# Patient Record
Sex: FEMALE | Race: WHITE | NOT HISPANIC OR LATINO | Employment: OTHER | URBAN - NONMETROPOLITAN AREA
[De-identification: names, ages, dates, MRNs, and addresses within clinical notes are randomized per-mention and may not be internally consistent; named-entity substitution may affect disease eponyms.]

---

## 2023-03-13 DIAGNOSIS — M86.461 CHRONIC OSTEOMYELITIS OF RIGHT TIBIA WITH DRAINING SINUS (MULTI): Primary | ICD-10-CM

## 2023-03-13 DIAGNOSIS — M17.31 POST-TRAUMATIC ARTHRITIS OF LOWER LEG, RIGHT: ICD-10-CM

## 2023-03-13 RX ORDER — OXYCODONE HYDROCHLORIDE 5 MG/1
5 TABLET ORAL EVERY 6 HOURS
Qty: 15 TABLET | Refills: 0 | Status: SHIPPED | OUTPATIENT
Start: 2023-03-13 | End: 2023-03-15 | Stop reason: SDUPTHER

## 2023-03-13 RX ORDER — OXYCODONE HYDROCHLORIDE 5 MG/1
TABLET ORAL EVERY 6 HOURS
COMMUNITY
Start: 2021-10-27 | End: 2023-03-13 | Stop reason: SDUPTHER

## 2023-03-13 NOTE — TELEPHONE ENCOUNTER
Sent this to Mohit but he hasn't been online to fill it. Can you please fill, she will be out tonight.

## 2023-03-15 DIAGNOSIS — M17.31 POST-TRAUMATIC ARTHRITIS OF LOWER LEG, RIGHT: ICD-10-CM

## 2023-03-15 DIAGNOSIS — M86.461 CHRONIC OSTEOMYELITIS OF RIGHT TIBIA WITH DRAINING SINUS (MULTI): ICD-10-CM

## 2023-03-16 RX ORDER — OXYCODONE HYDROCHLORIDE 5 MG/1
5 TABLET ORAL EVERY 6 HOURS
Qty: 60 TABLET | Refills: 0 | Status: SHIPPED | OUTPATIENT
Start: 2023-03-16 | End: 2023-04-01 | Stop reason: SDUPTHER

## 2023-03-29 DIAGNOSIS — M86.461 CHRONIC OSTEOMYELITIS OF RIGHT TIBIA WITH DRAINING SINUS (MULTI): Primary | ICD-10-CM

## 2023-03-29 RX ORDER — OXYCODONE AND ACETAMINOPHEN 5; 325 MG/1; MG/1
1 TABLET ORAL EVERY 6 HOURS PRN
COMMUNITY
Start: 2022-05-27 | End: 2023-03-29 | Stop reason: SDUPTHER

## 2023-03-29 RX ORDER — OXYCODONE AND ACETAMINOPHEN 5; 325 MG/1; MG/1
1 TABLET ORAL EVERY 6 HOURS PRN
Qty: 120 TABLET | Refills: 0 | Status: SHIPPED | OUTPATIENT
Start: 2023-03-29 | End: 2023-04-01 | Stop reason: ALTCHOICE

## 2023-04-01 ENCOUNTER — TELEPHONE (OUTPATIENT)
Dept: PRIMARY CARE | Facility: CLINIC | Age: 68
End: 2023-04-01

## 2023-04-01 DIAGNOSIS — M86.461 CHRONIC OSTEOMYELITIS OF RIGHT TIBIA WITH DRAINING SINUS (MULTI): ICD-10-CM

## 2023-04-01 DIAGNOSIS — M17.31 POST-TRAUMATIC ARTHRITIS OF LOWER LEG, RIGHT: ICD-10-CM

## 2023-04-01 RX ORDER — OXYCODONE HYDROCHLORIDE 5 MG/1
5 TABLET ORAL EVERY 6 HOURS
Qty: 60 TABLET | Refills: 0 | Status: SHIPPED | OUTPATIENT
Start: 2023-04-01 | End: 2023-04-01 | Stop reason: SDUPTHER

## 2023-04-01 RX ORDER — OXYCODONE HYDROCHLORIDE 5 MG/1
5 TABLET ORAL EVERY 6 HOURS
Qty: 60 TABLET | Refills: 0 | Status: SHIPPED | OUTPATIENT
Start: 2023-04-01 | End: 2023-04-14 | Stop reason: SDUPTHER

## 2023-04-01 NOTE — TELEPHONE ENCOUNTER
She has called a few times.  Percocet was called in instead of Oxy 5 mg. She does not want the percocet.  Uses [facility].  They gave her a partial rx for there perc.  Did not have enough.[phone]

## 2023-04-14 DIAGNOSIS — M86.461 CHRONIC OSTEOMYELITIS OF RIGHT TIBIA WITH DRAINING SINUS (MULTI): ICD-10-CM

## 2023-04-14 DIAGNOSIS — M17.31 POST-TRAUMATIC ARTHRITIS OF LOWER LEG, RIGHT: ICD-10-CM

## 2023-04-14 RX ORDER — OXYCODONE HYDROCHLORIDE 5 MG/1
5 TABLET ORAL EVERY 6 HOURS
Qty: 60 TABLET | Refills: 0 | Status: SHIPPED | OUTPATIENT
Start: 2023-04-14 | End: 2023-04-27 | Stop reason: SDUPTHER

## 2023-04-14 NOTE — TELEPHONE ENCOUNTER
Pt is requesting a refill of her oxy 5 mg. She takes them 4x daily. She is using [facility] in [place].

## 2023-04-27 DIAGNOSIS — M17.31 POST-TRAUMATIC ARTHRITIS OF LOWER LEG, RIGHT: ICD-10-CM

## 2023-04-27 DIAGNOSIS — M86.461 CHRONIC OSTEOMYELITIS OF RIGHT TIBIA WITH DRAINING SINUS (MULTI): ICD-10-CM

## 2023-04-27 RX ORDER — OXYCODONE HYDROCHLORIDE 5 MG/1
5 TABLET ORAL EVERY 6 HOURS
Qty: 60 TABLET | Refills: 0 | Status: SHIPPED | OUTPATIENT
Start: 2023-04-27 | End: 2023-05-17 | Stop reason: SDUPTHER

## 2023-05-02 DIAGNOSIS — F32.A DEPRESSION, UNSPECIFIED: ICD-10-CM

## 2023-05-02 RX ORDER — PAROXETINE 10 MG/1
TABLET, FILM COATED ORAL
Qty: 90 TABLET | Refills: 3 | Status: SHIPPED | OUTPATIENT
Start: 2023-05-02 | End: 2023-08-09

## 2023-05-05 DIAGNOSIS — S82.401S: ICD-10-CM

## 2023-05-05 DIAGNOSIS — S82.201S UNSPECIFIED FRACTURE OF SHAFT OF RIGHT TIBIA, SEQUELA: ICD-10-CM

## 2023-05-05 RX ORDER — ASPIRIN 81 MG/1
TABLET ORAL
Qty: 90 TABLET | Refills: 3 | Status: SHIPPED | OUTPATIENT
Start: 2023-05-05 | End: 2023-08-09

## 2023-05-17 DIAGNOSIS — M17.31 POST-TRAUMATIC ARTHRITIS OF LOWER LEG, RIGHT: ICD-10-CM

## 2023-05-17 DIAGNOSIS — M86.461 CHRONIC OSTEOMYELITIS OF RIGHT TIBIA WITH DRAINING SINUS (MULTI): ICD-10-CM

## 2023-05-17 RX ORDER — OXYCODONE HYDROCHLORIDE 5 MG/1
5 TABLET ORAL EVERY 6 HOURS
Qty: 60 TABLET | Refills: 0 | Status: SHIPPED | OUTPATIENT
Start: 2023-05-17 | End: 2023-06-01 | Stop reason: SDUPTHER

## 2023-06-01 DIAGNOSIS — M86.461 CHRONIC OSTEOMYELITIS OF RIGHT TIBIA WITH DRAINING SINUS (MULTI): ICD-10-CM

## 2023-06-01 DIAGNOSIS — M17.31 POST-TRAUMATIC ARTHRITIS OF LOWER LEG, RIGHT: ICD-10-CM

## 2023-06-01 RX ORDER — OXYCODONE HYDROCHLORIDE 5 MG/1
5 TABLET ORAL EVERY 6 HOURS
Qty: 60 TABLET | Refills: 0 | Status: SHIPPED | OUTPATIENT
Start: 2023-06-01 | End: 2023-06-18 | Stop reason: SDUPTHER

## 2023-06-16 ENCOUNTER — TELEPHONE (OUTPATIENT)
Dept: PRIMARY CARE | Facility: CLINIC | Age: 68
End: 2023-06-16
Payer: MEDICARE

## 2023-06-16 NOTE — TELEPHONE ENCOUNTER
Pt called and said that she needs a refill of her OxyCODONE sent to the [facility] pharm in [place]. I did tell pt that you were out of the office today.

## 2023-06-18 DIAGNOSIS — M86.461 CHRONIC OSTEOMYELITIS OF RIGHT TIBIA WITH DRAINING SINUS (MULTI): ICD-10-CM

## 2023-06-18 DIAGNOSIS — M17.31 POST-TRAUMATIC ARTHRITIS OF LOWER LEG, RIGHT: ICD-10-CM

## 2023-06-18 RX ORDER — OXYCODONE HYDROCHLORIDE 5 MG/1
5 TABLET ORAL EVERY 6 HOURS
Qty: 30 TABLET | Refills: 0 | Status: SHIPPED | OUTPATIENT
Start: 2023-06-18 | End: 2023-07-03 | Stop reason: SDUPTHER

## 2023-06-20 LAB
ANION GAP SERPL CALCULATED.3IONS-SCNC: 14 MMOL/L (ref 10–20)
BICARBONATE: 32 MMOL/L (ref 21–32)
CALCIUM SERPL-MCNC: 10.4 MG/DL (ref 8.6–10.6)
CHLORIDE BLD-SCNC: 101 MMOL/L (ref 98–107)
CREAT SERPL-MCNC: 0.59 MG/DL (ref 0.5–1.05)
EGFR FEMALE: >90 ML/MIN/1.73M2
ERYTHROCYTE [DISTWIDTH] IN BLOOD BY AUTOMATED COUNT: 12.7 % (ref 11.5–14.5)
ESTIMATED AVERAGE GLUCOSE: 105 MG/DL
GLUCOSE: 79 MG/DL (ref 74–99)
HBA1C MFR BLD: 5.3 %
HCT VFR BLD CALC: 46 % (ref 36–46)
HEMOGLOBIN: 14.7 G/DL (ref 12–16)
MCHC RBC AUTO-ENTMCNC: 32 G/DL (ref 32–36)
MCV RBC AUTO: 92 FL (ref 80–100)
NUCLEATED RBCS: 0 /100 WBC (ref 0–0)
PLATELET # BLD: 287 X10E9/L (ref 150–450)
POTASSIUM SERPL-SCNC: 4.6 MMOL/L (ref 3.5–5.3)
RBC # BLD: 4.99 X10E12/L (ref 4–5.2)
SODIUM BLD-SCNC: 142 MMOL/L (ref 136–145)
UREA NITROGEN: 11 MG/DL (ref 6–23)
WBC: 6.6 X10E9/L (ref 4.4–11.3)

## 2023-06-23 ENCOUNTER — HOSPITAL ENCOUNTER (OUTPATIENT)
Dept: DATA CONVERSION | Facility: HOSPITAL | Age: 68
End: 2023-06-23
Attending: ORTHOPAEDIC SURGERY | Admitting: ORTHOPAEDIC SURGERY
Payer: MEDICARE

## 2023-06-23 DIAGNOSIS — Z95.810 PRESENCE OF AUTOMATIC (IMPLANTABLE) CARDIAC DEFIBRILLATOR: ICD-10-CM

## 2023-06-23 DIAGNOSIS — Z87.891 PERSONAL HISTORY OF NICOTINE DEPENDENCE: ICD-10-CM

## 2023-06-23 DIAGNOSIS — Z79.82 LONG TERM (CURRENT) USE OF ASPIRIN: ICD-10-CM

## 2023-06-23 DIAGNOSIS — I25.2 OLD MYOCARDIAL INFARCTION: ICD-10-CM

## 2023-06-23 DIAGNOSIS — Z88.0 ALLERGY STATUS TO PENICILLIN: ICD-10-CM

## 2023-06-23 DIAGNOSIS — I10 ESSENTIAL (PRIMARY) HYPERTENSION: ICD-10-CM

## 2023-06-23 DIAGNOSIS — M21.371 FOOT DROP, RIGHT FOOT: ICD-10-CM

## 2023-06-23 DIAGNOSIS — I25.10 ATHEROSCLEROTIC HEART DISEASE OF NATIVE CORONARY ARTERY WITHOUT ANGINA PECTORIS: ICD-10-CM

## 2023-06-23 DIAGNOSIS — J45.998 OTHER ASTHMA (HHS-HCC): ICD-10-CM

## 2023-06-23 DIAGNOSIS — D64.9 ANEMIA, UNSPECIFIED: ICD-10-CM

## 2023-06-23 DIAGNOSIS — M17.31 UNILATERAL POST-TRAUMATIC OSTEOARTHRITIS, RIGHT KNEE: ICD-10-CM

## 2023-06-23 DIAGNOSIS — Z91.041 RADIOGRAPHIC DYE ALLERGY STATUS: ICD-10-CM

## 2023-06-25 LAB
GRAM STAIN: NORMAL
GRAM STAIN: NORMAL
TISSUE/WOUND CULTURE/SMEAR: NORMAL
TISSUE/WOUND CULTURE/SMEAR: NORMAL

## 2023-06-28 LAB
ATRIAL RATE: 67 BPM
P AXIS: 60 DEGREES
P OFFSET: 184 MS
P ONSET: 121 MS
PR INTERVAL: 206 MS
Q ONSET: 224 MS
QRS COUNT: 11 BEATS
QRS DURATION: 88 MS
QT INTERVAL: 476 MS
QTC CALCULATION(BAZETT): 502 MS
QTC FREDERICIA: 494 MS
R AXIS: 73 DEGREES
T AXIS: 65 DEGREES
T OFFSET: 462 MS
VENTRICULAR RATE: 67 BPM

## 2023-07-03 DIAGNOSIS — M86.461 CHRONIC OSTEOMYELITIS OF RIGHT TIBIA WITH DRAINING SINUS (MULTI): ICD-10-CM

## 2023-07-03 DIAGNOSIS — M17.31 POST-TRAUMATIC ARTHRITIS OF LOWER LEG, RIGHT: ICD-10-CM

## 2023-07-03 RX ORDER — OXYCODONE HYDROCHLORIDE 5 MG/1
5 TABLET ORAL EVERY 6 HOURS
Qty: 30 TABLET | Refills: 0 | Status: SHIPPED | OUTPATIENT
Start: 2023-07-03 | End: 2023-07-11 | Stop reason: SDUPTHER

## 2023-07-11 ENCOUNTER — TELEPHONE (OUTPATIENT)
Dept: PRIMARY CARE | Facility: CLINIC | Age: 68
End: 2023-07-11
Payer: MEDICARE

## 2023-07-11 DIAGNOSIS — M17.31 POST-TRAUMATIC ARTHRITIS OF LOWER LEG, RIGHT: ICD-10-CM

## 2023-07-11 DIAGNOSIS — M86.461 CHRONIC OSTEOMYELITIS OF RIGHT TIBIA WITH DRAINING SINUS (MULTI): ICD-10-CM

## 2023-07-11 RX ORDER — OXYCODONE HYDROCHLORIDE 5 MG/1
5 TABLET ORAL EVERY 6 HOURS
Qty: 30 TABLET | Refills: 0 | Status: SHIPPED | OUTPATIENT
Start: 2023-07-11 | End: 2023-07-22 | Stop reason: SDUPTHER

## 2023-07-20 ENCOUNTER — OFFICE VISIT (OUTPATIENT)
Dept: PRIMARY CARE | Facility: CLINIC | Age: 68
End: 2023-07-20
Payer: MEDICARE

## 2023-07-20 VITALS
DIASTOLIC BLOOD PRESSURE: 70 MMHG | BODY MASS INDEX: 23.39 KG/M2 | HEART RATE: 72 BPM | WEIGHT: 163 LBS | SYSTOLIC BLOOD PRESSURE: 120 MMHG | OXYGEN SATURATION: 96 %

## 2023-07-20 DIAGNOSIS — M86.461 CHRONIC OSTEOMYELITIS OF RIGHT TIBIA WITH DRAINING SINUS (MULTI): ICD-10-CM

## 2023-07-20 DIAGNOSIS — M17.31 POST-TRAUMATIC ARTHRITIS OF LOWER LEG, RIGHT: Primary | ICD-10-CM

## 2023-07-20 PROCEDURE — 1126F AMNT PAIN NOTED NONE PRSNT: CPT

## 2023-07-20 PROCEDURE — 99213 OFFICE O/P EST LOW 20 MIN: CPT

## 2023-07-20 PROCEDURE — 1159F MED LIST DOCD IN RCRD: CPT

## 2023-07-20 PROCEDURE — 1036F TOBACCO NON-USER: CPT

## 2023-07-20 PROCEDURE — 1157F ADVNC CARE PLAN IN RCRD: CPT

## 2023-07-20 PROCEDURE — 1160F RVW MEDS BY RX/DR IN RCRD: CPT

## 2023-07-20 RX ORDER — OXYCODONE HYDROCHLORIDE 5 MG/1
5 TABLET ORAL EVERY 4 HOURS PRN
Qty: 15 TABLET | Refills: 0 | Status: SHIPPED | OUTPATIENT
Start: 2023-07-20 | End: 2023-07-27

## 2023-07-20 ASSESSMENT — COLUMBIA-SUICIDE SEVERITY RATING SCALE - C-SSRS
6. HAVE YOU EVER DONE ANYTHING, STARTED TO DO ANYTHING, OR PREPARED TO DO ANYTHING TO END YOUR LIFE?: NO
2. HAVE YOU ACTUALLY HAD ANY THOUGHTS OF KILLING YOURSELF?: NO
1. IN THE PAST MONTH, HAVE YOU WISHED YOU WERE DEAD OR WISHED YOU COULD GO TO SLEEP AND NOT WAKE UP?: NO

## 2023-07-20 ASSESSMENT — ENCOUNTER SYMPTOMS
LOSS OF SENSATION IN FEET: 0
DEPRESSION: 0
OCCASIONAL FEELINGS OF UNSTEADINESS: 0

## 2023-07-20 ASSESSMENT — PATIENT HEALTH QUESTIONNAIRE - PHQ9
SUM OF ALL RESPONSES TO PHQ9 QUESTIONS 1 AND 2: 0
2. FEELING DOWN, DEPRESSED OR HOPELESS: NOT AT ALL
1. LITTLE INTEREST OR PLEASURE IN DOING THINGS: NOT AT ALL
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
## 2023-07-22 DIAGNOSIS — M17.31 POST-TRAUMATIC ARTHRITIS OF LOWER LEG, RIGHT: ICD-10-CM

## 2023-07-22 DIAGNOSIS — M86.461 CHRONIC OSTEOMYELITIS OF RIGHT TIBIA WITH DRAINING SINUS (MULTI): Primary | ICD-10-CM

## 2023-07-22 DIAGNOSIS — M86.461 CHRONIC OSTEOMYELITIS OF RIGHT TIBIA WITH DRAINING SINUS (MULTI): ICD-10-CM

## 2023-07-22 RX ORDER — OXYCODONE HYDROCHLORIDE 5 MG/1
5 TABLET ORAL EVERY 6 HOURS
Qty: 60 TABLET | Refills: 0 | Status: SHIPPED | OUTPATIENT
Start: 2023-07-22 | End: 2023-08-07 | Stop reason: SDUPTHER

## 2023-07-22 RX ORDER — NALOXONE HYDROCHLORIDE 4 MG/.1ML
4 SPRAY NASAL AS NEEDED
Qty: 2 EACH | Refills: 0 | Status: SHIPPED | OUTPATIENT
Start: 2023-07-22 | End: 2023-08-09

## 2023-07-26 ENCOUNTER — HOSPITAL ENCOUNTER (OUTPATIENT)
Dept: DATA CONVERSION | Facility: HOSPITAL | Age: 68
End: 2023-07-26
Attending: OPHTHALMOLOGY | Admitting: OPHTHALMOLOGY
Payer: MEDICARE

## 2023-07-26 DIAGNOSIS — Z88.0 ALLERGY STATUS TO PENICILLIN: ICD-10-CM

## 2023-07-26 DIAGNOSIS — H25.812 COMBINED FORMS OF AGE-RELATED CATARACT, LEFT EYE: ICD-10-CM

## 2023-07-26 DIAGNOSIS — Z91.041 RADIOGRAPHIC DYE ALLERGY STATUS: ICD-10-CM

## 2023-07-26 DIAGNOSIS — H26.9 UNSPECIFIED CATARACT: ICD-10-CM

## 2023-08-07 ENCOUNTER — TELEPHONE (OUTPATIENT)
Dept: PRIMARY CARE | Facility: CLINIC | Age: 68
End: 2023-08-07
Payer: MEDICARE

## 2023-08-07 DIAGNOSIS — M17.31 POST-TRAUMATIC ARTHRITIS OF LOWER LEG, RIGHT: ICD-10-CM

## 2023-08-07 DIAGNOSIS — M86.461 CHRONIC OSTEOMYELITIS OF RIGHT TIBIA WITH DRAINING SINUS (MULTI): ICD-10-CM

## 2023-08-07 RX ORDER — OXYCODONE HYDROCHLORIDE 5 MG/1
5 TABLET ORAL EVERY 6 HOURS
Qty: 60 TABLET | Refills: 0 | Status: SHIPPED | OUTPATIENT
Start: 2023-08-07 | End: 2023-08-09 | Stop reason: SDUPTHER

## 2023-08-07 NOTE — TELEPHONE ENCOUNTER
Has appointment 8/9/23 but says she will run out of oxycodone tomorrow, will you send refill to [facility]?

## 2023-08-09 ENCOUNTER — OFFICE VISIT (OUTPATIENT)
Dept: PRIMARY CARE | Facility: CLINIC | Age: 68
End: 2023-08-09
Payer: MEDICARE

## 2023-08-09 VITALS
SYSTOLIC BLOOD PRESSURE: 132 MMHG | DIASTOLIC BLOOD PRESSURE: 78 MMHG | WEIGHT: 175 LBS | OXYGEN SATURATION: 94 % | BODY MASS INDEX: 25.11 KG/M2 | HEART RATE: 66 BPM

## 2023-08-09 DIAGNOSIS — I45.81 LONG QT SYNDROME: ICD-10-CM

## 2023-08-09 DIAGNOSIS — M17.31 POST-TRAUMATIC ARTHRITIS OF LOWER LEG, RIGHT: ICD-10-CM

## 2023-08-09 DIAGNOSIS — M86.461 CHRONIC OSTEOMYELITIS OF RIGHT TIBIA WITH DRAINING SINUS (MULTI): ICD-10-CM

## 2023-08-09 PROBLEM — N20.0 NEPHROLITHIASIS: Status: ACTIVE | Noted: 2023-08-09

## 2023-08-09 PROBLEM — I47.21 TORSADES DE POINTES (MULTI): Status: ACTIVE | Noted: 2023-08-09

## 2023-08-09 PROBLEM — G89.29 CHRONIC HIP PAIN: Status: ACTIVE | Noted: 2023-08-09

## 2023-08-09 PROBLEM — R26.9 ABNORMAL GAIT: Status: ACTIVE | Noted: 2023-08-09

## 2023-08-09 PROBLEM — I42.9 CARDIOMYOPATHY (MULTI): Status: ACTIVE | Noted: 2023-08-09

## 2023-08-09 PROBLEM — S02.30XA: Status: ACTIVE | Noted: 2023-08-09

## 2023-08-09 PROBLEM — R94.31 LONG QT INTERVAL: Status: ACTIVE | Noted: 2023-08-09

## 2023-08-09 PROBLEM — T78.40XA ALLERGIES: Status: ACTIVE | Noted: 2023-08-09

## 2023-08-09 PROBLEM — H81.12 BENIGN PAROXYSMAL POSITIONAL VERTIGO OF LEFT EAR: Status: ACTIVE | Noted: 2023-08-09

## 2023-08-09 PROBLEM — E55.9 VITAMIN D DEFICIENCY: Status: ACTIVE | Noted: 2023-08-09

## 2023-08-09 PROBLEM — H50.111 EXOTROPIA OF RIGHT EYE: Status: ACTIVE | Noted: 2023-08-09

## 2023-08-09 PROBLEM — H25.812 COMBINED FORMS OF AGE-RELATED CATARACT OF LEFT EYE: Status: ACTIVE | Noted: 2023-08-09

## 2023-08-09 PROBLEM — I49.8 BIGEMINY: Status: ACTIVE | Noted: 2023-08-09

## 2023-08-09 PROBLEM — I49.3 PVC (PREMATURE VENTRICULAR CONTRACTION): Status: ACTIVE | Noted: 2023-08-09

## 2023-08-09 PROBLEM — M25.571 RIGHT ANKLE PAIN: Status: ACTIVE | Noted: 2023-08-09

## 2023-08-09 PROBLEM — G43.119 INTRACTABLE MIGRAINE WITH AURA WITHOUT STATUS MIGRAINOSUS: Status: ACTIVE | Noted: 2022-01-09
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
## 2023-08-09 PROBLEM — M25.559 CHRONIC HIP PAIN: Status: ACTIVE | Noted: 2023-08-09

## 2023-08-09 PROBLEM — M81.0 OSTEOPOROSIS: Status: ACTIVE | Noted: 2023-08-09

## 2023-08-09 LAB
AMPHETAMINE (PRESENCE) IN URINE BY SCREEN METHOD: ABNORMAL
BARBITURATES PRESENCE IN URINE BY SCREEN METHOD: ABNORMAL
BENZODIAZEPINE (PRESENCE) IN URINE BY SCREEN METHOD: ABNORMAL
CANNABINOIDS IN URINE BY SCREEN METHOD: ABNORMAL
COCAINE (PRESENCE) IN URINE BY SCREEN METHOD: ABNORMAL
DRUG SCREEN COMMENT URINE: ABNORMAL
FENTANYL URINE: ABNORMAL
METHADONE (PRESENCE) IN URINE BY SCREEN METHOD: ABNORMAL
OPIATES (PRESENCE) IN URINE BY SCREEN METHOD: ABNORMAL
OXYCODONE (PRESENCE) IN URINE BY SCREEN METHOD: ABNORMAL
PHENCYCLIDINE (PRESENCE) IN URINE BY SCREEN METHOD: ABNORMAL

## 2023-08-09 PROCEDURE — 1160F RVW MEDS BY RX/DR IN RCRD: CPT | Performed by: FAMILY MEDICINE

## 2023-08-09 PROCEDURE — 99213 OFFICE O/P EST LOW 20 MIN: CPT | Performed by: FAMILY MEDICINE

## 2023-08-09 PROCEDURE — 1159F MED LIST DOCD IN RCRD: CPT | Performed by: FAMILY MEDICINE

## 2023-08-09 PROCEDURE — 1126F AMNT PAIN NOTED NONE PRSNT: CPT | Performed by: FAMILY MEDICINE

## 2023-08-09 PROCEDURE — 80307 DRUG TEST PRSMV CHEM ANLYZR: CPT

## 2023-08-09 PROCEDURE — G0439 PPPS, SUBSEQ VISIT: HCPCS | Performed by: FAMILY MEDICINE

## 2023-08-09 PROCEDURE — 1157F ADVNC CARE PLAN IN RCRD: CPT | Performed by: FAMILY MEDICINE

## 2023-08-09 PROCEDURE — 1036F TOBACCO NON-USER: CPT | Performed by: FAMILY MEDICINE

## 2023-08-09 PROCEDURE — 1170F FXNL STATUS ASSESSED: CPT | Performed by: FAMILY MEDICINE

## 2023-08-09 RX ORDER — CALCIUM CARBONATE/VITAMIN D3 600MG-5MCG
1 TABLET ORAL DAILY
COMMUNITY
Start: 2023-01-03 | End: 2023-11-21 | Stop reason: ALTCHOICE

## 2023-08-09 RX ORDER — NALOXONE HYDROCHLORIDE 4 MG/.1ML
4 SPRAY NASAL AS NEEDED
Qty: 2 EACH | Refills: 0
Start: 2023-08-09 | End: 2024-08-08

## 2023-08-09 RX ORDER — OXYCODONE HYDROCHLORIDE 5 MG/1
5 TABLET ORAL EVERY 6 HOURS
Qty: 120 TABLET | Refills: 0 | Status: SHIPPED | OUTPATIENT
Start: 2023-08-09 | End: 2023-09-13 | Stop reason: SDUPTHER

## 2023-08-09 RX ORDER — BACLOFEN 5 MG/1
1 TABLET ORAL DAILY PRN
COMMUNITY
Start: 2022-02-10

## 2023-08-09 RX ORDER — LANOLIN ALCOHOL/MO/W.PET/CERES
1 CREAM (GRAM) TOPICAL DAILY
COMMUNITY
Start: 2023-02-08 | End: 2023-11-21 | Stop reason: ALTCHOICE

## 2023-08-09 RX ORDER — POTASSIUM CHLORIDE 1.5 G/1
20 POWDER, FOR SOLUTION ORAL DAILY
COMMUNITY
End: 2023-11-21 | Stop reason: ALTCHOICE

## 2023-08-09 RX ORDER — ALBUTEROL SULFATE 90 UG/1
2 AEROSOL, METERED RESPIRATORY (INHALATION) EVERY 6 HOURS PRN
COMMUNITY
End: 2024-04-23 | Stop reason: SDUPTHER

## 2023-08-09 RX ORDER — PREDNISOLONE ACETATE 10 MG/ML
SUSPENSION/ DROPS OPHTHALMIC
COMMUNITY
Start: 2023-07-26 | End: 2024-02-20 | Stop reason: ALTCHOICE

## 2023-08-09 ASSESSMENT — ACTIVITIES OF DAILY LIVING (ADL)
DRESSING: NEEDS ASSISTANCE
TAKING_MEDICATION: NEEDS ASSISTANCE
BATHING: NEEDS ASSISTANCE
GROCERY_SHOPPING: NEEDS ASSISTANCE
DOING_HOUSEWORK: NEEDS ASSISTANCE
MANAGING_FINANCES: INDEPENDENT

## 2023-08-09 ASSESSMENT — ENCOUNTER SYMPTOMS
APPETITE CHANGE: 0
DIFFICULTY URINATING: 0
BLOOD IN STOOL: 0
UNEXPECTED WEIGHT CHANGE: 0
SHORTNESS OF BREATH: 0
SEIZURES: 0
CONSTIPATION: 0
DIARRHEA: 0
JOINT SWELLING: 1
FEVER: 0
CONFUSION: 0
PALPITATIONS: 0
TROUBLE SWALLOWING: 0
NERVOUS/ANXIOUS: 0
COLOR CHANGE: 0
ARTHRALGIAS: 1
HEMATURIA: 0
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
## 2023-08-10 PROCEDURE — 80361 OPIATES 1 OR MORE: CPT

## 2023-08-10 PROCEDURE — 80365 DRUG SCREENING OXYCODONE: CPT

## 2023-08-14 LAB
6-ACETYLMORPHINE: <25 NG/ML
CODEINE: <50 NG/ML
HYDROCODONE: <25 NG/ML
HYDROMORPHONE: <25 NG/ML
MORPHINE URINE: <50 NG/ML
NORHYDROCODONE: <25 NG/ML
NOROXYCODONE: >1000 NG/ML
OXYCODONE: 489 NG/ML
OXYMORPHONE: 1588 NG/ML

## 2023-08-21 ENCOUNTER — OFFICE VISIT (OUTPATIENT)
Dept: PRIMARY CARE | Facility: CLINIC | Age: 68
End: 2023-08-21
Payer: MEDICARE

## 2023-08-21 VITALS
WEIGHT: 175 LBS | DIASTOLIC BLOOD PRESSURE: 69 MMHG | BODY MASS INDEX: 25.05 KG/M2 | OXYGEN SATURATION: 96 % | HEART RATE: 69 BPM | SYSTOLIC BLOOD PRESSURE: 113 MMHG | HEIGHT: 70 IN

## 2023-08-21 DIAGNOSIS — M86.461 CHRONIC OSTEOMYELITIS OF RIGHT TIBIA WITH DRAINING SINUS (MULTI): ICD-10-CM

## 2023-08-21 DIAGNOSIS — M17.31 POST-TRAUMATIC ARTHRITIS OF LOWER LEG, RIGHT: Primary | ICD-10-CM

## 2023-08-21 DIAGNOSIS — G89.29 CHRONIC PAIN OF RIGHT HIP: ICD-10-CM
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
## 2023-08-21 DIAGNOSIS — R09.81 SINUS CONGESTION: ICD-10-CM

## 2023-08-21 DIAGNOSIS — M25.551 CHRONIC PAIN OF RIGHT HIP: ICD-10-CM

## 2023-08-21 DIAGNOSIS — L03.115 CELLULITIS OF RIGHT LOWER EXTREMITY: ICD-10-CM

## 2023-08-21 PROCEDURE — 99213 OFFICE O/P EST LOW 20 MIN: CPT | Performed by: FAMILY MEDICINE

## 2023-08-21 PROCEDURE — 1126F AMNT PAIN NOTED NONE PRSNT: CPT | Performed by: FAMILY MEDICINE

## 2023-08-21 PROCEDURE — 1160F RVW MEDS BY RX/DR IN RCRD: CPT | Performed by: FAMILY MEDICINE

## 2023-08-21 PROCEDURE — 1036F TOBACCO NON-USER: CPT | Performed by: FAMILY MEDICINE

## 2023-08-21 PROCEDURE — 1157F ADVNC CARE PLAN IN RCRD: CPT | Performed by: FAMILY MEDICINE

## 2023-08-21 PROCEDURE — 1159F MED LIST DOCD IN RCRD: CPT | Performed by: FAMILY MEDICINE

## 2023-08-21 RX ORDER — CEPHALEXIN 500 MG/1
500 CAPSULE ORAL 3 TIMES DAILY
Qty: 21 CAPSULE | Refills: 0 | Status: SHIPPED | OUTPATIENT
Start: 2023-08-21 | End: 2023-08-28

## 2023-08-21 ASSESSMENT — ENCOUNTER SYMPTOMS
CONFUSION: 0
VOICE CHANGE: 1
NERVOUS/ANXIOUS: 0
BLOOD IN STOOL: 0
CONSTIPATION: 0
HEMATURIA: 0
TROUBLE SWALLOWING: 0
DIARRHEA: 0
JOINT SWELLING: 0
FEVER: 0
SINUS PAIN: 1
ARTHRALGIAS: 0
APPETITE CHANGE: 0
SINUS PRESSURE: 1
SORE THROAT: 1
SEIZURES: 0
PALPITATIONS: 0
SHORTNESS OF BREATH: 0
DIFFICULTY URINATING: 0
UNEXPECTED WEIGHT CHANGE: 0
RHINORRHEA: 1
COLOR CHANGE: 0
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
## 2023-08-28 ENCOUNTER — TELEPHONE (OUTPATIENT)
Dept: PRIMARY CARE | Facility: CLINIC | Age: 68
End: 2023-08-28
Payer: MEDICARE

## 2023-08-28 NOTE — TELEPHONE ENCOUNTER
"Pt needs referral to therapist so that therapist conducting evaluation can write rx for elevating seat lift wheelchair. Bret Cruz from [employer].     \"Elevation for power wheelchair with lift seat\"   Please fax to [employer].   The chair will come from a company called[facility] [employer] [place] in [place].  "

## 2023-08-29 NOTE — TELEPHONE ENCOUNTER
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

## 2023-09-12 DIAGNOSIS — K21.9 GASTROESOPHAGEAL REFLUX DISEASE WITHOUT ESOPHAGITIS: Primary | ICD-10-CM

## 2023-09-12 RX ORDER — PANTOPRAZOLE SODIUM 20 MG/1
20 TABLET, DELAYED RELEASE ORAL DAILY
Qty: 90 TABLET | Refills: 1 | Status: SHIPPED | OUTPATIENT
Start: 2023-09-12 | End: 2023-11-21 | Stop reason: ALTCHOICE

## 2023-09-13 ENCOUNTER — OFFICE VISIT (OUTPATIENT)
Dept: PRIMARY CARE | Facility: CLINIC | Age: 68
End: 2023-09-13
Payer: MEDICARE

## 2023-09-13 VITALS
WEIGHT: 175 LBS | SYSTOLIC BLOOD PRESSURE: 111 MMHG | BODY MASS INDEX: 25.11 KG/M2 | OXYGEN SATURATION: 95 % | DIASTOLIC BLOOD PRESSURE: 78 MMHG | HEART RATE: 41 BPM

## 2023-09-13 DIAGNOSIS — I42.9 CARDIOMYOPATHY, UNSPECIFIED TYPE (MULTI): ICD-10-CM

## 2023-09-13 DIAGNOSIS — I46.2 CARDIAC ARREST DUE TO UNDERLYING CARDIAC CONDITION (MULTI): Primary | ICD-10-CM

## 2023-09-13 DIAGNOSIS — M86.461 CHRONIC OSTEOMYELITIS OF RIGHT TIBIA WITH DRAINING SINUS (MULTI): ICD-10-CM

## 2023-09-13 DIAGNOSIS — S02.30XA: ICD-10-CM

## 2023-09-13 DIAGNOSIS — I47.21 TORSADES DE POINTES (MULTI): ICD-10-CM

## 2023-09-13 DIAGNOSIS — M17.31 POST-TRAUMATIC ARTHRITIS OF LOWER LEG, RIGHT: ICD-10-CM

## 2023-09-13 DIAGNOSIS — I49.8 BIGEMINY: ICD-10-CM

## 2023-09-13 PROCEDURE — 1126F AMNT PAIN NOTED NONE PRSNT: CPT | Performed by: FAMILY MEDICINE

## 2023-09-13 PROCEDURE — 1159F MED LIST DOCD IN RCRD: CPT | Performed by: FAMILY MEDICINE

## 2023-09-13 PROCEDURE — 1036F TOBACCO NON-USER: CPT | Performed by: FAMILY MEDICINE

## 2023-09-13 PROCEDURE — 1160F RVW MEDS BY RX/DR IN RCRD: CPT | Performed by: FAMILY MEDICINE

## 2023-09-13 PROCEDURE — 99213 OFFICE O/P EST LOW 20 MIN: CPT | Performed by: FAMILY MEDICINE

## 2023-09-13 PROCEDURE — 1157F ADVNC CARE PLAN IN RCRD: CPT | Performed by: FAMILY MEDICINE

## 2023-09-13 RX ORDER — PREGABALIN 75 MG/1
75 CAPSULE ORAL 2 TIMES DAILY
Qty: 60 CAPSULE | Refills: 1 | Status: SHIPPED | OUTPATIENT
Start: 2023-09-13 | End: 2023-09-25 | Stop reason: ALTCHOICE

## 2023-09-13 RX ORDER — OXYCODONE HYDROCHLORIDE 5 MG/1
5-10 TABLET ORAL EVERY 6 HOURS
Qty: 240 TABLET | Refills: 0 | Status: SHIPPED | OUTPATIENT
Start: 2023-09-13 | End: 2023-09-21 | Stop reason: SDUPTHER

## 2023-09-13 ASSESSMENT — ENCOUNTER SYMPTOMS
SHORTNESS OF BREATH: 0
FATIGUE: 1
PALPITATIONS: 0
COLOR CHANGE: 0
JOINT SWELLING: 0
HEMATURIA: 0
SINUS PAIN: 1
SINUS PRESSURE: 1
TROUBLE SWALLOWING: 0
NERVOUS/ANXIOUS: 0
APPETITE CHANGE: 0
DIFFICULTY URINATING: 0
SEIZURES: 0
RHINORRHEA: 1
CONFUSION: 0
SORE THROAT: 1
VOICE CHANGE: 1
BLOOD IN STOOL: 0
CONSTIPATION: 0
UNEXPECTED WEIGHT CHANGE: 0
FEVER: 0
DIARRHEA: 0
ARTHRALGIAS: 0
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
## 2023-09-21 DIAGNOSIS — M86.461 CHRONIC OSTEOMYELITIS OF RIGHT TIBIA WITH DRAINING SINUS (MULTI): ICD-10-CM

## 2023-09-21 DIAGNOSIS — M17.31 POST-TRAUMATIC ARTHRITIS OF LOWER LEG, RIGHT: ICD-10-CM

## 2023-09-25 ENCOUNTER — TELEPHONE (OUTPATIENT)
Dept: PRIMARY CARE | Facility: CLINIC | Age: 68
End: 2023-09-25
Payer: MEDICARE

## 2023-09-25 DIAGNOSIS — M86.461 CHRONIC OSTEOMYELITIS OF RIGHT TIBIA WITH DRAINING SINUS (MULTI): Primary | ICD-10-CM

## 2023-09-25 RX ORDER — OXYCODONE HYDROCHLORIDE 5 MG/1
5-10 TABLET ORAL EVERY 6 HOURS
Qty: 240 TABLET | Refills: 0 | Status: SHIPPED | OUTPATIENT
Start: 2023-09-25 | End: 2023-10-25

## 2023-09-25 RX ORDER — PREGABALIN 50 MG/1
50 CAPSULE ORAL 2 TIMES DAILY
Qty: 60 CAPSULE | Refills: 0 | Status: SHIPPED | OUTPATIENT
Start: 2023-09-25 | End: 2024-01-17 | Stop reason: WASHOUT

## 2023-09-25 NOTE — TELEPHONE ENCOUNTER
She said her lyrica is making her very sleepy, asked if she can do lower dose or something else? Also asked for refill of her oxycodone

## 2023-09-28 LAB
C REACTIVE PROTEIN (MG/L) IN SER/PLAS: 0.59 MG/DL
C-REACTIVE PROTEIN: 0.59 MG/DL
SEDIMENTATION RATE, ERYTHROCYTE: 36 MM/H (ref 0–30)
SEDIMENTATION RATE, ERYTHROCYTE: 36 MM/H (ref 0–30)

## 2023-09-30 NOTE — H&P
History & Physical Reviewed:   I have reviewed the History and Physical dated:  20-Jun-2023   History and Physical reviewed and relevant findings noted. Patient examined to review pertinent physical  findings.: No significant changes   Home Medications Reviewed: no changes noted   Allergies Reviewed: no changes noted       ERAS (Enhanced Recovery After Surgery):  ·  ERAS Patient: no     Consent:   COVID-19 Consent:  ·  COVID-19 Risk Consent Surgeon has reviewed key risks related to the risk of mark COVID-19 and if they contract COVID-19 what the risks are.     Attestation:   Note Completion:  I am a:  Resident/Fellow   Attending Attestation I saw and evaluated the patient.  I personally obtained the key and critical portions of the history and physical exam or was physically present for key and  critical portions performed by the resident/fellow. I reviewed the resident/fellow?s documentation and discussed the patient with the resident/fellow.  I agree with the resident/fellow?s medical decision making as documented in the note.     I personally evaluated the patient on 23-Jun-2023         Electronic Signatures:  Julieta Saez (Resident))  (Signed 23-Jun-2023 10:09)   Authored: History & Physical Reviewed, ERAS, Consent,  Note Completion  Andrzej Whelan)  (Signed 23-Jun-2023 12:02)   Authored: Note Completion   Co-Signer: History & Physical Reviewed, ERAS, Consent, Note Completion      Last Updated: 23-Jun-2023 12:02 by Andrzej Whelan)

## 2023-09-30 NOTE — H&P
History of Present Illness:   History Present Illness:  Reason for surgery: cataract left eye   HPI:    visually significant cataract OS    Allergies:        Allergies:  ·  contrast (specific type unknown) : Anaphylaxis  ·  penicillin : Anaphylaxis  ·  Augmentin : Other (Moderate)  ·  tramadol : Other (Moderate)  ·  naproxen : Bleeding (Severe)  ·  Vibramycin : Unknown  ·  Zofran : Unknown       Intolerances:  ·  codeine : Nausea/Vomiting    Home Medication Review:   Home Medications Reviewed: yes     Impression/Procedure:   ·  Impression and Planned Procedure: cataract extraction and intraocular lens insertion left eye       ERAS (Enhanced Recovery After Surgery):  ·  ERAS Patient: no       Physical Exam by System:    Constitutional: Well developed, awake/alert/oriented  x3, no distress, alert and cooperative   Respiratory/Thorax: Patent airways, CTAB, normal  breath sounds with good chest expansion, thorax symmetric   Cardiovascular: Regular, rate and rhythm, no murmurs,  2+ equal pulses of the extremities, normal S 1and S 2   Skin: Warm and dry, no lesions, no rashes     Consent:   COVID-19 Consent:  ·  COVID-19 Risk Consent Surgeon has reviewed key risks related to the risk of mark COVID-19 and if they contract COVID-19 what the risks are.     Attestation:   Note Completion:  I am a:  Resident/Fellow   Attending Attestation I saw and evaluated the patient.  I personally obtained the key and critical portions of the history and physical exam or was physically present for key and  critical portions performed by the resident/fellow. I reviewed the resident/fellow?s documentation and discussed the patient with the resident/fellow.  I agree with the resident/fellow?s medical decision making as documented in the note.     I personally evaluated the patient on 26-Jul-2023         Electronic Signatures:  Sveta Minaya (Resident))  (Signed 26-Jul-2023 07:04)   Authored: History of Present Illness,  Allergies, Home  Medication Review, Impression/Procedure, ERAS, Physical Exam, Consent, Note Completion  Nehal Gill)  (Signed 26-Jul-2023 12:54)   Authored: Note Completion   Co-Signer: History of Present Illness, Allergies, Home Medication Review, Impression/Procedure, ERAS, Physical Exam, Consent, Note Completion      Last Updated: 26-Jul-2023 12:54 by Nehal Gill)

## 2023-10-02 NOTE — OP NOTE
Post Operative Note:     PreOp Diagnosis: Right tibia painful hardware   Post-Procedure Diagnosis: same as preop   Procedure: 1. Right tibia removal of hardware  2.   3.   4.   5.   Surgeon: Dr. Whelan   Resident/Fellow/Other Assistant: REENA Saez; AMBAR Lutz   Estimated Blood Loss (mL): 10   Specimen: yes   Findings: consistent with preoperative diagnosis   Additional Details: WBAT  d/c home from PACU     Attestation:   Note Completion:  I am a: Resident/Fellow   Attending Attestation I was present for the entire procedure          Electronic Signatures:  Julieta Saez (Resident))  (Signed 23-Jun-2023 12:26)   Authored: Post Operative Note, Note Completion  Andrzej Whelan)  (Signed 23-Jun-2023 17:00)   Authored: Note Completion   Co-Signer: Post Operative Note, Note Completion      Last Updated: 23-Jun-2023 17:00 by Andrzej Whelan)

## 2023-10-02 NOTE — OP NOTE
Post Operative Note:     PreOp Diagnosis: cataract OS   Post-Procedure Diagnosis: pseudophakia OS   Procedure: cataract extraction and intraocular lens  insertion left eye   Surgeon: Jairo DELATORRE   Resident/Fellow/Other Assistant: Rei MARTIN   Estimated Blood Loss (mL): none   Specimen: no   Findings: Visually significant cataract     Operative Report Dictated:  Dictation: not applicable - note contains Operative  Report    Operative Report:    The patient was placed in the supine position on the operating room table where appropriate blood pressure and cardiac monitoring were initiated. The patient was  prepped and draped in the usual sterile fashion for intraocular surgery. This included instillation of Betadine 5% onto the ocular surface followed by irrigation with balance salt solution a minute or two later. A lid speculum was placed and the operating  microscope was positioned. One paracentesis stab incision was made to the left of the planned cataract incision with a 15-degree supersharp blade. 1 ml of preservative free lidocaine was injected into the AC. Viscoat was used to replace the aqueous humor.  A temporal clear corneal wound was fashioned beginning at the limbus with a 2.2 mm keratome, extending 2 mm into clear cornea before entering the anterior chamber. A continuous tear circular capsulorhexis of approximately 5 mm in diameter was performed.  Hydrodissection was performed using a Sears canula. The endothelium was coated with viscoat again. Using the Ozil handpiece on the [employer] Lens Removal System, the nucleus was emulsified and aspirated using a divide-and-conquer technique. Residual cortex  was removed from the eye with the irrigation/aspiration bimanually. ProVisc was used to inflate the capsular bag. The lens implant was inspected and found to be free of visible defects. The lens used was an Gianluca model AU00T0, power 20.0 diopter intraocular  lens. The lens was inserted into the capsular bag using  the [employer] insertion mechanism. The lens was centered with a Stovall hook. Residual viscoelastic was removed from the eye with the irrigation/aspiration instrument. Preservative free moxifloxacin  was injected  intracameral. The wounds were checked and found to be watertight. The lid speculum was removed and the eye was dressed with Maxitrol ointment, eye pad, tape, and shield. The patient tolerated the procedure well, and there were no complications.        Attestation:   Note Completion:  I am a: Resident/Fellow   Attending Attestation I was present for the entire procedure          Electronic Signatures:  Sveta Minaya (Resident))  (Signed 26-Jul-2023 10:03)   Authored: Post Operative Note, Note Completion  Nehal Gill)  (Signed 26-Jul-2023 13:25)   Authored: Post Operative Note, Note Completion   Co-Signer: Post Operative Note, Note Completion      Last Updated: 26-Jul-2023 13:25 by Nehal Gill)

## 2023-10-19 ENCOUNTER — HOSPITAL ENCOUNTER (OUTPATIENT)
Dept: RADIOLOGY | Facility: HOSPITAL | Age: 68
Discharge: HOME | End: 2023-10-19
Payer: MEDICARE

## 2023-10-19 DIAGNOSIS — S82.141B: ICD-10-CM

## 2023-10-19 PROCEDURE — 73700 CT LOWER EXTREMITY W/O DYE: CPT | Mod: RT

## 2023-10-19 PROCEDURE — 73700 CT LOWER EXTREMITY W/O DYE: CPT | Mod: RIGHT SIDE | Performed by: RADIOLOGY

## 2023-11-15 ENCOUNTER — TELEPHONE (OUTPATIENT)
Dept: PRIMARY CARE | Facility: CLINIC | Age: 68
End: 2023-11-15
Payer: MEDICARE

## 2023-11-15 DIAGNOSIS — M17.31 POST-TRAUMATIC ARTHRITIS OF LOWER LEG, RIGHT: ICD-10-CM

## 2023-11-15 DIAGNOSIS — M86.461 CHRONIC OSTEOMYELITIS OF RIGHT TIBIA WITH DRAINING SINUS (MULTI): ICD-10-CM

## 2023-11-15 DIAGNOSIS — S02.30XA: Primary | ICD-10-CM

## 2023-11-15 RX ORDER — OXYCODONE HYDROCHLORIDE 5 MG/1
5 TABLET ORAL EVERY 6 HOURS PRN
Qty: 120 TABLET | Refills: 0 | Status: SHIPPED | OUTPATIENT
Start: 2023-11-15 | End: 2023-12-14 | Stop reason: SDUPTHER

## 2023-12-05 ENCOUNTER — ANESTHESIA EVENT (OUTPATIENT)
Dept: OPERATING ROOM | Facility: CLINIC | Age: 68
End: 2023-12-05
Payer: MEDICARE

## 2023-12-06 ENCOUNTER — HOSPITAL ENCOUNTER (OUTPATIENT)
Facility: CLINIC | Age: 68
Setting detail: OUTPATIENT SURGERY
Discharge: HOME | End: 2023-12-06
Attending: OPHTHALMOLOGY | Admitting: OPHTHALMOLOGY
Payer: MEDICARE

## 2023-12-06 ENCOUNTER — ANESTHESIA (OUTPATIENT)
Dept: OPERATING ROOM | Facility: CLINIC | Age: 68
End: 2023-12-06
Payer: MEDICARE

## 2023-12-06 VITALS
WEIGHT: 174.89 LBS | SYSTOLIC BLOOD PRESSURE: 138 MMHG | TEMPERATURE: 97.9 F | OXYGEN SATURATION: 98 % | BODY MASS INDEX: 25.04 KG/M2 | RESPIRATION RATE: 16 BRPM | HEIGHT: 70 IN | HEART RATE: 68 BPM | DIASTOLIC BLOOD PRESSURE: 69 MMHG

## 2023-12-06 DIAGNOSIS — H25.811 COMBINED FORMS OF AGE-RELATED CATARACT, RIGHT EYE: Primary | ICD-10-CM

## 2023-12-06 PROCEDURE — 2500000001 HC RX 250 WO HCPCS SELF ADMINISTERED DRUGS (ALT 637 FOR MEDICARE OP): Performed by: OPHTHALMOLOGY

## 2023-12-06 PROCEDURE — 3600000003 HC OR TIME - INITIAL BASE CHARGE - PROCEDURE LEVEL THREE: Performed by: OPHTHALMOLOGY

## 2023-12-06 PROCEDURE — A66984 PR REMV CATARACT EXTRACAP,INSERT LENS: Performed by: NURSE ANESTHETIST, CERTIFIED REGISTERED

## 2023-12-06 PROCEDURE — 3700000001 HC GENERAL ANESTHESIA TIME - INITIAL BASE CHARGE: Performed by: OPHTHALMOLOGY

## 2023-12-06 PROCEDURE — 3600000008 HC OR TIME - EACH INCREMENTAL 1 MINUTE - PROCEDURE LEVEL THREE: Performed by: OPHTHALMOLOGY

## 2023-12-06 PROCEDURE — 3700000002 HC GENERAL ANESTHESIA TIME - EACH INCREMENTAL 1 MINUTE: Performed by: OPHTHALMOLOGY

## 2023-12-06 PROCEDURE — C1780 LENS, INTRAOCULAR (NEW TECH): HCPCS | Performed by: OPHTHALMOLOGY

## 2023-12-06 PROCEDURE — A66984 PR REMV CATARACT EXTRACAP,INSERT LENS: Performed by: ANESTHESIOLOGY

## 2023-12-06 PROCEDURE — 2760000001 HC OR 276 NO HCPCS: Performed by: OPHTHALMOLOGY

## 2023-12-06 PROCEDURE — 7100000009 HC PHASE TWO TIME - INITIAL BASE CHARGE: Performed by: OPHTHALMOLOGY

## 2023-12-06 PROCEDURE — 66984 XCAPSL CTRC RMVL W/O ECP: CPT | Performed by: OPHTHALMOLOGY

## 2023-12-06 PROCEDURE — 2500000004 HC RX 250 GENERAL PHARMACY W/ HCPCS (ALT 636 FOR OP/ED): Performed by: OPHTHALMOLOGY

## 2023-12-06 PROCEDURE — 7100000010 HC PHASE TWO TIME - EACH INCREMENTAL 1 MINUTE: Performed by: OPHTHALMOLOGY

## 2023-12-06 PROCEDURE — 2500000005 HC RX 250 GENERAL PHARMACY W/O HCPCS: Performed by: OPHTHALMOLOGY

## 2023-12-06 PROCEDURE — 2500000004 HC RX 250 GENERAL PHARMACY W/ HCPCS (ALT 636 FOR OP/ED): Performed by: NURSE ANESTHETIST, CERTIFIED REGISTERED

## 2023-12-06 PROCEDURE — 2500000001 HC RX 250 WO HCPCS SELF ADMINISTERED DRUGS (ALT 637 FOR MEDICARE OP): Performed by: STUDENT IN AN ORGANIZED HEALTH CARE EDUCATION/TRAINING PROGRAM

## 2023-12-06 DEVICE — ACRYSOF(R) IQ ASPHERIC IOL SP ACRYLIC FOLDABLELENS WULTRASERT(TM) DELIVERY SYSTEM UV WBLUE LIGHT FILTER. 13.0MM LENGTH 6.0MM ANTERIORASYMMETRIC BICONVEX OPTIC PLANAR HAPTICS.
Type: IMPLANTABLE DEVICE | Site: EYE | Status: FUNCTIONAL
Brand: ACRYSOF ULTRASERT

## 2023-12-06 RX ORDER — EPINEPHRINE 1 MG/ML
INJECTION, SOLUTION, CONCENTRATE INTRAVENOUS AS NEEDED
Status: DISCONTINUED | OUTPATIENT
Start: 2023-12-06 | End: 2023-12-06 | Stop reason: HOSPADM

## 2023-12-06 RX ORDER — LIDOCAINE HYDROCHLORIDE 10 MG/ML
INJECTION INFILTRATION; PERINEURAL AS NEEDED
Status: DISCONTINUED | OUTPATIENT
Start: 2023-12-06 | End: 2023-12-06 | Stop reason: HOSPADM

## 2023-12-06 RX ORDER — LIDOCAINE IN NACL,ISO-OSMOT/PF 30 MG/3 ML
0.1 SYRINGE (ML) INJECTION ONCE
Status: DISCONTINUED | OUTPATIENT
Start: 2023-12-06 | End: 2023-12-06 | Stop reason: HOSPADM

## 2023-12-06 RX ORDER — TRIAMCINOLONE ACETONIDE 40 MG/ML
INJECTION, SUSPENSION INTRA-ARTICULAR; INTRAMUSCULAR AS NEEDED
Status: DISCONTINUED | OUTPATIENT
Start: 2023-12-06 | End: 2023-12-06 | Stop reason: HOSPADM

## 2023-12-06 RX ORDER — FENTANYL CITRATE 50 UG/ML
INJECTION, SOLUTION INTRAMUSCULAR; INTRAVENOUS AS NEEDED
Status: DISCONTINUED | OUTPATIENT
Start: 2023-12-06 | End: 2023-12-06

## 2023-12-06 RX ORDER — SODIUM CHLORIDE, SODIUM LACTATE, POTASSIUM CHLORIDE, CALCIUM CHLORIDE 600; 310; 30; 20 MG/100ML; MG/100ML; MG/100ML; MG/100ML
100 INJECTION, SOLUTION INTRAVENOUS CONTINUOUS
Status: DISCONTINUED | OUTPATIENT
Start: 2023-12-06 | End: 2023-12-06 | Stop reason: HOSPADM

## 2023-12-06 RX ORDER — TROPICAMIDE 10 MG/ML
1 SOLUTION/ DROPS OPHTHALMIC
Status: COMPLETED | OUTPATIENT
Start: 2023-12-06 | End: 2023-12-06

## 2023-12-06 RX ORDER — MOXIFLOXACIN 5 MG/ML
1 SOLUTION/ DROPS OPHTHALMIC ONCE
Status: COMPLETED | OUTPATIENT
Start: 2023-12-06 | End: 2023-12-06

## 2023-12-06 RX ORDER — CYCLOPENTOLATE HYDROCHLORIDE 10 MG/ML
1 SOLUTION/ DROPS OPHTHALMIC
Status: COMPLETED | OUTPATIENT
Start: 2023-12-06 | End: 2023-12-06

## 2023-12-06 RX ORDER — MOXIFLOXACIN 5 MG/ML
SOLUTION/ DROPS OPHTHALMIC AS NEEDED
Status: DISCONTINUED | OUTPATIENT
Start: 2023-12-06 | End: 2023-12-06 | Stop reason: HOSPADM

## 2023-12-06 RX ORDER — TETRACAINE HYDROCHLORIDE 5 MG/ML
1 SOLUTION OPHTHALMIC ONCE
Status: COMPLETED | OUTPATIENT
Start: 2023-12-06 | End: 2023-12-06

## 2023-12-06 RX ORDER — PHENYLEPHRINE HYDROCHLORIDE 25 MG/ML
1 SOLUTION/ DROPS OPHTHALMIC
Status: COMPLETED | OUTPATIENT
Start: 2023-12-06 | End: 2023-12-06

## 2023-12-06 RX ADMIN — CYCLOPENTOLATE HYDROCHLORIDE 1 DROP: 10 SOLUTION/ DROPS OPHTHALMIC at 12:20

## 2023-12-06 RX ADMIN — PHENYLEPHRINE HYDROCHLORIDE 1 DROP: 25 SOLUTION/ DROPS OPHTHALMIC at 12:20

## 2023-12-06 RX ADMIN — PHENYLEPHRINE HYDROCHLORIDE 1 DROP: 25 SOLUTION/ DROPS OPHTHALMIC at 12:25

## 2023-12-06 RX ADMIN — PHENYLEPHRINE HYDROCHLORIDE 1 DROP: 25 SOLUTION/ DROPS OPHTHALMIC at 12:15

## 2023-12-06 RX ADMIN — TROPICAMIDE 1 DROP: 10 SOLUTION/ DROPS OPHTHALMIC at 12:20

## 2023-12-06 RX ADMIN — TROPICAMIDE 1 DROP: 10 SOLUTION/ DROPS OPHTHALMIC at 12:15

## 2023-12-06 RX ADMIN — CYCLOPENTOLATE HYDROCHLORIDE 1 DROP: 10 SOLUTION/ DROPS OPHTHALMIC at 12:15

## 2023-12-06 RX ADMIN — MOXIFLOXACIN OPHTHALMIC SOLUTION 1 DROP: 5 SOLUTION/ DROPS OPHTHALMIC at 12:15

## 2023-12-06 RX ADMIN — TETRACAINE HYDROCHLORIDE 1 DROP: 5 SOLUTION/ DROPS OPHTHALMIC at 12:15

## 2023-12-06 RX ADMIN — TROPICAMIDE 1 DROP: 10 SOLUTION/ DROPS OPHTHALMIC at 12:25

## 2023-12-06 RX ADMIN — CYCLOPENTOLATE HYDROCHLORIDE 1 DROP: 10 SOLUTION/ DROPS OPHTHALMIC at 12:25

## 2023-12-06 RX ADMIN — FENTANYL CITRATE 50 MCG: 50 INJECTION, SOLUTION INTRAMUSCULAR; INTRAVENOUS at 12:55

## 2023-12-06 SDOH — HEALTH STABILITY: MENTAL HEALTH: CURRENT SMOKER: 0

## 2023-12-06 ASSESSMENT — PAIN SCALES - GENERAL
PAINLEVEL_OUTOF10: 7
PAINLEVEL_OUTOF10: 0 - NO PAIN

## 2023-12-06 ASSESSMENT — PAIN - FUNCTIONAL ASSESSMENT
PAIN_FUNCTIONAL_ASSESSMENT: 0-10

## 2023-12-06 NOTE — ANESTHESIA POSTPROCEDURE EVALUATION
Patient: Adriana Wood    Procedure Summary       Date: 12/06/23 Room / Location: [facility] OR 04 / Virtual [facility] OR    Anesthesia Start: 1252 Anesthesia Stop: 1323    Procedure: PHACO OD (Right: Eye) Diagnosis:       Combined forms of age-related cataract, right eye      (Combined forms of age-related cataract, right eye [H25.811])    Surgeons: Nehal Gill MD Responsible Provider: Aminah Mendoza MD    Anesthesia Type: MAC ASA Status: 3            Anesthesia Type: MAC    Vitals Value Taken Time   /69 12/06/23 1335   Temp 36.6 °C (97.9 °F) 12/06/23 1335   Pulse 68 12/06/23 1335   Resp 16 12/06/23 1335   SpO2 98 % 12/06/23 1335       Anesthesia Post Evaluation    Patient participation: complete - patient participated  Level of consciousness: awake and alert  Pain management: adequate  Airway patency: patent  Cardiovascular status: acceptable  Respiratory status: acceptable  Hydration status: acceptable  Postoperative Nausea and Vomiting: none    There were no known notable events for this encounter.

## 2023-12-06 NOTE — BRIEF OP NOTE
Date: 2023  OR Location: [facility] OR    Name: Adriana Wood, : 1955, Age: 68 y.o., MRN: [id number], Sex: female    Diagnosis  Pre-op Diagnosis     * Combined forms of age-related cataract, right eye [H25.811] Post-op Diagnosis     * Combined forms of age-related cataract, right eye [H25.811]     Procedures  PHACO OD  00268 - [place] XCAPSL CTRC RMVL INSJ IO LENS PROSTH W/O ECP      Surgeons      * Nehal Gill - Primary    Resident/Fellow/Other Assistant:  Surgeon(s) and Role:    Procedure Summary  Anesthesia: Monitor Anesthesia Care  ASA: III  Anesthesia Staff: Anesthesiologist: Aminah Mendoza MD  CRNA: ISABEL Carvalho-CRNA  Estimated Blood Loss: 0mL  Intra-op Medications:   Medication Name Total Dose   lidocaine (Xylocaine) 10 mg/mL (1 %) injection 0.5 mL   EPINEPHrine HCl (PF) (Adrenalin) injection 0.3 mg   balanced salts (BSS) intraocular solution 500 mL   chondroitin sulf-sod hyaluron (Duovisc) intraocular kit 0.55 mL   moxifloxacin (Vigamox) 0.5 % ophthalmic solution 1 drop   triamcinolone acetonide (Kenalog-40) injection 40 mg              Anesthesia Record               Intraprocedure I/O Totals       None           Specimen: No specimens collected     Staff:   Circulator: Varsha Stevens RN  Scrub Person: Samantha Mishra          Findings: Cataract OD    Complications:  None; patient tolerated the procedure well.     Disposition: PACU - hemodynamically stable.  Condition: stable  Specimens Collected: No specimens collected  Attending Attestation: I performed the procedure.    Nehal Gill  Phone Number: [phone]
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
## 2023-12-06 NOTE — DISCHARGE INSTRUCTIONS
Start the following eye drops in the operative eye:     Postop instructions:  Prednisolone acetate drops:  4 times/day for 1 week  3 times/day for 1 week  2 times/day for 1 week  Once/day for 1 week     Sleep with shield at night for 7 days  No eye rubbing  May shower and wash face but no water inside surgery eye  No lifting any weight above 10lbs  Patient to resume home medications.   Please call immediately if you develop any redness, pain, decreased vision, flashes, floaters.   Office phone (after hours): [phone]   Hospital : [phone] (call this number if unable to reach someone on the phone above) then ask for ophthalmologist on call.
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
## 2023-12-06 NOTE — OP NOTE
PHACO OD (R) Operative Note     Date: 2023  OR Location: [facility] OR    Name: Adriana Wood, : 1955, Age: 68 y.o., MRN: [id number], Sex: female    Diagnosis  Pre-op Diagnosis     * Combined forms of age-related cataract, right eye [H25.811] Post-op Diagnosis     * Combined forms of age-related cataract, right eye [H25.811]     Procedures  PHACO OD  29735 - [place] XCAPSL CTRC RMVL INSJ IO LENS PROSTH W/O ECP      Surgeons      * Nehal Gill - Primary    Resident/Fellow/Other Assistant:  Surgeon(s) and Role:    Procedure Summary  Anesthesia: Monitor Anesthesia Care  ASA: III  Anesthesia Staff: Anesthesiologist: Aminah Mendoza MD  CRNA: ISABEL Carvalho-CRNA  Estimated Blood Loss: 0mL  Intra-op Medications:   Medication Name Total Dose   lidocaine (Xylocaine) 10 mg/mL (1 %) injection 0.5 mL   EPINEPHrine HCl (PF) (Adrenalin) injection 0.3 mg   balanced salts (BSS) intraocular solution 500 mL   chondroitin sulf-sod hyaluron (Duovisc) intraocular kit 0.55 mL   moxifloxacin (Vigamox) 0.5 % ophthalmic solution 1 drop   triamcinolone acetonide (Kenalog-40) injection 40 mg              Anesthesia Record               Intraprocedure I/O Totals       None           Specimen: No specimens collected     Staff:   Circulator: Varsha Stevens RN  Scrub Person: Samantha Mishra         Drains and/or Catheters: * None in log *    Tourniquet Times:         Implants:  Implants       Type Name Action Serial No.      Lens LENS, INTRAOCULAR, AU00T0 23.0D - [id number] Implanted [id number]              Findings: Cataract OD    Indications: Adriana Wood is an 68 y.o. female who is having surgery for Combined forms of age-related cataract, right eye [H25.811].     The patient was seen in the preoperative area. The risks, benefits, complications, treatment options, non-operative alternatives, expected recovery and outcomes were discussed with the patient. The possibilities of reaction to medication, pulmonary  aspiration, injury to surrounding structures, bleeding, recurrent infection, the need for additional procedures, failure to diagnose a condition, and creating a complication requiring transfusion or operation were discussed with the patient. The patient concurred with the proposed plan, giving informed consent.  The site of surgery was properly noted/marked if necessary per policy. The patient has been actively warmed in preoperative area. Preoperative antibiotics have been ordered and given within 1 hours of incision. Venous thrombosis prophylaxis are not indicated.    Procedure Details: The patient was placed in the supine position on the operating room table where appropriate blood pressure and cardiac monitoring were initiated. The patient was prepped and draped in the usual sterile fashion for intraocular surgery. This included instillation of Betadine 5% onto the ocular surface followed by irrigation with balance salt solution a minute or two later. A lid speculum was placed and the operating microscope was positioned. One paracentesis stab incision was made to the left of the planned cataract incision with a 15-degree supersharp blade. 1 ml of preservative free lidocaine was injected into the AC. Viscoat was used to replace the aqueous humor. A temporal clear corneal wound was fashioned beginning at the limbus with a 2.2 mm keratome, extending 2 mm into clear cornea before entering the anterior chamber. A continuous tear circular capsulorhexis of approximately 5 mm in diameter was performed. Hydrodissection was performed using a [employer] canula. The endothelium was coated with viscoat again. Using the Ozil handpiece on the [employer] Lens Removal System, the nucleus was emulsified and aspirated using a divide-and-conquer technique. Residual cortex was removed from the eye with the irrigation/aspiration bimanually. ProVisc was used to inflate the capsular bag. The lens implant was inspected and found to be free of  visible defects. The lens used was an Gianluca model AU00T0, power 23.0 diopter intraocular lens. The lens was inserted into the capsular bag using the [employer] insertion mechanism. The lens was centered with a Stovall hook. Residual viscoelastic was removed from the eye with the irrigation/aspiration instrument. Preservative free moxifloxacin was injected  intracameral. The wounds were checked and found to be watertight. 0.3ml of Diluted (1:3) triamcinolone acetonide was injected subonj inferiorly. The lid speculum was removed and the eye was dressed with Maxitrol ointment, eye pad, tape, and shield. The patient tolerated the procedure well, and there were no complications.   Complications:  None; patient tolerated the procedure well.    Disposition: PACU - hemodynamically stable.  Condition: stable         Additional Details: None    Attending Attestation: I performed the procedure.    Nehal Gill  Phone Number: [phone]

## 2023-12-07 ENCOUNTER — OFFICE VISIT (OUTPATIENT)
Dept: OPHTHALMOLOGY | Facility: CLINIC | Age: 68
End: 2023-12-07
Payer: MEDICARE

## 2023-12-07 DIAGNOSIS — Z96.1 PSEUDOPHAKIA OF BOTH EYES: Primary | ICD-10-CM

## 2023-12-07 PROCEDURE — 99024 POSTOP FOLLOW-UP VISIT: CPT | Performed by: OPHTHALMOLOGY

## 2023-12-07 ASSESSMENT — VISUAL ACUITY
METHOD: SNELLEN - LINEAR
OS_SC: 20/40+1
OD_SC: 20/100-1
OD_SC: J1+

## 2023-12-07 ASSESSMENT — EXTERNAL EXAM - LEFT EYE: OS_EXAM: NORMAL

## 2023-12-07 ASSESSMENT — TONOMETRY
IOP_METHOD: TONOPEN
OD_IOP_MMHG: 12

## 2023-12-07 ASSESSMENT — EXTERNAL EXAM - RIGHT EYE: OD_EXAM: NORMAL

## 2023-12-07 ASSESSMENT — ENCOUNTER SYMPTOMS: EYES NEGATIVE: 1

## 2023-12-07 ASSESSMENT — PAIN SCALES - GENERAL: PAINLEVEL_OUTOF10: 0 - NO PAIN

## 2023-12-07 ASSESSMENT — SLIT LAMP EXAM - LIDS
COMMENTS: GOOD POSITION
COMMENTS: GOOD POSITION

## 2023-12-08 ENCOUNTER — APPOINTMENT (OUTPATIENT)
Dept: OPHTHALMOLOGY | Facility: CLINIC | Age: 68
End: 2023-12-08
Payer: MEDICARE

## 2023-12-14 DIAGNOSIS — S02.30XA: ICD-10-CM

## 2023-12-14 DIAGNOSIS — M86.461 CHRONIC OSTEOMYELITIS OF RIGHT TIBIA WITH DRAINING SINUS (MULTI): ICD-10-CM

## 2023-12-14 DIAGNOSIS — M17.31 POST-TRAUMATIC ARTHRITIS OF LOWER LEG, RIGHT: ICD-10-CM

## 2023-12-14 RX ORDER — OXYCODONE HYDROCHLORIDE 5 MG/1
5 TABLET ORAL EVERY 6 HOURS PRN
Qty: 120 TABLET | Refills: 0 | Status: SHIPPED | OUTPATIENT
Start: 2023-12-14 | End: 2024-01-09 | Stop reason: SDUPTHER

## 2023-12-19 ENCOUNTER — APPOINTMENT (OUTPATIENT)
Dept: OPHTHALMOLOGY | Facility: CLINIC | Age: 68
End: 2023-12-19
Payer: MEDICARE

## 2023-12-27 ENCOUNTER — TELEPHONE (OUTPATIENT)
Dept: PRIMARY CARE | Facility: CLINIC | Age: 68
End: 2023-12-27
Payer: MEDICARE

## 2023-12-27 NOTE — TELEPHONE ENCOUNTER
Pt called to set up appt for you to fill out paperwork so she can get her new chair built. I squeezed her in on Jan 17th, she asked me to message you to see if you need to see her or if you would rather refer her to PT for them to fill out paperwork. Either way the paperwork has to be filled out with a face to face visit.

## 2023-12-28 DIAGNOSIS — M17.31 POST-TRAUMATIC ARTHRITIS OF LOWER LEG, RIGHT: Primary | ICD-10-CM

## 2024-01-09 DIAGNOSIS — S02.30XA: ICD-10-CM

## 2024-01-09 DIAGNOSIS — M17.31 POST-TRAUMATIC ARTHRITIS OF LOWER LEG, RIGHT: ICD-10-CM

## 2024-01-09 DIAGNOSIS — M86.461 CHRONIC OSTEOMYELITIS OF RIGHT TIBIA WITH DRAINING SINUS (MULTI): ICD-10-CM

## 2024-01-09 RX ORDER — OXYCODONE HYDROCHLORIDE 5 MG/1
5 TABLET ORAL EVERY 6 HOURS PRN
Qty: 120 TABLET | Refills: 0 | Status: SHIPPED | OUTPATIENT
Start: 2024-01-09 | End: 2024-01-17 | Stop reason: SDUPTHER

## 2024-01-12 ENCOUNTER — APPOINTMENT (OUTPATIENT)
Dept: OPHTHALMOLOGY | Facility: CLINIC | Age: 69
End: 2024-01-12
Payer: MEDICARE

## 2024-01-16 ENCOUNTER — OFFICE VISIT (OUTPATIENT)
Dept: OPHTHALMOLOGY | Facility: CLINIC | Age: 69
End: 2024-01-16
Payer: MEDICARE

## 2024-01-16 DIAGNOSIS — Z96.1 PSEUDOPHAKIA OF BOTH EYES: ICD-10-CM

## 2024-01-16 DIAGNOSIS — H59.033 CYSTOID MACULAR EDEMA FOLLOWING CATARACT SURGERY, BILATERAL: Primary | ICD-10-CM

## 2024-01-16 PROCEDURE — 99024 POSTOP FOLLOW-UP VISIT: CPT | Performed by: OPHTHALMOLOGY

## 2024-01-16 ASSESSMENT — REFRACTION_MANIFEST
OD_CYLINDER: -1.25
METHOD_AUTOREFRACTION: 1
OD_SPHERE: -1.75
OS_SPHERE: +1.00
OD_AXIS: 088
METHOD_AUTOREFRACTION: 1
OS_AXIS: 070
OD_AXIS: 088
OD_SPHERE: -1.75
OS_CYLINDER: -2.00
OS_CYLINDER: -2.00
OS_AXIS: 070
OS_ADD: +2.50
OD_CYLINDER: -1.25
OS_SPHERE: +1.00

## 2024-01-16 ASSESSMENT — CONF VISUAL FIELD
OS_NORMAL: 1
OD_SUPERIOR_TEMPORAL_RESTRICTION: 0
OS_INFERIOR_NASAL_RESTRICTION: 0
OD_INFERIOR_TEMPORAL_RESTRICTION: 0
OD_NORMAL: 1
OS_INFERIOR_TEMPORAL_RESTRICTION: 0
OD_INFERIOR_NASAL_RESTRICTION: 0
OD_SUPERIOR_NASAL_RESTRICTION: 0
OS_SUPERIOR_NASAL_RESTRICTION: 0
OS_SUPERIOR_TEMPORAL_RESTRICTION: 0

## 2024-01-16 ASSESSMENT — VISUAL ACUITY
OD_SC: J1
METHOD: SNELLEN - LINEAR
OD_SC: 20/60
OS_SC: 20/30+2

## 2024-01-16 ASSESSMENT — SLIT LAMP EXAM - LIDS
COMMENTS: NORMAL
COMMENTS: NORMAL

## 2024-01-16 ASSESSMENT — EXTERNAL EXAM - RIGHT EYE: OD_EXAM: NORMAL

## 2024-01-16 ASSESSMENT — TONOMETRY
OD_IOP_MMHG: 12
IOP_METHOD: TONOPEN
OS_IOP_MMHG: 13

## 2024-01-16 ASSESSMENT — EXTERNAL EXAM - LEFT EYE: OS_EXAM: NORMAL

## 2024-01-16 ASSESSMENT — ENCOUNTER SYMPTOMS: EYES NEGATIVE: 1

## 2024-01-16 NOTE — PROGRESS NOTES
S/p phaco OS followed by OD  [facility] (OD is near)  MRX released to the pt per their request to help with night time driving    Pt will fu locally in [place]

## 2024-01-17 ENCOUNTER — OFFICE VISIT (OUTPATIENT)
Dept: PRIMARY CARE | Facility: CLINIC | Age: 69
End: 2024-01-17
Payer: MEDICARE

## 2024-01-17 VITALS — HEART RATE: 80 BPM | OXYGEN SATURATION: 91 % | DIASTOLIC BLOOD PRESSURE: 70 MMHG | SYSTOLIC BLOOD PRESSURE: 105 MMHG

## 2024-01-17 DIAGNOSIS — M86.461 CHRONIC OSTEOMYELITIS OF RIGHT TIBIA WITH DRAINING SINUS (MULTI): ICD-10-CM

## 2024-01-17 DIAGNOSIS — Z99.3 DEPENDENCE ON WHEELCHAIR: Primary | ICD-10-CM

## 2024-01-17 DIAGNOSIS — I46.2 CARDIAC ARREST DUE TO UNDERLYING CARDIAC CONDITION (MULTI): ICD-10-CM

## 2024-01-17 DIAGNOSIS — Z12.31 SCREENING MAMMOGRAM, ENCOUNTER FOR: ICD-10-CM

## 2024-01-17 DIAGNOSIS — Z23 NEED FOR VACCINATION: ICD-10-CM

## 2024-01-17 DIAGNOSIS — I42.9 CARDIOMYOPATHY, UNSPECIFIED TYPE (MULTI): ICD-10-CM

## 2024-01-17 DIAGNOSIS — M17.31 POST-TRAUMATIC ARTHRITIS OF LOWER LEG, RIGHT: ICD-10-CM

## 2024-01-17 DIAGNOSIS — Z23 FLU VACCINE NEED: ICD-10-CM

## 2024-01-17 DIAGNOSIS — I47.21 TORSADES DE POINTES (MULTI): ICD-10-CM

## 2024-01-17 DIAGNOSIS — S02.30XA: ICD-10-CM

## 2024-01-17 PROCEDURE — 90677 PCV20 VACCINE IM: CPT | Performed by: FAMILY MEDICINE

## 2024-01-17 PROCEDURE — 1159F MED LIST DOCD IN RCRD: CPT | Performed by: FAMILY MEDICINE

## 2024-01-17 PROCEDURE — 99213 OFFICE O/P EST LOW 20 MIN: CPT | Performed by: FAMILY MEDICINE

## 2024-01-17 PROCEDURE — 1036F TOBACCO NON-USER: CPT | Performed by: FAMILY MEDICINE

## 2024-01-17 PROCEDURE — G0008 ADMIN INFLUENZA VIRUS VAC: HCPCS | Performed by: FAMILY MEDICINE

## 2024-01-17 PROCEDURE — 1160F RVW MEDS BY RX/DR IN RCRD: CPT | Performed by: FAMILY MEDICINE

## 2024-01-17 PROCEDURE — 90662 IIV NO PRSV INCREASED AG IM: CPT | Performed by: FAMILY MEDICINE

## 2024-01-17 PROCEDURE — 1126F AMNT PAIN NOTED NONE PRSNT: CPT | Performed by: FAMILY MEDICINE

## 2024-01-17 PROCEDURE — G0009 ADMIN PNEUMOCOCCAL VACCINE: HCPCS | Performed by: FAMILY MEDICINE

## 2024-01-17 RX ORDER — OXYCODONE HYDROCHLORIDE 5 MG/1
5-10 TABLET ORAL EVERY 6 HOURS PRN
Qty: 150 TABLET | Refills: 0 | COMMUNITY
Start: 2024-01-17 | End: 2024-02-05 | Stop reason: SDUPTHER

## 2024-01-17 ASSESSMENT — ENCOUNTER SYMPTOMS
SHORTNESS OF BREATH: 0
SINUS PAIN: 1
ARTHRALGIAS: 0
JOINT SWELLING: 0
APPETITE CHANGE: 0
BLOOD IN STOOL: 0
TROUBLE SWALLOWING: 0
DIFFICULTY URINATING: 0
FATIGUE: 1
SORE THROAT: 1
CONFUSION: 0
SINUS PRESSURE: 1
PALPITATIONS: 0
SEIZURES: 0
NERVOUS/ANXIOUS: 0
HEMATURIA: 0
COLOR CHANGE: 0
UNEXPECTED WEIGHT CHANGE: 0
CONSTIPATION: 0
RHINORRHEA: 1
FEVER: 0
VOICE CHANGE: 1
DIARRHEA: 0
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
## 2024-01-18 ENCOUNTER — LAB (OUTPATIENT)
Dept: LAB | Facility: LAB | Age: 69
End: 2024-01-18
Payer: MEDICARE

## 2024-01-18 ENCOUNTER — OFFICE VISIT (OUTPATIENT)
Dept: ORTHOPEDIC SURGERY | Facility: CLINIC | Age: 69
End: 2024-01-18
Payer: MEDICARE

## 2024-01-18 VITALS — WEIGHT: 174 LBS | BODY MASS INDEX: 24.91 KG/M2 | HEIGHT: 70 IN

## 2024-01-18 DIAGNOSIS — Z01.818 PRE-OP EVALUATION: ICD-10-CM

## 2024-01-18 DIAGNOSIS — M81.6 LOCALIZED OSTEOPOROSIS WITHOUT CURRENT PATHOLOGICAL FRACTURE: ICD-10-CM

## 2024-01-18 DIAGNOSIS — I42.8 OTHER CARDIOMYOPATHY (MULTI): Primary | ICD-10-CM

## 2024-01-18 DIAGNOSIS — M17.31 POST-TRAUMATIC ARTHRITIS OF LOWER LEG, RIGHT: ICD-10-CM

## 2024-01-18 LAB
CRP SERPL-MCNC: 1.58 MG/DL
ERYTHROCYTE [SEDIMENTATION RATE] IN BLOOD BY WESTERGREN METHOD: 35 MM/H (ref 0–30)

## 2024-01-18 PROCEDURE — 1159F MED LIST DOCD IN RCRD: CPT | Performed by: ORTHOPAEDIC SURGERY

## 2024-01-18 PROCEDURE — 99215 OFFICE O/P EST HI 40 MIN: CPT | Performed by: ORTHOPAEDIC SURGERY

## 2024-01-18 PROCEDURE — 1160F RVW MEDS BY RX/DR IN RCRD: CPT | Performed by: ORTHOPAEDIC SURGERY

## 2024-01-18 PROCEDURE — 1036F TOBACCO NON-USER: CPT | Performed by: ORTHOPAEDIC SURGERY

## 2024-01-18 PROCEDURE — 36415 COLL VENOUS BLD VENIPUNCTURE: CPT

## 2024-01-18 PROCEDURE — 85652 RBC SED RATE AUTOMATED: CPT

## 2024-01-18 PROCEDURE — 1126F AMNT PAIN NOTED NONE PRSNT: CPT | Performed by: ORTHOPAEDIC SURGERY

## 2024-01-18 PROCEDURE — 86140 C-REACTIVE PROTEIN: CPT

## 2024-01-18 ASSESSMENT — PAIN DESCRIPTION - DESCRIPTORS: DESCRIPTORS: ACHING

## 2024-01-18 ASSESSMENT — PATIENT HEALTH QUESTIONNAIRE - PHQ9
SUM OF ALL RESPONSES TO PHQ9 QUESTIONS 1 AND 2: 1
1. LITTLE INTEREST OR PLEASURE IN DOING THINGS: NOT AT ALL
2. FEELING DOWN, DEPRESSED OR HOPELESS: SEVERAL DAYS

## 2024-01-18 ASSESSMENT — PAIN SCALES - GENERAL: PAINLEVEL_OUTOF10: 7

## 2024-01-18 NOTE — PROGRESS NOTES
Chief complaint scheduled for right total knee replacement on March 13    History this is a 68-year-old female who is well-known to me.  She has a history of cardiomyopathy.  She had a severe right tibial plateau fracture and developed infection which required removal of hardware.  Dr. Tai did the original surgery.  I did remove the hardware last June and all cultures were negative at the time.  She is here to see me today to go over her CT scan.  She has had normal ESR and CRP's we do not have them for several months she is not on any antibiotics she has not had any drainage or sign of infection.  She still undergoing physical therapy and barely able to put weight on this leg.  She has significant osteoporosis problems and she has a significant problem with cardiomyopathy and has seen cardiology in the past.    Her examination today of the right knee reveals she lacks about 10 degrees of full extension she is able to flex to about 80.  She has a anterior lateral incision which goes across the tibial plateau.  In order to complete this will have to probably connect to this incision to do a total knee replacement.  There is no sign of infection.  She has about a 1-2+ joint effusion.  Her patella is not moving well not mobile.  She has full motion of the right ankle.  She had original foot drop after her trauma.     CT scan was evaluated today which she had she has severe disuse osteopenia in the proximal tibia and distal femur.  There are various areas of cystic formation which are not worried at all about infection.  She has a about a 8 mm defect in the lateral tibial plateau which is probably causing majority of her pain.  The various metaphyseal diaphyseal fractures appear to be healed but she has very thin cortices.  She has severe posttraumatic arthritis of right knee.    Assessment severe posttraumatic arthritis of the right knee status post tibial plateau fracture bicondylar, postoperative infection,  removal of hardware with negative cultures, with history of cardiomyopathy.    Plan the patient is currently on my schedule for right total knee replacement to be done on March 13.  I do not have any other alternatives for her.  She is barely able to put weight on the leg now.  She will need aggressive physical therapy afterward.  Will take cultures off antibiotics as well.  If her cultures come back positive will need to keep her on antibiotics for extended period of time.  I will also most likely do a frozen section at the time of her surgery if it becomes acute inflammation positive we might have to hold off temporarily and put the implant with antibiotics rather than the knee replacement.  She will need to see her cardiologist.  We have set up an appointment to see , I do not know if she needs another echocardiogram.  As far as the knee replacement is concerned I probably will need to technically use a tibial stem with the Connor universal tibial component.  Will much most likely use a PS femur.  Will most likely replace the patella as well.  Pre-op TKR discussion   I talked with the patient at length about risks, limitations, benefits and alternatives to total knee replacement today. The patient's recent knee imaging can be classified as a Grade 4 on the Kellgren Luis Scale for radiographic classification of osteoarthritis. Grade 4 is severe knee OA with large osteophytes, marked narrowing of joint space, severe sclerosis and definite deformity of bone ends. Under our care or the care of previous providers, the patient has had a reasonable trial of nonoperative treatment for their knee problem including NSAIDS, tylenol or other analgesics, cortisone injections or viscosupplementation, activity modification and activity restriction and use of assistive devices. These previous treatments have not provided the patient with durable relief of their symptoms. The patient is not an appropriate candidate  for physical therapy at this time. Despite the above, patient has had pain and symptomatic functional impairment that either interferes with their sleep or ADLs and quality of life. I reviewed concerns about implant wear, loosening, breakage, infection and infection prophylaxis, DVT, PE, death and other medical and anesthetic complications of surgery. We talked about the potential for persistent pain following surgery since there are many possible causes for knee and leg pain. The patient was advised that knee replacement will only relieve pain that is coming from the knee. We talked about limited range of motion following knee replacement and the importance of physical therapy and their motivation. We talked about improvements in pain management post-operatively and our accelerated rehab program. We talked about wound healing issues and neurovascular complications of surgery. I reviewed functional and activity restrictions in detail. We discussed the possible need for a homologous blood transfusion. We discussed the fact that many of our patients are able to go home in 2-3 days depending on their health, mobility, pre-op preparation, individual home situation and personal preference. The patient should take our pre-operative teaching class. All of the patients questions were answered.

## 2024-01-24 ENCOUNTER — OFFICE VISIT (OUTPATIENT)
Dept: CARDIOLOGY | Facility: CLINIC | Age: 69
End: 2024-01-24
Payer: MEDICARE

## 2024-01-24 DIAGNOSIS — I49.3 PVC (PREMATURE VENTRICULAR CONTRACTION): Primary | ICD-10-CM

## 2024-01-24 PROCEDURE — 1126F AMNT PAIN NOTED NONE PRSNT: CPT | Performed by: INTERNAL MEDICINE

## 2024-01-24 PROCEDURE — 99214 OFFICE O/P EST MOD 30 MIN: CPT | Performed by: INTERNAL MEDICINE

## 2024-01-24 PROCEDURE — 1036F TOBACCO NON-USER: CPT | Performed by: INTERNAL MEDICINE

## 2024-01-24 PROCEDURE — 1157F ADVNC CARE PLAN IN RCRD: CPT | Performed by: INTERNAL MEDICINE

## 2024-01-24 PROCEDURE — 1159F MED LIST DOCD IN RCRD: CPT | Performed by: INTERNAL MEDICINE

## 2024-01-24 PROCEDURE — 1160F RVW MEDS BY RX/DR IN RCRD: CPT | Performed by: INTERNAL MEDICINE

## 2024-01-24 NOTE — ASSESSMENT & PLAN NOTE
She is here for preoperative clearance - knee replacement. She has long standing PVCs from the outflow tract. Completely asymptomatic. She can proceed with the surgery . No medical therapy is needed especially since she does not tolerate medical therapy well.
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
## 2024-01-31 DIAGNOSIS — M17.11 ARTHRITIS OF RIGHT KNEE: ICD-10-CM

## 2024-02-05 DIAGNOSIS — S02.30XA: ICD-10-CM

## 2024-02-05 DIAGNOSIS — M86.461 CHRONIC OSTEOMYELITIS OF RIGHT TIBIA WITH DRAINING SINUS (MULTI): ICD-10-CM

## 2024-02-05 DIAGNOSIS — M17.31 POST-TRAUMATIC ARTHRITIS OF LOWER LEG, RIGHT: ICD-10-CM

## 2024-02-05 PROBLEM — M17.11 ARTHRITIS OF RIGHT KNEE: Status: ACTIVE | Noted: 2024-01-31

## 2024-02-05 RX ORDER — OXYCODONE HYDROCHLORIDE 5 MG/1
5-10 TABLET ORAL EVERY 6 HOURS PRN
Qty: 150 TABLET | Refills: 0 | Status: SHIPPED | OUTPATIENT
Start: 2024-02-05 | End: 2024-03-12 | Stop reason: SDUPTHER

## 2024-02-10 ENCOUNTER — OFFICE VISIT (OUTPATIENT)
Dept: PRIMARY CARE | Facility: CLINIC | Age: 69
End: 2024-02-10
Payer: MEDICARE

## 2024-02-10 VITALS
WEIGHT: 176 LBS | OXYGEN SATURATION: 93 % | DIASTOLIC BLOOD PRESSURE: 84 MMHG | SYSTOLIC BLOOD PRESSURE: 127 MMHG | BODY MASS INDEX: 25.25 KG/M2 | HEART RATE: 64 BPM

## 2024-02-10 DIAGNOSIS — R31.9 HEMATURIA, UNSPECIFIED TYPE: Primary | ICD-10-CM

## 2024-02-10 LAB
POC APPEARANCE, URINE: CLEAR
POC BILIRUBIN, URINE: NEGATIVE
POC BLOOD, URINE: ABNORMAL
POC COLOR, URINE: YELLOW
POC GLUCOSE, URINE: NEGATIVE MG/DL
POC KETONES, URINE: NEGATIVE MG/DL
POC LEUKOCYTES, URINE: ABNORMAL
POC NITRITE,URINE: NEGATIVE
POC PH, URINE: 7 PH
POC PROTEIN, URINE: NEGATIVE MG/DL
POC SPECIFIC GRAVITY, URINE: 1.01
POC UROBILINOGEN, URINE: 0.2 EU/DL

## 2024-02-10 PROCEDURE — 81003 URINALYSIS AUTO W/O SCOPE: CPT | Performed by: FAMILY MEDICINE

## 2024-02-10 PROCEDURE — 1126F AMNT PAIN NOTED NONE PRSNT: CPT | Performed by: FAMILY MEDICINE

## 2024-02-10 PROCEDURE — 99213 OFFICE O/P EST LOW 20 MIN: CPT | Performed by: FAMILY MEDICINE

## 2024-02-10 PROCEDURE — 87186 SC STD MICRODIL/AGAR DIL: CPT

## 2024-02-10 PROCEDURE — 1160F RVW MEDS BY RX/DR IN RCRD: CPT | Performed by: FAMILY MEDICINE

## 2024-02-10 PROCEDURE — 1036F TOBACCO NON-USER: CPT | Performed by: FAMILY MEDICINE

## 2024-02-10 PROCEDURE — 87086 URINE CULTURE/COLONY COUNT: CPT

## 2024-02-10 PROCEDURE — 1159F MED LIST DOCD IN RCRD: CPT | Performed by: FAMILY MEDICINE

## 2024-02-10 PROCEDURE — 1157F ADVNC CARE PLAN IN RCRD: CPT | Performed by: FAMILY MEDICINE

## 2024-02-10 RX ORDER — SULFAMETHOXAZOLE AND TRIMETHOPRIM 800; 160 MG/1; MG/1
1 TABLET ORAL 2 TIMES DAILY
Qty: 14 TABLET | Refills: 0 | Status: SHIPPED | OUTPATIENT
Start: 2024-02-10 | End: 2024-02-17

## 2024-02-10 ASSESSMENT — ENCOUNTER SYMPTOMS: FATIGUE: 1
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
## 2024-02-11 ASSESSMENT — ENCOUNTER SYMPTOMS
TROUBLE SWALLOWING: 0
JOINT SWELLING: 0
NERVOUS/ANXIOUS: 0
FREQUENCY: 0
UNEXPECTED WEIGHT CHANGE: 0
APPETITE CHANGE: 0
FLANK PAIN: 0
DIARRHEA: 0
SHORTNESS OF BREATH: 0
DYSURIA: 1
HEMATURIA: 1
PALPITATIONS: 0
CONSTIPATION: 0
BLOOD IN STOOL: 0
DIFFICULTY URINATING: 0
CONFUSION: 0
FEVER: 0
COLOR CHANGE: 0
SEIZURES: 0

## 2024-02-13 ENCOUNTER — TELEPHONE (OUTPATIENT)
Dept: PRIMARY CARE | Facility: CLINIC | Age: 69
End: 2024-02-13
Payer: MEDICARE

## 2024-02-13 LAB — BACTERIA UR CULT: ABNORMAL

## 2024-02-15 DIAGNOSIS — M17.11 UNILATERAL PRIMARY OSTEOARTHRITIS, RIGHT KNEE: ICD-10-CM

## 2024-02-19 ENCOUNTER — HOSPITAL ENCOUNTER (EMERGENCY)
Facility: HOSPITAL | Age: 69
Discharge: HOME | End: 2024-02-19
Attending: STUDENT IN AN ORGANIZED HEALTH CARE EDUCATION/TRAINING PROGRAM
Payer: MEDICARE

## 2024-02-19 VITALS
TEMPERATURE: 97.9 F | RESPIRATION RATE: 18 BRPM | BODY MASS INDEX: 25.24 KG/M2 | SYSTOLIC BLOOD PRESSURE: 110 MMHG | OXYGEN SATURATION: 96 % | HEART RATE: 65 BPM | DIASTOLIC BLOOD PRESSURE: 59 MMHG | WEIGHT: 175.93 LBS

## 2024-02-19 DIAGNOSIS — R11.0 NAUSEA: ICD-10-CM

## 2024-02-19 DIAGNOSIS — R68.89 ILL FEELING: Primary | ICD-10-CM

## 2024-02-19 LAB
ALBUMIN SERPL BCP-MCNC: 4.4 G/DL (ref 3.4–5)
ALP SERPL-CCNC: 80 U/L (ref 33–136)
ALT SERPL W P-5'-P-CCNC: 30 U/L (ref 7–45)
ANION GAP SERPL CALC-SCNC: 15 MMOL/L (ref 10–20)
APPEARANCE UR: CLEAR
AST SERPL W P-5'-P-CCNC: 22 U/L (ref 9–39)
BILIRUB SERPL-MCNC: 0.4 MG/DL (ref 0–1.2)
BILIRUB UR STRIP.AUTO-MCNC: NEGATIVE MG/DL
BUN SERPL-MCNC: 9 MG/DL (ref 6–23)
CALCIUM SERPL-MCNC: 10 MG/DL (ref 8.6–10.6)
CHLORIDE SERPL-SCNC: 101 MMOL/L (ref 98–107)
CO2 SERPL-SCNC: 24 MMOL/L (ref 21–32)
COLOR UR: COLORLESS
CREAT SERPL-MCNC: 0.54 MG/DL (ref 0.5–1.05)
EGFRCR SERPLBLD CKD-EPI 2021: >90 ML/MIN/1.73M*2
GLUCOSE SERPL-MCNC: 87 MG/DL (ref 74–99)
GLUCOSE UR STRIP.AUTO-MCNC: NORMAL MG/DL
KETONES UR STRIP.AUTO-MCNC: NEGATIVE MG/DL
LEUKOCYTE ESTERASE UR QL STRIP.AUTO: NEGATIVE
NITRITE UR QL STRIP.AUTO: NEGATIVE
PH UR STRIP.AUTO: 5 [PH]
POTASSIUM SERPL-SCNC: 4.4 MMOL/L (ref 3.5–5.3)
PROT SERPL-MCNC: 7.9 G/DL (ref 6.4–8.2)
PROT UR STRIP.AUTO-MCNC: NEGATIVE MG/DL
RBC # UR STRIP.AUTO: NEGATIVE /UL
SODIUM SERPL-SCNC: 136 MMOL/L (ref 136–145)
SP GR UR STRIP.AUTO: 1
UROBILINOGEN UR STRIP.AUTO-MCNC: NORMAL MG/DL

## 2024-02-19 PROCEDURE — 81003 URINALYSIS AUTO W/O SCOPE: CPT

## 2024-02-19 PROCEDURE — 99283 EMERGENCY DEPT VISIT LOW MDM: CPT

## 2024-02-19 PROCEDURE — 99284 EMERGENCY DEPT VISIT MOD MDM: CPT

## 2024-02-19 PROCEDURE — 36415 COLL VENOUS BLD VENIPUNCTURE: CPT

## 2024-02-19 PROCEDURE — 84075 ASSAY ALKALINE PHOSPHATASE: CPT

## 2024-02-19 ASSESSMENT — COLUMBIA-SUICIDE SEVERITY RATING SCALE - C-SSRS
2. HAVE YOU ACTUALLY HAD ANY THOUGHTS OF KILLING YOURSELF?: NO
1. IN THE PAST MONTH, HAVE YOU WISHED YOU WERE DEAD OR WISHED YOU COULD GO TO SLEEP AND NOT WAKE UP?: NO
6. HAVE YOU EVER DONE ANYTHING, STARTED TO DO ANYTHING, OR PREPARED TO DO ANYTHING TO END YOUR LIFE?: NO

## 2024-02-19 NOTE — ED TRIAGE NOTES
Pt recently treated for kidney infection. Pt states after she finished the script she began experiencing nausea. Pt also concerned for possible infection of R foot.
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
## 2024-02-20 LAB — HOLD SPECIMEN: NORMAL

## 2024-02-20 NOTE — DISCHARGE INSTRUCTIONS
Your urinalysis was negative for a UTI.  This is great news and means that your infection cleared up.  The symptoms you are experiencing are likely side effects of the antibiotic you are on.  Please continue to rest, stay well-hydrated, and eat as tolerated.  The symptoms should subside within the coming days.  Also, your foot does not look infected.  You are likely colonized with MRSA being that it has been present various times in the past.  This infection would not spread from your blood and into your urine.  This means that it was present in the urine only to begin with.  With that being said, you can follow-up with your PCP as needed.

## 2024-02-27 ENCOUNTER — HOSPITAL ENCOUNTER (OUTPATIENT)
Dept: RADIOLOGY | Facility: HOSPITAL | Age: 69
Discharge: HOME | End: 2024-02-27
Payer: MEDICARE

## 2024-02-27 ENCOUNTER — PRE-ADMISSION TESTING (OUTPATIENT)
Dept: PREADMISSION TESTING | Facility: HOSPITAL | Age: 69
End: 2024-02-27
Payer: MEDICARE

## 2024-02-27 VITALS
OXYGEN SATURATION: 92 % | WEIGHT: 176.37 LBS | BODY MASS INDEX: 25.25 KG/M2 | TEMPERATURE: 98.6 F | DIASTOLIC BLOOD PRESSURE: 76 MMHG | SYSTOLIC BLOOD PRESSURE: 114 MMHG | HEART RATE: 58 BPM | HEIGHT: 70 IN

## 2024-02-27 DIAGNOSIS — Z01.818 PREOPERATIVE TESTING: Primary | ICD-10-CM

## 2024-02-27 DIAGNOSIS — M17.11 UNILATERAL PRIMARY OSTEOARTHRITIS, RIGHT KNEE: ICD-10-CM

## 2024-02-27 DIAGNOSIS — M17.11 ARTHRITIS OF RIGHT KNEE: ICD-10-CM

## 2024-02-27 LAB
BASOPHILS # BLD AUTO: 0.04 X10*3/UL (ref 0–0.1)
BASOPHILS NFR BLD AUTO: 0.6 %
EOSINOPHIL # BLD AUTO: 0.08 X10*3/UL (ref 0–0.7)
EOSINOPHIL NFR BLD AUTO: 1.2 %
ERYTHROCYTE [DISTWIDTH] IN BLOOD BY AUTOMATED COUNT: 12.4 % (ref 11.5–14.5)
HCT VFR BLD AUTO: 43.1 % (ref 36–46)
HGB BLD-MCNC: 14.4 G/DL (ref 12–16)
IMM GRANULOCYTES # BLD AUTO: 0.01 X10*3/UL (ref 0–0.7)
IMM GRANULOCYTES NFR BLD AUTO: 0.1 % (ref 0–0.9)
LYMPHOCYTES # BLD AUTO: 2.09 X10*3/UL (ref 1.2–4.8)
LYMPHOCYTES NFR BLD AUTO: 30.7 %
MCH RBC QN AUTO: 29.4 PG (ref 26–34)
MCHC RBC AUTO-ENTMCNC: 33.4 G/DL (ref 32–36)
MCV RBC AUTO: 88 FL (ref 80–100)
MONOCYTES # BLD AUTO: 0.53 X10*3/UL (ref 0.1–1)
MONOCYTES NFR BLD AUTO: 7.8 %
NEUTROPHILS # BLD AUTO: 4.06 X10*3/UL (ref 1.2–7.7)
NEUTROPHILS NFR BLD AUTO: 59.6 %
NRBC BLD-RTO: 0 /100 WBCS (ref 0–0)
PLATELET # BLD AUTO: 285 X10*3/UL (ref 150–450)
RBC # BLD AUTO: 4.89 X10*6/UL (ref 4–5.2)
WBC # BLD AUTO: 6.8 X10*3/UL (ref 4.4–11.3)

## 2024-02-27 PROCEDURE — 99215 OFFICE O/P EST HI 40 MIN: CPT | Performed by: NURSE PRACTITIONER

## 2024-02-27 PROCEDURE — 85025 COMPLETE CBC W/AUTO DIFF WBC: CPT | Performed by: NURSE PRACTITIONER

## 2024-02-27 PROCEDURE — 36415 COLL VENOUS BLD VENIPUNCTURE: CPT

## 2024-02-27 PROCEDURE — 73562 X-RAY EXAM OF KNEE 3: CPT | Mod: RT

## 2024-02-27 PROCEDURE — 87081 CULTURE SCREEN ONLY: CPT | Performed by: NURSE PRACTITIONER

## 2024-02-27 PROCEDURE — 77077 JOINT SURVEY SINGLE VIEW: CPT

## 2024-02-27 PROCEDURE — 77077 JOINT SURVEY SINGLE VIEW: CPT | Mod: MUE

## 2024-02-27 RX ORDER — CHLORHEXIDINE GLUCONATE 40 MG/ML
SOLUTION TOPICAL
Qty: 473 ML | Refills: 0 | Status: SHIPPED | OUTPATIENT
Start: 2024-02-27 | End: 2024-03-18 | Stop reason: HOSPADM

## 2024-02-27 RX ORDER — CHLORHEXIDINE GLUCONATE ORAL RINSE 1.2 MG/ML
SOLUTION DENTAL
Qty: 15 ML | Refills: 0 | Status: SHIPPED | OUTPATIENT
Start: 2024-02-27 | End: 2024-03-18 | Stop reason: HOSPADM

## 2024-02-27 ASSESSMENT — ENCOUNTER SYMPTOMS
RESPIRATORY NEGATIVE: 1
NECK NEGATIVE: 1
CONSTITUTIONAL NEGATIVE: 1
EYES NEGATIVE: 1
ENDOCRINE NEGATIVE: 1
CONSTIPATION: 1
CARDIOVASCULAR NEGATIVE: 1
ARTHRALGIAS: 1
NEUROLOGICAL NEGATIVE: 1

## 2024-02-27 ASSESSMENT — DUKE ACTIVITY SCORE INDEX (DASI)
CAN YOU HAVE SEXUAL RELATIONS: NO
CAN YOU PARTICIPATE IN STRENOUS SPORTS LIKE SWIMMING, SINGLES TENNIS, FOOTBALL, BASKETBALL, OR SKIING: NO
CAN YOU TAKE CARE OF YOURSELF (EAT, DRESS, BATHE, OR USE TOILET): NO
CAN YOU PARTICIPATE IN MODERATE RECREATIONAL ACTIVITIES LIKE GOLF, BOWLING, DANCING, DOUBLES TENNIS OR THROWING A BASEBALL OR FOOTBALL: NO
CAN YOU WALK INDOORS, SUCH AS AROUND YOUR HOUSE: NO
DASI METS SCORE: 3.1
CAN YOU WALK A BLOCK OR TWO ON LEVEL GROUND: NO
CAN YOU DO YARD WORK LIKE RAKING LEAVES, WEEDING OR PUSHING A MOWER: NO
CAN YOU RUN A SHORT DISTANCE: NO
CAN YOU DO MODERATE WORK AROUND THE HOUSE LIKE VACUUMING, SWEEPING FLOORS OR CARRYING GROCERIES: NO
CAN YOU DO HEAVY WORK AROUND THE HOUSE LIKE SCRUBBING FLOORS OR LIFTING AND MOVING HEAVY FURNITURE: NO
TOTAL_SCORE: 2.7
CAN YOU CLIMB A FLIGHT OF STAIRS OR WALK UP A HILL: NO
CAN YOU DO LIGHT WORK AROUND THE HOUSE LIKE DUSTING OR WASHING DISHES: YES

## 2024-02-27 ASSESSMENT — CHADS2 SCORE
AGE GREATER THAN OR EQUAL TO 75: NO
CHADS2 SCORE: 1
HYPERTENSION: YES
DIABETES: NO
CHF: NO
PRIOR STROKE OR TIA OR THROMBOEMBOLISM: NO

## 2024-02-27 ASSESSMENT — LIFESTYLE VARIABLES: SMOKING_STATUS: NONSMOKER

## 2024-02-27 NOTE — PREPROCEDURE INSTRUCTIONS
Fasting Guidelines    NPO Instructions:    Do not eat any food after midnight the night before your surgery/procedure.  You may have up to TEN ounces of clear liquids until TWO hours before your instructed arrival time to the hospital. This includes water, black tea/coffee, (no milk or cream), apple juice, and/or electrolyte drinks (Gatorade).  You may chew gum up to TWO hours before your surgery/procedure.    Additional Instructions:     We have sent a prescription for Hibiclens soap and Peridex mouth wash to your preferred pharmacy.  If you have not already, Please  your prescription and start using as directed before surgery.  Follow the instruction sheet provided to you at your CPM/PAT appointment.    Avoid herbal supplements, multivitamins and NSAIDS (non-steroidal anti-inflammatory drugs) such as Advil, Aleve, Ibuprofen, Naproxen, Excedrin, Meloxicam or Celebrex for at least 7 days prior to surgery. May take Tylenol as needed.    Avoid tobacco and alcohol products for 24 hours prior to surgery.    Seven/Six Days before Surgery:  Review your medication instructions, stop indicated medications    Day of Surgery:  Review your medication instructions, take indicated medications  Wear comfortable loose fitting clothing  Do not use moisturizers, creams, lotions or perfume  All jewelry and valuables should be left at home    [facility]  [facility]  [phone]  [phone]  Email-[email or web address]    [facility]  [phone]       Preoperative Brain Exercises    What are brain exercises?  A brain exercise is any activity that engages your thinking (cognitive) skills.    What types of activities are considered brain exercises?  Jigsaw puzzles, crossword puzzles, word jumble, memory games, word search, and many more.  Many can be found free online or on your phone via a mobile jenise.    Why should I do brain exercises  before my surgery?  More recent research has shown brain exercise before surgery can lower the risk of postoperative delirium (confusion) which can be especially important for older adults.  Patients who did brain exercises for 5 to 10 hours the days before surgery, cut their risk of postoperative delirium in half up to 1 week after surgery.         [facility]    Preoperative Deep Breathing Exercises    Why it is important to do deep breathing exercises before my surgery?  Deep breathing exercises strengthen your breathing muscles.  This helps you to recover after your surgery and decreases the chance of breathing complications.      How are the deep breathing exercises done?  Sit straight with your back supported.  Breathe in deeply and slowly through your nose. Your lower rib cage should expand and your abdomen may move forward.  Hold that breath for 3 to 5 seconds.  Breathe out through pursed lips, slowly and completely.  Rest and repeat 10 times every hour while awake.  Rest longer if you become dizzy or lightheaded.         Patient and Family Education             Ways You Can Help Prevent Blood Clots             This handout explains some simple things you can do to help prevent blood clots.      Blood clots are blockages that can form in the body's veins. When a blood clot forms in your deep veins, it may be called a deep vein thrombosis, or DVT for short. Blood clots can happen in any part of the body where blood flows, but they are most common in the arms and legs. If a piece of a blood clot breaks free and travels to the lungs, it is called a pulmonary embolus (PE). A PE can be a very serious problem.         Being in the hospital or having surgery can raise your chances of getting a blood clot because you may not be well enough to move around as much as you normally do.         Ways you can help prevent blood clots in the hospital         Wearing SCDs. SCDs stands for Sequential  Compression Devices.   SCDs are special sleeves that wrap around your legs  They attach to a pump that fills them with air to gently squeeze your legs every few minutes.   This helps return the blood in your legs to your heart.   SCDs should only be taken off when walking or bathing.   SCDs may not be comfortable, but they can help save your life.               Wearing compression stockings - if your doctor orders them. These special snug fitting stockings gently squeeze your legs to help blood flow.       Walking. Walking helps move the blood in your legs.   If your doctor says it is ok, try walking the halls at least   5 times a day. Ask us to help you get up, so you don't fall.      Taking any blood thinning medicines your doctor orders.        Page 1 of 2     [facility]; 3/23   Ways you can help prevent blood clots at home       Wearing compression stockings - if your doctor orders them. ? Walking - to help move the blood in your legs.       Taking any blood thinning medicines your doctor orders.      Signs of a blood clot or PE      Tell your doctor or nurse know right away if you have of the problems listed below.    If you are at home, seek medical care right away. Call 911 for chest pain or problems breathing.               Signs of a blood clot (DVT) - such as pain,  swelling, redness or warmth in your arm or leg      Signs of a pulmonary embolism (PE) - such as chest     pain or feeling short of breath
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
## 2024-02-29 LAB — STAPHYLOCOCCUS SPEC CULT: NORMAL

## 2024-03-06 NOTE — CONSULTS
Service:   Service: Anesthesia - Pain     Consult:  Consult requested by (Attending Name): Dr. Whelan   Reason: Postoperative Pain     History of Present Illness:   HPI:    ALVARO GOSS is a 67 year old Female who presents for removal of hardware from right tibia with Dr. Whelan on 6/23/23. Acute Pain consulted for block for  postoperative pain control.     Anticipated Postop Pain Issues -   Palliative: typically relieved with IV analgesics  Provocative: typically with movement  Quality: typically burning and aching  Radiation: typically none  Severity: typically severe 8-10/10  Timing: typically constant    PMH:  History of long QTc/torsades/cardiac arrest, BPPV, GERD, asthma, nephrolithiasis, depression, anxiety, PTSD    PSH:  colonoscopy, D&C, ORIF    FHx:  CAD, HTN, Cancer    SHx:  Former smoker, occasional drink, no drugs    Allergies:   Penicillin, augmentin, tramadol, naproxen, contrast, vibramycin, zofran    ROS:   Constitutional: No fevers, no rash, no chills  Cardiovascular: No chest pain, no syncope.   Respiratory: No cough, shortness of breath  GI: No abdominal pain, no nausea, no vomiting. No diarrhea.   Vascular: No lower extremity pain.   Neuro: No seizures, weakness, or other neuro symptoms.  : No urinary symptoms. No burning with urination or incontinence.   MSK: No extremity muscle pain or weakness.   Endocrine: No evidence of heat intolerance, or cold intolerance or other endocrine symptoms:  Hematologic: No easy bleeding, bruising, petechiae, or purpura.   Skin: No rash or itching.            Allergies:  ·  contrast (specific type unknown) : Anaphylaxis  ·  penicillin : Anaphylaxis  ·  Augmentin : Other (Moderate)  ·  tramadol : Other (Moderate)  ·  naproxen : Bleeding (Severe)  ·  Vibramycin : Unknown  ·  Zofran : Unknown       Intolerances:  ·  codeine : Nausea/Vomiting    Objective:   Physical Exam Narrative:  ·  Physical Exam:    Physical Exam  Constitutional: Well developed,  awake/alert, no distress, alert and cooperative  Skin: Warm and dry, no lesions, no rashes  Eyes: EOMI, clear sclera  ENMT: mucous membranes moist, no apparent injury, no lesions seen  Head/Neck: Normocephalic, no apparent injury  Respiratory/Thorax: Breathing comfortably on room air  Cardiovascular: Warm and well perfused  Gastrointestinal: Non-distended  Extremities: Normal extremities, no cyanosis, edema, contusions, no clubbing  Psychological: Appropriate mood and behavior       Assessment:    ALVARO GOSS is a 67 year old Female who presents for removal of hardware from right tibia with Dr. Whelan on 6/23/23. Acute Pain consulted for block for  postoperative pain control.     - Right popliteal and saphenous single shot nerve blocks performed preoperatively  - Pain medications per primary team  - Can consider dosage of pain medication prior to work with PT  - Encourage IS and work with PT  - Will see on POD1    Acute Pain Resident  pg [phone] ph [phone]     Attestation:   Note Completion:  I am a:  Resident/Fellow   Attending Attestation I saw and evaluated the patient.  I personally obtained the key and critical portions of the history and physical exam or was physically present for key and  critical portions performed by the resident/fellow. I reviewed the resident/fellow?s documentation and discussed the patient with the resident/fellow.  I agree with the resident/fellow?s medical decision making as documented in the note.     I personally evaluated the patient on 23-Jun-2023         Electronic Signatures:  Kyler Romero)  (Signed 23-Jun-2023 13:24)   Authored: Note Completion   Co-Signer: Service, History of Present Illness, Allergies, Objective, Assessment/Recommendations, Note Completion  Jewel Jiang (Resident))  (Signed 23-Jun-2023 13:03)   Authored: Service, History of Present Illness, Allergies,  Objective, Assessment/Recommendations, Note Completion      Last Updated: 23-Jun-2023  13:24 by Kyler Romero)

## 2024-03-12 ENCOUNTER — ANESTHESIA EVENT (OUTPATIENT)
Dept: OPERATING ROOM | Facility: HOSPITAL | Age: 69
DRG: 470 | End: 2024-03-12
Payer: MEDICARE

## 2024-03-12 DIAGNOSIS — S02.30XA: ICD-10-CM

## 2024-03-12 DIAGNOSIS — M86.461 CHRONIC OSTEOMYELITIS OF RIGHT TIBIA WITH DRAINING SINUS (MULTI): ICD-10-CM

## 2024-03-12 DIAGNOSIS — M17.31 POST-TRAUMATIC ARTHRITIS OF LOWER LEG, RIGHT: ICD-10-CM

## 2024-03-12 RX ORDER — OXYCODONE HYDROCHLORIDE 5 MG/1
5-10 TABLET ORAL EVERY 6 HOURS PRN
Qty: 150 TABLET | Refills: 0 | Status: SHIPPED | OUTPATIENT
Start: 2024-03-12 | End: 2024-03-18 | Stop reason: HOSPADM

## 2024-03-13 ENCOUNTER — ANESTHESIA (OUTPATIENT)
Dept: OPERATING ROOM | Facility: HOSPITAL | Age: 69
DRG: 470 | End: 2024-03-13
Payer: MEDICARE

## 2024-03-13 ENCOUNTER — HOSPITAL ENCOUNTER (INPATIENT)
Facility: HOSPITAL | Age: 69
LOS: 3 days | Discharge: NURSING, REHAB OR LONG-TERM CARE | DRG: 470 | End: 2024-03-18
Attending: ORTHOPAEDIC SURGERY | Admitting: ORTHOPAEDIC SURGERY
Payer: MEDICARE

## 2024-03-13 ENCOUNTER — APPOINTMENT (OUTPATIENT)
Dept: CARDIOLOGY | Facility: HOSPITAL | Age: 69
DRG: 470 | End: 2024-03-13
Payer: MEDICARE

## 2024-03-13 DIAGNOSIS — G89.18 POSTOPERATIVE PAIN: ICD-10-CM

## 2024-03-13 DIAGNOSIS — M17.11 ARTHRITIS OF RIGHT KNEE: Primary | ICD-10-CM

## 2024-03-13 PROBLEM — M12.561 TRAUMATIC ARTHRITIS OF KNEE, RIGHT: Status: ACTIVE | Noted: 2024-03-13

## 2024-03-13 PROCEDURE — 0SRC0J9 REPLACEMENT OF RIGHT KNEE JOINT WITH SYNTHETIC SUBSTITUTE, CEMENTED, OPEN APPROACH: ICD-10-PCS | Performed by: ORTHOPAEDIC SURGERY

## 2024-03-13 PROCEDURE — C1713 ANCHOR/SCREW BN/BN,TIS/BN: HCPCS | Performed by: ORTHOPAEDIC SURGERY

## 2024-03-13 PROCEDURE — A4217 STERILE WATER/SALINE, 500 ML: HCPCS | Performed by: ORTHOPAEDIC SURGERY

## 2024-03-13 PROCEDURE — 97110 THERAPEUTIC EXERCISES: CPT | Mod: GP

## 2024-03-13 PROCEDURE — 2500000004 HC RX 250 GENERAL PHARMACY W/ HCPCS (ALT 636 FOR OP/ED): Performed by: ORTHOPAEDIC SURGERY

## 2024-03-13 PROCEDURE — 2780000003 HC OR 278 NO HCPCS: Performed by: ORTHOPAEDIC SURGERY

## 2024-03-13 PROCEDURE — A27447 PR TOTAL KNEE ARTHROPLASTY: Performed by: ANESTHESIOLOGIST ASSISTANT

## 2024-03-13 PROCEDURE — C1776 JOINT DEVICE (IMPLANTABLE): HCPCS | Performed by: ORTHOPAEDIC SURGERY

## 2024-03-13 PROCEDURE — 2500000005 HC RX 250 GENERAL PHARMACY W/O HCPCS: Performed by: ANESTHESIOLOGY

## 2024-03-13 PROCEDURE — 7100000011 HC EXTENDED STAY RECOVERY HOURLY - NURSING UNIT

## 2024-03-13 PROCEDURE — 3600000018 HC OR TIME - INITIAL BASE CHARGE - PROCEDURE LEVEL SIX: Performed by: ORTHOPAEDIC SURGERY

## 2024-03-13 PROCEDURE — 27447 TOTAL KNEE ARTHROPLASTY: CPT | Performed by: ORTHOPAEDIC SURGERY

## 2024-03-13 PROCEDURE — 3700000001 HC GENERAL ANESTHESIA TIME - INITIAL BASE CHARGE: Performed by: ORTHOPAEDIC SURGERY

## 2024-03-13 PROCEDURE — 2500000005 HC RX 250 GENERAL PHARMACY W/O HCPCS: Performed by: ANESTHESIOLOGIST ASSISTANT

## 2024-03-13 PROCEDURE — 2500000004 HC RX 250 GENERAL PHARMACY W/ HCPCS (ALT 636 FOR OP/ED): Mod: JG

## 2024-03-13 PROCEDURE — 97161 PT EVAL LOW COMPLEX 20 MIN: CPT | Mod: GP

## 2024-03-13 PROCEDURE — 3600000017 HC OR TIME - EACH INCREMENTAL 1 MINUTE - PROCEDURE LEVEL SIX: Performed by: ORTHOPAEDIC SURGERY

## 2024-03-13 PROCEDURE — 2500000001 HC RX 250 WO HCPCS SELF ADMINISTERED DRUGS (ALT 637 FOR MEDICARE OP)

## 2024-03-13 PROCEDURE — 7100000002 HC RECOVERY ROOM TIME - EACH INCREMENTAL 1 MINUTE: Performed by: ORTHOPAEDIC SURGERY

## 2024-03-13 PROCEDURE — P9045 ALBUMIN (HUMAN), 5%, 250 ML: HCPCS | Mod: JZ,JG | Performed by: ANESTHESIOLOGY

## 2024-03-13 PROCEDURE — 99223 1ST HOSP IP/OBS HIGH 75: CPT

## 2024-03-13 PROCEDURE — 93005 ELECTROCARDIOGRAM TRACING: CPT

## 2024-03-13 PROCEDURE — A6213 FOAM DRG >16<=48 SQ IN W/BDR: HCPCS | Performed by: ORTHOPAEDIC SURGERY

## 2024-03-13 PROCEDURE — 2500000004 HC RX 250 GENERAL PHARMACY W/ HCPCS (ALT 636 FOR OP/ED): Performed by: STUDENT IN AN ORGANIZED HEALTH CARE EDUCATION/TRAINING PROGRAM

## 2024-03-13 PROCEDURE — 2500000002 HC RX 250 W HCPCS SELF ADMINISTERED DRUGS (ALT 637 FOR MEDICARE OP, ALT 636 FOR OP/ED): Performed by: ANESTHESIOLOGIST ASSISTANT

## 2024-03-13 PROCEDURE — 2720000007 HC OR 272 NO HCPCS: Performed by: ORTHOPAEDIC SURGERY

## 2024-03-13 PROCEDURE — 27447 TOTAL KNEE ARTHROPLASTY: CPT | Performed by: PHYSICIAN ASSISTANT

## 2024-03-13 PROCEDURE — 3700000002 HC GENERAL ANESTHESIA TIME - EACH INCREMENTAL 1 MINUTE: Performed by: ORTHOPAEDIC SURGERY

## 2024-03-13 PROCEDURE — 2500000004 HC RX 250 GENERAL PHARMACY W/ HCPCS (ALT 636 FOR OP/ED): Performed by: ANESTHESIOLOGIST ASSISTANT

## 2024-03-13 PROCEDURE — 93010 ELECTROCARDIOGRAM REPORT: CPT | Performed by: INTERNAL MEDICINE

## 2024-03-13 PROCEDURE — A27447 PR TOTAL KNEE ARTHROPLASTY: Performed by: ANESTHESIOLOGY

## 2024-03-13 PROCEDURE — 2500000002 HC RX 250 W HCPCS SELF ADMINISTERED DRUGS (ALT 637 FOR MEDICARE OP, ALT 636 FOR OP/ED)

## 2024-03-13 PROCEDURE — 2500000004 HC RX 250 GENERAL PHARMACY W/ HCPCS (ALT 636 FOR OP/ED): Mod: JZ,JG | Performed by: ANESTHESIOLOGY

## 2024-03-13 PROCEDURE — 7100000001 HC RECOVERY ROOM TIME - INITIAL BASE CHARGE: Performed by: ORTHOPAEDIC SURGERY

## 2024-03-13 DEVICE — TOBRA FULL DOSE ANTIBIOTIC BONE CEMENT, 10 PACK CATALOG NUMBER IS 6197-9-010
Type: IMPLANTABLE DEVICE | Site: KNEE | Status: FUNCTIONAL
Brand: SIMPLEX

## 2024-03-13 DEVICE — ASYMMETRIC PATELLA
Type: IMPLANTABLE DEVICE | Site: PATELLA | Status: FUNCTIONAL
Brand: TRIATHLON

## 2024-03-13 DEVICE — UNIVERSAL TIBIAL BASEPLATE
Type: IMPLANTABLE DEVICE | Site: KNEE | Status: FUNCTIONAL
Brand: TRIATHLON

## 2024-03-13 DEVICE — TOTAL STABILIZER+ TIBIAL INSERT
Type: IMPLANTABLE DEVICE | Site: KNEE | Status: FUNCTIONAL
Brand: TRIATHLON

## 2024-03-13 DEVICE — POSTERIOR STABILIZED FEMORAL
Type: IMPLANTABLE DEVICE | Site: KNEE | Status: FUNCTIONAL
Brand: TRIATHLON

## 2024-03-13 DEVICE — CEMENTED STEM
Type: IMPLANTABLE DEVICE | Site: KNEE | Status: FUNCTIONAL
Brand: TRIATHLON

## 2024-03-13 RX ORDER — PANTOPRAZOLE SODIUM 40 MG/1
40 TABLET, DELAYED RELEASE ORAL
Status: DISCONTINUED | OUTPATIENT
Start: 2024-03-14 | End: 2024-03-18 | Stop reason: HOSPADM

## 2024-03-13 RX ORDER — ROPIVACAINE HYDROCHLORIDE 5 MG/ML
INJECTION, SOLUTION EPIDURAL; INFILTRATION; PERINEURAL AS NEEDED
Status: DISCONTINUED | OUTPATIENT
Start: 2024-03-13 | End: 2024-03-13

## 2024-03-13 RX ORDER — ACETAMINOPHEN 325 MG/1
650 TABLET ORAL EVERY 6 HOURS SCHEDULED
Status: DISCONTINUED | OUTPATIENT
Start: 2024-03-13 | End: 2024-03-14

## 2024-03-13 RX ORDER — HYDROMORPHONE HYDROCHLORIDE 1 MG/ML
0.1 INJECTION, SOLUTION INTRAMUSCULAR; INTRAVENOUS; SUBCUTANEOUS EVERY 5 MIN PRN
Status: DISCONTINUED | OUTPATIENT
Start: 2024-03-13 | End: 2024-03-13 | Stop reason: HOSPADM

## 2024-03-13 RX ORDER — SCOLOPAMINE TRANSDERMAL SYSTEM 1 MG/1
PATCH, EXTENDED RELEASE TRANSDERMAL AS NEEDED
Status: DISCONTINUED | OUTPATIENT
Start: 2024-03-13 | End: 2024-03-13

## 2024-03-13 RX ORDER — METOCLOPRAMIDE 10 MG/1
10 TABLET ORAL EVERY 6 HOURS PRN
Status: DISCONTINUED | OUTPATIENT
Start: 2024-03-13 | End: 2024-03-18 | Stop reason: HOSPADM

## 2024-03-13 RX ORDER — LIDOCAINE HYDROCHLORIDE 10 MG/ML
0.1 INJECTION INFILTRATION; PERINEURAL ONCE
Status: DISCONTINUED | OUTPATIENT
Start: 2024-03-13 | End: 2024-03-13 | Stop reason: HOSPADM

## 2024-03-13 RX ORDER — NALOXONE HYDROCHLORIDE 0.4 MG/ML
0.2 INJECTION, SOLUTION INTRAMUSCULAR; INTRAVENOUS; SUBCUTANEOUS EVERY 5 MIN PRN
Status: DISCONTINUED | OUTPATIENT
Start: 2024-03-13 | End: 2024-03-13 | Stop reason: SDUPTHER

## 2024-03-13 RX ORDER — ACETAMINOPHEN 325 MG/1
650 TABLET ORAL EVERY 4 HOURS PRN
Status: DISCONTINUED | OUTPATIENT
Start: 2024-03-13 | End: 2024-03-13 | Stop reason: HOSPADM

## 2024-03-13 RX ORDER — NALOXONE HYDROCHLORIDE 0.4 MG/ML
0.2 INJECTION, SOLUTION INTRAMUSCULAR; INTRAVENOUS; SUBCUTANEOUS EVERY 5 MIN PRN
Status: DISCONTINUED | OUTPATIENT
Start: 2024-03-13 | End: 2024-03-18 | Stop reason: HOSPADM

## 2024-03-13 RX ORDER — ONDANSETRON HYDROCHLORIDE 2 MG/ML
4 INJECTION, SOLUTION INTRAVENOUS ONCE AS NEEDED
Status: DISCONTINUED | OUTPATIENT
Start: 2024-03-13 | End: 2024-03-13 | Stop reason: HOSPADM

## 2024-03-13 RX ORDER — PROPOFOL 10 MG/ML
INJECTION, EMULSION INTRAVENOUS CONTINUOUS PRN
Status: DISCONTINUED | OUTPATIENT
Start: 2024-03-13 | End: 2024-03-13

## 2024-03-13 RX ORDER — BACLOFEN 10 MG/1
5 TABLET ORAL DAILY PRN
Status: DISCONTINUED | OUTPATIENT
Start: 2024-03-13 | End: 2024-03-18 | Stop reason: HOSPADM

## 2024-03-13 RX ORDER — METOCLOPRAMIDE HYDROCHLORIDE 5 MG/ML
10 INJECTION INTRAMUSCULAR; INTRAVENOUS EVERY 6 HOURS PRN
Status: DISCONTINUED | OUTPATIENT
Start: 2024-03-13 | End: 2024-03-18 | Stop reason: HOSPADM

## 2024-03-13 RX ORDER — PHENYLEPHRINE 10 MG/250 ML(40 MCG/ML)IN 0.9 % SOD.CHLORIDE INTRAVENOUS
CONTINUOUS PRN
Status: DISCONTINUED | OUTPATIENT
Start: 2024-03-13 | End: 2024-03-13

## 2024-03-13 RX ORDER — MEPERIDINE HYDROCHLORIDE 25 MG/ML
12.5 INJECTION INTRAMUSCULAR; INTRAVENOUS; SUBCUTANEOUS EVERY 10 MIN PRN
Status: DISCONTINUED | OUTPATIENT
Start: 2024-03-13 | End: 2024-03-13 | Stop reason: HOSPADM

## 2024-03-13 RX ORDER — OXYCODONE HYDROCHLORIDE 5 MG/1
5 TABLET ORAL EVERY 4 HOURS PRN
Status: DISCONTINUED | OUTPATIENT
Start: 2024-03-13 | End: 2024-03-13 | Stop reason: HOSPADM

## 2024-03-13 RX ORDER — SODIUM CHLORIDE, SODIUM LACTATE, POTASSIUM CHLORIDE, CALCIUM CHLORIDE 600; 310; 30; 20 MG/100ML; MG/100ML; MG/100ML; MG/100ML
100 INJECTION, SOLUTION INTRAVENOUS CONTINUOUS
Status: DISCONTINUED | OUTPATIENT
Start: 2024-03-13 | End: 2024-03-18 | Stop reason: HOSPADM

## 2024-03-13 RX ORDER — MAGNESIUM SULFATE HEPTAHYDRATE 500 MG/ML
INJECTION, SOLUTION INTRAMUSCULAR; INTRAVENOUS AS NEEDED
Status: DISCONTINUED | OUTPATIENT
Start: 2024-03-13 | End: 2024-03-13

## 2024-03-13 RX ORDER — HYDROMORPHONE HYDROCHLORIDE 1 MG/ML
0.2 INJECTION, SOLUTION INTRAMUSCULAR; INTRAVENOUS; SUBCUTANEOUS EVERY 5 MIN PRN
Status: DISCONTINUED | OUTPATIENT
Start: 2024-03-13 | End: 2024-03-13 | Stop reason: HOSPADM

## 2024-03-13 RX ORDER — ALBUTEROL SULFATE 90 UG/1
2 AEROSOL, METERED RESPIRATORY (INHALATION) EVERY 6 HOURS PRN
Status: DISCONTINUED | OUTPATIENT
Start: 2024-03-13 | End: 2024-03-18 | Stop reason: HOSPADM

## 2024-03-13 RX ORDER — FENTANYL CITRATE 50 UG/ML
INJECTION, SOLUTION INTRAMUSCULAR; INTRAVENOUS AS NEEDED
Status: DISCONTINUED | OUTPATIENT
Start: 2024-03-13 | End: 2024-03-13

## 2024-03-13 RX ORDER — DOCUSATE SODIUM 100 MG/1
100 CAPSULE, LIQUID FILLED ORAL 2 TIMES DAILY
Status: DISCONTINUED | OUTPATIENT
Start: 2024-03-13 | End: 2024-03-17

## 2024-03-13 RX ORDER — OXYCODONE HYDROCHLORIDE 5 MG/1
10 TABLET ORAL EVERY 4 HOURS PRN
Status: DISCONTINUED | OUTPATIENT
Start: 2024-03-13 | End: 2024-03-14

## 2024-03-13 RX ORDER — TRANEXAMIC ACID 650 MG/1
1950 TABLET ORAL ONCE
Status: COMPLETED | OUTPATIENT
Start: 2024-03-13 | End: 2024-03-13

## 2024-03-13 RX ORDER — CEFAZOLIN SODIUM 2 G/100ML
2 INJECTION, SOLUTION INTRAVENOUS EVERY 8 HOURS
Status: COMPLETED | OUTPATIENT
Start: 2024-03-13 | End: 2024-03-14

## 2024-03-13 RX ORDER — MIDAZOLAM HYDROCHLORIDE 1 MG/ML
INJECTION INTRAMUSCULAR; INTRAVENOUS AS NEEDED
Status: DISCONTINUED | OUTPATIENT
Start: 2024-03-13 | End: 2024-03-13

## 2024-03-13 RX ORDER — CEFAZOLIN 1 G/1
INJECTION, POWDER, FOR SOLUTION INTRAVENOUS AS NEEDED
Status: DISCONTINUED | OUTPATIENT
Start: 2024-03-13 | End: 2024-03-13

## 2024-03-13 RX ORDER — ALBUMIN HUMAN 50 G/1000ML
12.5 SOLUTION INTRAVENOUS ONCE
Status: COMPLETED | OUTPATIENT
Start: 2024-03-13 | End: 2024-03-13

## 2024-03-13 RX ORDER — PHENYLEPHRINE HCL IN 0.9% NACL 1 MG/10 ML
SYRINGE (ML) INTRAVENOUS AS NEEDED
Status: DISCONTINUED | OUTPATIENT
Start: 2024-03-13 | End: 2024-03-13

## 2024-03-13 RX ORDER — TRANEXAMIC ACID 100 MG/ML
INJECTION, SOLUTION INTRAVENOUS AS NEEDED
Status: DISCONTINUED | OUTPATIENT
Start: 2024-03-13 | End: 2024-03-13

## 2024-03-13 RX ORDER — HYDROMORPHONE HYDROCHLORIDE 1 MG/ML
0.5 INJECTION, SOLUTION INTRAMUSCULAR; INTRAVENOUS; SUBCUTANEOUS EVERY 5 MIN PRN
Status: DISCONTINUED | OUTPATIENT
Start: 2024-03-13 | End: 2024-03-13 | Stop reason: HOSPADM

## 2024-03-13 RX ORDER — SODIUM CHLORIDE, SODIUM LACTATE, POTASSIUM CHLORIDE, CALCIUM CHLORIDE 600; 310; 30; 20 MG/100ML; MG/100ML; MG/100ML; MG/100ML
50 INJECTION, SOLUTION INTRAVENOUS CONTINUOUS
Status: ACTIVE | OUTPATIENT
Start: 2024-03-13 | End: 2024-03-14

## 2024-03-13 RX ORDER — GLYCOPYRROLATE 0.2 MG/ML
INJECTION INTRAMUSCULAR; INTRAVENOUS AS NEEDED
Status: DISCONTINUED | OUTPATIENT
Start: 2024-03-13 | End: 2024-03-13

## 2024-03-13 RX ORDER — NORETHINDRONE AND ETHINYL ESTRADIOL 0.5-0.035
KIT ORAL AS NEEDED
Status: DISCONTINUED | OUTPATIENT
Start: 2024-03-13 | End: 2024-03-13

## 2024-03-13 RX ORDER — SODIUM CHLORIDE 0.9 G/100ML
IRRIGANT IRRIGATION AS NEEDED
Status: DISCONTINUED | OUTPATIENT
Start: 2024-03-13 | End: 2024-03-13 | Stop reason: HOSPADM

## 2024-03-13 RX ORDER — PROPOFOL 10 MG/ML
INJECTION, EMULSION INTRAVENOUS AS NEEDED
Status: DISCONTINUED | OUTPATIENT
Start: 2024-03-13 | End: 2024-03-13

## 2024-03-13 RX ORDER — DOCUSATE SODIUM 100 MG/1
100 CAPSULE, LIQUID FILLED ORAL 2 TIMES DAILY PRN
COMMUNITY
End: 2024-03-18 | Stop reason: HOSPADM

## 2024-03-13 RX ORDER — ASPIRIN 81 MG/1
81 TABLET ORAL 2 TIMES DAILY
Status: DISCONTINUED | OUTPATIENT
Start: 2024-03-13 | End: 2024-03-18 | Stop reason: HOSPADM

## 2024-03-13 RX ORDER — DROPERIDOL 2.5 MG/ML
0.62 INJECTION, SOLUTION INTRAMUSCULAR; INTRAVENOUS ONCE AS NEEDED
Status: DISCONTINUED | OUTPATIENT
Start: 2024-03-13 | End: 2024-03-13 | Stop reason: HOSPADM

## 2024-03-13 RX ORDER — SODIUM CHLORIDE, SODIUM LACTATE, POTASSIUM CHLORIDE, CALCIUM CHLORIDE 600; 310; 30; 20 MG/100ML; MG/100ML; MG/100ML; MG/100ML
100 INJECTION, SOLUTION INTRAVENOUS CONTINUOUS
Status: DISCONTINUED | OUTPATIENT
Start: 2024-03-13 | End: 2024-03-13 | Stop reason: HOSPADM

## 2024-03-13 RX ORDER — OXYCODONE HYDROCHLORIDE 5 MG/1
5 TABLET ORAL EVERY 6 HOURS PRN
Status: DISCONTINUED | OUTPATIENT
Start: 2024-03-13 | End: 2024-03-14

## 2024-03-13 RX ORDER — HYDROMORPHONE HYDROCHLORIDE 1 MG/ML
0.2 INJECTION, SOLUTION INTRAMUSCULAR; INTRAVENOUS; SUBCUTANEOUS EVERY 4 HOURS PRN
Status: DISCONTINUED | OUTPATIENT
Start: 2024-03-13 | End: 2024-03-18 | Stop reason: HOSPADM

## 2024-03-13 RX ORDER — APREPITANT 40 MG/1
CAPSULE ORAL AS NEEDED
Status: DISCONTINUED | OUTPATIENT
Start: 2024-03-13 | End: 2024-03-13

## 2024-03-13 RX ADMIN — MAGNESIUM SULFATE HEPTAHYDRATE 1 G: 500 INJECTION, SOLUTION INTRAMUSCULAR; INTRAVENOUS at 09:21

## 2024-03-13 RX ADMIN — HYDROMORPHONE HYDROCHLORIDE 0.5 MG: 1 INJECTION, SOLUTION INTRAMUSCULAR; INTRAVENOUS; SUBCUTANEOUS at 15:04

## 2024-03-13 RX ADMIN — SODIUM CHLORIDE, POTASSIUM CHLORIDE, SODIUM LACTATE AND CALCIUM CHLORIDE 100 ML/HR: 600; 310; 30; 20 INJECTION, SOLUTION INTRAVENOUS at 12:15

## 2024-03-13 RX ADMIN — FENTANYL CITRATE 25 MCG: 50 INJECTION, SOLUTION INTRAMUSCULAR; INTRAVENOUS at 10:42

## 2024-03-13 RX ADMIN — FENTANYL CITRATE 50 MCG: 50 INJECTION, SOLUTION INTRAMUSCULAR; INTRAVENOUS at 10:48

## 2024-03-13 RX ADMIN — Medication 80 MCG: at 11:09

## 2024-03-13 RX ADMIN — EPHEDRINE SULFATE 5 MG: 50 INJECTION, SOLUTION INTRAVENOUS at 08:56

## 2024-03-13 RX ADMIN — CEFAZOLIN SODIUM 2 G: 2 INJECTION, SOLUTION INTRAVENOUS at 16:31

## 2024-03-13 RX ADMIN — GLYCOPYRROLATE 0.2 MG: 0.2 INJECTION INTRAMUSCULAR; INTRAVENOUS at 08:46

## 2024-03-13 RX ADMIN — OXYCODONE HYDROCHLORIDE 10 MG: 5 TABLET ORAL at 16:27

## 2024-03-13 RX ADMIN — ALBUMIN HUMAN 12.5 G: 0.05 INJECTION, SOLUTION INTRAVENOUS at 13:43

## 2024-03-13 RX ADMIN — MIDAZOLAM HYDROCHLORIDE 1 MG: 1 INJECTION, SOLUTION INTRAMUSCULAR; INTRAVENOUS at 08:28

## 2024-03-13 RX ADMIN — MAGNESIUM SULFATE HEPTAHYDRATE 1 G: 500 INJECTION, SOLUTION INTRAMUSCULAR; INTRAVENOUS at 09:24

## 2024-03-13 RX ADMIN — ROPIVACAINE HYDROCHLORIDE 20 ML: 5 INJECTION, SOLUTION EPIDURAL; INFILTRATION; PERINEURAL at 07:42

## 2024-03-13 RX ADMIN — Medication 80 MCG: at 09:29

## 2024-03-13 RX ADMIN — DOCUSATE SODIUM 100 MG: 100 CAPSULE, LIQUID FILLED ORAL at 21:33

## 2024-03-13 RX ADMIN — PROPOFOL 20 MG: 10 INJECTION, EMULSION INTRAVENOUS at 11:01

## 2024-03-13 RX ADMIN — MIDAZOLAM HYDROCHLORIDE 1 MG: 1 INJECTION, SOLUTION INTRAMUSCULAR; INTRAVENOUS at 08:40

## 2024-03-13 RX ADMIN — BACLOFEN 5 MG: 10 TABLET ORAL at 17:41

## 2024-03-13 RX ADMIN — PROPOFOL 30 MG: 10 INJECTION, EMULSION INTRAVENOUS at 10:58

## 2024-03-13 RX ADMIN — Medication 2 L/MIN: at 11:48

## 2024-03-13 RX ADMIN — EPHEDRINE SULFATE 25 MG: 50 INJECTION, SOLUTION INTRAVENOUS at 08:54

## 2024-03-13 RX ADMIN — HYDROMORPHONE HYDROCHLORIDE 0.5 MG: 1 INJECTION, SOLUTION INTRAMUSCULAR; INTRAVENOUS; SUBCUTANEOUS at 15:13

## 2024-03-13 RX ADMIN — CEFAZOLIN 2 G: 1 INJECTION, POWDER, FOR SOLUTION INTRAMUSCULAR; INTRAVENOUS at 08:56

## 2024-03-13 RX ADMIN — TRANEXAMIC ACID 1000 MG: 100 INJECTION, SOLUTION INTRAVENOUS at 08:58

## 2024-03-13 RX ADMIN — ACETAMINOPHEN 650 MG: 325 TABLET ORAL at 17:41

## 2024-03-13 RX ADMIN — ASPIRIN 81 MG: 81 TABLET, COATED ORAL at 21:33

## 2024-03-13 RX ADMIN — TRANEXAMIC ACID 1950 MG: 650 TABLET ORAL at 17:41

## 2024-03-13 RX ADMIN — Medication 0.3 MCG/KG/MIN: at 09:24

## 2024-03-13 RX ADMIN — APREPITANT 40 MG: 40 CAPSULE ORAL at 08:09

## 2024-03-13 RX ADMIN — SCOPALAMINE 1 PATCH: 1 PATCH, EXTENDED RELEASE TRANSDERMAL at 08:09

## 2024-03-13 RX ADMIN — Medication 200 MCG: at 08:58

## 2024-03-13 RX ADMIN — PROPOFOL 10 MG: 10 INJECTION, EMULSION INTRAVENOUS at 11:17

## 2024-03-13 RX ADMIN — PROPOFOL 50 MCG/KG/MIN: 10 INJECTION, EMULSION INTRAVENOUS at 08:57

## 2024-03-13 RX ADMIN — OXYCODONE HYDROCHLORIDE 10 MG: 5 TABLET ORAL at 21:33

## 2024-03-13 RX ADMIN — SODIUM CHLORIDE, SODIUM LACTATE, POTASSIUM CHLORIDE, AND CALCIUM CHLORIDE: 600; 310; 30; 20 INJECTION, SOLUTION INTRAVENOUS at 08:09

## 2024-03-13 SDOH — SOCIAL STABILITY: SOCIAL INSECURITY
WITHIN THE LAST YEAR, HAVE TO BEEN RAPED OR FORCED TO HAVE ANY KIND OF SEXUAL ACTIVITY BY YOUR PARTNER OR EX-PARTNER?: NO

## 2024-03-13 SDOH — SOCIAL STABILITY: SOCIAL NETWORK: HOW OFTEN DO YOU ATTEND CHURCH OR RELIGIOUS SERVICES?: MORE THAN 4 TIMES PER YEAR

## 2024-03-13 SDOH — ECONOMIC STABILITY: FOOD INSECURITY: WITHIN THE PAST 12 MONTHS, YOU WORRIED THAT YOUR FOOD WOULD RUN OUT BEFORE YOU GOT MONEY TO BUY MORE.: NEVER TRUE

## 2024-03-13 SDOH — SOCIAL STABILITY: SOCIAL INSECURITY: ARE THERE ANY APPARENT SIGNS OF INJURIES/BEHAVIORS THAT COULD BE RELATED TO ABUSE/NEGLECT?: NO

## 2024-03-13 SDOH — SOCIAL STABILITY: SOCIAL INSECURITY: WITHIN THE LAST YEAR, HAVE YOU BEEN HUMILIATED OR EMOTIONALLY ABUSED IN OTHER WAYS BY YOUR PARTNER OR EX-PARTNER?: NO

## 2024-03-13 SDOH — SOCIAL STABILITY: SOCIAL INSECURITY
WITHIN THE LAST YEAR, HAVE YOU BEEN KICKED, HIT, SLAPPED, OR OTHERWISE PHYSICALLY HURT BY YOUR PARTNER OR EX-PARTNER?: NO

## 2024-03-13 SDOH — SOCIAL STABILITY: SOCIAL INSECURITY: HAS ANYONE EVER THREATENED TO HURT YOUR FAMILY OR YOUR PETS?: NO

## 2024-03-13 SDOH — SOCIAL STABILITY: SOCIAL INSECURITY: DOES ANYONE TRY TO KEEP YOU FROM HAVING/CONTACTING OTHER FRIENDS OR DOING THINGS OUTSIDE YOUR HOME?: NO

## 2024-03-13 SDOH — HEALTH STABILITY: MENTAL HEALTH
STRESS IS WHEN SOMEONE FEELS TENSE, NERVOUS, ANXIOUS, OR CAN'T SLEEP AT NIGHT BECAUSE THEIR MIND IS TROUBLED. HOW STRESSED ARE YOU?: NOT AT ALL

## 2024-03-13 SDOH — SOCIAL STABILITY: SOCIAL INSECURITY: WITHIN THE LAST YEAR, HAVE YOU BEEN AFRAID OF YOUR PARTNER OR EX-PARTNER?: NO

## 2024-03-13 SDOH — SOCIAL STABILITY: SOCIAL INSECURITY: ABUSE: ADULT

## 2024-03-13 SDOH — ECONOMIC STABILITY: INCOME INSECURITY: IN THE PAST 12 MONTHS, HAS THE ELECTRIC, GAS, OIL, OR WATER COMPANY THREATENED TO SHUT OFF SERVICE IN YOUR HOME?: NO

## 2024-03-13 SDOH — SOCIAL STABILITY: SOCIAL NETWORK
DO YOU BELONG TO ANY CLUBS OR ORGANIZATIONS SUCH AS CHURCH GROUPS UNIONS, FRATERNAL OR ATHLETIC GROUPS, OR SCHOOL GROUPS?: YES

## 2024-03-13 SDOH — ECONOMIC STABILITY: INCOME INSECURITY: IN THE LAST 12 MONTHS, WAS THERE A TIME WHEN YOU WERE NOT ABLE TO PAY THE MORTGAGE OR RENT ON TIME?: NO

## 2024-03-13 SDOH — SOCIAL STABILITY: SOCIAL INSECURITY: ARE YOU OR HAVE YOU BEEN THREATENED OR ABUSED PHYSICALLY, EMOTIONALLY, OR SEXUALLY BY ANYONE?: NO

## 2024-03-13 SDOH — SOCIAL STABILITY: SOCIAL NETWORK: HOW OFTEN DO YOU GET TOGETHER WITH FRIENDS OR RELATIVES?: MORE THAN THREE TIMES A WEEK

## 2024-03-13 SDOH — SOCIAL STABILITY: SOCIAL INSECURITY: DO YOU FEEL UNSAFE GOING BACK TO THE PLACE WHERE YOU ARE LIVING?: NO

## 2024-03-13 SDOH — ECONOMIC STABILITY: HOUSING INSECURITY
IN THE LAST 12 MONTHS, WAS THERE A TIME WHEN YOU DID NOT HAVE A STEADY PLACE TO SLEEP OR SLEPT IN A SHELTER (INCLUDING NOW)?: NO

## 2024-03-13 SDOH — ECONOMIC STABILITY: TRANSPORTATION INSECURITY
IN THE PAST 12 MONTHS, HAS LACK OF TRANSPORTATION KEPT YOU FROM MEETINGS, WORK, OR FROM GETTING THINGS NEEDED FOR DAILY LIVING?: NO

## 2024-03-13 SDOH — ECONOMIC STABILITY: INCOME INSECURITY: HOW HARD IS IT FOR YOU TO PAY FOR THE VERY BASICS LIKE FOOD, HOUSING, MEDICAL CARE, AND HEATING?: NOT HARD AT ALL

## 2024-03-13 SDOH — SOCIAL STABILITY: SOCIAL INSECURITY: WERE YOU ABLE TO COMPLETE ALL THE BEHAVIORAL HEALTH SCREENINGS?: YES

## 2024-03-13 SDOH — SOCIAL STABILITY: SOCIAL NETWORK: HOW OFTEN DO YOU ATTENT MEETINGS OF THE CLUB OR ORGANIZATION YOU BELONG TO?: MORE THAN 4 TIMES PER YEAR

## 2024-03-13 SDOH — SOCIAL STABILITY: SOCIAL NETWORK: ARE YOU MARRIED, WIDOWED, DIVORCED, SEPARATED, NEVER MARRIED, OR LIVING WITH A PARTNER?: MARRIED

## 2024-03-13 SDOH — ECONOMIC STABILITY: FOOD INSECURITY: WITHIN THE PAST 12 MONTHS, THE FOOD YOU BOUGHT JUST DIDN'T LAST AND YOU DIDN'T HAVE MONEY TO GET MORE.: NEVER TRUE

## 2024-03-13 SDOH — HEALTH STABILITY: PHYSICAL HEALTH: ON AVERAGE, HOW MANY DAYS PER WEEK DO YOU ENGAGE IN MODERATE TO STRENUOUS EXERCISE (LIKE A BRISK WALK)?: 0 DAYS

## 2024-03-13 SDOH — SOCIAL STABILITY: SOCIAL NETWORK
IN A TYPICAL WEEK, HOW MANY TIMES DO YOU TALK ON THE PHONE WITH FAMILY, FRIENDS, OR NEIGHBORS?: MORE THAN THREE TIMES A WEEK

## 2024-03-13 SDOH — HEALTH STABILITY: PHYSICAL HEALTH: ON AVERAGE, HOW MANY MINUTES DO YOU ENGAGE IN EXERCISE AT THIS LEVEL?: 0 MIN

## 2024-03-13 SDOH — SOCIAL STABILITY: SOCIAL INSECURITY: HAVE YOU HAD THOUGHTS OF HARMING ANYONE ELSE?: NO

## 2024-03-13 SDOH — SOCIAL STABILITY: SOCIAL INSECURITY: DO YOU FEEL ANYONE HAS EXPLOITED OR TAKEN ADVANTAGE OF YOU FINANCIALLY OR OF YOUR PERSONAL PROPERTY?: NO

## 2024-03-13 SDOH — ECONOMIC STABILITY: TRANSPORTATION INSECURITY
IN THE PAST 12 MONTHS, HAS THE LACK OF TRANSPORTATION KEPT YOU FROM MEDICAL APPOINTMENTS OR FROM GETTING MEDICATIONS?: NO

## 2024-03-13 ASSESSMENT — PAIN SCALES - GENERAL
PAINLEVEL_OUTOF10: 10 - WORST POSSIBLE PAIN
PAINLEVEL_OUTOF10: 7
PAINLEVEL_OUTOF10: 7
PAINLEVEL_OUTOF10: 6
PAINLEVEL_OUTOF10: 6
PAINLEVEL_OUTOF10: 7
PAINLEVEL_OUTOF10: 10 - WORST POSSIBLE PAIN
PAINLEVEL_OUTOF10: 8
PAINLEVEL_OUTOF10: 10 - WORST POSSIBLE PAIN
PAIN_LEVEL: 3
PAINLEVEL_OUTOF10: 7

## 2024-03-13 ASSESSMENT — COGNITIVE AND FUNCTIONAL STATUS - GENERAL
DRESSING REGULAR UPPER BODY CLOTHING: A LITTLE
MOVING FROM LYING ON BACK TO SITTING ON SIDE OF FLAT BED WITH BEDRAILS: A LITTLE
PATIENT BASELINE BEDBOUND: NO
WALKING IN HOSPITAL ROOM: A LOT
STANDING UP FROM CHAIR USING ARMS: A LOT
MOBILITY SCORE: 13
STANDING UP FROM CHAIR USING ARMS: A LOT
CLIMB 3 TO 5 STEPS WITH RAILING: TOTAL
TURNING FROM BACK TO SIDE WHILE IN FLAT BAD: A LOT
WALKING IN HOSPITAL ROOM: A LOT
MOVING TO AND FROM BED TO CHAIR: A LOT
HELP NEEDED FOR BATHING: A LOT
CLIMB 3 TO 5 STEPS WITH RAILING: A LOT
TURNING FROM BACK TO SIDE WHILE IN FLAT BAD: A LOT
MOVING TO AND FROM BED TO CHAIR: A LOT
MOBILITY SCORE: 12
DAILY ACTIVITIY SCORE: 16
MOVING FROM LYING ON BACK TO SITTING ON SIDE OF FLAT BED WITH BEDRAILS: A LITTLE
TOILETING: A LOT
DRESSING REGULAR LOWER BODY CLOTHING: TOTAL

## 2024-03-13 ASSESSMENT — ACTIVITIES OF DAILY LIVING (ADL)
WALKS IN HOME: NEEDS ASSISTANCE
LACK_OF_TRANSPORTATION: NO
TOILETING: NEEDS ASSISTANCE
FEEDING YOURSELF: INDEPENDENT
DRESSING YOURSELF: NEEDS ASSISTANCE
WALKS IN HOME: NEEDS ASSISTANCE
HEARING - RIGHT EAR: FUNCTIONAL
PATIENT'S MEMORY ADEQUATE TO SAFELY COMPLETE DAILY ACTIVITIES?: YES
ADEQUATE_TO_COMPLETE_ADL: YES
GROOMING: NEEDS ASSISTANCE
BATHING: NEEDS ASSISTANCE
PATIENT'S MEMORY ADEQUATE TO SAFELY COMPLETE DAILY ACTIVITIES?: YES
JUDGMENT_ADEQUATE_SAFELY_COMPLETE_DAILY_ACTIVITIES: YES
GROOMING: NEEDS ASSISTANCE
HEARING - LEFT EAR: FUNCTIONAL
JUDGMENT_ADEQUATE_SAFELY_COMPLETE_DAILY_ACTIVITIES: YES
HEARING - LEFT EAR: FUNCTIONAL
TOILETING: NEEDS ASSISTANCE
DRESSING YOURSELF: NEEDS ASSISTANCE
HEARING - RIGHT EAR: FUNCTIONAL
FEEDING YOURSELF: INDEPENDENT
ADEQUATE_TO_COMPLETE_ADL: YES

## 2024-03-13 ASSESSMENT — COLUMBIA-SUICIDE SEVERITY RATING SCALE - C-SSRS
6. HAVE YOU EVER DONE ANYTHING, STARTED TO DO ANYTHING, OR PREPARED TO DO ANYTHING TO END YOUR LIFE?: NO
2. HAVE YOU ACTUALLY HAD ANY THOUGHTS OF KILLING YOURSELF?: NO
1. IN THE PAST MONTH, HAVE YOU WISHED YOU WERE DEAD OR WISHED YOU COULD GO TO SLEEP AND NOT WAKE UP?: NO
2. HAVE YOU ACTUALLY HAD ANY THOUGHTS OF KILLING YOURSELF?: NO
1. IN THE PAST MONTH, HAVE YOU WISHED YOU WERE DEAD OR WISHED YOU COULD GO TO SLEEP AND NOT WAKE UP?: NO
6. HAVE YOU EVER DONE ANYTHING, STARTED TO DO ANYTHING, OR PREPARED TO DO ANYTHING TO END YOUR LIFE?: NO

## 2024-03-13 ASSESSMENT — PAIN - FUNCTIONAL ASSESSMENT
PAIN_FUNCTIONAL_ASSESSMENT: UNABLE TO SELF-REPORT
PAIN_FUNCTIONAL_ASSESSMENT: UNABLE TO SELF-REPORT
PAIN_FUNCTIONAL_ASSESSMENT: 0-10
PAIN_FUNCTIONAL_ASSESSMENT: UNABLE TO SELF-REPORT
PAIN_FUNCTIONAL_ASSESSMENT: 0-10
PAIN_FUNCTIONAL_ASSESSMENT: UNABLE TO SELF-REPORT
PAIN_FUNCTIONAL_ASSESSMENT: 0-10

## 2024-03-13 ASSESSMENT — LIFESTYLE VARIABLES
AUDIT-C TOTAL SCORE: 1
HOW OFTEN DO YOU HAVE 6 OR MORE DRINKS ON ONE OCCASION: NEVER
AUDIT-C TOTAL SCORE: 1
HOW MANY STANDARD DRINKS CONTAINING ALCOHOL DO YOU HAVE ON A TYPICAL DAY: PATIENT DOES NOT DRINK
SKIP TO QUESTIONS 9-10: 1
PRESCIPTION_ABUSE_PAST_12_MONTHS: NO
HOW OFTEN DO YOU HAVE A DRINK CONTAINING ALCOHOL: MONTHLY OR LESS
SUBSTANCE_ABUSE_PAST_12_MONTHS: NO

## 2024-03-13 ASSESSMENT — PAIN DESCRIPTION - LOCATION
LOCATION: KNEE
LOCATION: KNEE

## 2024-03-13 ASSESSMENT — PATIENT HEALTH QUESTIONNAIRE - PHQ9
2. FEELING DOWN, DEPRESSED OR HOPELESS: NOT AT ALL
SUM OF ALL RESPONSES TO PHQ9 QUESTIONS 1 & 2: 0
1. LITTLE INTEREST OR PLEASURE IN DOING THINGS: NOT AT ALL

## 2024-03-13 ASSESSMENT — PAIN DESCRIPTION - ORIENTATION
ORIENTATION: RIGHT
ORIENTATION: RIGHT

## 2024-03-13 NOTE — BRIEF OP NOTE
Date: 3/13/2024  OR Location: [facility] OR    Name: Adriana Wood, : 1955, Age: 68 y.o., MRN: [id number], Sex: female    Diagnosis  Pre-op Diagnosis     * Arthritis of right knee [M17.11] Post-op Diagnosis     * Arthritis of right knee [M17.11]     Procedures  Arthroplasty Total Knee  06109 - MS ARTHRP KNE CONDYLE&PLATU MEDIAL&LAT COMPARTMENTS    MS ARTHRP KNE CONDYLE&PLATU MEDIAL&LAT COMPARTMENTS [89343]  Surgeons      * Andrzej Whelan - Primary    Resident/Fellow/Other Assistant:  Surgeon(s) and Role:     * Keshawn Lebron MD - Resident - Assisting     * OSIRIS Jolley-C - MARC First Assist    Procedure Summary  Anesthesia: Spinal  ASA: ASA status not filed in the log.  Anesthesia Staff: Anesthesiologist: Oral Nelson MD; Mario Hua MD  C-AA: HERMAN Blake  Anesthesia Resident: Lama Dana MD  Estimated Blood Loss: 250 mL  Intra-op Medications:   Administrations occurring from 0815 to 1130 on 24:   Medication Name Total Dose   sodium chloride 0.9 % irrigation solution 3,000 mL   bupivacaine PF (Marcaine) 0.25 % (2.5 mg/mL) 30 mL, ketorolac (Toradol) 30 mg in sodium chloride 0.9% 30 mL syringe 60 mL              Anesthesia Record               Intraprocedure I/O Totals          Intake    Tranexamic Acid 0.00 mL    The total shown is the total volume documented since Anesthesia Start was filed.    Propofol Drip 0.00 mL    The total shown is the total volume documented since Anesthesia Start was filed.    Phenylephrine Drip 0.00 mL    The total shown is the total volume documented since Anesthesia Start was filed.    Total Intake 0 mL          Specimen: No specimens collected     Staff:   Circulator: Tiffanie Montesinos RN  Relief Scrub: Ronny Marroquin RN  Scrub Person: Bobby Ibarra          Findings: posttraumatic arthritis of right knee, disuse osteoporosis right knee, traumatic contracture right knee     Complications:  None; patient tolerated the  procedure well.     Disposition: PACU - hemodynamically stable.  Condition: stable  Specimens Collected: No specimens collected      Keshawn Lebron MD  Orthopaedic Surgery, PGY-2  Available by Epic Chat  03/13/24  11:55 AM

## 2024-03-13 NOTE — OP NOTE
Arthroplasty Total Knee (R) Operative Note     Date: 3/13/2024  OR Location: [facility] OR    Name: Adriana Wood, : 1955, Age: 68 y.o., MRN: [id number], Sex: female    Diagnosis  Pre-op Diagnosis     * Arthritis of right knee [M17.11] Post-op Diagnosis     * Arthritis of right knee [M17.11]     Procedures  Arthroplasty Total Knee  89173 - AK ARTHRP KNE CONDYLE&PLATU MEDIAL&LAT COMPARTMENTS    AK ARTHRP KNE CONDYLE&PLATU MEDIAL&LAT COMPARTMENTS [82188]  Surgeons      * Andrzej Whelan - Primary    Resident/Fellow/Other Assistant:  Surgeon(s) and Role:     * Keshawn Lebron MD - Resident - Assisting     * OSIRIS Jolley-C - MARC First Assist    Procedure Summary  Anesthesia: Spinal  ASA: ASA status not filed in the log.  Anesthesia Staff: Anesthesiologist: Oral Nelson MD; Mario Hua MD  C-AA: HERMAN Blake  Estimated Blood Loss: 250mL  Intra-op Medications:   Administrations occurring from 0815 to 1130 on 24:   Medication Name Total Dose   sodium chloride 0.9 % irrigation solution 3,000 mL   bupivacaine PF (Marcaine) 0.25 % (2.5 mg/mL) 30 mL, ketorolac (Toradol) 30 mg in sodium chloride 0.9% 30 mL syringe 60 mL              Anesthesia Record               Intraprocedure I/O Totals          Intake    Tranexamic Acid 0.00 mL    The total shown is the total volume documented since Anesthesia Start was filed.    Propofol Drip 0.00 mL    The total shown is the total volume documented since Anesthesia Start was filed.    Phenylephrine Drip 0.00 mL    The total shown is the total volume documented since Anesthesia Start was filed.    Total Intake 0 mL          Specimen: No specimens collected     Staff:   Circulator: Tiffanie Montesinos RN  Relief Scrub: Ronny Marroquin RN  Scrub Person: Bobby Ibarra         Drains and/or Catheters: * None in log *    Tourniquet Times:     Total Tourniquet Time Documented:  Thigh (Right) - 96 minutes  Total: Thigh (Right) - 96 minutes       Implants:  Implants       Type Name Action Serial No.      Joint CEMENT, BONE SIMPLEX P W/TOBRA - [id number] Implanted      Joint CEMENT, BONE SIMPLEX P W/TOBRA - [id number] Implanted      Joint INSERT, TIBIAL, X3 POLY TS PLUS, SZ-4 11MM - [id number] Implanted      Joint FEM COMP, TRIATH JIMENEZ PS P 4 RIGHT - [id number] Implanted      Joint PATELLA, TRIATHLON, ASYMMETRIC X3, SZ-A29 9MM - [id number] Implanted      Screw STEM, TRIATHLON CEMENTED, 12MM X 100MM - [id number] Implanted      Joint BASEPLATE, TIBIA 4 TS TRIATH - [id number] Implanted      Joint CEMENT, BONE SIMPLEX P W/TOBRA - [id number] Implanted      Joint CEMENT, BONE SIMPLEX P W/TOBRA - [id number] Implanted               Findings: severe contracture right knee,  severe post-trauma OA right knee, disuse osteoporosis    Indications: Adriana Wood is an 68 y.o. female who is having surgery for Arthritis of right knee [M17.11].     The patient was seen in the preoperative area. The risks, benefits, complications, treatment options, non-operative alternatives, expected recovery and outcomes were discussed with the patient. The possibilities of reaction to medication, pulmonary aspiration, injury to surrounding structures, bleeding, recurrent infection, the need for additional procedures, failure to diagnose a condition, and creating a complication requiring transfusion or operation were discussed with the patient. The patient concurred with the proposed plan, giving informed consent.  The site of surgery was properly noted/marked if necessary per policy. The patient has been actively warmed in preoperative area. Preoperative antibiotics have been ordered and given within 1 hours of incision. Venous thrombosis prophylaxis have been ordered including chemical prophylaxis    Procedure Details..  The operative procedure    Preoperative diagnosis posttraumatic arthritis of right knee, disuse osteoporosis right knee, traumatic contracture right knee    Postop  diagnosis same    Procedure was right total knee replacement with universal tibial stem component to support previous right tibial plateau fracture    Surgeon    keith  First assistant was Chanda corona physician assistant, please note that Chanda was required as my first assistant because there was no available senior resident.  Second assistant was Sam sinha    Anesthesia was spinal estimated blood loss was 250 cc    Preoperative indications this is a 68-year-old female who sustained a tibial plate right tibial plateau fracture bicondylar and developed an infection.  She had debridement and eventually removal of plates.  Once her infection cleared she developed severe posttraumatic arthritis of the right knee with a large defect in the lateral tibial plateau.  In fact her knee range of motion at the time of the surgery had a 20 degree flexion contracture and only flex to about 50,, she really got to the point where she was not putting almost any weight on the right leg.  At this point I talked to her about the risks and benefits of doing right total knee replacement we would certainly have to do releases and but in my opinion is the only way to alleviate the arthritic pain in the knee and try to get the knee range of motion improved.  The risks and benefits were discussed and she did agree to proceed.    Operative procedure after a full huddle was performed the operating room patient was given 2 g of Ancef IV.  Patient was given adequate TXA as a spinal anesthesia was obtained patient was placed supine on the table.  A tourniquet placed the proximal right thigh the entire right lower extremities and prepped and draped in usual sterile fashion a surgical pause was performed.    Even under spinal anesthesia the patient had still had a 20 degree flexion contracture and only flex to about 60 degrees.  We exsanguinated the right lower extremity and elevated the tourniquet appropriately.  The incision was made  midline above the patella over the medial side of the patella and the incision came down at a 90 degree angle to the previous anterior lateral incision then and then extended distally along the old scar.  The incision was taken down through skin and subcutaneous tissue a subcutaneous tissue was elevated from the fascia.  And we did a medial parapatellar approach extending the quadricep split proximally.  When we did this we noticed that the entire quadricep tendon was scarred to the femur and the quad tendon and the patella were scarred to the patella trochlea.  We surgically debrided that with a Locke and knife in order to its fully free up the patella we brought the Locke underneath the quad tendon proximally both on the anterior medial and lateral side at least 15 cm up the thigh.  Even when we did this there was no bending more than about 60 degrees.  I did a medial release of soft tissue around the medial tibia.  The patient had severe arthritic changes of the medial femoral condyle and had a defect in in the lateral tibial plateau had a large defect in it.  In order to flex the knee over 90 I did do a quadriceps snip even after lateral release.  So we did a oblique significant quadricep tendon using sharp dissection in order to lengthen the quadricep tendon.  The patient had significant disuse osteoporosis particularly in the femur.  We did an intramedullary starting hole in the femur and we cut the distal femur at 12 mm because of the flexion contracture.  We measured the femur to be 4 and #4 using the anterior put using the posterior femoral condyles and we placed the distal femoral cutting jig in 3 degrees of external rotation for the number for we then cut the did the anterior cut then the posterior cut and anterior chamfer and posterior chamfer cut respectively.  We then placed the notch cutting guide for the box on the femur for the number for we pinned into position and centralizing it and cut the notch  out.  We removed the remnant of the posterior cruciate ligament.  There was a tremendous amount of scarring of the posterior condyles to the tibia we removed the scarring by sharp dissection we removed the remnant posterior horn of the medial lateral meniscus.  There was significant osteoporosis of the femur so we try to protect using a metal box for retraction of the tibia.  We retracted the tibia anteriorly and we created a starting hole in the proximal tibia please note that the patient had previous fracture so we put a long skinny guidewire into the tibia directing it to the distal shaft then we used a larger guide adrienne with the proximal tibial cutting jig and we used the medial tibial plateau as the low point.  We did a proximal medial tibial cut removing about 2 mm below the most low portion of the tibia with the medial side however it appeared that there was actually sort of a blowout part of the posterior medial plateau which is healed in a malunited position slightly so the posterior medial side was pushed wider and there was a large defect in the lateral tibial plateau but it was it was contained.  We then reamed the proximal tibia with a 14 mm reamer across the malunion slight malunion of the tibial plateau at the metaphyseal diaphyseal junction and we were able to then prepare the proximal tibia with a #4 universal tibia from [employer].  With this trial we then trialed a #4 tibia 9 and 11 mm poly PS initially with a #4 PS femur.  We did this we noted the patient to we get the knee out to full extension and full flexion with the 11 but was slightly unstable in flexion extension.  So we substituted a 11 mm TS poly which was much more stable cannot do full flexion full extension.  We initially the tibia and the femur remove the trial components we did prepare the patella.  We reviewed removed the bone and then we we we trialed it with a 29 mm preparing it with 3 drill holes.    We then opened all the  components and we used 4 bags of Simplex antibiotic cement we first cemented the number for universal Connor tibial component with 100 mm x 12 mm cemented stem into the tibia with appropriate rotation we then cemented a #4 PS Connor femoral component onto the femur we then impacted an 11 mm total stabilized poly component onto the tibia and reduce the knee in full extension and then we cemented a 29 mm patella under the patella.  Once we the cement had set up with the knee was placed to full flexion full extension and was stable in flexion extension.  We then impacted the metal post into the poly of the tibia to department to make it permanent stable.    We then released the tourniquet.  No major bleeding was encountered we copiously irrigated out the area.    We then prepared repaired the quadriceps snip we put the knee in slight flexion so that we can get more flexion and extend extend the quadriceps snip repair and we used a #2 FiberWire to repair the quad snip portion and then repaired the medial side of the of the arthrotomy with #1 Ethibond and #1 Vicryl suture.  When we had the quadricep repaired we then bent the knee and the knee bent to from about 0 to 80 degrees quite comfortably without putting any stress much stress on the quadricep and also.  We then closed was closed the skin with 2-0 Vicryl and 404 0 Monocryl suture and Steri-Strips.  A Prevena dressing was placed on for added healing and a sterile dressing was applied on top of this.    We would put the patient in a hinged knee brace at 0-90 to protect the last bit of flexion.  Patient will be weightbearing as tolerated.  Patient undergo standard DVT prophylaxis.    Patient tolerated this procedure well.      Complications:  None; patient tolerated the procedure well.    Disposition: PACU - hemodynamically stable.  Condition: stable         Additional Details:     Attending Attestation:     Andrzej Whelan  Phone Number: [phone]

## 2024-03-13 NOTE — ANESTHESIA POSTPROCEDURE EVALUATION
Patient: Adriana Wood    Procedure Summary       Date: 03/13/24 Room / Location: [facility] OR 09 / [facility]    Anesthesia Start: 0830 Anesthesia Stop: 1148    Procedure: Arthroplasty Total Knee (Right: Knee) Diagnosis:       Arthritis of right knee      (Arthritis of right knee [M17.11])    Surgeons: Andrzej Whelan MD Responsible Provider: HERMAN Blake    Anesthesia Type: spinal ASA Status: 3            Anesthesia Type: spinal    Vitals Value Taken Time   /61 03/13/24 1152   Temp 36.4 03/13/24 1158   Pulse 80 03/13/24 1155   Resp 13 03/13/24 1155   SpO2 96 % 03/13/24 1155   Vitals shown include unvalidated device data.    Anesthesia Post Evaluation    Patient location during evaluation: PACU  Patient participation: complete - patient participated  Level of consciousness: awake and alert  Pain score: 3  Pain management: adequate  Airway patency: patent  Cardiovascular status: acceptable  Respiratory status: acceptable  Hydration status: acceptable  Postoperative Nausea and Vomiting: none        There were no known notable events for this encounter.

## 2024-03-13 NOTE — ANESTHESIA PREPROCEDURE EVALUATION
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 : N  Date of Last Liquid: 03/12/24  Time of Last Liquid: 0000  Date of Last Solid: 03/12/24  Time of Last Solid: 0000  Last Intake Type: Clear fluids  Time of Last Void: 0654         PHYSICAL EXAM    Anesthesia Plan    History of general anesthesia?: yes  History of complications of general anesthesia?: no    ASA 3     spinal     Anesthetic plan and risks discussed with patient.    Plan discussed with CAA.

## 2024-03-13 NOTE — PROGRESS NOTES
Physical Therapy    Physical Therapy Evaluation & Treatment    Patient Name: Adriana Wood  MRN: [id number]  Today's Date: 3/13/2024   Time Calculation  Start Time: 1625  Stop Time: 1705  Time Calculation (min): 40 min    Assessment/Plan   PT Assessment  PT Assessment Results: Decreased strength, Decreased endurance, Decreased mobility, Decreased range of motion, Impaired balance, Pain  Rehab Prognosis: Excellent  Evaluation/Treatment Tolerance: Patient tolerated treatment well  Strengths: Attitude of self, Support and attitude of living partners  End of Session Communication: Bedside nurse  Assessment Comment: Pt to benefit from ongoing PT services at this time and at discharge to address the above limitations and to prepare pt for timely return to prior level of function.  End of Session Patient Position: Bed, 3 rail up, Alarm off, not on at start of session   IP OR SWING BED PT PLAN  Inpatient or Swing Bed: Inpatient  PT Plan  Treatment/Interventions: Bed mobility, Transfer training, Gait training, Stair training, Balance training, Neuromuscular re-education, Strengthening, Endurance training, Range of motion, Therapeutic exercise, Therapeutic activity, Home exercise program, Orthotic fitting/training, Positioning, Postural re-education  PT Plan: Skilled PT  PT Frequency: BID  PT Discharge Recommendations: Moderate intensity level of continued care  Equipment Recommended upon Discharge:  (Pt owns walker, cane, and wheelchair)  PT Recommended Transfer Status: Assist x2, Assistive device  PT - OK to Discharge: Yes (When medically ready.)      Subjective     General Visit Information:  General  Reason for Referral: Severe posttraumatic arthritis of R knee s/p R TKA w/ quad snip  Past Medical History Relevant to Rehab: severe right tibial plateau fracture and developed infection which required removal of hardware  Missed Visit: No  Missed Visit Reason:  (n/a)  Family/Caregiver Present: Yes  Caregiver Feedback:  Pt's spouse present and supportive.  Co-Treatment:  (n/a)  Prior to Session Communication: Bedside nurse  Patient Position Received: Bed, 3 rail up, Alarm off, not on at start of session  General Comment: Extra rest time between position changes 2/2 pain.  Home Living:  Home Living  Type of Home: House  Lives With: Spouse (Adult daughter frequently visits.)  Home Adaptive Equipment: Walker rolling or standard, Wheelchair-manual, Cane  Home Layout: One level, Laundry main level  Home Access: Ramped entrance  Bathroom Shower/Tub: Tub/shower unit  Bathroom Equipment: Bedside commode, Tub transfer bench, Grab bars in shower  Prior Level of Function:  Prior Function Per Pt/Caregiver Report  Level of [place]:  (IND using manual wheelchair for mobility since 2/2022. 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  Hand Dominance: Right  Prior Function Comments: (-) drives (attempting to re-learn with hand controls)  Precautions:  Precautions  Hearing/Visual Limitations: WFL  Medical Precautions: Fall precautions  Precautions Comment: WBAT RLE in HKB, locked at 90 degrees, ROMAT 0-90 to protect terminal flexion  Vital Signs:       Objective   Pain:  Pain Assessment  Pain Assessment: 0-10  Pain Score: 7  Pain Location: Knee (Ankle, great toe)  Pain Orientation: Right  Pain Interventions:  (RN passed pain medications at start of session.)  Cognition:  Cognition  Overall Cognitive Status: Within Functional Limits  Orientation Level: Oriented X4  Insight: Within function limits  Impulsive: Within functional limits  Processing Speed:  Within funtional limits    General Assessments:     Sensation  Light Touch:  (RLE: Diminished sensation in foot/toes. Deep pressure intact. Intact in thigh. LLE unremarkable.)    Postural Control  Postural Control: Within Functional Limits  Head Control: WFL  Trunk Control: WFL    Static Sitting Balance  Static Sitting-Balance Support: Bilateral upper extremity supported, Feet supported  Static Sitting-Level of Assistance: Close supervision  Dynamic Sitting Balance  Dynamic Sitting-Balance Support: Bilateral upper extremity supported, Feet supported  Dynamic Sitting-Comments: CGA    Static Standing Balance  Static Standing-Balance Support: Bilateral upper extremity supported  Static Standing-Level of Assistance: Moderate assistance (x1)  Dynamic Standing Balance  Dynamic Standing-Balance Support: Bilateral upper extremity supported  Dynamic Standing-Comments: MOD A x1  Functional Assessments:  Bed Mobility  Bed Mobility: Yes  Bed Mobility 1  Bed Mobility 1: Supine to sitting, Sitting to supine  Level of Assistance 1: Minimum assistance, Minimal verbal cues (x1)  Bed Mobility Comments 1: Therapist assists with RLE. HOB elevated.    Transfers  Transfer: Yes  Transfer 1  Technique 1: Sit to stand, Stand to sit  Transfer Device 1: Walker (gait belt)  Transfer Level of Assistance 1: Moderate assistance (x1)  Trials/Comments 1: Verbal/tactile cues for proper hand placement and controlling speed.    Ambulation/Gait Training  Ambulation/Gait Training Performed: Yes  Ambulation/Gait Training 1  Surface 1: Level tile  Device 1: Rolling walker  Gait Support Devices: Gait belt  Assistance 1: Moderate assistance (x1)  Quality of Gait 1: Narrow base of support, Diminished heel strike, Decreased step length, Shuffling gait, Antalgic (Verbal cues for proper posture, walker mgmt, and increased B step length.)  Comments/Distance (ft) 1: 3 steps laterally  Extremity/Trunk Assessments:  RLE   RLE :  (ROM: Knee flexion 0-90 limited by  HKB. Hip and ankle WFL. Strength: >/= 3-/5 throughout based on observation. Deferred overpressure 2/2 HKB.)  LLE   LLE :  (ROM WFL. Strength 5/5 throughout.)  Treatments:  Therapeutic Exercise  Therapeutic Exercise Performed: Yes  Therapeutic Exercise Activity 1: 10x RLE with verbal/tactile cues for proper form, speed, and muscle fiber recruitment: Ankle pumps, heel slides, and quad set.  Outcome Measures:  [facility] Basic Mobility  Turning from your back to your side while in a flat bed without using bedrails: A little  Moving from lying on your back to sitting on the side of a flat bed without using bedrails: A lot  Moving to and from bed to chair (including a wheelchair): A lot  Standing up from a chair using your arms (e.g. wheelchair or bedside chair): A lot  To walk in hospital room: A lot  Climbing 3-5 steps with railing: Total  Basic Mobility - Total Score: 12    Encounter Problems       Encounter Problems (Active)       PT Problem       Patient will stand with UE support of LRD and </= CGA at least 3 min to improve balance required for self-care tasks.        Start:  03/13/24    Expected End:  03/15/24            Patient will perform sit to stand and stand to sit transfers with </= CGA and LRD to increase functional strength.        Start:  03/13/24    Expected End:  03/15/24            Patient will ambulate at least 15 ft. with </= MIN A x1 and LRD to improve tolerance of household distances.        Start:  03/13/24    Expected End:  03/15/24            Patient will perform home exercise program as prescribed with cues as needed.         Start:  03/13/24    Expected End:  03/15/24            Patient will perform bed mobility with </= close sup to reduce risk of developing decubitus ulcers.        Start:  03/13/24    Expected End:  03/15/24                   Education Documentation  Precautions, taught by Heidy Garcia, PT at 3/13/2024  5:29 PM.  Learner: Significant Other, Patient  Readiness:  Acceptance  Method: Explanation  Response: Verbalizes Understanding    Body Mechanics, taught by Heidy Garcia PT at 3/13/2024  5:29 PM.  Learner: Significant Other, Patient  Readiness: Acceptance  Method: Explanation  Response: Verbalizes Understanding    Home Exercise Program, taught by Heidy Garcia PT at 3/13/2024  5:29 PM.  Learner: Significant Other, Patient  Readiness: Acceptance  Method: Explanation  Response: Verbalizes Understanding    Mobility Training, taught by Heidy Garcia PT at 3/13/2024  5:29 PM.  Learner: Significant Other, Patient  Readiness: Acceptance  Method: Explanation  Response: Verbalizes Understanding    Education Comments  No comments found.      03/13/24  at 5:31 PM   Heidy Garcia PT   Rehab Office: [phone]

## 2024-03-13 NOTE — ANESTHESIA PROCEDURE NOTES
Peripheral Block    Patient location during procedure: pre-op  Start time: 3/13/2024 7:35 AM  End time: 3/13/2024 7:45 AM  Reason for block: post-op pain management  Staffing  Performed: resident   Authorized by: Sofia Lewis MD    Performed by: Petty Slaughter MD  Preanesthetic Checklist  Completed: patient identified, IV checked, site marked, risks and benefits discussed, surgical consent, monitors and equipment checked, pre-op evaluation and timeout performed   Timeout performed at: 3/13/2024 7:35 AM  Peripheral Block  Patient position: laying flat  Prep: ChloraPrep  Patient monitoring: heart rate and continuous pulse ox  Block type: adductor canal  Laterality: right  Injection technique: single-shot  Guidance: ultrasound guided  Local infiltration: lidocaine  Needle  Needle type: Tuohy   Needle gauge: 26 G  Needle length: 8 cm  Needle localization: ultrasound guidance     image stored in chart  Assessment  Injection assessment: negative aspiration for heme, no paresthesia on injection, incremental injection and local visualized surrounding nerve on ultrasound  Paresthesia pain: none  Heart rate change: no  Slow fractionated injection: no  Additional Notes  Adductor canal block: Informed consent obtained.  Risks, benefits, and alternatives discussed.  ASA monitors placed, and timeout performed.  Patient positioned, prepped with chlorhexidine, and draped with sterile towels.  Ultrasound guidance was used to visualize the saphenous nerve at the adductor canal and surrounding structures with visualization of the needle throughout duration of the procedure.  Aspiration negative.  20 cc of 0.5% ropivacaine, dexamethasone 4 mg, and 1:200,000 epinephrine injected.  Patient tolerated the procedure well.    Timeout by Rekha, PACU LAURIE            
Spinal Block    Patient location during procedure: OR  Start time: 3/13/2024 8:37 AM  End time: 3/13/2024 8:45 AM  Reason for block: primary anesthetic  Staffing  Performed: MSA   Authorized by: Oral Nelson MD    Performed by: HERMAN Blake    Preanesthetic Checklist  Completed: patient identified, IV checked, risks and benefits discussed, surgical consent, pre-op evaluation, timeout performed and sterile techniques followed  Block Timeout  RN/Licensed healthcare professional reads aloud to the Anesthesia provider and entire team: Patient identity, procedure with side and site, patient position, and as applicable the availability of implants/special equipment/special requirements.  Patient on coagulant treatment: no  Timeout performed at: 3/13/2024 8:32 AM  Spinal Block  Patient position: sitting  Prep: Betadine  Sterility prep: gloves, hand hygiene and mask  Sedation level: moderate sedation  Patient monitoring: blood pressure and continuous pulse oximetry  Approach: midline  Vertebral space: L3-4  Injection technique: single-shot  Needle  Needle type: pencil-point   Needle gauge: 24 G  Needle length: 4 in  Free flowing CSF: yes    Assessment  Sensory level: T10  Block outcome: patient comfortable  Procedure assessment: patient tolerated procedure well with no immediate complications  Additional Notes  2 attempts by TAVO-1, 2 mL of spinal bupivacaine 0.75% with epi wash.          
Moderna dose 1, 2, and 3

## 2024-03-13 NOTE — DISCHARGE INSTRUCTIONS
MD Chanda Alvarado, MPAS, PA-C  [address] [address]  [phone]    PLEASE READ CAREFULLY BEFORE CONTACTING YOUR PROVIDER.    WE WORK COLLABORATIVELY AS A TEAM. CALLING MULTIPLE STAFF MEMBERS REGARDING THE SAME ISSUE WILL DELAY YOUR CARE.  MYCHART IS THE PREFERRED COMMUNICATION FOR ALL TEAM MEMBERS.    Postoperative Instructions: TOTAL HIP & TOTAL KNEE ARTHROPLASTY    JOINT CARE TEAM  Please use the information below to contact your care team following surgery.  If you are leaving a message, please include your full name, date of birth and date of surgery so that we can correctly identify you.  Your call will be returned within 1-2 business days, please do not leave multiple messages regarding a single issue while you are awaiting a return call.     Who to call Contact Information Matters needing handled   Apolonia PLATA, RN  Ortho/Trauma Nurse Navigator   [phone]   Prescription Refills   Medical questions/concerns  Orders for dental antibiotics lifelong  Nursing, medical question related questions or concerns within 6 weeks of surgery   Orders for Outpatient Physical Therapy         [place]           [phone] [address]     Scheduling office Visits  Leave of Absence or other paperwork  Any concerns more than 6 weeks from surgery - an appointment will need to be made     MEDICATION REFILLS - BONI Dubois, RN (MyChart or Main Office)    You will not receive a call indicating that your prescription has been filled.  Please contact your pharmacy with any questions.    Medication refills will be filled Monday-Friday 7am to 1pm ONLY. Please call the office or send a [facility] message for a refill request.  Any requests received outside of this timeframe will be handled on the next business day.  Messages should be left directly through the office or via my chart.  Please do not call multiple times or call other members of the care team for medication  needs, this will cause the refill to take longer.    Per State and Institutional policy, pain medications can only be refilled every 7 days for up to six weeks following surgery.    DISCHARGE MEDICATIONS - Please reference the sample schedule on the reverse side for instructions on how to best schedule medications.  PAIN MEDICATION    __ _X ___Tramadol / Oxycodone  Tramadol and Oxycodone have been prescribed for post-operative pain control.    These medications will only be refilled ONCE every 7 days for a period of up to 6 weeks following surgery.  After 6 weeks, you will transition to acetaminophen and over -the- counter anti-inflammatories such as Ibuprofen, Advil or Aleve in conjunction with ICE/COLD THERAPY.   Side effects may be constipation and nausea, vomiting, sleepiness, dizziness, lightheadedness, headache, blurred vision, dry mouth sweating, itching (if you have itching, over-the -counter Benadryl can be used as needed).  You may NOT operate a motor vehicle while taking these medications or have been cleared by your care team.     ___ X___ Acetaminophen (Tylenol)  Acetaminophen has been prescribed as an adjunct for pain control. Take two 500 mg tablets every 6 hours for 4 weeks. You will not receive a refill on this medication.  Do not exceed 4000mg of acetaminophen within a 24 hour period.  Side effects may include nausea, heartburn, drowsiness, and headache.    _______ Meloxicam (Mobic)-Meloxicam has been prescribed as an adjunct anti-inflammatory to assist in pain control.    Take one 15mg tablet once daily for 4 weeks.  You will not receive refills on this medication.   Side effects may include nausea.  May not be prescribed if you are on a more potent blood thinner than aspirin or have chronic kidney disease.    BLOOD THINNER    ___X___Blood Thinner   A blood thinner has been prescribed to prevent blood clots in your leg or lungs. Take as prescribed on the bottle for 4 weeks. You will not receive a  refill on this medication.    ANTI NAUSEA    ______Pantoprazole (Protonix)  Pantoprazole has been prescribed to help with nausea and protect your stomach while taking pain medication. Take one 40 mg tablet once daily for 4 weeks. You will not receive a refill on this medication.    _______ Zofran   Zofran has been prescribed to help with nausea and protect your stomach while taking pain medication. Take one 4 mg tablet every eight hours as needed for nausea. Refills will be provided as necessary.    STOOL SOFTENERS    ___X___ Colace (Docusate Sodium) & Miralax (polyethylene glycol)  Take both medications to help with constipation while using the Oxycodone and Tramadol for pain control.  You will not receive a refill on this medication.    ______ Senna  Senna has been prescribed to help with constipation while on Oxycodone and Tramadol. Take one tab, once daily. Senna is a laxative used to treat constipation.  Side effects may include nausea, vomiting, persistent diarrhea, abdominal cramps, and discolored urine.      You will not receive refills on the following medications:  Acetaminophen (Tylenol)  Meloxicam  Miralax  Colace  Pantoprazole  Blood Thinner    Pain Medication Refills [phone] or [facility]- Monday through Friday 7am-1pm    Medication refills will be sent upon receipt of your request during the times listed above. Due to the high call volume, you will not receive a call confirming prescription refills; please do not call multiple times.  Prescription refills may take a few hours to process, you may follow up with your pharmacy for pickup availability.    SAMPLE              The times below are an example of how to organize medications to optimize pain control  Your actual medication schedule may vary based on your last dose taken IN THE HOSPITAL        Time 3:00 am 6:00 am 9:00 am 12:00 pm 3:00 pm 6:00 pm 9:00 pm 12:00 am   Medications Tramadol Tylenol  Oxycodone  Miralax   Blood  Thinner  Colace  Pantoprazole  Tramadol  Meloxicam Tylenol  Oxycodone Tramadol Tylenol  Oxycodone  Miralax Blood Thinner  Colace  Tramadol   Tylenol  Oxycodone          You may begin to wean off the pain medication as your pain remains controlled with increased activity.  The schedules provided are meant to serve as an example.  You may wean off based on your pain control.  Please note that pain medications are not filled beyond 6 weeks after surgery.              The times below are an example of how to WEAN OFF medications WHILE CONTINUING TO OPTIMIZE PAIN CONTROL.  Your actual medication schedule may vary based on your last dose taken.    Time 12:00am 4:00am 8:00am 12:00pm 4:00pm 8:00pm   Med Tramadol Oxycodone   Tramadol Oxycodone Tramadol Oxycodone     Time 12:00am 6:00am 12:00pm 6:00pm   Med Tramadol Oxycodone   Tramadol Oxycodone     Time 12:00am 8:00am 4:00pm   Med Tramadol Oxycodone   Tramadol     Time 12:00am 12:00pm   Med Tramadol Tramadol
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
## 2024-03-13 NOTE — PROGRESS NOTES
"Orthopaedic Surgery Progress Note    S:  Transferred to PACU in stable condition, spinal still in effect.     O:  /72   Pulse (!) 34   Temp 36.3 °C (97.3 °F) (Temporal)   Resp 16   Ht 1.778 m (5' 10\")   Wt 80 kg (176 lb 5.9 oz)   SpO2 93%   BMI 25.31 kg/m²     Gen: arousable, NAD, appropriately conversational  Cardiac: RRR to peripheral palpation  Resp: nonlabored on RA  GI: soft, nondistended    MSK:  Right Lower Extremity:   -Provena holding suction  -Unable to obtain M/S exam s/t spinal  -Foot warm, well perfused  -Palpable DP pulse, brisk cap refill      Labs:  No results found for this or any previous visit (from the past 24 hour(s)).    A/P: 68 y.o. female s/p R TKA w/ quad snip on 3/13/24 with Dr. Whelan. Stable in PACU.       Plan:  - Weight bearing: WBAT RLE in HKB, locked at 90 degrees, ROMAT 0-90 to protect terminal flexion  - DVT ppx: SCDs, ASA 81mg BID (pantoprazole for GI protection)  - Diet: Regular  - Pain: Tylenol, oxycodone 5/10, dilaudid breakthrough  - Antibiotics: perioperative ancef 2g q8hr x3 doses  - FEN: HLIV with good PO intake  - Bowel Regimen: Colace, senna, dulcolax  - PT/OT  - Pulm: Encourage IS  - Continue home medications  - No damon    Dispo: Pending PT/OT, anticipate [facility]    Keshawn Lebron MD  Orthopaedic Surgery, PGY-2  Available by Epic Chat  03/13/24  12:04 PM    This patient will be followed by Ortho Trauma team (All chat preferred):     1st call: Martin Russell, PGY-1  2nd call: Keshawn Lebron PGY-2  3rd call: Lucio Harper PGY-3    "

## 2024-03-14 LAB
ERYTHROCYTE [DISTWIDTH] IN BLOOD BY AUTOMATED COUNT: 12.6 % (ref 11.5–14.5)
HCT VFR BLD AUTO: 41 % (ref 36–46)
HGB BLD-MCNC: 12.9 G/DL (ref 12–16)
MCH RBC QN AUTO: 29.9 PG (ref 26–34)
MCHC RBC AUTO-ENTMCNC: 31.5 G/DL (ref 32–36)
MCV RBC AUTO: 95 FL (ref 80–100)
NRBC BLD-RTO: 0 /100 WBCS (ref 0–0)
PLATELET # BLD AUTO: 192 X10*3/UL (ref 150–450)
RBC # BLD AUTO: 4.32 X10*6/UL (ref 4–5.2)
WBC # BLD AUTO: 12.4 X10*3/UL (ref 4.4–11.3)

## 2024-03-14 PROCEDURE — 97530 THERAPEUTIC ACTIVITIES: CPT | Mod: GP,CQ

## 2024-03-14 PROCEDURE — 36415 COLL VENOUS BLD VENIPUNCTURE: CPT

## 2024-03-14 PROCEDURE — 7100000011 HC EXTENDED STAY RECOVERY HOURLY - NURSING UNIT

## 2024-03-14 PROCEDURE — 2500000004 HC RX 250 GENERAL PHARMACY W/ HCPCS (ALT 636 FOR OP/ED): Mod: JG

## 2024-03-14 PROCEDURE — 97165 OT EVAL LOW COMPLEX 30 MIN: CPT | Mod: GO

## 2024-03-14 PROCEDURE — 97110 THERAPEUTIC EXERCISES: CPT | Mod: GP,CQ

## 2024-03-14 PROCEDURE — 99231 SBSQ HOSP IP/OBS SF/LOW 25: CPT | Performed by: STUDENT IN AN ORGANIZED HEALTH CARE EDUCATION/TRAINING PROGRAM

## 2024-03-14 PROCEDURE — 85027 COMPLETE CBC AUTOMATED: CPT

## 2024-03-14 PROCEDURE — 97116 GAIT TRAINING THERAPY: CPT | Mod: GP,CQ

## 2024-03-14 PROCEDURE — 2500000004 HC RX 250 GENERAL PHARMACY W/ HCPCS (ALT 636 FOR OP/ED): Performed by: ANESTHESIOLOGY

## 2024-03-14 PROCEDURE — 2500000001 HC RX 250 WO HCPCS SELF ADMINISTERED DRUGS (ALT 637 FOR MEDICARE OP)

## 2024-03-14 RX ORDER — OXYCODONE HYDROCHLORIDE 5 MG/1
15 TABLET ORAL EVERY 4 HOURS PRN
Status: DISCONTINUED | OUTPATIENT
Start: 2024-03-14 | End: 2024-03-15

## 2024-03-14 RX ORDER — OXYCODONE HYDROCHLORIDE 5 MG/1
10 TABLET ORAL EVERY 6 HOURS PRN
Status: DISCONTINUED | OUTPATIENT
Start: 2024-03-14 | End: 2024-03-15

## 2024-03-14 RX ORDER — ACETAMINOPHEN 325 MG/1
975 TABLET ORAL EVERY 6 HOURS SCHEDULED
Status: DISCONTINUED | OUTPATIENT
Start: 2024-03-14 | End: 2024-03-18 | Stop reason: HOSPADM

## 2024-03-14 RX ORDER — CEFAZOLIN SODIUM 2 G/100ML
2 INJECTION, SOLUTION INTRAVENOUS EVERY 8 HOURS
Status: COMPLETED | OUTPATIENT
Start: 2024-03-14 | End: 2024-03-14

## 2024-03-14 RX ADMIN — ACETAMINOPHEN 650 MG: 325 TABLET ORAL at 12:59

## 2024-03-14 RX ADMIN — ASPIRIN 81 MG: 81 TABLET, COATED ORAL at 20:10

## 2024-03-14 RX ADMIN — ACETAMINOPHEN 650 MG: 325 TABLET ORAL at 00:16

## 2024-03-14 RX ADMIN — CEFAZOLIN SODIUM 2 G: 2 INJECTION, SOLUTION INTRAVENOUS at 00:16

## 2024-03-14 RX ADMIN — OXYCODONE HYDROCHLORIDE 10 MG: 5 TABLET ORAL at 02:07

## 2024-03-14 RX ADMIN — HYDROMORPHONE HYDROCHLORIDE 0.2 MG: 1 INJECTION, SOLUTION INTRAMUSCULAR; INTRAVENOUS; SUBCUTANEOUS at 20:10

## 2024-03-14 RX ADMIN — HYDROMORPHONE HYDROCHLORIDE 0.2 MG: 1 INJECTION, SOLUTION INTRAMUSCULAR; INTRAVENOUS; SUBCUTANEOUS at 11:01

## 2024-03-14 RX ADMIN — OXYCODONE HYDROCHLORIDE 10 MG: 5 TABLET ORAL at 08:07

## 2024-03-14 RX ADMIN — HYDROMORPHONE HYDROCHLORIDE 0.2 MG: 1 INJECTION, SOLUTION INTRAMUSCULAR; INTRAVENOUS; SUBCUTANEOUS at 15:03

## 2024-03-14 RX ADMIN — BACLOFEN 5 MG: 10 TABLET ORAL at 15:01

## 2024-03-14 RX ADMIN — CEFAZOLIN SODIUM 2 G: 2 INJECTION, SOLUTION INTRAVENOUS at 08:07

## 2024-03-14 RX ADMIN — OXYCODONE HYDROCHLORIDE 10 MG: 5 TABLET ORAL at 16:47

## 2024-03-14 RX ADMIN — ACETAMINOPHEN 975 MG: 325 TABLET ORAL at 18:43

## 2024-03-14 RX ADMIN — SODIUM CHLORIDE, POTASSIUM CHLORIDE, SODIUM LACTATE AND CALCIUM CHLORIDE 100 ML/HR: 600; 310; 30; 20 INJECTION, SOLUTION INTRAVENOUS at 18:42

## 2024-03-14 RX ADMIN — ASPIRIN 81 MG: 81 TABLET, COATED ORAL at 08:07

## 2024-03-14 RX ADMIN — DOCUSATE SODIUM 100 MG: 100 CAPSULE, LIQUID FILLED ORAL at 08:07

## 2024-03-14 ASSESSMENT — PAIN SCALES - GENERAL
PAINLEVEL_OUTOF10: 10 - WORST POSSIBLE PAIN
PAINLEVEL_OUTOF10: 4
PAINLEVEL_OUTOF10: 5 - MODERATE PAIN
PAINLEVEL_OUTOF10: 7
PAINLEVEL_OUTOF10: 6
PAINLEVEL_OUTOF10: 4
PAINLEVEL_OUTOF10: 8
PAINLEVEL_OUTOF10: 9
PAINLEVEL_OUTOF10: 7
PAINLEVEL_OUTOF10: 10 - WORST POSSIBLE PAIN
PAINLEVEL_OUTOF10: 9
PAINLEVEL_OUTOF10: 8
PAINLEVEL_OUTOF10: 4
PAINLEVEL_OUTOF10: 7
PAINLEVEL_OUTOF10: 10 - WORST POSSIBLE PAIN

## 2024-03-14 ASSESSMENT — COGNITIVE AND FUNCTIONAL STATUS - GENERAL
STANDING UP FROM CHAIR USING ARMS: A LITTLE
TURNING FROM BACK TO SIDE WHILE IN FLAT BAD: A LITTLE
PERSONAL GROOMING: A LITTLE
DAILY ACTIVITIY SCORE: 15
WALKING IN HOSPITAL ROOM: A LITTLE
HELP NEEDED FOR BATHING: A LOT
MOBILITY SCORE: 16
MOVING FROM LYING ON BACK TO SITTING ON SIDE OF FLAT BED WITH BEDRAILS: A LITTLE
MOVING TO AND FROM BED TO CHAIR: A LITTLE
TOILETING: A LOT
DRESSING REGULAR LOWER BODY CLOTHING: A LOT
CLIMB 3 TO 5 STEPS WITH RAILING: TOTAL
DRESSING REGULAR UPPER BODY CLOTHING: A LITTLE
EATING MEALS: A LITTLE

## 2024-03-14 ASSESSMENT — PAIN DESCRIPTION - ORIENTATION
ORIENTATION: RIGHT
ORIENTATION: RIGHT

## 2024-03-14 ASSESSMENT — PAIN DESCRIPTION - LOCATION
LOCATION: KNEE
LOCATION: KNEE

## 2024-03-14 ASSESSMENT — PAIN DESCRIPTION - DESCRIPTORS
DESCRIPTORS: BURNING
DESCRIPTORS: DISCOMFORT;SHARP
DESCRIPTORS: ACHING;CRAMPING
DESCRIPTORS: ACHING
DESCRIPTORS: ACHING;CRAMPING
DESCRIPTORS: ACHING;CRAMPING;CRUSHING
DESCRIPTORS: BURNING
DESCRIPTORS: ACHING;CRAMPING
DESCRIPTORS: ACHING;CRAMPING

## 2024-03-14 NOTE — PROGRESS NOTES
Physical Therapy    Physical Therapy Treatment    Patient Name: Adriana oWod  MRN: [id number]  Today's Date: 3/14/2024  Time Calculation  Start Time: 1005  Stop Time: 1103  Time Calculation (min): 58 min       Assessment/Plan   PT Assessment  End of Session Communication:  (Pt. remained in bed and requested breakthru pain meds -Towards end of session pt. closing eyes due to discomfort.)  Assessment Comment: Pt. remained in bed and requested breakthru pain meds -Towards end of session pt. closing eyes due to discomfort.  End of Session Patient Position: Bed, 3 rail up     PT Plan  Treatment/Interventions: Bed mobility, Transfer training, Gait training, Stair training, Balance training, Neuromuscular re-education, Strengthening, Endurance training, Range of motion, Therapeutic exercise, Therapeutic activity, Home exercise program, Orthotic fitting/training, Positioning, Postural re-education  PT Plan: Skilled PT  PT Frequency: BID  PT Discharge Recommendations: Moderate intensity level of continued care  Equipment Recommended upon Discharge:  (Pt owns walker, cane, and wheelchair)  PT Recommended Transfer Status: Assist x2, Assistive device  PT - OK to Discharge: Yes (When medically ready.)      General Visit Information:   PT  Visit  PT Received On: 03/14/24  General  Reason for Referral: Severe posttraumatic arthritis of R knee s/p R TKA w/ quad snip  Missed Visit: No  Missed Visit Reason:  (Attempted follow up however pt. in too much pain. RN addressing currently.)  Family/Caregiver Present: No  Prior to Session Communication: Bedside nurse  Patient Position Received: Up in chair  General Comment: Pt just finished working with OT but willing to participate -Pt. already had oxy 10mg this morning. Pt. has HKB on right le 0-90 degrees - Pt. has purwick/PIV/wound vac/HKB.    Subjective   Precautions:  Precautions  LE Weight Bearing Status:  (WBAT-RLE)  Vital Signs:       Objective   Pain:  Pain Assessment  Pain  Assessment: 0-10  Pain Score: 8  Pain Descriptors: Aching, Cramping  Cognition:  Cognition  Overall Cognitive Status: Within Functional Limits  Postural Control:     Extremity/Trunk Assessments:                      Activity Tolerance:  Activity Tolerance  Endurance: Decreased tolerance for upright activites  Treatments:  Therapeutic Exercise  Therapeutic Exercise Performed: Yes  Therapeutic Exercise Activity 1: Supine bilat AP'S- decreased range of motion bilat but R>L QS, SAQ'S, HS X 15 REPS. Pt. needs time, rest breaks and assist.    Therapeutic Activity  Therapeutic Activity Performed:  (Pre gait activites due to pt. reporting buckling with OT. Shifting laterally / forward and back. x 3 trials.)    Bed Mobility  Bed Mobility: Yes  Bed Mobility 1  Bed Mobility 1: Sitting to supine  Level of Assistance 1: Minimum assistance, Minimal verbal cues  Bed Mobility Comments 1: Assist with bilat le's Right >Left.    Ambulation/Gait Training  Ambulation/Gait Training Performed: Yes  Ambulation/Gait Training 1  Surface 1:  (Pt. amb. fwd and back 1 step using a wh. walker and min assist. Pt. complained of increased pain with backward stepping.)  Ambulation/Gait Training 2  Surface 2:  (Pt. amb. from w/c to bed - using a wh. walker and min assist.)  Transfers  Transfer: Yes  Transfer 1  Transfer From 1:  (Several trials of sit to stand - min assist to steady/balance.)    Outcome Measures:  [facility] Basic Mobility  Turning from your back to your side while in a flat bed without using bedrails: A little  Moving from lying on your back to sitting on the side of a flat bed without using bedrails: A little  Moving to and from bed to chair (including a wheelchair): A little  Standing up from a chair using your arms (e.g. wheelchair or bedside chair): A little  To walk in hospital room: A little  Climbing 3-5 steps with railing: Total  Basic Mobility - Total Score: 16    Education Documentation  Body Mechanics, taught by Ciera RO  NENA Jarvis at 3/14/2024 11:32 AM.  Learner: Patient  Readiness: Acceptance  Method: Explanation, Demonstration  Response: Needs Reinforcement    Home Exercise Program, taught by Ciera Jarvis PTA at 3/14/2024 11:32 AM.  Learner: Patient  Readiness: Acceptance  Method: Explanation, Demonstration  Response: Needs Reinforcement    Mobility Training, taught by Ciera Jarvis PTA at 3/14/2024 11:32 AM.  Learner: Patient  Readiness: Acceptance  Method: Explanation, Demonstration  Response: Needs Reinforcement    Education Comments  No comments found.        OP EDUCATION:       Encounter Problems       Encounter Problems (Active)       PT Problem       Patient will stand with UE support of LRD and </= CGA at least 3 min to improve balance required for self-care tasks.        Start:  03/13/24    Expected End:  03/15/24            Patient will perform sit to stand and stand to sit transfers with </= CGA and LRD to increase functional strength.  (Progressing)       Start:  03/13/24    Expected End:  03/15/24            Patient will ambulate at least 15 ft. with </= MIN A x1 and LRD to improve tolerance of household distances.  (Progressing)       Start:  03/13/24    Expected End:  03/15/24            Patient will perform home exercise program as prescribed with cues as needed.   (Progressing)       Start:  03/13/24    Expected End:  03/15/24            Patient will perform bed mobility with </= close sup to reduce risk of developing decubitus ulcers.        Start:  03/13/24    Expected End:  03/15/24
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
## 2024-03-14 NOTE — CARE PLAN
The patient's goals for the shift include to avoid nausea    The clinical goals for the shift include Pain management      Problem: Pain  Goal: My pain/discomfort is manageable  Outcome: Progressing     Problem: Safety  Goal: Patient will be injury free during hospitalization  Outcome: Progressing  Goal: I will remain free of falls  Outcome: Progressing     Problem: Daily Care  Goal: Daily care needs are met  Outcome: Progressing     Problem: Psychosocial Needs  Goal: Demonstrates ability to cope with hospitalization/illness  Outcome: Progressing  Goal: Collaborate with me, my family, and caregiver to identify my specific goals  Outcome: Progressing     Problem: Discharge Barriers  Goal: My discharge needs are met  Outcome: Progressing     Problem: Pain  Goal: Takes deep breaths with improved pain control throughout the shift  Outcome: Progressing  Goal: Turns in bed with improved pain control throughout the shift  Outcome: Progressing  Goal: Walks with improved pain control throughout the shift  Outcome: Progressing  Goal: Performs ADL's with improved pain control throughout shift  Outcome: Progressing  Goal: Participates in PT with improved pain control throughout the shift  Outcome: Progressing  Goal: Free from opioid side effects throughout the shift  Outcome: Progressing  Goal: Free from acute confusion related to pain meds throughout the shift  Outcome: Progressing

## 2024-03-14 NOTE — PROGRESS NOTES
I met with Adriana at the bedside regarding discharge planning and home going needs. Patient lives at home with her spouse [phone] who is her primary caregiver she states that she is not independent with ADL's she states that she has received a HHA through the [facility] in the past. Patient has a ramp to enter her home . She has a wheelchair here at the bedside with her, she has a walker, cane, shower chair and bedside commode in the home. Patient is recommended for moderate intensity therapy at discharge. She would like her referral placed with [facility]. I will continue to follow with a safe discharge plan.

## 2024-03-14 NOTE — PROGRESS NOTES
SW received update from [facility] that patient is agreeable to SNF. FOC is [facility] [place]. SW inquired if SNF can accept under secondary insurance, patient is OBS and primary is Medicare a/b. Referral submitted for review. Will continue to follow.    NADINE Shepherd

## 2024-03-14 NOTE — PROGRESS NOTES
Physical Therapy                 Therapy Communication Note    Patient Name: Adriana Wood  MRN: [id number]  Today's Date: 3/14/2024     Discipline: Physical Therapy    Missed Visit Reason: Missed Visit Reason:  (Attempted follow up however pt. in too much pain. RN addressing presently.)    Missed Time: Attempt    Comment:

## 2024-03-14 NOTE — CARE PLAN
Problem: Pain  Goal: My pain/discomfort is manageable  Outcome: Progressing     Problem: Safety  Goal: Patient will be injury free during hospitalization  Outcome: Progressing  Goal: I will remain free of falls  Outcome: Progressing     Problem: Daily Care  Goal: Daily care needs are met  Outcome: Progressing     Problem: Psychosocial Needs  Goal: Demonstrates ability to cope with hospitalization/illness  Outcome: Progressing  Goal: Collaborate with me, my family, and caregiver to identify my specific goals  Outcome: Progressing     Problem: Discharge Barriers  Goal: My discharge needs are met  Outcome: Progressing     Problem: Pain  Goal: Takes deep breaths with improved pain control throughout the shift  Outcome: Progressing  Goal: Turns in bed with improved pain control throughout the shift  Outcome: Progressing  Goal: Walks with improved pain control throughout the shift  Outcome: Progressing  Goal: Performs ADL's with improved pain control throughout shift  Outcome: Progressing  Goal: Participates in PT with improved pain control throughout the shift  Outcome: Progressing  Goal: Free from opioid side effects throughout the shift  Outcome: Progressing  Goal: Free from acute confusion related to pain meds throughout the shift  Outcome: Progressing   The patient's goals for the shift include to avoid nausea    The clinical goals for the shift include decreased pain this shift

## 2024-03-14 NOTE — PROGRESS NOTES
"Orthopaedic Surgery Progress Note    S:  NAEON. Doing well, pain controlled. Denies CP, SOB, N, V. HKB placed by orthotics yesterday.    O:  /75 (BP Location: Left arm, Patient Position: Lying)   Pulse 80   Temp 36.2 °C (97.2 °F) (Temporal)   Resp 17   Ht 1.778 m (5' 10\")   Wt 80 kg (176 lb 5.9 oz)   SpO2 95%   BMI 25.31 kg/m²     Gen: arousable, NAD, appropriately conversational  Cardiac: RRR to peripheral palpation  Resp: nonlabored on RA  GI: soft, nondistended    MSK:  Right Lower Extremity:   -Dressing c/d/I, HKB in place  - Provena holding suction  -Unable to obtain M/S exam s/t spinal  -Foot warm, well perfused  -Palpable DP pulse, brisk cap refill      Labs:  No results found for this or any previous visit (from the past 24 hour(s)).    A/P: 68 y.o. female s/p R TKA w/ quad snip on 3/13/24 with Dr. Whelan. Stable in PACU.       Plan:  - Weight bearing: WBAT RLE in HKB, locked at 90 degrees, ROMAT 0-90 to protect terminal flexion  - DVT ppx: SCDs, ASA 81mg BID (pantoprazole for GI protection)  - Diet: Regular  - Pain: Tylenol, oxycodone 5/10, dilaudid breakthrough  - Antibiotics: perioperative ancef 2g q8hr x3 doses  - FEN: HLIV with good PO intake  - Bowel Regimen: Colace, senna, dulcolax  - PT/OT  - Pulm: Encourage IS  - Continue home medications  - No damon    Dispo: PT rec SNF, SNF placement and possible DC today    Keshawn Lebron MD  Orthopaedic Surgery, PGY-2  Available by Epic Chat  03/14/24  5:33 AM    This patient will be followed by Ortho Trauma team (All chat preferred):     1st call: Martin Russell, PGY-1  2nd call: Keshawn Lebron, PGY-2  3rd call: Lucio Harper, PGY-3    "

## 2024-03-14 NOTE — PROGRESS NOTES
Acute Pain Service    Postop Pain HPI -   Palliative: relieved with IV analgesics and regional local anesthetics  Provocative: movement  Quality:  burning and aching  Radiation:  none  Severity:  8/10  Timing: constant    24-HOUR OPIOID CONSUMPTION:  Oxycodone 10mg x3    Scheduled medications  acetaminophen, 650 mg, oral, q6h FELIX  aspirin, 81 mg, oral, BID  docusate sodium, 100 mg, oral, BID  pantoprazole, 40 mg, oral, Daily before breakfast      Continuous medications  lactated Ringer's, 100 mL/hr, Last Rate: 100 mL/hr (03/13/24 1743)  lactated Ringer's, 50 mL/hr, Last Rate: 50 mL/hr (03/13/24 1630)  oxygen, 2 L/min, Last Rate: Stopped (03/13/24 1630)      PRN medications  PRN medications: albuterol, baclofen, HYDROmorphone, metoclopramide **OR** metoclopramide, naloxone, naloxone, oxyCODONE, oxyCODONE     Physical Exam:  Constitutional:  no distress, alert and cooperative  Eyes: clear sclera  Head/Neck: No apparent injury, trachea midline  Respiratory/Thorax: Patent airways, thorax symmetric, breathing comfortably  Cardiovascular: no pitting edema  Gastrointestinal: Nondistended  Musculoskeletal: ROM intact  Extremities: no clubbing  Neurological: alert, campuzano x4  Psychological: Appropriate affect    Results for orders placed or performed during the hospital encounter of 03/13/24 (from the past 24 hour(s))   CBC   Result Value Ref Range    WBC 12.4 (H) 4.4 - 11.3 x10*3/uL    nRBC 0.0 0.0 - 0.0 /100 WBCs    RBC 4.32 4.00 - 5.20 x10*6/uL    Hemoglobin 12.9 12.0 - 16.0 g/dL    Hematocrit 41.0 36.0 - 46.0 %    MCV 95 80 - 100 fL    MCH 29.9 26.0 - 34.0 pg    MCHC 31.5 (L) 32.0 - 36.0 g/dL    RDW 12.6 11.5 - 14.5 %    Platelets 192 150 - 450 x10*3/uL      Adriana Wood is a 68 y.o. year old female patient who presents for right arthroplasty total knee right with Dr. Whelan. Acute Pain consulted for block for postoperative pain control.      Plan:     - Right adductor canal single shot block performed preoperatively  on 3/13/24  - Recommend toradol 15mg q6h if ok from surgical standpoint  - Recommend increasing tylenol to 975mg q6  - Consider increasing oxycodone dosage to 10mg/15mg for moderate/severe if continued difficulty controlling pain  - Rest of pain management per primary team  - Will sign off, please call with any questions or concerns      Acute Pain Resident  pg [phone] ph [phone]

## 2024-03-15 PROCEDURE — 97530 THERAPEUTIC ACTIVITIES: CPT | Mod: GP,CQ

## 2024-03-15 PROCEDURE — 2500000004 HC RX 250 GENERAL PHARMACY W/ HCPCS (ALT 636 FOR OP/ED)

## 2024-03-15 PROCEDURE — 2500000001 HC RX 250 WO HCPCS SELF ADMINISTERED DRUGS (ALT 637 FOR MEDICARE OP)

## 2024-03-15 PROCEDURE — 1100000001 HC PRIVATE ROOM DAILY

## 2024-03-15 PROCEDURE — 97116 GAIT TRAINING THERAPY: CPT | Mod: GP,CQ

## 2024-03-15 PROCEDURE — 97110 THERAPEUTIC EXERCISES: CPT | Mod: GP,CQ

## 2024-03-15 RX ORDER — HYDROMORPHONE HYDROCHLORIDE 1 MG/ML
0.4 INJECTION, SOLUTION INTRAMUSCULAR; INTRAVENOUS; SUBCUTANEOUS ONCE
Status: COMPLETED | OUTPATIENT
Start: 2024-03-15 | End: 2024-03-15

## 2024-03-15 RX ORDER — OXYCODONE HYDROCHLORIDE 5 MG/1
10 TABLET ORAL EVERY 6 HOURS PRN
Status: DISCONTINUED | OUTPATIENT
Start: 2024-03-15 | End: 2024-03-18 | Stop reason: HOSPADM

## 2024-03-15 RX ORDER — OXYCODONE HYDROCHLORIDE 5 MG/1
5 TABLET ORAL EVERY 4 HOURS PRN
Status: DISCONTINUED | OUTPATIENT
Start: 2024-03-15 | End: 2024-03-15

## 2024-03-15 RX ORDER — OXYCODONE HYDROCHLORIDE 5 MG/1
5 TABLET ORAL EVERY 6 HOURS PRN
Status: DISCONTINUED | OUTPATIENT
Start: 2024-03-15 | End: 2024-03-18 | Stop reason: HOSPADM

## 2024-03-15 RX ADMIN — ASPIRIN 81 MG: 81 TABLET, COATED ORAL at 21:25

## 2024-03-15 RX ADMIN — OXYCODONE HYDROCHLORIDE 10 MG: 5 TABLET ORAL at 01:30

## 2024-03-15 RX ADMIN — OXYCODONE HYDROCHLORIDE 10 MG: 5 TABLET ORAL at 23:47

## 2024-03-15 RX ADMIN — OXYCODONE HYDROCHLORIDE 10 MG: 5 TABLET ORAL at 15:30

## 2024-03-15 RX ADMIN — DOCUSATE SODIUM 100 MG: 100 CAPSULE, LIQUID FILLED ORAL at 21:30

## 2024-03-15 RX ADMIN — ACETAMINOPHEN 975 MG: 325 TABLET ORAL at 23:47

## 2024-03-15 RX ADMIN — HYDROMORPHONE HYDROCHLORIDE 0.4 MG: 1 INJECTION, SOLUTION INTRAMUSCULAR; INTRAVENOUS; SUBCUTANEOUS at 11:15

## 2024-03-15 RX ADMIN — BACLOFEN 5 MG: 10 TABLET ORAL at 08:26

## 2024-03-15 RX ADMIN — HYDROMORPHONE HYDROCHLORIDE 0.2 MG: 1 INJECTION, SOLUTION INTRAMUSCULAR; INTRAVENOUS; SUBCUTANEOUS at 21:21

## 2024-03-15 RX ADMIN — OXYCODONE HYDROCHLORIDE 10 MG: 5 TABLET ORAL at 08:26

## 2024-03-15 RX ADMIN — DOCUSATE SODIUM 100 MG: 100 CAPSULE, LIQUID FILLED ORAL at 08:26

## 2024-03-15 RX ADMIN — ACETAMINOPHEN 975 MG: 325 TABLET ORAL at 18:11

## 2024-03-15 RX ADMIN — ACETAMINOPHEN 975 MG: 325 TABLET ORAL at 12:32

## 2024-03-15 ASSESSMENT — PAIN SCALES - GENERAL
PAINLEVEL_OUTOF10: 7
PAINLEVEL_OUTOF10: 7
PAINLEVEL_OUTOF10: 6
PAINLEVEL_OUTOF10: 8
PAINLEVEL_OUTOF10: 8
PAINLEVEL_OUTOF10: 9
PAINLEVEL_OUTOF10: 8

## 2024-03-15 ASSESSMENT — COGNITIVE AND FUNCTIONAL STATUS - GENERAL
CLIMB 3 TO 5 STEPS WITH RAILING: TOTAL
WALKING IN HOSPITAL ROOM: A LOT
TURNING FROM BACK TO SIDE WHILE IN FLAT BAD: A LITTLE
DRESSING REGULAR UPPER BODY CLOTHING: A LITTLE
WALKING IN HOSPITAL ROOM: A LITTLE
HELP NEEDED FOR BATHING: A LOT
MOVING TO AND FROM BED TO CHAIR: A LITTLE
CLIMB 3 TO 5 STEPS WITH RAILING: TOTAL
DRESSING REGULAR LOWER BODY CLOTHING: A LOT
TURNING FROM BACK TO SIDE WHILE IN FLAT BAD: A LITTLE
MOVING FROM LYING ON BACK TO SITTING ON SIDE OF FLAT BED WITH BEDRAILS: A LITTLE
MOVING FROM LYING ON BACK TO SITTING ON SIDE OF FLAT BED WITH BEDRAILS: A LITTLE
TOILETING: A LOT
MOBILITY SCORE: 13
TURNING FROM BACK TO SIDE WHILE IN FLAT BAD: A LITTLE
DAILY ACTIVITIY SCORE: 16
HELP NEEDED FOR BATHING: A LOT
CLIMB 3 TO 5 STEPS WITH RAILING: TOTAL
STANDING UP FROM CHAIR USING ARMS: A LOT
WALKING IN HOSPITAL ROOM: A LOT
MOBILITY SCORE: 16
STANDING UP FROM CHAIR USING ARMS: A LOT
MOVING FROM LYING ON BACK TO SITTING ON SIDE OF FLAT BED WITH BEDRAILS: A LITTLE
PERSONAL GROOMING: A LITTLE
PERSONAL GROOMING: A LITTLE
MOVING TO AND FROM BED TO CHAIR: A LOT
STANDING UP FROM CHAIR USING ARMS: A LITTLE
DAILY ACTIVITIY SCORE: 16
DRESSING REGULAR LOWER BODY CLOTHING: A LOT
DRESSING REGULAR UPPER BODY CLOTHING: A LITTLE
TOILETING: A LOT
MOBILITY SCORE: 13
MOVING TO AND FROM BED TO CHAIR: A LOT

## 2024-03-15 ASSESSMENT — PAIN - FUNCTIONAL ASSESSMENT
PAIN_FUNCTIONAL_ASSESSMENT: 0-10

## 2024-03-15 ASSESSMENT — PAIN DESCRIPTION - LOCATION: LOCATION: KNEE

## 2024-03-15 ASSESSMENT — PAIN DESCRIPTION - ORIENTATION: ORIENTATION: RIGHT

## 2024-03-15 NOTE — PROGRESS NOTES
"Orthopaedic Surgery Progress Note    S:  NAEON. Doing well, pain controlled. Denies CP, SOB, N, V. HKB in place. Working with PT.    O:  /67   Pulse 91   Temp 36.1 °C (97 °F)   Resp 18   Ht 1.778 m (5' 10\")   Wt 80 kg (176 lb 5.9 oz)   SpO2 92%   BMI 25.31 kg/m²     Gen: arousable, NAD, appropriately conversational  Cardiac: RRR to peripheral palpation  Resp: nonlabored on RA  GI: soft, nondistended    MSK:  Right Lower Extremity:   -Dressing c/d/I, HKB in place  - Provena holding suction  -Unable to obtain M/S exam s/t spinal  -Foot warm, well perfused  -Palpable DP pulse, brisk cap refill      Labs:  Results for orders placed or performed during the hospital encounter of 03/13/24 (from the past 24 hour(s))   CBC   Result Value Ref Range    WBC 12.4 (H) 4.4 - 11.3 x10*3/uL    nRBC 0.0 0.0 - 0.0 /100 WBCs    RBC 4.32 4.00 - 5.20 x10*6/uL    Hemoglobin 12.9 12.0 - 16.0 g/dL    Hematocrit 41.0 36.0 - 46.0 %    MCV 95 80 - 100 fL    MCH 29.9 26.0 - 34.0 pg    MCHC 31.5 (L) 32.0 - 36.0 g/dL    RDW 12.6 11.5 - 14.5 %    Platelets 192 150 - 450 x10*3/uL       A/P: 68 y.o. female s/p R TKA w/ quad snip on 3/13/24 with Dr. Whelan. Stable in PACU.       Plan:  - Weight bearing: WBAT RLE in HKB, locked at 90 degrees, ROMAT 0-90 to protect terminal flexion  - DVT ppx: SCDs, ASA 81mg BID (pantoprazole for GI protection)  - Diet: Regular  - Pain: Tylenol, oxycodone 5/10, dilaudid breakthrough  - Antibiotics: perioperative ancef 2g q8hr x3 doses  - FEN: HLIV with good PO intake  - Bowel Regimen: Colace, senna, dulcolax  - PT/OT  - Pulm: Encourage IS  - Continue home medications  - No damon    Dispo: Possible update in PT recommendation for HHC, then HHC DC today if so, otherwise SNF    Keshawn Lebron MD  Orthopaedic Surgery, PGY-2  Available by Epic Chat  03/15/24  5:44 AM    This patient will be followed by Ortho Trauma team (All chat preferred):     1st call: Martin Russell, PGY-1  2nd call: Keshawn" Vi, PGY-2  3rd call: Lucio Harper, PGY-3

## 2024-03-15 NOTE — CARE PLAN
Problem: Pain  Goal: My pain/discomfort is manageable  Outcome: Progressing     Problem: Safety  Goal: Patient will be injury free during hospitalization  Outcome: Progressing  Goal: I will remain free of falls  Outcome: Progressing     Problem: Daily Care  Goal: Daily care needs are met  Outcome: Progressing     Problem: Psychosocial Needs  Goal: Demonstrates ability to cope with hospitalization/illness  Outcome: Progressing  Goal: Collaborate with me, my family, and caregiver to identify my specific goals  Outcome: Progressing     Problem: Discharge Barriers  Goal: My discharge needs are met  Outcome: Progressing     Problem: Pain  Goal: Takes deep breaths with improved pain control throughout the shift  Outcome: Progressing  Goal: Turns in bed with improved pain control throughout the shift  Outcome: Progressing  Goal: Walks with improved pain control throughout the shift  Outcome: Progressing  Goal: Performs ADL's with improved pain control throughout shift  Outcome: Progressing  Goal: Participates in PT with improved pain control throughout the shift  Outcome: Progressing  Goal: Free from opioid side effects throughout the shift  Outcome: Progressing  Goal: Free from acute confusion related to pain meds throughout the shift  Outcome: Progressing     Problem: Skin  Goal: Decreased wound size/increased tissue granulation at next dressing change  Outcome: Progressing  Goal: Participates in plan/prevention/treatment measures  Outcome: Progressing  Goal: Prevent/manage excess moisture  Outcome: Progressing  Goal: Prevent/minimize sheer/friction injuries  Outcome: Progressing  Goal: Promote/optimize nutrition  Outcome: Progressing  Goal: Promote skin healing  Outcome: Progressing     Problem: Pain - Adult  Goal: Verbalizes/displays adequate comfort level or baseline comfort level  Outcome: Progressing     Problem: Safety - Adult  Goal: Free from fall injury  Outcome: Progressing     Problem: Discharge  Planning  Goal: Discharge to home or other facility with appropriate resources  Outcome: Progressing     Problem: Chronic Conditions and Co-morbidities  Goal: Patient's chronic conditions and co-morbidity symptoms are monitored and maintained or improved  Outcome: Progressing   The patient's goals for the shift include to avoid nausea    The clinical goals for the shift include pt will have decreased pain this shift    Over the shift, the patient did not make progress toward the following goals. Barriers to progression include   . Recommendations to address these barriers include   .

## 2024-03-15 NOTE — CARE PLAN
Problem: Pain  Goal: My pain/discomfort is manageable  Outcome: Progressing     Problem: Safety  Goal: Patient will be injury free during hospitalization  Outcome: Progressing  Goal: I will remain free of falls  Outcome: Progressing     Problem: Daily Care  Goal: Daily care needs are met  Outcome: Progressing     Problem: Psychosocial Needs  Goal: Demonstrates ability to cope with hospitalization/illness  Outcome: Progressing  Goal: Collaborate with me, my family, and caregiver to identify my specific goals  Outcome: Progressing  Flowsheets (Taken 3/15/2024 0900)  Cultural Requests During Hospitalization: n/a  Spiritual Requests During Hospitalization: n/a     Problem: Discharge Barriers  Goal: My discharge needs are met  Outcome: Progressing     Problem: Pain  Goal: Takes deep breaths with improved pain control throughout the shift  Outcome: Progressing  Goal: Turns in bed with improved pain control throughout the shift  Outcome: Progressing  Goal: Walks with improved pain control throughout the shift  Outcome: Progressing  Goal: Performs ADL's with improved pain control throughout shift  Outcome: Progressing  Goal: Participates in PT with improved pain control throughout the shift  Outcome: Progressing  Goal: Free from opioid side effects throughout the shift  Outcome: Progressing  Goal: Free from acute confusion related to pain meds throughout the shift  Outcome: Progressing     Problem: Skin  Goal: Decreased wound size/increased tissue granulation at next dressing change  Outcome: Progressing  Goal: Participates in plan/prevention/treatment measures  Outcome: Progressing  Goal: Prevent/manage excess moisture  Outcome: Progressing  Goal: Prevent/minimize sheer/friction injuries  Outcome: Progressing  Goal: Promote/optimize nutrition  3/15/2024 0936 by Andrzej Marvin RN  Flowsheets (Taken 3/15/2024 0936)  Promote/optimize nutrition: Consume > 50% meals/supplements  3/15/2024 0935 by Andrzej Marvin RN  Outcome:  Progressing  Goal: Promote skin healing  3/15/2024 0936 by Andrzej Marvin RN  Flowsheets (Taken 3/15/2024 0936)  Promote skin healing:   Assess skin/pad under line(s)/device(s)   Protective dressings over bony prominences  3/15/2024 0935 by Andrzej Marvin RN  Outcome: Progressing     Problem: Pain - Adult  Goal: Verbalizes/displays adequate comfort level or baseline comfort level  Outcome: Progressing     Problem: Safety - Adult  Goal: Free from fall injury  Outcome: Progressing     Problem: Discharge Planning  Goal: Discharge to home or other facility with appropriate resources  Outcome: Progressing     Problem: Chronic Conditions and Co-morbidities  Goal: Patient's chronic conditions and co-morbidity symptoms are monitored and maintained or improved  Outcome: Progressing   The patient's goals for the shift include to avoid nausea    The clinical goals for the shift include decrease in pain    Over the shift, the patient did not make progress toward the following goals. Barriers to progression include   . Recommendations to address these barriers include   .

## 2024-03-15 NOTE — PROGRESS NOTES
Patient was switched from OBS to inpatient today. Will need 3 midnight inpatient stay before dc to [facility] [place]. Will continue to follow.    NADINE Shepherd

## 2024-03-15 NOTE — PROGRESS NOTES
"Physical Therapy    Physical Therapy Treatment    Patient Name: Adriana Wood  MRN: [id number]  Today's Date: 3/15/2024  Time Calculation  Start Time: 1100  Stop Time: 1156  Time Calculation (min): 56 min       Assessment/Plan   PT Assessment  Evaluation/Treatment Tolerance: Patient tolerated treatment well  Assessment Comment: Pt. able to tolerate most of session however pt. complained of throbbing pain during ther ex right le. Pt. given dilaudid to assist in mobility. Pt. willing to sit up in w/c end of session.  End of Session Patient Position: Up in chair  PT Plan  Inpatient/Swing Bed or Outpatient: Inpatient  PT Plan  Treatment/Interventions: Bed mobility, Transfer training, Gait training, Stair training, Balance training, Neuromuscular re-education, Strengthening, Endurance training, Range of motion, Therapeutic exercise, Therapeutic activity, Home exercise program, Orthotic fitting/training, Positioning, Postural re-education  PT Plan: Skilled PT  PT Frequency: BID  PT Discharge Recommendations: Moderate intensity level of continued care  Equipment Recommended upon Discharge:  (Pt owns walker, cane, and wheelchair)  PT Recommended Transfer Status: Assist x2, Assistive device  PT - OK to Discharge: Yes (When medically ready.)      General Visit Information:   PT  Visit  PT Received On: 03/15/24  General  Reason for Referral: Severe posttraumatic arthritis of R knee s/p R TKA w/ quad snip  Past Medical History Relevant to Rehab: severe right tibial plateau fracture and developed infection which required removal of hardware  Missed Visit: Yes  Missed Visit Reason:  (Attempted follow up however pt.  in \"8-9\"/10 with movement and wanted pain meds to work before participating.  Encouraged pt. to eat with the medication as pt. already reporting  stomach discomfort.)  Family/Caregiver Present: No  Prior to Session Communication: Bedside nurse  Patient Position Received: Bed, 3 rail up  General Comment: Pt. " received pain meds and ate breakfast as discussed before therapy was to begin. Pt. has HKB on right , regina, 2L of 02 , prevena PIV.    Subjective   Precautions:  Precautions  Hearing/Visual Limitations: WFL  Medical Precautions: Fall precautions  Precautions Comment: WBAT RLE in HKB, locked at 90 degrees, ROMAT 0-90 to protect terminal flexion  Vital Signs:  Vital Signs  Heart Rate: 85  SpO2: 93 %    Objective   Pain:  Pain Assessment  Pain Assessment: 0-10  Pain Score: 8  Pain Interventions: Medication (See MAR)  Cognition:     Postural Control:  Static Sitting Balance  Static Sitting-Balance Support: Bilateral upper extremity supported, Feet supported  Static Sitting-Level of Assistance: Close supervision  Static Standing Balance  Static Standing-Balance Support: Bilateral upper extremity supported  Static Standing-Level of Assistance: Minimum assistance, Moderate assistance  Extremity/Trunk Assessments:                Activity Tolerance:  Activity Tolerance  Endurance: Decreased tolerance for upright activites  Treatments:  Therapeutic Exercise  Therapeutic Exercise Performed: Yes  Therapeutic Exercise Activity 1: Supine bilat le ex AP'S, QS, SAQ'S, HS, AND ABD X 15 REPS. Increased time required due to increased discomfort.         Bed Mobility  Bed Mobility: Yes  Bed Mobility 1  Bed Mobility 1: Supine to sitting  Level of Assistance 1: Minimum assistance, Minimal verbal cues  Bed Mobility Comments 1: Increased time required and assist for bilat le's.    Ambulation/Gait Training  Ambulation/Gait Training Performed: Yes  Ambulation/Gait Training 1  Surface 1:  (Pt. amb. 5' fwd and back using a wh. walker and min assist. Pt. requires max cues to work on hip,knee, trunk extension.)  Ambulation/Gait Training 2  Surface 2:  (Pt. amb. 4 sidesteps right /left using a wh. walker and min assist- Pt. stated that she felt like something was dripping down her leg therefore sat pt. down on bed - gave pt. wet wipes to clean  herself.)  Ambulation/Gait Training 3  Surface 3:  (Pt. amb. 5' using a wh. walker and mod assist as pt. attempting to sit before reaching back.)  Transfers  Transfer: Yes  Transfer 1  Transfer From 1: Bed to  Transfer to 1: Stand  Transfer Level of Assistance 1: Minimum assistance, Minimal verbal cues  Trials/Comments 1: Incresed time getting to full standing position. Cues for hip/trunk/knee extension.  Transfers 2  Transfer From 2: Stand to  Transfer to 2: Wheelchair  Transfer Level of Assistance 2: Moderate assistance, Minimal verbal cues  Trials/Comments 2: Max cues to reach back after lining up with chair.    Outcome Measures:  [facility] Basic Mobility  Turning from your back to your side while in a flat bed without using bedrails: A little  Moving from lying on your back to sitting on the side of a flat bed without using bedrails: A little  Moving to and from bed to chair (including a wheelchair): A lot  Standing up from a chair using your arms (e.g. wheelchair or bedside chair): A lot  To walk in hospital room: A lot  Climbing 3-5 steps with railing: Total  Basic Mobility - Total Score: 13    Education Documentation  Precautions, taught by Ciera Jarvis PTA at 3/15/2024 12:17 PM.  Learner: Patient  Readiness: Acceptance  Method: Explanation, Demonstration  Response: Needs Reinforcement    Body Mechanics, taught by Ciera Jarvis PTA at 3/15/2024 12:17 PM.  Learner: Patient  Readiness: Acceptance  Method: Explanation, Demonstration  Response: Needs Reinforcement    Home Exercise Program, taught by Ciera Jarvis PTA at 3/15/2024 12:17 PM.  Learner: Patient  Readiness: Acceptance  Method: Explanation, Demonstration  Response: Needs Reinforcement    Mobility Training, taught by Ciera Jarvis PTA at 3/15/2024 12:17 PM.  Learner: Patient  Readiness: Acceptance  Method: Explanation, Demonstration  Response: Needs Reinforcement    Education Comments  No comments found.        OP EDUCATION:       Encounter  Problems       Encounter Problems (Active)       PT Problem       Patient will stand with UE support of LRD and </= CGA at least 3 min to improve balance required for self-care tasks.  (Progressing)       Start:  03/13/24    Expected End:  03/15/24            Patient will perform sit to stand and stand to sit transfers with </= CGA and LRD to increase functional strength.  (Progressing)       Start:  03/13/24    Expected End:  03/15/24            Patient will ambulate at least 15 ft. with </= MIN A x1 and LRD to improve tolerance of household distances.  (Progressing)       Start:  03/13/24    Expected End:  03/15/24            Patient will perform home exercise program as prescribed with cues as needed.   (Progressing)       Start:  03/13/24    Expected End:  03/15/24            Patient will perform bed mobility with </= close sup to reduce risk of developing decubitus ulcers.  (Progressing)       Start:  03/13/24    Expected End:  03/15/24               Pain - Adult

## 2024-03-15 NOTE — PROGRESS NOTES
"Physical Therapy                 Therapy Communication Note    Patient Name: Adriana Wood  MRN: [id number]  Today's Date: 3/15/2024     Discipline: Physical Therapy    Missed Visit Reason: Missed Visit Reason:  (Attempted follow up however pt.  in \"8-9\"/10 with movement and wanted pain meds to work before participating.  Encouraged pt. to eat with the medication as pt. already reporting  stomach discomfort.)    Missed Time: Attempt    Comment:  "

## 2024-03-15 NOTE — CARE PLAN
The patient's goals for the shift include to avoid nausea    The clinical goals for the shift include pt. will have decrease in pain level.    Problem: Pain  Goal: My pain/discomfort is manageable  Outcome: Progressing     Problem: Safety  Goal: Patient will be injury free during hospitalization  Outcome: Progressing  Goal: I will remain free of falls  Outcome: Progressing     Problem: Daily Care  Goal: Daily care needs are met  Outcome: Progressing     Problem: Psychosocial Needs  Goal: Demonstrates ability to cope with hospitalization/illness  Outcome: Progressing  Goal: Collaborate with me, my family, and caregiver to identify my specific goals  Outcome: Progressing     Problem: Discharge Barriers  Goal: My discharge needs are met  Outcome: Progressing     Problem: Pain  Goal: Takes deep breaths with improved pain control throughout the shift  Outcome: Progressing  Goal: Turns in bed with improved pain control throughout the shift  Outcome: Progressing  Goal: Walks with improved pain control throughout the shift  Outcome: Progressing  Goal: Performs ADL's with improved pain control throughout shift  Outcome: Progressing  Goal: Participates in PT with improved pain control throughout the shift  Outcome: Progressing  Goal: Free from opioid side effects throughout the shift  Outcome: Progressing  Goal: Free from acute confusion related to pain meds throughout the shift  Outcome: Progressing     Problem: Skin  Goal: Decreased wound size/increased tissue granulation at next dressing change  Outcome: Progressing  Goal: Participates in plan/prevention/treatment measures  Outcome: Progressing  Goal: Prevent/manage excess moisture  Outcome: Progressing  Goal: Prevent/minimize sheer/friction injuries  Outcome: Progressing  Goal: Promote/optimize nutrition  Outcome: Progressing  Goal: Promote skin healing  Outcome: Progressing

## 2024-03-16 PROCEDURE — 2500000001 HC RX 250 WO HCPCS SELF ADMINISTERED DRUGS (ALT 637 FOR MEDICARE OP)

## 2024-03-16 PROCEDURE — 1100000001 HC PRIVATE ROOM DAILY

## 2024-03-16 PROCEDURE — 97530 THERAPEUTIC ACTIVITIES: CPT | Mod: GP,CQ

## 2024-03-16 PROCEDURE — 97116 GAIT TRAINING THERAPY: CPT | Mod: GP,CQ

## 2024-03-16 RX ORDER — CYCLOBENZAPRINE HCL 10 MG
5 TABLET ORAL 3 TIMES DAILY PRN
Status: DISCONTINUED | OUTPATIENT
Start: 2024-03-16 | End: 2024-03-18 | Stop reason: HOSPADM

## 2024-03-16 RX ADMIN — OXYCODONE HYDROCHLORIDE 10 MG: 5 TABLET ORAL at 06:26

## 2024-03-16 RX ADMIN — OXYCODONE HYDROCHLORIDE 10 MG: 5 TABLET ORAL at 19:07

## 2024-03-16 RX ADMIN — ASPIRIN 81 MG: 81 TABLET, COATED ORAL at 20:10

## 2024-03-16 RX ADMIN — ACETAMINOPHEN 975 MG: 325 TABLET ORAL at 06:25

## 2024-03-16 RX ADMIN — CYCLOBENZAPRINE 5 MG: 10 TABLET, FILM COATED ORAL at 18:07

## 2024-03-16 RX ADMIN — DOCUSATE SODIUM 100 MG: 100 CAPSULE, LIQUID FILLED ORAL at 20:10

## 2024-03-16 RX ADMIN — OXYCODONE HYDROCHLORIDE 10 MG: 5 TABLET ORAL at 13:02

## 2024-03-16 RX ADMIN — DOCUSATE SODIUM 100 MG: 100 CAPSULE, LIQUID FILLED ORAL at 09:17

## 2024-03-16 RX ADMIN — PANTOPRAZOLE SODIUM 40 MG: 40 TABLET, DELAYED RELEASE ORAL at 06:26

## 2024-03-16 RX ADMIN — BACLOFEN 5 MG: 10 TABLET ORAL at 09:16

## 2024-03-16 RX ADMIN — ASPIRIN 81 MG: 81 TABLET, COATED ORAL at 09:17

## 2024-03-16 RX ADMIN — ACETAMINOPHEN 975 MG: 325 TABLET ORAL at 13:02

## 2024-03-16 ASSESSMENT — COGNITIVE AND FUNCTIONAL STATUS - GENERAL
TOILETING: A LOT
DRESSING REGULAR UPPER BODY CLOTHING: A LITTLE
WALKING IN HOSPITAL ROOM: A LITTLE
MOVING FROM LYING ON BACK TO SITTING ON SIDE OF FLAT BED WITH BEDRAILS: A LITTLE
CLIMB 3 TO 5 STEPS WITH RAILING: TOTAL
HELP NEEDED FOR BATHING: A LOT
MOVING TO AND FROM BED TO CHAIR: A LITTLE
DAILY ACTIVITIY SCORE: 16
MOBILITY SCORE: 16
TURNING FROM BACK TO SIDE WHILE IN FLAT BAD: A LITTLE
PERSONAL GROOMING: A LITTLE
STANDING UP FROM CHAIR USING ARMS: A LITTLE
MOBILITY SCORE: 16
MOVING FROM LYING ON BACK TO SITTING ON SIDE OF FLAT BED WITH BEDRAILS: A LITTLE
CLIMB 3 TO 5 STEPS WITH RAILING: TOTAL
MOVING TO AND FROM BED TO CHAIR: A LITTLE
TURNING FROM BACK TO SIDE WHILE IN FLAT BAD: A LITTLE
DRESSING REGULAR LOWER BODY CLOTHING: A LOT
STANDING UP FROM CHAIR USING ARMS: A LITTLE
WALKING IN HOSPITAL ROOM: A LITTLE

## 2024-03-16 ASSESSMENT — PAIN - FUNCTIONAL ASSESSMENT
PAIN_FUNCTIONAL_ASSESSMENT: 0-10

## 2024-03-16 ASSESSMENT — PAIN SCALES - GENERAL
PAINLEVEL_OUTOF10: 8
PAINLEVEL_OUTOF10: 7
PAINLEVEL_OUTOF10: 9
PAINLEVEL_OUTOF10: 6
PAINLEVEL_OUTOF10: 7
PAINLEVEL_OUTOF10: 7
PAINLEVEL_OUTOF10: 8

## 2024-03-16 ASSESSMENT — PAIN DESCRIPTION - LOCATION
LOCATION: KNEE

## 2024-03-16 ASSESSMENT — PAIN DESCRIPTION - DESCRIPTORS: DESCRIPTORS: BURNING

## 2024-03-16 ASSESSMENT — PAIN DESCRIPTION - ORIENTATION
ORIENTATION: RIGHT

## 2024-03-16 NOTE — PROGRESS NOTES
Physical Therapy    Physical Therapy Treatment    Patient Name: Adriana Wood  MRN: [id number]  Today's Date: 3/16/2024  Time Calculation  Start Time: 0848  Stop Time: 0915  Time Calculation (min): 27 min       Assessment/Plan   PT Assessment  PT Assessment Results: Decreased strength, Decreased range of motion, Decreased endurance, Impaired balance, Decreased mobility  End of Session Patient Position: Bed, 2 rail up, Alarm off, not on at start of session  PT Plan  Inpatient/Swing Bed or Outpatient: Inpatient  PT Plan  Treatment/Interventions: Bed mobility, Transfer training, Gait training, Stair training, Balance training, Neuromuscular re-education, Strengthening, Endurance training, Range of motion, Therapeutic exercise, Therapeutic activity, Home exercise program, Orthotic fitting/training, Positioning, Postural re-education  PT Plan: Skilled PT  PT Frequency: Daily  PT Discharge Recommendations: Moderate intensity level of continued care  Equipment Recommended upon Discharge:  (Pt owns walker, cane, and wheelchair)  PT Recommended Transfer Status: Assist x2, Assistive device  PT - OK to Discharge: Yes (When medically ready.)      General Visit Information:   PT  Visit  PT Received On: 03/16/24  General  Family/Caregiver Present: No  Prior to Session Communication: Bedside nurse  Patient Position Received: Bed, 3 rail up, Alarm off, not on at start of session  General Comment: Pt supine in bed upon arrival. Pt pleasant and agreeable to therapy.    Subjective   Precautions:  Precautions  LE Weight Bearing Status:  (WBAT R LE)  Medical Precautions: Fall precautions  Precautions Comment: WBAT RLE in HKB, locked at 90 degrees, ROMAT 0-90 to protect terminal flexion  Vital Signs:       Objective   Pain:  Pain Assessment  Pain Assessment: 0-10  Pain Score: 7  Pain Type: Acute pain  Pain Location: Knee  Pain Orientation: Right  Pain Descriptors: Burning  Pain Frequency: Constant/continuous  Pain Onset:  Ongoing  Cognition:  Cognition  Overall Cognitive Status: Within Functional Limits    Activity Tolerance:  Activity Tolerance  Endurance: Tolerates 10 - 20 min exercise with multiple rests  Treatments:  Therapeutic Exercise  Therapeutic Exercise Performed: Yes  Therapeutic Exercise Activity 1: Supine B LE AROM HS, QS x10    Bed Mobility  Bed Mobility: Yes  Bed Mobility 1  Bed Mobility 1: Supine to sitting  Level of Assistance 1: Close supervision  Bed Mobility 2  Bed Mobility  2: Sitting to supine  Level of Assistance 2: Minimum assistance    Ambulation/Gait Training  Ambulation/Gait Training Performed: Yes  Ambulation/Gait Training 1  Surface 1: Level tile  Device 1: Rolling walker  Assistance 1: Contact guard  Quality of Gait 1: Narrow base of support, Antalgic, Decreased step length (step to gait pattern)  Comments/Distance (ft) 1: 10' increased time to complete  Transfers  Transfer: Yes  Transfer 1  Transfer From 1: Bed to  Transfer to 1: Stand  Technique 1: Sit to stand  Transfer Device 1: Walker  Transfer Level of Assistance 1: Close supervision  Trials/Comments 1: cues for hand placement, increased time to complete.  Transfers 2  Transfer From 2: Stand to  Transfer to 2: Sit  Technique 2: Stand to sit  Transfer Device 2: Walker  Transfer Level of Assistance 2: Close supervision  Trials/Comments 2: cues to reach back to control descent.    Outcome Measures:  [facility] Basic Mobility  Turning from your back to your side while in a flat bed without using bedrails: A little  Moving from lying on your back to sitting on the side of a flat bed without using bedrails: A little  Moving to and from bed to chair (including a wheelchair): A little  Standing up from a chair using your arms (e.g. wheelchair or bedside chair): A little  To walk in hospital room: A little  Climbing 3-5 steps with railing: Total  Basic Mobility - Total Score: 16    Education Documentation  Precautions, taught by Radha Hartley PTA at 3/16/2024  10:38 AM.  Learner: Patient  Readiness: Acceptance  Method: Explanation  Response: Verbalizes Understanding    Body Mechanics, taught by Radha Hartley PTA at 3/16/2024 10:38 AM.  Learner: Patient  Readiness: Acceptance  Method: Explanation  Response: Verbalizes Understanding    Home Exercise Program, taught by Radha Hartley PTA at 3/16/2024 10:38 AM.  Learner: Patient  Readiness: Acceptance  Method: Explanation  Response: Verbalizes Understanding    Mobility Training, taught by Radha Hartley PTA at 3/16/2024 10:38 AM.  Learner: Patient  Readiness: Acceptance  Method: Explanation  Response: Verbalizes Understanding    Education Comments  No comments found.        OP EDUCATION:       Encounter Problems       Encounter Problems (Active)       PT Problem       Patient will stand with UE support of LRD and </= CGA at least 3 min to improve balance required for self-care tasks.  (Progressing)       Start:  03/13/24    Expected End:  03/29/24            Patient will perform sit to stand and stand to sit transfers with </= CGA and LRD to increase functional strength.  (Progressing)       Start:  03/13/24    Expected End:  03/29/24            Patient will ambulate at least 15 ft. with </= MIN A x1 and LRD to improve tolerance of household distances.  (Progressing)       Start:  03/13/24    Expected End:  03/29/24            Patient will perform home exercise program as prescribed with cues as needed.   (Progressing)       Start:  03/13/24    Expected End:  03/29/24            Patient will perform bed mobility with </= close sup to reduce risk of developing decubitus ulcers.  (Progressing)       Start:  03/13/24    Expected End:  03/29/24               Pain - Adult

## 2024-03-16 NOTE — CARE PLAN
Problem: Pain  Goal: My pain/discomfort is manageable  Outcome: Progressing     Problem: Safety  Goal: Patient will be injury free during hospitalization  Outcome: Progressing  Goal: I will remain free of falls  Outcome: Progressing     Problem: Daily Care  Goal: Daily care needs are met  Outcome: Progressing     Problem: Psychosocial Needs  Goal: Demonstrates ability to cope with hospitalization/illness  Outcome: Progressing  Goal: Collaborate with me, my family, and caregiver to identify my specific goals  Outcome: Progressing     Problem: Discharge Barriers  Goal: My discharge needs are met  Outcome: Progressing     Problem: Pain  Goal: Takes deep breaths with improved pain control throughout the shift  Outcome: Progressing  Goal: Turns in bed with improved pain control throughout the shift  Outcome: Progressing  Goal: Walks with improved pain control throughout the shift  Outcome: Progressing  Goal: Performs ADL's with improved pain control throughout shift  Outcome: Progressing  Goal: Participates in PT with improved pain control throughout the shift  Outcome: Progressing  Goal: Free from opioid side effects throughout the shift  Outcome: Progressing  Goal: Free from acute confusion related to pain meds throughout the shift  Outcome: Progressing     Problem: Skin  Goal: Participates in plan/prevention/treatment measures  Outcome: Progressing  Goal: Prevent/manage excess moisture  Outcome: Progressing  Goal: Prevent/minimize sheer/friction injuries  Outcome: Progressing  Goal: Promote/optimize nutrition  Outcome: Progressing  Goal: Promote skin healing  Outcome: Progressing   The patient's goals for the shift include to avoid nausea    The clinical goals for the shift include Pain controlled to tolerable level. Patient compliant with DVT prophylaxis. Patient remains safe and free from falls.

## 2024-03-16 NOTE — PROGRESS NOTES
"Orthopaedic Surgery Progress Note    S:  NAEON. Doing well, pain controlled. Denies CP, SOB, N, V. HKB in place. Working with PT.    O:  /77   Pulse 53   Temp 36.5 °C (97.7 °F) (Temporal)   Resp 16   Ht 1.778 m (5' 10\")   Wt 80 kg (176 lb 5.9 oz)   SpO2 95%   BMI 25.31 kg/m²     Gen: arousable, NAD, appropriately conversational  Cardiac: RRR to peripheral palpation  Resp: nonlabored on RA  GI: soft, nondistended    MSK:  Right Lower Extremity:   -Dressing c/d/I, HKB in place  - Provena holding suction  -Unable to obtain M/S exam s/t spinal  -Foot warm, well perfused  -Palpable DP pulse, brisk cap refill      Labs:  No results found for this or any previous visit (from the past 24 hour(s)).      A/P: 68 y.o. female s/p R TKA w/ quad snip on 3/13/24 with Dr. Whelan. Stable in PACU.       Plan:  - Weight bearing: WBAT RLE in HKB, locked at 90 degrees, ROMAT 0-90 to protect terminal flexion  - DVT ppx: SCDs, ASA 81mg BID (pantoprazole for GI protection)  - Diet: Regular  - Pain: Tylenol, oxycodone 5/10, dilaudid breakthrough  - Antibiotics: perioperative ancef 2g q8hr x3 doses  - FEN: HLIV with good PO intake  - Bowel Regimen: Colace, senna, dulcolax  - PT/OT  - Pulm: Encourage IS  - Continue home medications  - No damon    Dispo: Pending another two midnights for SNF on Monday    Keshawn Lebron MD  Orthopaedic Surgery, PGY-2  Available by Epic Chat  03/16/24  9:09 AM    This patient will be followed by Ortho Trauma team (All chat preferred):     1st call: Martin Russell, PGY-1  2nd call: Keshawn Lebron PGY-2  3rd call: Lucio Harper, PGY-3    "

## 2024-03-16 NOTE — CARE PLAN
Problem: Pain  Goal: My pain/discomfort is manageable  Outcome: Progressing     Problem: Safety  Goal: Patient will be injury free during hospitalization  Outcome: Progressing  Goal: I will remain free of falls  Outcome: Progressing     Problem: Daily Care  Goal: Daily care needs are met  Outcome: Progressing     Problem: Psychosocial Needs  Goal: Demonstrates ability to cope with hospitalization/illness  Outcome: Progressing  Goal: Collaborate with me, my family, and caregiver to identify my specific goals  Outcome: Progressing     Problem: Discharge Barriers  Goal: My discharge needs are met  Outcome: Progressing     Problem: Pain  Goal: Takes deep breaths with improved pain control throughout the shift  Outcome: Progressing  Goal: Turns in bed with improved pain control throughout the shift  Outcome: Progressing  Goal: Walks with improved pain control throughout the shift  Outcome: Progressing  Goal: Performs ADL's with improved pain control throughout shift  Outcome: Progressing  Goal: Participates in PT with improved pain control throughout the shift  Outcome: Progressing  Goal: Free from opioid side effects throughout the shift  Outcome: Progressing  Goal: Free from acute confusion related to pain meds throughout the shift  Outcome: Progressing     Problem: Skin  Goal: Decreased wound size/increased tissue granulation at next dressing change  Outcome: Progressing  Goal: Participates in plan/prevention/treatment measures  Outcome: Progressing  Goal: Prevent/manage excess moisture  Outcome: Progressing  Goal: Prevent/minimize sheer/friction injuries  Outcome: Progressing  Goal: Promote/optimize nutrition  Outcome: Progressing  Goal: Promote skin healing  Outcome: Progressing     Problem: Pain - Adult  Goal: Verbalizes/displays adequate comfort level or baseline comfort level  Outcome: Progressing     Problem: Safety - Adult  Goal: Free from fall injury  Outcome: Progressing     Problem: Discharge  Planning  Goal: Discharge to home or other facility with appropriate resources  Outcome: Progressing     Problem: Chronic Conditions and Co-morbidities  Goal: Patient's chronic conditions and co-morbidity symptoms are monitored and maintained or improved  Outcome: Progressing     Problem: Fall/Injury  Goal: Not fall by end of shift  Outcome: Progressing  Goal: Be free from injury by end of the shift  Outcome: Progressing  Goal: Verbalize understanding of personal risk factors for fall in the hospital  Outcome: Progressing  Goal: Verbalize understanding of risk factor reduction measures to prevent injury from fall in the home  Outcome: Progressing  Goal: Use assistive devices by end of the shift  Outcome: Progressing  Goal: Pace activities to prevent fatigue by end of the shift  Outcome: Progressing   The patient's goals for the shift include to avoid nausea    The clinical goals for the shift include decrease in pain

## 2024-03-16 NOTE — CARE PLAN
Problem: Pain  Goal: My pain/discomfort is manageable  3/15/2024 2309 by Kurtis Eden RN  Outcome: Progressing  3/15/2024 2306 by Kurtis Eden RN  Outcome: Progressing     Problem: Safety  Goal: Patient will be injury free during hospitalization  3/15/2024 2309 by Kurtis Eden RN  Outcome: Progressing  3/15/2024 2306 by Kurtis Eden RN  Outcome: Progressing  Goal: I will remain free of falls  3/15/2024 2309 by Kurtis Eden RN  Outcome: Progressing  3/15/2024 2306 by Kurtis Eden RN  Outcome: Progressing     Problem: Daily Care  Goal: Daily care needs are met  3/15/2024 2309 by Kurtis Eden RN  Outcome: Progressing  3/15/2024 2306 by Kutris Eden RN  Outcome: Progressing     Problem: Psychosocial Needs  Goal: Demonstrates ability to cope with hospitalization/illness  3/15/2024 2309 by Kurtis Eden RN  Outcome: Progressing  3/15/2024 2306 by Kurtis Eden RN  Outcome: Progressing  Goal: Collaborate with me, my family, and caregiver to identify my specific goals  3/15/2024 2309 by Kurtis Eden RN  Outcome: Progressing  3/15/2024 2306 by Kurtis Eden RN  Outcome: Progressing     Problem: Discharge Barriers  Goal: My discharge needs are met  3/15/2024 2309 by Kurtis Eden RN  Outcome: Progressing  3/15/2024 2306 by Kurtis Eden RN  Outcome: Progressing     Problem: Pain  Goal: Takes deep breaths with improved pain control throughout the shift  3/15/2024 2309 by Kurtis Eden RN  Outcome: Progressing  3/15/2024 2306 by Kurtis Eden RN  Outcome: Progressing  Goal: Turns in bed with improved pain control throughout the shift  3/15/2024 2309 by Kurtis Eden RN  Outcome: Progressing  3/15/2024 2306 by Kurtis Eden RN  Outcome: Progressing  Goal: Walks with improved pain control throughout the shift  3/15/2024 2309 by Kurtis Eden RN  Outcome: Progressing  3/15/2024 2306 by Kurtis Eden RN  Outcome: Progressing  Goal: Performs ADL's with improved pain control throughout shift  3/15/2024  2309 by Kurtis Eden RN  Outcome: Progressing  3/15/2024 2306 by Kurtis Eden RN  Outcome: Progressing  Goal: Participates in PT with improved pain control throughout the shift  3/15/2024 2309 by Kurtis Eden RN  Outcome: Progressing  3/15/2024 2306 by Kurtis Eden RN  Outcome: Progressing  Goal: Free from opioid side effects throughout the shift  3/15/2024 2309 by Kurtis Eden RN  Outcome: Progressing  3/15/2024 2306 by Kurtis Eden RN  Outcome: Progressing  Goal: Free from acute confusion related to pain meds throughout the shift  3/15/2024 2309 by Kurtis Eden RN  Outcome: Progressing  3/15/2024 2306 by Kurtis Eden RN  Outcome: Progressing     Problem: Skin  Goal: Decreased wound size/increased tissue granulation at next dressing change  3/15/2024 2309 by Kurtis Eden RN  Outcome: Progressing  3/15/2024 2306 by Kurtis Eden RN  Outcome: Progressing  Goal: Participates in plan/prevention/treatment measures  3/15/2024 2309 by Kurtis Eden RN  Outcome: Progressing  3/15/2024 2306 by Kurtis Eden RN  Outcome: Progressing  Goal: Prevent/manage excess moisture  3/15/2024 2309 by Kurtis Eden RN  Outcome: Progressing  3/15/2024 2306 by Kurtis Eden RN  Outcome: Progressing  Goal: Prevent/minimize sheer/friction injuries  3/15/2024 2309 by Kurtis Eden RN  Outcome: Progressing  3/15/2024 2306 by Kurtis Eden RN  Outcome: Progressing  Goal: Promote/optimize nutrition  3/15/2024 2309 by Kurtis Eden RN  Outcome: Progressing  3/15/2024 2306 by Kurtis Eden RN  Outcome: Progressing  Goal: Promote skin healing  3/15/2024 2309 by Kurtis Eden RN  Outcome: Progressing  3/15/2024 2306 by Kurtis Eden RN  Outcome: Progressing     Problem: Pain - Adult  Goal: Verbalizes/displays adequate comfort level or baseline comfort level  3/15/2024 2309 by Kurtis Eden RN  Outcome: Progressing  3/15/2024 2306 by Kurtis Eden RN  Outcome: Progressing     Problem: Safety - Adult  Goal: Free from  fall injury  3/15/2024 2309 by Kurtis Eden RN  Outcome: Progressing  3/15/2024 2306 by Kurtis Eden RN  Outcome: Progressing     Problem: Discharge Planning  Goal: Discharge to home or other facility with appropriate resources  3/15/2024 2309 by Kurtis Eden RN  Outcome: Progressing  3/15/2024 2306 by Kurtis Eden RN  Outcome: Progressing     Problem: Chronic Conditions and Co-morbidities  Goal: Patient's chronic conditions and co-morbidity symptoms are monitored and maintained or improved  3/15/2024 2309 by Kurtis Eden RN  Outcome: Progressing  3/15/2024 2306 by Kurtis Eden RN  Outcome: Progressing     Problem: Fall/Injury  Goal: Not fall by end of shift  3/15/2024 2309 by Kurtis Eden RN  Outcome: Progressing  3/15/2024 2306 by Kurtis Eden RN  Outcome: Progressing  Goal: Be free from injury by end of the shift  3/15/2024 2309 by Kurtis Eden RN  Outcome: Progressing  3/15/2024 2306 by Kurtis Eden RN  Outcome: Progressing  Goal: Verbalize understanding of personal risk factors for fall in the hospital  3/15/2024 2309 by Kurtis Eden RN  Outcome: Progressing  3/15/2024 2306 by Kurtis Eden RN  Outcome: Progressing  Goal: Verbalize understanding of risk factor reduction measures to prevent injury from fall in the home  3/15/2024 2309 by Kurtis Eden RN  Outcome: Progressing  3/15/2024 2306 by Kurtis Eden RN  Outcome: Progressing  Goal: Use assistive devices by end of the shift  3/15/2024 2309 by Kurtis Eden RN  Outcome: Progressing  3/15/2024 2306 by Kurtis Eden RN  Outcome: Progressing  Goal: Pace activities to prevent fatigue by end of the shift  3/15/2024 2309 by Kurtis Eden RN  Outcome: Progressing  3/15/2024 2306 by Kurtis Eden RN  Outcome: Progressing   The patient's goals for the shift include     The clinical goals for the shift include decrease in pain

## 2024-03-17 LAB
ANION GAP SERPL CALC-SCNC: 9 MMOL/L (ref 10–20)
BUN SERPL-MCNC: 8 MG/DL (ref 6–23)
CALCIUM SERPL-MCNC: 8.6 MG/DL (ref 8.6–10.6)
CHLORIDE SERPL-SCNC: 102 MMOL/L (ref 98–107)
CO2 SERPL-SCNC: 30 MMOL/L (ref 21–32)
CREAT SERPL-MCNC: 0.43 MG/DL (ref 0.5–1.05)
EGFRCR SERPLBLD CKD-EPI 2021: >90 ML/MIN/1.73M*2
ERYTHROCYTE [DISTWIDTH] IN BLOOD BY AUTOMATED COUNT: 12.8 % (ref 11.5–14.5)
GLUCOSE SERPL-MCNC: 86 MG/DL (ref 74–99)
HCT VFR BLD AUTO: 33.1 % (ref 36–46)
HGB BLD-MCNC: 10.8 G/DL (ref 12–16)
MCH RBC QN AUTO: 29.3 PG (ref 26–34)
MCHC RBC AUTO-ENTMCNC: 32.6 G/DL (ref 32–36)
MCV RBC AUTO: 90 FL (ref 80–100)
NRBC BLD-RTO: 0 /100 WBCS (ref 0–0)
PLATELET # BLD AUTO: 222 X10*3/UL (ref 150–450)
POTASSIUM SERPL-SCNC: 4.1 MMOL/L (ref 3.5–5.3)
RBC # BLD AUTO: 3.68 X10*6/UL (ref 4–5.2)
SODIUM SERPL-SCNC: 137 MMOL/L (ref 136–145)
WBC # BLD AUTO: 9.8 X10*3/UL (ref 4.4–11.3)

## 2024-03-17 PROCEDURE — 97116 GAIT TRAINING THERAPY: CPT | Mod: GP,CQ

## 2024-03-17 PROCEDURE — 80048 BASIC METABOLIC PNL TOTAL CA: CPT

## 2024-03-17 PROCEDURE — 36415 COLL VENOUS BLD VENIPUNCTURE: CPT

## 2024-03-17 PROCEDURE — 2500000001 HC RX 250 WO HCPCS SELF ADMINISTERED DRUGS (ALT 637 FOR MEDICARE OP)

## 2024-03-17 PROCEDURE — 2500000004 HC RX 250 GENERAL PHARMACY W/ HCPCS (ALT 636 FOR OP/ED)

## 2024-03-17 PROCEDURE — 1100000001 HC PRIVATE ROOM DAILY

## 2024-03-17 PROCEDURE — 97530 THERAPEUTIC ACTIVITIES: CPT | Mod: GP,CQ

## 2024-03-17 PROCEDURE — 85027 COMPLETE CBC AUTOMATED: CPT

## 2024-03-17 RX ORDER — POLYETHYLENE GLYCOL 3350 17 G/17G
17 POWDER, FOR SOLUTION ORAL DAILY
Qty: 30 PACKET | Refills: 0 | Status: SHIPPED | OUTPATIENT
Start: 2024-03-17 | End: 2024-04-23

## 2024-03-17 RX ORDER — DOCUSATE SODIUM 100 MG/1
100 CAPSULE, LIQUID FILLED ORAL DAILY
Status: DISCONTINUED | OUTPATIENT
Start: 2024-03-17 | End: 2024-03-18 | Stop reason: HOSPADM

## 2024-03-17 RX ORDER — ACETAMINOPHEN 325 MG/1
650 TABLET ORAL EVERY 6 HOURS PRN
Qty: 120 TABLET | Refills: 0 | Status: SHIPPED | OUTPATIENT
Start: 2024-03-17 | End: 2024-05-15 | Stop reason: HOSPADM

## 2024-03-17 RX ORDER — OXYCODONE HYDROCHLORIDE 5 MG/1
5 TABLET ORAL EVERY 6 HOURS PRN
Qty: 28 TABLET | Refills: 0 | Status: SHIPPED | OUTPATIENT
Start: 2024-03-17 | End: 2024-03-24

## 2024-03-17 RX ORDER — POLYETHYLENE GLYCOL 3350 17 G/17G
17 POWDER, FOR SOLUTION ORAL DAILY
Status: DISCONTINUED | OUTPATIENT
Start: 2024-03-17 | End: 2024-03-18 | Stop reason: HOSPADM

## 2024-03-17 RX ORDER — DOCUSATE SODIUM 100 MG/1
100 CAPSULE, LIQUID FILLED ORAL 2 TIMES DAILY
Qty: 60 CAPSULE | Refills: 0 | Status: SHIPPED | OUTPATIENT
Start: 2024-03-17 | End: 2024-04-23

## 2024-03-17 RX ORDER — PANTOPRAZOLE SODIUM 40 MG/1
40 TABLET, DELAYED RELEASE ORAL
Qty: 30 TABLET | Refills: 0 | Status: SHIPPED | OUTPATIENT
Start: 2024-03-17 | End: 2024-04-23 | Stop reason: WASHOUT

## 2024-03-17 RX ORDER — FERROUS SULFATE, DRIED 160(50) MG
1 TABLET, EXTENDED RELEASE ORAL DAILY
Qty: 30 TABLET | Refills: 0 | Status: SHIPPED | OUTPATIENT
Start: 2024-03-17 | End: 2024-04-16

## 2024-03-17 RX ORDER — ASPIRIN 81 MG/1
81 TABLET ORAL 2 TIMES DAILY
Qty: 60 TABLET | Refills: 0 | Status: SHIPPED | OUTPATIENT
Start: 2024-03-17 | End: 2024-04-23

## 2024-03-17 RX ADMIN — HYDROMORPHONE HYDROCHLORIDE 0.2 MG: 1 INJECTION, SOLUTION INTRAMUSCULAR; INTRAVENOUS; SUBCUTANEOUS at 23:26

## 2024-03-17 RX ADMIN — OXYCODONE HYDROCHLORIDE 10 MG: 5 TABLET ORAL at 03:39

## 2024-03-17 RX ADMIN — POLYETHYLENE GLYCOL 3350 17 G: 17 POWDER, FOR SOLUTION ORAL at 08:05

## 2024-03-17 RX ADMIN — OXYCODONE HYDROCHLORIDE 10 MG: 5 TABLET ORAL at 10:02

## 2024-03-17 RX ADMIN — ASPIRIN 81 MG: 81 TABLET, COATED ORAL at 08:05

## 2024-03-17 RX ADMIN — CYCLOBENZAPRINE 5 MG: 10 TABLET, FILM COATED ORAL at 19:38

## 2024-03-17 RX ADMIN — OXYCODONE HYDROCHLORIDE 10 MG: 5 TABLET ORAL at 21:57

## 2024-03-17 RX ADMIN — HYDROMORPHONE HYDROCHLORIDE 0.2 MG: 1 INJECTION, SOLUTION INTRAMUSCULAR; INTRAVENOUS; SUBCUTANEOUS at 02:38

## 2024-03-17 RX ADMIN — PANTOPRAZOLE SODIUM 40 MG: 40 TABLET, DELAYED RELEASE ORAL at 06:17

## 2024-03-17 RX ADMIN — OXYCODONE HYDROCHLORIDE 10 MG: 5 TABLET ORAL at 15:50

## 2024-03-17 RX ADMIN — HYDROMORPHONE HYDROCHLORIDE 0.2 MG: 1 INJECTION, SOLUTION INTRAMUSCULAR; INTRAVENOUS; SUBCUTANEOUS at 11:08

## 2024-03-17 RX ADMIN — ASPIRIN 81 MG: 81 TABLET, COATED ORAL at 21:41

## 2024-03-17 RX ADMIN — BACLOFEN 5 MG: 10 TABLET ORAL at 15:59

## 2024-03-17 RX ADMIN — DOCUSATE SODIUM 100 MG: 100 CAPSULE, LIQUID FILLED ORAL at 08:06

## 2024-03-17 RX ADMIN — HYDROMORPHONE HYDROCHLORIDE 0.2 MG: 1 INJECTION, SOLUTION INTRAMUSCULAR; INTRAVENOUS; SUBCUTANEOUS at 06:16

## 2024-03-17 ASSESSMENT — PAIN SCALES - GENERAL
PAINLEVEL_OUTOF10: 8
PAINLEVEL_OUTOF10: 9
PAINLEVEL_OUTOF10: 7
PAINLEVEL_OUTOF10: 9
PAINLEVEL_OUTOF10: 6
PAINLEVEL_OUTOF10: 9
PAINLEVEL_OUTOF10: 7
PAINLEVEL_OUTOF10: 6
PAINLEVEL_OUTOF10: 7
PAINLEVEL_OUTOF10: 9
PAINLEVEL_OUTOF10: 8
PAINLEVEL_OUTOF10: 8
PAINLEVEL_OUTOF10: 9
PAINLEVEL_OUTOF10: 7
PAINLEVEL_OUTOF10: 6
PAINLEVEL_OUTOF10: 9
PAINLEVEL_OUTOF10: 7

## 2024-03-17 ASSESSMENT — PAIN DESCRIPTION - LOCATION
LOCATION: KNEE

## 2024-03-17 ASSESSMENT — COGNITIVE AND FUNCTIONAL STATUS - GENERAL
MOVING FROM LYING ON BACK TO SITTING ON SIDE OF FLAT BED WITH BEDRAILS: A LITTLE
STANDING UP FROM CHAIR USING ARMS: A LITTLE
MOVING TO AND FROM BED TO CHAIR: A LITTLE
WALKING IN HOSPITAL ROOM: A LITTLE
TURNING FROM BACK TO SIDE WHILE IN FLAT BAD: A LITTLE
MOBILITY SCORE: 16
CLIMB 3 TO 5 STEPS WITH RAILING: TOTAL

## 2024-03-17 ASSESSMENT — PAIN DESCRIPTION - ORIENTATION
ORIENTATION: RIGHT

## 2024-03-17 NOTE — CARE PLAN
Problem: Pain  Goal: My pain/discomfort is manageable  Outcome: Progressing     Problem: Safety  Goal: Patient will be injury free during hospitalization  Outcome: Progressing  Goal: I will remain free of falls  Outcome: Progressing     Problem: Daily Care  Goal: Daily care needs are met  Outcome: Progressing     Problem: Psychosocial Needs  Goal: Demonstrates ability to cope with hospitalization/illness  Outcome: Progressing  Goal: Collaborate with me, my family, and caregiver to identify my specific goals  Outcome: Progressing     Problem: Discharge Barriers  Goal: My discharge needs are met  Outcome: Progressing     Problem: Pain  Goal: Takes deep breaths with improved pain control throughout the shift  Outcome: Progressing  Goal: Turns in bed with improved pain control throughout the shift  Outcome: Progressing  Goal: Walks with improved pain control throughout the shift  Outcome: Progressing  Goal: Performs ADL's with improved pain control throughout shift  Outcome: Progressing  Goal: Participates in PT with improved pain control throughout the shift  Outcome: Progressing  Goal: Free from opioid side effects throughout the shift  Outcome: Progressing  Goal: Free from acute confusion related to pain meds throughout the shift  Outcome: Progressing     Problem: Skin  Goal: Participates in plan/prevention/treatment measures  Outcome: Progressing  Goal: Prevent/manage excess moisture  Outcome: Progressing  Goal: Prevent/minimize sheer/friction injuries  Outcome: Progressing  Goal: Promote/optimize nutrition  Outcome: Progressing  Goal: Promote skin healing  Outcome: Progressing     Problem: Pain - Adult  Goal: Verbalizes/displays adequate comfort level or baseline comfort level  Outcome: Progressing     Problem: Safety - Adult  Goal: Free from fall injury  Outcome: Progressing     Problem: Discharge Planning  Goal: Discharge to home or other facility with appropriate resources  Outcome: Progressing     Problem:  Chronic Conditions and Co-morbidities  Goal: Patient's chronic conditions and co-morbidity symptoms are monitored and maintained or improved  Outcome: Progressing     Problem: Fall/Injury  Goal: Not fall by end of shift  Outcome: Progressing  Goal: Be free from injury by end of the shift  Outcome: Progressing  Goal: Verbalize understanding of personal risk factors for fall in the hospital  Outcome: Progressing  Goal: Verbalize understanding of risk factor reduction measures to prevent injury from fall in the home  Outcome: Progressing  Goal: Use assistive devices by end of the shift  Outcome: Progressing  Goal: Pace activities to prevent fatigue by end of the shift  Outcome: Progressing   The patient's goals for the shift include to avoid nausea    The clinical goals for the shift include Patient's pain remains at tolerable level. Patient compliant with DVT prophylaxis. Patient remains safe and free from falls.

## 2024-03-17 NOTE — CARE PLAN
The patient's goals for the shift include to avoid nausea    The clinical goals for the shift include Pain controlled to tolerable level. Patient compliant with DVT prophylaxis. Patient remains safe and free from falls.      Problem: Pain  Goal: My pain/discomfort is manageable  Outcome: Progressing     Problem: Safety  Goal: Patient will be injury free during hospitalization  Outcome: Progressing     Problem: Safety  Goal: I will remain free of falls  Outcome: Progressing     Problem: Psychosocial Needs  Goal: Demonstrates ability to cope with hospitalization/illness  Outcome: Progressing     Problem: Pain  Goal: Takes deep breaths with improved pain control throughout the shift  Outcome: Progressing

## 2024-03-17 NOTE — PROGRESS NOTES
Physical Therapy    Physical Therapy Treatment    Patient Name: Adriana Wodo  MRN: [id number]  Today's Date: 3/17/2024  Time Calculation  Start Time: 1100  Stop Time: 1130  Time Calculation (min): 30 min       Assessment/Plan   PT Assessment  End of Session Communication: Bedside nurse  Assessment Comment: Pt continues to remain appropriate for Moderate intensity PT upon D/C from hospital.  End of Session Patient Position: Up in chair  PT Plan  Inpatient/Swing Bed or Outpatient: Inpatient  PT Plan  Treatment/Interventions: Bed mobility, Transfer training, Gait training, Stair training, Balance training, Neuromuscular re-education, Strengthening, Endurance training, Range of motion, Therapeutic exercise, Therapeutic activity, Home exercise program, Orthotic fitting/training, Positioning, Postural re-education  PT Plan: Skilled PT  PT Frequency: Daily  PT Discharge Recommendations: Moderate intensity level of continued care  Equipment Recommended upon Discharge:  (Pt owns walker, cane, and wheelchair)  PT Recommended Transfer Status: Assist x2, Assistive device  PT - OK to Discharge: Yes (When medically ready.)      General Visit Information:   PT  Visit  PT Received On: 03/17/24  General  Missed Visit: No  Missed Visit Reason:  (n/a)  Family/Caregiver Present: No  Prior to Session Communication: Bedside nurse  Patient Position Received: Bed, 3 rail up, Alarm on  General Comment: Pt supine in bed on arrival, agreeable to work with PT.    Subjective   Precautions:  Precautions  LE Weight Bearing Status: Weight Bearing as Tolerated (RLE, HKB locked in 90, ROMAT 0-90 to protect terminal flexion)  Medical Precautions: Fall precautions  Vital Signs:       Objective   Pain:  Pain Assessment  Pain Assessment: 0-10  Pain Score: 9  Pain Location: Knee  Pain Orientation: Right  Cognition:  Cognition  Overall Cognitive Status: Within Functional Limits    Activity Tolerance:  Activity Tolerance  Endurance: Tolerates 10 - 20  min exercise with multiple rests  Treatments:  Bed Mobility  Bed Mobility: Yes  Bed Mobility 1  Bed Mobility 1: Supine to sitting  Level of Assistance 1: Minimum assistance, Minimal verbal cues  Bed Mobility Comments 1: assist with RLE, pt required increased time and effort to advance to EOB.    Ambulation/Gait Training  Ambulation/Gait Training Performed: Yes  Ambulation/Gait Training 1  Surface 1: Level tile  Device 1: Standard walker  Assistance 1: Minimum assistance, Minimal verbal cues  Quality of Gait 1: Wide base of support, Diminished heel strike, Inconsistent stride length, Decreased step length, Shuffling gait, Forward flexed posture (Decreased yandel,)  Comments/Distance (ft) 1: x10ft, x3ft, cues for safe sequencing and FWW placement. Pt with increased fatigue towards end of ambulation, called in CTA for increased safety at end of session.    Transfers  Transfer: Yes  Transfer 1  Transfer From 1: Bed to  Transfer to 1: Stand  Technique 1: Sit to stand  Transfer Device 1: Walker  Transfer Level of Assistance 1: Minimum assistance, Minimal verbal cues  Trials/Comments 1: Cues for safe hand placement and safety, bed height elevated  Transfers 2  Transfer From 2: Stand to  Transfer to 2: Toilet  Technique 2: Stand to sit  Transfer Device 2: Walker (grabbar)  Transfer Level of Assistance 2: Minimum assistance, Minimal verbal cues  Trials/Comments 2: Cues for safe sequencing, cues to kick out RLE before descending to seated position.  Transfers 3  Transfer From 3: Toilet to  Transfer to 3: Stand  Technique 3: Sit to stand  Transfer Device 3: Walker (grab bars)  Transfer Level of Assistance 3: Minimum assistance, Minimal verbal cues  Trials/Comments 3: Cues for hand placement  Transfers 4  Transfer From 4: Stand to  Transfer to 4: Wheelchair  Technique 4: Stand to sit  Transfer Device 4: Walker  Transfer Level of Assistance 4: Minimum assistance, Minimal verbal cues  Trials/Comments 4: Cues to kick out RLE  prior to transitioning to seated position.    Stairs  Stairs: No    Outcome Measures:  [facility] Basic Mobility  Turning from your back to your side while in a flat bed without using bedrails: A little  Moving from lying on your back to sitting on the side of a flat bed without using bedrails: A little  Moving to and from bed to chair (including a wheelchair): A little  Standing up from a chair using your arms (e.g. wheelchair or bedside chair): A little  To walk in hospital room: A little  Climbing 3-5 steps with railing: Total  Basic Mobility - Total Score: 16    Education Documentation  Precautions, taught by Erin Tello PTA at 3/17/2024 12:27 PM.  Learner: Patient  Readiness: Acceptance  Method: Explanation  Response: Verbalizes Understanding    Body Mechanics, taught by Erin Tello PTA at 3/17/2024 12:27 PM.  Learner: Patient  Readiness: Acceptance  Method: Explanation  Response: Verbalizes Understanding    Mobility Training, taught by Erin Tello PTA at 3/17/2024 12:27 PM.  Learner: Patient  Readiness: Acceptance  Method: Explanation  Response: Verbalizes Understanding    Education Comments  No comments found.        OP EDUCATION:       Encounter Problems       Encounter Problems (Active)       PT Problem       Patient will stand with UE support of LRD and </= CGA at least 3 min to improve balance required for self-care tasks.  (Progressing)       Start:  03/13/24    Expected End:  03/29/24            Patient will perform sit to stand and stand to sit transfers with </= CGA and LRD to increase functional strength.  (Progressing)       Start:  03/13/24    Expected End:  03/29/24            Patient will ambulate at least 15 ft. with </= MIN A x1 and LRD to improve tolerance of household distances.  (Progressing)       Start:  03/13/24    Expected End:  03/29/24            Patient will perform home exercise program as prescribed with cues as needed.   (Progressing)       Start:  03/13/24     Expected End:  03/29/24            Patient will perform bed mobility with </= close sup to reduce risk of developing decubitus ulcers.  (Progressing)       Start:  03/13/24    Expected End:  03/29/24               Pain - Adult            Erin Tello PTA  Rehab Office [phone]

## 2024-03-17 NOTE — PROGRESS NOTES
"Orthopaedic Surgery Progress Note    S:  NAEON. Doing well, pain controlled. Denies CP, SOB, N, V. HKB in place. Working with PT.    O:  /64 (BP Location: Right arm, Patient Position: Lying)   Pulse 63   Temp 36 °C (96.8 °F) (Temporal)   Resp 18   Ht 1.778 m (5' 10\")   Wt 80 kg (176 lb 5.9 oz)   SpO2 94%   BMI 25.31 kg/m²     Gen: arousable, NAD, appropriately conversational  Cardiac: RRR to peripheral palpation  Resp: nonlabored on RA  GI: soft, nondistended    MSK:  Right Lower Extremity:   -Dressing c/d/I, HKB in place  - Provena holding suction  -Unable to obtain M/S exam s/t spinal  -Foot warm, well perfused  -Palpable DP pulse, brisk cap refill      Labs:  Results for orders placed or performed during the hospital encounter of 03/13/24 (from the past 24 hour(s))   CBC   Result Value Ref Range    WBC 9.8 4.4 - 11.3 x10*3/uL    nRBC 0.0 0.0 - 0.0 /100 WBCs    RBC 3.68 (L) 4.00 - 5.20 x10*6/uL    Hemoglobin 10.8 (L) 12.0 - 16.0 g/dL    Hematocrit 33.1 (L) 36.0 - 46.0 %    MCV 90 80 - 100 fL    MCH 29.3 26.0 - 34.0 pg    MCHC 32.6 32.0 - 36.0 g/dL    RDW 12.8 11.5 - 14.5 %    Platelets 222 150 - 450 x10*3/uL   Basic metabolic panel   Result Value Ref Range    Glucose 86 74 - 99 mg/dL    Sodium 137 136 - 145 mmol/L    Potassium 4.1 3.5 - 5.3 mmol/L    Chloride 102 98 - 107 mmol/L    Bicarbonate 30 21 - 32 mmol/L    Anion Gap 9 (L) 10 - 20 mmol/L    Urea Nitrogen 8 6 - 23 mg/dL    Creatinine 0.43 (L) 0.50 - 1.05 mg/dL    eGFR >90 >60 mL/min/1.73m*2    Calcium 8.6 8.6 - 10.6 mg/dL         A/P: 68 y.o. female s/p R TKA w/ quad snip on 3/13/24 with Dr. Whelan. Stable in PACU.       Plan:  - Weight bearing: WBAT RLE in HKB, locked at 90 degrees, ROMAT 0-90 to protect terminal flexion  - DVT ppx: SCDs, ASA 81mg BID (pantoprazole for GI protection)  - Diet: Regular  - Pain: Tylenol, oxycodone 5/10, dilaudid breakthrough  - Antibiotics: perioperative ancef 2g q8hr x3 doses  - FEN: HLIV with good PO intake  - " Bowel Regimen: Colace, senna, dulcolax  - PT/OT  - Pulm: Encourage IS  - Continue home medications  - No damon    Dispo: Pending another midnight for SNF tomorrow, accepted at a facility    Keshawn Lebron MD  Orthopaedic Surgery, PGY-2  Available by Epic Chat  03/17/24  9:14 AM    This patient will be followed by Ortho Trauma team (All chat preferred):     1st call: Martin Russell, PGY-1  2nd call: Keshawn Lebron PGY-2  3rd call: Lucio Harper, PGY-3

## 2024-03-18 ENCOUNTER — APPOINTMENT (OUTPATIENT)
Dept: CARDIOLOGY | Facility: HOSPITAL | Age: 69
DRG: 470 | End: 2024-03-18
Payer: MEDICARE

## 2024-03-18 VITALS
BODY MASS INDEX: 25.25 KG/M2 | HEART RATE: 86 BPM | RESPIRATION RATE: 18 BRPM | DIASTOLIC BLOOD PRESSURE: 76 MMHG | HEIGHT: 70 IN | WEIGHT: 176.37 LBS | SYSTOLIC BLOOD PRESSURE: 145 MMHG | TEMPERATURE: 98.6 F | OXYGEN SATURATION: 100 %

## 2024-03-18 PROCEDURE — 2500000001 HC RX 250 WO HCPCS SELF ADMINISTERED DRUGS (ALT 637 FOR MEDICARE OP)

## 2024-03-18 PROCEDURE — 97530 THERAPEUTIC ACTIVITIES: CPT | Mod: GO

## 2024-03-18 PROCEDURE — 97110 THERAPEUTIC EXERCISES: CPT | Mod: GP,CQ

## 2024-03-18 PROCEDURE — 93005 ELECTROCARDIOGRAM TRACING: CPT

## 2024-03-18 PROCEDURE — 97535 SELF CARE MNGMENT TRAINING: CPT | Mod: GO

## 2024-03-18 RX ADMIN — OXYCODONE HYDROCHLORIDE 10 MG: 5 TABLET ORAL at 10:48

## 2024-03-18 RX ADMIN — ASPIRIN 81 MG: 81 TABLET, COATED ORAL at 08:56

## 2024-03-18 RX ADMIN — DOCUSATE SODIUM 100 MG: 100 CAPSULE, LIQUID FILLED ORAL at 08:56

## 2024-03-18 RX ADMIN — OXYCODONE HYDROCHLORIDE 10 MG: 5 TABLET ORAL at 04:44

## 2024-03-18 RX ADMIN — CYCLOBENZAPRINE 5 MG: 10 TABLET, FILM COATED ORAL at 04:44

## 2024-03-18 ASSESSMENT — COGNITIVE AND FUNCTIONAL STATUS - GENERAL
PERSONAL GROOMING: A LITTLE
DRESSING REGULAR UPPER BODY CLOTHING: A LITTLE
HELP NEEDED FOR BATHING: A LOT
MOVING TO AND FROM BED TO CHAIR: A LITTLE
CLIMB 3 TO 5 STEPS WITH RAILING: TOTAL
MOBILITY SCORE: 16
WALKING IN HOSPITAL ROOM: A LITTLE
TURNING FROM BACK TO SIDE WHILE IN FLAT BAD: A LITTLE
MOVING FROM LYING ON BACK TO SITTING ON SIDE OF FLAT BED WITH BEDRAILS: A LITTLE
TOILETING: A LITTLE
DAILY ACTIVITIY SCORE: 17
STANDING UP FROM CHAIR USING ARMS: A LITTLE
DRESSING REGULAR LOWER BODY CLOTHING: A LOT

## 2024-03-18 ASSESSMENT — PAIN SCALES - GENERAL
PAINLEVEL_OUTOF10: 7
PAINLEVEL_OUTOF10: 8
PAINLEVEL_OUTOF10: 8
PAINLEVEL_OUTOF10: 6
PAINLEVEL_OUTOF10: 8

## 2024-03-18 ASSESSMENT — PAIN DESCRIPTION - ORIENTATION: ORIENTATION: RIGHT

## 2024-03-18 ASSESSMENT — PAIN - FUNCTIONAL ASSESSMENT
PAIN_FUNCTIONAL_ASSESSMENT: 0-10

## 2024-03-18 ASSESSMENT — PAIN DESCRIPTION - LOCATION: LOCATION: KNEE

## 2024-03-18 ASSESSMENT — ACTIVITIES OF DAILY LIVING (ADL): HOME_MANAGEMENT_TIME_ENTRY: 16

## 2024-03-18 NOTE — PROGRESS NOTES
Physical Therapy    Physical Therapy Treatment    Patient Name: Adriana Wood  MRN: [id number]  Today's Date: 3/18/2024  Time Calculation  Start Time: 1017  Stop Time: 1027  Time Calculation (min): 10 min       Assessment/Plan   PT Assessment  Evaluation/Treatment Tolerance: Patient limited by pain  Assessment Comment: Pt continues to remain appropriate for Moderate intensity PT upon D/C from hospital.  End of Session Patient Position: Bed, 3 rail up, Alarm off, not on at start of session  PT Plan  Inpatient/Swing Bed or Outpatient: Inpatient  PT Plan  Treatment/Interventions: Bed mobility, Transfer training, Gait training, Stair training, Balance training, Neuromuscular re-education, Strengthening, Endurance training, Range of motion, Therapeutic exercise, Therapeutic activity, Home exercise program, Orthotic fitting/training, Positioning, Postural re-education  PT Plan: Skilled PT  PT Frequency: Daily  PT Discharge Recommendations: Moderate intensity level of continued care  Equipment Recommended upon Discharge:  (Pt owns walker, cane, and wheelchair)  PT Recommended Transfer Status: Assist x2, Assistive device  PT - OK to Discharge: Yes (When medically ready.)      General Visit Information:   PT  Visit  PT Received On: 03/18/24  General  Reason for Referral: Severe posttraumatic arthritis of R knee s/p R TKA w/ quad snip  Past Medical History Relevant to Rehab: severe right tibial plateau fracture and developed infection which required removal of hardware  Family/Caregiver Present: Yes  Caregiver Feedback: Pt's spouse present and supportive.  Prior to Session Communication: Bedside nurse  Patient Position Received: Bed, 3 rail up, Alarm off, not on at start of session  General Comment: First attempt, pt. unwilling due to needing to rest- asked pt. when to return- Pt. asked if this therapist could go see another pt. and return - Returned after seeing pt. as Pt. requested.  in room.    Subjective    Precautions:  Precautions  LE Weight Bearing Status: Weight Bearing as Tolerated  Precautions Comment: wound vac  Vital Signs:       Objective   Pain:  Pain Assessment  Pain Assessment: 0-10  Pain Score: 8  Pain Location: Knee  Pain Orientation: Right  Pain Interventions: Repositioned (Not yet due for pain meds - notified RN.)  Cognition:     Postural Control:     Extremity/Trunk Assessments:                      Activity Tolerance:     Treatments:  Therapeutic Exercise  Therapeutic Exercise Performed:  (Began ther ex after setting up room and introing self to  - Pt. performed AP'S, AND QS X 15 REPS. Pt. appearing to be in pain therefore frequently asked pt. if she needed to stop however pt. did not respond either way. Began  SAQ's however)  Therapeutic Exercise Activity 1: Pt. continuing to appear in intolerable pain therefore this therapist made the decision that pt. is unable at this time. Alerted RN.    Outcome Measures:  [facility] Basic Mobility  Turning from your back to your side while in a flat bed without using bedrails: A little  Moving from lying on your back to sitting on the side of a flat bed without using bedrails: A little  Moving to and from bed to chair (including a wheelchair): A little  Standing up from a chair using your arms (e.g. wheelchair or bedside chair): A little  To walk in hospital room: A little  Climbing 3-5 steps with railing: Total  Basic Mobility - Total Score: 16    Education Documentation  Home Exercise Program, taught by Ciera Jarvis PTA at 3/18/2024 10:45 AM.  Learner: Patient  Readiness: Acceptance  Method: Demonstration, Explanation  Response: Needs Reinforcement    Education Comments  No comments found.        OP EDUCATION:       Encounter Problems       Encounter Problems (Active)       PT Problem       Patient will stand with UE support of LRD and </= CGA at least 3 min to improve balance required for self-care tasks.  (Progressing)       Start:  03/13/24     Expected End:  03/29/24            Patient will perform sit to stand and stand to sit transfers with </= CGA and LRD to increase functional strength.  (Progressing)       Start:  03/13/24    Expected End:  03/29/24            Patient will ambulate at least 15 ft. with </= MIN A x1 and LRD to improve tolerance of household distances.  (Progressing)       Start:  03/13/24    Expected End:  03/29/24            Patient will perform home exercise program as prescribed with cues as needed.   (Progressing)       Start:  03/13/24    Expected End:  03/29/24            Patient will perform bed mobility with </= close sup to reduce risk of developing decubitus ulcers.  (Progressing)       Start:  03/13/24    Expected End:  03/29/24               Pain - Adult

## 2024-03-18 NOTE — CARE PLAN
Problem: Pain  Goal: My pain/discomfort is manageable  Outcome: Adequate for Discharge     Problem: Safety  Goal: Patient will be injury free during hospitalization  Outcome: Adequate for Discharge  Goal: I will remain free of falls  Outcome: Adequate for Discharge     Problem: Daily Care  Goal: Daily care needs are met  Outcome: Adequate for Discharge     Problem: Psychosocial Needs  Goal: Demonstrates ability to cope with hospitalization/illness  Outcome: Adequate for Discharge  Goal: Collaborate with me, my family, and caregiver to identify my specific goals  Outcome: Adequate for Discharge     Problem: Discharge Barriers  Goal: My discharge needs are met  Outcome: Adequate for Discharge     Problem: Pain  Goal: Takes deep breaths with improved pain control throughout the shift  Outcome: Adequate for Discharge  Goal: Turns in bed with improved pain control throughout the shift  Outcome: Adequate for Discharge  Goal: Walks with improved pain control throughout the shift  Outcome: Adequate for Discharge  Goal: Performs ADL's with improved pain control throughout shift  Outcome: Adequate for Discharge  Goal: Participates in PT with improved pain control throughout the shift  Outcome: Adequate for Discharge  Goal: Free from opioid side effects throughout the shift  Outcome: Adequate for Discharge  Goal: Free from acute confusion related to pain meds throughout the shift  Outcome: Adequate for Discharge     Problem: Skin  Goal: Decreased wound size/increased tissue granulation at next dressing change  Outcome: Adequate for Discharge  Goal: Participates in plan/prevention/treatment measures  Outcome: Adequate for Discharge  Goal: Prevent/manage excess moisture  Outcome: Adequate for Discharge  Goal: Prevent/minimize sheer/friction injuries  Outcome: Adequate for Discharge  Goal: Promote/optimize nutrition  Outcome: Adequate for Discharge  Goal: Promote skin healing  Outcome: Adequate for Discharge     Problem: Pain -  Adult  Goal: Verbalizes/displays adequate comfort level or baseline comfort level  Outcome: Adequate for Discharge     Problem: Safety - Adult  Goal: Free from fall injury  Outcome: Adequate for Discharge     Problem: Discharge Planning  Goal: Discharge to home or other facility with appropriate resources  Outcome: Adequate for Discharge     Problem: Chronic Conditions and Co-morbidities  Goal: Patient's chronic conditions and co-morbidity symptoms are monitored and maintained or improved  Outcome: Adequate for Discharge     Problem: Fall/Injury  Goal: Not fall by end of shift  Outcome: Adequate for Discharge  Goal: Be free from injury by end of the shift  Outcome: Adequate for Discharge  Goal: Verbalize understanding of personal risk factors for fall in the hospital  Outcome: Adequate for Discharge  Goal: Verbalize understanding of risk factor reduction measures to prevent injury from fall in the home  Outcome: Adequate for Discharge  Goal: Use assistive devices by end of the shift  Outcome: Adequate for Discharge  Goal: Pace activities to prevent fatigue by end of the shift  Outcome: Adequate for Discharge

## 2024-03-18 NOTE — PROGRESS NOTES
Occupational Therapy    OT Treatment    Patient Name: Adriana Wood  MRN: [id number]  Today's Date: 3/18/2024  Time Calculation  Start Time: 0803  Stop Time: 0834  Time Calculation (min): 31 min         Assessment:  Prognosis: Good  Barriers to Discharge: None  Evaluation/Treatment Tolerance: Patient limited by pain  Medical Staff Made Aware: Yes  End of Session Communication: Bedside nurse  End of Session Patient Position: Bed, 3 rail up, Alarm off, not on at start of session  OT Assessment Results: Decreased ADL status, Decreased functional mobility  Prognosis: Good  Barriers to Discharge: None  Evaluation/Treatment Tolerance: Patient limited by pain  Medical Staff Made Aware: Yes  Strengths: Attitude of self, Support of Caregivers  Plan:  Treatment Interventions: ADL retraining, Functional transfer training  OT Frequency: 3 times per week  OT Discharge Recommendations: Moderate intensity level of continued care  Equipment Recommended upon Discharge:  (TBD)  OT Recommended Transfer Status: Assist of 1  OT - OK to Discharge: Yes  Treatment Interventions: ADL retraining, Functional transfer training    Subjective   Previous Visit Info:  OT Last Visit  OT Received On: 03/18/24  General:  General  Reason for Referral: Severe posttraumatic arthritis of R knee s/p R TKA w/ quad snip  Past Medical History Relevant to Rehab: severe right tibial plateau fracture and developed infection which required removal of hardware  Family/Caregiver Present: No  Prior to Session Communication: Bedside nurse  Patient Position Received: Bed, 3 rail up, Alarm off, not on at start of session  Preferred Learning Style: verbal, visual  General Comment: Pt pleasant and agreeable to therapy  Precautions:  LE Weight Bearing Status: Weight Bearing as Tolerated (LE, HKB locked in 90, ROMAT 0-90 to protect terminal flexion)  Medical Precautions: Fall precautions  Precautions Comment: wound vac    Pain:  Pain Assessment  Pain Assessment:  0-10  Pain Score: 8  Pain Type: Acute pain, Surgical pain  Pain Location: Knee  Pain Orientation: Right  Pain Interventions: Repositioned    Objective    Cognition:  Cognition  Overall Cognitive Status: Within Functional Limits  Orientation Level: Oriented X4  Insight: Mild    Activities of Daily Living:      Grooming  Grooming Level of Assistance: Setup  Grooming Where Assessed: Chair  Grooming Comments: Seated facial care in chair following set-up  UE Bathing  UE Bathing Level of Assistance: Minimum assistance  UE Bathing Where Assessed:  (chair level)  UE Bathing Comments: Set-up to complete task, minimal assist due to functional reach restrictions  UE Dressing  UE Dressing Level of Assistance: Setup  UE Dressing Where Assessed: Chair  Toileting  Toileting Level of Assistance: Minimum assistance  Where Assessed: Bedside commode  Toileting Comments: Verbal cueing for mechanics for safety, esepcially for portions of task with standing elements, to maintain 1 UE on walker for balance  Functional Standing Tolerance:     Bed Mobility/Transfers: Bed Mobility  Bed Mobility: Yes  Bed Mobility 1  Bed Mobility 1: Supine to sitting, Sitting to supine  Level of Assistance 1: Minimum assistance  Bed Mobility Comments 1: Verbal cueing for mechanics, assistance required primarily for RLE    Transfers  Transfer: Yes  Transfer 1  Transfer From 1: Bed to  Transfer to 1: Stand  Technique 1: Sit to stand  Transfer Device 1: Walker  Transfer Level of Assistance 1: Moderate assistance  Trials/Comments 1: Verbal cueing for mechanics, increased physical assist to safely weightshift to stand  Transfers 2  Transfer From 2: Stand to  Transfer to 2: Commode-standard  Technique 2: Sit to stand, Stand to sit  Transfer Device 2: Walker  Transfer Level of Assistance 2: Minimum assistance  Trials/Comments 2: Verbal cueing for hand placement as well as RLE for controlled transitions  Transfers 3  Transfer From 3: Stand to  Transfer to 3:  Bed  Technique 3: Stand to sit  Transfer Device 3: Walker  Transfer Level of Assistance 3: Minimum assistance    Outcome Measures:[facility] Daily Activity  Putting on and taking off regular lower body clothing: A lot  Bathing (including washing, rinsing, drying): A lot  Putting on and taking off regular upper body clothing: A little  Toileting, which includes using toilet, bedpan or urinal: A little  Taking care of personal grooming such as brushing teeth: A little  Eating Meals: None  Daily Activity - Total Score: 17    Education Documentation  Precautions, taught by Rhea Carolina OT at 3/18/2024  9:09 AM.  Learner: Patient  Readiness: Acceptance  Method: Explanation, Demonstration  Response: Verbalizes Understanding, Demonstrated Understanding, Needs Reinforcement    ADL Training, taught by Rhea Carolina OT at 3/18/2024  9:09 AM.  Learner: Patient  Readiness: Acceptance  Method: Explanation, Demonstration  Response: Verbalizes Understanding, Demonstrated Understanding, Needs Reinforcement    EDUCATION:  Education  Individual(s) Educated: Patient  Education Provided: Diagnosis & Precautions, Fall precautons, Risk and benefits of OT discussed with patient or other, POC discussed and agreed upon  Patient Response to Education: Patient/Caregiver Verbalized Understanding of Information    Goals:  Encounter Problems       Encounter Problems (Active)       ADLs       Patient will perform UB and LB bathing  with modified independent level of assistance and grab bars. (Progressing)       Start:  03/14/24    Expected End:  04/04/24            Patient with complete lower body dressing with modified independent level of assistance donning and doffing all LE clothes  with PRN adaptive equipment while edge of bed  (Progressing)       Start:  03/14/24    Expected End:  04/04/24            Patient will complete toileting including hygiene clothing management/hygiene with modified independent level of assistance and bedside  minor. (Progressing)       Start:  03/14/24    Expected End:  04/04/24               BALANCE       Pt will maintain dynamic standing balance during ADL task with modified independent level of assistance in order to demonstrate decreased risk of falling and improved postural control. (Progressing)       Start:  03/14/24    Expected End:  04/04/24               MOBILITY       Patient will perform Functional mobility min Household distances/Community Distances with modified independent level of assistance and least restrictive device in order to improve safety and functional mobility. (Progressing)       Start:  03/14/24    Expected End:  04/04/24               TRANSFERS       Patient will perform bed mobility modified independent level of assistance and bed rails in order to improve safety and independence with mobility (Progressing)       Start:  03/14/24    Expected End:  04/04/24            Patient will complete sit to stand transfer with modified independent level of assistance and least restrictive device in order to improve safety and prepare for out of bed mobility. (Progressing)       Start:  03/14/24    Expected End:  04/04/24

## 2024-03-18 NOTE — PROGRESS NOTES
Patient is medically cleared for discharge she will be discharged to [facility] via private transportation. I have spoken with Adriana at the bedside to let her know that she is accepted and that she will be discharged today. I have also alerted the bedside RN Jac and the . Patient is ready for discharge.

## 2024-03-18 NOTE — DISCHARGE SUMMARY
Orthopaedic Surgery Discharge Summary  Date of discharge: 3/18/24  Discharge Diagnosis  Arthritis of right knee    Issues Requiring Follow-Up  Routine Postoperative Followup    Test Results Pending At Discharge  Pending Labs       No current pending labs.            Hospital Course  68 year-old female who presented with R knee post-traumatic arthritis. Patient is now s/p R TKA on 3/13 with Dr. Whelan. On the day of surgery, patient was identified in the pre-operative holding area and agreeable to proceed with surgery. Written consent was obtained.  Please see operative note for further details of this procedure. Patient received 24 hours of zaheer-operative antibiotics. Patient recovered in the PACU before transfer to a regular nursing floor. Patient was started on oxycodone and tylenol for pain control and ASA 81 mg bid for DVT prophylaxis. She was kept in a hinged knee brace with flexion limited to 90 degrees due to the need of a quad snip in the OR. Physical therapy recommended continued recovery at at [facility] with continued physical therapy and wound care. On the day of discharge, patient was afebrile with stable vital signs. Patient was neurovascularly intact at time of discharge. Patient was discharged with prescription of ASA for DVT prophylaxis for 4 weeks. Patient will follow-up with Dr. Whelan in 2 weeks for post-operative visit.      Home Medications     Medication List      START taking these medications     acetaminophen 325 mg tablet; Commonly known as: Tylenol; Take 2 tablets   (650 mg) by mouth every 6 hours if needed for mild pain (1 - 3).   aspirin 81 mg EC tablet; Take 1 tablet (81 mg) by mouth 2 times a day.   calcium carbonate-vitamin D3 500 mg-5 mcg (200 unit) tablet; Take 1   tablet by mouth once daily.   pantoprazole 40 mg EC tablet; Commonly known as: ProtoNix; Take 1 tablet   (40 mg) by mouth once daily in the morning. Take before meals. Do not   crush, chew, or split.   polyethylene glycol 17  gram packet; Commonly known as: Glycolax,   Miralax; Take 17 g by mouth once daily. Mix 1 cap (17g) into 8 ounces of   fluid.     CHANGE how you take these medications     docusate sodium 100 mg capsule; Commonly known as: Colace; Take 1   capsule (100 mg) by mouth 2 times a day.; What changed: when to take this,   reasons to take this   oxyCODONE 5 mg immediate release tablet; Commonly known as: Roxicodone;   Take 1 tablet (5 mg) by mouth every 6 hours if needed for severe pain (7 -   10) for up to 7 days.; What changed: how much to take, reasons to take   this, additional instructions     CONTINUE taking these medications     albuterol 90 mcg/actuation inhaler   baclofen 5 mg tablet; Commonly known as: Lioresal   naloxone 4 mg/0.1 mL nasal spray; Commonly known as: Narcan; Administer   1 spray (4 mg) into affected nostril(s) if needed for opioid reversal. May   repeat every 2-3 minutes if needed, alternating nostrils, until medical   assistance becomes available.     STOP taking these medications     chlorhexidine 0.12 % solution; Commonly known as: Peridex   chlorhexidine 4 % external liquid; Commonly known as: Hibiclens       Outpatient Follow-Up  Future Appointments   Date Time Provider Department Center   3/28/2024 10:20 AM Andrzej Whelan MD [id number] Maximiliano Lebron MD  Orthopaedic Surgery, PGY-2  Available by Epic Chat  03/18/24  3:11 PM

## 2024-03-18 NOTE — NURSING NOTE
AVS given to patient and her  for review. IV removed. Nursing report called to Gabriel at [facility].

## 2024-03-18 NOTE — CARE PLAN
The patient's goals for the shift include to avoid nausea    The clinical goals for the shift include Patient's pain remains at tolerable level. Patient compliant with DVT prophylaxis. Patient remains safe and free from falls.    Problem: Pain  Goal: My pain/discomfort is manageable  Outcome: Progressing     Problem: Safety  Goal: Patient will be injury free during hospitalization  Outcome: Progressing  Goal: I will remain free of falls  Outcome: Progressing     Problem: Daily Care  Goal: Daily care needs are met  Outcome: Progressing     Problem: Psychosocial Needs  Goal: Demonstrates ability to cope with hospitalization/illness  Outcome: Progressing  Goal: Collaborate with me, my family, and caregiver to identify my specific goals  Outcome: Progressing     Problem: Discharge Barriers  Goal: My discharge needs are met  Outcome: Progressing     Problem: Pain  Goal: Takes deep breaths with improved pain control throughout the shift  Outcome: Progressing  Goal: Turns in bed with improved pain control throughout the shift  Outcome: Progressing  Goal: Walks with improved pain control throughout the shift  Outcome: Progressing  Goal: Performs ADL's with improved pain control throughout shift  Outcome: Progressing  Goal: Participates in PT with improved pain control throughout the shift  Outcome: Progressing  Goal: Free from opioid side effects throughout the shift  Outcome: Progressing  Goal: Free from acute confusion related to pain meds throughout the shift  Outcome: Progressing     Problem: Skin  Goal: Decreased wound size/increased tissue granulation at next dressing change  Outcome: Progressing  Goal: Participates in plan/prevention/treatment measures  Outcome: Progressing  Goal: Prevent/manage excess moisture  Outcome: Progressing  Goal: Prevent/minimize sheer/friction injuries  Outcome: Progressing  Goal: Promote/optimize nutrition  Outcome: Progressing  Goal: Promote skin healing  Outcome: Progressing      Problem: Pain - Adult  Goal: Verbalizes/displays adequate comfort level or baseline comfort level  Outcome: Progressing     Problem: Safety - Adult  Goal: Free from fall injury  Outcome: Progressing     Problem: Discharge Planning  Goal: Discharge to home or other facility with appropriate resources  Outcome: Progressing     Problem: Chronic Conditions and Co-morbidities  Goal: Patient's chronic conditions and co-morbidity symptoms are monitored and maintained or improved  Outcome: Progressing     Problem: Fall/Injury  Goal: Not fall by end of shift  Outcome: Progressing  Goal: Be free from injury by end of the shift  Outcome: Progressing  Goal: Verbalize understanding of personal risk factors for fall in the hospital  Outcome: Progressing  Goal: Verbalize understanding of risk factor reduction measures to prevent injury from fall in the home  Outcome: Progressing  Goal: Use assistive devices by end of the shift  Outcome: Progressing  Goal: Pace activities to prevent fatigue by end of the shift  Outcome: Progressing

## 2024-03-18 NOTE — PROGRESS NOTES
"Orthopaedic Surgery Progress Note    S:  DIMPLE. Doing well, pain controlled, working with PT. Hillary CP, SOB, N, V. HKB in place.     O:  /64   Pulse 71   Temp 36.2 °C (97.2 °F)   Resp 16   Ht 1.778 m (5' 10\")   Wt 80 kg (176 lb 5.9 oz)   SpO2 90%   BMI 25.31 kg/m²     Gen: arousable, NAD, appropriately conversational  Cardiac: RRR to peripheral palpation  Resp: nonlabored on RA  GI: soft, nondistended    MSK:  Right Lower Extremity:   -Dressing c/d/I, HKB in place  - Provena holding suction  -Unable to obtain M/S exam s/t spinal  -Foot warm, well perfused  -Palpable DP pulse, brisk cap refill      Labs:  Results for orders placed or performed during the hospital encounter of 03/13/24 (from the past 24 hour(s))   CBC   Result Value Ref Range    WBC 9.8 4.4 - 11.3 x10*3/uL    nRBC 0.0 0.0 - 0.0 /100 WBCs    RBC 3.68 (L) 4.00 - 5.20 x10*6/uL    Hemoglobin 10.8 (L) 12.0 - 16.0 g/dL    Hematocrit 33.1 (L) 36.0 - 46.0 %    MCV 90 80 - 100 fL    MCH 29.3 26.0 - 34.0 pg    MCHC 32.6 32.0 - 36.0 g/dL    RDW 12.8 11.5 - 14.5 %    Platelets 222 150 - 450 x10*3/uL   Basic metabolic panel   Result Value Ref Range    Glucose 86 74 - 99 mg/dL    Sodium 137 136 - 145 mmol/L    Potassium 4.1 3.5 - 5.3 mmol/L    Chloride 102 98 - 107 mmol/L    Bicarbonate 30 21 - 32 mmol/L    Anion Gap 9 (L) 10 - 20 mmol/L    Urea Nitrogen 8 6 - 23 mg/dL    Creatinine 0.43 (L) 0.50 - 1.05 mg/dL    eGFR >90 >60 mL/min/1.73m*2    Calcium 8.6 8.6 - 10.6 mg/dL         A/P: 68 y.o. female s/p R TKA w/ quad snip on 3/13/24 with Dr. Whelan. Doing well on the floor.     Plan:  - Weight bearing: WBAT RLE in HKB, locked at 90 degrees, ROMAT 0-90 to protect terminal flexion  - DVT ppx: SCDs, ASA 81mg BID (pantoprazole for GI protection)  - Diet: Regular  - Pain: Tylenol, oxycodone 5/10, dilaudid breakthrough  - Antibiotics: perioperative ancef 2g q8hr x3 doses  - FEN: HLIV with good PO intake  - Bowel Regimen: Colace, senna, dulcolax  - PT/OT  - " Pulm: Encourage IS  - Continue home medications  - No damon    Dispo: SNF DC today    Keshawn Lebron MD  Orthopaedic Surgery, PGY-2  Available by Epic Chat  03/18/24  5:49 AM    This patient will be followed by Ortho Trauma team (All chat preferred):     1st call: Martin Russell, PGY-1  2nd call: Keshawn Lebron, PGY-2  3rd call: Lucio Harper, PGY-3

## 2024-03-20 ENCOUNTER — NURSING HOME VISIT (OUTPATIENT)
Dept: POST ACUTE CARE | Facility: EXTERNAL LOCATION | Age: 69
End: 2024-03-20
Payer: MEDICARE

## 2024-03-20 DIAGNOSIS — J98.01 BRONCHOSPASM: ICD-10-CM

## 2024-03-20 DIAGNOSIS — E56.9 VITAMIN DEFICIENCY: ICD-10-CM

## 2024-03-20 DIAGNOSIS — Z29.89 NEED FOR PROPHYLAXIS AGAINST URINARY TRACT INFECTION: ICD-10-CM

## 2024-03-20 DIAGNOSIS — K59.00 CONSTIPATION, UNSPECIFIED CONSTIPATION TYPE: ICD-10-CM

## 2024-03-20 DIAGNOSIS — M17.31 POST-TRAUMATIC ARTHRITIS OF LOWER LEG, RIGHT: Primary | ICD-10-CM

## 2024-03-20 DIAGNOSIS — K21.9 GASTROESOPHAGEAL REFLUX DISEASE, UNSPECIFIED WHETHER ESOPHAGITIS PRESENT: ICD-10-CM

## 2024-03-20 PROCEDURE — 99310 SBSQ NF CARE HIGH MDM 45: CPT | Performed by: NURSE PRACTITIONER

## 2024-03-21 ENCOUNTER — NURSING HOME VISIT (OUTPATIENT)
Dept: POST ACUTE CARE | Facility: EXTERNAL LOCATION | Age: 69
End: 2024-03-21
Payer: MEDICARE

## 2024-03-21 DIAGNOSIS — I49.3 PVC (PREMATURE VENTRICULAR CONTRACTION): ICD-10-CM

## 2024-03-21 DIAGNOSIS — M17.31 POST-TRAUMATIC ARTHRITIS OF LOWER LEG, RIGHT: Primary | ICD-10-CM

## 2024-03-21 PROCEDURE — 99305 1ST NF CARE MODERATE MDM 35: CPT | Performed by: FAMILY MEDICINE
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
## 2024-03-22 LAB
ATRIAL RATE: 68 BPM
P AXIS: 60 DEGREES
P OFFSET: 181 MS
P ONSET: 119 MS
PR INTERVAL: 206 MS
Q ONSET: 222 MS
QRS COUNT: 11 BEATS
QRS DURATION: 84 MS
QT INTERVAL: 424 MS
QTC CALCULATION(BAZETT): 450 MS
QTC FREDERICIA: 442 MS
R AXIS: 26 DEGREES
T AXIS: 86 DEGREES
T OFFSET: 434 MS
VENTRICULAR RATE: 68 BPM

## 2024-03-27 ENCOUNTER — HOSPITAL ENCOUNTER (EMERGENCY)
Facility: HOSPITAL | Age: 69
Discharge: HOME | End: 2024-03-28
Attending: EMERGENCY MEDICINE
Payer: MEDICARE

## 2024-03-27 ENCOUNTER — APPOINTMENT (OUTPATIENT)
Dept: RADIOLOGY | Facility: HOSPITAL | Age: 69
End: 2024-03-27
Payer: MEDICARE

## 2024-03-27 ENCOUNTER — NURSING HOME VISIT (OUTPATIENT)
Dept: POST ACUTE CARE | Facility: EXTERNAL LOCATION | Age: 69
End: 2024-03-27

## 2024-03-27 DIAGNOSIS — Z29.89 NEED FOR PROPHYLAXIS AGAINST URINARY TRACT INFECTION: ICD-10-CM

## 2024-03-27 DIAGNOSIS — L03.115 CELLULITIS OF RIGHT LEG: Primary | ICD-10-CM

## 2024-03-27 DIAGNOSIS — J98.01 BRONCHOSPASM: ICD-10-CM

## 2024-03-27 DIAGNOSIS — K21.9 GASTROESOPHAGEAL REFLUX DISEASE, UNSPECIFIED WHETHER ESOPHAGITIS PRESENT: ICD-10-CM

## 2024-03-27 DIAGNOSIS — E56.9 VITAMIN DEFICIENCY: ICD-10-CM

## 2024-03-27 DIAGNOSIS — M17.31 POST-TRAUMATIC ARTHRITIS OF LOWER LEG, RIGHT: Primary | ICD-10-CM

## 2024-03-27 DIAGNOSIS — K59.00 CONSTIPATION, UNSPECIFIED CONSTIPATION TYPE: ICD-10-CM

## 2024-03-27 PROCEDURE — 99284 EMERGENCY DEPT VISIT MOD MDM: CPT | Performed by: EMERGENCY MEDICINE

## 2024-03-27 PROCEDURE — 73562 X-RAY EXAM OF KNEE 3: CPT | Mod: RT

## 2024-03-27 PROCEDURE — 99284 EMERGENCY DEPT VISIT MOD MDM: CPT

## 2024-03-27 PROCEDURE — 99309 SBSQ NF CARE MODERATE MDM 30: CPT | Performed by: NURSE PRACTITIONER

## 2024-03-27 PROCEDURE — 2500000001 HC RX 250 WO HCPCS SELF ADMINISTERED DRUGS (ALT 637 FOR MEDICARE OP)

## 2024-03-27 PROCEDURE — 73562 X-RAY EXAM OF KNEE 3: CPT | Mod: RIGHT SIDE | Performed by: STUDENT IN AN ORGANIZED HEALTH CARE EDUCATION/TRAINING PROGRAM

## 2024-03-27 PROCEDURE — 2500000002 HC RX 250 W HCPCS SELF ADMINISTERED DRUGS (ALT 637 FOR MEDICARE OP, ALT 636 FOR OP/ED)

## 2024-03-27 RX ORDER — OXYCODONE AND ACETAMINOPHEN 5; 325 MG/1; MG/1
1 TABLET ORAL ONCE
Status: COMPLETED | OUTPATIENT
Start: 2024-03-27 | End: 2024-03-27

## 2024-03-27 RX ORDER — SULFAMETHOXAZOLE AND TRIMETHOPRIM 800; 160 MG/1; MG/1
1 TABLET ORAL ONCE
Status: COMPLETED | OUTPATIENT
Start: 2024-03-27 | End: 2024-03-27

## 2024-03-27 RX ORDER — SULFAMETHOXAZOLE AND TRIMETHOPRIM 800; 160 MG/1; MG/1
1 TABLET ORAL 2 TIMES DAILY
Qty: 14 TABLET | Refills: 0 | Status: SHIPPED | OUTPATIENT
Start: 2024-03-27 | End: 2024-04-03

## 2024-03-27 RX ADMIN — OXYCODONE HYDROCHLORIDE AND ACETAMINOPHEN 1 TABLET: 5; 325 TABLET ORAL at 22:50

## 2024-03-27 RX ADMIN — SULFAMETHOXAZOLE AND TRIMETHOPRIM 1 TABLET: 800; 160 TABLET ORAL at 23:58

## 2024-03-27 ASSESSMENT — PAIN - FUNCTIONAL ASSESSMENT: PAIN_FUNCTIONAL_ASSESSMENT: 0-10

## 2024-03-28 ENCOUNTER — APPOINTMENT (OUTPATIENT)
Dept: RADIOLOGY | Facility: CLINIC | Age: 69
End: 2024-03-28
Payer: MEDICARE

## 2024-03-28 ENCOUNTER — OFFICE VISIT (OUTPATIENT)
Dept: ORTHOPEDIC SURGERY | Facility: CLINIC | Age: 69
End: 2024-03-28
Payer: MEDICARE

## 2024-03-28 ENCOUNTER — APPOINTMENT (OUTPATIENT)
Dept: ORTHOPEDIC SURGERY | Facility: CLINIC | Age: 69
End: 2024-03-28
Payer: MEDICARE

## 2024-03-28 VITALS
DIASTOLIC BLOOD PRESSURE: 69 MMHG | BODY MASS INDEX: 25.31 KG/M2 | SYSTOLIC BLOOD PRESSURE: 116 MMHG | HEART RATE: 80 BPM | TEMPERATURE: 97.7 F | RESPIRATION RATE: 16 BRPM | OXYGEN SATURATION: 96 % | WEIGHT: 176.37 LBS

## 2024-03-28 DIAGNOSIS — M17.31 POST-TRAUMATIC ARTHRITIS OF LOWER LEG, RIGHT: Primary | ICD-10-CM

## 2024-03-28 DIAGNOSIS — Z96.651 STATUS POST TOTAL RIGHT KNEE REPLACEMENT: ICD-10-CM

## 2024-03-28 PROCEDURE — 1111F DSCHRG MED/CURRENT MED MERGE: CPT | Performed by: ORTHOPAEDIC SURGERY

## 2024-03-28 PROCEDURE — 1160F RVW MEDS BY RX/DR IN RCRD: CPT | Performed by: ORTHOPAEDIC SURGERY

## 2024-03-28 PROCEDURE — 1157F ADVNC CARE PLAN IN RCRD: CPT | Performed by: ORTHOPAEDIC SURGERY

## 2024-03-28 PROCEDURE — 1159F MED LIST DOCD IN RCRD: CPT | Performed by: ORTHOPAEDIC SURGERY

## 2024-03-28 PROCEDURE — 99024 POSTOP FOLLOW-UP VISIT: CPT | Performed by: ORTHOPAEDIC SURGERY

## 2024-03-28 PROCEDURE — 1125F AMNT PAIN NOTED PAIN PRSNT: CPT | Performed by: ORTHOPAEDIC SURGERY

## 2024-03-28 ASSESSMENT — PAIN SCALES - GENERAL
PAINLEVEL_OUTOF10: 0 - NO PAIN
PAINLEVEL_OUTOF10: 8

## 2024-03-28 ASSESSMENT — PAIN - FUNCTIONAL ASSESSMENT
PAIN_FUNCTIONAL_ASSESSMENT: 0-10
PAIN_FUNCTIONAL_ASSESSMENT: 0-10

## 2024-03-28 ASSESSMENT — LIFESTYLE VARIABLES
EVER HAD A DRINK FIRST THING IN THE MORNING TO STEADY YOUR NERVES TO GET RID OF A HANGOVER: NO
TOTAL SCORE: 0
EVER FELT BAD OR GUILTY ABOUT YOUR DRINKING: NO
HAVE PEOPLE ANNOYED YOU BY CRITICIZING YOUR DRINKING: NO
HAVE YOU EVER FELT YOU SHOULD CUT DOWN ON YOUR DRINKING: NO

## 2024-03-28 ASSESSMENT — PAIN DESCRIPTION - DESCRIPTORS: DESCRIPTORS: SHARP
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
## 2024-03-28 NOTE — LETTER
June 13, 2024     Patient: Adriana Wood   YOB: 1955   Date of Visit: 3/28/2024       To Whom it May Concern:    Adriana Wood was seen in my clinic on 3/28/2024. She would like to withdraw from her courses  due to medical issues. Ms. Wood has had a total knee replacement and is currently   enrolled in multiple physical therapy sessions.    If you have any questions or concerns, please don't hesitate to call.  [phone]         Sincerely,          Andrzej Whelan MD        CC: No Recipients

## 2024-03-28 NOTE — DISCHARGE INSTRUCTIONS
I have provided a prescription for your antibiotic. Your facility should be able to coordinate your medication filling for you with the prescription. Please attend your follow-up appointment with Dr. Whelan tomorrow at [facility]. Return to the emergency department for any new or worsening symptoms or for any other concerns.

## 2024-03-28 NOTE — ED TRIAGE NOTES
"Pt to ED, from acute rehab facility, for post-op problem. Pt had Right TKA on 3/13. Pt is having pain and noted \"black sticky discharge\" at wound.  "

## 2024-03-28 NOTE — LETTER
June 13, 2024     Patient: Adriana Wood   YOB: 1955   Date of Visit: 3/28/2024       To Whom It May Concern:    It is my medical opinion that Adriana Wood should have her dressings changed   Daily with zero form and sterile dressing.    If you have any questions or concerns, please don't hesitate to call.  [phone]       Sincerely,        Andrzej Whelan MD    CC: No Recipients

## 2024-03-28 NOTE — PROGRESS NOTES
Patient is status post conversion of previous tibial plateau fracture to total knee replacement stabilized on 3/13/2024, now still having some drainage from the distal end of the incision and skin changes.    History patient comes in today after being in the emergency room last night.  She was seen by the orthopedic resident.  What she has developed is some skin necrosis over the distal incision as it very close or adjacent to the previous fracture incision scar.  She has a blister measures about a centimeter by centimeter and some erythema around it she has some necrosis as well.  There is no gross signs of infection and the knee joint itself does not have a lot of fluid in it.  She is afebrile she feels well and her labs last night showed that her CRP was actually lower than it was 2 months ago before her surgery.  She is always had some of an elevated CRP when she was infected 2 years ago her CRP was 15 a currently is 1.48.  Her ESR is elevated as well but she is always had an elevated ESR.  Her white blood cell count is not elevated.    This appears to me on physical examination today as above to be some mild skin necrosis.  I do not see any sign of deep infection but she still has some drainage from the very apex of the incision distally.  Her range of motion is actually very good at full extension and about 60 degrees of flexion.  She started off at about 10 degrees to 30 degrees before the surgery.  Her knee is stable medial laterally anteriorly and posteriorly.    X-rays done in the emergency room last night show total knee replacement in good position across previously tibial plateau fracture.    Assessment 2 weeks status post conversion of previous severe tibial plateau fracture to total knee replacement patient has a 2-year history of infection which was resolved.  I do not see any evidence of deep infection at this time but certainly some local cellulitis.    Plan I am good to go ahead and discontinue  her brace even though she has a quadricep tendon repair.  I think the brace is pushing on the skin and worsening the problem.  She can weight-bear as tolerated with a walker and assistance but we have to be careful about doing any heavy resistance for the quad muscle.  She can do resistance against gravity only.  I am to see her back in 2 weeks and reassess her incision.  I put on Bactrim double strength 1 p.o. twice daily.  I think she is going to stay in the skilled care facility until next week which would be reasonable then discharged with home physical therapy and pain medications as needed.  She may need some wound assessment from home as well from nursing.  This
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
## 2024-03-30 ASSESSMENT — ENCOUNTER SYMPTOMS
FATIGUE: 0
BACK PAIN: 0
WOUND: 0
NECK STIFFNESS: 0
BRUISES/BLEEDS EASILY: 0
POLYDIPSIA: 0
WEAKNESS: 1
EYE REDNESS: 0
RHINORRHEA: 0
HEADACHES: 0
SORE THROAT: 0
ADENOPATHY: 0
COUGH: 0
JOINT SWELLING: 1
CHILLS: 0
HALLUCINATIONS: 0
FEVER: 0
ARTHRALGIAS: 1
HEMATURIA: 0
DYSURIA: 0
SHORTNESS OF BREATH: 0
BLOOD IN STOOL: 0
ABDOMINAL PAIN: 0
EYE PAIN: 0
PALPITATIONS: 0
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
## 2024-04-01 ENCOUNTER — NURSING HOME VISIT (OUTPATIENT)
Dept: POST ACUTE CARE | Facility: EXTERNAL LOCATION | Age: 69
End: 2024-04-01
Payer: MEDICARE

## 2024-04-01 ENCOUNTER — HOSPITAL ENCOUNTER (EMERGENCY)
Facility: HOSPITAL | Age: 69
Discharge: HOME | End: 2024-04-01
Payer: MEDICARE

## 2024-04-01 VITALS
TEMPERATURE: 97.7 F | HEART RATE: 84 BPM | RESPIRATION RATE: 16 BRPM | DIASTOLIC BLOOD PRESSURE: 80 MMHG | WEIGHT: 176 LBS | SYSTOLIC BLOOD PRESSURE: 112 MMHG | HEIGHT: 66 IN | BODY MASS INDEX: 28.28 KG/M2 | OXYGEN SATURATION: 100 %

## 2024-04-01 DIAGNOSIS — R11.0 NAUSEA: ICD-10-CM

## 2024-04-01 DIAGNOSIS — M17.9 OSTEOARTHRITIS OF KNEE, UNSPECIFIED LATERALITY, UNSPECIFIED OSTEOARTHRITIS TYPE: ICD-10-CM

## 2024-04-01 DIAGNOSIS — R11.10 VOMITING, UNSPECIFIED VOMITING TYPE, UNSPECIFIED WHETHER NAUSEA PRESENT: Primary | ICD-10-CM

## 2024-04-01 LAB
ALBUMIN SERPL BCP-MCNC: 4.1 G/DL (ref 3.4–5)
ALP SERPL-CCNC: 89 U/L (ref 33–136)
ALT SERPL W P-5'-P-CCNC: 20 U/L (ref 7–45)
ANION GAP SERPL CALC-SCNC: 18 MMOL/L (ref 10–20)
AST SERPL W P-5'-P-CCNC: 25 U/L (ref 9–39)
BASOPHILS # BLD AUTO: 0.02 X10*3/UL (ref 0–0.1)
BASOPHILS NFR BLD AUTO: 0.5 %
BILIRUB SERPL-MCNC: 0.5 MG/DL (ref 0–1.2)
BUN SERPL-MCNC: 7 MG/DL (ref 6–23)
CALCIUM SERPL-MCNC: 9.3 MG/DL (ref 8.6–10.3)
CHLORIDE SERPL-SCNC: 97 MMOL/L (ref 98–107)
CO2 SERPL-SCNC: 24 MMOL/L (ref 21–32)
CREAT SERPL-MCNC: 0.77 MG/DL (ref 0.5–1.05)
EGFRCR SERPLBLD CKD-EPI 2021: 85 ML/MIN/1.73M*2
EOSINOPHIL # BLD AUTO: 0 X10*3/UL (ref 0–0.7)
EOSINOPHIL NFR BLD AUTO: 0 %
ERYTHROCYTE [DISTWIDTH] IN BLOOD BY AUTOMATED COUNT: 14.1 % (ref 11.5–14.5)
GLUCOSE SERPL-MCNC: 104 MG/DL (ref 74–99)
HCT VFR BLD AUTO: 40.6 % (ref 36–46)
HGB BLD-MCNC: 13.5 G/DL (ref 12–16)
IMM GRANULOCYTES # BLD AUTO: 0.02 X10*3/UL (ref 0–0.7)
IMM GRANULOCYTES NFR BLD AUTO: 0.5 % (ref 0–0.9)
LIPASE SERPL-CCNC: 41 U/L (ref 9–82)
LYMPHOCYTES # BLD AUTO: 1.09 X10*3/UL (ref 1.2–4.8)
LYMPHOCYTES NFR BLD AUTO: 26.7 %
MAGNESIUM SERPL-MCNC: 2.02 MG/DL (ref 1.6–2.4)
MCH RBC QN AUTO: 29.2 PG (ref 26–34)
MCHC RBC AUTO-ENTMCNC: 33.3 G/DL (ref 32–36)
MCV RBC AUTO: 88 FL (ref 80–100)
MONOCYTES # BLD AUTO: 0.26 X10*3/UL (ref 0.1–1)
MONOCYTES NFR BLD AUTO: 6.4 %
NEUTROPHILS # BLD AUTO: 2.69 X10*3/UL (ref 1.2–7.7)
NEUTROPHILS NFR BLD AUTO: 65.9 %
NRBC BLD-RTO: 0 /100 WBCS (ref 0–0)
PLATELET # BLD AUTO: 383 X10*3/UL (ref 150–450)
POTASSIUM SERPL-SCNC: 4.2 MMOL/L (ref 3.5–5.3)
PROT SERPL-MCNC: 7.8 G/DL (ref 6.4–8.2)
RBC # BLD AUTO: 4.63 X10*6/UL (ref 4–5.2)
SODIUM SERPL-SCNC: 135 MMOL/L (ref 136–145)
WBC # BLD AUTO: 4.1 X10*3/UL (ref 4.4–11.3)

## 2024-04-01 PROCEDURE — 85025 COMPLETE CBC W/AUTO DIFF WBC: CPT | Performed by: PHYSICIAN ASSISTANT

## 2024-04-01 PROCEDURE — 99309 SBSQ NF CARE MODERATE MDM 30: CPT | Performed by: FAMILY MEDICINE

## 2024-04-01 PROCEDURE — 2500000004 HC RX 250 GENERAL PHARMACY W/ HCPCS (ALT 636 FOR OP/ED): Performed by: PHYSICIAN ASSISTANT

## 2024-04-01 PROCEDURE — 83735 ASSAY OF MAGNESIUM: CPT | Performed by: PHYSICIAN ASSISTANT

## 2024-04-01 PROCEDURE — 96361 HYDRATE IV INFUSION ADD-ON: CPT

## 2024-04-01 PROCEDURE — 83690 ASSAY OF LIPASE: CPT | Performed by: PHYSICIAN ASSISTANT

## 2024-04-01 PROCEDURE — 99284 EMERGENCY DEPT VISIT MOD MDM: CPT | Mod: 25

## 2024-04-01 PROCEDURE — 84075 ASSAY ALKALINE PHOSPHATASE: CPT | Performed by: PHYSICIAN ASSISTANT

## 2024-04-01 PROCEDURE — 96375 TX/PRO/DX INJ NEW DRUG ADDON: CPT

## 2024-04-01 PROCEDURE — 96374 THER/PROPH/DIAG INJ IV PUSH: CPT

## 2024-04-01 PROCEDURE — 36415 COLL VENOUS BLD VENIPUNCTURE: CPT | Performed by: PHYSICIAN ASSISTANT

## 2024-04-01 RX ORDER — FAMOTIDINE 10 MG/ML
20 INJECTION INTRAVENOUS ONCE
Status: COMPLETED | OUTPATIENT
Start: 2024-04-01 | End: 2024-04-01

## 2024-04-01 RX ORDER — METOCLOPRAMIDE HYDROCHLORIDE 5 MG/ML
10 INJECTION INTRAMUSCULAR; INTRAVENOUS ONCE
Status: COMPLETED | OUTPATIENT
Start: 2024-04-01 | End: 2024-04-01

## 2024-04-01 RX ORDER — FAMOTIDINE 20 MG/1
20 TABLET, FILM COATED ORAL DAILY PRN
Qty: 30 TABLET | Refills: 0 | Status: SHIPPED | OUTPATIENT
Start: 2024-04-01 | End: 2024-04-23 | Stop reason: WASHOUT

## 2024-04-01 RX ORDER — METOCLOPRAMIDE 10 MG/1
10 TABLET ORAL EVERY 6 HOURS PRN
Qty: 12 TABLET | Refills: 0 | Status: SHIPPED | OUTPATIENT
Start: 2024-04-01 | End: 2024-04-23 | Stop reason: WASHOUT

## 2024-04-01 RX ADMIN — FAMOTIDINE 20 MG: 10 INJECTION, SOLUTION INTRAVENOUS at 16:34

## 2024-04-01 RX ADMIN — METOCLOPRAMIDE 10 MG: 5 INJECTION, SOLUTION INTRAMUSCULAR; INTRAVENOUS at 18:27

## 2024-04-01 RX ADMIN — SODIUM CHLORIDE 1000 ML: 9 INJECTION, SOLUTION INTRAVENOUS at 16:34

## 2024-04-01 ASSESSMENT — LIFESTYLE VARIABLES
TOTAL SCORE: 0
EVER HAD A DRINK FIRST THING IN THE MORNING TO STEADY YOUR NERVES TO GET RID OF A HANGOVER: NO
EVER FELT BAD OR GUILTY ABOUT YOUR DRINKING: NO
HAVE PEOPLE ANNOYED YOU BY CRITICIZING YOUR DRINKING: NO
HAVE YOU EVER FELT YOU SHOULD CUT DOWN ON YOUR DRINKING: NO

## 2024-04-01 ASSESSMENT — COLUMBIA-SUICIDE SEVERITY RATING SCALE - C-SSRS
6. HAVE YOU EVER DONE ANYTHING, STARTED TO DO ANYTHING, OR PREPARED TO DO ANYTHING TO END YOUR LIFE?: NO
1. IN THE PAST MONTH, HAVE YOU WISHED YOU WERE DEAD OR WISHED YOU COULD GO TO SLEEP AND NOT WAKE UP?: NO
2. HAVE YOU ACTUALLY HAD ANY THOUGHTS OF KILLING YOURSELF?: NO

## 2024-04-01 ASSESSMENT — PAIN SCALES - GENERAL: PAINLEVEL_OUTOF10: 0 - NO PAIN

## 2024-04-01 ASSESSMENT — PAIN - FUNCTIONAL ASSESSMENT: PAIN_FUNCTIONAL_ASSESSMENT: 0-10
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
## 2024-04-01 NOTE — ED TRIAGE NOTES
Pt arrived with EMS with nausea and vomiting x's 2 days. Pt states it started after they increased her Bactrim dose.
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
## 2024-04-03 ENCOUNTER — DOCUMENTATION (OUTPATIENT)
Dept: PRIMARY CARE | Facility: CLINIC | Age: 69
End: 2024-04-03
Payer: MEDICARE

## 2024-04-03 ENCOUNTER — PATIENT OUTREACH (OUTPATIENT)
Dept: PRIMARY CARE | Facility: CLINIC | Age: 69
End: 2024-04-03
Payer: MEDICARE

## 2024-04-03 DIAGNOSIS — Z96.651 S/P TKR (TOTAL KNEE REPLACEMENT), RIGHT: Primary | ICD-10-CM

## 2024-04-03 DIAGNOSIS — Z96.651 S/P TKR (TOTAL KNEE REPLACEMENT), RIGHT: ICD-10-CM

## 2024-04-03 DIAGNOSIS — L03.115 CELLULITIS OF RIGHT LEG: ICD-10-CM
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
## 2024-04-04 ENCOUNTER — PATIENT OUTREACH (OUTPATIENT)
Dept: PRIMARY CARE | Facility: CLINIC | Age: 69
End: 2024-04-04
Payer: MEDICARE

## 2024-04-04 ENCOUNTER — APPOINTMENT (OUTPATIENT)
Dept: ORTHOPEDIC SURGERY | Facility: CLINIC | Age: 69
End: 2024-04-04
Payer: MEDICARE

## 2024-04-04 PROBLEM — M17.9 OSTEOARTHRITIS OF KNEE: Status: ACTIVE | Noted: 2024-04-04

## 2024-04-04 PROBLEM — R11.0 NAUSEA: Status: ACTIVE | Noted: 2024-04-04
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
## 2024-04-04 NOTE — PROGRESS NOTES
Outreach made to notify patient Dr. Rueda ordered  Home Health Care and the emergency room sent a prescription to her pharmacy for her nausea. LVM and advised patient to return call with any questions or concerns.
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
## 2024-04-05 ENCOUNTER — HOME HEALTH ADMISSION (OUTPATIENT)
Dept: HOME HEALTH SERVICES | Facility: HOME HEALTH | Age: 69
End: 2024-04-05
Payer: MEDICARE

## 2024-04-08 ENCOUNTER — HOME CARE VISIT (OUTPATIENT)
Dept: HOME HEALTH SERVICES | Facility: HOME HEALTH | Age: 69
End: 2024-04-08
Payer: MEDICARE

## 2024-04-08 PROCEDURE — 1090000001 HH PPS REVENUE CREDIT

## 2024-04-08 PROCEDURE — G0151 HHCP-SERV OF PT,EA 15 MIN: HCPCS | Mod: HHH

## 2024-04-08 PROCEDURE — 1090000002 HH PPS REVENUE DEBIT

## 2024-04-08 PROCEDURE — 0023 HH SOC

## 2024-04-08 PROCEDURE — 169592 NO-PAY CLAIM PROCEDURE

## 2024-04-09 VITALS
TEMPERATURE: 97.4 F | DIASTOLIC BLOOD PRESSURE: 78 MMHG | SYSTOLIC BLOOD PRESSURE: 120 MMHG | OXYGEN SATURATION: 97 % | HEART RATE: 74 BPM

## 2024-04-09 PROCEDURE — 1090000002 HH PPS REVENUE DEBIT

## 2024-04-09 PROCEDURE — 1090000001 HH PPS REVENUE CREDIT

## 2024-04-09 ASSESSMENT — ENCOUNTER SYMPTOMS
DEPRESSION: 0
PERSON REPORTING PAIN: PATIENT
LIMITED RANGE OF MOTION: 1
HIGHEST PAIN SEVERITY IN PAST 24 HOURS: 10/10
LOWEST PAIN SEVERITY IN PAST 24 HOURS: 7/10
PAIN LOCATION - RELIEVING FACTORS: PAIN MEDS
SUBJECTIVE PAIN PROGRESSION: UNCHANGED
PAIN LOCATION - PAIN SEVERITY: 7/10
OCCASIONAL FEELINGS OF UNSTEADINESS: 1
PAIN LOCATION - PAIN QUALITY: BURNING
MUSCLE WEAKNESS: 1
LOSS OF SENSATION IN FEET: 0
PAIN LOCATION: RIGHT LEG
PAIN: 1

## 2024-04-09 ASSESSMENT — ACTIVITIES OF DAILY LIVING (ADL)
OASIS_M1830: 03
ENTERING_EXITING_HOME: NEEDS ASSISTANCE
AMBULATION ASSISTANCE ON FLAT SURFACES: 1
AMBULATION_DISTANCE/DURATION_TOLERATED: 40 FEET
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
## 2024-04-10 ENCOUNTER — HOME CARE VISIT (OUTPATIENT)
Dept: HOME HEALTH SERVICES | Facility: HOME HEALTH | Age: 69
End: 2024-04-10
Payer: MEDICARE

## 2024-04-10 PROCEDURE — 1090000002 HH PPS REVENUE DEBIT

## 2024-04-10 PROCEDURE — 1090000001 HH PPS REVENUE CREDIT

## 2024-04-10 PROCEDURE — G0152 HHCP-SERV OF OT,EA 15 MIN: HCPCS | Mod: HHH

## 2024-04-10 ASSESSMENT — ENCOUNTER SYMPTOMS
PAIN LOCATION - RELIEVING FACTORS: MEDS
LOWEST PAIN SEVERITY IN PAST 24 HOURS: 2/10
HIGHEST PAIN SEVERITY IN PAST 24 HOURS: 10/10
PAIN LOCATION - PAIN QUALITY: ACHING
PAIN LOCATION - PAIN FREQUENCY: CONSTANT
PAIN: 1
PAIN LOCATION - RELIEVING FACTORS: MEDS
SUBJECTIVE PAIN PROGRESSION: GRADUALLY IMPROVING
PAIN LOCATION: RIGHT KNEE
PAIN LOCATION - PAIN SEVERITY: 7/10
PAIN LOCATION - EXACERBATING FACTORS: WB, ROM
PAIN LOCATION - PAIN QUALITY: ACHING
PAIN LOCATION: RIGHT ANKLE
PERSON REPORTING PAIN: PATIENT
PAIN LOCATION - PAIN SEVERITY: 7/10
PAIN LOCATION - EXACERBATING FACTORS: WB
PAIN SEVERITY GOAL: 0/10
PAIN LOCATION - PAIN FREQUENCY: CONSTANT

## 2024-04-10 ASSESSMENT — ACTIVITIES OF DAILY LIVING (ADL)
TOILETING: CONTACT GUARD ASSIST
AMBULATION ASSISTANCE: MINIMUM ASSIST
AMBULATION ASSISTANCE: 1
BATHING_CURRENT_FUNCTION: MINIMUM ASSIST
DRESSING_LB_CURRENT_FUNCTION: MINIMUM ASSIST
PREPARING MEALS: NEEDS ASSISTANCE
BATHING ASSESSED: 1
CURRENT_FUNCTION: CONTACT GUARD ASSIST
PHYSICAL TRANSFERS ASSESSED: 1
TOILETING: 1
DRESSING_UB_CURRENT_FUNCTION: STAND BY ASSIST

## 2024-04-11 ENCOUNTER — HOSPITAL ENCOUNTER (OUTPATIENT)
Dept: RADIOLOGY | Facility: CLINIC | Age: 69
Discharge: HOME | End: 2024-04-11
Payer: MEDICARE

## 2024-04-11 ENCOUNTER — OFFICE VISIT (OUTPATIENT)
Dept: ORTHOPEDIC SURGERY | Facility: CLINIC | Age: 69
End: 2024-04-11
Payer: MEDICARE

## 2024-04-11 VITALS — BODY MASS INDEX: 28.28 KG/M2 | HEIGHT: 66 IN | WEIGHT: 176 LBS

## 2024-04-11 DIAGNOSIS — Z96.651 STATUS POST TOTAL RIGHT KNEE REPLACEMENT: Primary | ICD-10-CM

## 2024-04-11 DIAGNOSIS — Z96.651 S/P TOTAL KNEE ARTHROPLASTY, RIGHT: ICD-10-CM

## 2024-04-11 PROCEDURE — 1090000001 HH PPS REVENUE CREDIT

## 2024-04-11 PROCEDURE — 1160F RVW MEDS BY RX/DR IN RCRD: CPT | Performed by: ORTHOPAEDIC SURGERY

## 2024-04-11 PROCEDURE — 1159F MED LIST DOCD IN RCRD: CPT | Performed by: ORTHOPAEDIC SURGERY

## 2024-04-11 PROCEDURE — 1125F AMNT PAIN NOTED PAIN PRSNT: CPT | Performed by: ORTHOPAEDIC SURGERY

## 2024-04-11 PROCEDURE — 1157F ADVNC CARE PLAN IN RCRD: CPT | Performed by: ORTHOPAEDIC SURGERY

## 2024-04-11 PROCEDURE — 73560 X-RAY EXAM OF KNEE 1 OR 2: CPT | Mod: RIGHT SIDE | Performed by: RADIOLOGY

## 2024-04-11 PROCEDURE — 1090000002 HH PPS REVENUE DEBIT

## 2024-04-11 PROCEDURE — 99024 POSTOP FOLLOW-UP VISIT: CPT | Performed by: ORTHOPAEDIC SURGERY

## 2024-04-11 PROCEDURE — 73560 X-RAY EXAM OF KNEE 1 OR 2: CPT | Mod: RT

## 2024-04-11 PROCEDURE — 1111F DSCHRG MED/CURRENT MED MERGE: CPT | Performed by: ORTHOPAEDIC SURGERY

## 2024-04-11 ASSESSMENT — PAIN DESCRIPTION - DESCRIPTORS: DESCRIPTORS: ACHING

## 2024-04-11 ASSESSMENT — PAIN SCALES - GENERAL: PAINLEVEL_OUTOF10: 6

## 2024-04-11 ASSESSMENT — PAIN - FUNCTIONAL ASSESSMENT: PAIN_FUNCTIONAL_ASSESSMENT: 0-10

## 2024-04-11 NOTE — PROGRESS NOTES
Chief complaint patient returns today for wound check.    History patient is status post conversion of previous total knee surgery to a stabilized total knee.  She had developed some skin necrosis between the previous lateral tibial plateau incision in the anterior medial total knee incision.  She stopped taking any of the antibiotics over a week ago as they bothered her stomach.  She is also stopped taking aspirin at the same time.  She is saying she is doing pretty well but the foot and ankle and leg are swollen today.    Physical exam appears to show that the skin necrosis is improved greatly.  The small eschars are decreasing in size and there is no sign of deep infection.  She has a negative Homans' sign but she does have swelling in the ankle and the foot.  She had a previous right ankle injury which was taken care of by Dr. Tai.  Patient also has a significant weakness in the right knee.  But she is able to actively extend her knee against gravity to about 10 degrees unable to get the knee fully extended passively and flex her to about 85 degrees.  The quadricep tendon appears to be manually intact by palpation.    X-rays of her right knee show revision knee replacement in good position no evidence of loosening or malalignment.    Assessment patient is 4 weeks status post revision conversion right total knee replacement after tibial plateau fracture.  Patient does have element of RSD.  Old right ankle syndesmotic injury swelling in the right ankle.    Plan I told her that the note the swelling is probably secondary to the fact the knee has some swelling and she had a previous right ankle injury but you note to be on the safe side I told her I think she should go to the emergency room to rule out deep venous thrombosis   unfortunately, she said her  developed acute leukemia and  [facility] at  for treatment.  She wants to go over there right away.  Still I think she should go to the emergency  room but I will leave it up to her.  She did stop taking aspirin last week and wanted to go back on a baby aspirin twice daily.  I Patricia see her back again in 6 weeks.  I like to repeat x-ray of the right knee at that time.  She is undergoing physical therapy now at home unit for I forgot to give I forgot to get OxyContin from oxycodone okay is is all he wanted but he said he wanted oxycodone

## 2024-04-12 ENCOUNTER — HOME CARE VISIT (OUTPATIENT)
Dept: HOME HEALTH SERVICES | Facility: HOME HEALTH | Age: 69
End: 2024-04-12
Payer: MEDICARE

## 2024-04-12 PROCEDURE — 1090000002 HH PPS REVENUE DEBIT

## 2024-04-12 PROCEDURE — 1090000001 HH PPS REVENUE CREDIT

## 2024-04-12 PROCEDURE — G0157 HHC PT ASSISTANT EA 15: HCPCS | Mod: HHH

## 2024-04-12 ASSESSMENT — ENCOUNTER SYMPTOMS
HIGHEST PAIN SEVERITY IN PAST 24 HOURS: 8/10
SUBJECTIVE PAIN PROGRESSION: UNCHANGED
PAIN LOCATION - PAIN QUALITY: SORE/ACHEY
PAIN LOCATION: RIGHT ANKLE
PAIN LOCATION - PAIN FREQUENCY: CONSTANT
PAIN LOCATION: RIGHT KNEE
PERSON REPORTING PAIN: PATIENT
PAIN LOCATION - EXACERBATING FACTORS: ACTIVITY/MOBILITY
PAIN LOCATION - PAIN SEVERITY: 7/10
PAIN LOCATION - PAIN QUALITY: SORE/ACHEY
PAIN LOCATION - PAIN SEVERITY: 7/10
PAIN LOCATION - PAIN FREQUENCY: CONSTANT
PAIN LOCATION - RELIEVING FACTORS: PAIN MEDICATION, ICE APPLICATION, REST AND REPOSITIONING
LOWEST PAIN SEVERITY IN PAST 24 HOURS: 6/10
PAIN: 1
PAIN SEVERITY GOAL: 0/10
PAIN LOCATION - EXACERBATING FACTORS: ACTIVITY/MOBILITY

## 2024-04-13 PROCEDURE — 1090000001 HH PPS REVENUE CREDIT

## 2024-04-13 PROCEDURE — 1090000002 HH PPS REVENUE DEBIT

## 2024-04-14 PROCEDURE — 1090000001 HH PPS REVENUE CREDIT

## 2024-04-14 PROCEDURE — 1090000002 HH PPS REVENUE DEBIT

## 2024-04-15 PROCEDURE — 1090000002 HH PPS REVENUE DEBIT

## 2024-04-15 PROCEDURE — 1090000001 HH PPS REVENUE CREDIT

## 2024-04-16 ENCOUNTER — HOME CARE VISIT (OUTPATIENT)
Dept: HOME HEALTH SERVICES | Facility: HOME HEALTH | Age: 69
End: 2024-04-16
Payer: MEDICARE

## 2024-04-16 PROCEDURE — 1090000002 HH PPS REVENUE DEBIT

## 2024-04-16 PROCEDURE — G0157 HHC PT ASSISTANT EA 15: HCPCS | Mod: HHH

## 2024-04-16 PROCEDURE — 1090000001 HH PPS REVENUE CREDIT

## 2024-04-16 ASSESSMENT — ENCOUNTER SYMPTOMS
PAIN LOCATION - PAIN FREQUENCY: INTERMITTENT
PERSON REPORTING PAIN: PATIENT
PAIN SEVERITY GOAL: 0/10
PAIN: 1
PAIN LOCATION - EXACERBATING FACTORS: ACTIVITY/MOBILITY
PAIN LOCATION - PAIN QUALITY: SORE/ACHEY
LOWEST PAIN SEVERITY IN PAST 24 HOURS: 4/10
PAIN LOCATION - RELIEVING FACTORS: PAIN MEDICATION, ICE APPLICATION, REST AND REPOSITIONING
PAIN LOCATION - PAIN SEVERITY: 6/10
PAIN LOCATION: RIGHT KNEE
HIGHEST PAIN SEVERITY IN PAST 24 HOURS: 7/10

## 2024-04-17 ENCOUNTER — PATIENT OUTREACH (OUTPATIENT)
Dept: PRIMARY CARE | Facility: CLINIC | Age: 69
End: 2024-04-17

## 2024-04-17 ENCOUNTER — HOME CARE VISIT (OUTPATIENT)
Dept: HOME HEALTH SERVICES | Facility: HOME HEALTH | Age: 69
End: 2024-04-17
Payer: MEDICARE

## 2024-04-17 PROCEDURE — 1090000001 HH PPS REVENUE CREDIT

## 2024-04-17 PROCEDURE — G0152 HHCP-SERV OF OT,EA 15 MIN: HCPCS | Mod: HHH

## 2024-04-17 PROCEDURE — 1090000002 HH PPS REVENUE DEBIT

## 2024-04-17 PROCEDURE — G0180 MD CERTIFICATION HHA PATIENT: HCPCS | Performed by: FAMILY MEDICINE

## 2024-04-17 ASSESSMENT — PAIN DESCRIPTION - PAIN TYPE: TYPE: ACUTE PAIN

## 2024-04-18 ENCOUNTER — HOME CARE VISIT (OUTPATIENT)
Dept: HOME HEALTH SERVICES | Facility: HOME HEALTH | Age: 69
End: 2024-04-18
Payer: MEDICARE

## 2024-04-18 PROCEDURE — G0157 HHC PT ASSISTANT EA 15: HCPCS | Mod: HHH

## 2024-04-18 PROCEDURE — 1090000002 HH PPS REVENUE DEBIT

## 2024-04-18 PROCEDURE — 1090000001 HH PPS REVENUE CREDIT

## 2024-04-18 PROCEDURE — G0152 HHCP-SERV OF OT,EA 15 MIN: HCPCS | Mod: HHH

## 2024-04-18 ASSESSMENT — ENCOUNTER SYMPTOMS
PAIN: 1
PAIN LOCATION - EXACERBATING FACTORS: ACTIVITY/MOBILITY
PAIN LOCATION - PAIN FREQUENCY: FREQUENT
PAIN LOCATION: RIGHT KNEE
SUBJECTIVE PAIN PROGRESSION: UNCHANGED
HIGHEST PAIN SEVERITY IN PAST 24 HOURS: 8/10
LOWEST PAIN SEVERITY IN PAST 24 HOURS: 3/10
PAIN LOCATION - PAIN SEVERITY: 8/10
PAIN SEVERITY GOAL: 0/10
PERSON REPORTING PAIN: PATIENT

## 2024-04-18 ASSESSMENT — PAIN DESCRIPTION - PAIN TYPE: TYPE: ACUTE PAIN

## 2024-04-19 PROCEDURE — 1090000001 HH PPS REVENUE CREDIT

## 2024-04-19 PROCEDURE — 1090000002 HH PPS REVENUE DEBIT

## 2024-04-20 PROCEDURE — 1090000002 HH PPS REVENUE DEBIT

## 2024-04-20 PROCEDURE — 1090000001 HH PPS REVENUE CREDIT

## 2024-04-21 PROCEDURE — 1090000001 HH PPS REVENUE CREDIT

## 2024-04-21 PROCEDURE — 1090000002 HH PPS REVENUE DEBIT

## 2024-04-22 PROCEDURE — 1090000002 HH PPS REVENUE DEBIT

## 2024-04-22 PROCEDURE — 1090000001 HH PPS REVENUE CREDIT

## 2024-04-23 ENCOUNTER — HOME CARE VISIT (OUTPATIENT)
Dept: HOME HEALTH SERVICES | Facility: HOME HEALTH | Age: 69
End: 2024-04-23
Payer: MEDICARE

## 2024-04-23 ENCOUNTER — OFFICE VISIT (OUTPATIENT)
Dept: PRIMARY CARE | Facility: CLINIC | Age: 69
End: 2024-04-23
Payer: MEDICARE

## 2024-04-23 VITALS
HEIGHT: 66 IN | DIASTOLIC BLOOD PRESSURE: 72 MMHG | SYSTOLIC BLOOD PRESSURE: 128 MMHG | OXYGEN SATURATION: 97 % | HEART RATE: 84 BPM | BODY MASS INDEX: 28.12 KG/M2 | WEIGHT: 175 LBS

## 2024-04-23 DIAGNOSIS — M86.461 CHRONIC OSTEOMYELITIS OF RIGHT TIBIA WITH DRAINING SINUS (MULTI): ICD-10-CM

## 2024-04-23 DIAGNOSIS — Z96.651 S/P TKR (TOTAL KNEE REPLACEMENT), RIGHT: ICD-10-CM

## 2024-04-23 DIAGNOSIS — M17.31 POST-TRAUMATIC ARTHRITIS OF LOWER LEG, RIGHT: Primary | ICD-10-CM

## 2024-04-23 DIAGNOSIS — R05.1 ACUTE COUGH: ICD-10-CM

## 2024-04-23 PROCEDURE — G2211 COMPLEX E/M VISIT ADD ON: HCPCS | Performed by: FAMILY MEDICINE

## 2024-04-23 PROCEDURE — G0152 HHCP-SERV OF OT,EA 15 MIN: HCPCS | Mod: HHH

## 2024-04-23 PROCEDURE — 1157F ADVNC CARE PLAN IN RCRD: CPT | Performed by: FAMILY MEDICINE

## 2024-04-23 PROCEDURE — 99213 OFFICE O/P EST LOW 20 MIN: CPT | Performed by: FAMILY MEDICINE

## 2024-04-23 PROCEDURE — 1159F MED LIST DOCD IN RCRD: CPT | Performed by: FAMILY MEDICINE

## 2024-04-23 PROCEDURE — G0157 HHC PT ASSISTANT EA 15: HCPCS | Mod: HHH

## 2024-04-23 PROCEDURE — 1160F RVW MEDS BY RX/DR IN RCRD: CPT | Performed by: FAMILY MEDICINE

## 2024-04-23 PROCEDURE — 1090000002 HH PPS REVENUE DEBIT

## 2024-04-23 PROCEDURE — 1090000001 HH PPS REVENUE CREDIT

## 2024-04-23 RX ORDER — OXYCODONE HYDROCHLORIDE 5 MG/1
10 TABLET ORAL EVERY 6 HOURS PRN
Qty: 210 TABLET | Refills: 0 | Status: SHIPPED | OUTPATIENT
Start: 2024-04-23 | End: 2024-05-15 | Stop reason: HOSPADM

## 2024-04-23 RX ORDER — ALBUTEROL SULFATE 90 UG/1
2 AEROSOL, METERED RESPIRATORY (INHALATION) EVERY 6 HOURS PRN
Qty: 8 G | Refills: 0 | Status: SHIPPED | OUTPATIENT
Start: 2024-04-23

## 2024-04-23 ASSESSMENT — ENCOUNTER SYMPTOMS
PAIN LOCATION - PAIN SEVERITY: 7/10
SEIZURES: 0
HIGHEST PAIN SEVERITY IN PAST 24 HOURS: 8/10
PAIN: 1
SHORTNESS OF BREATH: 0
PAIN LOCATION - PAIN FREQUENCY: INTERMITTENT
PAIN LOCATION - EXACERBATING FACTORS: ACTIVITY/MOBILITY
DIFFICULTY URINATING: 0
FLANK PAIN: 0
BLOOD IN STOOL: 0
CONFUSION: 0
FEVER: 0
PAIN LOCATION - PAIN QUALITY: SORE/ACHEY
TROUBLE SWALLOWING: 0
DIARRHEA: 0
PAIN LOCATION: RIGHT LEG
LOWEST PAIN SEVERITY IN PAST 24 HOURS: 3/10
APPETITE CHANGE: 0
DYSURIA: 1
JOINT SWELLING: 0
NERVOUS/ANXIOUS: 0
CONSTIPATION: 0
COLOR CHANGE: 0
FATIGUE: 1
FREQUENCY: 0
HEMATURIA: 1
PERSON REPORTING PAIN: PATIENT
PALPITATIONS: 0
PAIN SEVERITY GOAL: 0/10
UNEXPECTED WEIGHT CHANGE: 0

## 2024-04-23 ASSESSMENT — PAIN DESCRIPTION - PAIN TYPE: TYPE: ACUTE PAIN
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
## 2024-04-24 PROCEDURE — 1090000001 HH PPS REVENUE CREDIT

## 2024-04-24 PROCEDURE — 1090000002 HH PPS REVENUE DEBIT

## 2024-04-25 ENCOUNTER — HOME CARE VISIT (OUTPATIENT)
Dept: HOME HEALTH SERVICES | Facility: HOME HEALTH | Age: 69
End: 2024-04-25
Payer: MEDICARE

## 2024-04-25 PROCEDURE — G0152 HHCP-SERV OF OT,EA 15 MIN: HCPCS | Mod: HHH

## 2024-04-25 PROCEDURE — 1090000001 HH PPS REVENUE CREDIT

## 2024-04-25 PROCEDURE — 1090000002 HH PPS REVENUE DEBIT

## 2024-04-25 ASSESSMENT — ENCOUNTER SYMPTOMS
PAIN LOCATION - PAIN QUALITY: SHARP
PAIN LOCATION - PAIN SEVERITY: 8/10
PAIN: 1
PAIN LOCATION - RELIEVING FACTORS: MEDS
PAIN LOCATION: GENERALIZED
PAIN LOCATION - EXACERBATING FACTORS: ROM, WB
PERSON REPORTING PAIN: PATIENT
LOWEST PAIN SEVERITY IN PAST 24 HOURS: 6/10
HIGHEST PAIN SEVERITY IN PAST 24 HOURS: 9/10

## 2024-04-26 ENCOUNTER — HOME CARE VISIT (OUTPATIENT)
Dept: HOME HEALTH SERVICES | Facility: HOME HEALTH | Age: 69
End: 2024-04-26
Payer: MEDICARE

## 2024-04-26 PROCEDURE — 1090000001 HH PPS REVENUE CREDIT

## 2024-04-26 PROCEDURE — G0157 HHC PT ASSISTANT EA 15: HCPCS | Mod: HHH

## 2024-04-26 PROCEDURE — 1090000002 HH PPS REVENUE DEBIT

## 2024-04-26 ASSESSMENT — ENCOUNTER SYMPTOMS
PAIN SEVERITY GOAL: 0/10
PAIN LOCATION - PAIN SEVERITY: 6/10
LOWEST PAIN SEVERITY IN PAST 24 HOURS: 3/10
PAIN LOCATION - PAIN FREQUENCY: INTERMITTENT
PAIN LOCATION - EXACERBATING FACTORS: ACTIVITY/MOBILITY
PAIN LOCATION - RELIEVING FACTORS: PAIN MEDICATION, ICE APPLICATION, REST AND REPOSITIONING
PERSON REPORTING PAIN: PATIENT
HIGHEST PAIN SEVERITY IN PAST 24 HOURS: 8/10
PAIN: 1
SUBJECTIVE PAIN PROGRESSION: UNCHANGED

## 2024-04-27 PROCEDURE — 1090000001 HH PPS REVENUE CREDIT

## 2024-04-27 PROCEDURE — 1090000002 HH PPS REVENUE DEBIT

## 2024-04-28 PROCEDURE — 1090000001 HH PPS REVENUE CREDIT

## 2024-04-28 PROCEDURE — 1090000002 HH PPS REVENUE DEBIT

## 2024-04-29 PROCEDURE — 1090000002 HH PPS REVENUE DEBIT

## 2024-04-29 PROCEDURE — 1090000001 HH PPS REVENUE CREDIT

## 2024-04-30 ENCOUNTER — HOME CARE VISIT (OUTPATIENT)
Dept: HOME HEALTH SERVICES | Facility: HOME HEALTH | Age: 69
End: 2024-04-30
Payer: MEDICARE

## 2024-04-30 PROCEDURE — G0157 HHC PT ASSISTANT EA 15: HCPCS | Mod: HHH

## 2024-04-30 PROCEDURE — 1090000002 HH PPS REVENUE DEBIT

## 2024-04-30 PROCEDURE — 1090000001 HH PPS REVENUE CREDIT

## 2024-04-30 ASSESSMENT — ENCOUNTER SYMPTOMS
PAIN LOCATION - RELIEVING FACTORS: PAIN MEDICATION, ICE APPLICATION, REST AND REPOSITIONING
PAIN LOCATION - EXACERBATING FACTORS: ACTIVITY/MOBILITY
LOWEST PAIN SEVERITY IN PAST 24 HOURS: 3/10
PAIN LOCATION - PAIN SEVERITY: 8/10
PAIN LOCATION - PAIN QUALITY: SORE/ACHEY
PERSON REPORTING PAIN: PATIENT
PAIN: 1
PAIN LOCATION: RIGHT KNEE
PAIN SEVERITY GOAL: 0/10
SUBJECTIVE PAIN PROGRESSION: UNCHANGED
HIGHEST PAIN SEVERITY IN PAST 24 HOURS: 8/10

## 2024-05-01 ENCOUNTER — HOME CARE VISIT (OUTPATIENT)
Dept: HOME HEALTH SERVICES | Facility: HOME HEALTH | Age: 69
End: 2024-05-01
Payer: MEDICARE

## 2024-05-01 PROCEDURE — 1090000002 HH PPS REVENUE DEBIT

## 2024-05-01 PROCEDURE — G0152 HHCP-SERV OF OT,EA 15 MIN: HCPCS | Mod: HHH

## 2024-05-01 PROCEDURE — 1090000001 HH PPS REVENUE CREDIT

## 2024-05-02 ENCOUNTER — HOME CARE VISIT (OUTPATIENT)
Dept: HOME HEALTH SERVICES | Facility: HOME HEALTH | Age: 69
End: 2024-05-02
Payer: MEDICARE

## 2024-05-02 PROCEDURE — G0157 HHC PT ASSISTANT EA 15: HCPCS | Mod: HHH

## 2024-05-02 PROCEDURE — 1090000002 HH PPS REVENUE DEBIT

## 2024-05-02 PROCEDURE — 1090000001 HH PPS REVENUE CREDIT

## 2024-05-02 ASSESSMENT — ENCOUNTER SYMPTOMS
PAIN LOCATION - PAIN SEVERITY: 7/10
PERSON REPORTING PAIN: PATIENT
PAIN SEVERITY GOAL: 0/10
PAIN LOCATION - RELIEVING FACTORS: PAIN MEDICATION, ICE APPLICATION, REST AND REPOSITIONING
HIGHEST PAIN SEVERITY IN PAST 24 HOURS: 8/10
PAIN LOCATION - PAIN FREQUENCY: INTERMITTENT
PAIN LOCATION: RIGHT KNEE
PAIN: 1
PAIN LOCATION - EXACERBATING FACTORS: ACTIVITY/MOBILITY
PAIN LOCATION - PAIN QUALITY: SORE/ACHEY
SUBJECTIVE PAIN PROGRESSION: UNCHANGED
LOWEST PAIN SEVERITY IN PAST 24 HOURS: 3/10

## 2024-05-03 ENCOUNTER — HOME CARE VISIT (OUTPATIENT)
Dept: HOME HEALTH SERVICES | Facility: HOME HEALTH | Age: 69
End: 2024-05-03
Payer: MEDICARE

## 2024-05-03 PROCEDURE — 1090000002 HH PPS REVENUE DEBIT

## 2024-05-03 PROCEDURE — 1090000001 HH PPS REVENUE CREDIT

## 2024-05-04 PROCEDURE — 1090000002 HH PPS REVENUE DEBIT

## 2024-05-04 PROCEDURE — 1090000001 HH PPS REVENUE CREDIT

## 2024-05-05 PROCEDURE — 1090000002 HH PPS REVENUE DEBIT

## 2024-05-05 PROCEDURE — 1090000001 HH PPS REVENUE CREDIT

## 2024-05-06 ENCOUNTER — PATIENT OUTREACH (OUTPATIENT)
Dept: PRIMARY CARE | Facility: CLINIC | Age: 69
End: 2024-05-06
Payer: MEDICARE

## 2024-05-06 PROCEDURE — 1090000002 HH PPS REVENUE DEBIT

## 2024-05-06 PROCEDURE — 1090000001 HH PPS REVENUE CREDIT

## 2024-05-06 NOTE — PROGRESS NOTES
Outreach made for monthly post discharge follow up. At the time of outreach call, the patient stated she is progressing. She continues to work with home care PT/OT for her right leg injury and knee replacement. No questions regarding follow up appt with Dr. Rueda.

## 2024-05-06 NOTE — OP NOTE
PREOPERATIVE DIAGNOSIS:  1. Posttraumatic arthritis of right knee.  2. History of postoperative infection with tibial plateau fracture,  remote.   3. History of footdrop with peroneal nerve injury.  4. History of chronic regional pain syndrome.    POSTOPERATIVE DIAGNOSIS:  1. Posttraumatic arthritis of right knee.  2. History of postoperative infection with tibial plateau fracture,  remote.   3. History of footdrop with peroneal nerve injury.  4. History of chronic regional pain syndrome.    OPERATION/PROCEDURE:  Removal of hardware, deep, plates x2, right proximal tibia with  culturing.     SURGEON:  Andrzej Whelan MD.    ASSISTANT(S):  1. Chanda Lutz, physician assistant.  Please note that Chanda was  required for the entire case.   2. Julieta Saez, resident, who had academic obligations partially  during this case.     ANESTHESIA:  General LMA.    PREOPERATIVE INDICATIONS:  This is a 67-year-old female, who presents after a tibial plateau  fracture fixed by Dr. Tai.  The patient sustained this fracture  over a year ago, developed contracture of the knee and had a  secondary infection, which required debridement and antibiotics.  She  has subsequently developed significant posttraumatic arthritis of the  knee and secondary symptoms of hyperesthesia.  She also has a  footdrop, which she has had since the original injury.  At this time,  I evaluated her.  Her CRP was normal and did not show signs of  infection.  She had no gross signs of infection at this time and has  severe posttraumatic arthritis.  The patient in order to function  reasonably without pain, she is eventually going to require a knee  replacement, but at this time, we would remove the plate through  separate incision.  We would culture the area and we will see how she  does.  The risks and benefits were discussed and she wished to  proceed today.     DESCRIPTION OF OPERATION:  In the preoperative area, the patient was known to have  bigeminy  arrhythmia, which Cardiology knew about and cleared her before  surgery.  Anesthesia did a preoperative block, and then, she was  brought back to the room.  She had a full Huddle in the room and then  underwent general anesthesia LMA without difficulty.  She was given 2  g of Ancef IV.  She does have multiple drug allergies, one of them is  not Ancef.  She was also given TXA.  A tourniquet was placed in  proximal right thigh.  The entire right lower extremity was then  prepped and draped in usual sterile fashion.     A surgical pause was performed.  We evaluated the knee.  The knee  still lacked about 5 to 7 degrees of full extension and only flexed  to about 60 degrees even under general anesthesia.  We did not  attempt to manipulate.  We exsanguinated the right lower extremity  and elevated the tourniquet.  We made an incision in line with the  previous scar, which extended over the anterolateral joint surface  and turned proximally as it moved posteriorly.  We then extended the  incision distally over the anterior compartment, which was very close  to the midline.  We went down through the fascia and exposed a large  plate anterolaterally.  We removed all the screws from the plate.  We  extended the incision a little bit further distally, so we did not  have to percutaneously remove the last 2 as it would have been  difficult and probably not worth it, so we actually removed it  entirely through the incision without difficulty.  We did culture on  the plate and below the plate.  There were no gross signs of  infection.  The fracture appeared to be healed clinically.  Through  the same incision, we went on top of the fascia over the tibial  tubercle and found the mini frag plate over the tibial tubercle.  We  removed all 4 screws from this plate and removed this plate as well.  We brought in fluoroscopic image, which showed posttraumatic  arthritis of the knee.  We cultured the small plate as well  through  separate culture and we then released the tourniquet.  No major  bleeding was encountered.  We copiously irrigated out this area  including with IrriSept and flow irrigation.  We closed the fascia  with interrupted 0 Vicryl suture.  We closed the skin with 2-0 Vicryl  and 3-0 nylon suture.  Sterile dressings were applied.  The patient  tolerated procedure well. The patient will be weightbearing as  tolerated.  The patient would receive aspirin 81 mg b.i.d. for DVT  prophylaxis for 4 weeks.       Andrzej Whelan MD    DD:  06/23/2023 11:55:23 EST  DT:  06/23/2023 12:07:56 EST  DICTATION NUMBER:  [id number]  INTERNAL JOB NUMBER:  [id number]    CC:  MD Michael Ferguson MD, Fax: [phone]        Electronic Signatures:  Andrzej Whelan) (Signed on 23-Jun-2023 17:00)   Authored  Unsigned, Draft (SYS GENERATED) (Entered on 23-Jun-2023 12:07)   Entered    Last Updated: 23-Jun-2023 17:00 by Andrzej Whelan)

## 2024-05-07 PROCEDURE — 1090000002 HH PPS REVENUE DEBIT

## 2024-05-07 PROCEDURE — 1090000001 HH PPS REVENUE CREDIT

## 2024-05-08 ENCOUNTER — TELEPHONE (OUTPATIENT)
Dept: ORTHOPEDIC SURGERY | Facility: HOSPITAL | Age: 69
End: 2024-05-08
Payer: MEDICARE

## 2024-05-08 ENCOUNTER — HOSPITAL ENCOUNTER (INPATIENT)
Facility: HOSPITAL | Age: 69
LOS: 6 days | Discharge: NURSING, REHAB OR LONG-TERM CARE | DRG: 486 | End: 2024-05-15
Attending: EMERGENCY MEDICINE | Admitting: ORTHOPAEDIC SURGERY
Payer: MEDICARE

## 2024-05-08 ENCOUNTER — HOME CARE VISIT (OUTPATIENT)
Dept: HOME HEALTH SERVICES | Facility: HOME HEALTH | Age: 69
End: 2024-05-08
Payer: MEDICARE

## 2024-05-08 ENCOUNTER — HOSPITAL ENCOUNTER (EMERGENCY)
Facility: HOSPITAL | Age: 69
Discharge: OTHER INSTITUTION | End: 2024-05-08
Attending: STUDENT IN AN ORGANIZED HEALTH CARE EDUCATION/TRAINING PROGRAM
Payer: MEDICARE

## 2024-05-08 ENCOUNTER — APPOINTMENT (OUTPATIENT)
Dept: RADIOLOGY | Facility: HOSPITAL | Age: 69
End: 2024-05-08
Payer: MEDICARE

## 2024-05-08 VITALS
SYSTOLIC BLOOD PRESSURE: 117 MMHG | DIASTOLIC BLOOD PRESSURE: 74 MMHG | HEART RATE: 88 BPM | BODY MASS INDEX: 25.05 KG/M2 | OXYGEN SATURATION: 98 % | HEIGHT: 70 IN | TEMPERATURE: 98.8 F | RESPIRATION RATE: 18 BRPM | WEIGHT: 175 LBS

## 2024-05-08 VITALS — TEMPERATURE: 98.3 F

## 2024-05-08 DIAGNOSIS — G89.18 ACUTE POST-OPERATIVE PAIN: ICD-10-CM

## 2024-05-08 DIAGNOSIS — T81.89XA DRAINING POSTOPERATIVE WOUND, INITIAL ENCOUNTER: Primary | ICD-10-CM

## 2024-05-08 DIAGNOSIS — Z96.651 STATUS POST TOTAL RIGHT KNEE REPLACEMENT: ICD-10-CM

## 2024-05-08 DIAGNOSIS — T79.8XXA OTHER EARLY COMPLICATIONS OF TRAUMA, INITIAL ENCOUNTER (CMS-HCC): ICD-10-CM

## 2024-05-08 DIAGNOSIS — T81.30XA WOUND DEHISCENCE: Primary | ICD-10-CM

## 2024-05-08 LAB
ABO GROUP (TYPE) IN BLOOD: NORMAL
ALBUMIN SERPL BCP-MCNC: 4.4 G/DL (ref 3.4–5)
ALP SERPL-CCNC: 91 U/L (ref 33–136)
ALT SERPL W P-5'-P-CCNC: 11 U/L (ref 7–45)
ANION GAP BLDV CALCULATED.4IONS-SCNC: 9 MMOL/L (ref 10–25)
ANION GAP SERPL CALC-SCNC: 12 MMOL/L (ref 10–20)
ANTIBODY SCREEN: NORMAL
AST SERPL W P-5'-P-CCNC: 14 U/L (ref 9–39)
BASE EXCESS BLDV CALC-SCNC: 5.4 MMOL/L (ref -2–3)
BASOPHILS # BLD AUTO: 0.05 X10*3/UL (ref 0–0.1)
BASOPHILS NFR BLD AUTO: 0.5 %
BILIRUB SERPL-MCNC: 0.8 MG/DL (ref 0–1.2)
BODY TEMPERATURE: ABNORMAL
BUN SERPL-MCNC: 8 MG/DL (ref 6–23)
CA-I BLDV-SCNC: 1.24 MMOL/L (ref 1.1–1.33)
CALCIUM SERPL-MCNC: 9.7 MG/DL (ref 8.6–10.3)
CHLORIDE BLDV-SCNC: 98 MMOL/L (ref 98–107)
CHLORIDE SERPL-SCNC: 99 MMOL/L (ref 98–107)
CO2 SERPL-SCNC: 30 MMOL/L (ref 21–32)
CREAT SERPL-MCNC: 0.58 MG/DL (ref 0.5–1.05)
EGFRCR SERPLBLD CKD-EPI 2021: >90 ML/MIN/1.73M*2
EOSINOPHIL # BLD AUTO: 0.05 X10*3/UL (ref 0–0.7)
EOSINOPHIL NFR BLD AUTO: 0.5 %
ERYTHROCYTE [DISTWIDTH] IN BLOOD BY AUTOMATED COUNT: 12.7 % (ref 11.5–14.5)
GLUCOSE BLDV-MCNC: 99 MG/DL (ref 74–99)
GLUCOSE SERPL-MCNC: 91 MG/DL (ref 74–99)
HCO3 BLDV-SCNC: 32.4 MMOL/L (ref 22–26)
HCT VFR BLD AUTO: 43.3 % (ref 36–46)
HCT VFR BLD EST: 44 % (ref 36–46)
HGB BLD-MCNC: 13.9 G/DL (ref 12–16)
HGB BLDV-MCNC: 14.7 G/DL (ref 12–16)
IMM GRANULOCYTES # BLD AUTO: 0.04 X10*3/UL (ref 0–0.7)
IMM GRANULOCYTES NFR BLD AUTO: 0.4 % (ref 0–0.9)
INHALED O2 CONCENTRATION: 100 %
LACTATE BLDV-SCNC: 0.8 MMOL/L (ref 0.4–2)
LYMPHOCYTES # BLD AUTO: 1.94 X10*3/UL (ref 1.2–4.8)
LYMPHOCYTES NFR BLD AUTO: 20.8 %
MAGNESIUM SERPL-MCNC: 2.04 MG/DL (ref 1.6–2.4)
MCH RBC QN AUTO: 28.3 PG (ref 26–34)
MCHC RBC AUTO-ENTMCNC: 32.1 G/DL (ref 32–36)
MCV RBC AUTO: 88 FL (ref 80–100)
MONOCYTES # BLD AUTO: 0.78 X10*3/UL (ref 0.1–1)
MONOCYTES NFR BLD AUTO: 8.4 %
NEUTROPHILS # BLD AUTO: 6.48 X10*3/UL (ref 1.2–7.7)
NEUTROPHILS NFR BLD AUTO: 69.4 %
NRBC BLD-RTO: 0 /100 WBCS (ref 0–0)
OXYHGB MFR BLDV: 37.8 % (ref 45–75)
PCO2 BLDV: 56 MM HG (ref 41–51)
PH BLDV: 7.37 PH (ref 7.33–7.43)
PLATELET # BLD AUTO: 305 X10*3/UL (ref 150–450)
PO2 BLDV: 27 MM HG (ref 35–45)
POTASSIUM BLDV-SCNC: 4.1 MMOL/L (ref 3.5–5.3)
POTASSIUM SERPL-SCNC: 4.1 MMOL/L (ref 3.5–5.3)
PROT SERPL-MCNC: 8 G/DL (ref 6.4–8.2)
RBC # BLD AUTO: 4.91 X10*6/UL (ref 4–5.2)
RH FACTOR (ANTIGEN D): NORMAL
SAO2 % BLDV: 39 % (ref 45–75)
SODIUM BLDV-SCNC: 135 MMOL/L (ref 136–145)
SODIUM SERPL-SCNC: 137 MMOL/L (ref 136–145)
WBC # BLD AUTO: 9.3 X10*3/UL (ref 4.4–11.3)

## 2024-05-08 PROCEDURE — 96375 TX/PRO/DX INJ NEW DRUG ADDON: CPT

## 2024-05-08 PROCEDURE — 2500000001 HC RX 250 WO HCPCS SELF ADMINISTERED DRUGS (ALT 637 FOR MEDICARE OP): Performed by: STUDENT IN AN ORGANIZED HEALTH CARE EDUCATION/TRAINING PROGRAM

## 2024-05-08 PROCEDURE — 1090000001 HH PPS REVENUE CREDIT

## 2024-05-08 PROCEDURE — 99285 EMERGENCY DEPT VISIT HI MDM: CPT

## 2024-05-08 PROCEDURE — 36415 COLL VENOUS BLD VENIPUNCTURE: CPT | Performed by: PHYSICIAN ASSISTANT

## 2024-05-08 PROCEDURE — 0023 HH SOC

## 2024-05-08 PROCEDURE — 96374 THER/PROPH/DIAG INJ IV PUSH: CPT

## 2024-05-08 PROCEDURE — 99285 EMERGENCY DEPT VISIT HI MDM: CPT | Mod: 25

## 2024-05-08 PROCEDURE — 86850 RBC ANTIBODY SCREEN: CPT | Performed by: PHYSICIAN ASSISTANT

## 2024-05-08 PROCEDURE — G0152 HHCP-SERV OF OT,EA 15 MIN: HCPCS | Mod: HHH

## 2024-05-08 PROCEDURE — 2500000004 HC RX 250 GENERAL PHARMACY W/ HCPCS (ALT 636 FOR OP/ED): Mod: JZ | Performed by: PHYSICIAN ASSISTANT

## 2024-05-08 PROCEDURE — 84132 ASSAY OF SERUM POTASSIUM: CPT | Mod: 91 | Performed by: PHYSICIAN ASSISTANT

## 2024-05-08 PROCEDURE — 1090000002 HH PPS REVENUE DEBIT

## 2024-05-08 PROCEDURE — 1090000003 HH PPS REVENUE ADJ

## 2024-05-08 PROCEDURE — A4217 STERILE WATER/SALINE, 500 ML: HCPCS | Performed by: PHYSICIAN ASSISTANT

## 2024-05-08 PROCEDURE — 99285 EMERGENCY DEPT VISIT HI MDM: CPT | Performed by: EMERGENCY MEDICINE

## 2024-05-08 PROCEDURE — 2500000001 HC RX 250 WO HCPCS SELF ADMINISTERED DRUGS (ALT 637 FOR MEDICARE OP): Performed by: PHYSICIAN ASSISTANT

## 2024-05-08 PROCEDURE — 93010 ELECTROCARDIOGRAM REPORT: CPT | Performed by: EMERGENCY MEDICINE

## 2024-05-08 PROCEDURE — 73562 X-RAY EXAM OF KNEE 3: CPT | Mod: RT

## 2024-05-08 PROCEDURE — 85025 COMPLETE CBC W/AUTO DIFF WBC: CPT | Performed by: PHYSICIAN ASSISTANT

## 2024-05-08 PROCEDURE — 83735 ASSAY OF MAGNESIUM: CPT | Performed by: PHYSICIAN ASSISTANT

## 2024-05-08 PROCEDURE — 73562 X-RAY EXAM OF KNEE 3: CPT | Mod: RIGHT SIDE | Performed by: RADIOLOGY

## 2024-05-08 PROCEDURE — 84132 ASSAY OF SERUM POTASSIUM: CPT | Mod: 59,91 | Performed by: PHYSICIAN ASSISTANT

## 2024-05-08 RX ORDER — METRONIDAZOLE 500 MG/100ML
500 INJECTION, SOLUTION INTRAVENOUS ONCE
Status: COMPLETED | OUTPATIENT
Start: 2024-05-08 | End: 2024-05-08

## 2024-05-08 RX ORDER — ACETAMINOPHEN 325 MG/1
975 TABLET ORAL ONCE
Status: COMPLETED | OUTPATIENT
Start: 2024-05-08 | End: 2024-05-08

## 2024-05-08 RX ORDER — OXYCODONE HYDROCHLORIDE 5 MG/1
10 TABLET ORAL ONCE
Status: COMPLETED | OUTPATIENT
Start: 2024-05-08 | End: 2024-05-08

## 2024-05-08 RX ORDER — OXYCODONE HYDROCHLORIDE 5 MG/1
5 TABLET ORAL ONCE
Status: COMPLETED | OUTPATIENT
Start: 2024-05-08 | End: 2024-05-08

## 2024-05-08 RX ORDER — CEFEPIME 1 G/50ML
2 INJECTION, SOLUTION INTRAVENOUS ONCE
Status: DISCONTINUED | OUTPATIENT
Start: 2024-05-08 | End: 2024-05-08 | Stop reason: HOSPADM

## 2024-05-08 RX ADMIN — ACETAMINOPHEN 975 MG: 325 TABLET ORAL at 15:59

## 2024-05-08 RX ADMIN — OXYCODONE HYDROCHLORIDE 5 MG: 5 TABLET ORAL at 22:21

## 2024-05-08 RX ADMIN — OXYCODONE HYDROCHLORIDE 5 MG: 5 TABLET ORAL at 16:56

## 2024-05-08 RX ADMIN — METRONIDAZOLE 500 MG: 500 INJECTION, SOLUTION INTRAVENOUS at 22:11

## 2024-05-08 RX ADMIN — VANCOMYCIN HYDROCHLORIDE 2 G: 10 INJECTION, POWDER, LYOPHILIZED, FOR SOLUTION INTRAVENOUS at 16:56

## 2024-05-08 RX ADMIN — OXYCODONE HYDROCHLORIDE 5 MG: 5 TABLET ORAL at 15:59

## 2024-05-08 ASSESSMENT — COLUMBIA-SUICIDE SEVERITY RATING SCALE - C-SSRS
6. HAVE YOU EVER DONE ANYTHING, STARTED TO DO ANYTHING, OR PREPARED TO DO ANYTHING TO END YOUR LIFE?: NO
6. HAVE YOU EVER DONE ANYTHING, STARTED TO DO ANYTHING, OR PREPARED TO DO ANYTHING TO END YOUR LIFE?: NO
2. HAVE YOU ACTUALLY HAD ANY THOUGHTS OF KILLING YOURSELF?: NO
1. IN THE PAST MONTH, HAVE YOU WISHED YOU WERE DEAD OR WISHED YOU COULD GO TO SLEEP AND NOT WAKE UP?: NO
1. IN THE PAST MONTH, HAVE YOU WISHED YOU WERE DEAD OR WISHED YOU COULD GO TO SLEEP AND NOT WAKE UP?: NO
2. HAVE YOU ACTUALLY HAD ANY THOUGHTS OF KILLING YOURSELF?: NO

## 2024-05-08 ASSESSMENT — PAIN DESCRIPTION - PROGRESSION
CLINICAL_PROGRESSION: GRADUALLY IMPROVING
CLINICAL_PROGRESSION: NOT CHANGED

## 2024-05-08 ASSESSMENT — ENCOUNTER SYMPTOMS
HIGHEST PAIN SEVERITY IN PAST 24 HOURS: 9/10
PAIN LOCATION: GENERALIZED
PAIN LOCATION - PAIN FREQUENCY: CONSTANT
PERSON REPORTING PAIN: PATIENT
PAIN SEVERITY GOAL: 0/10
PAIN LOCATION - PAIN SEVERITY: 7/10
PAIN: 1
LOWEST PAIN SEVERITY IN PAST 24 HOURS: 5/10
SUBJECTIVE PAIN PROGRESSION: WAXING AND WANING
PAIN LOCATION - EXACERBATING FACTORS: ROM, WB
PAIN LOCATION - RELIEVING FACTORS: MEDS, REST, ICE
PAIN LOCATION - PAIN QUALITY: ACHING

## 2024-05-08 ASSESSMENT — LIFESTYLE VARIABLES
HAVE PEOPLE ANNOYED YOU BY CRITICIZING YOUR DRINKING: NO
EVER HAD A DRINK FIRST THING IN THE MORNING TO STEADY YOUR NERVES TO GET RID OF A HANGOVER: NO
TOTAL SCORE: 0
EVER FELT BAD OR GUILTY ABOUT YOUR DRINKING: NO
HAVE YOU EVER FELT YOU SHOULD CUT DOWN ON YOUR DRINKING: NO

## 2024-05-08 ASSESSMENT — PAIN DESCRIPTION - ONSET: ONSET: ONGOING

## 2024-05-08 ASSESSMENT — PAIN - FUNCTIONAL ASSESSMENT
PAIN_FUNCTIONAL_ASSESSMENT: 0-10

## 2024-05-08 ASSESSMENT — PAIN SCALES - GENERAL
PAINLEVEL_OUTOF10: 8
PAINLEVEL_OUTOF10: 4
PAINLEVEL_OUTOF10: 8
PAINLEVEL_OUTOF10: 8

## 2024-05-08 ASSESSMENT — PAIN DESCRIPTION - ORIENTATION: ORIENTATION: RIGHT

## 2024-05-08 ASSESSMENT — PAIN DESCRIPTION - FREQUENCY: FREQUENCY: CONSTANT/CONTINUOUS

## 2024-05-08 ASSESSMENT — PAIN DESCRIPTION - LOCATION: LOCATION: KNEE
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
## 2024-05-08 NOTE — TELEPHONE ENCOUNTER
Patient called c/o odor and drainage from knee incision. Patient advised to go to [facility] ED but states she does not have a ride. Advised patient to go to nearest ED and providers will reach out our surgeons and discuss patient care. Patient verbalized understanding.

## 2024-05-09 ENCOUNTER — HOSPITAL ENCOUNTER (OUTPATIENT)
Facility: HOSPITAL | Age: 69
Setting detail: SURGERY ADMIT
End: 2024-05-09
Attending: ORTHOPAEDIC SURGERY | Admitting: ORTHOPAEDIC SURGERY
Payer: MEDICARE

## 2024-05-09 ENCOUNTER — CLINICAL SUPPORT (OUTPATIENT)
Dept: EMERGENCY MEDICINE | Facility: HOSPITAL | Age: 69
DRG: 486 | End: 2024-05-09
Payer: MEDICARE

## 2024-05-09 ENCOUNTER — APPOINTMENT (OUTPATIENT)
Dept: RADIOLOGY | Facility: HOSPITAL | Age: 69
DRG: 486 | End: 2024-05-09
Payer: MEDICARE

## 2024-05-09 ENCOUNTER — HOME CARE VISIT (OUTPATIENT)
Dept: HOME HEALTH SERVICES | Facility: HOME HEALTH | Age: 69
End: 2024-05-09
Payer: MEDICARE

## 2024-05-09 ENCOUNTER — ANESTHESIA EVENT (OUTPATIENT)
Dept: OPERATING ROOM | Facility: HOSPITAL | Age: 69
DRG: 486 | End: 2024-05-09
Payer: MEDICARE

## 2024-05-09 PROBLEM — T81.30XA WOUND DEHISCENCE: Status: ACTIVE | Noted: 2024-05-09

## 2024-05-09 PROBLEM — T79.8XXA OTHER EARLY COMPLICATIONS OF TRAUMA, INITIAL ENCOUNTER (CMS-HCC): Status: ACTIVE | Noted: 2024-05-08

## 2024-05-09 LAB
ABO GROUP (TYPE) IN BLOOD: NORMAL
ANION GAP SERPL CALC-SCNC: 12 MMOL/L (ref 10–20)
ANTIBODY SCREEN: NORMAL
APTT PPP: 31 SECONDS (ref 27–38)
BASOPHILS # BLD AUTO: 0.04 X10*3/UL (ref 0–0.1)
BASOPHILS NFR BLD AUTO: 0.5 %
BASOPHILS NFR FLD MANUAL: 0 %
BLASTS NFR FLD MANUAL: 0 %
BUN SERPL-MCNC: 8 MG/DL (ref 6–23)
CALCIUM SERPL-MCNC: 9.2 MG/DL (ref 8.6–10.6)
CHLORIDE SERPL-SCNC: 100 MMOL/L (ref 98–107)
CLARITY FLD: ABNORMAL
CO2 SERPL-SCNC: 28 MMOL/L (ref 21–32)
COLOR FLD: ABNORMAL
CREAT SERPL-MCNC: 0.53 MG/DL (ref 0.5–1.05)
CRP SERPL-MCNC: 4.19 MG/DL
CRYSTALS FLD MICRO: NORMAL
EGFRCR SERPLBLD CKD-EPI 2021: >90 ML/MIN/1.73M*2
EOSINOPHIL # BLD AUTO: 0.09 X10*3/UL (ref 0–0.7)
EOSINOPHIL NFR BLD AUTO: 1.1 %
EOSINOPHIL NFR FLD MANUAL: 0 %
ERYTHROCYTE [DISTWIDTH] IN BLOOD BY AUTOMATED COUNT: 12.7 % (ref 11.5–14.5)
ERYTHROCYTE [SEDIMENTATION RATE] IN BLOOD BY WESTERGREN METHOD: 32 MM/H (ref 0–30)
GLUCOSE SERPL-MCNC: 101 MG/DL (ref 74–99)
HCT VFR BLD AUTO: 39 % (ref 36–46)
HGB BLD-MCNC: 12.8 G/DL (ref 12–16)
IMM GRANULOCYTES # BLD AUTO: 0.02 X10*3/UL (ref 0–0.7)
IMM GRANULOCYTES NFR BLD AUTO: 0.2 % (ref 0–0.9)
IMMATURE GRANULOCYTES IN FLUID: 0 %
INR PPP: 1.1 (ref 0.9–1.1)
LYMPHOCYTES # BLD AUTO: 2.02 X10*3/UL (ref 1.2–4.8)
LYMPHOCYTES NFR BLD AUTO: 24.7 %
LYMPHOCYTES NFR FLD MANUAL: 7 %
MAGNESIUM SERPL-MCNC: 2.04 MG/DL (ref 1.6–2.4)
MCH RBC QN AUTO: 27.9 PG (ref 26–34)
MCHC RBC AUTO-ENTMCNC: 32.8 G/DL (ref 32–36)
MCV RBC AUTO: 85 FL (ref 80–100)
MONOCYTES # BLD AUTO: 0.83 X10*3/UL (ref 0.1–1)
MONOCYTES NFR BLD AUTO: 10.2 %
MONOS+MACROS NFR FLD MANUAL: 0 %
NEUTROPHILS # BLD AUTO: 5.17 X10*3/UL (ref 1.2–7.7)
NEUTROPHILS NFR BLD AUTO: 63.3 %
NEUTROPHILS NFR FLD MANUAL: 93 %
NRBC BLD-RTO: 0 /100 WBCS (ref 0–0)
OTHER CELLS NFR FLD MANUAL: 0 %
PLASMA CELLS NFR FLD MANUAL: 0 %
PLATELET # BLD AUTO: 271 X10*3/UL (ref 150–450)
POTASSIUM SERPL-SCNC: 4.1 MMOL/L (ref 3.5–5.3)
PROTHROMBIN TIME: 12.3 SECONDS (ref 9.8–12.8)
RBC # BLD AUTO: 4.58 X10*6/UL (ref 4–5.2)
RBC # FLD AUTO: ABNORMAL /UL
RH FACTOR (ANTIGEN D): NORMAL
SODIUM SERPL-SCNC: 136 MMOL/L (ref 136–145)
TOTAL CELLS COUNTED FLD: 100
WBC # BLD AUTO: 8.2 X10*3/UL (ref 4.4–11.3)
WBC # FLD AUTO: 1748 /UL

## 2024-05-09 PROCEDURE — 93005 ELECTROCARDIOGRAM TRACING: CPT

## 2024-05-09 PROCEDURE — 85730 THROMBOPLASTIN TIME PARTIAL: CPT | Mod: 91 | Performed by: STUDENT IN AN ORGANIZED HEALTH CARE EDUCATION/TRAINING PROGRAM

## 2024-05-09 PROCEDURE — 99222 1ST HOSP IP/OBS MODERATE 55: CPT | Performed by: STUDENT IN AN ORGANIZED HEALTH CARE EDUCATION/TRAINING PROGRAM

## 2024-05-09 PROCEDURE — 71045 X-RAY EXAM CHEST 1 VIEW: CPT

## 2024-05-09 PROCEDURE — 2500000004 HC RX 250 GENERAL PHARMACY W/ HCPCS (ALT 636 FOR OP/ED)

## 2024-05-09 PROCEDURE — 1090000001 HH PPS REVENUE CREDIT

## 2024-05-09 PROCEDURE — 82374 ASSAY BLOOD CARBON DIOXIDE: CPT

## 2024-05-09 PROCEDURE — 83735 ASSAY OF MAGNESIUM: CPT | Performed by: STUDENT IN AN ORGANIZED HEALTH CARE EDUCATION/TRAINING PROGRAM

## 2024-05-09 PROCEDURE — 85025 COMPLETE CBC W/AUTO DIFF WBC: CPT

## 2024-05-09 PROCEDURE — 2500000001 HC RX 250 WO HCPCS SELF ADMINISTERED DRUGS (ALT 637 FOR MEDICARE OP)

## 2024-05-09 PROCEDURE — 85652 RBC SED RATE AUTOMATED: CPT | Performed by: STUDENT IN AN ORGANIZED HEALTH CARE EDUCATION/TRAINING PROGRAM

## 2024-05-09 PROCEDURE — 1090000002 HH PPS REVENUE DEBIT

## 2024-05-09 PROCEDURE — 86901 BLOOD TYPING SEROLOGIC RH(D): CPT | Performed by: STUDENT IN AN ORGANIZED HEALTH CARE EDUCATION/TRAINING PROGRAM

## 2024-05-09 PROCEDURE — 89051 BODY FLUID CELL COUNT: CPT

## 2024-05-09 PROCEDURE — 87070 CULTURE OTHR SPECIMN AEROBIC: CPT

## 2024-05-09 PROCEDURE — 36415 COLL VENOUS BLD VENIPUNCTURE: CPT

## 2024-05-09 PROCEDURE — 36415 COLL VENOUS BLD VENIPUNCTURE: CPT | Performed by: STUDENT IN AN ORGANIZED HEALTH CARE EDUCATION/TRAINING PROGRAM

## 2024-05-09 PROCEDURE — 99222 1ST HOSP IP/OBS MODERATE 55: CPT

## 2024-05-09 PROCEDURE — 86140 C-REACTIVE PROTEIN: CPT | Performed by: STUDENT IN AN ORGANIZED HEALTH CARE EDUCATION/TRAINING PROGRAM

## 2024-05-09 PROCEDURE — 1100000001 HC PRIVATE ROOM DAILY

## 2024-05-09 PROCEDURE — 89060 EXAM SYNOVIAL FLUID CRYSTALS: CPT

## 2024-05-09 RX ORDER — OXYCODONE HYDROCHLORIDE 5 MG/1
10 TABLET ORAL EVERY 4 HOURS PRN
Status: DISCONTINUED | OUTPATIENT
Start: 2024-05-09 | End: 2024-05-10

## 2024-05-09 RX ORDER — DIAZEPAM 5 MG/ML
2 INJECTION, SOLUTION INTRAMUSCULAR; INTRAVENOUS ONCE
Status: COMPLETED | OUTPATIENT
Start: 2024-05-09 | End: 2024-05-09

## 2024-05-09 RX ORDER — SODIUM CHLORIDE, SODIUM LACTATE, POTASSIUM CHLORIDE, CALCIUM CHLORIDE 600; 310; 30; 20 MG/100ML; MG/100ML; MG/100ML; MG/100ML
100 INJECTION, SOLUTION INTRAVENOUS CONTINUOUS
Status: DISCONTINUED | OUTPATIENT
Start: 2024-05-09 | End: 2024-05-10

## 2024-05-09 RX ORDER — DOCUSATE SODIUM 100 MG/1
100 CAPSULE, LIQUID FILLED ORAL EVERY OTHER DAY
COMMUNITY
End: 2024-05-15 | Stop reason: HOSPADM

## 2024-05-09 RX ORDER — ALBUTEROL SULFATE 90 UG/1
2 AEROSOL, METERED RESPIRATORY (INHALATION) EVERY 6 HOURS PRN
Status: DISCONTINUED | OUTPATIENT
Start: 2024-05-09 | End: 2024-05-15 | Stop reason: HOSPADM

## 2024-05-09 RX ORDER — OXYCODONE HYDROCHLORIDE 5 MG/1
5 TABLET ORAL EVERY 4 HOURS PRN
Status: DISCONTINUED | OUTPATIENT
Start: 2024-05-09 | End: 2024-05-10

## 2024-05-09 RX ORDER — SCOLOPAMINE TRANSDERMAL SYSTEM 1 MG/1
1 PATCH, EXTENDED RELEASE TRANSDERMAL
Status: DISCONTINUED | OUTPATIENT
Start: 2024-05-09 | End: 2024-05-15 | Stop reason: HOSPADM

## 2024-05-09 RX ORDER — LIDOCAINE HYDROCHLORIDE 5 MG/ML
20 INJECTION, SOLUTION INFILTRATION; PERINEURAL ONCE
Qty: 20 ML | Refills: 0 | Status: DISCONTINUED | OUTPATIENT
Start: 2024-05-09 | End: 2024-05-15 | Stop reason: HOSPADM

## 2024-05-09 RX ORDER — BACLOFEN 10 MG/1
5 TABLET ORAL DAILY PRN
Status: DISCONTINUED | OUTPATIENT
Start: 2024-05-09 | End: 2024-05-15 | Stop reason: HOSPADM

## 2024-05-09 RX ORDER — ACETAMINOPHEN 325 MG/1
650 TABLET ORAL EVERY 6 HOURS
Status: DISCONTINUED | OUTPATIENT
Start: 2024-05-09 | End: 2024-05-10

## 2024-05-09 RX ADMIN — SCOPOLAMINE 1 PATCH: 1.5 PATCH, EXTENDED RELEASE TRANSDERMAL at 18:24

## 2024-05-09 RX ADMIN — DIAZEPAM 2 MG: 5 INJECTION, SOLUTION INTRAMUSCULAR; INTRAVENOUS at 20:24

## 2024-05-09 RX ADMIN — OXYCODONE HYDROCHLORIDE 10 MG: 5 TABLET ORAL at 01:37

## 2024-05-09 RX ADMIN — OXYCODONE HYDROCHLORIDE 10 MG: 5 TABLET ORAL at 07:21

## 2024-05-09 RX ADMIN — SODIUM CHLORIDE, POTASSIUM CHLORIDE, SODIUM LACTATE AND CALCIUM CHLORIDE 100 ML/HR: 600; 310; 30; 20 INJECTION, SOLUTION INTRAVENOUS at 19:35

## 2024-05-09 SDOH — SOCIAL STABILITY: SOCIAL INSECURITY: DO YOU FEEL UNSAFE GOING BACK TO THE PLACE WHERE YOU ARE LIVING?: NO

## 2024-05-09 SDOH — SOCIAL STABILITY: SOCIAL INSECURITY: HAS ANYONE EVER THREATENED TO HURT YOUR FAMILY OR YOUR PETS?: NO

## 2024-05-09 SDOH — SOCIAL STABILITY: SOCIAL INSECURITY: ABUSE: ADULT

## 2024-05-09 SDOH — SOCIAL STABILITY: SOCIAL INSECURITY: HAVE YOU HAD ANY THOUGHTS OF HARMING ANYONE ELSE?: NO

## 2024-05-09 SDOH — SOCIAL STABILITY: SOCIAL INSECURITY: HAVE YOU HAD THOUGHTS OF HARMING ANYONE ELSE?: NO

## 2024-05-09 SDOH — SOCIAL STABILITY: SOCIAL INSECURITY: DO YOU FEEL ANYONE HAS EXPLOITED OR TAKEN ADVANTAGE OF YOU FINANCIALLY OR OF YOUR PERSONAL PROPERTY?: NO

## 2024-05-09 SDOH — SOCIAL STABILITY: SOCIAL INSECURITY: ARE YOU OR HAVE YOU BEEN THREATENED OR ABUSED PHYSICALLY, EMOTIONALLY, OR SEXUALLY BY ANYONE?: NO

## 2024-05-09 SDOH — SOCIAL STABILITY: SOCIAL INSECURITY: DOES ANYONE TRY TO KEEP YOU FROM HAVING/CONTACTING OTHER FRIENDS OR DOING THINGS OUTSIDE YOUR HOME?: NO

## 2024-05-09 SDOH — SOCIAL STABILITY: SOCIAL INSECURITY: WERE YOU ABLE TO COMPLETE ALL THE BEHAVIORAL HEALTH SCREENINGS?: NO

## 2024-05-09 SDOH — SOCIAL STABILITY: SOCIAL INSECURITY: ARE THERE ANY APPARENT SIGNS OF INJURIES/BEHAVIORS THAT COULD BE RELATED TO ABUSE/NEGLECT?: NO

## 2024-05-09 ASSESSMENT — LIFESTYLE VARIABLES
TOTAL SCORE: 0
AUDIT-C TOTAL SCORE: 1
AUDIT-C TOTAL SCORE: 1
EVER HAD A DRINK FIRST THING IN THE MORNING TO STEADY YOUR NERVES TO GET RID OF A HANGOVER: NO
HAVE YOU EVER FELT YOU SHOULD CUT DOWN ON YOUR DRINKING: NO
EVER FELT BAD OR GUILTY ABOUT YOUR DRINKING: NO
HOW OFTEN DO YOU HAVE 6 OR MORE DRINKS ON ONE OCCASION: NEVER
HAVE PEOPLE ANNOYED YOU BY CRITICIZING YOUR DRINKING: NO
HOW OFTEN DO YOU HAVE A DRINK CONTAINING ALCOHOL: MONTHLY OR LESS
HOW MANY STANDARD DRINKS CONTAINING ALCOHOL DO YOU HAVE ON A TYPICAL DAY: 1 OR 2
SKIP TO QUESTIONS 9-10: 1

## 2024-05-09 ASSESSMENT — COGNITIVE AND FUNCTIONAL STATUS - GENERAL
DRESSING REGULAR LOWER BODY CLOTHING: A LITTLE
WALKING IN HOSPITAL ROOM: A LOT
HELP NEEDED FOR BATHING: A LOT
MOVING FROM LYING ON BACK TO SITTING ON SIDE OF FLAT BED WITH BEDRAILS: A LITTLE
WALKING IN HOSPITAL ROOM: A LOT
CLIMB 3 TO 5 STEPS WITH RAILING: TOTAL
HELP NEEDED FOR BATHING: A LITTLE
DRESSING REGULAR LOWER BODY CLOTHING: A LOT
CLIMB 3 TO 5 STEPS WITH RAILING: TOTAL
DAILY ACTIVITIY SCORE: 21
MOBILITY SCORE: 18
DAILY ACTIVITIY SCORE: 21
MOVING FROM LYING ON BACK TO SITTING ON SIDE OF FLAT BED WITH BEDRAILS: A LITTLE
MOBILITY SCORE: 18
PATIENT BASELINE BEDBOUND: NO

## 2024-05-09 ASSESSMENT — PAIN SCALES - GENERAL
PAINLEVEL_OUTOF10: 7
PAINLEVEL_OUTOF10: 9
PAINLEVEL_OUTOF10: 4

## 2024-05-09 ASSESSMENT — ACTIVITIES OF DAILY LIVING (ADL)
FEEDING YOURSELF: INDEPENDENT
JUDGMENT_ADEQUATE_SAFELY_COMPLETE_DAILY_ACTIVITIES: YES
DRESSING YOURSELF: NEEDS ASSISTANCE
HEARING - LEFT EAR: FUNCTIONAL
ADEQUATE_TO_COMPLETE_ADL: YES
WALKS IN HOME: NEEDS ASSISTANCE
WALKS IN HOME: NEEDS ASSISTANCE
GROOMING: NEEDS ASSISTANCE
HEARING - RIGHT EAR: FUNCTIONAL
TOILETING: NEEDS ASSISTANCE
EFFECT OF PAIN ON DAILY ACTIVITIES: MOVEMENT
LACK_OF_TRANSPORTATION: NO
BATHING: NEEDS ASSISTANCE
PATIENT'S MEMORY ADEQUATE TO SAFELY COMPLETE DAILY ACTIVITIES?: YES

## 2024-05-09 ASSESSMENT — PAIN - FUNCTIONAL ASSESSMENT
PAIN_FUNCTIONAL_ASSESSMENT: 0-10

## 2024-05-09 ASSESSMENT — PAIN DESCRIPTION - DESCRIPTORS: DESCRIPTORS: DISCOMFORT;SHARP;SORE

## 2024-05-09 ASSESSMENT — PATIENT HEALTH QUESTIONNAIRE - PHQ9
2. FEELING DOWN, DEPRESSED OR HOPELESS: MORE THAN HALF THE DAYS
SUM OF ALL RESPONSES TO PHQ9 QUESTIONS 1 & 2: 2
1. LITTLE INTEREST OR PLEASURE IN DOING THINGS: NOT AT ALL

## 2024-05-09 NOTE — PROGRESS NOTES
Pharmacy Admission Order Reconciliation Review    Adriana Wood is a 68 y.o. female admitted for Wound dehiscence. Pharmacy reviewed the patient's unreconciled admission medications.    Prior to admission medications that were reviewed and acted on by the pharmacist include:  docusate  These medications have been reconciled.     Any other unreconcilied medications have been addressed and will be ordered or held by the patient's medical team. Medications addressed by the pharmacist may be added or changed by the patient's medical team at any time.    Eileen Simons, PharmD  Transitions of Care Pharmacist  [facility]  Please reach out via Secure Chat for questions

## 2024-05-09 NOTE — PROGRESS NOTES
Pharmacy Medication History Review    Adriana Wood is a 68 y.o. female admitted for Wound dehiscence. Pharmacy reviewed the patient's [id number] medications and allergies for accuracy.    The list below reflects the updated PTA list. Comments regarding how patient may be taking medications differently can be found in the Admit Orders Activity  Prior to Admission Medications   Prescriptions Last Dose Informant Patient Reported? Taking?   acetaminophen (Tylenol) 325 mg tablet Not Taking Self No No   Sig: Take 2 tablets (650 mg) by mouth every 6 hours if needed for mild pain (1 - 3).   Patient not taking: Reported on 2024   albuterol 90 mcg/actuation inhaler Over a Month at PRN Self No PRN   Sig: Inhale 2 puffs every 6 hours if needed for shortness of breath.   baclofen (Lioresal) 5 mg tablet Past Month Self Yes Yes   Sig: Take 1 tablet (5 mg) by mouth once daily as needed for muscle spasms.   docusate sodium (Colace) 100 mg capsule Past Week Self Yes Yes   Sig: Take 1 capsule (100 mg) by mouth every other day. 1-2 pills   naloxone (Narcan) 4 mg/0.1 mL nasal spray  at PRN Self No No   Sig: Administer 1 spray (4 mg) into affected nostril(s) if needed for opioid reversal. May repeat every 2-3 minutes if needed, alternating nostrils, until medical assistance becomes available.   oxyCODONE (Roxicodone) 5 mg immediate release tablet 2024 Self No Yes   Sig: Take 2 tablets (10 mg) by mouth every 6 hours if needed for moderate pain (4 - 6).   Patient takin-2 tablets as needed for pain and anticipated pain with activity      Facility-Administered Medications: None        The list below reflects the updated allergy list. Please review each documented allergy for additional clarification and justification.  Allergies  Reviewed by Tenzin Saini MD on 2024        Severity Reactions Comments    Iodinated Contrast Media High Anaphylaxis     Naproxen High Other, GI bleeding Causes internal bleeding  "   Penicillins High Anaphylaxis, Rash, Other Seizures    Tramadol High Unknown, Other stimulant behavior Keeps her up all night    Zofran [ondansetron Hcl] High Cardiac arrhythmia/arrest Long QT, went into cardiac arrest.    Vibramycin [doxycycline Calcium] Medium Nausea/vomiting     Augmentin [amoxicillin-pot Clavulanate] Low Rash     Doxycycline Hyclate Low Rash     Duloxetine Low Diarrhea             Patient declines M2B at discharge. Pharmacy has been updated to [id number].    Sources used to complete the med history include   [employer], [employer] fill history, care everywhere  Patient interview, patient was an excellent historian, and independently listed all medications and information   Patient reports she will take 1-2 tablets of oxycodone, based on her pain level when she takes the medication and what she will be doing (ex. Will take 2 tablets before therapy, may only need 1 tablet otherwise)  Patient takes docusate roughly every other day, however reports it has been about a week since she last took it, and has not had a bowel movement in \"a few days\"    Below are additional concerns with the patient's PTA list.  None     Medications ADDED:  None   Medications CHANGED:  None   Medications REMOVED:   None     Dayan Garcia, PharmD, [facility] PGY1 Pharmacy Resident   [facility] Ambulatory and Retail Services  Please reach out via e[employer]I Secure Chat for questions, or if no response call [employer] or [employer] \"[facility]\"    "
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
## 2024-05-09 NOTE — CARE PLAN
Problem: Safety - Adult  Goal: Free from fall injury  Outcome: Progressing     Problem: Discharge Planning  Goal: Discharge to home or other facility with appropriate resources  Outcome: Progressing     Problem: Chronic Conditions and Co-morbidities  Goal: Patient's chronic conditions and co-morbidity symptoms are monitored and maintained or improved  Outcome: Progressing     Problem: Pain  Goal: Takes deep breaths with improved pain control throughout the shift  Outcome: Progressing  Goal: Turns in bed with improved pain control throughout the shift  Outcome: Progressing  Goal: Walks with improved pain control throughout the shift  Outcome: Progressing  Goal: Performs ADL's with improved pain control throughout shift  Outcome: Progressing  Goal: Participates in PT with improved pain control throughout the shift  Outcome: Progressing  Goal: Free from opioid side effects throughout the shift  Outcome: Progressing  Goal: Free from acute confusion related to pain meds throughout the shift  Outcome: Progressing     Problem: Fall/Injury  Goal: Not fall by end of shift  Outcome: Progressing  Goal: Be free from injury by end of the shift  Outcome: Progressing  Goal: Verbalize understanding of personal risk factors for fall in the hospital  Outcome: Progressing  Goal: Verbalize understanding of risk factor reduction measures to prevent injury from fall in the home  Outcome: Progressing  Goal: Use assistive devices by end of the shift  Outcome: Progressing  Goal: Pace activities to prevent fatigue by end of the shift  Outcome: Progressing     Problem: Infection related to problem list condition  Goal: Infection will resolve through treatment  Outcome: Progressing   The patient's goals for the shift include      The clinical goals for the shift include pain control
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
## 2024-05-09 NOTE — CARE PLAN
Problem: Safety - Adult  Goal: Free from fall injury  Outcome: Progressing     Problem: Discharge Planning  Goal: Discharge to home or other facility with appropriate resources  Outcome: Progressing     Problem: Chronic Conditions and Co-morbidities  Goal: Patient's chronic conditions and co-morbidity symptoms are monitored and maintained or improved  Outcome: Progressing     Problem: Pain  Goal: Walks with improved pain control throughout the shift  Outcome: Progressing     Problem: Pain  Goal: Performs ADL's with improved pain control throughout shift  Outcome: Progressing     Problem: Pain  Goal: Free from opioid side effects throughout the shift  Outcome: Progressing     Problem: Fall/Injury  Goal: Not fall by end of shift  Outcome: Progressing     Problem: Fall/Injury  Goal: Verbalize understanding of personal risk factors for fall in the hospital  Outcome: Progressing     Problem: Fall/Injury  Goal: Verbalize understanding of risk factor reduction measures to prevent injury from fall in the home  Outcome: Progressing     Problem: Infection related to problem list condition  Goal: Infection will resolve through treatment  Outcome: Progressing   The patient's goals for the shift include : Keeping my pain low     The clinical goals for the shift include : Patient to remain HDS and sepsis-free, considering possible infection to right knee

## 2024-05-09 NOTE — PROGRESS NOTES
I met with Adriana at the bedside regarding discharge planning and home going needs. Patient lives home with her spouse [phone] where she is dependent with ADL's she does have a wheelchair, walker, bedside commode and shower chair. Patient states that she has been receiving in home therapy PT/OT with [facility] services, she states that if she is recommended for moderate intensity therapy at discharge she would like her referral placed with [facility] [place]. Patient is not medically cleared for discharge pending cultures and ID recommendations and possible PICC Line placement. I will continue to follow with a safe discharge plan.

## 2024-05-10 ENCOUNTER — ANESTHESIA (OUTPATIENT)
Dept: OPERATING ROOM | Facility: HOSPITAL | Age: 69
DRG: 486 | End: 2024-05-10
Payer: MEDICARE

## 2024-05-10 LAB
ATRIAL RATE: 75 BPM
P AXIS: 68 DEGREES
P OFFSET: 181 MS
P ONSET: 126 MS
PR INTERVAL: 196 MS
Q ONSET: 224 MS
QRS COUNT: 13 BEATS
QRS DURATION: 80 MS
QT INTERVAL: 450 MS
QTC CALCULATION(BAZETT): 502 MS
QTC FREDERICIA: 484 MS
R AXIS: 73 DEGREES
T AXIS: 36 DEGREES
T OFFSET: 449 MS
VENTRICULAR RATE: 75 BPM

## 2024-05-10 PROCEDURE — 2500000004 HC RX 250 GENERAL PHARMACY W/ HCPCS (ALT 636 FOR OP/ED)

## 2024-05-10 PROCEDURE — A27487 PR REVISE KNEE JOINT REPLACE,ALL PARTS: Performed by: ANESTHESIOLOGY

## 2024-05-10 PROCEDURE — 36415 COLL VENOUS BLD VENIPUNCTURE: CPT | Performed by: STUDENT IN AN ORGANIZED HEALTH CARE EDUCATION/TRAINING PROGRAM

## 2024-05-10 PROCEDURE — 3600000008 HC OR TIME - EACH INCREMENTAL 1 MINUTE - PROCEDURE LEVEL THREE: Performed by: ORTHOPAEDIC SURGERY

## 2024-05-10 PROCEDURE — 2500000004 HC RX 250 GENERAL PHARMACY W/ HCPCS (ALT 636 FOR OP/ED): Performed by: ANESTHESIOLOGIST ASSISTANT

## 2024-05-10 PROCEDURE — 99232 SBSQ HOSP IP/OBS MODERATE 35: CPT | Performed by: STUDENT IN AN ORGANIZED HEALTH CARE EDUCATION/TRAINING PROGRAM

## 2024-05-10 PROCEDURE — 87205 SMEAR GRAM STAIN: CPT | Mod: 91,MUE | Performed by: ORTHOPAEDIC SURGERY

## 2024-05-10 PROCEDURE — 27487 REVISE/REPLACE KNEE JOINT: CPT | Performed by: ORTHOPAEDIC SURGERY

## 2024-05-10 PROCEDURE — 1090000002 HH PPS REVENUE DEBIT

## 2024-05-10 PROCEDURE — 2500000001 HC RX 250 WO HCPCS SELF ADMINISTERED DRUGS (ALT 637 FOR MEDICARE OP)

## 2024-05-10 PROCEDURE — 87185 SC STD ENZYME DETCJ PER NZM: CPT | Mod: 91 | Performed by: ORTHOPAEDIC SURGERY

## 2024-05-10 PROCEDURE — 2500000004 HC RX 250 GENERAL PHARMACY W/ HCPCS (ALT 636 FOR OP/ED): Performed by: ANESTHESIOLOGY

## 2024-05-10 PROCEDURE — 0SUV09Z SUPPLEMENT RIGHT KNEE JOINT, TIBIAL SURFACE WITH LINER, OPEN APPROACH: ICD-10-PCS | Performed by: ORTHOPAEDIC SURGERY

## 2024-05-10 PROCEDURE — 0SPC09Z REMOVAL OF LINER FROM RIGHT KNEE JOINT, OPEN APPROACH: ICD-10-PCS | Performed by: ORTHOPAEDIC SURGERY

## 2024-05-10 PROCEDURE — 13160 SEC CLSR SURG WND/DEHSN XTN: CPT | Performed by: ORTHOPAEDIC SURGERY

## 2024-05-10 PROCEDURE — 1100000001 HC PRIVATE ROOM DAILY

## 2024-05-10 PROCEDURE — 1090000001 HH PPS REVENUE CREDIT

## 2024-05-10 PROCEDURE — 2500000005 HC RX 250 GENERAL PHARMACY W/O HCPCS

## 2024-05-10 PROCEDURE — 2500000004 HC RX 250 GENERAL PHARMACY W/ HCPCS (ALT 636 FOR OP/ED): Mod: MUE | Performed by: ORTHOPAEDIC SURGERY

## 2024-05-10 PROCEDURE — 7100000001 HC RECOVERY ROOM TIME - INITIAL BASE CHARGE: Performed by: ORTHOPAEDIC SURGERY

## 2024-05-10 PROCEDURE — A4217 STERILE WATER/SALINE, 500 ML: HCPCS | Mod: MUE | Performed by: ORTHOPAEDIC SURGERY

## 2024-05-10 PROCEDURE — 3700000001 HC GENERAL ANESTHESIA TIME - INITIAL BASE CHARGE: Performed by: ORTHOPAEDIC SURGERY

## 2024-05-10 PROCEDURE — 2500000005 HC RX 250 GENERAL PHARMACY W/O HCPCS: Performed by: ANESTHESIOLOGIST ASSISTANT

## 2024-05-10 PROCEDURE — A27487 PR REVISE KNEE JOINT REPLACE,ALL PARTS: Performed by: ANESTHESIOLOGIST ASSISTANT

## 2024-05-10 PROCEDURE — 0SBC0ZZ EXCISION OF RIGHT KNEE JOINT, OPEN APPROACH: ICD-10-PCS | Performed by: ORTHOPAEDIC SURGERY

## 2024-05-10 PROCEDURE — 3700000002 HC GENERAL ANESTHESIA TIME - EACH INCREMENTAL 1 MINUTE: Performed by: ORTHOPAEDIC SURGERY

## 2024-05-10 PROCEDURE — 87040 BLOOD CULTURE FOR BACTERIA: CPT | Performed by: STUDENT IN AN ORGANIZED HEALTH CARE EDUCATION/TRAINING PROGRAM

## 2024-05-10 PROCEDURE — 87102 FUNGUS ISOLATION CULTURE: CPT | Mod: 91 | Performed by: ORTHOPAEDIC SURGERY

## 2024-05-10 PROCEDURE — 2720000007 HC OR 272 NO HCPCS: Performed by: ORTHOPAEDIC SURGERY

## 2024-05-10 PROCEDURE — 2780000003 HC OR 278 NO HCPCS: Performed by: ORTHOPAEDIC SURGERY

## 2024-05-10 PROCEDURE — 7100000002 HC RECOVERY ROOM TIME - EACH INCREMENTAL 1 MINUTE: Performed by: ORTHOPAEDIC SURGERY

## 2024-05-10 PROCEDURE — 3600000003 HC OR TIME - INITIAL BASE CHARGE - PROCEDURE LEVEL THREE: Performed by: ORTHOPAEDIC SURGERY

## 2024-05-10 PROCEDURE — 2500000001 HC RX 250 WO HCPCS SELF ADMINISTERED DRUGS (ALT 637 FOR MEDICARE OP): Performed by: STUDENT IN AN ORGANIZED HEALTH CARE EDUCATION/TRAINING PROGRAM

## 2024-05-10 PROCEDURE — C1776 JOINT DEVICE (IMPLANTABLE): HCPCS | Performed by: ORTHOPAEDIC SURGERY

## 2024-05-10 DEVICE — TOTAL STABILIZER+ TIBIAL INSERT
Type: IMPLANTABLE DEVICE | Site: KNEE | Status: FUNCTIONAL
Brand: TRIATHLON

## 2024-05-10 RX ORDER — NALOXONE HYDROCHLORIDE 0.4 MG/ML
0.2 INJECTION, SOLUTION INTRAMUSCULAR; INTRAVENOUS; SUBCUTANEOUS EVERY 5 MIN PRN
Status: DISCONTINUED | OUTPATIENT
Start: 2024-05-10 | End: 2024-05-15 | Stop reason: HOSPADM

## 2024-05-10 RX ORDER — ACETAMINOPHEN 325 MG/1
650 TABLET ORAL EVERY 6 HOURS SCHEDULED
Status: DISCONTINUED | OUTPATIENT
Start: 2024-05-10 | End: 2024-05-15 | Stop reason: HOSPADM

## 2024-05-10 RX ORDER — SODIUM CHLORIDE, SODIUM LACTATE, POTASSIUM CHLORIDE, CALCIUM CHLORIDE 600; 310; 30; 20 MG/100ML; MG/100ML; MG/100ML; MG/100ML
125 INJECTION, SOLUTION INTRAVENOUS CONTINUOUS
Status: DISCONTINUED | OUTPATIENT
Start: 2024-05-10 | End: 2024-05-15 | Stop reason: HOSPADM

## 2024-05-10 RX ORDER — VANCOMYCIN HYDROCHLORIDE 1 G/20ML
INJECTION, POWDER, LYOPHILIZED, FOR SOLUTION INTRAVENOUS AS NEEDED
Status: DISCONTINUED | OUTPATIENT
Start: 2024-05-10 | End: 2024-05-10 | Stop reason: HOSPADM

## 2024-05-10 RX ORDER — CEFEPIME HYDROCHLORIDE 2 G/1
INJECTION, POWDER, FOR SOLUTION INTRAVENOUS AS NEEDED
Status: DISCONTINUED | OUTPATIENT
Start: 2024-05-10 | End: 2024-05-10

## 2024-05-10 RX ORDER — OXYCODONE HYDROCHLORIDE 5 MG/1
5 TABLET ORAL EVERY 6 HOURS PRN
Status: DISCONTINUED | OUTPATIENT
Start: 2024-05-10 | End: 2024-05-12

## 2024-05-10 RX ORDER — PHENYLEPHRINE HCL IN 0.9% NACL 1 MG/10 ML
SYRINGE (ML) INTRAVENOUS AS NEEDED
Status: DISCONTINUED | OUTPATIENT
Start: 2024-05-10 | End: 2024-05-10

## 2024-05-10 RX ORDER — SODIUM CHLORIDE, SODIUM LACTATE, POTASSIUM CHLORIDE, CALCIUM CHLORIDE 600; 310; 30; 20 MG/100ML; MG/100ML; MG/100ML; MG/100ML
100 INJECTION, SOLUTION INTRAVENOUS CONTINUOUS
Status: DISCONTINUED | OUTPATIENT
Start: 2024-05-10 | End: 2024-05-10 | Stop reason: HOSPADM

## 2024-05-10 RX ORDER — ASPIRIN 81 MG/1
81 TABLET ORAL 2 TIMES DAILY
Status: DISCONTINUED | OUTPATIENT
Start: 2024-05-10 | End: 2024-05-15 | Stop reason: HOSPADM

## 2024-05-10 RX ORDER — METHOCARBAMOL 100 MG/ML
INJECTION, SOLUTION INTRAMUSCULAR; INTRAVENOUS AS NEEDED
Status: DISCONTINUED | OUTPATIENT
Start: 2024-05-10 | End: 2024-05-10

## 2024-05-10 RX ORDER — SUCCINYLCHOLINE CHLORIDE 100 MG/5ML
SYRINGE (ML) INTRAVENOUS AS NEEDED
Status: DISCONTINUED | OUTPATIENT
Start: 2024-05-10 | End: 2024-05-10

## 2024-05-10 RX ORDER — HYDROMORPHONE HYDROCHLORIDE 1 MG/ML
0.5 INJECTION, SOLUTION INTRAMUSCULAR; INTRAVENOUS; SUBCUTANEOUS EVERY 5 MIN PRN
Status: DISCONTINUED | OUTPATIENT
Start: 2024-05-10 | End: 2024-05-10 | Stop reason: HOSPADM

## 2024-05-10 RX ORDER — OXYCODONE HYDROCHLORIDE 5 MG/1
10 TABLET ORAL EVERY 4 HOURS PRN
Status: DISCONTINUED | OUTPATIENT
Start: 2024-05-10 | End: 2024-05-15 | Stop reason: HOSPADM

## 2024-05-10 RX ORDER — DOCUSATE SODIUM 100 MG/1
100 CAPSULE, LIQUID FILLED ORAL 2 TIMES DAILY
Status: DISCONTINUED | OUTPATIENT
Start: 2024-05-10 | End: 2024-05-15 | Stop reason: HOSPADM

## 2024-05-10 RX ORDER — FENTANYL CITRATE 50 UG/ML
INJECTION, SOLUTION INTRAMUSCULAR; INTRAVENOUS AS NEEDED
Status: DISCONTINUED | OUTPATIENT
Start: 2024-05-10 | End: 2024-05-10

## 2024-05-10 RX ORDER — PANTOPRAZOLE SODIUM 40 MG/1
40 TABLET, DELAYED RELEASE ORAL
Status: DISCONTINUED | OUTPATIENT
Start: 2024-05-11 | End: 2024-05-15 | Stop reason: HOSPADM

## 2024-05-10 RX ORDER — HYDROMORPHONE HYDROCHLORIDE 1 MG/ML
0.2 INJECTION, SOLUTION INTRAMUSCULAR; INTRAVENOUS; SUBCUTANEOUS EVERY 5 MIN PRN
Status: DISCONTINUED | OUTPATIENT
Start: 2024-05-10 | End: 2024-05-10 | Stop reason: HOSPADM

## 2024-05-10 RX ORDER — MIDAZOLAM HYDROCHLORIDE 1 MG/ML
INJECTION, SOLUTION INTRAMUSCULAR; INTRAVENOUS AS NEEDED
Status: DISCONTINUED | OUTPATIENT
Start: 2024-05-10 | End: 2024-05-10

## 2024-05-10 RX ORDER — OXYCODONE HYDROCHLORIDE 5 MG/1
5 TABLET ORAL EVERY 4 HOURS PRN
Status: DISCONTINUED | OUTPATIENT
Start: 2024-05-10 | End: 2024-05-10 | Stop reason: HOSPADM

## 2024-05-10 RX ORDER — SODIUM CHLORIDE 0.9 G/100ML
IRRIGANT IRRIGATION AS NEEDED
Status: DISCONTINUED | OUTPATIENT
Start: 2024-05-10 | End: 2024-05-10 | Stop reason: HOSPADM

## 2024-05-10 RX ORDER — CEFEPIME 1 G/50ML
2 INJECTION, SOLUTION INTRAVENOUS EVERY 8 HOURS
Status: DISCONTINUED | OUTPATIENT
Start: 2024-05-10 | End: 2024-05-14

## 2024-05-10 RX ORDER — VANCOMYCIN HYDROCHLORIDE 1 G/20ML
INJECTION, POWDER, LYOPHILIZED, FOR SOLUTION INTRAVENOUS AS NEEDED
Status: DISCONTINUED | OUTPATIENT
Start: 2024-05-10 | End: 2024-05-10

## 2024-05-10 RX ORDER — ROCURONIUM BROMIDE 10 MG/ML
INJECTION, SOLUTION INTRAVENOUS AS NEEDED
Status: DISCONTINUED | OUTPATIENT
Start: 2024-05-10 | End: 2024-05-10

## 2024-05-10 RX ORDER — LIDOCAINE HYDROCHLORIDE 20 MG/ML
INJECTION, SOLUTION INFILTRATION; PERINEURAL AS NEEDED
Status: DISCONTINUED | OUTPATIENT
Start: 2024-05-10 | End: 2024-05-10

## 2024-05-10 RX ORDER — VANCOMYCIN HYDROCHLORIDE 1 G/20ML
INJECTION, POWDER, LYOPHILIZED, FOR SOLUTION INTRAVENOUS DAILY PRN
Status: DISCONTINUED | OUTPATIENT
Start: 2024-05-10 | End: 2024-05-14

## 2024-05-10 RX ORDER — OXYCODONE HYDROCHLORIDE 5 MG/1
10 TABLET ORAL EVERY 4 HOURS PRN
Status: DISCONTINUED | OUTPATIENT
Start: 2024-05-10 | End: 2024-05-10 | Stop reason: HOSPADM

## 2024-05-10 RX ORDER — SCOLOPAMINE TRANSDERMAL SYSTEM 1 MG/1
PATCH, EXTENDED RELEASE TRANSDERMAL AS NEEDED
Status: DISCONTINUED | OUTPATIENT
Start: 2024-05-10 | End: 2024-05-10

## 2024-05-10 RX ORDER — LABETALOL HYDROCHLORIDE 5 MG/ML
5 INJECTION, SOLUTION INTRAVENOUS ONCE AS NEEDED
Status: DISCONTINUED | OUTPATIENT
Start: 2024-05-10 | End: 2024-05-10 | Stop reason: HOSPADM

## 2024-05-10 RX ORDER — TOBRAMYCIN 1.2 G/30ML
INJECTION, POWDER, LYOPHILIZED, FOR SOLUTION INTRAVENOUS AS NEEDED
Status: DISCONTINUED | OUTPATIENT
Start: 2024-05-10 | End: 2024-05-10 | Stop reason: HOSPADM

## 2024-05-10 RX ORDER — PROPOFOL 10 MG/ML
INJECTION, EMULSION INTRAVENOUS AS NEEDED
Status: DISCONTINUED | OUTPATIENT
Start: 2024-05-10 | End: 2024-05-10

## 2024-05-10 RX ORDER — HYDROMORPHONE HYDROCHLORIDE 1 MG/ML
0.4 INJECTION, SOLUTION INTRAMUSCULAR; INTRAVENOUS; SUBCUTANEOUS EVERY 4 HOURS PRN
Status: DISCONTINUED | OUTPATIENT
Start: 2024-05-10 | End: 2024-05-15 | Stop reason: HOSPADM

## 2024-05-10 RX ORDER — HYDROMORPHONE HYDROCHLORIDE 1 MG/ML
INJECTION, SOLUTION INTRAMUSCULAR; INTRAVENOUS; SUBCUTANEOUS AS NEEDED
Status: DISCONTINUED | OUTPATIENT
Start: 2024-05-10 | End: 2024-05-10

## 2024-05-10 RX ADMIN — HYDROMORPHONE HYDROCHLORIDE 0.5 MG: 1 INJECTION, SOLUTION INTRAMUSCULAR; INTRAVENOUS; SUBCUTANEOUS at 17:20

## 2024-05-10 RX ADMIN — HYDROMORPHONE HYDROCHLORIDE 0.4 MG: 1 INJECTION, SOLUTION INTRAMUSCULAR; INTRAVENOUS; SUBCUTANEOUS at 23:33

## 2024-05-10 RX ADMIN — PROPOFOL 30 MG: 10 INJECTION, EMULSION INTRAVENOUS at 15:01

## 2024-05-10 RX ADMIN — CEFEPIME HYDROCHLORIDE 2 G: 2 INJECTION, POWDER, FOR SOLUTION INTRAVENOUS at 14:56

## 2024-05-10 RX ADMIN — Medication 120 MCG: at 14:36

## 2024-05-10 RX ADMIN — LIDOCAINE HYDROCHLORIDE 100 MG: 20 INJECTION, SOLUTION INFILTRATION; PERINEURAL at 14:11

## 2024-05-10 RX ADMIN — OXYCODONE HYDROCHLORIDE 10 MG: 5 TABLET ORAL at 17:40

## 2024-05-10 RX ADMIN — SODIUM CHLORIDE 1 MG: 9 INJECTION, SOLUTION INTRAVENOUS at 14:15

## 2024-05-10 RX ADMIN — ROCURONIUM 20 MG: 50 INJECTION, SOLUTION INTRAVENOUS at 14:49

## 2024-05-10 RX ADMIN — HYDROMORPHONE HYDROCHLORIDE 1 MG: 1 INJECTION, SOLUTION INTRAMUSCULAR; INTRAVENOUS; SUBCUTANEOUS at 16:30

## 2024-05-10 RX ADMIN — CEFEPIME 2 G: 1 INJECTION, SOLUTION INTRAVENOUS at 22:51

## 2024-05-10 RX ADMIN — Medication 80 MCG: at 14:25

## 2024-05-10 RX ADMIN — OXYCODONE HYDROCHLORIDE 5 MG: 5 TABLET ORAL at 07:41

## 2024-05-10 RX ADMIN — SODIUM CHLORIDE, POTASSIUM CHLORIDE, SODIUM LACTATE AND CALCIUM CHLORIDE: 600; 310; 30; 20 INJECTION, SOLUTION INTRAVENOUS at 14:01

## 2024-05-10 RX ADMIN — PROPOFOL 50 MG: 10 INJECTION, EMULSION INTRAVENOUS at 16:30

## 2024-05-10 RX ADMIN — SODIUM CHLORIDE, POTASSIUM CHLORIDE, SODIUM LACTATE AND CALCIUM CHLORIDE 100 ML/HR: 600; 310; 30; 20 INJECTION, SOLUTION INTRAVENOUS at 16:30

## 2024-05-10 RX ADMIN — ROCURONIUM 50 MG: 50 INJECTION, SOLUTION INTRAVENOUS at 14:15

## 2024-05-10 RX ADMIN — Medication 2 L/MIN: at 19:15

## 2024-05-10 RX ADMIN — Medication 80 MCG: at 15:44

## 2024-05-10 RX ADMIN — SUGAMMADEX 200 MG: 100 INJECTION, SOLUTION INTRAVENOUS at 16:05

## 2024-05-10 RX ADMIN — ASPIRIN 81 MG: 81 TABLET, COATED ORAL at 20:37

## 2024-05-10 RX ADMIN — VANCOMYCIN HYDROCHLORIDE 1 G: 1 INJECTION, POWDER, LYOPHILIZED, FOR SOLUTION INTRAVENOUS at 14:56

## 2024-05-10 RX ADMIN — DOCUSATE SODIUM 100 MG: 100 CAPSULE, LIQUID FILLED ORAL at 20:37

## 2024-05-10 RX ADMIN — MIDAZOLAM 2 MG: 1 INJECTION INTRAMUSCULAR; INTRAVENOUS at 14:11

## 2024-05-10 RX ADMIN — PROPOFOL 50 MG: 10 INJECTION, EMULSION INTRAVENOUS at 15:55

## 2024-05-10 RX ADMIN — FENTANYL CITRATE 100 MCG: 50 INJECTION, SOLUTION INTRAMUSCULAR; INTRAVENOUS at 14:11

## 2024-05-10 RX ADMIN — HYDROMORPHONE HYDROCHLORIDE 0.5 MG: 1 INJECTION, SOLUTION INTRAMUSCULAR; INTRAVENOUS; SUBCUTANEOUS at 17:07

## 2024-05-10 RX ADMIN — SODIUM CHLORIDE, POTASSIUM CHLORIDE, SODIUM LACTATE AND CALCIUM CHLORIDE 100 ML/HR: 600; 310; 30; 20 INJECTION, SOLUTION INTRAVENOUS at 06:09

## 2024-05-10 RX ADMIN — Medication 200 MCG: at 15:19

## 2024-05-10 RX ADMIN — PROPOFOL 150 MG: 10 INJECTION, EMULSION INTRAVENOUS at 14:11

## 2024-05-10 RX ADMIN — Medication 100 MG: at 14:11

## 2024-05-10 RX ADMIN — PROPOFOL 50 MG: 10 INJECTION, EMULSION INTRAVENOUS at 16:02

## 2024-05-10 RX ADMIN — METHOCARBAMOL 1000 MG: 1000 INJECTION, SOLUTION INTRAMUSCULAR; INTRAVENOUS at 16:30

## 2024-05-10 RX ADMIN — HYDROMORPHONE HYDROCHLORIDE 0.5 MG: 1 INJECTION, SOLUTION INTRAMUSCULAR; INTRAVENOUS; SUBCUTANEOUS at 17:00

## 2024-05-10 RX ADMIN — PROPOFOL 20 MG: 10 INJECTION, EMULSION INTRAVENOUS at 14:49

## 2024-05-10 RX ADMIN — DEXAMETHASONE SODIUM PHOSPHATE 4 MG: 4 INJECTION INTRA-ARTICULAR; INTRALESIONAL; INTRAMUSCULAR; INTRAVENOUS; SOFT TISSUE at 14:25

## 2024-05-10 RX ADMIN — OXYCODONE HYDROCHLORIDE 10 MG: 5 TABLET ORAL at 21:33

## 2024-05-10 RX ADMIN — Medication 200 MCG: at 14:44

## 2024-05-10 RX ADMIN — SODIUM CHLORIDE, POTASSIUM CHLORIDE, SODIUM LACTATE AND CALCIUM CHLORIDE 125 ML/HR: 600; 310; 30; 20 INJECTION, SOLUTION INTRAVENOUS at 19:03

## 2024-05-10 ASSESSMENT — PAIN DESCRIPTION - ORIENTATION
ORIENTATION: RIGHT

## 2024-05-10 ASSESSMENT — PAIN DESCRIPTION - LOCATION
LOCATION: LEG

## 2024-05-10 ASSESSMENT — PAIN - FUNCTIONAL ASSESSMENT
PAIN_FUNCTIONAL_ASSESSMENT: 0-10

## 2024-05-10 ASSESSMENT — COGNITIVE AND FUNCTIONAL STATUS - GENERAL
HELP NEEDED FOR BATHING: A LITTLE
DAILY ACTIVITIY SCORE: 20
STANDING UP FROM CHAIR USING ARMS: A LOT
MOVING FROM LYING ON BACK TO SITTING ON SIDE OF FLAT BED WITH BEDRAILS: A LITTLE
TURNING FROM BACK TO SIDE WHILE IN FLAT BAD: A LITTLE
CLIMB 3 TO 5 STEPS WITH RAILING: TOTAL
DRESSING REGULAR UPPER BODY CLOTHING: A LITTLE
TOILETING: A LITTLE
MOBILITY SCORE: 13
MOVING TO AND FROM BED TO CHAIR: A LOT
DRESSING REGULAR LOWER BODY CLOTHING: A LITTLE
WALKING IN HOSPITAL ROOM: A LOT

## 2024-05-10 ASSESSMENT — PAIN SCALES - GENERAL
PAINLEVEL_OUTOF10: 9
PAINLEVEL_OUTOF10: 10 - WORST POSSIBLE PAIN
PAINLEVEL_OUTOF10: 7
PAINLEVEL_OUTOF10: 10 - WORST POSSIBLE PAIN
PAINLEVEL_OUTOF10: 9
PAINLEVEL_OUTOF10: 9
PAINLEVEL_OUTOF10: 4
PAINLEVEL_OUTOF10: 10 - WORST POSSIBLE PAIN
PAINLEVEL_OUTOF10: 10 - WORST POSSIBLE PAIN
PAINLEVEL_OUTOF10: 8

## 2024-05-10 ASSESSMENT — COLUMBIA-SUICIDE SEVERITY RATING SCALE - C-SSRS
1. IN THE PAST MONTH, HAVE YOU WISHED YOU WERE DEAD OR WISHED YOU COULD GO TO SLEEP AND NOT WAKE UP?: NO
6. HAVE YOU EVER DONE ANYTHING, STARTED TO DO ANYTHING, OR PREPARED TO DO ANYTHING TO END YOUR LIFE?: NO
2. HAVE YOU ACTUALLY HAD ANY THOUGHTS OF KILLING YOURSELF?: NO

## 2024-05-10 ASSESSMENT — PAIN DESCRIPTION - DESCRIPTORS
DESCRIPTORS: ACHING
DESCRIPTORS: ACHING
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
## 2024-05-10 NOTE — OP NOTE
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.651]     * Other early complications of trauma, initial encounter (CMS-HCC) [T79.8XXA]     Procedures  Debridement Lower Extremity and poly exchange  13372 - RI DEBRIDEMENT BONE 1ST 20 SQ CM/<      Surgeons      * Andrzej Whelan - Primary    Resident/Fellow/Other Assistant:  Surgeons and Role:     * Julieta Saez MD - Resident - Assisting     * Keshawn Lebron MD - Resident - Assisting    Procedure Summary  Anesthesia: General  ASA: II  Anesthesia Staff: Anesthesiologist: Arron Solorzano MD; Audelia Stark MD  C-AA: HERMAN Trejo; HERMAN Paulino  Estimated Blood Loss: 200 mL  Intra-op Medications:   Administrations occurring from 1350 to 1520 on 05/10/24:   Medication Name Total Dose   sodium chloride 0.9 % irrigation solution 4,000 mL   vancomycin (Vancocin) vial for injection 1 g   lactated Ringer's infusion 131.67 mL              Anesthesia Record               Intraprocedure I/O Totals          Intake    Tranexamic Acid 0.00 mL    The total shown is the total volume documented since Anesthesia Start was filed.    Total Intake 0 mL          Specimen:   ID Type Source Tests Collected by Time   A : Right Knee Joint Fluid for Cell Count Fluid SYNOVIAL FLUID STERILE FLUID CULTURE/SMEAR Andrzej Whelan MD 5/10/2024 1448   B : Superficial Right Knee Swab KNEE HARDWARE RIGHT FUNGAL CULTURE/SMEAR, TISSUE/WOUND CULTURE/SMEAR Andrzej Whelan MD 5/10/2024 1452   C : Deep Right Knee Swab KNEE HARDWARE RIGHT FUNGAL CULTURE/SMEAR, TISSUE/WOUND CULTURE/SMEAR Andrzej  KISHA Whelan MD 5/10/2024 1455   D : Right Knee Deep Joint Fluid Swab KNEE HARDWARE RIGHT FUNGAL CULTURE/SMEAR, TISSUE/WOUND CULTURE/SMEAR Andrzej Whelan MD 5/10/2024 1456   E : Right Knee Deep 3 Swab KNEE HARDWARE RIGHT FUNGAL CULTURE/SMEAR, TISSUE/WOUND CULTURE/SMEAR Andrzej Whelan MD 5/10/2024 1504        Staff:   Circulator: Carly Allen RN; Marisela Faith RN  Scrub Person: Roslyn Matos         Drains and/or Catheters:   Closed/Suction Drain Right;Lateral Knee Accordion 10 Fr. (Active)       Tourniquet Times:     Total Tourniquet Time Documented:  Thigh (Right) - 29 minutes  Total: Thigh (Right) - 29 minutes      Implants:  Implants       Type Name Action Serial No.      Joint INSERT, TIBIAL, X3 POLY TS PLUS, SZ-4 11MM - [id number] Implanted               Findings: necrosis anterior knee skin nd soft tissue with secondary infection TKR    Indications: Adriana Wood is an 68 y.o. female who is having surgery for Status post total right knee replacement [Z96.651]  Other early complications of trauma, initial encounter (CMS-[facility]) [T79.8XXA].     The patient was seen in the preoperative area. The risks, benefits, complications, treatment options, non-operative alternatives, expected recovery and outcomes were discussed with the patient. The possibilities of reaction to medication, pulmonary aspiration, injury to surrounding structures, bleeding, recurrent infection, the need for additional procedures, failure to diagnose a condition, and creating a complication requiring transfusion or operation were discussed with the patient. The patient concurred with the proposed plan, giving informed consent.  The site of surgery was properly noted/marked if necessary per policy. The patient has been actively warmed in preoperative area. Preoperative antibiotics have been ordered and given within 2 hours of incision. Venous thrombosis prophylaxis have been ordered including chemical prophylaxis    Procedure Details  operative procedure    Preoperative diagnosis necrotic posttraumatic wound over anterior knee with partial dehiscence of total knee incision with secondary infected total knee replacement    Postop diagnosis same    Procedure was open arthrotomy of right knee with debridement of right knee synovium, exchange of tibial poly component(partial revision) placement of drain and closure of dehisced wound 4 cm right knee.    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  I followed her for a year removed her hardware did debridements and 8 weeks ago she underwent a right total knee replacement.  At the time of the surgery she only had about 20 to 30 degrees of range of motion and as she was doing quite well she had almost 0 to 90 degrees of motion was ambulating.  We were worried about an area between the anterior knee incision and the previous swash Yehuda lateral incision.  Which she did over medially but she still developed some necrosis of the skin edge last week she developed an infection through an area of about a centimeter and a half by a centimeter half of necrotic tissue.  The infection worsened she went to a local emergency room and was transferred here.  Her ESR and CRP were elevated and she had drainage from the wound with suggested this contaminated into the knee joint.  We tried to aspirate the knee joint more proximally but we only got bloody fluid but we did get up to 92% neutrophils.  At this time we are to take her to the operating for exploration most likely this wound extends down into the knee joint we will do a debridement and a poly exchange.  The risks and benefits were discussed with  her and she did agree to proceed.  Patient does have a history of cardiomyopathy and was cleared for surgery today.    Operative procedure patient was brought into the operating room and a full awake huddle was performed with the patient.  Patient then had adequate general anesthesia.  We did not initially give antibiotics for deep cultures.  We placed the patient supine on the operating table and put a tourniquet along the proximal right thigh the entire right lower extremity was then prepped and draped in usual sterile fashion.  Surgical pause was then performed.    We excised the # scar over the distal incision measuring about 20 mm x 15 mm this eschar went directly down to the patella tendon and snuck into the knee joint along the medial border of the patella tendon down to the tibial component.  For this reason we did a full exposure of the knee joint.  We will wait went proximally over the distal femur and did a medial parapatellar approach.  We surgically excised the edges of the skin around this eschared area with sharp dissection down through skin and subcutaneous tissue.  We debrided the top sheath of the patella tendon and then went into the knee joint what was found more infection anteriorly along the patella and along the tibial poly component.  We took multiple screws both superficial and deep which are recorded.  We also took fluid from the knee joint for cell count.  We did not move the patella to the side and we did a full synovectomy starting approximately the infection did not appear to go up in the suprapatella pouch or even superiorly in the gutters but states mostly inferiorly in the area of the wound.  We did flex the knee protected the quad tendon the quad tendon was well-fixed and the repair had healed.  We removed the number for TS poly component we had to cut the post plastic and then remove the metal and then remove the poly component.  We debrided the back of the popliteal fossa area and  we also debrided the medial and lateral gutter.  We then copiously irrigated out the area we did use Irrisept solution to try to eliminate any biofilm we scrubbed the metal components both proximally and distally as well as the patella we then placed 1 g of vancomycin powder and 1.2 g of tobramycin powder posteriorly and around the tibial component and the femoral component we then flexed the knee up and we impacted an 11 mm thick #4 TS poly component onto the tibia locking into position we then placed the knee in full flexion full extension and the knee was stable we then put the metal post into the poly component for stability.  We did release the tourniquet that was with small amount of bleeding so we actually put a medium Hemovac drain out anterior laterally into the joint.  We then closed the dehisced wound we were able to get the 2 edges of the skin together.  We did get a plastic surgery consultation in the operating with with Dr Reyes, who suggested attempted primary closure and a Prevena dressing.  So what we did this we used #1 PDS for the fascial layer closing the retinaculum which closed tightly.  We then closed the skin proximally with 2-0 Monocryl and 2-0 nylon suture the area over the dehiscence work which we closed the dehiscence we used 0 Prolene suture to bring the skin together and then we used a 2-0 nylon intermittently..  We were able to get watertight closure but we had some blanching over the skin laterally.  But we thought it best to close this considering the open nature of the total knee replacement.  We then used a Prevena dressing and a sterile dressing on top that and then we put the patient in a knee immobilizer.    We did administer vancomycin and Ceftin antibiotic through the IV which was recommended by infectious disease.  We will wait on cultures for final recommendations but certainly the patient is given a prolonged IV antibiotics at least 6 weeks.  We will let the patient  weight-bear but will put her in a knee immobilizer to keep the knee straight for now for soft tissue healing.  Patient will receive standard DVT prophylaxis.  Patient tolerated this procedure well.      Complications:  None; patient tolerated the procedure well.    Disposition: PACU - hemodynamically stable.  Condition: stable         Additional Details:     Attending Attestation: I was present and scrubbed for the entire procedure.    Andrzej Whelan  Phone Number: [phone]

## 2024-05-10 NOTE — PERIOPERATIVE NURSING NOTE
1617 Pt arrived to PACU. Pt alert and able to answer simple questions and follow simple commands. Pt shows no signs of distress. Will continue to monitor.     1740 Pt stable. Report called to nurse on [facility] tower 6. Will continue to  monitor.     1756 Transport requested for pt.       Deb Joe RN

## 2024-05-10 NOTE — ANESTHESIA PREPROCEDURE EVALUATION
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 Left ventricular systolic function is low normal with a 50-55% estimated ejection fraction.  2. Mildly elevated RVSP.  3. Frequent PVCs which may be impacting accuracy of left ventricular systolic function measurements.    NPO Detail:  No data recorded     Physical Exam    Airway  Mallampati: II     Cardiovascular   Rhythm: regular  Rate: normal     Dental    Pulmonary   Breath sounds clear to auscultation     Abdominal            Anesthesia Plan    History of general anesthesia?: yes  History of complications of general anesthesia?: no    ASA 2     general   (Plan GA rapid sequence.  Possible hx of cardiac arrest due to prolonged QT syndrome.   )    Plan discussed with CAA.

## 2024-05-10 NOTE — PROGRESS NOTES
Adriana Wood is a 68 y.o. female on day 1 of admission presenting with Wound dehiscence.    Subjective   Interval History:  -Patient seen and examined this morning. She reports that she was having nausea and vomiting yesterday and still is nauseas today.  -Plan for OR today       Objective   Physical Exam  General: Alert and oriented, NAD  HEENT: no lesions, moist mucous membranes  Neuro: A&Ox3, grossly normal  CV: RRR  Resp: Good bilateral air entry. No crackles,  wheezing, or rhonchi  Abdomen: Non distended, no peritoneal signs  Extremities: approx 3cm draining sinus tract noted on the right knee surgical incision with purulence and malodor, surrounding erythema and warmth, tenderness to palpation of the medial knee and tenderness with passive ROM  Skin: No jaundice, no lesions   Psych: Appropriate mood and behavior    Range of Vitals (last 24 hours)  Heart Rate:  [77-86]   Temp:  [36.1 °C (97 °F)-37.1 °C (98.8 °F)]   Resp:  [14-16]   BP: (131-147)/(65-84)   SpO2:  [86 %-97 %]   Daily Weight  05/09/24 : 77.3 kg (170 lb 6.7 oz)    Body mass index is 24.45 kg/m².    Relevant Results  Labs:  Results from last 72 hours   Lab Units 05/09/24  0024   WBC AUTO x10*3/uL 8.2   HEMOGLOBIN g/dL 12.8   HEMATOCRIT % 39.0   PLATELETS AUTO x10*3/uL 271   NEUTROS PCT AUTO % 63.3   LYMPHS PCT AUTO % 24.7   MONOS PCT AUTO % 10.2   EOS PCT AUTO % 1.1     Results from last 72 hours   Lab Units 05/09/24  0024   SODIUM mmol/L 136   POTASSIUM mmol/L 4.1   CHLORIDE mmol/L 100   CO2 mmol/L 28   BUN mg/dL 8   CREATININE mg/dL 0.53   GLUCOSE mg/dL 101*   CALCIUM mg/dL 9.2   ANION GAP mmol/L 12   EGFR mL/min/1.73m*2 >90     Results from last 72 hours   Lab Units 05/08/24  1607   ALK PHOS U/L 91   BILIRUBIN TOTAL mg/dL 0.8   PROTEIN TOTAL g/dL 8.0   ALT U/L 11   AST U/L 14   ALBUMIN g/dL 4.4     Estimated Creatinine Clearance: 112.9 mL/min (by C-G formula based on SCr of 0.53 mg/dL).  C-Reactive Protein   Date/Time Value Ref Range Status    05/09/2024 12:18 AM 4.19 (H) <1.00 mg/dL Final     Imaging:  XR knee right 3 views  Result Date: 5/8/2024  STUDY: Knee Radiographs; 05/08/2024 at 6:30 PM INDICATION: Post-op right knee. COMPARISON: XR right knee 04/11/24, 03/27/24, 02/27/24. ACCESSION NUMBER(S): [id number] ORDERING CLINICIAN: JAZZ TERRY TECHNIQUE:  Three view(s) of the right knee (three images). FINDINGS: The total knee arthroplasty is in anatomic alignment.  Alignment has not changed when compared to the prior study..  There is no evidence of orthopedic hardware complications.  Also unchanged is an old fracture of the proximal diaphysis of the fibula.  No soft tissue abnormality is seen.    There is a total knee prosthesis in place on the right.  No acute bony abnormalities are identified.. Signed by Joselito Figueroa MD      Micro  Lateral R Tibia 1 9/28/21: Enterobacter cloacae (R amp, cefazolin)  Lateral R Tibia 2 9/28/21: Enterobacter cloacae  Medial R Tibia 1 9/28/21: Enterobacter cloacae  Medial R Tibia 2 9/28/21: Enterobacter cloacae  Blood x 2 10/7/21: NG  Tibia 1 10/7/21: NG  Tibia 2 10/7/21: NG  Tibia 3 10/7/21: NG  R Tibia 1 10/12/21: NG  R Tibia 2 10/12/21: NG  R Lateral Tibia 10/14/21: NG  R Medial Tibia 10/14/21: NG  R Tibia 10/19/21: NG  MRSA Screen 12/8/21: Negative  R Tibia 1 12/28/21: NG  R Tibia 2 12/28/21: NG  MRSA Screen 5/25/22: Positive  MRSA Screen 6/20/23: Positive  Urine culture 2/10/24: >100,000 MRSA  MRSA Screen 2/27/24: Negative  Urine culture 3/27/24: Negative     Synovial fluid 5/9/24: NGTD, final result pending        Antibiotic history   6 weeks IV cefepime in 9/2021  Was on a course of bactrim in 2/2024 and then for 3 weeks after R knee TKA       Assessment/Plan   INFECTIOUS SYNOPSIS AND ASSESSMENT  68F with history of traumatic tibia fracture in 8/2021 c/b osteomyelitis and Enterobacter infection in 9/2021 s/p 6 weeks cefepime. More recently patient underwent R TKA on 3/13/24 for posttraumatic arthritis, now  presenting with 3cm draining sinus tract on surgical incision with concern for infection. Patient going to the OR today 5/10 for I&D.     RECOMMENDATION  -Start vancomycin and cefepime after surgery, follow-up on surgical cultures        ID will follow.   The case was discussed with my attending, Dr. Clyde Tolliver, who agreed with my assessment and plan.     ID consult TEAM B   Tung Melendrez DO

## 2024-05-10 NOTE — BRIEF OP NOTE
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]     * Other early complications of trauma, initial encounter (CMS-HCC) [T79.8XXA] Post-op Diagnosis     * Status post total right knee replacement [Z96.651]     * Other early complications of trauma, initial encounter (CMS-HCC) [T79.8XXA]     Procedures  Debridement Lower Extremity and poly exchange  63487 - KS DEBRIDEMENT BONE 1ST 20 SQ CM/<    I&D R knee  Extensive synovectomy  Revision R total knee tibial component w/ poly exchange      Surgeons      * Andrzej Whelan - Primary    Resident/Fellow/Other Assistant:  Surgeons and Role:     * Julieta Saez MD - Resident - Assisting     * Keshawn Leborn MD - Resident - Assisting    Procedure Summary  Anesthesia: General  ASA: II  Anesthesia Staff: Anesthesiologist: Arron Solorzano MD; Audelia Stark MD  C-AA: HERMAN Trejo; HERMAN Paulino  Estimated Blood Loss: 200 mL  Intra-op Medications:   Administrations occurring from 1350 to 1520 on 05/10/24:   Medication Name Total Dose   sodium chloride 0.9 % irrigation solution 4,000 mL   vancomycin (Vancocin) vial for injection 1 g   lactated Ringer's infusion 131.67 mL              Anesthesia Record               Intraprocedure I/O Totals          Intake    Tranexamic Acid 0.00 mL    The total shown is the total volume documented since Anesthesia Start was filed.    lactated Ringer's infusion 900.00 mL    Total Intake 900 mL          Specimen:   ID Type Source Tests Collected by Time   A : Right Knee Joint Fluid for Cell Count Fluid SYNOVIAL FLUID STERILE FLUID CULTURE/SMEAR Andrzej Whelan MD 5/10/2024 3307   B : Superficial Right Knee Swab KNEE HARDWARE RIGHT FUNGAL CULTURE/SMEAR, TISSUE/WOUND CULTURE/SMEAR Andrzej Whelan MD 5/10/2024 5612   C : Deep Right Knee Swab KNEE HARDWARE  RIGHT FUNGAL CULTURE/SMEAR, TISSUE/WOUND CULTURE/SMEAR Andrzej Whelan MD 5/10/2024 1455   D : Right Knee Deep Joint Fluid Swab KNEE HARDWARE RIGHT FUNGAL CULTURE/SMEAR, TISSUE/WOUND CULTURE/SMEAR Andrzej Whelan MD 5/10/2024 1456   E : Right Knee Deep 3 Swab KNEE HARDWARE RIGHT FUNGAL CULTURE/SMEAR, TISSUE/WOUND CULTURE/SMEAR Andrzej Whelan MD 5/10/2024 1504        Staff:   Circulator: Carly Allen RN; Marisela Faith RN  Scrub Person: Roslyn Matos          Findings: See operative note    Complications:  None; patient tolerated the procedure well.     Disposition: PACU - hemodynamically stable.  Condition: stable  Specimens Collected:   ID Type Source Tests Collected by Time   A : Right Knee Joint Fluid for Cell Count Fluid SYNOVIAL FLUID STERILE FLUID CULTURE/SMEAR Andrzej Whelan MD 5/10/2024 1448   B : Superficial Right Knee Swab KNEE HARDWARE RIGHT FUNGAL CULTURE/SMEAR, TISSUE/WOUND CULTURE/SMEAR Andrzej Whelan MD 5/10/2024 1452   C : Deep Right Knee Swab KNEE HARDWARE RIGHT FUNGAL CULTURE/SMEAR, TISSUE/WOUND CULTURE/SMEAR Andrzej Whelan MD 5/10/2024 1455   D : Right Knee Deep Joint Fluid Swab KNEE HARDWARE RIGHT FUNGAL CULTURE/SMEAR, TISSUE/WOUND CULTURE/SMEAR Andrzej Whelan MD 5/10/2024 1456   E : Right Knee Deep 3 Swab KNEE HARDWARE RIGHT FUNGAL CULTURE/SMEAR, TISSUE/WOUND CULTURE/SMEAR Andrzej Whelan MD 5/10/2024 1504       Keshawn Lebron MD  Orthopaedic Surgery, PGY-2  Available by Epic Chat  05/10/24  4:22 PM

## 2024-05-10 NOTE — CONSULTS
Inpatient consult to Infectious Diseases  Consult performed by: Tenzin Saini MD  Consult ordered by: Andrzej Whelan MD        Please see full consult note from 5/9 and progress note from 5/10  Tenzin Saini MD

## 2024-05-10 NOTE — H&P
H&P reviewed. The patient was examined and there are no changes to the H&P. Patient electing to proceed with surgery. Patient marked and consented.      Jose Alberto Sotomayor MD  Orthopaedic Surgery, PGY-2  EpicChat preferred

## 2024-05-10 NOTE — CONSULTS
"Vancomycin Dosing by Pharmacy- INITIAL    Adriana Wood is a 68 y.o. year old female who Pharmacy has been consulted for vancomycin dosing for other surgical wound infection . Based on the patient's indication and renal status this patient will be dosed based on a goal AUC of 400-600.     Renal function is currently stable.    Visit Vitals  /70   Pulse 80   Temp 36.4 °C (97.5 °F) (Temporal)   Resp 14        Lab Results   Component Value Date    CREATININE 0.53 05/09/2024    CREATININE 0.58 05/08/2024    CREATININE 0.77 04/01/2024    CREATININE 0.56 03/27/2024        Patient weight is No results found for: \"PTWEIGHT\"    No results found for: \"CULTURE\"     I/O last 3 completed shifts:  In: 2993.3 (38.7 mL/kg) [I.V.:2993.3 (38.7 mL/kg)]  Out: 145 (1.9 mL/kg) [Emesis/NG output:100; Drains:20; Blood:25]  Weight: 77.3 kg   [unfilled]    Lab Results   Component Value Date    PATIENTTEMP  05/08/2024      Comment:      NOTE: Patient Results are Not Corrected for Temperature          Assessment/Plan     Patient has already been given a loading dose of 1000 mg in the OR.  Will initiate vancomycin maintenance,  1250 mg every 12 hours.    This dosing regimen is predicted by [employer] to result in the following pharmacokinetic parameters:    Regimen: 1250 mg IV every 12 hours.  Exposure target: AUC24 (range)400-600 mg/L.hr   AUC24,ss: 524 mg/L.hr  Probability of AUC24 > 400: 77 %  Ctrough,ss: 15.5 mg/L  Probability of Ctrough,ss > 20: 31 %  Probability of nephrotoxicity (Lodise JOSE LUIS 2009): 11 %      Follow-up level will be ordered on 5/11 at 1st am labs unless clinically indicated sooner.  Will continue to monitor renal function daily while on vancomycin and order serum creatinine at least every 48 hours if not already ordered.  Follow for continued vancomycin needs, clinical response, and signs/symptoms of toxicity.       Karri Rabago, PharmD       "

## 2024-05-10 NOTE — CARE PLAN
The patient's goals for the shift include      The clinical goals for the shift include Pt's N/V will be adequately managed during this shift    Problem: Safety - Adult  Goal: Free from fall injury  Outcome: Progressing     Problem: Pain  Goal: Takes deep breaths with improved pain control throughout the shift  Outcome: Progressing  Goal: Turns in bed with improved pain control throughout the shift  Outcome: Progressing  Goal: Performs ADL's with improved pain control throughout shift  Outcome: Progressing  Goal: Free from opioid side effects throughout the shift  Outcome: Progressing  Goal: Free from acute confusion related to pain meds throughout the shift  Outcome: Progressing     Problem: Fall/Injury  Goal: Not fall by end of shift  Outcome: Progressing  Goal: Verbalize understanding of personal risk factors for fall in the hospital  Outcome: Progressing  Goal: Use assistive devices by end of the shift  Outcome: Progressing  Goal: Pace activities to prevent fatigue by end of the shift  Outcome: Progressing

## 2024-05-10 NOTE — ANESTHESIA PROCEDURE NOTES
Airway  Date/Time: 5/10/2024 2:13 PM  Urgency: elective    Airway not difficult    Staffing  Performed: HERMAN   Authorized by: Arron Solorzano MD    Performed by: HERMAN Trejo  Patient location during procedure: OR    Indications and Patient Condition  Indications for airway management: anesthesia and airway protection  Spontaneous ventilation: present  Sedation level: deep  Preoxygenated: yes  Patient position: sniffing  MILS not maintained throughout  Mask difficulty assessment: 0 - not attempted    Final Airway Details  Final airway type: endotracheal airway      Successful airway: ETT  Cuffed: yes   Successful intubation technique: direct laryngoscopy  Blade: Orion  Blade size: #3  ETT size (mm): 7.0  Cormack-Lehane Classification: grade I - full view of glottis  Placement verified by: chest auscultation and capnometry   Measured from: lips  ETT to lips (cm): 22  Number of attempts at approach: 1    Additional Comments  Secured with silk tape, RSI

## 2024-05-10 NOTE — PROGRESS NOTES
"Orthopaedic Surgery Progress Note    S:  Transferred to PACU in stable condition. Waking up from anesthesia.     O:  /77   Pulse 97   Temp 36.1 °C (97 °F) (Temporal)   Resp 18   Ht 1.778 m (5' 10\")   Wt 77.3 kg (170 lb 6.7 oz)   SpO2 97%   BMI 24.45 kg/m²     Gen: arousable, NAD  Cardiac: RRR to peripheral palpation  Resp: nonlabored on RA  GI: soft, nondistended    MSK:  Right Lower Extremity:   -Dressing c/d/I, in KI  -Provena holding suction  -Fires DF/PF/EHL/FHL  -SILT in saph/sural/SPN/DPN distributions  -Foot warm, well perfused  -Palpable DP pulse, brisk cap refill  -Compartments soft and compressible      Labs:  No results found for this or any previous visit (from the past 24 hour(s)).    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 Whelan. Stable in PACU. Plastics (Dr. Ramos) evaluated intra-op for potential future flap coverage, wound was able to be closed primarily intra-op.       Plan:    - ID consult for R PJI, appreciate recommendations   - Possible future plastics intervention, pending incisional healing and wound viability  - Weight bearing: NWB while drain in place, maintain KI  - DVT ppx: SCDs, ASA 81mg BID  - Diet: Regular  - Pain: Tylenol, oxycodone 5/10  - Drain: HV drain x1, please record output q8 hr  - F/u intra-op cxs x3  - Antibiotics: Broad spectrum pending tailoring based off intra-op cxs  - FEN: HLIV with good PO intake  - Bowel Regimen: Colace, senna  - PT/OT  - Pulm: Encourage IS  - Continue home medications as ordered  - No damon    Dispo: Pending PT/OT, final cxs/ID recs, drain    Keshawn Lebron MD  Orthopaedic Surgery, PGY-2  Available by Epic Chat  05/10/24  4:29 PM    This patient will be followed by Ortho Trauma team (All chat preferred):     1st call: Tyrel Razo, PGY-1  2nd call: Ciaran Hobbs, PGY-2  3rd call: Mathew Silvestre, PGY-3     "

## 2024-05-11 LAB
ANION GAP SERPL CALC-SCNC: 14 MMOL/L (ref 10–20)
BUN SERPL-MCNC: 7 MG/DL (ref 6–23)
CALCIUM SERPL-MCNC: 8.7 MG/DL (ref 8.6–10.6)
CHLORIDE SERPL-SCNC: 100 MMOL/L (ref 98–107)
CO2 SERPL-SCNC: 24 MMOL/L (ref 21–32)
CREAT SERPL-MCNC: 0.36 MG/DL (ref 0.5–1.05)
EGFRCR SERPLBLD CKD-EPI 2021: >90 ML/MIN/1.73M*2
ERYTHROCYTE [DISTWIDTH] IN BLOOD BY AUTOMATED COUNT: 12.6 % (ref 11.5–14.5)
GLUCOSE SERPL-MCNC: 93 MG/DL (ref 74–99)
HCT VFR BLD AUTO: 36.1 % (ref 36–46)
HGB BLD-MCNC: 11.4 G/DL (ref 12–16)
MCH RBC QN AUTO: 27.8 PG (ref 26–34)
MCHC RBC AUTO-ENTMCNC: 31.6 G/DL (ref 32–36)
MCV RBC AUTO: 88 FL (ref 80–100)
NRBC BLD-RTO: 0 /100 WBCS (ref 0–0)
PLATELET # BLD AUTO: 243 X10*3/UL (ref 150–450)
POTASSIUM SERPL-SCNC: 4.4 MMOL/L (ref 3.5–5.3)
RBC # BLD AUTO: 4.1 X10*6/UL (ref 4–5.2)
SODIUM SERPL-SCNC: 134 MMOL/L (ref 136–145)
VANCOMYCIN SERPL-MCNC: 18.4 UG/ML (ref 5–20)
WBC # BLD AUTO: 10.3 X10*3/UL (ref 4.4–11.3)

## 2024-05-11 PROCEDURE — 1090000001 HH PPS REVENUE CREDIT

## 2024-05-11 PROCEDURE — 36415 COLL VENOUS BLD VENIPUNCTURE: CPT

## 2024-05-11 PROCEDURE — A4217 STERILE WATER/SALINE, 500 ML: HCPCS

## 2024-05-11 PROCEDURE — 1090000002 HH PPS REVENUE DEBIT

## 2024-05-11 PROCEDURE — 80202 ASSAY OF VANCOMYCIN: CPT

## 2024-05-11 PROCEDURE — 2500000001 HC RX 250 WO HCPCS SELF ADMINISTERED DRUGS (ALT 637 FOR MEDICARE OP)

## 2024-05-11 PROCEDURE — 1100000001 HC PRIVATE ROOM DAILY

## 2024-05-11 PROCEDURE — 85027 COMPLETE CBC AUTOMATED: CPT

## 2024-05-11 PROCEDURE — 80048 BASIC METABOLIC PNL TOTAL CA: CPT

## 2024-05-11 PROCEDURE — 2500000004 HC RX 250 GENERAL PHARMACY W/ HCPCS (ALT 636 FOR OP/ED)

## 2024-05-11 RX ADMIN — VANCOMYCIN HYDROCHLORIDE 1250 MG: 1.25 INJECTION, POWDER, LYOPHILIZED, FOR SOLUTION INTRAVENOUS at 02:38

## 2024-05-11 RX ADMIN — CEFEPIME 2 G: 1 INJECTION, SOLUTION INTRAVENOUS at 23:59

## 2024-05-11 RX ADMIN — ACETAMINOPHEN 650 MG: 325 TABLET ORAL at 23:59

## 2024-05-11 RX ADMIN — OXYCODONE HYDROCHLORIDE 10 MG: 5 TABLET ORAL at 04:29

## 2024-05-11 RX ADMIN — DOCUSATE SODIUM 100 MG: 100 CAPSULE, LIQUID FILLED ORAL at 20:02

## 2024-05-11 RX ADMIN — OXYCODONE HYDROCHLORIDE 5 MG: 5 TABLET ORAL at 09:30

## 2024-05-11 RX ADMIN — DOCUSATE SODIUM 100 MG: 100 CAPSULE, LIQUID FILLED ORAL at 09:30

## 2024-05-11 RX ADMIN — ASPIRIN 81 MG: 81 TABLET, COATED ORAL at 09:29

## 2024-05-11 RX ADMIN — BACLOFEN 5 MG: 10 TABLET ORAL at 11:49

## 2024-05-11 RX ADMIN — CEFEPIME 2 G: 1 INJECTION, SOLUTION INTRAVENOUS at 14:28

## 2024-05-11 RX ADMIN — CEFEPIME 2 G: 1 INJECTION, SOLUTION INTRAVENOUS at 06:01

## 2024-05-11 RX ADMIN — OXYCODONE HYDROCHLORIDE 10 MG: 5 TABLET ORAL at 15:39

## 2024-05-11 RX ADMIN — ASPIRIN 81 MG: 81 TABLET, COATED ORAL at 20:02

## 2024-05-11 RX ADMIN — VANCOMYCIN HYDROCHLORIDE 1250 MG: 1.25 INJECTION, POWDER, LYOPHILIZED, FOR SOLUTION INTRAVENOUS at 15:06

## 2024-05-11 RX ADMIN — SODIUM CHLORIDE, POTASSIUM CHLORIDE, SODIUM LACTATE AND CALCIUM CHLORIDE 125 ML/HR: 600; 310; 30; 20 INJECTION, SOLUTION INTRAVENOUS at 04:31

## 2024-05-11 RX ADMIN — OXYCODONE HYDROCHLORIDE 10 MG: 5 TABLET ORAL at 20:02

## 2024-05-11 ASSESSMENT — COGNITIVE AND FUNCTIONAL STATUS - GENERAL
DRESSING REGULAR LOWER BODY CLOTHING: A LITTLE
MOVING TO AND FROM BED TO CHAIR: A LOT
MOVING FROM LYING ON BACK TO SITTING ON SIDE OF FLAT BED WITH BEDRAILS: A LITTLE
STANDING UP FROM CHAIR USING ARMS: A LOT
TURNING FROM BACK TO SIDE WHILE IN FLAT BAD: A LITTLE
MOBILITY SCORE: 13
WALKING IN HOSPITAL ROOM: A LOT
CLIMB 3 TO 5 STEPS WITH RAILING: TOTAL
HELP NEEDED FOR BATHING: A LITTLE
DAILY ACTIVITIY SCORE: 20
TOILETING: A LITTLE
DRESSING REGULAR UPPER BODY CLOTHING: A LITTLE

## 2024-05-11 ASSESSMENT — PAIN - FUNCTIONAL ASSESSMENT
PAIN_FUNCTIONAL_ASSESSMENT: 0-10

## 2024-05-11 ASSESSMENT — PAIN SCALES - GENERAL
PAINLEVEL_OUTOF10: 0 - NO PAIN
PAINLEVEL_OUTOF10: 7
PAINLEVEL_OUTOF10: 8
PAINLEVEL_OUTOF10: 8
PAINLEVEL_OUTOF10: 9

## 2024-05-11 ASSESSMENT — PAIN DESCRIPTION - ORIENTATION
ORIENTATION: RIGHT
ORIENTATION: RIGHT

## 2024-05-11 ASSESSMENT — PAIN DESCRIPTION - LOCATION
LOCATION: KNEE
LOCATION: KNEE

## 2024-05-11 ASSESSMENT — PAIN DESCRIPTION - DESCRIPTORS: DESCRIPTORS: ACHING;DISCOMFORT;SORE

## 2024-05-11 NOTE — PROGRESS NOTES
Orthopaedic Surgery Progress Note:    S: NAEON. AFVSS. Pain well controlled. Denies CP, SOB, f/c. Some motion sickness.    O:    Constitutional: NAD, resting comfortably in bed  Skin: Warm and dry, no rashes   Eyes: EOMI, clear sclera   ENMT: MMM   HEENT: Neck supple without apparent injury, EOMI, MMM  Respiratory: NWOB on RA   CV: RRR per peripheral pulses, limbs wwp  GI: soft, non-distended   Lymph: No apparent LAD  Neuro: SPAIN spontaneously, CNs II - XII grossly intact   Psych: Appropriate mood and behavior   MSK:   RLE:   -Prevena holding suction  - HV in place holding suction  -Motor intact in DF/PF/EHL/FHL  -SILT in saph/sural/SPN/DPN distributions  -Foot wwp, 2+ DP/PT pulse, brisk cap refill  -Compartments soft and compressible, no pain with passive dorsiflexion    A full secondary survey was conducted. Patient did not have any acute pain with ROM or palpation of other extremities other than that which is mentioned below.    A/P: 67yo F p/w R TKA dehiscence. Now s/p I&D, poly swap on 5/10 with Dr Whelan.  Recovering appropriately    - Regular diet. Bowel Regimen: Colace, senna, dulcolax.  - Multimodal pain therapy: scheduled tylenol, prn oxycodone, dilaudid prn for breakthrough  - mIVF to continue until patient is tolerating good PO intake, HLIV w/ good PO; Will monitor BMP on POD 1 and prn thereafter  - Weightbearing: NWB RLE until drain removed. PT/OT consult, to see by POD1 at the latest.   - Encourage IS  - Abx; vanc/cefepime pending cultures  - ID: intra-op cx; ID consult  - No indication for transfusion; monitor CBC POD1, repeat prn thereafter.  - DVT PPx: SCD, ASA 81 mg BID for chemoPPx   - Maintain PIV, no damon  - Drain: HVx1, please record output at the end of each 8 hour nursing shift  - Continue home meds: as indicated  - Glycemic: No issues    Dispo: pending ID recs and PT    This plan was discussed with the attending, Dr. Whelan.    This patient will be followed by ortho trauma team (All Epic  chat preferred):  1st call: Tyrel Razo PGY1  2nd call: Ciaran Hobbs PGY2  3rd call: Mathew Silvestre PGY3     6pm-6am M-F, holidays, weekends please contact on-call resident @ [phone] w/ urgent questions/concerns.    Ciaran Hobbs MD  Orthopedic Surgery, PGY-2

## 2024-05-11 NOTE — CARE PLAN
The patient's goals for the shift include      The clinical goals for the shift include pt will remain safe and free from harm and have adequate pain control by end of shift    Problem: Safety - Adult  Goal: Free from fall injury  Outcome: Progressing     Problem: Discharge Planning  Goal: Discharge to home or other facility with appropriate resources  Outcome: Progressing     Problem: Chronic Conditions and Co-morbidities  Goal: Patient's chronic conditions and co-morbidity symptoms are monitored and maintained or improved  Outcome: Progressing     Problem: Pain  Goal: Takes deep breaths with improved pain control throughout the shift  Outcome: Progressing  Goal: Turns in bed with improved pain control throughout the shift  Outcome: Progressing  Goal: Walks with improved pain control throughout the shift  Outcome: Progressing  Goal: Performs ADL's with improved pain control throughout shift  Outcome: Progressing  Goal: Participates in PT with improved pain control throughout the shift  Outcome: Progressing  Goal: Free from opioid side effects throughout the shift  Outcome: Progressing  Goal: Free from acute confusion related to pain meds throughout the shift  Outcome: Progressing     Problem: Fall/Injury  Goal: Not fall by end of shift  Outcome: Progressing  Goal: Be free from injury by end of the shift  Outcome: Progressing  Goal: Verbalize understanding of personal risk factors for fall in the hospital  Outcome: Progressing  Goal: Verbalize understanding of risk factor reduction measures to prevent injury from fall in the home  Outcome: Progressing  Goal: Use assistive devices by end of the shift  Outcome: Progressing  Goal: Pace activities to prevent fatigue by end of the shift  Outcome: Progressing     Problem: Infection related to problem list condition  Goal: Infection will resolve through treatment  Outcome: Progressing

## 2024-05-11 NOTE — PROGRESS NOTES
"Vancomycin Dosing by Pharmacy- FOLLOW UP    Adriana Wood is a 68 y.o. year old female who Pharmacy has been consulted for vancomycin dosing for other surgical wound infection . Based on the patient's indication and renal status this patient is being dosed based on a goal AUC of 400-600.     Renal function is currently stable.    Current vancomycin dose: 1250 mg given every 12 hours    Estimated vancomycin AUC on current dose: 532 mg/L.hr     Visit Vitals  /70 (BP Location: Right arm, Patient Position: Lying)   Pulse 57   Temp 35.6 °C (96.1 °F) (Temporal)   Resp 17        Lab Results   Component Value Date    CREATININE 0.36 (L) 05/11/2024    CREATININE 0.53 05/09/2024    CREATININE 0.58 05/08/2024    CREATININE 0.77 04/01/2024        Patient weight is No results found for: \"PTWEIGHT\"    No results found for: \"CULTURE\"     I/O last 3 completed shifts:  In: 4247.6 (54.9 mL/kg) [I.V.:4247.6 (54.9 mL/kg)]  Out: 1065 (13.8 mL/kg) [Urine:900 (0.3 mL/kg/hr); Emesis/NG output:100; Drains:40; Blood:25]  Weight: 77.3 kg   [unfilled]    Lab Results   Component Value Date    PATIENTTEMP  05/08/2024      Comment:      NOTE: Patient Results are Not Corrected for Temperature        Assessment/Plan    Within goal AUC range. Continue current vancomycin regimen.    This dosing regimen is predicted by [employer] to result in the following pharmacokinetic parameters:  Loading dose: N/A  Regimen: 1250 mg IV every 12 hours.  Start time: 14:38 on 05/11/2024  Exposure target: AUC24 (range)400-600 mg/L.hr   AUC24,ss: 532 mg/L.hr  Probability of AUC24 > 400: 94 %  Ctrough,ss: 15.1 mg/L  Probability of Ctrough,ss > 20: 18 %  Probability of nephrotoxicity (Lodise JOSE LUIS 2009): 10 %    The next level will be obtained on 5/15/24 at 1st AM labs. May be obtained sooner if clinically indicated.   Will continue to monitor renal function daily while on vancomycin and order serum creatinine at least every 48 hours if not already " ordered.  Follow for continued vancomycin needs, clinical response, and signs/symptoms of toxicity.       Donal Palm, PharmD

## 2024-05-11 NOTE — CARE PLAN
The patient's goals for the shift include      The clinical goals for the shift include pt will remain safe and free from harm and have adequate pain control by end of shift  Pt remained safe and pain was gradually improved throughout my shift  Problem: Safety - Adult  Goal: Free from fall injury  Outcome: Progressing     Problem: Discharge Planning  Goal: Discharge to home or other facility with appropriate resources  Outcome: Progressing     Problem: Chronic Conditions and Co-morbidities  Goal: Patient's chronic conditions and co-morbidity symptoms are monitored and maintained or improved  Outcome: Progressing     Problem: Pain  Goal: Takes deep breaths with improved pain control throughout the shift  Outcome: Progressing  Goal: Turns in bed with improved pain control throughout the shift  Outcome: Progressing  Goal: Walks with improved pain control throughout the shift  Outcome: Progressing  Goal: Performs ADL's with improved pain control throughout shift  Outcome: Progressing  Goal: Participates in PT with improved pain control throughout the shift  Outcome: Progressing  Goal: Free from opioid side effects throughout the shift  Outcome: Progressing  Goal: Free from acute confusion related to pain meds throughout the shift  Outcome: Progressing     Problem: Fall/Injury  Goal: Not fall by end of shift  Outcome: Progressing  Goal: Be free from injury by end of the shift  Outcome: Progressing  Goal: Verbalize understanding of personal risk factors for fall in the hospital  Outcome: Progressing  Goal: Verbalize understanding of risk factor reduction measures to prevent injury from fall in the home  Outcome: Progressing  Goal: Use assistive devices by end of the shift  Outcome: Progressing  Goal: Pace activities to prevent fatigue by end of the shift  Outcome: Progressing     Problem: Infection related to problem list condition  Goal: Infection will resolve through treatment  Outcome: Progressing

## 2024-05-12 LAB
BACTERIA FLD CULT: NORMAL
BACTERIA SPEC CULT: ABNORMAL
GRAM STN SPEC: ABNORMAL
GRAM STN SPEC: ABNORMAL
GRAM STN SPEC: NORMAL
GRAM STN SPEC: NORMAL

## 2024-05-12 PROCEDURE — 2500000001 HC RX 250 WO HCPCS SELF ADMINISTERED DRUGS (ALT 637 FOR MEDICARE OP)

## 2024-05-12 PROCEDURE — 2500000004 HC RX 250 GENERAL PHARMACY W/ HCPCS (ALT 636 FOR OP/ED)

## 2024-05-12 PROCEDURE — 1090000002 HH PPS REVENUE DEBIT

## 2024-05-12 PROCEDURE — 1100000001 HC PRIVATE ROOM DAILY

## 2024-05-12 PROCEDURE — A4217 STERILE WATER/SALINE, 500 ML: HCPCS

## 2024-05-12 PROCEDURE — 1090000001 HH PPS REVENUE CREDIT

## 2024-05-12 RX ORDER — OXYCODONE HYDROCHLORIDE 5 MG/1
5 TABLET ORAL EVERY 4 HOURS PRN
Status: DISCONTINUED | OUTPATIENT
Start: 2024-05-12 | End: 2024-05-15 | Stop reason: HOSPADM

## 2024-05-12 RX ADMIN — PANTOPRAZOLE SODIUM 40 MG: 40 TABLET, DELAYED RELEASE ORAL at 07:48

## 2024-05-12 RX ADMIN — SCOPOLAMINE 1 PATCH: 1.5 PATCH, EXTENDED RELEASE TRANSDERMAL at 17:30

## 2024-05-12 RX ADMIN — ASPIRIN 81 MG: 81 TABLET, COATED ORAL at 08:55

## 2024-05-12 RX ADMIN — OXYCODONE HYDROCHLORIDE 5 MG: 5 TABLET ORAL at 07:48

## 2024-05-12 RX ADMIN — BACLOFEN 5 MG: 10 TABLET ORAL at 10:55

## 2024-05-12 RX ADMIN — CEFEPIME 2 G: 1 INJECTION, SOLUTION INTRAVENOUS at 14:35

## 2024-05-12 RX ADMIN — CEFEPIME 2 G: 1 INJECTION, SOLUTION INTRAVENOUS at 23:46

## 2024-05-12 RX ADMIN — ASPIRIN 81 MG: 81 TABLET, COATED ORAL at 19:59

## 2024-05-12 RX ADMIN — HYDROMORPHONE HYDROCHLORIDE 0.4 MG: 1 INJECTION, SOLUTION INTRAMUSCULAR; INTRAVENOUS; SUBCUTANEOUS at 00:00

## 2024-05-12 RX ADMIN — VANCOMYCIN HYDROCHLORIDE 1250 MG: 1.25 INJECTION, POWDER, LYOPHILIZED, FOR SOLUTION INTRAVENOUS at 15:34

## 2024-05-12 RX ADMIN — OXYCODONE HYDROCHLORIDE 10 MG: 5 TABLET ORAL at 11:49

## 2024-05-12 RX ADMIN — OXYCODONE HYDROCHLORIDE 10 MG: 5 TABLET ORAL at 19:59

## 2024-05-12 RX ADMIN — VANCOMYCIN HYDROCHLORIDE 1250 MG: 1.25 INJECTION, POWDER, LYOPHILIZED, FOR SOLUTION INTRAVENOUS at 03:35

## 2024-05-12 RX ADMIN — CEFEPIME 2 G: 1 INJECTION, SOLUTION INTRAVENOUS at 07:47

## 2024-05-12 ASSESSMENT — COGNITIVE AND FUNCTIONAL STATUS - GENERAL
DAILY ACTIVITIY SCORE: 20
CLIMB 3 TO 5 STEPS WITH RAILING: TOTAL
WALKING IN HOSPITAL ROOM: A LOT
TOILETING: A LITTLE
DRESSING REGULAR UPPER BODY CLOTHING: A LITTLE
MOVING TO AND FROM BED TO CHAIR: A LOT
STANDING UP FROM CHAIR USING ARMS: A LOT
HELP NEEDED FOR BATHING: A LITTLE
HELP NEEDED FOR BATHING: A LITTLE
MOVING FROM LYING ON BACK TO SITTING ON SIDE OF FLAT BED WITH BEDRAILS: A LITTLE
CLIMB 3 TO 5 STEPS WITH RAILING: TOTAL
WALKING IN HOSPITAL ROOM: A LOT
DRESSING REGULAR LOWER BODY CLOTHING: A LITTLE
TURNING FROM BACK TO SIDE WHILE IN FLAT BAD: A LITTLE
DRESSING REGULAR UPPER BODY CLOTHING: A LITTLE
DRESSING REGULAR LOWER BODY CLOTHING: A LITTLE
STANDING UP FROM CHAIR USING ARMS: A LOT
MOBILITY SCORE: 13
TURNING FROM BACK TO SIDE WHILE IN FLAT BAD: A LITTLE
MOVING TO AND FROM BED TO CHAIR: A LOT
MOBILITY SCORE: 13
DAILY ACTIVITIY SCORE: 20
MOVING FROM LYING ON BACK TO SITTING ON SIDE OF FLAT BED WITH BEDRAILS: A LITTLE
TOILETING: A LITTLE

## 2024-05-12 ASSESSMENT — PAIN SCALES - GENERAL
PAINLEVEL_OUTOF10: 9
PAINLEVEL_OUTOF10: 4
PAINLEVEL_OUTOF10: 8
PAINLEVEL_OUTOF10: 3
PAINLEVEL_OUTOF10: 2
PAINLEVEL_OUTOF10: 6
PAINLEVEL_OUTOF10: 8

## 2024-05-12 ASSESSMENT — PAIN - FUNCTIONAL ASSESSMENT
PAIN_FUNCTIONAL_ASSESSMENT: 0-10

## 2024-05-12 ASSESSMENT — PAIN DESCRIPTION - ORIENTATION: ORIENTATION: RIGHT

## 2024-05-12 ASSESSMENT — PAIN DESCRIPTION - LOCATION: LOCATION: LEG

## 2024-05-12 NOTE — CARE PLAN
Problem: Safety - Adult  Goal: Free from fall injury  Outcome: Progressing     Problem: Discharge Planning  Goal: Discharge to home or other facility with appropriate resources  Outcome: Progressing     Problem: Chronic Conditions and Co-morbidities  Goal: Patient's chronic conditions and co-morbidity symptoms are monitored and maintained or improved  Outcome: Progressing     Problem: Pain  Goal: Takes deep breaths with improved pain control throughout the shift  Outcome: Progressing  Goal: Turns in bed with improved pain control throughout the shift  Outcome: Progressing  Goal: Walks with improved pain control throughout the shift  Outcome: Progressing  Goal: Performs ADL's with improved pain control throughout shift  Outcome: Progressing  Goal: Participates in PT with improved pain control throughout the shift  Outcome: Progressing  Goal: Free from opioid side effects throughout the shift  Outcome: Progressing  Goal: Free from acute confusion related to pain meds throughout the shift  Outcome: Progressing     Problem: Fall/Injury  Goal: Not fall by end of shift  Outcome: Progressing  Goal: Be free from injury by end of the shift  Outcome: Progressing  Goal: Verbalize understanding of personal risk factors for fall in the hospital  Outcome: Progressing  Goal: Verbalize understanding of risk factor reduction measures to prevent injury from fall in the home  Outcome: Progressing  Goal: Use assistive devices by end of the shift  Outcome: Progressing  Goal: Pace activities to prevent fatigue by end of the shift  Outcome: Progressing     Problem: Infection related to problem list condition  Goal: Infection will resolve through treatment  Outcome: Progressing   The patient's goals for the shift include      The clinical goals for the shift include pt will have decreased pain this shift

## 2024-05-12 NOTE — PROGRESS NOTES
Orthopaedic Surgery Progress Note:    S: NAEON. AFVSS. Pain well controlled. Denies CP, SOB, f/c. Some motion sickness.    O:    Constitutional: NAD, resting comfortably in bed  Skin: Warm and dry, no rashes   Eyes: EOMI, clear sclera   ENMT: MMM   HEENT: Neck supple without apparent injury, EOMI, MMM  Respiratory: NWOB on RA   CV: RRR per peripheral pulses, limbs wwp  GI: soft, non-distended   Lymph: No apparent LAD  Neuro: SPAIN spontaneously, CNs II - XII grossly intact   Psych: Appropriate mood and behavior   MSK:   RLE:   -Prevena holding suction  -Motor intact in DF/PF/EHL/FHL  -SILT in saph/sural/SPN/DPN distributions  -Foot wwp, 2+ DP/PT pulse, brisk cap refill  -Compartments soft and compressible, no pain with passive dorsiflexion    A full secondary survey was conducted. Patient did not have any acute pain with ROM or palpation of other extremities other than that which is mentioned below.    A/P: 67yo F p/w R TKA dehiscence. Now s/p I&D, poly swap on 5/10 with Dr Whelan.  Recovering appropriately    - Regular diet. Bowel Regimen: Colace, senna, dulcolax.  - Multimodal pain therapy: scheduled tylenol, prn oxycodone, dilaudid prn for breakthrough  - mIVF to continue until patient is tolerating good PO intake, HLIV w/ good PO; Will monitor BMP on POD 1 and prn thereafter  - Weightbearing: WBAT RLE in KI. PT/OT consult, to see by POD1 at the latest.   - Encourage IS  - Abx; vanc/cefepime pending cultures  - ID: intra-op cx w E coli; ID consult  - No indication for transfusion; monitor CBC POD1, repeat prn thereafter.  - DVT PPx: SCD, ASA 81 mg BID for chemoPPx   - Maintain PIV, no damon  - Drain: removed 5/12. Prevena exchanged 5/12  - Continue home meds: as indicated  - Glycemic: No issues    Dispo: pending ID recs and PT    This plan was discussed with the attending, Dr. Whelan.    This patient will be followed by ortho trauma team (All Epic chat preferred):  1st call: Tyrel Razo PGY1  2nd call: Ciaran  Anjel PGY2  3rd call: Mathew Silvestre PGY3     6pm-6am M-F, holidays, weekends please contact on-call resident @ [phone] w/ urgent questions/concerns.    Ciaran Hobbs MD  Orthopedic Surgery, PGY-2

## 2024-05-12 NOTE — CARE PLAN
The patient's goals for the shift include      The clinical goals for the shift include Patient will have adequate pain control throughout the shift.    Problem: Safety - Adult  Goal: Free from fall injury  Outcome: Progressing     Problem: Discharge Planning  Goal: Discharge to home or other facility with appropriate resources  Outcome: Progressing     Problem: Chronic Conditions and Co-morbidities  Goal: Patient's chronic conditions and co-morbidity symptoms are monitored and maintained or improved  Outcome: Progressing     Problem: Pain  Goal: Takes deep breaths with improved pain control throughout the shift  Outcome: Progressing  Goal: Turns in bed with improved pain control throughout the shift  Outcome: Progressing  Goal: Walks with improved pain control throughout the shift  Outcome: Progressing  Goal: Performs ADL's with improved pain control throughout shift  Outcome: Progressing  Goal: Participates in PT with improved pain control throughout the shift  Outcome: Progressing  Goal: Free from opioid side effects throughout the shift  Outcome: Progressing  Goal: Free from acute confusion related to pain meds throughout the shift  Outcome: Progressing     Problem: Fall/Injury  Goal: Not fall by end of shift  Outcome: Progressing  Goal: Be free from injury by end of the shift  Outcome: Progressing  Goal: Verbalize understanding of personal risk factors for fall in the hospital  Outcome: Progressing  Goal: Verbalize understanding of risk factor reduction measures to prevent injury from fall in the home  Outcome: Progressing  Goal: Use assistive devices by end of the shift  Outcome: Progressing  Goal: Pace activities to prevent fatigue by end of the shift  Outcome: Progressing     Problem: Infection related to problem list condition  Goal: Infection will resolve through treatment  Outcome: Progressing

## 2024-05-12 NOTE — NURSING NOTE
1103 - Received critical value from microbiology. E-coli in synovial fluid. Paged MD to make aware. MD is now aware.

## 2024-05-13 ENCOUNTER — APPOINTMENT (OUTPATIENT)
Dept: RADIOLOGY | Facility: HOSPITAL | Age: 69
DRG: 486 | End: 2024-05-13
Payer: MEDICARE

## 2024-05-13 LAB
B-LACTAMASE ORGANISM ISLT: POSITIVE
BACTERIA SPEC CULT: ABNORMAL
GRAM STN SPEC: ABNORMAL

## 2024-05-13 PROCEDURE — 99232 SBSQ HOSP IP/OBS MODERATE 35: CPT | Performed by: INTERNAL MEDICINE

## 2024-05-13 PROCEDURE — 2500000001 HC RX 250 WO HCPCS SELF ADMINISTERED DRUGS (ALT 637 FOR MEDICARE OP)

## 2024-05-13 PROCEDURE — 97530 THERAPEUTIC ACTIVITIES: CPT | Mod: GP

## 2024-05-13 PROCEDURE — C1751 CATH, INF, PER/CENT/MIDLINE: HCPCS

## 2024-05-13 PROCEDURE — 36573 INSJ PICC RS&I 5 YR+: CPT

## 2024-05-13 PROCEDURE — 2500000004 HC RX 250 GENERAL PHARMACY W/ HCPCS (ALT 636 FOR OP/ED)

## 2024-05-13 PROCEDURE — 97165 OT EVAL LOW COMPLEX 30 MIN: CPT | Mod: GO

## 2024-05-13 PROCEDURE — 97161 PT EVAL LOW COMPLEX 20 MIN: CPT | Mod: GP

## 2024-05-13 PROCEDURE — 36569 INSJ PICC 5 YR+ W/O IMAGING: CPT

## 2024-05-13 PROCEDURE — A4217 STERILE WATER/SALINE, 500 ML: HCPCS

## 2024-05-13 PROCEDURE — 1100000001 HC PRIVATE ROOM DAILY

## 2024-05-13 PROCEDURE — 1090000002 HH PPS REVENUE DEBIT

## 2024-05-13 PROCEDURE — 1090000001 HH PPS REVENUE CREDIT

## 2024-05-13 PROCEDURE — 2780000003 HC OR 278 NO HCPCS

## 2024-05-13 PROCEDURE — 87081 CULTURE SCREEN ONLY: CPT

## 2024-05-13 PROCEDURE — 97535 SELF CARE MNGMENT TRAINING: CPT | Mod: GO

## 2024-05-13 PROCEDURE — 02HV33Z INSERTION OF INFUSION DEVICE INTO SUPERIOR VENA CAVA, PERCUTANEOUS APPROACH: ICD-10-PCS | Performed by: ORTHOPAEDIC SURGERY

## 2024-05-13 RX ORDER — ACETAMINOPHEN 325 MG/1
650 TABLET ORAL EVERY 6 HOURS PRN
Qty: 90 TABLET | Refills: 0 | Status: SHIPPED | OUTPATIENT
Start: 2024-05-13 | End: 2024-05-15

## 2024-05-13 RX ORDER — OXYCODONE HYDROCHLORIDE 5 MG/1
5 TABLET ORAL EVERY 6 HOURS PRN
Qty: 20 TABLET | Refills: 0 | Status: SHIPPED | OUTPATIENT
Start: 2024-05-13 | End: 2024-05-15

## 2024-05-13 RX ORDER — DOCUSATE SODIUM 100 MG/1
100 CAPSULE, LIQUID FILLED ORAL 2 TIMES DAILY PRN
Qty: 14 CAPSULE | Refills: 0 | Status: SHIPPED | OUTPATIENT
Start: 2024-05-13 | End: 2024-05-15

## 2024-05-13 RX ORDER — CEFTRIAXONE SODIUM 2 G/1
2 INJECTION, POWDER, FOR SOLUTION INTRAVENOUS DAILY
Qty: 42 EACH | Refills: 0 | Status: SHIPPED
Start: 2024-05-13 | End: 2024-05-15

## 2024-05-13 RX ORDER — LIDOCAINE HYDROCHLORIDE 10 MG/ML
5 INJECTION INFILTRATION; PERINEURAL ONCE
Status: DISCONTINUED | OUTPATIENT
Start: 2024-05-13 | End: 2024-05-15 | Stop reason: HOSPADM

## 2024-05-13 RX ORDER — MULTIVITAMIN
1 TABLET ORAL 2 TIMES DAILY
Qty: 60 TABLET | Refills: 0 | Status: SHIPPED | OUTPATIENT
Start: 2024-05-13 | End: 2024-05-15

## 2024-05-13 RX ORDER — ASPIRIN 81 MG/1
81 TABLET ORAL 2 TIMES DAILY
Qty: 56 TABLET | Refills: 0 | Status: SHIPPED | OUTPATIENT
Start: 2024-05-13 | End: 2024-05-15

## 2024-05-13 RX ADMIN — OXYCODONE HYDROCHLORIDE 10 MG: 5 TABLET ORAL at 15:25

## 2024-05-13 RX ADMIN — CEFEPIME 2 G: 1 INJECTION, SOLUTION INTRAVENOUS at 14:34

## 2024-05-13 RX ADMIN — DOCUSATE SODIUM 100 MG: 100 CAPSULE, LIQUID FILLED ORAL at 20:27

## 2024-05-13 RX ADMIN — CEFEPIME 2 G: 1 INJECTION, SOLUTION INTRAVENOUS at 22:29

## 2024-05-13 RX ADMIN — VANCOMYCIN HYDROCHLORIDE 1250 MG: 1.25 INJECTION, POWDER, LYOPHILIZED, FOR SOLUTION INTRAVENOUS at 15:09

## 2024-05-13 RX ADMIN — VANCOMYCIN HYDROCHLORIDE 1250 MG: 1.25 INJECTION, POWDER, LYOPHILIZED, FOR SOLUTION INTRAVENOUS at 03:33

## 2024-05-13 RX ADMIN — ASPIRIN 81 MG: 81 TABLET, COATED ORAL at 09:11

## 2024-05-13 RX ADMIN — PANTOPRAZOLE SODIUM 40 MG: 40 TABLET, DELAYED RELEASE ORAL at 06:30

## 2024-05-13 RX ADMIN — OXYCODONE HYDROCHLORIDE 10 MG: 5 TABLET ORAL at 03:28

## 2024-05-13 RX ADMIN — ASPIRIN 81 MG: 81 TABLET, COATED ORAL at 20:27

## 2024-05-13 RX ADMIN — OXYCODONE HYDROCHLORIDE 5 MG: 5 TABLET ORAL at 20:27

## 2024-05-13 RX ADMIN — OXYCODONE HYDROCHLORIDE 10 MG: 5 TABLET ORAL at 09:11

## 2024-05-13 RX ADMIN — CEFEPIME 2 G: 1 INJECTION, SOLUTION INTRAVENOUS at 06:32

## 2024-05-13 ASSESSMENT — ENCOUNTER SYMPTOMS
MUSCULOSKELETAL NEGATIVE: 1
HEMATOLOGIC/LYMPHATIC NEGATIVE: 1
CONSTITUTIONAL NEGATIVE: 1
ENDOCRINE NEGATIVE: 1
ALLERGIC/IMMUNOLOGIC NEGATIVE: 1
CARDIOVASCULAR NEGATIVE: 1
PSYCHIATRIC NEGATIVE: 1
RESPIRATORY NEGATIVE: 1
NEUROLOGICAL NEGATIVE: 1
EYES NEGATIVE: 1
GASTROINTESTINAL NEGATIVE: 1

## 2024-05-13 ASSESSMENT — COGNITIVE AND FUNCTIONAL STATUS - GENERAL
MOVING FROM LYING ON BACK TO SITTING ON SIDE OF FLAT BED WITH BEDRAILS: A LITTLE
TOILETING: A LITTLE
HELP NEEDED FOR BATHING: A LOT
STANDING UP FROM CHAIR USING ARMS: A LOT
MOVING FROM LYING ON BACK TO SITTING ON SIDE OF FLAT BED WITH BEDRAILS: A LITTLE
DAILY ACTIVITIY SCORE: 17
WALKING IN HOSPITAL ROOM: A LOT
HELP NEEDED FOR BATHING: A LOT
DRESSING REGULAR LOWER BODY CLOTHING: A LOT
DRESSING REGULAR LOWER BODY CLOTHING: A LOT
WALKING IN HOSPITAL ROOM: A LOT
MOBILITY SCORE: 14
MOVING TO AND FROM BED TO CHAIR: A LITTLE
DRESSING REGULAR UPPER BODY CLOTHING: A LITTLE
TOILETING: A LITTLE
CLIMB 3 TO 5 STEPS WITH RAILING: TOTAL
PERSONAL GROOMING: A LITTLE
TURNING FROM BACK TO SIDE WHILE IN FLAT BAD: A LITTLE
TURNING FROM BACK TO SIDE WHILE IN FLAT BAD: A LITTLE
PERSONAL GROOMING: A LITTLE
DAILY ACTIVITIY SCORE: 17
DRESSING REGULAR UPPER BODY CLOTHING: A LITTLE
STANDING UP FROM CHAIR USING ARMS: A LOT
MOVING TO AND FROM BED TO CHAIR: A LITTLE
MOBILITY SCORE: 14
CLIMB 3 TO 5 STEPS WITH RAILING: TOTAL

## 2024-05-13 ASSESSMENT — ACTIVITIES OF DAILY LIVING (ADL)
HOME_MANAGEMENT_TIME_ENTRY: 8
BATHING_ASSISTANCE: MODERATE
ADL_ASSISTANCE: NEEDS ASSISTANCE

## 2024-05-13 ASSESSMENT — PAIN - FUNCTIONAL ASSESSMENT
PAIN_FUNCTIONAL_ASSESSMENT: 0-10

## 2024-05-13 ASSESSMENT — PAIN SCALES - GENERAL
PAINLEVEL_OUTOF10: 8
PAINLEVEL_OUTOF10: 3
PAINLEVEL_OUTOF10: 9
PAINLEVEL_OUTOF10: 8
PAINLEVEL_OUTOF10: 6
PAINLEVEL_OUTOF10: 7
PAINLEVEL_OUTOF10: 7
PAINLEVEL_OUTOF10: 6

## 2024-05-13 NOTE — PROGRESS NOTES
Orthopaedic Surgery Progress Note:    S: NAEON. AFVSS. Pain well controlled. Denies CP, SOB, f/c.    O:    Constitutional: NAD, resting comfortably in bed  Skin: Warm and dry, no rashes   Eyes: EOMI, clear sclera   ENMT: MMM   HEENT: Neck supple without apparent injury, EOMI, MMM  Respiratory: NWOB on RA   CV: RRR per peripheral pulses, limbs wwp  GI: soft, non-distended   Lymph: No apparent LAD  Neuro: SPAIN spontaneously, CNs II - XII grossly intact   Psych: Appropriate mood and behavior   MSK:   RLE:   -Prevena holding suction  -Motor intact in DF/PF/EHL/FHL  -SILT in saph/sural/SPN/DPN distributions  -Foot wwp, 2+ DP/PT pulse, brisk cap refill  -Compartments soft and compressible, no pain with passive dorsiflexion    A full secondary survey was conducted. Patient did not have any acute pain with ROM or palpation of other extremities other than that which is mentioned below.    A/P: 69yo F p/w R TKA dehiscence. Now s/p I&D, poly swap on 5/10 with Dr Whelan.  Recovering appropriately    - Regular diet. Bowel Regimen: Colace, senna, dulcolax.  - Multimodal pain therapy: scheduled tylenol, prn oxycodone, dilaudid prn for breakthrough  - mIVF to continue until patient is tolerating good PO intake, HLIV w/ good PO; Will monitor BMP on POD 1 and prn thereafter  - Weightbearing: WBAT RLE in KI, no ROM R knee. PT/OT consult, to see by POD1 at the latest.   - Encourage IS  - Abx; vanc/cefepime  - ID: intra-op cx w E coli; ID consult  - No indication for transfusion; monitor CBC POD1, repeat prn thereafter.  - DVT PPx: SCD, ASA 81 mg BID for chemoPPx   - Maintain PIV, no damon  - Drain: removed 5/12. Prevena exchanged 5/12  - Continue home meds: as indicated  - Glycemic: No issues    Dispo: pending ID recs and PT    This plan was discussed with the attending, Dr. Whelan.    This patient will be followed by ortho trauma team (All Epic chat preferred):  1st call: Tyrel Razo PGY1  2nd call: Ciaran Hobbs PGY2  3rd call:  Mathew Silvestre PGY3     6pm-6am M-F, holidays, weekends please contact on-call resident @ [phone] w/ urgent questions/concerns.    Ciaran Hobbs MD  Orthopedic Surgery, PGY-2

## 2024-05-13 NOTE — CARE PLAN
The clinical goals for the shift include pt will remain safe and use call light    Problem: Safety - Adult  Goal: Free from fall injury  Outcome: Progressing     Problem: Discharge Planning  Goal: Discharge to home or other facility with appropriate resources  Outcome: Progressing     Problem: Chronic Conditions and Co-morbidities  Goal: Patient's chronic conditions and co-morbidity symptoms are monitored and maintained or improved  Outcome: Progressing     Problem: Pain  Goal: Takes deep breaths with improved pain control throughout the shift  Outcome: Progressing  Goal: Turns in bed with improved pain control throughout the shift  Outcome: Progressing  Goal: Walks with improved pain control throughout the shift  Outcome: Progressing  Goal: Performs ADL's with improved pain control throughout shift  Outcome: Progressing  Goal: Participates in PT with improved pain control throughout the shift  Outcome: Progressing  Goal: Free from opioid side effects throughout the shift  Outcome: Progressing  Goal: Free from acute confusion related to pain meds throughout the shift  Outcome: Progressing     Problem: Fall/Injury  Goal: Not fall by end of shift  Outcome: Progressing  Goal: Be free from injury by end of the shift  Outcome: Progressing  Goal: Verbalize understanding of personal risk factors for fall in the hospital  Outcome: Progressing  Goal: Verbalize understanding of risk factor reduction measures to prevent injury from fall in the home  Outcome: Progressing  Goal: Use assistive devices by end of the shift  Outcome: Progressing  Goal: Pace activities to prevent fatigue by end of the shift  Outcome: Progressing     Problem: Infection related to problem list condition  Goal: Infection will resolve through treatment  Outcome: Progressing

## 2024-05-13 NOTE — PROGRESS NOTES
Physical Therapy    Physical Therapy Evaluation    Patient Name: Adriana Wood  MRN: [id number]  Today's Date: 5/13/2024   Time Calculation  Start Time: 0820  Stop Time: 0845  Time Calculation (min): 25 min    Assessment/Plan   PT Assessment  PT Assessment Results: Decreased strength, Decreased range of motion, Decreased endurance, Decreased mobility, Impaired balance, Impaired sensation (pt reports n/t in all R fingers)  Rehab Prognosis: Good  Barriers to Discharge: none  Evaluation/Treatment Tolerance: Patient limited by fatigue, Patient limited by pain, Other (Comment) (+ nausea)  Medical Staff Made Aware: Yes  Strengths: Ability to acquire knowledge, Access to adaptive/assistive products, Coping skills, Financial security, Insight into problems, Living arrangement secure, Rehab experience, Support of Caregivers, Support and attitude of living partners  End of Session Communication: Bedside nurse  Assessment Comment: Pt demonstrates deficits in strength, mobility, balance, and function. Would benefit from continued PT in hospital as well as following discharge. Treatment to focus on bed mobility, strength, transfers, gait, and functional capacity.  End of Session Patient Position: Bed, 3 rail up  IP OR SWING BED PT PLAN  Inpatient or Swing Bed: Inpatient  PT Plan  Treatment/Interventions: Bed mobility, Transfer training, Gait training, Balance training, Strengthening  PT Plan: Skilled PT  PT Frequency: Daily  PT Discharge Recommendations: Low intensity level of continued care  PT Recommended Transfer Status: Contact guard  PT - OK to Discharge: Yes    Subjective   General Visit Information:  General  Reason for Referral: hx of R open tibia fx in 8/21 c/b posttraumatic arthritis now s/p R TKA w Dr. Whelan on 3/13/24. Currently hospitalized due to wound dihisence and subsequent infection. Surgical debridement performed last week.  Currently WBAT in knee immobilizer.  Co-Treatment: OT  Co-Treatment Reason: Pt  has multiple sites of complications due to trauma in 2021, including R knee and R forearm. Diffuculties with functional mobility as well as self care and ADLs.  Patient Position Received: Bed, 3 rail up  General Comment: Pt supine in bed upon arrival and drowsy. Reports willingness to participate in therapy this morning. C/o nausea and 7/10 pain in R knee and forearm.  Home Living:  Home Living  Type of Home: House  Lives With: Spouse, Adult children  Home Adaptive Equipment: Walker rolling or standard, Wheelchair-manual  Home Access: Ramped entrance  Home Living Comments: Pt resides with , who was recently dx with leukemia. Since suergery in March, pt's daughter has also been residing with her and offering 24/hr assistance. Reports additional help from home health.  Prior Level of Function:  Prior Function Per Pt/Caregiver Report  Level of [place]: Needs assistance with ADLs, Needs assistance with homemaking, Needs assistance with functional transfers  Receives Help From: Home health, Family  Precautions:  Precautions  LE Weight Bearing Status: Weight Bearing as Tolerated  Medical Precautions: Fall precautions  Braces Applied: R knee immobilizer    Objective   Pain:  Pain Assessment  Pain Assessment: 0-10  Pain Score: 7  Pain Type: Surgical pain  Pain Location: Knee  Pain Orientation: Right  Cognition:  Cognition  Overall Cognitive Status: Within Functional Limits  Orientation Level: Oriented X4    General Assessments:    Activity Tolerance  Endurance: Tolerates 10 - 20 min exercise with multiple rests    Strength  Strength Comments: impaired (3-/10)  Strength  Strength Comments: impaired (3-/10)    Postural Control  Postural Control: Within Functional Limits  Posture Comment: kyphotic sitting posture and B hand support needed to maintain sitting    Functional Assessments:    Bed Mobility  Bed Mobility: Yes  Bed Mobility 1  Bed Mobility 1: Supine to sitting, Sitting to supine, Rolling left  Level of  Assistance 1: Close supervision  Bed Mobility Comments 1: To bring legs off of bed to sit up, pt lifted and dragged R leg. Unclear if due to pain, fear of movement, or knee immobilizer.    Transfers  Transfer: Yes  Transfer 1  Transfer From 1: Bed to, Wheelchair to  Transfer to 1: Wheelchair, Bed  Technique 1: Stand pivot, To right  Transfer Level of Assistance 1: Minimum assistance    Ambulation/Gait Training  Ambulation/Gait Training Performed: No (not able to tolerate attempts to walk, secondary to low endurance, pain and nausea)    Wheelchair Activities  Wheelchair Type: Standard  Wheelchair Cushion: None    Outcome Measures:  [facility] Basic Mobility  Turning from your back to your side while in a flat bed without using bedrails: A little  Moving from lying on your back to sitting on the side of a flat bed without using bedrails: A little  Moving to and from bed to chair (including a wheelchair): A little  Standing up from a chair using your arms (e.g. wheelchair or bedside chair): A lot  To walk in hospital room: A lot  Climbing 3-5 steps with railing: Total  Basic Mobility - Total Score: 14    Encounter Problems       Encounter Problems (Active)       Balance       STG - Maintains static standing balance with upper extremity support wheeled walker, CGA       Start:  05/13/24    Expected End:  05/27/24       INTERVENTIONS:  1. Practice standing with minimal support.  2. Educate patient about standing tolerance.  3. Educate patient about independence with gait, transfers, and ADL's.  4. Educate patient about use of assistive device.  5. Educate patient about self-directed care.            Mobility       STG - Patient will ambulate >10ft, wheeled, CGA       Start:  05/13/24    Expected End:  05/27/24               PT Problem       Patient will perform chair to and from bed transfer with supervision       Start:  05/13/24    Expected End:  05/27/24               PT Transfers       STG - Patient will transfer sit to  and from stand CGA       Start:  05/13/24    Expected End:  05/27/24                   Education Documentation  Precautions, taught by Olu Cedeno PT at 5/13/2024  9:19 AM.  Learner: Patient  Readiness: Acceptance  Method: Explanation  Response: Verbalizes Understanding    ADL Training, taught by Olu Cedeno PT at 5/13/2024  9:19 AM.  Learner: Patient  Readiness: Acceptance  Method: Explanation  Response: Verbalizes Understanding    Precautions, taught by Olu Cedeno PT at 5/13/2024  9:19 AM.  Learner: Patient  Readiness: Acceptance  Method: Explanation  Response: Verbalizes Understanding    Body Mechanics, taught by Olu Cedeno PT at 5/13/2024  9:19 AM.  Learner: Patient  Readiness: Acceptance  Method: Explanation  Response: Verbalizes Understanding    Mobility Training, taught by Olu Cedeno PT at 5/13/2024  9:19 AM.  Learner: Patient  Readiness: Acceptance  Method: Explanation  Response: Verbalizes Understanding    Education Comments  No comments found.

## 2024-05-13 NOTE — PROCEDURES
Pre-Procedure Checklist:  Emergent Line Insertion: No  Type of Line to be Placed: PICC  Consent Obtained: Yes  Emergency Medication Necessary: No  Patient Identified with 2 Independent Identifiers: Yes  Review of Allergies, Anticoagulation, Relevant Labs, ECG/Telemetry: Yes  Risks/Benefits/Alternatives Discussed with Patient/POA/Legal Representative: Yes  Stop Sign on Door: Yes  Time Out Performed: Yes  Catheter Exchange: No    Positioning Checklist:  All People, Including Patient, in the Room with Cap and Mask: Yes  Fluoroscopy Used to Identify Vessel and Guide Insertion: No   Sterile Cover Used: Yes  Full Barrier Precautions Followed (Mask, Cap, Gown, Gloves): Yes  Hands Washed: Yes  Monitors Attached with Sound Alarms On: No  Full Body Sterile Drape (Head-to-Toe) Used to Cover Patient: Yes  Trendelenburg Position (For IJ and Subclavian): No  CHG Skin Prep Used and Allowed to Air Dry to Skin Procedure: Yes    Procedure Checklist:  Blood Aspirated From All Lumens, All Ports Subsequently Flushed: Yes  Catheter Caps Placed on All Lumens; Lumens Clamped: Yes  Maintain Guidewire Control Throughout, Ensuring Guidewire Removal: Yes  Maintain Sterile Field Throughout Insertion: Yes  Catheter Secured: Yes  Confirmatory Test of Venous Placement: Non-Pulsatile Blood    Post Procedure Checklist:  Date and Time Written on Dressing: Yes  Sharp and Wire Count and Safe Disposal of all Sharps/Wires: Yes  Sterile Dressing Applied Per Protocol: Yes  X-ray Ordered or ECG Image: Yes    PICC Insertion Details:  Size (Fr): 4  Lumen Type: SL  Catheter to Vein Ratio Less Than 50%: Yes  Total Length (cm): 42  External Length (cm): 0  Orientation:  Right brachial vein     Site Prep: Chlorohexidine; Usual sterile procedure followed  Local Anesthetic: Injectable/Subcutaneous  Indication: medication administration   Insertion Team Members in the Room:Adrianne Collins LPN  Initial Extremity Circumference (cm): 27  Insertion Attempts:  1  Patient Tolerance: Tolerated Well, Age Appropriate  Comfort Measures: Subcutaneous anesthetic; Verbal  Procedure Location: Bedside  Safety Measures: Patient specific safety measures addressed with RN  Estimated Blood Loss (mL): 1  Vessel Fully Compressible Proximally and Distally to Insertion Site: Yes  Brisk Blood Return Obtained and Line Draws Easily: Yes  Tip Location: PICC tip confirmed using 3cg at SVC  Line Confirmation: ECG  Lot #: [id number]  : Bard  PICC Line Exp Date: 04/30/2025  Securement: Stat Lock  Post Procedure Checklist: Handoff with RN; Obtain all new IV tubing prior to use; Bed at lowest level and wheels locked; Line discharge information at bedside.  Additional Details: Line was inserted using Modified Seldinger's Technique.   Placed by: Loulou Moralez RN

## 2024-05-13 NOTE — ANESTHESIA POSTPROCEDURE EVALUATION
Patient: Adriana Wood    Procedure Summary       Date: 05/10/24 Room / Location: [facility] OR 07 / Virtual [facility] OR    Anesthesia Start: 1355 Anesthesia Stop: 1639    Procedure: Debridement Lower Extremity and poly exchange (Right: Knee) Diagnosis:       Status post total right knee replacement      Other early complications of trauma, initial encounter (CMS-HCC)      (Status post total right knee replacement [Z96.651])      (Other early complications of trauma, initial encounter (CMS-HCC) [T79.8XXA])    Surgeons: Andrzej Whelan MD Responsible Provider: Audelia Stark MD    Anesthesia Type: general ASA Status: 2            Anesthesia Type: general    Vitals Value Taken Time   /77 05/10/24 1756   Temp 35.8 °C (96.4 °F) 05/10/24 1756   Pulse 59 05/10/24 1755   Resp 15 05/10/24 1756   SpO2 96 % 05/10/24 1756   Vitals shown include unfiled device data.    Anesthesia Post Evaluation    Patient location during evaluation: PACU  Patient participation: complete - patient participated  Level of consciousness: awake and alert  Pain management: adequate  Airway patency: patent  Cardiovascular status: acceptable  Respiratory status: acceptable  Hydration status: acceptable  Postoperative Nausea and Vomiting: none        No notable events documented.

## 2024-05-13 NOTE — CARE PLAN
The patient's goals for the shift include pt will rate pain 5/10 or less     The clinical goals for the shift include pt will remain safe and use call light

## 2024-05-13 NOTE — PROGRESS NOTES
Occupational Therapy    Evaluation and Treatment    Patient Name: Adriana Wood  MRN: [id number]  Today's Date: 5/13/2024  Room: [address] [place], [place] [address]  Time Calculation  Start Time: 0825  Stop Time: 0848  Time Calculation (min): 23 min    Assessment  IP OT Assessment  OT Assessment: Pt presents with deficits in activity tolerance, functional strength, and dynamic balance. She will benefit from skilled OT while admitted to increase IND in ADLs/IADLs prior to d/c.  Prognosis: Good  Barriers to Discharge: None  Evaluation/Treatment Tolerance: Patient limited by fatigue  Medical Staff Made Aware: Yes  End of Session Communication: Bedside nurse  End of Session Patient Position: Bed, 3 rail up, Alarm off, not on at start of session  Plan:  Treatment Interventions: ADL retraining, Functional transfer training, UE strengthening/ROM, Endurance training, Patient/family training, Equipment evaluation/education, Compensatory technique education  OT Frequency: 2 times per week  OT Discharge Recommendations: Low intensity level of continued care  OT Recommended Transfer Status: Minimal assist, Assist of 1  OT - OK to Discharge: Yes    Subjective   Current Problem:  1. Wound dehiscence        2. Status post total right knee replacement  Case Request Operating Room: Debridement Lower Extremity    Case Request Operating Room: Debridement Lower Extremity    Fungal Culture/Smear    Fungal Culture/Smear    Fungal Culture/Smear    Fungal Culture/Smear    Fungal Culture/Smear    Fungal Culture/Smear    Sterile Fluid Culture/Smear    Sterile Fluid Culture/Smear    Tissue/Wound Culture/Smear    Tissue/Wound Culture/Smear    Tissue/Wound Culture/Smear    Tissue/Wound Culture/Smear    Tissue/Wound Culture/Smear    Tissue/Wound Culture/Smear    Fungal Culture/Smear    Fungal Culture/Smear    Tissue/Wound Culture/Smear    Tissue/Wound Culture/Smear      3. Other early complications of trauma, initial encounter (CMS-[facility])  Case Request Operating Room:  "Debridement Lower Extremity    Case Request Operating Room: Debridement Lower Extremity    Fungal Culture/Smear    Fungal Culture/Smear    Fungal Culture/Smear    Fungal Culture/Smear    Fungal Culture/Smear    Fungal Culture/Smear    Sterile Fluid Culture/Smear    Sterile Fluid Culture/Smear    Tissue/Wound Culture/Smear    Tissue/Wound Culture/Smear    Tissue/Wound Culture/Smear    Tissue/Wound Culture/Smear    Tissue/Wound Culture/Smear    Tissue/Wound Culture/Smear    Fungal Culture/Smear    Fungal Culture/Smear    Tissue/Wound Culture/Smear    Tissue/Wound Culture/Smear        General:  Reason for Referral: Wound dihisence and subsequent infection. Surgical debridement performed 5/10  Past Medical History Relevant to Rehab: R TKA w Dr. Whelan on 3/13/24, R open tibia fx in 8/21 c/b posttraumatic arthritis  Co-Treatment: PT  Co-Treatment Reason: Pt has multiple sites of complications due to trauma in 2021, including R knee and R forearm. Diffuculties with functional mobility as well as self care and ADLs.  Prior to Session Communication: Bedside nurse  Patient Position Received: Alarm off, not on at start of session (Sitting EOB with PT)  Family/Caregiver Present: No  General Comment: Pt pleasant and agreeable to OT eval, reports \"I want to go home, but I know I might need therapy.\" Reports  is sick and daughter has been providing assistance at home.   Precautions:  LE Weight Bearing Status: Weight Bearing as Tolerated (R LE)  Medical Precautions: Fall precautions  Braces Applied: R knee immobilizer  Vital Signs:     Pain:  Pain Assessment  Pain Assessment: 0-10  Pain Score: 7  Pain Type: Surgical pain  Pain Location: Knee  Pain Orientation: Right  Pain Interventions: Repositioned  Response to Interventions: Best at rest  Lines/Tubes/Drains:  Closed/Suction Drain Right;Lateral Knee Accordion 10 Fr. (Active)   Number of days: 2       External Urinary Catheter Female (Active)   Number of days: 1 "         Objective   Cognition:  Overall Cognitive Status: Within Functional Limits  Orientation Level: Oriented X4           Home Living:  Type of Home: House  Lives With: Spouse, Adult children ( is sick and cannot assist, dtr available and can assist as needed)  Home Adaptive Equipment: Walker rolling or standard, Wheelchair-manual  Home Layout: One level  Home Access: Ramped entrance  Bathroom Shower/Tub: Tub/shower unit  Bathroom Toilet: Standard  Bathroom Equipment: Tub transfer bench, Bedside commode  Home Living Comments: Reports she has been using BSC, hasn't been able to get into bathroom since surgery in March   Prior Function:  Level of [place]: Needs assistance with ADLs, Needs assistance with homemaking, Needs assistance with functional transfers  Receives Help From: Family  ADL Assistance: Needs assistance  Homemaking Assistance: Needs assistance  Homemaking Assistance Comments: Daughter has been assisting  Ambulatory Assistance: Needs assistance  Transfers: Stand by  Vocational: Unemployed  Leisure: Drawing, playing cards, TV  Prior Function Comments: Has been using w/c since 2022, stand pivot transfers with SUP  IADL History:  Homemaking Responsibilities: No  Current License: No  Mode of Transportation: Family  Occupation: Unemployed  Type of Occupation: Wants to run for [employer], plans to work from home  Leisure and Hobbies: Drawing, playing cards, TV  ADL:  Eating Assistance: Independent (Anticipated)  Grooming Assistance: Stand by  Grooming Deficit: Teeth care (From bed level due to nausea)  Bathing Assistance: Moderate (Anticipated)  UE Dressing Assistance: Minimal (Anticipated)  LE Dressing Assistance: Moderate (Anticipated)  Toileting Assistance with Device: Moderate (Anticipated)  Activity Tolerance:  Endurance: Tolerates 10 - 20 min exercise with multiple rests  Activity Tolerance Comments: Short rest breaks throughout  Balance:  Dynamic Standing Balance  Dynamic  Standing-Balance Support: Bilateral upper extremity supported  Dynamic Standing-Comments: Min A  Static Sitting Balance  Static Sitting-Balance Support: No upper extremity supported, Feet supported  Static Sitting-Level of Assistance: Independent  Bed Mobility/Transfers: Bed Mobility/Transfers: Bed Mobility  Bed Mobility: Yes  Bed Mobility 1  Bed Mobility 1: Sitting to supine  Level of Assistance 1: Close supervision  Bed Mobility Comments 1: SBA for safety, cueing for improved positioning   and Transfers  Transfer: Yes  Transfer 1  Transfer From 1: Bed to, Wheelchair to  Transfer to 1: Wheelchair, Bed  Technique 1: Stand pivot  Transfer Level of Assistance 1: Minimum assistance  Trials/Comments 1: Min A for management of R LE, cueing for improved positioning  IADL's:   Homemaking Responsibilities: No  Current License: No  Mode of Transportation: Family  Occupation: Unemployed  Type of Occupation: Wants to run for local [employer] position, plans to work from home  Leisure and Hobbies: Drawing, playing cards, TV  Vision: Vision - Basic Assessment  Current Vision: No visual deficits   and    Sensation:  Light Touch: Partial deficits in the RUE, Partial deficits in the LUE  Sensation Comment: Numbness in tips of fingers in B hands  Strength:  Strength Comments: Grossly impaired  Perception:  Inattention/Neglect: Appears intact  Coordination:  Movements are Fluid and Coordinated: Yes   Hand Function:  Hand Function  Gross Grasp: Functional  Coordination: Functional  Extremities:   RUE   RUE : Exceptions to WFL (Complains of pain with R wrist, 3+/5 gross strength), LUE   LUE: Exceptions to WFL (4-/5 grossly),  , and        Outcome Measures: [facility] Daily Activity  Putting on and taking off regular lower body clothing: A lot  Bathing (including washing, rinsing, drying): A lot  Putting on and taking off regular upper body clothing: A little  Toileting, which includes using toilet, bedpan or urinal: A little  Taking care of  personal grooming such as brushing teeth: A little  Eating Meals: None  Daily Activity - Total Score: 17         ,     OT Adult Other Outcome Measures  4AT: 4 AT -    Education Documentation  Body Mechanics, taught by Jose Amadro OT at 5/13/2024  9:43 AM.  Learner: Patient  Readiness: Acceptance  Method: Explanation, Demonstration  Response: Verbalizes Understanding, Demonstrated Understanding    Precautions, taught by Jose Amador OT at 5/13/2024  9:43 AM.  Learner: Patient  Readiness: Acceptance  Method: Explanation, Demonstration  Response: Verbalizes Understanding, Demonstrated Understanding    ADL Training, taught by Jose Amador OT at 5/13/2024  9:43 AM.  Learner: Patient  Readiness: Acceptance  Method: Explanation, Demonstration  Response: Verbalizes Understanding, Demonstrated Understanding    Education Comments  No comments found.    Goals:   Encounter Problems       Encounter Problems (Active)       ADLs       Patient with complete lower body dressing with supervision level of assistance donning and doffing all LE clothes  with PRN adaptive equipment while edge of bed  and standing (Progressing)       Start:  05/13/24    Expected End:  06/03/24            Patient will complete daily grooming tasks brushing teeth and washing face/hair with supervision level of assistance and PRN adaptive equipment while standing. (Progressing)       Start:  05/13/24    Expected End:  06/03/24            Patient will complete toileting including hygiene clothing management/hygiene with supervision level of assistance and grab bars. (Progressing)       Start:  05/13/24    Expected End:  06/03/24               BALANCE       Patientt will maintain static standing balance during ADL task with supervision level of assistance drop down in order to demonstrate decreased risk of falling and improved postural control. (Progressing)       Start:  05/13/24    Expected End:  06/03/24               TRANSFERS       Patient will  complete functional transfers with least restrictive device with independent level of assistance. (Progressing)       Start:  05/13/24    Expected End:  06/03/24                   Treatment Completed on Evaluation  Activities of Daily Living:    Grooming  Grooming Level of Assistance: Setup  Grooming Where Assessed: Bed level  Grooming Comments: Setup assist at bed level, education on improved positioning, cueing for completion     Therapy/Activity:     Therapeutic Activity  Therapeutic Activity Performed: Yes  Therapeutic Activity 1: Functional transfer bed to w/c and w/c to bed, cueing for improved positioning and safety     05/13/24 at 9:44 AM   Jose Amador OT   Rehab Office: [phone]

## 2024-05-13 NOTE — PROGRESS NOTES
Adriana Wood is a 68 y.o. female on day 4 of admission presenting with Wound dehiscence.    Subjective   Interval History:   Afebrile  OR cxs with E. Coli (pan-sens)        Review of Systems   Constitutional: Negative.    HENT: Negative.     Eyes: Negative.    Respiratory: Negative.     Cardiovascular: Negative.    Gastrointestinal: Negative.    Endocrine: Negative.    Genitourinary: Negative.    Musculoskeletal: Negative.    Skin: Negative.    Allergic/Immunologic: Negative.    Neurological: Negative.    Hematological: Negative.    Psychiatric/Behavioral: Negative.         Objective   Range of Vitals (last 24 hours)  Heart Rate:  [57-65]   Temp:  [36.3 °C (97.3 °F)-36.6 °C (97.9 °F)]   Resp:  [16]   BP: ()/(61-66)   SpO2:  [95 %-96 %]   Daily Weight  05/10/24 : 77.3 kg (170 lb 6.7 oz)    Body mass index is 24.45 kg/m².    Physical Exam  Vitals reviewed.   Constitutional:       Appearance: Normal appearance.   HENT:      Head: Normocephalic and atraumatic.      Mouth/Throat:      Mouth: Mucous membranes are moist.      Pharynx: Oropharynx is clear.   Cardiovascular:      Rate and Rhythm: Normal rate and regular rhythm.   Pulmonary:      Effort: Pulmonary effort is normal.   Abdominal:      General: Abdomen is flat. Bowel sounds are normal.      Palpations: Abdomen is soft.   Musculoskeletal:         General: Normal range of motion.      Cervical back: Normal range of motion.   Skin:     General: Skin is warm.   Neurological:      General: No focal deficit present.      Mental Status: She is alert and oriented to person, place, and time.   Psychiatric:         Mood and Affect: Mood normal.         Behavior: Behavior normal.         Thought Content: Thought content normal.         Judgment: Judgment normal.         Antibiotics  acetaminophen (Tylenol) tablet 650 mg  albuterol 90 mcg/actuation inhaler 2 puff  baclofen (Lioresal) tablet 5 mg  oxyCODONE (Roxicodone) immediate release tablet 5 mg  oxyCODONE  (Roxicodone) immediate release tablet 10 mg  docusate sodium (Colace) capsule  lidocaine (Xylocaine) 5 mg/mL (0.5 %) injection 100 mg  scopolamine (Transderm-Scop) patch 1 patch  dexAMETHasone (Decadron) 4 mg/mL oral liquid 4 mg  diazePAM (Valium) injection 2 mg  lactated Ringer's infusion  lactated Ringer's infusion  HYDROmorphone (Dilaudid) injection 0.2 mg  HYDROmorphone (Dilaudid) injection 0.5 mg  labetaloL (Normodyne,Trandate) injection 5 mg  promethazine (Phenergan) 6.25 mg in sodium chloride 0.9% 50 mL IV  sodium chloride 0.9 % irrigation solution  vancomycin (Vancocin) vial for injection  tobramycin (Nebcin) injection  oxygen (O2) therapy  lactated Ringer's infusion  acetaminophen (Tylenol) tablet 650 mg  aspirin EC tablet 81 mg  pantoprazole (ProtoNix) EC tablet 40 mg  vancomycin (Vancocin) pharmacy to dose - pharmacy monitoring  docusate sodium (Colace) capsule 100 mg  naloxone (Narcan) injection 0.2 mg  oxyCODONE (Roxicodone) immediate release tablet 5 mg  naloxone (Narcan) injection 0.2 mg  oxyCODONE (Roxicodone) immediate release tablet 10 mg  HYDROmorphone (Dilaudid) injection 0.4 mg  naloxone (Narcan) injection 0.2 mg  cefepime (Maxipime) IV 2 g  HYDROmorphone (Dilaudid) injection  - Omnicell Override Pull  HYDROmorphone (Dilaudid) injection  - Omnicell Override Pull  oxyCODONE (Roxicodone) immediate release tablet 5 mg  oxyCODONE (Roxicodone) immediate release tablet 10 mg  oxyCODONE (Roxicodone) immediate release tablet  - Omnicell Override Pull  vancomycin (Vancocin) in dextrose 5 % water (D5W) 250 mL IV 1,250 mg  oxyCODONE (Roxicodone) immediate release tablet 5 mg  lidocaine (Xylocaine) 10 mg/mL (1 %) injection 50 mg  alteplase (Cathflo Activase) injection 2 mg      Relevant Results  Labs  Results from last 72 hours   Lab Units 05/11/24  0726   WBC AUTO x10*3/uL 10.3   HEMOGLOBIN g/dL 11.4*   HEMATOCRIT % 36.1   PLATELETS AUTO x10*3/uL 243     Results from last 72 hours   Lab Units  05/11/24  0726   SODIUM mmol/L 134*   POTASSIUM mmol/L 4.4   CHLORIDE mmol/L 100   CO2 mmol/L 24   BUN mg/dL 7   CREATININE mg/dL 0.36*   GLUCOSE mg/dL 93   CALCIUM mg/dL 8.7   ANION GAP mmol/L 14   EGFR mL/min/1.73m*2 >90         Estimated Creatinine Clearance: 125 mL/min (A) (by C-G formula based on SCr of 0.36 mg/dL (L)).  C-Reactive Protein   Date Value Ref Range Status   05/09/2024 4.19 (H) <1.00 mg/dL Final   03/27/2024 1.48 (H) <1.00 mg/dL Final   01/18/2024 1.58 (H) <1.00 mg/dL Final     Microbiology  Susceptibility data from last 14 days.  Collected Specimen Info Organism Ampicillin Cefazolin Ciprofloxacin Gentamicin Levofloxacin Piperacillin/Tazobactam Trimethoprim/Sulfamethoxazole   05/10/24 Swab from KNEE HARDWARE RIGHT Escherichia coli          05/10/24 Swab from KNEE HARDWARE RIGHT Escherichia coli  S  S  S  S  S  S  S   05/10/24 Swab from KNEE HARDWARE RIGHT Escherichia coli          05/10/24 Swab from KNEE HARDWARE RIGHT Escherichia coli            Mixed Gram-Positive Bacteria           05/10/24 Fluid from SYNOVIAL FLUID Escherichia coli  S  S  S  S  S  S  S           Assessment/Plan   INFECTIOUS SYNOPSIS AND ASSESSMENT  68F with history of traumatic tibia fracture in 8/2021 c/b osteomyelitis and Enterobacter infection in 9/2021 s/p 6 weeks cefepime. More recently patient underwent R TKA on 3/13/24 for posttraumatic arthritis, now presenting with 3cm draining sinus tract on surgical incision with concern for infection. Patient going to the OR today 5/10 for I&D.    UPDATE 5/13/2024  Doing better  Cxs with pan-sens E. coli     RECOMMENDATION  -STOP vanc and cefepime  -START ceftriaxone 2gm IV daily x 6 weeks, EOT 6/19/2024  -OK to place PICC  -On discharge obtain weekly CBC with diff, CMP, CRP and ESR and fax reports to [phone] attn Dr Tolliver  -Will arrange outpatient ID follow up    SIGNING OFF, please call/text with any questions       I spent 30 minutes in the professional and overall care of  this patient.      Clyde Tolliver MD MPH  ID attending  ID Team B

## 2024-05-14 LAB
BACTERIA FLD CULT: ABNORMAL
GRAM STN SPEC: ABNORMAL
GRAM STN SPEC: ABNORMAL
STAPHYLOCOCCUS SPEC CULT: NORMAL

## 2024-05-14 PROCEDURE — 2500000004 HC RX 250 GENERAL PHARMACY W/ HCPCS (ALT 636 FOR OP/ED)

## 2024-05-14 PROCEDURE — 1090000002 HH PPS REVENUE DEBIT

## 2024-05-14 PROCEDURE — 2500000001 HC RX 250 WO HCPCS SELF ADMINISTERED DRUGS (ALT 637 FOR MEDICARE OP)

## 2024-05-14 PROCEDURE — 97530 THERAPEUTIC ACTIVITIES: CPT | Mod: GP,CQ

## 2024-05-14 PROCEDURE — 1100000001 HC PRIVATE ROOM DAILY

## 2024-05-14 PROCEDURE — 1090000001 HH PPS REVENUE CREDIT

## 2024-05-14 PROCEDURE — A4217 STERILE WATER/SALINE, 500 ML: HCPCS

## 2024-05-14 RX ORDER — METOCLOPRAMIDE HYDROCHLORIDE 5 MG/ML
10 INJECTION INTRAMUSCULAR; INTRAVENOUS EVERY 6 HOURS PRN
Status: DISCONTINUED | OUTPATIENT
Start: 2024-05-14 | End: 2024-05-15 | Stop reason: HOSPADM

## 2024-05-14 RX ORDER — METOCLOPRAMIDE 10 MG/1
10 TABLET ORAL EVERY 6 HOURS PRN
Status: DISCONTINUED | OUTPATIENT
Start: 2024-05-14 | End: 2024-05-15 | Stop reason: HOSPADM

## 2024-05-14 RX ORDER — CEFTRIAXONE 2 G/50ML
2 INJECTION, SOLUTION INTRAVENOUS EVERY 24 HOURS
Status: DISCONTINUED | OUTPATIENT
Start: 2024-05-14 | End: 2024-05-15 | Stop reason: HOSPADM

## 2024-05-14 RX ORDER — PROCHLORPERAZINE EDISYLATE 5 MG/ML
5 INJECTION INTRAMUSCULAR; INTRAVENOUS EVERY 6 HOURS PRN
Status: DISCONTINUED | OUTPATIENT
Start: 2024-05-14 | End: 2024-05-14

## 2024-05-14 RX ADMIN — VANCOMYCIN HYDROCHLORIDE 1250 MG: 1.25 INJECTION, POWDER, LYOPHILIZED, FOR SOLUTION INTRAVENOUS at 04:31

## 2024-05-14 RX ADMIN — ASPIRIN 81 MG: 81 TABLET, COATED ORAL at 20:03

## 2024-05-14 RX ADMIN — OXYCODONE HYDROCHLORIDE 10 MG: 5 TABLET ORAL at 12:54

## 2024-05-14 RX ADMIN — METOCLOPRAMIDE 10 MG: 5 INJECTION, SOLUTION INTRAMUSCULAR; INTRAVENOUS at 11:07

## 2024-05-14 RX ADMIN — ASPIRIN 81 MG: 81 TABLET, COATED ORAL at 08:19

## 2024-05-14 RX ADMIN — PANTOPRAZOLE SODIUM 40 MG: 40 TABLET, DELAYED RELEASE ORAL at 06:19

## 2024-05-14 RX ADMIN — CEFTRIAXONE SODIUM 2 G: 2 INJECTION, SOLUTION INTRAVENOUS at 08:19

## 2024-05-14 RX ADMIN — DOCUSATE SODIUM 100 MG: 100 CAPSULE, LIQUID FILLED ORAL at 08:19

## 2024-05-14 RX ADMIN — OXYCODONE HYDROCHLORIDE 10 MG: 5 TABLET ORAL at 22:13

## 2024-05-14 RX ADMIN — OXYCODONE HYDROCHLORIDE 5 MG: 5 TABLET ORAL at 18:11

## 2024-05-14 ASSESSMENT — PAIN - FUNCTIONAL ASSESSMENT
PAIN_FUNCTIONAL_ASSESSMENT: 0-10

## 2024-05-14 ASSESSMENT — PAIN SCALES - GENERAL
PAINLEVEL_OUTOF10: 8
PAINLEVEL_OUTOF10: 3
PAINLEVEL_OUTOF10: 5 - MODERATE PAIN
PAINLEVEL_OUTOF10: 8
PAINLEVEL_OUTOF10: 6
PAINLEVEL_OUTOF10: 6

## 2024-05-14 ASSESSMENT — COGNITIVE AND FUNCTIONAL STATUS - GENERAL
HELP NEEDED FOR BATHING: A LOT
TURNING FROM BACK TO SIDE WHILE IN FLAT BAD: A LITTLE
MOBILITY SCORE: 15
MOVING FROM LYING ON BACK TO SITTING ON SIDE OF FLAT BED WITH BEDRAILS: A LITTLE
TURNING FROM BACK TO SIDE WHILE IN FLAT BAD: A LITTLE
MOVING TO AND FROM BED TO CHAIR: A LITTLE
DAILY ACTIVITIY SCORE: 17
PERSONAL GROOMING: A LITTLE
CLIMB 3 TO 5 STEPS WITH RAILING: TOTAL
WALKING IN HOSPITAL ROOM: A LOT
TOILETING: A LITTLE
WALKING IN HOSPITAL ROOM: A LOT
CLIMB 3 TO 5 STEPS WITH RAILING: TOTAL
MOVING FROM LYING ON BACK TO SITTING ON SIDE OF FLAT BED WITH BEDRAILS: A LITTLE
MOVING TO AND FROM BED TO CHAIR: A LITTLE
STANDING UP FROM CHAIR USING ARMS: A LITTLE
MOBILITY SCORE: 15
DRESSING REGULAR UPPER BODY CLOTHING: A LITTLE
DRESSING REGULAR LOWER BODY CLOTHING: A LOT
STANDING UP FROM CHAIR USING ARMS: A LITTLE

## 2024-05-14 NOTE — CARE PLAN
The patient's goals for the shift include pt will rate pain 5/10 or less -progressing     The clinical goals for the shift include pt will remain safe and use call light -met

## 2024-05-14 NOTE — PROGRESS NOTES
Vancomycin Dosing by Pharmacy- Cessation of Therapy    Consult to pharmacy for vancomycin dosing has been discontinued by the prescriber, pharmacy will sign off at this time.    Please call pharmacy if there are further questions or re-enter a consult if vancomycin is resumed.     TANGELA FREEMAN, PharmD

## 2024-05-14 NOTE — PROGRESS NOTES
Physical Therapy    Physical Therapy Treatment    Patient Name: Adriana Wood  MRN: [id number]  Today's Date: 5/14/2024  Time Calculation  Start Time: 1207  Stop Time: 1234  Time Calculation (min): 27 min    Assessment/Plan   PT Assessment  PT Assessment Results: Decreased strength, Decreased endurance, Impaired balance, Decreased mobility  End of Session Communication: Bedside nurse  Assessment Comment: Pt was limited due to nausea this date. Pt able to complete STS transfer using FWW with Min A. Pt stood staticaly using FWW with CGA but unable to take a step due to increased nausea.  End of Session Patient Position: Bed, 3 rail up, Alarm off, not on at start of session     PT Plan  Treatment/Interventions: Bed mobility, Transfer training, Gait training, Balance training, Strengthening  PT Plan: Skilled PT  PT Frequency: Daily  PT Discharge Recommendations: Low intensity level of continued care  PT Recommended Transfer Status: Contact guard  PT - OK to Discharge: Yes      General Visit Information:   PT  Visit  PT Received On: 05/14/24  General  Family/Caregiver Present: Yes  Caregiver Feedback: Pt's  entered room during treatment.  Prior to Session Communication: Bedside nurse  General Comment: RN stated pt has been nauseaus today but cleared pt for therapy. Pt supine in bed upon arrival. Pt stated she is still nauseaus but it has improved and she is agreeable to therapy.    Subjective   Precautions:  Precautions  LE Weight Bearing Status: Weight Bearing as Tolerated (R LE in knee immobilizer)  Braces Applied: R knee immobilizer  Vital Signs:       Objective   Pain:     Cognition:  Cognition  Overall Cognitive Status: Within Functional Limits  Coordination:       Activity Tolerance:  Activity Tolerance  Endurance: Tolerates 10 - 20 min exercise with multiple rests  Treatments:  Therapeutic Activity  Therapeutic Activity Performed: Yes  Therapeutic Activity 1: Pt completed static standing using FWW with  CGA. Pt's nausea increased with prolonged standing and pt requested to sit.  Therapeutic Activity 2: Pt completed dynamic balance activity sitting EOB completing hygiene care maintaining upright posture with no LOB.    Bed Mobility  Bed Mobility: Yes  Bed Mobility 1  Bed Mobility 1: Supine to sitting  Level of Assistance 1: Close supervision  Bed Mobility 2  Bed Mobility  2: Sitting to supine  Level of Assistance 2: Close supervision    Ambulation/Gait Training  Ambulation/Gait Training Performed: No (Pt unable to due to nausea.)  Transfers  Transfer: Yes  Transfer 1  Transfer From 1: Bed to  Transfer to 1: Stand  Technique 1: Sit to stand  Transfer Level of Assistance 1: Minimum assistance  Trials/Comments 1: Cues for proper hand placement.  Transfers 2  Transfer From 2: Stand to  Transfer to 2: Sit  Technique 2: Stand to sit  Transfer Device 2: Walker  Transfer Level of Assistance 2: Contact guard    Outcome Measures:  [facility] Basic Mobility  Turning from your back to your side while in a flat bed without using bedrails: A little  Moving from lying on your back to sitting on the side of a flat bed without using bedrails: A little  Moving to and from bed to chair (including a wheelchair): A little  Standing up from a chair using your arms (e.g. wheelchair or bedside chair): A little  To walk in hospital room: A lot  Climbing 3-5 steps with railing: Total  Basic Mobility - Total Score: 15    Education Documentation  Precautions, taught by Radha Hartley PTA at 5/14/2024 12:47 PM.  Learner: Patient  Readiness: Acceptance  Method: Explanation  Response: Verbalizes Understanding    Body Mechanics, taught by Radha Hartley PTA at 5/14/2024 12:47 PM.  Learner: Patient  Readiness: Acceptance  Method: Explanation  Response: Verbalizes Understanding    Home Exercise Program, taught by Radha Hartley PTA at 5/14/2024 12:47 PM.  Learner: Patient  Readiness: Acceptance  Method: Explanation  Response: Verbalizes  Understanding    Mobility Training, taught by Radha Hartley PTA at 5/14/2024 12:47 PM.  Learner: Patient  Readiness: Acceptance  Method: Explanation  Response: Verbalizes Understanding    Education Comments  No comments found.        OP EDUCATION:       Encounter Problems       Encounter Problems (Active)       Balance       STG - Maintains static standing balance with upper extremity support wheeled walker, CGA (Progressing)       Start:  05/13/24    Expected End:  05/27/24       INTERVENTIONS:  1. Practice standing with minimal support.  2. Educate patient about standing tolerance.  3. Educate patient about independence with gait, transfers, and ADL's.  4. Educate patient about use of assistive device.  5. Educate patient about self-directed care.            Mobility       STG - Patient will ambulate >10ft, wheeled, CGA (Progressing)       Start:  05/13/24    Expected End:  05/27/24               PT Problem       Patient will perform chair to and from bed transfer with supervision (Progressing)       Start:  05/13/24    Expected End:  05/27/24               PT Transfers       STG - Patient will transfer sit to and from stand CGA (Progressing)       Start:  05/13/24    Expected End:  05/27/24

## 2024-05-14 NOTE — PROGRESS NOTES
Orthopaedic Surgery Progress Note:    S: No overnight issues. Doing well. No longer nauseated. Awaiting SNF     O:    Constitutional: NAD, resting comfortably in bed  Skin: Warm and dry, no rashes   Eyes: EOMI, clear sclera   ENMT: MMM   HEENT: Neck supple without apparent injury, EOMI, MMM  Respiratory: NWOB on RA   CV: RRR per peripheral pulses, limbs wwp  GI: soft, non-distended   Lymph: No apparent LAD  Neuro: SPAIN spontaneously, CNs II - XII grossly intact   Psych: Appropriate mood and behavior     MSK:   RLE:   -Prevena holding suction  -Motor intact in DF/PF/EHL/FHL  -SILT in saph/sural/SPN/DPN distributions  -Foot wwp, 2+ DP/PT pulse, brisk cap refill  -Compartments soft and compressible, no pain with passive dorsiflexion    A full secondary survey was conducted. Patient did not have any acute pain with ROM or palpation of other extremities other than that which is mentioned below.    A/P: 69yo F p/w R TKA dehiscence. Now s/p I&D, poly swap on 5/10 with Dr Whelan.  Recovering appropriately    - Regular diet. Bowel Regimen: Colace, senna, dulcolax.  - Multimodal pain therapy: scheduled tylenol, prn oxycodone, dilaudid prn for breakthrough  - HLIV  - Weightbearing: WBAT RLE in KI, no ROM R knee. PT/OT consult, to see by POD1 at the latest.   - Encourage IS  - Abx; CTX q24 hours  - ID: intra-op cx w E coli; ID consult left final recs Ceftriaxone 2g for 6 weeks   - DVT PPx: SCD, ASA 81 mg BID for chemoPPx   - Maintain PIV, no damon  - Drain: removed 5/12. Prevena exchanged 5/12  - Continue home meds: as indicated  - Glycemic: No issues    Dispo: pending SNF placement     Mathew Silvestre MD  PGY-3 Orthopedic Surgery  [facility]  Available by Epic Message     While admitted, this patient will be followed by the Ortho Trauma Team. Please contact below residents with any questions (available via Epic Chat).     First call: Tyrel Razo PGY-1  Second call: Mathew Silvestre PGY-3

## 2024-05-14 NOTE — CARE PLAN
The clinical goals for the shift include pt will remain safe and us call light    Problem: Safety - Adult  Goal: Free from fall injury  Outcome: Progressing     Problem: Discharge Planning  Goal: Discharge to home or other facility with appropriate resources  Outcome: Progressing     Problem: Chronic Conditions and Co-morbidities  Goal: Patient's chronic conditions and co-morbidity symptoms are monitored and maintained or improved  Outcome: Progressing     Problem: Pain  Goal: Takes deep breaths with improved pain control throughout the shift  Outcome: Progressing  Goal: Turns in bed with improved pain control throughout the shift  Outcome: Progressing  Goal: Walks with improved pain control throughout the shift  Outcome: Progressing  Goal: Performs ADL's with improved pain control throughout shift  Outcome: Progressing  Goal: Participates in PT with improved pain control throughout the shift  Outcome: Progressing  Goal: Free from opioid side effects throughout the shift  Outcome: Progressing  Goal: Free from acute confusion related to pain meds throughout the shift  Outcome: Progressing     Problem: Fall/Injury  Goal: Not fall by end of shift  Outcome: Progressing  Goal: Be free from injury by end of the shift  Outcome: Progressing  Goal: Verbalize understanding of personal risk factors for fall in the hospital  Outcome: Progressing  Goal: Verbalize understanding of risk factor reduction measures to prevent injury from fall in the home  Outcome: Progressing  Goal: Use assistive devices by end of the shift  Outcome: Progressing  Goal: Pace activities to prevent fatigue by end of the shift  Outcome: Progressing     Problem: Infection related to problem list condition  Goal: Infection will resolve through treatment  Outcome: Progressing

## 2024-05-14 NOTE — PROGRESS NOTES
SW received update from [facility]. PT recommends low intensity and will need IV antibiotics Ceftriaxone 2 grams Daily STOP DATE 6/19/24. Patient prefers SNF, FOC is [facility]. Referral submitted for review. SW will continue to follow.    1245-[facility] clinically accepted, pending update on bed availability. Per [facility], patient is not ready today. Will not need precert for discharge. SW will continue to follow.    1551-[facility] accepted. SHANEKA updated patient. SW will continue to follow.    NADINE Shepherd

## 2024-05-15 VITALS
HEIGHT: 70 IN | RESPIRATION RATE: 16 BRPM | BODY MASS INDEX: 24.4 KG/M2 | TEMPERATURE: 97.7 F | OXYGEN SATURATION: 98 % | WEIGHT: 170.42 LBS | HEART RATE: 52 BPM | DIASTOLIC BLOOD PRESSURE: 67 MMHG | SYSTOLIC BLOOD PRESSURE: 111 MMHG

## 2024-05-15 LAB — BACTERIA BLD CULT: NORMAL

## 2024-05-15 PROCEDURE — 2500000004 HC RX 250 GENERAL PHARMACY W/ HCPCS (ALT 636 FOR OP/ED)

## 2024-05-15 PROCEDURE — 1090000002 HH PPS REVENUE DEBIT

## 2024-05-15 PROCEDURE — 2500000001 HC RX 250 WO HCPCS SELF ADMINISTERED DRUGS (ALT 637 FOR MEDICARE OP)

## 2024-05-15 PROCEDURE — 1090000001 HH PPS REVENUE CREDIT

## 2024-05-15 RX ORDER — MULTIVITAMIN
1 TABLET ORAL 2 TIMES DAILY
Qty: 60 TABLET | Refills: 0 | Status: SHIPPED | OUTPATIENT
Start: 2024-05-15 | End: 2024-06-14

## 2024-05-15 RX ORDER — ACETAMINOPHEN 325 MG/1
650 TABLET ORAL EVERY 6 HOURS PRN
Qty: 90 TABLET | Refills: 0 | Status: SHIPPED | OUTPATIENT
Start: 2024-05-15 | End: 2024-05-29

## 2024-05-15 RX ORDER — DOCUSATE SODIUM 100 MG/1
100 CAPSULE, LIQUID FILLED ORAL 2 TIMES DAILY PRN
Qty: 14 CAPSULE | Refills: 0 | Status: SHIPPED | OUTPATIENT
Start: 2024-05-15 | End: 2024-05-22

## 2024-05-15 RX ORDER — OXYCODONE HYDROCHLORIDE 5 MG/1
5 TABLET ORAL EVERY 6 HOURS PRN
Qty: 20 TABLET | Refills: 0 | Status: SHIPPED | OUTPATIENT
Start: 2024-05-15 | End: 2024-05-20

## 2024-05-15 RX ORDER — MULTIVITAMIN
1 TABLET ORAL 2 TIMES DAILY
Qty: 60 TABLET | Refills: 0 | Status: SHIPPED | OUTPATIENT
Start: 2024-05-15 | End: 2024-05-15

## 2024-05-15 RX ORDER — ASPIRIN 81 MG/1
81 TABLET ORAL 2 TIMES DAILY
Qty: 60 TABLET | Refills: 0 | Status: SHIPPED | OUTPATIENT
Start: 2024-05-15 | End: 2024-06-14

## 2024-05-15 RX ORDER — CEFTRIAXONE SODIUM 2 G/1
2 INJECTION, POWDER, FOR SOLUTION INTRAVENOUS DAILY
Qty: 35 EACH | Refills: 0 | Status: SHIPPED | OUTPATIENT
Start: 2024-05-15 | End: 2024-06-19

## 2024-05-15 RX ADMIN — DOCUSATE SODIUM 100 MG: 100 CAPSULE, LIQUID FILLED ORAL at 08:12

## 2024-05-15 RX ADMIN — ASPIRIN 81 MG: 81 TABLET, COATED ORAL at 08:12

## 2024-05-15 RX ADMIN — OXYCODONE HYDROCHLORIDE 10 MG: 5 TABLET ORAL at 03:24

## 2024-05-15 RX ADMIN — OXYCODONE HYDROCHLORIDE 10 MG: 5 TABLET ORAL at 14:29

## 2024-05-15 RX ADMIN — OXYCODONE HYDROCHLORIDE 5 MG: 5 TABLET ORAL at 08:09

## 2024-05-15 RX ADMIN — CEFTRIAXONE SODIUM 2 G: 2 INJECTION, SOLUTION INTRAVENOUS at 06:45

## 2024-05-15 ASSESSMENT — PAIN - FUNCTIONAL ASSESSMENT
PAIN_FUNCTIONAL_ASSESSMENT: 0-10

## 2024-05-15 ASSESSMENT — PAIN SCALES - GENERAL
PAINLEVEL_OUTOF10: 6
PAINLEVEL_OUTOF10: 8
PAINLEVEL_OUTOF10: 8
PAINLEVEL_OUTOF10: 4
PAINLEVEL_OUTOF10: 3

## 2024-05-15 NOTE — PROGRESS NOTES
Patient is not medically cleared for discharge today, she has significant nausea will continue with IV anti nausea medications. Patient is recommended low intensity therapy at discharge but states that her spouse [phone] is ill and receiving treatment for his illness and will not be able to help her in the home and that she will not be able to rely on her daughter. Patient will need IV Ceftriaxone 2 grams daily STOP DATE 6/19/24 in the home. Patient has asked that a referral be placed with [facility], referral placed awaiting response. I will continue to follow with a safe discharge plan.

## 2024-05-15 NOTE — NURSING NOTE
Report called to Viri at [phone]. All belongings taken with patient discharged in stable condition via stretcher

## 2024-05-15 NOTE — DISCHARGE SUMMARY
Discharge Diagnosis  Wound dehiscence    Issues Requiring Follow-Up  Routine Postoperative Followup    Test Results Pending At Discharge  Pending Labs       Order Current Status    Fungal Culture/Smear Preliminary result    Fungal Culture/Smear Preliminary result    Fungal Culture/Smear Preliminary result    Fungal Culture/Smear Preliminary result            Hospital Course  68 year-old female who presented with Right TKA wound dehiscence. Patient is now s/p I&D right knee with polyswap on 5/10/2024 by Dr. Whelan. On the day of surgery, patient was identified in the pre-operative holding area and agreeable to proceed with surgery. Written consent was obtained.  Please see operative note for further details of this procedure. Patient received empiric antibiotics while ID was consulted. Patient recovered in the PACU before transfer to a regular nursing floor. Patient was started on oxycodone, tylenol for pain control and ASA 81 mg bid for DVT prophylaxis. Infectious disease was consulted who recommended prologned IV antibiotics via PICC (Ceftriaxone) after cultures grew E. coli. Physical therapy recommended continued recovery at skilled nursing facility with continued physical therapy and wound care. On the day of discharge, patient was afebrile with stable vital signs. Patient was neurovascularly intact at time of discharge. Patient was discharged with prescription of ASA 81 mg bid for DVT prophylaxis for 4 weeks. Patient will follow-up with Dr. Whelan in 2 weeks for post-operative visit.      Home Medications     Medication List      START taking these medications     aspirin 81 mg EC tablet; Take 1 tablet (81 mg) by mouth 2 times a day.   calcium carbonate-vitamin D3 600 mg-10 mcg (400 unit) tablet; Take 1   tablet by mouth 2 times a day.   cefTRIAXone 2 gram recon soln; Infuse 2 g into a venous catheter once   daily.     CHANGE how you take these medications     acetaminophen 325 mg tablet; Commonly known as:  TylenoL; Take 2 tablets   (650 mg) by mouth every 6 hours if needed for mild pain (1 - 3) or fever   (temp greater than 38.0 C) for up to 14 days. For up to 14 days; What   changed: reasons to take this, additional instructions   docusate sodium 100 mg capsule; Commonly known as: Colace; Take 1   capsule (100 mg) by mouth 2 times a day as needed for constipation for up   to 7 days.; What changed: when to take this, reasons to take this,   additional instructions   oxyCODONE 5 mg immediate release tablet; Commonly known as: Roxicodone;   Take 1 tablet (5 mg) by mouth every 6 hours if needed for severe pain (7 -   10) for up to 5 days.; What changed: how much to take, reasons to take   this     CONTINUE taking these medications     albuterol 90 mcg/actuation inhaler; Inhale 2 puffs every 6 hours if   needed for shortness of breath.   baclofen 5 mg tablet; Commonly known as: Lioresal   naloxone 4 mg/0.1 mL nasal spray; Commonly known as: Narcan; Administer   1 spray (4 mg) into affected nostril(s) if needed for opioid reversal. May   repeat every 2-3 minutes if needed, alternating nostrils, until medical   assistance becomes available.     ASK your doctor about these medications     alteplase 2 mg injection; Commonly known as: Cathflo Activase; 2 mL (2   mg) by intra-catheter route 1 time for 1 dose.; Ask about: Should I take   this medication?       Outpatient Follow-Up  Future Appointments   Date Time Provider Department Startex   5/23/2024 10:00 AM Andrzej Whelan MD [id number] [facility]   10/23/2024  1:00 PM Sabas ALLEN DO [id number] [facility]       Tyrel Razo MD  Orthopedic Surgery PGY-1  [facility]  Pager: [phone]  Available by Epic Chat

## 2024-05-15 NOTE — PROGRESS NOTES
Patient medically ready for dc. SW requested 1300 stretcher transport. Pending Roundtrip confirmation. [facility] notified for 7000. SW will continue to follow.    [facility] confirmed 830 transport. SW updated [facility], patient, SNF and the floor.     1228-SW attempted to get an earlier  time. [facility] confirmed 730 . SHANEKA updated RN, patient and SNF.    NADINE Shepherd

## 2024-05-16 ENCOUNTER — NURSING HOME VISIT (OUTPATIENT)
Dept: POST ACUTE CARE | Facility: EXTERNAL LOCATION | Age: 69
End: 2024-05-16
Payer: MEDICARE

## 2024-05-16 DIAGNOSIS — F43.10 PTSD (POST-TRAUMATIC STRESS DISORDER): ICD-10-CM

## 2024-05-16 DIAGNOSIS — T84.53XD INFECTION ASSOCIATED WITH INTERNAL RIGHT KNEE PROSTHESIS, SUBSEQUENT ENCOUNTER: ICD-10-CM

## 2024-05-16 PROCEDURE — 1090000002 HH PPS REVENUE DEBIT

## 2024-05-16 PROCEDURE — 99305 1ST NF CARE MODERATE MDM 35: CPT | Performed by: FAMILY MEDICINE

## 2024-05-16 PROCEDURE — 1090000001 HH PPS REVENUE CREDIT
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
## 2024-05-17 ENCOUNTER — NURSING HOME VISIT (OUTPATIENT)
Dept: POST ACUTE CARE | Facility: EXTERNAL LOCATION | Age: 69
End: 2024-05-17
Payer: MEDICARE

## 2024-05-17 DIAGNOSIS — R11.0 NAUSEA: ICD-10-CM

## 2024-05-17 DIAGNOSIS — K59.00 CONSTIPATION, UNSPECIFIED CONSTIPATION TYPE: ICD-10-CM

## 2024-05-17 DIAGNOSIS — J98.01 BRONCHOSPASM: ICD-10-CM

## 2024-05-17 DIAGNOSIS — E56.9 VITAMIN DEFICIENCY: ICD-10-CM

## 2024-05-17 DIAGNOSIS — M17.31 POST-TRAUMATIC ARTHRITIS OF LOWER LEG, RIGHT: Primary | ICD-10-CM

## 2024-05-17 DIAGNOSIS — M00.861: ICD-10-CM

## 2024-05-17 PROCEDURE — 99310 SBSQ NF CARE HIGH MDM 45: CPT | Performed by: NURSE PRACTITIONER

## 2024-05-17 PROCEDURE — 1090000001 HH PPS REVENUE CREDIT

## 2024-05-17 PROCEDURE — 1090000002 HH PPS REVENUE DEBIT

## 2024-05-18 PROCEDURE — 1090000002 HH PPS REVENUE DEBIT

## 2024-05-18 PROCEDURE — 1090000001 HH PPS REVENUE CREDIT

## 2024-05-19 PROCEDURE — 1090000002 HH PPS REVENUE DEBIT

## 2024-05-19 PROCEDURE — 1090000001 HH PPS REVENUE CREDIT

## 2024-05-20 ENCOUNTER — NURSING HOME VISIT (OUTPATIENT)
Dept: POST ACUTE CARE | Facility: EXTERNAL LOCATION | Age: 69
End: 2024-05-20
Payer: MEDICARE

## 2024-05-20 DIAGNOSIS — T84.53XD INFECTION ASSOCIATED WITH INTERNAL RIGHT KNEE PROSTHESIS, SUBSEQUENT ENCOUNTER: ICD-10-CM

## 2024-05-20 DIAGNOSIS — G47.00 INSOMNIA, UNSPECIFIED TYPE: ICD-10-CM

## 2024-05-20 PROCEDURE — 1090000002 HH PPS REVENUE DEBIT

## 2024-05-20 PROCEDURE — 1090000001 HH PPS REVENUE CREDIT

## 2024-05-20 PROCEDURE — 99309 SBSQ NF CARE MODERATE MDM 30: CPT | Performed by: FAMILY MEDICINE
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
## 2024-05-21 PROBLEM — T84.53XA INFECTION OF PROSTHETIC RIGHT KNEE JOINT (CMS-HCC): Status: ACTIVE | Noted: 2024-05-21

## 2024-05-21 PROBLEM — F43.10 PTSD (POST-TRAUMATIC STRESS DISORDER): Status: ACTIVE | Noted: 2024-05-21

## 2024-05-21 PROCEDURE — 1090000001 HH PPS REVENUE CREDIT

## 2024-05-21 PROCEDURE — 1090000002 HH PPS REVENUE DEBIT

## 2024-05-21 ASSESSMENT — ENCOUNTER SYMPTOMS
HEMATURIA: 0
PALPITATIONS: 0
FEVER: 0
WOUND: 1
CHILLS: 0
RHINORRHEA: 0
HALLUCINATIONS: 0
BACK PAIN: 0
POLYDIPSIA: 0
COUGH: 0
BLOOD IN STOOL: 0
FATIGUE: 0
ADENOPATHY: 0
EYE REDNESS: 0
WEAKNESS: 1
DYSURIA: 0
SHORTNESS OF BREATH: 0
SORE THROAT: 0
NECK STIFFNESS: 0
HEADACHES: 0
BRUISES/BLEEDS EASILY: 0
EYE PAIN: 0
ABDOMINAL PAIN: 0
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
## 2024-05-22 ENCOUNTER — NURSING HOME VISIT (OUTPATIENT)
Dept: POST ACUTE CARE | Facility: EXTERNAL LOCATION | Age: 69
End: 2024-05-22
Payer: MEDICARE

## 2024-05-22 DIAGNOSIS — E56.9 VITAMIN DEFICIENCY: ICD-10-CM

## 2024-05-22 DIAGNOSIS — T81.30XA WOUND DEHISCENCE: ICD-10-CM

## 2024-05-22 DIAGNOSIS — R11.0 NAUSEA: ICD-10-CM

## 2024-05-22 DIAGNOSIS — T84.53XD INFECTION ASSOCIATED WITH INTERNAL RIGHT KNEE PROSTHESIS, SUBSEQUENT ENCOUNTER: ICD-10-CM

## 2024-05-22 DIAGNOSIS — M17.31 POST-TRAUMATIC ARTHRITIS OF LOWER LEG, RIGHT: Primary | ICD-10-CM

## 2024-05-22 DIAGNOSIS — J98.01 BRONCHOSPASM: ICD-10-CM

## 2024-05-22 DIAGNOSIS — K59.00 CONSTIPATION, UNSPECIFIED CONSTIPATION TYPE: ICD-10-CM

## 2024-05-22 PROCEDURE — 1090000001 HH PPS REVENUE CREDIT

## 2024-05-22 PROCEDURE — 99309 SBSQ NF CARE MODERATE MDM 30: CPT | Performed by: NURSE PRACTITIONER

## 2024-05-22 PROCEDURE — 1090000002 HH PPS REVENUE DEBIT
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
## 2024-05-23 ENCOUNTER — OFFICE VISIT (OUTPATIENT)
Dept: ORTHOPEDIC SURGERY | Facility: CLINIC | Age: 69
End: 2024-05-23
Payer: MEDICARE

## 2024-05-23 ENCOUNTER — APPOINTMENT (OUTPATIENT)
Dept: ORTHOPEDIC SURGERY | Facility: CLINIC | Age: 69
End: 2024-05-23
Payer: MEDICARE

## 2024-05-23 ENCOUNTER — HOSPITAL ENCOUNTER (OUTPATIENT)
Dept: RADIOLOGY | Facility: CLINIC | Age: 69
Discharge: HOME | End: 2024-05-23
Payer: MEDICARE

## 2024-05-23 VITALS — WEIGHT: 172 LBS | HEIGHT: 70 IN | BODY MASS INDEX: 24.62 KG/M2

## 2024-05-23 DIAGNOSIS — Z96.651 STATUS POST TOTAL RIGHT KNEE REPLACEMENT: ICD-10-CM

## 2024-05-23 DIAGNOSIS — M17.31 POST-TRAUMATIC ARTHRITIS OF LOWER LEG, RIGHT: Primary | ICD-10-CM

## 2024-05-23 PROCEDURE — 73560 X-RAY EXAM OF KNEE 1 OR 2: CPT | Mod: RIGHT SIDE | Performed by: STUDENT IN AN ORGANIZED HEALTH CARE EDUCATION/TRAINING PROGRAM

## 2024-05-23 PROCEDURE — 1160F RVW MEDS BY RX/DR IN RCRD: CPT | Performed by: ORTHOPAEDIC SURGERY

## 2024-05-23 PROCEDURE — 1159F MED LIST DOCD IN RCRD: CPT | Performed by: ORTHOPAEDIC SURGERY

## 2024-05-23 PROCEDURE — 1090000001 HH PPS REVENUE CREDIT

## 2024-05-23 PROCEDURE — 1111F DSCHRG MED/CURRENT MED MERGE: CPT | Performed by: ORTHOPAEDIC SURGERY

## 2024-05-23 PROCEDURE — 99024 POSTOP FOLLOW-UP VISIT: CPT | Performed by: ORTHOPAEDIC SURGERY

## 2024-05-23 PROCEDURE — 1125F AMNT PAIN NOTED PAIN PRSNT: CPT | Performed by: ORTHOPAEDIC SURGERY

## 2024-05-23 PROCEDURE — 1157F ADVNC CARE PLAN IN RCRD: CPT | Performed by: ORTHOPAEDIC SURGERY

## 2024-05-23 PROCEDURE — 1090000002 HH PPS REVENUE DEBIT

## 2024-05-23 PROCEDURE — 73560 X-RAY EXAM OF KNEE 1 OR 2: CPT | Mod: RT

## 2024-05-23 PROCEDURE — L1833 KO ADJ JNT POS R SUP PRE OTS: HCPCS | Performed by: ORTHOPAEDIC SURGERY

## 2024-05-23 PROCEDURE — 1036F TOBACCO NON-USER: CPT | Performed by: ORTHOPAEDIC SURGERY

## 2024-05-23 ASSESSMENT — PAIN - FUNCTIONAL ASSESSMENT: PAIN_FUNCTIONAL_ASSESSMENT: 0-10

## 2024-05-23 ASSESSMENT — PAIN SCALES - GENERAL: PAINLEVEL_OUTOF10: 7
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
## 2024-05-24 PROCEDURE — 1090000001 HH PPS REVENUE CREDIT

## 2024-05-24 PROCEDURE — 1090000002 HH PPS REVENUE DEBIT

## 2024-05-25 PROCEDURE — 1090000002 HH PPS REVENUE DEBIT

## 2024-05-25 PROCEDURE — 1090000001 HH PPS REVENUE CREDIT

## 2024-05-26 PROCEDURE — 1090000002 HH PPS REVENUE DEBIT

## 2024-05-26 PROCEDURE — 1090000001 HH PPS REVENUE CREDIT

## 2024-05-27 PROCEDURE — 1090000002 HH PPS REVENUE DEBIT

## 2024-05-27 PROCEDURE — 1090000001 HH PPS REVENUE CREDIT

## 2024-05-28 ENCOUNTER — NURSING HOME VISIT (OUTPATIENT)
Dept: POST ACUTE CARE | Facility: EXTERNAL LOCATION | Age: 69
End: 2024-05-28
Payer: MEDICARE

## 2024-05-28 DIAGNOSIS — T84.53XD INFECTION ASSOCIATED WITH INTERNAL RIGHT KNEE PROSTHESIS, SUBSEQUENT ENCOUNTER: ICD-10-CM

## 2024-05-28 DIAGNOSIS — E56.9 VITAMIN DEFICIENCY: ICD-10-CM

## 2024-05-28 DIAGNOSIS — K59.00 CONSTIPATION, UNSPECIFIED CONSTIPATION TYPE: ICD-10-CM

## 2024-05-28 DIAGNOSIS — R11.0 NAUSEA: ICD-10-CM

## 2024-05-28 DIAGNOSIS — J98.01 BRONCHOSPASM: ICD-10-CM

## 2024-05-28 DIAGNOSIS — T81.30XA WOUND DEHISCENCE: ICD-10-CM

## 2024-05-28 DIAGNOSIS — M17.31 POST-TRAUMATIC ARTHRITIS OF LOWER LEG, RIGHT: Primary | ICD-10-CM

## 2024-05-28 LAB
FUNGUS SPEC CULT: NORMAL
FUNGUS SPEC FUNGUS STN: NORMAL

## 2024-05-28 PROCEDURE — 1090000001 HH PPS REVENUE CREDIT

## 2024-05-28 PROCEDURE — 99309 SBSQ NF CARE MODERATE MDM 30: CPT | Performed by: NURSE PRACTITIONER

## 2024-05-28 PROCEDURE — 1090000002 HH PPS REVENUE DEBIT

## 2024-05-29 PROCEDURE — 1090000002 HH PPS REVENUE DEBIT

## 2024-05-29 PROCEDURE — 1090000001 HH PPS REVENUE CREDIT
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
## 2024-05-30 ENCOUNTER — NURSING HOME VISIT (OUTPATIENT)
Dept: POST ACUTE CARE | Facility: EXTERNAL LOCATION | Age: 69
End: 2024-05-30
Payer: MEDICARE

## 2024-05-30 DIAGNOSIS — R11.0 NAUSEA: ICD-10-CM

## 2024-05-30 DIAGNOSIS — T84.53XD INFECTION ASSOCIATED WITH INTERNAL RIGHT KNEE PROSTHESIS, SUBSEQUENT ENCOUNTER: ICD-10-CM

## 2024-05-30 PROCEDURE — 99309 SBSQ NF CARE MODERATE MDM 30: CPT | Performed by: FAMILY MEDICINE

## 2024-05-30 PROCEDURE — 1090000002 HH PPS REVENUE DEBIT

## 2024-05-30 PROCEDURE — 1090000001 HH PPS REVENUE CREDIT
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
## 2024-05-31 PROCEDURE — 1090000001 HH PPS REVENUE CREDIT

## 2024-05-31 PROCEDURE — 1090000002 HH PPS REVENUE DEBIT

## 2024-06-01 PROCEDURE — 1090000002 HH PPS REVENUE DEBIT

## 2024-06-01 PROCEDURE — 1090000001 HH PPS REVENUE CREDIT

## 2024-06-02 PROCEDURE — 1090000001 HH PPS REVENUE CREDIT

## 2024-06-02 PROCEDURE — 1090000002 HH PPS REVENUE DEBIT

## 2024-06-03 ENCOUNTER — NURSING HOME VISIT (OUTPATIENT)
Dept: POST ACUTE CARE | Facility: EXTERNAL LOCATION | Age: 69
End: 2024-06-03
Payer: MEDICARE

## 2024-06-03 DIAGNOSIS — F43.10 PTSD (POST-TRAUMATIC STRESS DISORDER): ICD-10-CM

## 2024-06-03 DIAGNOSIS — T84.53XD INFECTION ASSOCIATED WITH INTERNAL RIGHT KNEE PROSTHESIS, SUBSEQUENT ENCOUNTER: ICD-10-CM

## 2024-06-03 PROCEDURE — 1090000002 HH PPS REVENUE DEBIT

## 2024-06-03 PROCEDURE — 1090000001 HH PPS REVENUE CREDIT

## 2024-06-03 PROCEDURE — 99309 SBSQ NF CARE MODERATE MDM 30: CPT | Performed by: FAMILY MEDICINE
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
## 2024-06-04 PROCEDURE — 1090000001 HH PPS REVENUE CREDIT

## 2024-06-04 PROCEDURE — 1090000002 HH PPS REVENUE DEBIT

## 2024-06-05 ENCOUNTER — NURSING HOME VISIT (OUTPATIENT)
Dept: POST ACUTE CARE | Facility: EXTERNAL LOCATION | Age: 69
End: 2024-06-05
Payer: MEDICARE

## 2024-06-05 DIAGNOSIS — E56.9 VITAMIN DEFICIENCY: ICD-10-CM

## 2024-06-05 DIAGNOSIS — K59.00 CONSTIPATION, UNSPECIFIED CONSTIPATION TYPE: ICD-10-CM

## 2024-06-05 DIAGNOSIS — T84.53XD INFECTION ASSOCIATED WITH INTERNAL RIGHT KNEE PROSTHESIS, SUBSEQUENT ENCOUNTER: ICD-10-CM

## 2024-06-05 DIAGNOSIS — R11.0 NAUSEA: ICD-10-CM

## 2024-06-05 DIAGNOSIS — J98.01 BRONCHOSPASM: ICD-10-CM

## 2024-06-05 DIAGNOSIS — T81.30XA WOUND DEHISCENCE: ICD-10-CM

## 2024-06-05 DIAGNOSIS — M17.31 POST-TRAUMATIC ARTHRITIS OF LOWER LEG, RIGHT: Primary | ICD-10-CM

## 2024-06-05 PROCEDURE — 1090000002 HH PPS REVENUE DEBIT

## 2024-06-05 PROCEDURE — 99309 SBSQ NF CARE MODERATE MDM 30: CPT | Performed by: NURSE PRACTITIONER

## 2024-06-05 PROCEDURE — 1090000001 HH PPS REVENUE CREDIT
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
## 2024-06-06 ENCOUNTER — OFFICE VISIT (OUTPATIENT)
Dept: ORTHOPEDIC SURGERY | Facility: CLINIC | Age: 69
End: 2024-06-06
Payer: MEDICARE

## 2024-06-06 DIAGNOSIS — M17.31 POST-TRAUMATIC ARTHRITIS OF LOWER LEG, RIGHT: ICD-10-CM

## 2024-06-06 DIAGNOSIS — T81.30XA WOUND DEHISCENCE: Primary | ICD-10-CM

## 2024-06-06 PROCEDURE — 1125F AMNT PAIN NOTED PAIN PRSNT: CPT | Performed by: ORTHOPAEDIC SURGERY

## 2024-06-06 PROCEDURE — 99024 POSTOP FOLLOW-UP VISIT: CPT | Performed by: ORTHOPAEDIC SURGERY

## 2024-06-06 PROCEDURE — 1111F DSCHRG MED/CURRENT MED MERGE: CPT | Performed by: ORTHOPAEDIC SURGERY

## 2024-06-06 PROCEDURE — 1159F MED LIST DOCD IN RCRD: CPT | Performed by: ORTHOPAEDIC SURGERY

## 2024-06-06 PROCEDURE — 1157F ADVNC CARE PLAN IN RCRD: CPT | Performed by: ORTHOPAEDIC SURGERY

## 2024-06-06 ASSESSMENT — PAIN SCALES - GENERAL: PAINLEVEL_OUTOF10: 7

## 2024-06-06 ASSESSMENT — PAIN - FUNCTIONAL ASSESSMENT: PAIN_FUNCTIONAL_ASSESSMENT: 0-10

## 2024-06-06 NOTE — PROGRESS NOTES
Subjective   Patient ID: Adriana Wood is a 68 y.o. female referral from Dr. Whelan.     HPI Adriana is a 68-year-old female with a history of HTN, bradycardia, cardiomyopathy, CAD, cardiac arrest due to electrolyte abnormalities and zofran use post-operatively, and traumatic brain injury. Patient had a total knee right knee done by Dr. Whelan in March 2024. Which was complicated by E. Coli infection. Patient had right TKA wound dehiscence s/p I&D right knee with polyswap and attempted re-closure of complex wound on 5/10/2024 by Dr. Whelan. She is currently on ceftriaxone until June 19th. Patients current wound care regimen for right knee wound is xeroform and ABD followed by ACE wrap. Patient is able to pivot and walks with walker with assistance. Patient is currently residing in nursing home, she states she does not ambulate at this time, rather pivots from wheelchair to bed. She is currently taking 5mg oxycodone every 4 hours.     Review of Systems  ROS: All 10 systems were reviewed and are unremarkable except for those mentioned in HPI.     Objective   Physical Exam  Constitutional:       Appearance: Normal appearance.   HENT:      Head: Normocephalic and atraumatic.      Nose: Nose normal.   Eyes:      Extraocular Movements: Extraocular movements intact.      Pupils: Pupils are equal, round, and reactive to light.   Cardiovascular:      Rate and Rhythm: Normal rate and regular rhythm.   Pulmonary:      Effort: Pulmonary effort is normal. No respiratory distress.   Abdominal:      General: Abdomen is flat.      Palpations: Abdomen is soft.   Musculoskeletal:      Cervical back: Normal range of motion and neck supple.      Comments:   Right knee with healed surgical incision. Small 1.5cm x .5cm, tracks 1cm around wound, wound overlying right kneecap. Wound appears to have fibrinous slough overlying wound bed. No surrounding erythema or warmth. No current or expressible drainage appreciated. Decreased ROM  in RLE, unable to fully extend right leg, lacks about 15 degrees of full extension   Skin:     General: Skin is warm and dry.   Neurological:      Mental Status: She is alert and oriented to person, place, and time.   Psychiatric:         Mood and Affect: Mood normal.         Behavior: Behavior normal.             Assessment/Plan   Patient presents with wound breakdown at the patella area, see picture above. Drainage is minimal.     Discussed treatment options including non surgical with wound care, and reconstruction with free flap vs. Pedicle flap. Pros and cons of each surgical approach were discussed in length. Although the wound surface area is small, the wound is likely to require soft tissue coverage. Patient stated that she prefers reconstruction, she wants the wound to heal as soon as possible. Flap surgery will increase wound healing chances.     Discussed possible risks of flap surgery including but not limited to reaction to medication, deep vein thrombosis, pulmonary embolism, cardiac complications, infection, bleeding and hematoma, seroma, wound healing issues,  partial or total flap necrosis, need for additional surgery chronic regional pain syndrome. Patient verbalized good understanding and wants to proceed.          ·  Referral placed for pre-admission testing for pre-operative clearance       ·  CBC, CRP, CMP, Type and screen ordered       ·  CT lower extremity WO IV contrast (contrast allergy) ordered for possible free flap surgical planning.       ·  Continue antibiotics per ID/Ortho  - Updated wound care:       ·  Cleanse right knee wound with normal saline, and clean the inside of wound with betadine soaked cotton swab. Pack with xeroform, place ABD overtop and loosely wrap ACE bandage.   - Follow up in 2 weeks with Dr. Reyes
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
## 2024-06-10 ENCOUNTER — NURSING HOME VISIT (OUTPATIENT)
Dept: POST ACUTE CARE | Facility: EXTERNAL LOCATION | Age: 69
End: 2024-06-10

## 2024-06-10 ENCOUNTER — LAB (OUTPATIENT)
Dept: LAB | Facility: LAB | Age: 69
End: 2024-06-10
Payer: MEDICARE

## 2024-06-10 ENCOUNTER — OFFICE VISIT (OUTPATIENT)
Dept: PLASTIC SURGERY | Facility: CLINIC | Age: 69
End: 2024-06-10
Payer: MEDICARE

## 2024-06-10 VITALS — SYSTOLIC BLOOD PRESSURE: 102 MMHG | HEART RATE: 72 BPM | DIASTOLIC BLOOD PRESSURE: 75 MMHG

## 2024-06-10 DIAGNOSIS — Z01.818 PRE-OP EXAM: ICD-10-CM

## 2024-06-10 DIAGNOSIS — T81.30XA WOUND DEHISCENCE: ICD-10-CM

## 2024-06-10 DIAGNOSIS — T81.30XA WOUND DEHISCENCE: Primary | ICD-10-CM

## 2024-06-10 DIAGNOSIS — F43.10 PTSD (POST-TRAUMATIC STRESS DISORDER): ICD-10-CM

## 2024-06-10 DIAGNOSIS — R11.0 NAUSEA: ICD-10-CM

## 2024-06-10 LAB
ABO GROUP (TYPE) IN BLOOD: NORMAL
ALBUMIN SERPL BCP-MCNC: 4.2 G/DL (ref 3.4–5)
ALP SERPL-CCNC: 88 U/L (ref 33–136)
ALT SERPL W P-5'-P-CCNC: 17 U/L (ref 7–45)
ANION GAP SERPL CALC-SCNC: 17 MMOL/L (ref 10–20)
ANTIBODY SCREEN: NORMAL
AST SERPL W P-5'-P-CCNC: 23 U/L (ref 9–39)
BASOPHILS # BLD AUTO: 0.01 X10*3/UL (ref 0–0.1)
BASOPHILS NFR BLD AUTO: 0.2 %
BILIRUB SERPL-MCNC: 0.3 MG/DL (ref 0–1.2)
BUN SERPL-MCNC: 9 MG/DL (ref 6–23)
CALCIUM SERPL-MCNC: 9.4 MG/DL (ref 8.6–10.6)
CHLORIDE SERPL-SCNC: 98 MMOL/L (ref 98–107)
CO2 SERPL-SCNC: 27 MMOL/L (ref 21–32)
CREAT SERPL-MCNC: 0.54 MG/DL (ref 0.5–1.05)
CRP SERPL-MCNC: 0.52 MG/DL
EGFRCR SERPLBLD CKD-EPI 2021: >90 ML/MIN/1.73M*2
EOSINOPHIL # BLD AUTO: 0.13 X10*3/UL (ref 0–0.7)
EOSINOPHIL NFR BLD AUTO: 2.8 %
ERYTHROCYTE [DISTWIDTH] IN BLOOD BY AUTOMATED COUNT: 13.1 % (ref 11.5–14.5)
ERYTHROCYTE [SEDIMENTATION RATE] IN BLOOD BY WESTERGREN METHOD: 35 MM/H (ref 0–30)
GLUCOSE SERPL-MCNC: 79 MG/DL (ref 74–99)
HCT VFR BLD AUTO: 39.6 % (ref 36–46)
HGB BLD-MCNC: 12.6 G/DL (ref 12–16)
IMM GRANULOCYTES # BLD AUTO: 0.06 X10*3/UL (ref 0–0.7)
IMM GRANULOCYTES NFR BLD AUTO: 1.3 % (ref 0–0.9)
LYMPHOCYTES # BLD AUTO: 1.2 X10*3/UL (ref 1.2–4.8)
LYMPHOCYTES NFR BLD AUTO: 25.5 %
MCH RBC QN AUTO: 27 PG (ref 26–34)
MCHC RBC AUTO-ENTMCNC: 31.8 G/DL (ref 32–36)
MCV RBC AUTO: 85 FL (ref 80–100)
MONOCYTES # BLD AUTO: 0.47 X10*3/UL (ref 0.1–1)
MONOCYTES NFR BLD AUTO: 10 %
NEUTROPHILS # BLD AUTO: 2.83 X10*3/UL (ref 1.2–7.7)
NEUTROPHILS NFR BLD AUTO: 60.2 %
NRBC BLD-RTO: 0 /100 WBCS (ref 0–0)
PLATELET # BLD AUTO: 286 X10*3/UL (ref 150–450)
POTASSIUM SERPL-SCNC: 4.2 MMOL/L (ref 3.5–5.3)
PROT SERPL-MCNC: 7.8 G/DL (ref 6.4–8.2)
RBC # BLD AUTO: 4.66 X10*6/UL (ref 4–5.2)
RH FACTOR (ANTIGEN D): NORMAL
SODIUM SERPL-SCNC: 138 MMOL/L (ref 136–145)
WBC # BLD AUTO: 4.7 X10*3/UL (ref 4.4–11.3)

## 2024-06-10 PROCEDURE — 80053 COMPREHEN METABOLIC PANEL: CPT

## 2024-06-10 PROCEDURE — 85025 COMPLETE CBC W/AUTO DIFF WBC: CPT

## 2024-06-10 PROCEDURE — 1111F DSCHRG MED/CURRENT MED MERGE: CPT

## 2024-06-10 PROCEDURE — 1036F TOBACCO NON-USER: CPT

## 2024-06-10 PROCEDURE — 1157F ADVNC CARE PLAN IN RCRD: CPT

## 2024-06-10 PROCEDURE — 86900 BLOOD TYPING SEROLOGIC ABO: CPT

## 2024-06-10 PROCEDURE — 1125F AMNT PAIN NOTED PAIN PRSNT: CPT

## 2024-06-10 PROCEDURE — 99203 OFFICE O/P NEW LOW 30 MIN: CPT

## 2024-06-10 PROCEDURE — 1159F MED LIST DOCD IN RCRD: CPT

## 2024-06-10 PROCEDURE — 86901 BLOOD TYPING SEROLOGIC RH(D): CPT

## 2024-06-10 PROCEDURE — 86850 RBC ANTIBODY SCREEN: CPT

## 2024-06-10 PROCEDURE — 86140 C-REACTIVE PROTEIN: CPT

## 2024-06-10 PROCEDURE — 99308 SBSQ NF CARE LOW MDM 20: CPT | Performed by: FAMILY MEDICINE

## 2024-06-10 PROCEDURE — 85652 RBC SED RATE AUTOMATED: CPT

## 2024-06-10 RX ORDER — OXYCODONE HYDROCHLORIDE 5 MG/1
5 TABLET ORAL EVERY 4 HOURS PRN
COMMUNITY
End: 2024-06-10

## 2024-06-10 ASSESSMENT — PAIN SCALES - GENERAL: PAINLEVEL: 6
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
## 2024-06-11 NOTE — PROGRESS NOTES
Subjective   Patient ID: Adriana Wood is a 68 y.o. female referral from Dr. Whelan.     HPI Adriana is a 68-year-old female with a history of HTN, bradycardia, cardiomyopathy, CAD, cardiac arrest due to electrolyte abnormalities and zofran use post-operatively, and traumatic brain injury. Patient had a total knee right knee done by Dr. Whelan in March 2024. Which was complicated by E. Coli infection. Patient had right TKA wound dehiscence s/p I&D right knee with polyswap and attempted re-closure of complex wound on 5/10/2024 by Dr. Whelan. She is currently on ceftriaxone until June 19th. Patients current wound care regimen for right knee wound is xeroform and ABD followed by ACE wrap. Patient is able to pivot and walks with walker with assistance. Patient is currently residing in nursing home, she states she does not ambulate at this time, rather pivots from wheelchair to bed. She is currently taking 5mg oxycodone every 4 hours.     Review of Systems  ROS: All 10 systems were reviewed and are unremarkable except for those mentioned in HPI.     Objective   Physical Exam  Constitutional:       Appearance: Normal appearance.   HENT:      Head: Normocephalic and atraumatic.      Nose: Nose normal.   Eyes:      Extraocular Movements: Extraocular movements intact.      Pupils: Pupils are equal, round, and reactive to light.   Cardiovascular:      Rate and Rhythm: Normal rate and regular rhythm.   Pulmonary:      Effort: Pulmonary effort is normal. No respiratory distress.   Abdominal:      General: Abdomen is flat.      Palpations: Abdomen is soft.   Musculoskeletal:      Cervical back: Normal range of motion and neck supple.      Comments:   Right knee with healed surgical incision. Small 1.5cm x .5cm, tracks 1cm around wound, wound overlying right kneecap. Wound appears to have fibrinous slough overlying wound bed. No surrounding erythema or warmth. No current or expressible drainage appreciated. Decreased ROM  in RLE, unable to fully extend right leg, lacks about 15 degrees of full extension   Skin:     General: Skin is warm and dry.   Neurological:      Mental Status: She is alert and oriented to person, place, and time.   Psychiatric:         Mood and Affect: Mood normal.         Behavior: Behavior normal.           Assessment/Plan   Patient presents for follow up to discuss surgery to wound breakdown at the patella area, see picture above. Drainage is minimal.     Discussed treatment options including non surgical with wound care, and reconstruction with free flap vs. Pedicle flap. Pros and cons of each surgical approach were discussed in length. Although the wound surface area is small, the wound is likely to require soft tissue coverage. Patient stated that she prefers reconstruction, she wants the wound to heal as soon as possible. Flap surgery will increase wound healing chances.     Discussed possible risks of flap surgery including but not limited to reaction to medication, deep vein thrombosis, pulmonary embolism, cardiac complications, infection, bleeding and hematoma, seroma, wound healing issues,  partial or total flap necrosis, need for additional surgery chronic regional pain syndrome. Patient verbalized good understanding and wants to proceed.                ·  Continue antibiotics per ID/Ortho  - Follow up with Wound care clinic   - Follow up PRN

## 2024-06-12 ENCOUNTER — NURSING HOME VISIT (OUTPATIENT)
Dept: POST ACUTE CARE | Facility: EXTERNAL LOCATION | Age: 69
End: 2024-06-12
Payer: MEDICARE

## 2024-06-12 DIAGNOSIS — K59.00 CONSTIPATION, UNSPECIFIED CONSTIPATION TYPE: ICD-10-CM

## 2024-06-12 DIAGNOSIS — M17.31 POST-TRAUMATIC ARTHRITIS OF LOWER LEG, RIGHT: Primary | ICD-10-CM

## 2024-06-12 DIAGNOSIS — R11.0 NAUSEA: ICD-10-CM

## 2024-06-12 DIAGNOSIS — T84.53XD INFECTION ASSOCIATED WITH INTERNAL RIGHT KNEE PROSTHESIS, SUBSEQUENT ENCOUNTER: ICD-10-CM

## 2024-06-12 DIAGNOSIS — T81.30XA WOUND DEHISCENCE: ICD-10-CM

## 2024-06-12 DIAGNOSIS — E56.9 VITAMIN DEFICIENCY: ICD-10-CM

## 2024-06-12 DIAGNOSIS — J98.01 BRONCHOSPASM: ICD-10-CM

## 2024-06-12 PROCEDURE — 99309 SBSQ NF CARE MODERATE MDM 30: CPT | Performed by: NURSE PRACTITIONER

## 2024-06-13 ENCOUNTER — HOSPITAL ENCOUNTER (OUTPATIENT)
Facility: HOSPITAL | Age: 69
Setting detail: SURGERY ADMIT
End: 2024-06-13
Payer: MEDICARE

## 2024-06-13 ENCOUNTER — TELEPHONE (OUTPATIENT)
Dept: PLASTIC SURGERY | Facility: CLINIC | Age: 69
End: 2024-06-13
Payer: MEDICARE

## 2024-06-13 NOTE — TELEPHONE ENCOUNTER
Spoke with LAURIE Sheikh who is providing care at nursing facility for patient Adriana Wood. She wanted to clarify the wound care orders from 6/10/24 visit with Dr. Reyes. Confirmed that the orders were correct and also faxed the office note to the facility at [phone]

## 2024-06-17 ENCOUNTER — NURSING HOME VISIT (OUTPATIENT)
Dept: POST ACUTE CARE | Facility: EXTERNAL LOCATION | Age: 69
End: 2024-06-17
Payer: MEDICARE

## 2024-06-17 DIAGNOSIS — T81.30XA WOUND DEHISCENCE: ICD-10-CM

## 2024-06-17 DIAGNOSIS — T84.53XD INFECTION ASSOCIATED WITH INTERNAL RIGHT KNEE PROSTHESIS, SUBSEQUENT ENCOUNTER: ICD-10-CM

## 2024-06-17 PROCEDURE — 99309 SBSQ NF CARE MODERATE MDM 30: CPT | Performed by: FAMILY MEDICINE
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
## 2024-06-19 ENCOUNTER — NURSING HOME VISIT (OUTPATIENT)
Dept: POST ACUTE CARE | Facility: EXTERNAL LOCATION | Age: 69
End: 2024-06-19
Payer: MEDICARE

## 2024-06-19 DIAGNOSIS — J98.01 BRONCHOSPASM: ICD-10-CM

## 2024-06-19 DIAGNOSIS — E56.9 VITAMIN DEFICIENCY: ICD-10-CM

## 2024-06-19 DIAGNOSIS — M17.31 POST-TRAUMATIC ARTHRITIS OF LOWER LEG, RIGHT: Primary | ICD-10-CM

## 2024-06-19 DIAGNOSIS — T81.30XA WOUND DEHISCENCE: ICD-10-CM

## 2024-06-19 DIAGNOSIS — R11.0 NAUSEA: ICD-10-CM

## 2024-06-19 DIAGNOSIS — T84.53XD INFECTION ASSOCIATED WITH INTERNAL RIGHT KNEE PROSTHESIS, SUBSEQUENT ENCOUNTER: ICD-10-CM

## 2024-06-19 DIAGNOSIS — K59.00 CONSTIPATION, UNSPECIFIED CONSTIPATION TYPE: ICD-10-CM

## 2024-06-19 PROCEDURE — 99316 NF DSCHRG MGMT 30 MIN+: CPT | Performed by: NURSE PRACTITIONER
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
## 2024-06-20 ENCOUNTER — HOSPITAL ENCOUNTER (OUTPATIENT)
Dept: RADIOLOGY | Facility: HOSPITAL | Age: 69
Discharge: HOME | End: 2024-06-20
Payer: MEDICARE

## 2024-06-20 ENCOUNTER — PRE-ADMISSION TESTING (OUTPATIENT)
Dept: PREADMISSION TESTING | Facility: HOSPITAL | Age: 69
End: 2024-06-20
Payer: MEDICARE

## 2024-06-20 ENCOUNTER — TELEPHONE (OUTPATIENT)
Dept: PRIMARY CARE | Facility: CLINIC | Age: 69
End: 2024-06-20

## 2024-06-20 VITALS
OXYGEN SATURATION: 95 % | DIASTOLIC BLOOD PRESSURE: 63 MMHG | HEIGHT: 70 IN | TEMPERATURE: 96.5 F | BODY MASS INDEX: 22.62 KG/M2 | HEART RATE: 78 BPM | SYSTOLIC BLOOD PRESSURE: 99 MMHG | WEIGHT: 158 LBS

## 2024-06-20 DIAGNOSIS — R05.1 ACUTE COUGH: ICD-10-CM

## 2024-06-20 DIAGNOSIS — R11.0 NAUSEA: Primary | ICD-10-CM

## 2024-06-20 DIAGNOSIS — Z01.818 PRE-OP EXAM: ICD-10-CM

## 2024-06-20 DIAGNOSIS — T81.30XA WOUND DEHISCENCE: ICD-10-CM

## 2024-06-20 DIAGNOSIS — Z96.651 STATUS POST TOTAL RIGHT KNEE REPLACEMENT: ICD-10-CM

## 2024-06-20 DIAGNOSIS — M81.6 LOCALIZED OSTEOPOROSIS WITHOUT CURRENT PATHOLOGICAL FRACTURE: ICD-10-CM

## 2024-06-20 PROCEDURE — 73700 CT LOWER EXTREMITY W/O DYE: CPT | Mod: LT

## 2024-06-20 PROCEDURE — 87081 CULTURE SCREEN ONLY: CPT | Performed by: NURSE PRACTITIONER

## 2024-06-20 PROCEDURE — 99215 OFFICE O/P EST HI 40 MIN: CPT | Performed by: NURSE PRACTITIONER

## 2024-06-20 RX ORDER — ALBUTEROL SULFATE 90 UG/1
2 AEROSOL, METERED RESPIRATORY (INHALATION) EVERY 6 HOURS PRN
Qty: 8 G | Refills: 0 | Status: SHIPPED | OUTPATIENT
Start: 2024-06-20

## 2024-06-20 RX ORDER — MULTIVITAMIN
1 TABLET ORAL 2 TIMES DAILY
Qty: 60 TABLET | Refills: 0 | Status: SHIPPED | OUTPATIENT
Start: 2024-06-20 | End: 2024-07-20

## 2024-06-20 RX ORDER — ASPIRIN 81 MG/1
81 TABLET ORAL 2 TIMES DAILY
Qty: 60 TABLET | Refills: 0 | Status: SHIPPED | OUTPATIENT
Start: 2024-06-20 | End: 2024-07-20

## 2024-06-20 RX ORDER — CHLORHEXIDINE GLUCONATE ORAL RINSE 1.2 MG/ML
SOLUTION DENTAL
Qty: 15 ML | Refills: 0 | Status: SHIPPED | OUTPATIENT
Start: 2024-06-20

## 2024-06-20 RX ORDER — PROMETHAZINE HYDROCHLORIDE 12.5 MG/1
12.5 TABLET ORAL EVERY 6 HOURS PRN
Qty: 30 TABLET | Refills: 0 | Status: SHIPPED | OUTPATIENT
Start: 2024-06-20 | End: 2024-07-20

## 2024-06-20 RX ORDER — CHLORHEXIDINE GLUCONATE 40 MG/ML
SOLUTION TOPICAL
Qty: 473 ML | Refills: 0 | Status: SHIPPED | OUTPATIENT
Start: 2024-06-20

## 2024-06-20 RX ORDER — BACLOFEN 5 MG/1
5 TABLET ORAL DAILY PRN
Qty: 30 TABLET | Refills: 0 | Status: SHIPPED | OUTPATIENT
Start: 2024-06-20 | End: 2024-07-20

## 2024-06-20 RX ORDER — PROMETHAZINE HYDROCHLORIDE 12.5 MG/1
12.5 TABLET ORAL EVERY 6 HOURS PRN
COMMUNITY
End: 2024-06-20 | Stop reason: SDUPTHER

## 2024-06-20 RX ORDER — OXYCODONE HYDROCHLORIDE 5 MG/1
5 TABLET ORAL EVERY 4 HOURS PRN
COMMUNITY
End: 2024-06-27 | Stop reason: SDUPTHER

## 2024-06-20 ASSESSMENT — ENCOUNTER SYMPTOMS
ENDOCRINE NEGATIVE: 1
ARTHRALGIAS: 1
CARDIOVASCULAR NEGATIVE: 1
NEUROLOGICAL NEGATIVE: 1
MYALGIAS: 1
WOUND: 1
NECK NEGATIVE: 1
NAUSEA: 1
CONSTITUTIONAL NEGATIVE: 1
CONSTIPATION: 1
EYES NEGATIVE: 1
RESPIRATORY NEGATIVE: 1

## 2024-06-20 ASSESSMENT — DUKE ACTIVITY SCORE INDEX (DASI)
DASI METS SCORE: 3.1
CAN YOU RUN A SHORT DISTANCE: NO
CAN YOU PARTICIPATE IN STRENOUS SPORTS LIKE SWIMMING, SINGLES TENNIS, FOOTBALL, BASKETBALL, OR SKIING: NO
CAN YOU PARTICIPATE IN MODERATE RECREATIONAL ACTIVITIES LIKE GOLF, BOWLING, DANCING, DOUBLES TENNIS OR THROWING A BASEBALL OR FOOTBALL: NO
CAN YOU DO LIGHT WORK AROUND THE HOUSE LIKE DUSTING OR WASHING DISHES: YES
CAN YOU DO YARD WORK LIKE RAKING LEAVES, WEEDING OR PUSHING A MOWER: NO
CAN YOU WALK A BLOCK OR TWO ON LEVEL GROUND: NO
CAN YOU TAKE CARE OF YOURSELF (EAT, DRESS, BATHE, OR USE TOILET): NO
CAN YOU CLIMB A FLIGHT OF STAIRS OR WALK UP A HILL: NO
CAN YOU WALK INDOORS, SUCH AS AROUND YOUR HOUSE: NO
TOTAL_SCORE: 2.7
CAN YOU DO HEAVY WORK AROUND THE HOUSE LIKE SCRUBBING FLOORS OR LIFTING AND MOVING HEAVY FURNITURE: NO
CAN YOU DO MODERATE WORK AROUND THE HOUSE LIKE VACUUMING, SWEEPING FLOORS OR CARRYING GROCERIES: NO
CAN YOU HAVE SEXUAL RELATIONS: NO

## 2024-06-20 ASSESSMENT — LIFESTYLE VARIABLES: SMOKING_STATUS: NONSMOKER
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
## 2024-06-20 NOTE — PREPROCEDURE INSTRUCTIONS
Fasting Guidelines    NPO Instructions:    Do not eat any food after midnight the night before your surgery/procedure.  You may have up to 13.5 ounces of clear liquids until TWO hours before your instructed arrival time to the hospital. This includes water, black tea/coffee, (no milk or cream), apple juice, and/or electrolyte drinks (Gatorade).  You may chew gum up to TWO hours before your surgery/procedure.    Additional Instructions:     We have sent a prescription for Hibiclens soap and Peridex mouth wash to your preferred pharmacy.  If you have not already, Please  your prescription and start using as directed before surgery.  Follow the instruction sheet provided to you at your CPM/PAT appointment.    Avoid herbal supplements, multivitamins and NSAIDS (non-steroidal anti-inflammatory drugs) such as Advil, Aleve, Ibuprofen, Naproxen, Excedrin, Meloxicam or Celebrex for at least 7 days prior to surgery. May take Tylenol as needed.    Avoid tobacco and alcohol products for 24 hours prior to surgery.    CONTACT SURGEON'S OFFICE IF YOU DEVELOP:  * Fever = 100.4 F   * New respiratory symptoms (e.g. cough, shortness of breath, respiratory distress, sore throat)  * Recent loss of taste or smell  *Flu like symptoms such as headache, fatigue or gastrointestinal symptoms  * You develop any open sores, shingles, burning or painful urination   AND/OR:  * You no longer wish to have the surgery.  * Any other personal circumstances change that may lead to the need to cancel or defer this surgery.  *You were admitted to any hospital within one week of your planned procedure.    Seven/Six Days before Surgery:  Review your medication instructions, stop indicated medications    Day of Surgery:  Review your medication instructions, take indicated medications  Wear comfortable loose fitting clothing  Do not use moisturizers, creams, lotions or perfume  All jewelry and valuables should be left at home    Julio Cesar Hill  [facility]  [facility]  [phone]-[phone]  [phone]  Email-[email or web address]    [place] [facility]  [phone]           Patient Information: Pre-Operative Infection Prevention Measures     Why did I have my nose, under my arms, and groin swabbed?  The purpose of the swab is to identify Staphylococcus aureus inside your nose or on your skin.  The swab was sent to the laboratory for culture.  A positive swab/culture for Staphylococcus aureus is called colonization or carriage.      What is Staphylococcus aureus?  Staphylococcus aureus, also known as “staph”, is a germ found on the skin or in the nose of healthy people.  Sometimes Staphylococcus aureus can get into the body and cause an infection.  This can be minor (such as pimples, boils, or other skin problems).  It might also be serious (such as a blood infection, pneumonia, or a surgical site infection).    What is Staphylococcus aureus colonization or carriage?  Colonization or carriage means that a person has the germ but is not sick from it.  These bacteria can be spread on the hands or when breathing or sneezing.    How is Staphylococcus aureus spread?  It is most often spread by close contact with a person or item that carries it.    What happens if my culture is positive for Staphylococcus aureus?  Your doctor/medical team will use this information to guide any antibiotic treatment which may be necessary.  Regardless of the culture results, we will clean the inside of your nose with a betadine swab just before you have your surgery.      Will I get an infection if I have Staphylococcus aureus in my nose or on my skin?  Anyone can get an infection with Staphylococcus aureus.  However, the best way to reduce your risk of infection is to follow the instructions provided to you for the use of your CHG soap and dental rinse.        Patient Information: Oral/Dental Rinse    What is oral/dental  rinse?   It is a mouthwash. It is a way of cleaning the mouth with a germ-killing solution before your surgery.  The solution contains chlorhexidine, commonly known as CHG.   It is used inside the mouth to kill a bacteria known as Staphylococcus aureus.  Let your doctor know if you are allergic to Chlorhexidine.    Why do I need to use CHG oral/dental rinse?  The CHG oral/dental rinse helps to kill a bacteria in your mouth known as Staphylococcus aureus.     This reduces the risk of infection at the surgical site.      Using your CHG oral/dental rinse  STEPS:  Use your CHG oral/dental rinse after you brush your teeth the night before (at bedtime) and the morning of your surgery.  Follow all directions on your prescription label.    Use the cap on the container to measure 15ml   Swish (gargle if you can) the mouthwash in your mouth for at least 30 seconds, (do not swallow) and spit out  After you use your CHG rinse, do not rinse your mouth with water, drink or eat.  Please refer to the prescription label for the appropriate time to resume oral intake      What side effects might I have using the CHG oral/dental rinse?  CHG rinse will stick to plaque on the teeth.  Brush and floss just before use.  Teeth brushing will help avoid staining of plaque during use.      Patient Information: Home Preoperative Antibacterial Shower      What is a home preoperative antibacterial shower?  This shower is a way of cleaning the skin with a germ-killing solution before surgery.  The solution contains chlorhexidine, commonly known as CHG.  CHG is a skin cleanser with germ-killing ability.  Let your doctor know if you are allergic to chlorhexidine.    Why do I need to take a preoperative antibacterial shower?  Skin is not sterile.  It is best to try to make your skin as free of germs as possible before surgery.  Proper cleansing with a germ-killing soap before surgery can lower the number of germs on your skin.  This helps to reduce  the risk of infection at the surgical site.  Following the instructions listed below will help you prepare your skin for surgery.      How do I use the solution?  Steps:  Begin using your CHG soap 5 days before your scheduled surgery on ________________________.    First, wash and rinse your hair using the CHG soap. Keep CHG soap away from ear canals and eyes.  Rinse completely, do not condition.  Hair extensions should be removed.  Wash your face with your normal soap and rinse.    Apply the CHG solution to a clean wet washcloth.  Turn the water off or move away from the water spray to avoid premature rinsing of the CHG soap as you are applying.   Firmly lather your entire body from the neck down.  Do not use on your face.  Pay special attention to the area(s) where your incision(s) will be located unless they are on your face.  Avoid scrubbing your skin too hard.  The important point is to have the CHG soap sit on your skin for 3 minutes.    When the 3 minutes are up, turn on the water and rinse the CHG solution off your body completely.   DO NOT wash with regular soap after you have used the CHG soap solution  Pat yourself dry with a clean, freshly-laundered towel.  DO NOT apply powders, deodorants, or lotions.  Dress in clean, freshly laundered nightclothes.    Be sure to sleep with clean, freshly laundered sheets.  Be aware that CHG will cause stains on fabrics; if you wash them with bleach after use.  Rinse your washcloth and other linens that have contact with CHG completely.  Use only non-chlorine detergents to launder the items used.   The morning of surgery is the fifth day.  Repeat the above steps and dress in clean comfortable clothing     Whom should I contact if I have any questions regarding the use of CHG soap?  Call the [facility], [facility] at [phone] if you have any questions.               Preoperative Brain Exercises    What are  brain exercises?  A brain exercise is any activity that engages your thinking (cognitive) skills.    What types of activities are considered brain exercises?  Jigsaw puzzles, crossword puzzles, word jumble, memory games, word search, and many more.  Many can be found free online or on your phone via a mobile jenise.    Why should I do brain exercises before my surgery?  More recent research has shown brain exercise before surgery can lower the risk of postoperative delirium (confusion) which can be especially important for older adults.  Patients who did brain exercises for 5 to 10 hours the days before surgery, cut their risk of postoperative delirium in half up to 1 week after surgery.         [facility]    Preoperative Deep Breathing Exercises    Why it is important to do deep breathing exercises before my surgery?  Deep breathing exercises strengthen your breathing muscles.  This helps you to recover after your surgery and decreases the chance of breathing complications.      How are the deep breathing exercises done?  Sit straight with your back supported.  Breathe in deeply and slowly through your nose. Your lower rib cage should expand and your abdomen may move forward.  Hold that breath for 3 to 5 seconds.  Breathe out through pursed lips, slowly and completely.  Rest and repeat 10 times every hour while awake.  Rest longer if you become dizzy or lightheaded.         Patient and Family Education             Ways You Can Help Prevent Blood Clots             This handout explains some simple things you can do to help prevent blood clots.      Blood clots are blockages that can form in the body's veins. When a blood clot forms in your deep veins, it may be called a deep vein thrombosis, or DVT for short. Blood clots can happen in any part of the body where blood flows, but they are most common in the arms and legs. If a piece of a blood clot breaks free and travels to the lungs, it is  called a pulmonary embolus (PE). A PE can be a very serious problem.         Being in the hospital or having surgery can raise your chances of getting a blood clot because you may not be well enough to move around as much as you normally do.         Ways you can help prevent blood clots in the hospital         Wearing SCDs. SCDs stands for Sequential Compression Devices.   SCDs are special sleeves that wrap around your legs  They attach to a pump that fills them with air to gently squeeze your legs every few minutes.   This helps return the blood in your legs to your heart.   SCDs should only be taken off when walking or bathing.   SCDs may not be comfortable, but they can help save your life.               Wearing compression stockings - if your doctor orders them. These special snug fitting stockings gently squeeze your legs to help blood flow.       Walking. Walking helps move the blood in your legs.   If your doctor says it is ok, try walking the halls at least   5 times a day. Ask us to help you get up, so you don't fall.      Taking any blood thinning medicines your doctor orders.        Page 1 of 2     [facility]; 3/23   Ways you can help prevent blood clots at home       Wearing compression stockings - if your doctor orders them. ? Walking - to help move the blood in your legs.       Taking any blood thinning medicines your doctor orders.      Signs of a blood clot or PE      Tell your doctor or nurse know right away if you have of the problems listed below.    If you are at home, seek medical care right away. Call 911 for chest pain or problems breathing.               Signs of a blood clot (DVT) - such as pain,  swelling, redness or warmth in your arm or leg      Signs of a pulmonary embolism (PE) - such as chest     pain or feeling short of breath

## 2024-06-20 NOTE — TELEPHONE ENCOUNTER
Pt called, states she still has the PIC line in, is scheduled for surgery next Tuesday. Did not realize there has to be an order to have it removed, doesn't even know who put it in. The nursing home wasn't sure if they were even allowed to remove it.   She would like to speak to you about this. [phone]

## 2024-06-21 DIAGNOSIS — M86.461 CHRONIC OSTEOMYELITIS OF RIGHT TIBIA WITH DRAINING SINUS (MULTI): Primary | ICD-10-CM

## 2024-06-21 RX ORDER — HEPARIN 100 UNIT/ML
500 SYRINGE INTRAVENOUS AS NEEDED
Status: CANCELLED | OUTPATIENT
Start: 2024-06-21

## 2024-06-21 RX ORDER — HEPARIN SODIUM,PORCINE/PF 10 UNIT/ML
50 SYRINGE (ML) INTRAVENOUS AS NEEDED
Status: CANCELLED | OUTPATIENT
Start: 2024-06-21

## 2024-06-22 LAB — STAPHYLOCOCCUS SPEC CULT: ABNORMAL
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
## 2024-06-24 ENCOUNTER — ANESTHESIA EVENT (OUTPATIENT)
Dept: OPERATING ROOM | Facility: HOSPITAL | Age: 69
End: 2024-06-24

## 2024-06-24 ENCOUNTER — APPOINTMENT (OUTPATIENT)
Dept: PLASTIC SURGERY | Facility: CLINIC | Age: 69
End: 2024-06-24
Payer: MEDICARE

## 2024-06-24 ENCOUNTER — INFUSION (OUTPATIENT)
Dept: HEMATOLOGY/ONCOLOGY | Facility: CLINIC | Age: 69
End: 2024-06-24
Payer: MEDICARE

## 2024-06-24 VITALS
DIASTOLIC BLOOD PRESSURE: 79 MMHG | BODY MASS INDEX: 22.62 KG/M2 | TEMPERATURE: 97.5 F | OXYGEN SATURATION: 97 % | RESPIRATION RATE: 18 BRPM | HEIGHT: 70 IN | SYSTOLIC BLOOD PRESSURE: 118 MMHG | WEIGHT: 158 LBS | HEART RATE: 52 BPM

## 2024-06-24 VITALS
HEART RATE: 40 BPM | BODY MASS INDEX: 23.16 KG/M2 | HEIGHT: 70 IN | SYSTOLIC BLOOD PRESSURE: 125 MMHG | RESPIRATION RATE: 16 BRPM | WEIGHT: 161.8 LBS | DIASTOLIC BLOOD PRESSURE: 70 MMHG

## 2024-06-24 DIAGNOSIS — T81.30XA WOUND DEHISCENCE: ICD-10-CM

## 2024-06-24 PROCEDURE — 1157F ADVNC CARE PLAN IN RCRD: CPT

## 2024-06-24 PROCEDURE — 1159F MED LIST DOCD IN RCRD: CPT

## 2024-06-24 PROCEDURE — 1036F TOBACCO NON-USER: CPT

## 2024-06-24 PROCEDURE — 1125F AMNT PAIN NOTED PAIN PRSNT: CPT

## 2024-06-24 PROCEDURE — 99215 OFFICE O/P EST HI 40 MIN: CPT

## 2024-06-24 ASSESSMENT — ENCOUNTER SYMPTOMS
FEVER: 0
ARTHRALGIAS: 0
WOUND: 1
NUMBNESS: 0
DIFFICULTY URINATING: 0
SHORTNESS OF BREATH: 0
VOMITING: 0
CHILLS: 0
FLANK PAIN: 0
DYSURIA: 0
WEAKNESS: 0
NAUSEA: 0
HEMATURIA: 0
COLOR CHANGE: 0
JOINT SWELLING: 0

## 2024-06-24 ASSESSMENT — PAIN SCALES - GENERAL
PAINLEVEL: 6
PAINLEVEL: 0-NO PAIN

## 2024-06-24 NOTE — PROGRESS NOTES
Pt came in for PICC line removal, order in system to remove picc line, placed pt in supine position, removed picc line per policy on 6/24/24 at 1015. Pt tolerated removal of picc line without complications, Pt remained in supine position for 30 minutes, catheter is 42cm and intact, discharge instructions provided to pt verbally and in writing on AVS with teach back.

## 2024-06-24 NOTE — PATIENT INSTRUCTIONS
We removed your PICC line today! Keep our dressing on for 24 hours. Tomorrow (6/25) around 12:00 wash hands and carefully remove the outer Tegaderm (clear adhesive covering) and gauze. Observe for any signs of infection (redness, red streak going up or down the arm, swelling, pus, fever, pain, etc). Keep site covered and dry for 72 hours, or until scabbed over, using provided dressing supplies (Tegaderm and sterile gauze).     If any signs or symptoms of infection are noted, call our office at [phone]. If you notice any bleeding through the dressing (either ours, or the one you put on tomorrow) hold pressure to the site and call our office. If you experience any sudden chest pain, shortness of breath, or confusion, call [phone] immediately.

## 2024-06-24 NOTE — PROGRESS NOTES
Subjective   Patient ID: Adriana Wood is a 68 y.o. female referral from Dr. Whelan.     ARTUR Wright is a 68-year-old female with a history of HTN, bradycardia, cardiomyopathy, CAD, cardiac arrest due to electrolyte abnormalities and zofran use post-operatively, and traumatic brain injury. Patient had a total knee right knee done by Dr. Whelan in March 2024. Which was complicated by E. Coli infection. Patient had right TKA wound dehiscence s/p I&D right knee with polyswap and attempted re-closure of complex wound on 5/10/2024 by Dr. Whelan. She is currently on ceftriaxone until June 19th. Patients current wound care regimen for right knee wound is xeroform and ABD followed by ACE wrap. Patient is able to pivot and walks with walker with assistance. Patient is currently residing in nursing home, she states she does not ambulate at this time, rather pivots from wheelchair to bed. She is currently taking 5mg oxycodone every 4 hours. Patient self reports decreased lower extremity circulation in which she sustains cold extremities. Additionally, she reports limited extension of right knee. Denies any fever, chills, CP, SOB, palpitations, nausea, vomiting, diarrhea, constipation, dysuria, hematuria, flank pain, arthralgias, joint swelling, numbness, or tingling.      Review of Systems   Constitutional:  Negative for chills and fever.   Respiratory:  Negative for shortness of breath.    Cardiovascular:  Negative for chest pain.   Gastrointestinal:  Negative for nausea and vomiting.   Genitourinary:  Negative for difficulty urinating, dysuria, flank pain and hematuria.   Musculoskeletal:  Positive for gait problem. Negative for arthralgias and joint swelling.   Skin:  Positive for wound. Negative for color change and rash.   Neurological:  Negative for weakness and numbness.         Objective   Physical Exam  Constitutional:       Appearance: Normal appearance.   HENT:      Head: Normocephalic and atraumatic.   Eyes:       Extraocular Movements: Extraocular movements intact.      Pupils: Pupils are equal, round, and reactive to light.   Cardiovascular:      Rate and Rhythm: Normal rate and regular rhythm.      Pulses: Normal pulses.      Heart sounds: Normal heart sounds.   Pulmonary:      Effort: Pulmonary effort is normal. No respiratory distress.   Abdominal:      General: Abdomen is flat.      Palpations: Abdomen is soft.   Musculoskeletal:         General: No swelling or tenderness.      Right lower leg: No edema.      Comments:      Skin:     General: Skin is warm and dry.      Capillary Refill: Capillary refill takes less than 2 seconds.      Findings: No rash.      Comments: Right knee with healed surgical incision extending from right mid lower thigh to 2/3 lower right extremity. Small 1.5cm x .5cm, tracks 1cm around wound, wound overlying right kneecap. Wound appears to have fibrinous slough overlying wound bed. No surrounding erythema or warmth. Expressible minimal bloody drainage appreciated with purulent fluid. Decreased ROM in RLE, unable to fully extend right leg, lacks about 15 degrees of full extension   Neurological:      Mental Status: She is alert and oriented to person, place, and time.   Psychiatric:         Mood and Affect: Mood normal.         Behavior: Behavior normal.             Assessment/Plan   Patient presents for follow up to discuss surgery to wound breakdown at the patella area, see picture above. Drainage is minimal. She has her surgery booked for tomorrow 06/25. However, she is hesitant about whether or not to proceed.     Today again, I reviewed treatment options including non surgical with wound care, and reconstruction with free flap. Pros and cons of each surgical approach were discussed in length. Although the wound surface area is small, the wound is likely to require soft tissue coverage due to potential risk of soft tissue or joint infection. Emphasized that flap surgery will increase  wound healing chances.     We then discussed possible risks of flap surgery including but not limited to reaction to medication, deep vein thrombosis, pulmonary embolism, cardiac complications, infection, bleeding and hematoma, seroma, wound healing issues,  partial or total flap necrosis, and need for additional surgery chronic regional pain syndrome. Patient verbalized good understanding and after lengthy discussion, she was in favor of postponing the surgery. She will let us know when she would like to proceed with surgery or not in the near future. Discussed monitoring for signs of infection (fever, malodorous, purulent drainage, warmth, erythema, etc.).           - Continue antibiotics per ID/Ortho  - Educated on importance of washing hands prior to touching or cleaning wound site due to her MRSA carrier status   - Clean wound with Betadine or another antiseptic. Avoid soap and water.   - Follow up with Wound care clinic   - Follow up PRN

## 2024-06-25 ENCOUNTER — ANESTHESIA (OUTPATIENT)
Dept: OPERATING ROOM | Facility: HOSPITAL | Age: 69
End: 2024-06-25
Payer: MEDICARE

## 2024-06-26 ENCOUNTER — APPOINTMENT (OUTPATIENT)
Dept: ORTHOPEDIC SURGERY | Facility: CLINIC | Age: 69
End: 2024-06-26
Payer: MEDICARE

## 2024-06-27 ENCOUNTER — APPOINTMENT (OUTPATIENT)
Dept: ORTHOPEDIC SURGERY | Facility: CLINIC | Age: 69
End: 2024-06-27
Payer: MEDICARE

## 2024-06-27 RX ORDER — OXYCODONE HYDROCHLORIDE 5 MG/1
5 TABLET ORAL EVERY 4 HOURS PRN
Qty: 150 TABLET | Refills: 0 | Status: SHIPPED | OUTPATIENT
Start: 2024-06-27 | End: 2024-07-27

## 2024-07-08 ENCOUNTER — OFFICE VISIT (OUTPATIENT)
Dept: WOUND CARE | Facility: HOSPITAL | Age: 69
End: 2024-07-08
Payer: MEDICARE

## 2024-07-08 DIAGNOSIS — T81.30XA WOUND DEHISCENCE: ICD-10-CM

## 2024-07-08 PROCEDURE — 11042 DBRDMT SUBQ TIS 1ST 20SQCM/<: CPT

## 2024-07-08 PROCEDURE — 99213 OFFICE O/P EST LOW 20 MIN: CPT | Mod: 25

## 2024-07-18 ENCOUNTER — APPOINTMENT (OUTPATIENT)
Dept: INFECTIOUS DISEASES | Facility: CLINIC | Age: 69
End: 2024-07-18
Payer: MEDICARE

## 2024-07-18 ENCOUNTER — OFFICE VISIT (OUTPATIENT)
Dept: WOUND CARE | Facility: HOSPITAL | Age: 69
End: 2024-07-18
Payer: MEDICARE

## 2024-07-18 DIAGNOSIS — S81.001D OPEN WOUND OF RIGHT KNEE WITH COMPLICATION, SUBSEQUENT ENCOUNTER: Primary | ICD-10-CM

## 2024-07-18 DIAGNOSIS — M86.461 CHRONIC OSTEOMYELITIS OF RIGHT TIBIA WITH DRAINING SINUS (MULTI): Primary | ICD-10-CM

## 2024-07-18 PROCEDURE — 99214 OFFICE O/P EST MOD 30 MIN: CPT | Performed by: INTERNAL MEDICINE

## 2024-07-18 PROCEDURE — 1157F ADVNC CARE PLAN IN RCRD: CPT | Performed by: INTERNAL MEDICINE

## 2024-07-18 PROCEDURE — 11043 DBRDMT MUSC&/FSCA 1ST 20/<: CPT

## 2024-07-18 RX ORDER — HONEY 100 %
PASTE (ML) TOPICAL DAILY
Qty: 15 ML | Refills: 2 | Status: SHIPPED | OUTPATIENT
Start: 2024-07-18

## 2024-07-18 RX ORDER — SULFAMETHOXAZOLE AND TRIMETHOPRIM 800; 160 MG/1; MG/1
1 TABLET ORAL 2 TIMES DAILY
Qty: 90 TABLET | Refills: 1 | Status: SHIPPED | OUTPATIENT
Start: 2024-07-18

## 2024-07-18 ASSESSMENT — ENCOUNTER SYMPTOMS
ALLERGIC/IMMUNOLOGIC NEGATIVE: 1
PSYCHIATRIC NEGATIVE: 1
GASTROINTESTINAL NEGATIVE: 1
MUSCULOSKELETAL NEGATIVE: 1
EYES NEGATIVE: 1
CARDIOVASCULAR NEGATIVE: 1
CONSTITUTIONAL NEGATIVE: 1
HEMATOLOGIC/LYMPHATIC NEGATIVE: 1
RESPIRATORY NEGATIVE: 1
NEUROLOGICAL NEGATIVE: 1
ENDOCRINE NEGATIVE: 1
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
## 2024-07-24 ENCOUNTER — APPOINTMENT (OUTPATIENT)
Dept: PRIMARY CARE | Facility: CLINIC | Age: 69
End: 2024-07-24
Payer: MEDICARE

## 2024-07-24 VITALS — HEART RATE: 67 BPM | BODY MASS INDEX: 22.27 KG/M2 | WEIGHT: 155.2 LBS | OXYGEN SATURATION: 96 %

## 2024-07-24 DIAGNOSIS — R19.7 DIARRHEA OF PRESUMED INFECTIOUS ORIGIN: Primary | ICD-10-CM
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
## 2024-07-25 ENCOUNTER — APPOINTMENT (OUTPATIENT)
Dept: ORTHOPEDIC SURGERY | Facility: CLINIC | Age: 69
End: 2024-07-25
Payer: MEDICARE

## 2024-07-25 ENCOUNTER — OFFICE VISIT (OUTPATIENT)
Dept: WOUND CARE | Facility: HOSPITAL | Age: 69
End: 2024-07-25
Payer: MEDICARE

## 2024-07-25 ENCOUNTER — APPOINTMENT (OUTPATIENT)
Dept: PRIMARY CARE | Facility: CLINIC | Age: 69
End: 2024-07-25
Payer: MEDICARE

## 2024-07-25 DIAGNOSIS — R11.0 NAUSEA: ICD-10-CM

## 2024-07-25 PROCEDURE — 11043 DBRDMT MUSC&/FSCA 1ST 20/<: CPT

## 2024-07-26 ENCOUNTER — TELEPHONE (OUTPATIENT)
Dept: PRIMARY CARE | Facility: CLINIC | Age: 69
End: 2024-07-26
Payer: MEDICARE

## 2024-07-30 DIAGNOSIS — Z96.651 S/P TKR (TOTAL KNEE REPLACEMENT), RIGHT: Primary | ICD-10-CM

## 2024-07-30 RX ORDER — CYCLOBENZAPRINE HCL 5 MG
5 TABLET ORAL NIGHTLY PRN
Qty: 30 TABLET | Refills: 0 | Status: ON HOLD | OUTPATIENT
Start: 2024-07-30 | End: 2024-09-28

## 2024-07-31 ENCOUNTER — APPOINTMENT (OUTPATIENT)
Dept: RADIOLOGY | Facility: HOSPITAL | Age: 69
End: 2024-07-31
Payer: MEDICARE

## 2024-07-31 ENCOUNTER — HOSPITAL ENCOUNTER (EMERGENCY)
Facility: HOSPITAL | Age: 69
Discharge: LEFT BEFORE DISCHARGE | End: 2024-07-31
Attending: STUDENT IN AN ORGANIZED HEALTH CARE EDUCATION/TRAINING PROGRAM
Payer: MEDICARE

## 2024-07-31 VITALS
SYSTOLIC BLOOD PRESSURE: 139 MMHG | DIASTOLIC BLOOD PRESSURE: 73 MMHG | RESPIRATION RATE: 16 BRPM | HEART RATE: 87 BPM | WEIGHT: 155 LBS | BODY MASS INDEX: 24.91 KG/M2 | OXYGEN SATURATION: 100 % | HEIGHT: 66 IN | TEMPERATURE: 97.2 F

## 2024-07-31 DIAGNOSIS — L08.9 INFECTED WOUND: Primary | ICD-10-CM

## 2024-07-31 DIAGNOSIS — T14.8XXA INFECTED WOUND: Primary | ICD-10-CM

## 2024-07-31 LAB
ALBUMIN SERPL BCP-MCNC: 3.9 G/DL (ref 3.4–5)
ALP SERPL-CCNC: 85 U/L (ref 33–136)
ALT SERPL W P-5'-P-CCNC: 10 U/L (ref 7–45)
ANION GAP SERPL CALC-SCNC: 14 MMOL/L (ref 10–20)
AST SERPL W P-5'-P-CCNC: 21 U/L (ref 9–39)
BASOPHILS # BLD AUTO: 0.06 X10*3/UL (ref 0–0.1)
BASOPHILS NFR BLD AUTO: 0.6 %
BILIRUB SERPL-MCNC: 0.2 MG/DL (ref 0–1.2)
BUN SERPL-MCNC: 8 MG/DL (ref 6–23)
CALCIUM SERPL-MCNC: 9.6 MG/DL (ref 8.6–10.3)
CHLORIDE SERPL-SCNC: 94 MMOL/L (ref 98–107)
CO2 SERPL-SCNC: 32 MMOL/L (ref 21–32)
CREAT SERPL-MCNC: 0.6 MG/DL (ref 0.5–1.05)
CRP SERPL-MCNC: 7.92 MG/DL
EGFRCR SERPLBLD CKD-EPI 2021: >90 ML/MIN/1.73M*2
EOSINOPHIL # BLD AUTO: 0.04 X10*3/UL (ref 0–0.7)
EOSINOPHIL NFR BLD AUTO: 0.4 %
ERYTHROCYTE [DISTWIDTH] IN BLOOD BY AUTOMATED COUNT: 13.6 % (ref 11.5–14.5)
ERYTHROCYTE [SEDIMENTATION RATE] IN BLOOD BY WESTERGREN METHOD: 61 MM/H (ref 0–30)
GLUCOSE SERPL-MCNC: 92 MG/DL (ref 74–99)
HCT VFR BLD AUTO: 39.7 % (ref 36–46)
HGB BLD-MCNC: 12.4 G/DL (ref 12–16)
IMM GRANULOCYTES # BLD AUTO: 0.03 X10*3/UL (ref 0–0.7)
IMM GRANULOCYTES NFR BLD AUTO: 0.3 % (ref 0–0.9)
INR PPP: 1.1 (ref 0.9–1.1)
LACTATE SERPL-SCNC: 1.3 MMOL/L (ref 0.4–2)
LYMPHOCYTES # BLD AUTO: 1.88 X10*3/UL (ref 1.2–4.8)
LYMPHOCYTES NFR BLD AUTO: 18.9 %
MAGNESIUM SERPL-MCNC: 2.02 MG/DL (ref 1.6–2.4)
MCH RBC QN AUTO: 26.7 PG (ref 26–34)
MCHC RBC AUTO-ENTMCNC: 31.2 G/DL (ref 32–36)
MCV RBC AUTO: 85 FL (ref 80–100)
MONOCYTES # BLD AUTO: 0.77 X10*3/UL (ref 0.1–1)
MONOCYTES NFR BLD AUTO: 7.7 %
NEUTROPHILS # BLD AUTO: 7.16 X10*3/UL (ref 1.2–7.7)
NEUTROPHILS NFR BLD AUTO: 72.1 %
NRBC BLD-RTO: 0 /100 WBCS (ref 0–0)
PLATELET # BLD AUTO: 366 X10*3/UL (ref 150–450)
POTASSIUM SERPL-SCNC: 5 MMOL/L (ref 3.5–5.3)
PROT SERPL-MCNC: 8.2 G/DL (ref 6.4–8.2)
PROTHROMBIN TIME: 12.7 SECONDS (ref 9.8–12.8)
RBC # BLD AUTO: 4.65 X10*6/UL (ref 4–5.2)
SODIUM SERPL-SCNC: 135 MMOL/L (ref 136–145)
WBC # BLD AUTO: 9.9 X10*3/UL (ref 4.4–11.3)

## 2024-07-31 PROCEDURE — 85610 PROTHROMBIN TIME: CPT | Performed by: HEALTH CARE PROVIDER

## 2024-07-31 PROCEDURE — 36415 COLL VENOUS BLD VENIPUNCTURE: CPT | Performed by: HEALTH CARE PROVIDER

## 2024-07-31 PROCEDURE — 93971 EXTREMITY STUDY: CPT | Mod: FOREIGN READ | Performed by: RADIOLOGY

## 2024-07-31 PROCEDURE — 85025 COMPLETE CBC W/AUTO DIFF WBC: CPT | Performed by: HEALTH CARE PROVIDER

## 2024-07-31 PROCEDURE — 85652 RBC SED RATE AUTOMATED: CPT | Performed by: HEALTH CARE PROVIDER

## 2024-07-31 PROCEDURE — 73562 X-RAY EXAM OF KNEE 3: CPT | Mod: RT

## 2024-07-31 PROCEDURE — 83735 ASSAY OF MAGNESIUM: CPT | Performed by: HEALTH CARE PROVIDER

## 2024-07-31 PROCEDURE — 83605 ASSAY OF LACTIC ACID: CPT | Performed by: HEALTH CARE PROVIDER

## 2024-07-31 PROCEDURE — 86140 C-REACTIVE PROTEIN: CPT | Performed by: HEALTH CARE PROVIDER

## 2024-07-31 PROCEDURE — 2500000004 HC RX 250 GENERAL PHARMACY W/ HCPCS (ALT 636 FOR OP/ED): Performed by: HEALTH CARE PROVIDER

## 2024-07-31 PROCEDURE — 93971 EXTREMITY STUDY: CPT

## 2024-07-31 PROCEDURE — 73562 X-RAY EXAM OF KNEE 3: CPT | Mod: RIGHT SIDE | Performed by: SPECIALIST

## 2024-07-31 PROCEDURE — 99284 EMERGENCY DEPT VISIT MOD MDM: CPT | Mod: 25

## 2024-07-31 PROCEDURE — 73590 X-RAY EXAM OF LOWER LEG: CPT | Mod: RIGHT SIDE | Performed by: RADIOLOGY

## 2024-07-31 PROCEDURE — 73590 X-RAY EXAM OF LOWER LEG: CPT | Mod: RT

## 2024-07-31 PROCEDURE — 80053 COMPREHEN METABOLIC PANEL: CPT | Performed by: HEALTH CARE PROVIDER

## 2024-07-31 RX ORDER — VANCOMYCIN HYDROCHLORIDE 1 G/20ML
INJECTION, POWDER, LYOPHILIZED, FOR SOLUTION INTRAVENOUS DAILY PRN
Status: DISCONTINUED | OUTPATIENT
Start: 2024-07-31 | End: 2024-07-31

## 2024-07-31 RX ORDER — METOCLOPRAMIDE HYDROCHLORIDE 5 MG/ML
5 INJECTION INTRAMUSCULAR; INTRAVENOUS ONCE
Status: COMPLETED | OUTPATIENT
Start: 2024-07-31 | End: 2024-07-31

## 2024-07-31 RX ORDER — CEFEPIME 1 G/50ML
2 INJECTION, SOLUTION INTRAVENOUS EVERY 8 HOURS
Status: DISCONTINUED | OUTPATIENT
Start: 2024-07-31 | End: 2024-08-01 | Stop reason: HOSPADM

## 2024-07-31 ASSESSMENT — LIFESTYLE VARIABLES
EVER FELT BAD OR GUILTY ABOUT YOUR DRINKING: NO
HAVE PEOPLE ANNOYED YOU BY CRITICIZING YOUR DRINKING: NO
EVER HAD A DRINK FIRST THING IN THE MORNING TO STEADY YOUR NERVES TO GET RID OF A HANGOVER: NO
HAVE YOU EVER FELT YOU SHOULD CUT DOWN ON YOUR DRINKING: NO
TOTAL SCORE: 0

## 2024-07-31 ASSESSMENT — PAIN DESCRIPTION - PAIN TYPE: TYPE: ACUTE PAIN;CHRONIC PAIN

## 2024-07-31 ASSESSMENT — COLUMBIA-SUICIDE SEVERITY RATING SCALE - C-SSRS
2. HAVE YOU ACTUALLY HAD ANY THOUGHTS OF KILLING YOURSELF?: NO
6. HAVE YOU EVER DONE ANYTHING, STARTED TO DO ANYTHING, OR PREPARED TO DO ANYTHING TO END YOUR LIFE?: NO
1. IN THE PAST MONTH, HAVE YOU WISHED YOU WERE DEAD OR WISHED YOU COULD GO TO SLEEP AND NOT WAKE UP?: NO

## 2024-07-31 ASSESSMENT — PAIN SCALES - GENERAL
PAINLEVEL_OUTOF10: 8
PAINLEVEL_OUTOF10: 8
PAINLEVEL_OUTOF10: 0 - NO PAIN
PAINLEVEL_OUTOF10: 8

## 2024-07-31 ASSESSMENT — PAIN DESCRIPTION - LOCATION: LOCATION: KNEE

## 2024-07-31 ASSESSMENT — PAIN - FUNCTIONAL ASSESSMENT: PAIN_FUNCTIONAL_ASSESSMENT: 0-10

## 2024-07-31 ASSESSMENT — PAIN DESCRIPTION - ORIENTATION: ORIENTATION: RIGHT

## 2024-08-01 ENCOUNTER — OFFICE VISIT (OUTPATIENT)
Dept: WOUND CARE | Facility: HOSPITAL | Age: 69
End: 2024-08-01
Payer: MEDICARE

## 2024-08-01 DIAGNOSIS — S81.001D UNSPECIFIED OPEN WOUND, RIGHT KNEE, SUBSEQUENT ENCOUNTER: Primary | ICD-10-CM

## 2024-08-01 DIAGNOSIS — S81.001D UNSPECIFIED OPEN WOUND, RIGHT KNEE, SUBSEQUENT ENCOUNTER: ICD-10-CM

## 2024-08-01 PROCEDURE — 87077 CULTURE AEROBIC IDENTIFY: CPT | Mod: GEALAB | Performed by: PLASTIC SURGERY

## 2024-08-01 PROCEDURE — 11043 DBRDMT MUSC&/FSCA 1ST 20/<: CPT
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
## 2024-08-02 ASSESSMENT — ENCOUNTER SYMPTOMS
VOMITING: 0
ARTHRALGIAS: 0
WEAKNESS: 0
WOUND: 1
NUMBNESS: 0
SHORTNESS OF BREATH: 0
HEMATURIA: 0
DIFFICULTY URINATING: 0
COLOR CHANGE: 0
CHILLS: 0
FEVER: 0
JOINT SWELLING: 0
DYSURIA: 0
FLANK PAIN: 0
NAUSEA: 0

## 2024-08-02 NOTE — PROGRESS NOTES
Subjective   Patient ID: Adriana Wood is a 68 y.o. female presenting for follow up after worsening of right knee wound    HPI Adriana is a 68-year-old female with a history of HTN, bradycardia, cardiomyopathy, CAD, cardiac arrest due to electrolyte abnormalities and zofran use post-operatively, and traumatic brain injury. Patient had a total knee right knee done by Dr. Whelan in March 2024. Which was complicated by E. Coli infection. Patient had right TKA wound dehiscence s/p I&D right knee with polyswap and attempted re-closure of complex wound on 5/10/2024 by Dr. Whelan. She is currently on ceftriaxone until June 19th. Patients current wound care regimen for right knee wound is xeroform and ABD followed by ACE wrap. Patient is able to pivot and walks with walker with assistance. Patient is currently residing in nursing home, she states she does not ambulate at this time, rather pivots from wheelchair to bed. She is currently taking 5mg oxycodone every 4 hours. Patient self reports decreased lower extremity circulation in which she sustains cold extremities. Additionally, she reports limited extension of right knee. Denies any fever, chills, CP, SOB, palpitations, nausea, vomiting, diarrhea, constipation, dysuria, hematuria, flank pain, arthralgias, joint swelling, numbness, or tingling. Today, she presents as referral from Dr. Combs due to worsening of her wound.       Review of Systems   Constitutional:  Negative for chills and fever.   Respiratory:  Negative for shortness of breath.    Cardiovascular:  Negative for chest pain.   Gastrointestinal:  Negative for nausea and vomiting.   Genitourinary:  Negative for difficulty urinating, dysuria, flank pain and hematuria.   Musculoskeletal:  Positive for gait problem.   Skin:  Positive for color change and wound. Negative for rash.   Neurological:  Negative for weakness and numbness.     Physical Exam  Vitals and nursing note reviewed. Exam conducted with a  chaperone present.   Constitutional:       General: She is not in acute distress.     Appearance: She is not ill-appearing.   Eyes:      Extraocular Movements: Extraocular movements intact.      Conjunctiva/sclera: Conjunctivae normal.      Pupils: Pupils are equal, round, and reactive to light.   Cardiovascular:      Rate and Rhythm: Normal rate and regular rhythm.      Pulses: Normal pulses.   Pulmonary:      Effort: Pulmonary effort is normal.      Breath sounds: Normal breath sounds.   Abdominal:      Palpations: Abdomen is soft. There is no mass.      Tenderness: There is no abdominal tenderness.      Hernia: No hernia is present.   Musculoskeletal:         General: No swelling or tenderness.      Cervical back: Normal range of motion and neck supple.   Skin:     Capillary Refill: Capillary refill takes less than 2 seconds.      Coloration: Skin is not jaundiced.      Findings: Right knee wounds (total 2), measuring:   The upper wound is about 1cm x 2cm and tracks 3 cm deep to knee joint , the lower wound is 0.5x0.5 cm. Both wounds are overlying right kneecap. Wounds appear to have fibrinous slough overlying wound bed. Notable surrounding erythema and warmth. Expressible drainage appreciated with purulent fluid. Decreased ROM in RLE, unable to fully extend right leg, varus deformity  Neurological:      General: No focal deficit present.      Mental Status: She is oriented to person, place, and time.   Psychiatric:         Mood and Affect: Mood normal.         Behavior: Behavior normal.         Thought Content: Thought content normal.         Judgment: Judgment normal.     Objective   Wound on 6/24/24:      Wound today 8/7/24 8/1/24 Tissue Wound Culture:     (4+) Abundant Methicillin Resistant Staphylococcus aureus (MRSA)        Wound Care: cleansed with betadine and packed with Aquacel Ag and covered with adhesive dressing    Assessment/Plan     Patient presented to clinic today after worsening of her  right knee wound. The wound now is three times deeper, have higher output, and the surrounding tissue is erythematous with localized heat. Additionally, the patient is in pain and has limited range of motion at the knee with varus deformity. Patient was initially scheduled for free flap surgery, and opted not to proceed with flap surgery and to do wound care instead. She was referred back to me by Dr. Combs due to worsening of her wound.     We discussed treatment options including irrigation and debridement, broad spectrum antibiotics, removal of hard ware, wound temporization with VAC, and delayed reconstruction. I expressed my concerns about joint infection, need for multidisciplinary approach involving orthopedics, and infectious disease doctors.   I recommended the patient to go to ED at [facility] for admission to treat infection.     Plan of care:  ED  CT knee with/without contrast  CBC, ESR, CRP, metabolic panel.

## 2024-08-04 LAB
BACTERIA SPEC CULT: ABNORMAL
BACTERIA SPEC CULT: ABNORMAL
GRAM STN SPEC: ABNORMAL
GRAM STN SPEC: ABNORMAL

## 2024-08-07 ENCOUNTER — CLINICAL SUPPORT (OUTPATIENT)
Dept: EMERGENCY MEDICINE | Facility: HOSPITAL | Age: 69
End: 2024-08-07
Payer: MEDICARE

## 2024-08-07 ENCOUNTER — HOSPITAL ENCOUNTER (INPATIENT)
Facility: HOSPITAL | Age: 69
End: 2024-08-07
Attending: STUDENT IN AN ORGANIZED HEALTH CARE EDUCATION/TRAINING PROGRAM | Admitting: INTERNAL MEDICINE
Payer: MEDICARE

## 2024-08-07 ENCOUNTER — APPOINTMENT (OUTPATIENT)
Dept: PLASTIC SURGERY | Facility: CLINIC | Age: 69
End: 2024-08-07
Payer: MEDICARE

## 2024-08-07 ENCOUNTER — APPOINTMENT (OUTPATIENT)
Dept: RADIOLOGY | Facility: HOSPITAL | Age: 69
End: 2024-08-07
Payer: MEDICARE

## 2024-08-07 VITALS — HEART RATE: 63 BPM | SYSTOLIC BLOOD PRESSURE: 111 MMHG | DIASTOLIC BLOOD PRESSURE: 76 MMHG

## 2024-08-07 DIAGNOSIS — I49.3 PVC (PREMATURE VENTRICULAR CONTRACTION): ICD-10-CM

## 2024-08-07 DIAGNOSIS — T84.7XXS HARDWARE COMPLICATING WOUND INFECTION, SEQUELA: Primary | ICD-10-CM

## 2024-08-07 DIAGNOSIS — T81.30XA WOUND DEHISCENCE: ICD-10-CM

## 2024-08-07 DIAGNOSIS — T79.8XXA OTHER EARLY COMPLICATIONS OF TRAUMA, INITIAL ENCOUNTER (CMS-HCC): ICD-10-CM

## 2024-08-07 DIAGNOSIS — T14.8XXA WOUND INFECTION: ICD-10-CM

## 2024-08-07 DIAGNOSIS — L08.9 WOUND INFECTION: ICD-10-CM

## 2024-08-07 DIAGNOSIS — M86.461 CHRONIC OSTEOMYELITIS OF RIGHT TIBIA WITH DRAINING SINUS (MULTI): ICD-10-CM

## 2024-08-07 DIAGNOSIS — F43.10 PTSD (POST-TRAUMATIC STRESS DISORDER): ICD-10-CM

## 2024-08-07 DIAGNOSIS — T84.50XD PROSTHETIC JOINT INFECTION, SUBSEQUENT ENCOUNTER: Primary | ICD-10-CM

## 2024-08-07 DIAGNOSIS — S81.001A OPEN WOUND OF RIGHT KNEE, INITIAL ENCOUNTER: ICD-10-CM

## 2024-08-07 DIAGNOSIS — Z01.818 PRE-OP EXAM: ICD-10-CM

## 2024-08-07 PROBLEM — T84.7XXA HARDWARE COMPLICATING WOUND INFECTION (CMS-HCC): Status: ACTIVE | Noted: 2024-05-21

## 2024-08-07 LAB
ALBUMIN SERPL BCP-MCNC: 3.9 G/DL (ref 3.4–5)
ALP SERPL-CCNC: 89 U/L (ref 33–136)
ALT SERPL W P-5'-P-CCNC: 7 U/L (ref 7–45)
ANION GAP SERPL CALC-SCNC: 14 MMOL/L (ref 10–20)
AST SERPL W P-5'-P-CCNC: 14 U/L (ref 9–39)
BASOPHILS # BLD AUTO: 0.04 X10*3/UL (ref 0–0.1)
BASOPHILS NFR BLD AUTO: 0.5 %
BILIRUB SERPL-MCNC: 0.4 MG/DL (ref 0–1.2)
BUN SERPL-MCNC: 8 MG/DL (ref 6–23)
CALCIUM SERPL-MCNC: 9.9 MG/DL (ref 8.6–10.6)
CHLORIDE SERPL-SCNC: 98 MMOL/L (ref 98–107)
CO2 SERPL-SCNC: 29 MMOL/L (ref 21–32)
CREAT SERPL-MCNC: 0.47 MG/DL (ref 0.5–1.05)
CRP SERPL-MCNC: 5.82 MG/DL
EGFRCR SERPLBLD CKD-EPI 2021: >90 ML/MIN/1.73M*2
EOSINOPHIL # BLD AUTO: 0.03 X10*3/UL (ref 0–0.7)
EOSINOPHIL NFR BLD AUTO: 0.4 %
ERYTHROCYTE [DISTWIDTH] IN BLOOD BY AUTOMATED COUNT: 13.5 % (ref 11.5–14.5)
ERYTHROCYTE [SEDIMENTATION RATE] IN BLOOD BY WESTERGREN METHOD: 119 MM/H (ref 0–30)
GLUCOSE SERPL-MCNC: 108 MG/DL (ref 74–99)
HCT VFR BLD AUTO: 38.6 % (ref 36–46)
HGB BLD-MCNC: 12.6 G/DL (ref 12–16)
IMM GRANULOCYTES # BLD AUTO: 0.07 X10*3/UL (ref 0–0.7)
IMM GRANULOCYTES NFR BLD AUTO: 0.9 % (ref 0–0.9)
LACTATE SERPL-SCNC: 0.6 MMOL/L (ref 0.4–2)
LYMPHOCYTES # BLD AUTO: 1.37 X10*3/UL (ref 1.2–4.8)
LYMPHOCYTES NFR BLD AUTO: 18.4 %
MCH RBC QN AUTO: 26.2 PG (ref 26–34)
MCHC RBC AUTO-ENTMCNC: 32.6 G/DL (ref 32–36)
MCV RBC AUTO: 80 FL (ref 80–100)
MONOCYTES # BLD AUTO: 0.52 X10*3/UL (ref 0.1–1)
MONOCYTES NFR BLD AUTO: 7 %
NEUTROPHILS # BLD AUTO: 5.43 X10*3/UL (ref 1.2–7.7)
NEUTROPHILS NFR BLD AUTO: 72.8 %
NRBC BLD-RTO: 0 /100 WBCS (ref 0–0)
PLATELET # BLD AUTO: 384 X10*3/UL (ref 150–450)
POTASSIUM SERPL-SCNC: 4 MMOL/L (ref 3.5–5.3)
PROT SERPL-MCNC: 9.2 G/DL (ref 6.4–8.2)
RBC # BLD AUTO: 4.81 X10*6/UL (ref 4–5.2)
SODIUM SERPL-SCNC: 137 MMOL/L (ref 136–145)
WBC # BLD AUTO: 7.5 X10*3/UL (ref 4.4–11.3)

## 2024-08-07 PROCEDURE — 99214 OFFICE O/P EST MOD 30 MIN: CPT

## 2024-08-07 PROCEDURE — 93010 ELECTROCARDIOGRAM REPORT: CPT | Performed by: PHYSICIAN ASSISTANT

## 2024-08-07 PROCEDURE — 80053 COMPREHEN METABOLIC PANEL: CPT | Performed by: STUDENT IN AN ORGANIZED HEALTH CARE EDUCATION/TRAINING PROGRAM

## 2024-08-07 PROCEDURE — 1159F MED LIST DOCD IN RCRD: CPT

## 2024-08-07 PROCEDURE — 99285 EMERGENCY DEPT VISIT HI MDM: CPT | Performed by: PHYSICIAN ASSISTANT

## 2024-08-07 PROCEDURE — 1125F AMNT PAIN NOTED PAIN PRSNT: CPT

## 2024-08-07 PROCEDURE — 2500000001 HC RX 250 WO HCPCS SELF ADMINISTERED DRUGS (ALT 637 FOR MEDICARE OP)

## 2024-08-07 PROCEDURE — 71045 X-RAY EXAM CHEST 1 VIEW: CPT

## 2024-08-07 PROCEDURE — 2500000004 HC RX 250 GENERAL PHARMACY W/ HCPCS (ALT 636 FOR OP/ED): Performed by: STUDENT IN AN ORGANIZED HEALTH CARE EDUCATION/TRAINING PROGRAM

## 2024-08-07 PROCEDURE — 85025 COMPLETE CBC W/AUTO DIFF WBC: CPT | Performed by: EMERGENCY MEDICINE

## 2024-08-07 PROCEDURE — 73562 X-RAY EXAM OF KNEE 3: CPT | Mod: RIGHT SIDE | Performed by: RADIOLOGY

## 2024-08-07 PROCEDURE — 86140 C-REACTIVE PROTEIN: CPT | Performed by: PHYSICIAN ASSISTANT

## 2024-08-07 PROCEDURE — 73562 X-RAY EXAM OF KNEE 3: CPT | Mod: RT

## 2024-08-07 PROCEDURE — 76937 US GUIDE VASCULAR ACCESS: CPT | Performed by: STUDENT IN AN ORGANIZED HEALTH CARE EDUCATION/TRAINING PROGRAM

## 2024-08-07 PROCEDURE — 85025 COMPLETE CBC W/AUTO DIFF WBC: CPT | Performed by: STUDENT IN AN ORGANIZED HEALTH CARE EDUCATION/TRAINING PROGRAM

## 2024-08-07 PROCEDURE — 2500000001 HC RX 250 WO HCPCS SELF ADMINISTERED DRUGS (ALT 637 FOR MEDICARE OP): Performed by: EMERGENCY MEDICINE

## 2024-08-07 PROCEDURE — 84075 ASSAY ALKALINE PHOSPHATASE: CPT | Performed by: EMERGENCY MEDICINE

## 2024-08-07 PROCEDURE — 36410 VNPNXR 3YR/> PHY/QHP DX/THER: CPT

## 2024-08-07 PROCEDURE — 36000 PLACE NEEDLE IN VEIN: CPT | Performed by: STUDENT IN AN ORGANIZED HEALTH CARE EDUCATION/TRAINING PROGRAM

## 2024-08-07 PROCEDURE — 85652 RBC SED RATE AUTOMATED: CPT | Performed by: PHYSICIAN ASSISTANT

## 2024-08-07 PROCEDURE — 36415 COLL VENOUS BLD VENIPUNCTURE: CPT | Performed by: PHYSICIAN ASSISTANT

## 2024-08-07 PROCEDURE — 36415 COLL VENOUS BLD VENIPUNCTURE: CPT | Performed by: EMERGENCY MEDICINE

## 2024-08-07 PROCEDURE — 1200000002 HC GENERAL ROOM WITH TELEMETRY DAILY

## 2024-08-07 PROCEDURE — 83605 ASSAY OF LACTIC ACID: CPT | Performed by: PHYSICIAN ASSISTANT

## 2024-08-07 PROCEDURE — 99285 EMERGENCY DEPT VISIT HI MDM: CPT

## 2024-08-07 PROCEDURE — 93005 ELECTROCARDIOGRAM TRACING: CPT

## 2024-08-07 PROCEDURE — 1157F ADVNC CARE PLAN IN RCRD: CPT

## 2024-08-07 RX ORDER — DIPHENHYDRAMINE HYDROCHLORIDE 50 MG/ML
50 INJECTION INTRAMUSCULAR; INTRAVENOUS ONCE
Status: DISCONTINUED | OUTPATIENT
Start: 2024-08-08 | End: 2024-08-08

## 2024-08-07 RX ORDER — OXYCODONE HYDROCHLORIDE 5 MG/1
5 TABLET ORAL ONCE
Status: COMPLETED | OUTPATIENT
Start: 2024-08-07 | End: 2024-08-07

## 2024-08-07 RX ORDER — OXYCODONE HYDROCHLORIDE 5 MG/1
5 TABLET ORAL EVERY 6 HOURS PRN
Status: ON HOLD | COMMUNITY

## 2024-08-07 RX ORDER — ALBUTEROL SULFATE 90 UG/1
2 INHALANT RESPIRATORY (INHALATION) EVERY 6 HOURS PRN
Status: DISPENSED | OUTPATIENT
Start: 2024-08-07

## 2024-08-07 RX ORDER — ENOXAPARIN SODIUM 100 MG/ML
40 INJECTION SUBCUTANEOUS EVERY 24 HOURS
Status: DISPENSED | OUTPATIENT
Start: 2024-08-08

## 2024-08-07 RX ORDER — OXYCODONE HYDROCHLORIDE 5 MG/1
TABLET ORAL
Status: COMPLETED
Start: 2024-08-07 | End: 2024-08-07

## 2024-08-07 RX ORDER — CYCLOBENZAPRINE HCL 10 MG
5 TABLET ORAL NIGHTLY PRN
Status: DISCONTINUED | OUTPATIENT
Start: 2024-08-07 | End: 2024-08-09

## 2024-08-07 RX ORDER — ACETAMINOPHEN 325 MG/1
975 TABLET ORAL 3 TIMES DAILY
Status: DISPENSED | OUTPATIENT
Start: 2024-08-07

## 2024-08-07 RX ORDER — OXYCODONE HYDROCHLORIDE 5 MG/1
5 TABLET ORAL EVERY 4 HOURS PRN
Status: DISCONTINUED | OUTPATIENT
Start: 2024-08-07 | End: 2024-08-08

## 2024-08-07 ASSESSMENT — LIFESTYLE VARIABLES
TOTAL SCORE: 0
HAVE PEOPLE ANNOYED YOU BY CRITICIZING YOUR DRINKING: NO
EVER HAD A DRINK FIRST THING IN THE MORNING TO STEADY YOUR NERVES TO GET RID OF A HANGOVER: NO
EVER FELT BAD OR GUILTY ABOUT YOUR DRINKING: NO
HAVE YOU EVER FELT YOU SHOULD CUT DOWN ON YOUR DRINKING: NO

## 2024-08-07 ASSESSMENT — PAIN DESCRIPTION - LOCATION
LOCATION: LEG

## 2024-08-07 ASSESSMENT — PAIN DESCRIPTION - ORIENTATION
ORIENTATION: RIGHT

## 2024-08-07 ASSESSMENT — PAIN SCALES - GENERAL
PAINLEVEL: 8
PAINLEVEL_OUTOF10: 8
PAINLEVEL_OUTOF10: 8
PAINLEVEL_OUTOF10: 9
PAINLEVEL_OUTOF10: 10 - WORST POSSIBLE PAIN

## 2024-08-07 ASSESSMENT — ENCOUNTER SYMPTOMS: COLOR CHANGE: 1

## 2024-08-07 ASSESSMENT — PAIN - FUNCTIONAL ASSESSMENT
PAIN_FUNCTIONAL_ASSESSMENT: 0-10
PAIN_FUNCTIONAL_ASSESSMENT: 0-10

## 2024-08-07 ASSESSMENT — PAIN DESCRIPTION - PAIN TYPE: TYPE: ACUTE PAIN

## 2024-08-07 ASSESSMENT — PAIN DESCRIPTION - PROGRESSION: CLINICAL_PROGRESSION: GRADUALLY IMPROVING

## 2024-08-07 NOTE — ED TRIAGE NOTES
Pt brought to ED from doctor appt with concern for infection to RLE. Pt has open wound to right knee

## 2024-08-07 NOTE — PROGRESS NOTES
Patient having persistent pain in the waiting room, approached by nursing to order her home dose oxycodone, the patient has this listed as an intolerance, we did confirm in fact that the patient is on oxycodone as a home medication tolerates it well no history of allergy to it.  5 mg was ordered  [facility]

## 2024-08-08 ENCOUNTER — APPOINTMENT (OUTPATIENT)
Dept: RADIOLOGY | Facility: HOSPITAL | Age: 69
End: 2024-08-08
Payer: MEDICARE

## 2024-08-08 ENCOUNTER — ANESTHESIA (OUTPATIENT)
Dept: OPERATING ROOM | Facility: HOSPITAL | Age: 69
End: 2024-08-08
Payer: MEDICARE

## 2024-08-08 ENCOUNTER — APPOINTMENT (OUTPATIENT)
Dept: ORTHOPEDIC SURGERY | Facility: CLINIC | Age: 69
End: 2024-08-08
Payer: MEDICARE

## 2024-08-08 ENCOUNTER — ANESTHESIA EVENT (OUTPATIENT)
Dept: OPERATING ROOM | Facility: HOSPITAL | Age: 69
End: 2024-08-08
Payer: MEDICARE

## 2024-08-08 ENCOUNTER — APPOINTMENT (OUTPATIENT)
Dept: WOUND CARE | Facility: HOSPITAL | Age: 69
End: 2024-08-08
Payer: MEDICARE

## 2024-08-08 LAB
ABO GROUP (TYPE) IN BLOOD: NORMAL
ALBUMIN SERPL BCP-MCNC: 3.1 G/DL (ref 3.4–5)
ANION GAP BLDA CALCULATED.4IONS-SCNC: 8 MMO/L (ref 10–25)
ANION GAP SERPL CALC-SCNC: 11 MMOL/L (ref 10–20)
ANTIBODY SCREEN: NORMAL
BASE EXCESS BLDA CALC-SCNC: 1.9 MMOL/L (ref -2–3)
BASOPHILS # BLD AUTO: 0.03 X10*3/UL (ref 0–0.1)
BASOPHILS NFR BLD AUTO: 0.4 %
BODY TEMPERATURE: 37 DEGREES CELSIUS
BUN SERPL-MCNC: 7 MG/DL (ref 6–23)
CA-I BLDA-SCNC: 1.14 MMOL/L (ref 1.1–1.33)
CALCIUM SERPL-MCNC: 8.7 MG/DL (ref 8.6–10.6)
CHLORIDE BLDA-SCNC: 104 MMOL/L (ref 98–107)
CHLORIDE SERPL-SCNC: 101 MMOL/L (ref 98–107)
CO2 SERPL-SCNC: 29 MMOL/L (ref 21–32)
CREAT SERPL-MCNC: 0.47 MG/DL (ref 0.5–1.05)
EGFRCR SERPLBLD CKD-EPI 2021: >90 ML/MIN/1.73M*2
EOSINOPHIL # BLD AUTO: 0.03 X10*3/UL (ref 0–0.7)
EOSINOPHIL NFR BLD AUTO: 0.4 %
ERYTHROCYTE [DISTWIDTH] IN BLOOD BY AUTOMATED COUNT: 13.5 % (ref 11.5–14.5)
ERYTHROCYTE [DISTWIDTH] IN BLOOD BY AUTOMATED COUNT: 13.6 % (ref 11.5–14.5)
GLUCOSE BLD MANUAL STRIP-MCNC: 131 MG/DL (ref 74–99)
GLUCOSE BLDA-MCNC: 100 MG/DL (ref 74–99)
GLUCOSE SERPL-MCNC: 88 MG/DL (ref 74–99)
HCO3 BLDA-SCNC: 26.6 MMOL/L (ref 22–26)
HCT VFR BLD AUTO: 26 % (ref 36–46)
HCT VFR BLD AUTO: 31 % (ref 36–46)
HCT VFR BLD EST: 28 % (ref 36–46)
HGB BLD-MCNC: 8.6 G/DL (ref 12–16)
HGB BLD-MCNC: 9.8 G/DL (ref 12–16)
HGB BLDA-MCNC: 9.3 G/DL (ref 12–16)
HOLD SPECIMEN: NORMAL
IMM GRANULOCYTES # BLD AUTO: 0.02 X10*3/UL (ref 0–0.7)
IMM GRANULOCYTES NFR BLD AUTO: 0.3 % (ref 0–0.9)
INHALED O2 CONCENTRATION: 53 %
INR PPP: 1.2 (ref 0.9–1.1)
LACTATE BLDA-SCNC: 1 MMOL/L (ref 0.4–2)
LYMPHOCYTES # BLD AUTO: 1.76 X10*3/UL (ref 1.2–4.8)
LYMPHOCYTES NFR BLD AUTO: 26.3 %
MAGNESIUM SERPL-MCNC: 1.84 MG/DL (ref 1.6–2.4)
MCH RBC QN AUTO: 26.6 PG (ref 26–34)
MCH RBC QN AUTO: 26.9 PG (ref 26–34)
MCHC RBC AUTO-ENTMCNC: 31.6 G/DL (ref 32–36)
MCHC RBC AUTO-ENTMCNC: 33.1 G/DL (ref 32–36)
MCV RBC AUTO: 81 FL (ref 80–100)
MCV RBC AUTO: 84 FL (ref 80–100)
MONOCYTES # BLD AUTO: 0.56 X10*3/UL (ref 0.1–1)
MONOCYTES NFR BLD AUTO: 8.4 %
NEUTROPHILS # BLD AUTO: 4.28 X10*3/UL (ref 1.2–7.7)
NEUTROPHILS NFR BLD AUTO: 64.2 %
NRBC BLD-RTO: 0 /100 WBCS (ref 0–0)
NRBC BLD-RTO: 0 /100 WBCS (ref 0–0)
OXYHGB MFR BLDA: 97.4 % (ref 94–98)
PCO2 BLDA: 41 MM HG (ref 38–42)
PH BLDA: 7.42 PH (ref 7.38–7.42)
PHOSPHATE SERPL-MCNC: 3.7 MG/DL (ref 2.5–4.9)
PLATELET # BLD AUTO: 301 X10*3/UL (ref 150–450)
PLATELET # BLD AUTO: 351 X10*3/UL (ref 150–450)
PO2 BLDA: 196 MM HG (ref 85–95)
POTASSIUM BLDA-SCNC: 4.1 MMOL/L (ref 3.5–5.3)
POTASSIUM SERPL-SCNC: 3.9 MMOL/L (ref 3.5–5.3)
PROTHROMBIN TIME: 13.2 SECONDS (ref 9.8–12.8)
RBC # BLD AUTO: 3.2 X10*6/UL (ref 4–5.2)
RBC # BLD AUTO: 3.69 X10*6/UL (ref 4–5.2)
RH FACTOR (ANTIGEN D): NORMAL
SAO2 % BLDA: 100 % (ref 94–100)
SODIUM BLDA-SCNC: 134 MMOL/L (ref 136–145)
SODIUM SERPL-SCNC: 137 MMOL/L (ref 136–145)
WBC # BLD AUTO: 6.7 X10*3/UL (ref 4.4–11.3)
WBC # BLD AUTO: 7.5 X10*3/UL (ref 4.4–11.3)

## 2024-08-08 PROCEDURE — 84100 ASSAY OF PHOSPHORUS: CPT

## 2024-08-08 PROCEDURE — A6213 FOAM DRG >16<=48 SQ IN W/BDR: HCPCS | Performed by: ORTHOPAEDIC SURGERY

## 2024-08-08 PROCEDURE — A27487 PR REVISE KNEE JOINT REPLACE,ALL PARTS: Performed by: NURSE ANESTHETIST, CERTIFIED REGISTERED

## 2024-08-08 PROCEDURE — 87102 FUNGUS ISOLATION CULTURE: CPT | Performed by: ORTHOPAEDIC SURGERY

## 2024-08-08 PROCEDURE — 3600000010 HC OR TIME - EACH INCREMENTAL 1 MINUTE - PROCEDURE LEVEL FIVE: Performed by: ORTHOPAEDIC SURGERY

## 2024-08-08 PROCEDURE — 99236 HOSP IP/OBS SAME DATE HI 85: CPT | Performed by: INTERNAL MEDICINE

## 2024-08-08 PROCEDURE — 83735 ASSAY OF MAGNESIUM: CPT

## 2024-08-08 PROCEDURE — 82947 ASSAY GLUCOSE BLOOD QUANT: CPT

## 2024-08-08 PROCEDURE — 88300 SURGICAL PATH GROSS: CPT | Mod: TC,SUR | Performed by: ORTHOPAEDIC SURGERY

## 2024-08-08 PROCEDURE — 3700000002 HC GENERAL ANESTHESIA TIME - EACH INCREMENTAL 1 MINUTE: Performed by: ORTHOPAEDIC SURGERY

## 2024-08-08 PROCEDURE — 2500000001 HC RX 250 WO HCPCS SELF ADMINISTERED DRUGS (ALT 637 FOR MEDICARE OP)

## 2024-08-08 PROCEDURE — 0SBC0ZZ EXCISION OF RIGHT KNEE JOINT, OPEN APPROACH: ICD-10-PCS | Performed by: ORTHOPAEDIC SURGERY

## 2024-08-08 PROCEDURE — 2500000005 HC RX 250 GENERAL PHARMACY W/O HCPCS

## 2024-08-08 PROCEDURE — 86900 BLOOD TYPING SEROLOGIC ABO: CPT

## 2024-08-08 PROCEDURE — 7100000001 HC RECOVERY ROOM TIME - INITIAL BASE CHARGE: Performed by: ORTHOPAEDIC SURGERY

## 2024-08-08 PROCEDURE — 36415 COLL VENOUS BLD VENIPUNCTURE: CPT

## 2024-08-08 PROCEDURE — 2500000005 HC RX 250 GENERAL PHARMACY W/O HCPCS: Performed by: ORTHOPAEDIC SURGERY

## 2024-08-08 PROCEDURE — 87040 BLOOD CULTURE FOR BACTERIA: CPT

## 2024-08-08 PROCEDURE — 2720000007 HC OR 272 NO HCPCS: Performed by: ORTHOPAEDIC SURGERY

## 2024-08-08 PROCEDURE — 3700000001 HC GENERAL ANESTHESIA TIME - INITIAL BASE CHARGE: Performed by: ORTHOPAEDIC SURGERY

## 2024-08-08 PROCEDURE — 84132 ASSAY OF SERUM POTASSIUM: CPT | Performed by: NURSE ANESTHETIST, CERTIFIED REGISTERED

## 2024-08-08 PROCEDURE — 27580 FUSION OF KNEE: CPT | Performed by: ORTHOPAEDIC SURGERY

## 2024-08-08 PROCEDURE — 85025 COMPLETE CBC W/AUTO DIFF WBC: CPT

## 2024-08-08 PROCEDURE — 73590 X-RAY EXAM OF LOWER LEG: CPT | Mod: RT

## 2024-08-08 PROCEDURE — 7100000002 HC RECOVERY ROOM TIME - EACH INCREMENTAL 1 MINUTE: Performed by: ORTHOPAEDIC SURGERY

## 2024-08-08 PROCEDURE — C1769 GUIDE WIRE: HCPCS | Performed by: ORTHOPAEDIC SURGERY

## 2024-08-08 PROCEDURE — 27488 REMOVAL OF KNEE PROSTHESIS: CPT | Performed by: ORTHOPAEDIC SURGERY

## 2024-08-08 PROCEDURE — 0SPC0JZ REMOVAL OF SYNTHETIC SUBSTITUTE FROM RIGHT KNEE JOINT, OPEN APPROACH: ICD-10-PCS | Performed by: ORTHOPAEDIC SURGERY

## 2024-08-08 PROCEDURE — 73560 X-RAY EXAM OF KNEE 1 OR 2: CPT | Mod: RIGHT SIDE | Performed by: RADIOLOGY

## 2024-08-08 PROCEDURE — 2500000004 HC RX 250 GENERAL PHARMACY W/ HCPCS (ALT 636 FOR OP/ED): Performed by: NURSE ANESTHETIST, CERTIFIED REGISTERED

## 2024-08-08 PROCEDURE — 1200000002 HC GENERAL ROOM WITH TELEMETRY DAILY

## 2024-08-08 PROCEDURE — 2500000004 HC RX 250 GENERAL PHARMACY W/ HCPCS (ALT 636 FOR OP/ED)

## 2024-08-08 PROCEDURE — 51702 INSERT TEMP BLADDER CATH: CPT

## 2024-08-08 PROCEDURE — 13160 SEC CLSR SURG WND/DEHSN XTN: CPT | Performed by: ORTHOPAEDIC SURGERY

## 2024-08-08 PROCEDURE — 85610 PROTHROMBIN TIME: CPT

## 2024-08-08 PROCEDURE — 73590 X-RAY EXAM OF LOWER LEG: CPT | Mod: RIGHT SIDE | Performed by: RADIOLOGY

## 2024-08-08 PROCEDURE — 0SHC08Z INSERTION OF SPACER INTO RIGHT KNEE JOINT, OPEN APPROACH: ICD-10-PCS | Performed by: ORTHOPAEDIC SURGERY

## 2024-08-08 PROCEDURE — A27487 PR REVISE KNEE JOINT REPLACE,ALL PARTS: Performed by: ANESTHESIOLOGY

## 2024-08-08 PROCEDURE — C1713 ANCHOR/SCREW BN/BN,TIS/BN: HCPCS | Performed by: ORTHOPAEDIC SURGERY

## 2024-08-08 PROCEDURE — 3600000005 HC OR TIME - INITIAL BASE CHARGE - PROCEDURE LEVEL FIVE: Performed by: ORTHOPAEDIC SURGERY

## 2024-08-08 PROCEDURE — P9045 ALBUMIN (HUMAN), 5%, 250 ML: HCPCS | Mod: JZ | Performed by: NURSE ANESTHETIST, CERTIFIED REGISTERED

## 2024-08-08 PROCEDURE — 85027 COMPLETE CBC AUTOMATED: CPT | Performed by: ANESTHESIOLOGY

## 2024-08-08 PROCEDURE — 73560 X-RAY EXAM OF KNEE 1 OR 2: CPT | Mod: RT

## 2024-08-08 PROCEDURE — 2500000004 HC RX 250 GENERAL PHARMACY W/ HCPCS (ALT 636 FOR OP/ED): Performed by: ANESTHESIOLOGY

## 2024-08-08 PROCEDURE — 87077 CULTURE AEROBIC IDENTIFY: CPT | Performed by: ORTHOPAEDIC SURGERY

## 2024-08-08 PROCEDURE — 99223 1ST HOSP IP/OBS HIGH 75: CPT | Performed by: STUDENT IN AN ORGANIZED HEALTH CARE EDUCATION/TRAINING PROGRAM

## 2024-08-08 PROCEDURE — 2500000005 HC RX 250 GENERAL PHARMACY W/O HCPCS: Performed by: NURSE ANESTHETIST, CERTIFIED REGISTERED

## 2024-08-08 PROCEDURE — 0SGC04Z FUSION OF RIGHT KNEE JOINT WITH INTERNAL FIXATION DEVICE, OPEN APPROACH: ICD-10-PCS | Performed by: ORTHOPAEDIC SURGERY

## 2024-08-08 PROCEDURE — 2780000003 HC OR 278 NO HCPCS: Performed by: ORTHOPAEDIC SURGERY

## 2024-08-08 DEVICE — SCREW, LOCKING, 5 X 45MM: Type: IMPLANTABLE DEVICE | Site: KNEE | Status: FUNCTIONAL

## 2024-08-08 DEVICE — SCREW, LOCKING, 5 X 42MM: Type: IMPLANTABLE DEVICE | Site: KNEE | Status: FUNCTIONAL

## 2024-08-08 DEVICE — TOBRA FULL DOSE ANTIBIOTIC BONE CEMENT, 10 PACK CATALOG NUMBER IS 6197-9-010
Type: IMPLANTABLE DEVICE | Site: KNEE | Status: FUNCTIONAL
Brand: SIMPLEX

## 2024-08-08 DEVICE — IMPLANTABLE DEVICE: Type: IMPLANTABLE DEVICE | Site: TIBIA | Status: FUNCTIONAL

## 2024-08-08 DEVICE — GUIDE WIRE, GAM, 3.0MM X 1000MM: Type: IMPLANTABLE DEVICE | Site: TIBIA | Status: NON-FUNCTIONAL

## 2024-08-08 RX ORDER — ACETAMINOPHEN 325 MG/1
650 TABLET ORAL EVERY 4 HOURS PRN
Status: DISCONTINUED | OUTPATIENT
Start: 2024-08-08 | End: 2024-08-08 | Stop reason: HOSPADM

## 2024-08-08 RX ORDER — NEOSTIGMINE METHYLSULFATE 1 MG/ML
INJECTION INTRAVENOUS AS NEEDED
Status: DISCONTINUED | OUTPATIENT
Start: 2024-08-08 | End: 2024-08-08

## 2024-08-08 RX ORDER — SODIUM CHLORIDE, SODIUM LACTATE, POTASSIUM CHLORIDE, CALCIUM CHLORIDE 600; 310; 30; 20 MG/100ML; MG/100ML; MG/100ML; MG/100ML
100 INJECTION, SOLUTION INTRAVENOUS CONTINUOUS
Status: DISCONTINUED | OUTPATIENT
Start: 2024-08-08 | End: 2024-08-08 | Stop reason: HOSPADM

## 2024-08-08 RX ORDER — ROCURONIUM BROMIDE 10 MG/ML
INJECTION, SOLUTION INTRAVENOUS AS NEEDED
Status: DISCONTINUED | OUTPATIENT
Start: 2024-08-08 | End: 2024-08-08

## 2024-08-08 RX ORDER — LIDOCAINE HYDROCHLORIDE 20 MG/ML
INJECTION, SOLUTION INFILTRATION; PERINEURAL AS NEEDED
Status: DISCONTINUED | OUTPATIENT
Start: 2024-08-08 | End: 2024-08-08

## 2024-08-08 RX ORDER — SODIUM CHLORIDE 0.9 G/100ML
IRRIGANT IRRIGATION AS NEEDED
Status: DISCONTINUED | OUTPATIENT
Start: 2024-08-08 | End: 2024-08-08 | Stop reason: HOSPADM

## 2024-08-08 RX ORDER — MIDAZOLAM HYDROCHLORIDE 1 MG/ML
INJECTION INTRAMUSCULAR; INTRAVENOUS AS NEEDED
Status: DISCONTINUED | OUTPATIENT
Start: 2024-08-08 | End: 2024-08-08

## 2024-08-08 RX ORDER — HYDROMORPHONE HYDROCHLORIDE 1 MG/ML
0.2 INJECTION, SOLUTION INTRAMUSCULAR; INTRAVENOUS; SUBCUTANEOUS EVERY 5 MIN PRN
Status: DISCONTINUED | OUTPATIENT
Start: 2024-08-08 | End: 2024-08-08 | Stop reason: HOSPADM

## 2024-08-08 RX ORDER — ALBUMIN HUMAN 50 G/1000ML
SOLUTION INTRAVENOUS AS NEEDED
Status: DISCONTINUED | OUTPATIENT
Start: 2024-08-08 | End: 2024-08-08

## 2024-08-08 RX ORDER — LIDOCAINE HYDROCHLORIDE 10 MG/ML
0.1 INJECTION INFILTRATION; PERINEURAL ONCE
Status: DISCONTINUED | OUTPATIENT
Start: 2024-08-08 | End: 2024-08-08 | Stop reason: HOSPADM

## 2024-08-08 RX ORDER — HYDROMORPHONE HYDROCHLORIDE 1 MG/ML
0.2 INJECTION, SOLUTION INTRAMUSCULAR; INTRAVENOUS; SUBCUTANEOUS
Status: DISCONTINUED | OUTPATIENT
Start: 2024-08-08 | End: 2024-08-08

## 2024-08-08 RX ORDER — HYDROMORPHONE HYDROCHLORIDE 1 MG/ML
0.5 INJECTION, SOLUTION INTRAMUSCULAR; INTRAVENOUS; SUBCUTANEOUS EVERY 5 MIN PRN
Status: DISCONTINUED | OUTPATIENT
Start: 2024-08-08 | End: 2024-08-08 | Stop reason: HOSPADM

## 2024-08-08 RX ORDER — HYDROMORPHONE HYDROCHLORIDE 1 MG/ML
0.2 INJECTION, SOLUTION INTRAMUSCULAR; INTRAVENOUS; SUBCUTANEOUS
Status: DISCONTINUED | OUTPATIENT
Start: 2024-08-08 | End: 2024-08-09

## 2024-08-08 RX ORDER — NORETHINDRONE AND ETHINYL ESTRADIOL 0.5-0.035
KIT ORAL AS NEEDED
Status: DISCONTINUED | OUTPATIENT
Start: 2024-08-08 | End: 2024-08-08

## 2024-08-08 RX ORDER — OXYCODONE HYDROCHLORIDE 5 MG/1
5 TABLET ORAL EVERY 4 HOURS PRN
Status: DISCONTINUED | OUTPATIENT
Start: 2024-08-08 | End: 2024-08-09

## 2024-08-08 RX ORDER — NALOXONE HYDROCHLORIDE 0.4 MG/ML
0.2 INJECTION, SOLUTION INTRAMUSCULAR; INTRAVENOUS; SUBCUTANEOUS EVERY 5 MIN PRN
Status: ACTIVE | OUTPATIENT
Start: 2024-08-08

## 2024-08-08 RX ORDER — GLYCOPYRROLATE 0.2 MG/ML
INJECTION INTRAMUSCULAR; INTRAVENOUS AS NEEDED
Status: DISCONTINUED | OUTPATIENT
Start: 2024-08-08 | End: 2024-08-08

## 2024-08-08 RX ORDER — VANCOMYCIN HYDROCHLORIDE 1 G/20ML
INJECTION, POWDER, LYOPHILIZED, FOR SOLUTION INTRAVENOUS DAILY PRN
Status: ACTIVE | OUTPATIENT
Start: 2024-08-08

## 2024-08-08 RX ORDER — ESMOLOL HYDROCHLORIDE 10 MG/ML
INJECTION, SOLUTION INTRAVENOUS CONTINUOUS PRN
Status: DISCONTINUED | OUTPATIENT
Start: 2024-08-08 | End: 2024-08-08

## 2024-08-08 RX ORDER — PROPOFOL 10 MG/ML
INJECTION, EMULSION INTRAVENOUS AS NEEDED
Status: DISCONTINUED | OUTPATIENT
Start: 2024-08-08 | End: 2024-08-08

## 2024-08-08 RX ORDER — FENTANYL CITRATE 50 UG/ML
INJECTION, SOLUTION INTRAMUSCULAR; INTRAVENOUS AS NEEDED
Status: DISCONTINUED | OUTPATIENT
Start: 2024-08-08 | End: 2024-08-08

## 2024-08-08 RX ORDER — PHENYLEPHRINE HCL IN 0.9% NACL 0.4MG/10ML
SYRINGE (ML) INTRAVENOUS AS NEEDED
Status: DISCONTINUED | OUTPATIENT
Start: 2024-08-08 | End: 2024-08-08

## 2024-08-08 RX ORDER — HYDROMORPHONE HYDROCHLORIDE 1 MG/ML
0.1 INJECTION, SOLUTION INTRAMUSCULAR; INTRAVENOUS; SUBCUTANEOUS EVERY 5 MIN PRN
Status: DISCONTINUED | OUTPATIENT
Start: 2024-08-08 | End: 2024-08-08 | Stop reason: HOSPADM

## 2024-08-08 RX ORDER — HYDROMORPHONE HYDROCHLORIDE 1 MG/ML
INJECTION, SOLUTION INTRAMUSCULAR; INTRAVENOUS; SUBCUTANEOUS AS NEEDED
Status: DISCONTINUED | OUTPATIENT
Start: 2024-08-08 | End: 2024-08-08

## 2024-08-08 RX ORDER — ESMOLOL HYDROCHLORIDE 10 MG/ML
INJECTION INTRAVENOUS AS NEEDED
Status: DISCONTINUED | OUTPATIENT
Start: 2024-08-08 | End: 2024-08-08

## 2024-08-08 RX ORDER — OXYCODONE HYDROCHLORIDE 5 MG/1
5 TABLET ORAL EVERY 4 HOURS PRN
Status: DISCONTINUED | OUTPATIENT
Start: 2024-08-08 | End: 2024-08-08 | Stop reason: HOSPADM

## 2024-08-08 SDOH — SOCIAL STABILITY: SOCIAL INSECURITY: WITHIN THE LAST YEAR, HAVE YOU BEEN HUMILIATED OR EMOTIONALLY ABUSED IN OTHER WAYS BY YOUR PARTNER OR EX-PARTNER?: NO

## 2024-08-08 SDOH — ECONOMIC STABILITY: HOUSING INSECURITY: AT ANY TIME IN THE PAST 12 MONTHS, WERE YOU HOMELESS OR LIVING IN A SHELTER (INCLUDING NOW)?: NO

## 2024-08-08 SDOH — ECONOMIC STABILITY: INCOME INSECURITY: IN THE LAST 12 MONTHS, WAS THERE A TIME WHEN YOU WERE NOT ABLE TO PAY THE MORTGAGE OR RENT ON TIME?: NO

## 2024-08-08 SDOH — SOCIAL STABILITY: SOCIAL INSECURITY: DO YOU FEEL ANYONE HAS EXPLOITED OR TAKEN ADVANTAGE OF YOU FINANCIALLY OR OF YOUR PERSONAL PROPERTY?: NO

## 2024-08-08 SDOH — SOCIAL STABILITY: SOCIAL INSECURITY: ABUSE: ADULT

## 2024-08-08 SDOH — SOCIAL STABILITY: SOCIAL INSECURITY: DOES ANYONE TRY TO KEEP YOU FROM HAVING/CONTACTING OTHER FRIENDS OR DOING THINGS OUTSIDE YOUR HOME?: NO

## 2024-08-08 SDOH — SOCIAL STABILITY: SOCIAL INSECURITY: WITHIN THE LAST YEAR, HAVE YOU BEEN AFRAID OF YOUR PARTNER OR EX-PARTNER?: NO

## 2024-08-08 SDOH — SOCIAL STABILITY: SOCIAL INSECURITY: HAVE YOU HAD THOUGHTS OF HARMING ANYONE ELSE?: NO

## 2024-08-08 SDOH — HEALTH STABILITY: PHYSICAL HEALTH: ON AVERAGE, HOW MANY MINUTES DO YOU ENGAGE IN EXERCISE AT THIS LEVEL?: 0 MIN

## 2024-08-08 SDOH — ECONOMIC STABILITY: FOOD INSECURITY: WITHIN THE PAST 12 MONTHS, YOU WORRIED THAT YOUR FOOD WOULD RUN OUT BEFORE YOU GOT MONEY TO BUY MORE.: NEVER TRUE

## 2024-08-08 SDOH — SOCIAL STABILITY: SOCIAL INSECURITY: DO YOU FEEL UNSAFE GOING BACK TO THE PLACE WHERE YOU ARE LIVING?: NO

## 2024-08-08 SDOH — SOCIAL STABILITY: SOCIAL NETWORK: HOW OFTEN DO YOU ATTENT MEETINGS OF THE CLUB OR ORGANIZATION YOU BELONG TO?: MORE THAN 4 TIMES PER YEAR

## 2024-08-08 SDOH — SOCIAL STABILITY: SOCIAL INSECURITY: ARE YOU OR HAVE YOU BEEN THREATENED OR ABUSED PHYSICALLY, EMOTIONALLY, OR SEXUALLY BY ANYONE?: NO

## 2024-08-08 SDOH — HEALTH STABILITY: MENTAL HEALTH
HOW OFTEN DO YOU NEED TO HAVE SOMEONE HELP YOU WHEN YOU READ INSTRUCTIONS, PAMPHLETS, OR OTHER WRITTEN MATERIAL FROM YOUR DOCTOR OR PHARMACY?: NEVER

## 2024-08-08 SDOH — ECONOMIC STABILITY: INCOME INSECURITY: IN THE PAST 12 MONTHS, HAS THE ELECTRIC, GAS, OIL, OR WATER COMPANY THREATENED TO SHUT OFF SERVICE IN YOUR HOME?: NO

## 2024-08-08 SDOH — HEALTH STABILITY: PHYSICAL HEALTH: ON AVERAGE, HOW MANY DAYS PER WEEK DO YOU ENGAGE IN MODERATE TO STRENUOUS EXERCISE (LIKE A BRISK WALK)?: 0 DAYS

## 2024-08-08 SDOH — SOCIAL STABILITY: SOCIAL INSECURITY: HAVE YOU HAD ANY THOUGHTS OF HARMING ANYONE ELSE?: NO

## 2024-08-08 SDOH — HEALTH STABILITY: MENTAL HEALTH
STRESS IS WHEN SOMEONE FEELS TENSE, NERVOUS, ANXIOUS, OR CAN'T SLEEP AT NIGHT BECAUSE THEIR MIND IS TROUBLED. HOW STRESSED ARE YOU?: TO SOME EXTENT

## 2024-08-08 SDOH — ECONOMIC STABILITY: FOOD INSECURITY: WITHIN THE PAST 12 MONTHS, THE FOOD YOU BOUGHT JUST DIDN'T LAST AND YOU DIDN'T HAVE MONEY TO GET MORE.: NEVER TRUE

## 2024-08-08 SDOH — SOCIAL STABILITY: SOCIAL NETWORK: ARE YOU MARRIED, WIDOWED, DIVORCED, SEPARATED, NEVER MARRIED, OR LIVING WITH A PARTNER?: MARRIED

## 2024-08-08 SDOH — SOCIAL STABILITY: SOCIAL INSECURITY: HAS ANYONE EVER THREATENED TO HURT YOUR FAMILY OR YOUR PETS?: NO

## 2024-08-08 SDOH — ECONOMIC STABILITY: HOUSING INSECURITY: IN THE PAST 12 MONTHS, HOW MANY TIMES HAVE YOU MOVED WHERE YOU WERE LIVING?: 0

## 2024-08-08 SDOH — ECONOMIC STABILITY: INCOME INSECURITY: HOW HARD IS IT FOR YOU TO PAY FOR THE VERY BASICS LIKE FOOD, HOUSING, MEDICAL CARE, AND HEATING?: NOT HARD AT ALL

## 2024-08-08 SDOH — SOCIAL STABILITY: SOCIAL NETWORK: HOW OFTEN DO YOU GET TOGETHER WITH FRIENDS OR RELATIVES?: MORE THAN THREE TIMES A WEEK

## 2024-08-08 SDOH — HEALTH STABILITY: MENTAL HEALTH: CURRENT SMOKER: 0

## 2024-08-08 SDOH — SOCIAL STABILITY: SOCIAL NETWORK: HOW OFTEN DO YOU ATTEND CHURCH OR RELIGIOUS SERVICES?: MORE THAN 4 TIMES PER YEAR

## 2024-08-08 SDOH — SOCIAL STABILITY: SOCIAL INSECURITY: ARE THERE ANY APPARENT SIGNS OF INJURIES/BEHAVIORS THAT COULD BE RELATED TO ABUSE/NEGLECT?: NO

## 2024-08-08 SDOH — SOCIAL STABILITY: SOCIAL INSECURITY: WERE YOU ABLE TO COMPLETE ALL THE BEHAVIORAL HEALTH SCREENINGS?: YES

## 2024-08-08 ASSESSMENT — PAIN SCALES - GENERAL
PAIN_LEVEL: 3
PAINLEVEL_OUTOF10: 8
PAINLEVEL_OUTOF10: 8
PAINLEVEL_OUTOF10: 10 - WORST POSSIBLE PAIN
PAINLEVEL_OUTOF10: 9
PAINLEVEL_OUTOF10: 7
PAINLEVEL_OUTOF10: 10 - WORST POSSIBLE PAIN
PAINLEVEL_OUTOF10: 10 - WORST POSSIBLE PAIN
PAINLEVEL_OUTOF10: 5 - MODERATE PAIN
PAINLEVEL_OUTOF10: 0 - NO PAIN
PAINLEVEL_OUTOF10: 9
PAINLEVEL_OUTOF10: 5 - MODERATE PAIN
PAINLEVEL_OUTOF10: 8
PAINLEVEL_OUTOF10: 9
PAINLEVEL_OUTOF10: 10 - WORST POSSIBLE PAIN
PAINLEVEL_OUTOF10: 10 - WORST POSSIBLE PAIN
PAINLEVEL_OUTOF10: 7
PAINLEVEL_OUTOF10: 9

## 2024-08-08 ASSESSMENT — LIFESTYLE VARIABLES
AUDIT-C TOTAL SCORE: 0
HOW OFTEN DO YOU HAVE A DRINK CONTAINING ALCOHOL: NEVER
SKIP TO QUESTIONS 9-10: 1
PRESCIPTION_ABUSE_PAST_12_MONTHS: NO
AUDIT-C TOTAL SCORE: 0
SUBSTANCE_ABUSE_PAST_12_MONTHS: NO
HOW MANY STANDARD DRINKS CONTAINING ALCOHOL DO YOU HAVE ON A TYPICAL DAY: PATIENT DOES NOT DRINK
HOW OFTEN DO YOU HAVE 6 OR MORE DRINKS ON ONE OCCASION: NEVER

## 2024-08-08 ASSESSMENT — PAIN SCALES - WONG BAKER
WONGBAKER_NUMERICALRESPONSE: HURTS LITTLE MORE
WONGBAKER_NUMERICALRESPONSE: NO HURT
WONGBAKER_NUMERICALRESPONSE: HURTS WORST
WONGBAKER_NUMERICALRESPONSE: HURTS EVEN MORE
WONGBAKER_NUMERICALRESPONSE: HURTS WHOLE LOT

## 2024-08-08 ASSESSMENT — PAIN DESCRIPTION - ORIENTATION: ORIENTATION: RIGHT

## 2024-08-08 ASSESSMENT — COGNITIVE AND FUNCTIONAL STATUS - GENERAL
MOBILITY SCORE: 12
WALKING IN HOSPITAL ROOM: TOTAL
STANDING UP FROM CHAIR USING ARMS: TOTAL
PATIENT BASELINE BEDBOUND: YES
CLIMB 3 TO 5 STEPS WITH RAILING: TOTAL
DAILY ACTIVITIY SCORE: 19
MOVING TO AND FROM BED TO CHAIR: A LITTLE
STANDING UP FROM CHAIR USING ARMS: TOTAL
MOVING FROM LYING ON BACK TO SITTING ON SIDE OF FLAT BED WITH BEDRAILS: A LITTLE
WALKING IN HOSPITAL ROOM: TOTAL
TOILETING: A LITTLE
MOBILITY SCORE: 14
DRESSING REGULAR UPPER BODY CLOTHING: A LITTLE
MOVING TO AND FROM BED TO CHAIR: A LITTLE
HELP NEEDED FOR BATHING: A LITTLE
PERSONAL GROOMING: A LITTLE
TURNING FROM BACK TO SIDE WHILE IN FLAT BAD: A LITTLE
CLIMB 3 TO 5 STEPS WITH RAILING: TOTAL
DRESSING REGULAR LOWER BODY CLOTHING: A LITTLE

## 2024-08-08 ASSESSMENT — PAIN - FUNCTIONAL ASSESSMENT
PAIN_FUNCTIONAL_ASSESSMENT: 0-10
PAIN_FUNCTIONAL_ASSESSMENT: UNABLE TO SELF-REPORT
PAIN_FUNCTIONAL_ASSESSMENT: 0-10
PAIN_FUNCTIONAL_ASSESSMENT: UNABLE TO SELF-REPORT
PAIN_FUNCTIONAL_ASSESSMENT: 0-10
PAIN_FUNCTIONAL_ASSESSMENT: UNABLE TO SELF-REPORT

## 2024-08-08 ASSESSMENT — ACTIVITIES OF DAILY LIVING (ADL)
HEARING - LEFT EAR: FUNCTIONAL
ASSISTIVE_DEVICE: WHEELCHAIR
BATHING: NEEDS ASSISTANCE
PATIENT'S MEMORY ADEQUATE TO SAFELY COMPLETE DAILY ACTIVITIES?: YES
DRESSING YOURSELF: NEEDS ASSISTANCE
FEEDING YOURSELF: INDEPENDENT
GROOMING: NEEDS ASSISTANCE
HEARING - RIGHT EAR: FUNCTIONAL
JUDGMENT_ADEQUATE_SAFELY_COMPLETE_DAILY_ACTIVITIES: YES
WALKS IN HOME: DEPENDENT
TOILETING: NEEDS ASSISTANCE
ADEQUATE_TO_COMPLETE_ADL: YES

## 2024-08-08 ASSESSMENT — PAIN DESCRIPTION - DESCRIPTORS
DESCRIPTORS: SHOOTING;SHARP
DESCRIPTORS: SHOOTING;SHARP
DESCRIPTORS: ACHING;DISCOMFORT
DESCRIPTORS: SHARP

## 2024-08-08 ASSESSMENT — PATIENT HEALTH QUESTIONNAIRE - PHQ9
SUM OF ALL RESPONSES TO PHQ9 QUESTIONS 1 & 2: 4
2. FEELING DOWN, DEPRESSED OR HOPELESS: MORE THAN HALF THE DAYS
1. LITTLE INTEREST OR PLEASURE IN DOING THINGS: MORE THAN HALF THE DAYS

## 2024-08-08 ASSESSMENT — PAIN DESCRIPTION - LOCATION: LOCATION: KNEE
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
## 2024-08-08 NOTE — NURSING NOTE
22G piv to right forearm site is clear, flushes easilly with excellent blood return. 18G piv to right hand infiltrated, discontinued piv intact. Nurse notified.  Nurse also asked if I could attempt to to place a second piv for access. Scanned bilateral arms with ultrasound and found no viable veins for access. Patient has also had multiple piv's in the last several days. This patient is going to need consult for alternative access. Nurse notified.

## 2024-08-08 NOTE — H&P
[facility] Department of Orthopaedic Surgery   Surgical History & Physical <30 Days    Reason for Surgery: R knee PJI  Planned Procedure: R knee explant and antibiotic spacer placement w possible [place] application    History & Physical Reviewed:  I have reviewed the History and Physical within 30 days dated 8/8. Relevant findings and updates are noted below:  No significant changes.    Home medications were reviewed with significant updates noted below:  No significant changes.    ERAS patient?: No    COVID-19 Risk Consent:   Surgeon has reviewed the key risks related to mark COVID-19 and subsequent sequelae.     08/08/24 at 1:23 AM - Rosa Sosa MD

## 2024-08-08 NOTE — CONSULTS
Vancomycin Dosing by Pharmacy- INITIAL    Adriana Wood is a 68 y.o. year old female who Pharmacy has been consulted for vancomycin dosing for Prosthetic Device Infection. Based on the patient's indication and renal status this patient will be dosed based on a goal AUC of 400-600.     Renal function is currently stable.    Visit Vitals  /60   Pulse 70   Temp 36.3 °C (97.3 °F) (Temporal)   Resp 14        Lab Results   Component Value Date    CREATININE 0.47 (L) 2024    CREATININE 0.47 (L) 2024    CREATININE 0.60 2024    CREATININE 0.54 06/10/2024        Patient weight is as follows:   Vitals:    24 1318   Weight: 70.3 kg (155 lb)       Cultures:  No results found for the encounter in last 14 days.        No intake/output data recorded.  I/O during current shift:  I/O this shift:  In: 3450 [I.V.:400; IV Piggyback:3050]  Out: 1425 [Urine:825; Blood:600]    Temp (24hrs), Av.4 °C (97.6 °F), Min:36.1 °C (97 °F), Max:36.9 °C (98.4 °F)         Assessment/Plan     Patient will not be given a loading dose. Patient had 1g at 1035 today while in OR   Will initiate vancomycin maintenance,  1,250 mg every 12 hours.    This dosing regimen is predicted by [employer] to result in the following pharmacokinetic parameters:    Loading dose: N/A  Regimen: 1250 mg IV every 12 hours.  Start time: 22:35 on 2024  Exposure target: AUC24 (range)400-600 mg/L.hr   AUC24,ss: 518 mg/L.hr  Probability of AUC24 > 400: 75 %  Ctrough,ss: 14.9 mg/L  Probability of Ctrough,ss > 20: 28 %  Probability of nephrotoxicity (Lodise JOSE LUIS ): 10 %    Follow-up level will be ordered on  at 0500 unless clinically indicated sooner.  Will continue to monitor renal function daily while on vancomycin and order serum creatinine at least every 48 hours if not already ordered.  Follow for continued vancomycin needs, clinical response, and signs/symptoms of toxicity.       Estela Long, PharmD

## 2024-08-08 NOTE — ANESTHESIA POSTPROCEDURE EVALUATION
Patient: Adriana Wood    Procedure Summary       Date: 08/08/24 Room / Location: [facility] OR 07 / Virtual [facility] [place] OR    Anesthesia Start: 0829 Anesthesia Stop: 1255    Procedures:       Revision Arthroplasty Total Knee EXPLAN TNA HARDWARE AND ANTIBIOTIC SPACER (Right: Knee)      Lower Extremity I  and  D (Right: Knee) Diagnosis:       Wound dehiscence      Prosthetic joint infection, subsequent encounter      (Wound dehiscence [T81.30XA])      (Prosthetic joint infection, subsequent encounter [T84.50XD])    Surgeons: Andrzej Whelan MD Responsible Provider: Deangelo Rogers MD    Anesthesia Type: general, regional ASA Status: 3            Anesthesia Type: general, regional    Vitals Value Taken Time   /64   08/08/24 1320   Temp  08/08/24 1320   Pulse 102 08/08/24 1320   Resp 17 08/08/24 1320   SpO2 100 % 08/08/24 1245       Anesthesia Post Evaluation    Patient location during evaluation: PACU  Patient participation: complete - patient cannot participate  Level of consciousness: awake  Pain score: 3  Pain management: adequate  Airway patency: patent  Cardiovascular status: acceptable, hemodynamically stable and stable  Respiratory status: acceptable and room air  Hydration status: acceptable  Postoperative Nausea and Vomiting: none        There were no known notable events for this encounter.
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
## 2024-08-08 NOTE — CARE PLAN
The patient's goals for the shift include Decreased pain    The clinical goals for the shift include pt will be free from fall /injury      Problem: Skin  Goal: Promote skin healing  Outcome: Progressing  Flowsheets (Taken 8/8/2024 8574)  Promote skin healing: Rotate device position/do not position patient on device     Problem: Fall/Injury  Goal: Not fall by end of shift  Outcome: Progressing  Goal: Be free from injury by end of the shift  Outcome: Progressing     Problem: Fall/Injury  Goal: Not fall by end of shift  Outcome: Progressing

## 2024-08-08 NOTE — BRIEF OP NOTE
Date: 2024  OR Location: [facility] OR    Name: Adriana Wood, : 1955, Age: 68 y.o., MRN: [id number], Sex: female    Diagnosis  Pre-op Diagnosis      * Wound dehiscence [T81.30XA]     * Prosthetic joint infection, subsequent encounter [T84.50XD] Post-op Diagnosis     * Wound dehiscence [T81.30XA]     * Prosthetic joint infection, subsequent encounter [T84.50XD]     Procedures  Revision Arthroplasty Total Knee EXPLAN TNA HARDWARE AND ANTIBIOTIC SPACER  94105 - ID REVJ TOT KNEE ARTHRP FEM&ENTIRE TIBIAL COMPONE    Lower Extremity I  and  D  79421 - ID INCISION LEG/ANKLE      Surgeons      * Andrzej Whelan - Primary    Resident/Fellow/Other Assistant:  Surgeons and Role:     * Andrzej Alberto MD - Resident - Assisting    Procedure Summary  Anesthesia: Regional, General  ASA: III  Anesthesia Staff: Anesthesiologist: Deangelo Rogers MD  CRNA: ISABEL Wu-CRNA  C-AA: HERMAN Gamboa Capp; EHRMAN Chiu  TAVO: Jacoby Piña  Estimated Blood Loss: 600mL  Intra-op Medications:   Administrations occurring from 0800 to 1230 on 24:   Medication Name Total Dose   sodium chloride 0.9 % irrigation solution 4,000 mL   enoxaparin (Lovenox) syringe 40 mg Cannot be calculated              Anesthesia Record               Intraprocedure I/O Totals          Intake    LR bolus 2000.00 mL    vancomycin (Vancocin) 1 mg in dextrose 5% 250 mL .00 mL    Total Intake 2100 mL       Output    Urine 325 mL    Est. Blood Loss 600 mL    Total Output 925 mL       Net    Net Volume 1175 mL          Specimen:   ID Type Source Tests Collected by Time   1 : Right knee hardware Tissue KNEE HARDWARE RIGHT SURGICAL PATHOLOGY EXAM Andrzej Whelan MD 2024 1134   A : RIGHT KNEE #1 Swab ABSCESS FUNGAL CULTURE/SMEAR, TISSUE/WOUND CULTURE/SMEAR Andrzej Whelan MD 2024 1104   B : RIGHT KNEE #2 Swab ABSCESS FUNGAL CULTURE/SMEAR, TISSUE/WOUND CULTURE/SMEAR Andrzej Whelan MD 2024 1104        Staff:   Circulator:  Dannielle  Scrub Person: Pradip  Scrub Person: Adi  Circulator: Deangelo Kennedy Scrub: Bobby Kennedy Circulator: Adi          Findings: Knee arthroplasty hardware, abscess    Complications:  None; patient tolerated the procedure well.     Disposition: PACU - hemodynamically stable.  Condition: stable  Specimens Collected:   ID Type Source Tests Collected by Time   1 : Right knee hardware Tissue KNEE HARDWARE RIGHT SURGICAL PATHOLOGY EXAM Andrzej Whelan MD 8/8/2024 1134   A : RIGHT KNEE #1 Swab ABSCESS FUNGAL CULTURE/SMEAR, TISSUE/WOUND CULTURE/SMEAR Andrzej Whelan MD 8/8/2024 1104   B : RIGHT KNEE #2 Swab ABSCESS FUNGAL CULTURE/SMEAR, TISSUE/WOUND CULTURE/SMEAR Andrzej Whelan MD 8/8/2024 1104     Attending Attestation:     Andrzej Whelan  Phone Number: [phone]

## 2024-08-08 NOTE — CONSULTS
[facility] Department of Orthopaedic Surgery   Initial Consult Note    HPI:      68F (R knee PJI +E. coli s/p poly swap and I&D on 5/10, CAD, long QT, cardiac arrest postoperatively) p/w worsening cloudy drainage and enlargement of wound w subj fevers x1-2 weeks.     She initially underwent R TKA in 03/2024 that was complicated by PJI that grew E. Coli. She underwent poly exchange and I&D on 5/10 and was placed on IV ceftriaxone. She was initially doing well but then reported anterior wound inferior to the patella that was having difficulty healing. In the clinic, she was prescribed PO abx but she reported she stopped taking them after a couple days due to upset stomach. She was also seen by plastics for possible soft tissue coverage should this wound not heal. She presents today with worsening of wound with increased drainage and generalized feelings of malaise. She reports increased pain and decreased ROM.    Orthopaedic Problems/Injuries: recurrent R knee PJI  Other Injuries: none    PMH: per above/EMR  PSH: per above/EMR  SocHx:      -  Denies tobacco use      -  Denies EtOH use      -  Denies other drug use  FamHx:  Non-contributory to this patient's acute orthopaedic problem.   Allergies: Reviewed in EMR  Meds: Reviewed in EMR    ROS  12-point review of systems is negative other than what is mentioned above.    Physical Exam:  Gen: AOx3, NAD  HEENT: normocephalic, atraumatic  Psych: appropriate mood and affect  Resp: nonlabored breathing  Cardiac: Extremities WWP, regular rate on peripheral palpation  Neuro: moves all extremities spontaneously   Skin: no rashes  MSK:     RLE:   - 1cm wound inferior to patella that probes to joint w persistent brown drainage  - ROM 30-90 degrees secondary to pain  - Motor intact in DF/PF/EHL/FHL  - SILT in saph/sural/SPN/DPN distributions  - Foot wwp, 2+ DP/PT pulse, brisk cap refill  - Compartments soft and compressible, no pain with passive dorsiflexion      A full  secondary exam was performed and all relevant findings discussed and noted above.    Imaging:    XR w stable implants    Assessment:  Orthopaedic Problems/Injuries: recurrent R knee PJI     68F (R knee PJI +E. coli s/p poly swap and I&D on 5/10, CAD, long QT, cardiac arrest postoperatively) p/w worsening cloudy drainage and enlargement of wound w subj fevers x1-2 weeks. 1cm wound inferior to patella that probes to joint. NVI distally. WBC 7.5, , CRP 5.82. Wound cx 8/1 growing MRSA.    Plan:  - Admit to medicine  - Consented and posted to OR schedule for R knee explant and antibiotic spacer placement w possible [place] application w/ Dr. Whelan on 8/8  - NPO for upcoming surgery   - Clearance pending for OR; appreciate documentation of clearance by primary team  - Please obtain pre-operative labs/studies: T&S, PT/INR, CBC, BMP, CXR, EKG  - WB: WBAT  - Abx: please hold for OR if possible  - DVT: SCDs, please hold chemoppx for OR   - Rosenbaum: N    This patient was seen and evaluated within 30 minutes of initial consult.     Staffed and discussed with attending. Please don't hesitate to reach out with any questions.    Rosa Sosa MD  Orthopaedic Surgery PGY-2   Epic Chat preferred    Please page [phone] (on-call pager) on weekends and between 6p-7a on weekdays.  _________________________________________________________    While admitted, this patient will be followed by the Ortho Trauma Team. Please contact the residents listed below with any questions (available via Epic Chat).     First call: Parker Meek, PGY-1; Po Andersen, PGY-1  Second call: Michael Forman, PGY-2   Third call: Sam Lebron, PGY-3    Please call the ortho pager [phone]) on weekends and between 6p-7a on weekdays.

## 2024-08-08 NOTE — ED PROCEDURE NOTE
Procedure  Procedures    There was difficulty obtaining IV access on this patient, and multiple attempts by nursing staff and/or medics have failed. Patient required IV access by ED provider under the assistance of ultrasound.      Site was prepped and sterilized before IV insertion.     Ultrasound images were not saved in the patient's chart demonstrating cannulization.     IV Size:22  IV Side: Left  IV Site: Antecubital Fossa    DO Sarah Patton DO  Resident  08/08/24 0048

## 2024-08-08 NOTE — OP NOTE
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]     Procedures  Revision Arthroplasty Total Knee EXPLAN TNA HARDWARE AND ANTIBIOTIC SPACER  89876 - PA REVJ TOT KNEE ARTHRP FEM&ENTIRE TIBIAL COMPONE    Lower Extremity I  and  D  47339 - PA INCISION LEG/ANKLE      Surgeons      * Andrzej Whelan - Primary    Resident/Fellow/Other Assistant:  Surgeons and Role:     * Andrzej Alberto MD - Resident - Assisting    Procedure Summary  Anesthesia: Regional, General  ASA: III  Anesthesia Staff: Anesthesiologist: Deangelo Rogers MD  CRNA: ISABEL Wu-CRNA  C-AA: HERMAN Gamboa Capp; HERMAN Chiu  TAVO: Jacoby Piña  Estimated Blood [id number]  Intra-op Medications:   Administrations occurring from 0800 to 1230 on 24:   Medication Name Total Dose   sodium chloride 0.9 % irrigation solution 4,000 mL              Anesthesia Record               Intraprocedure I/O Totals          Intake    LR bolus 2500.00 mL    albumin human 5 % 250.00 mL    piperacillin-tazobactam (Zosyn) IV 3.375 g in 50 mL (premix) 100.00 mL    vancomycin (Vancocin) 1 mg in dextrose 5% 250 mL .00 mL    Total Intake 3050 mL       Output    Urine 550 mL    Est. Blood Loss 600 mL    Total Output 1150 mL       Net    Net Volume 1900 mL          Specimen:   ID Type Source Tests Collected by Time   1 : Right knee hardware Tissue KNEE HARDWARE RIGHT SURGICAL PATHOLOGY EXAM Andrzej Whelan MD 2024 1134   A : RIGHT KNEE #1 Swab ABSCESS FUNGAL CULTURE/SMEAR, TISSUE/WOUND CULTURE/SMEAR  Andrzej Whelan MD 8/8/2024 1104   B : RIGHT KNEE #2 Swab ABSCESS FUNGAL CULTURE/SMEAR, TISSUE/WOUND CULTURE/SMEAR Andrzej Whelan MD 8/8/2024 1104        Staff:   Sarahulator: Dannielle Hopsonub Person: Pradip Hopsonub Person: Adi  Circulator: Deangelo Kennedy Scrub: Bobby Kennedy Circulator: Adi         Drains and/or Catheters:   Urethral Catheter (Active)   Collection Container Standard drainage bag 08/08/24 1300   Securement Method Securing device (Describe) 08/08/24 1300   Reason for Continuing Urinary Catheterization surgical procedures: urological/gynecological, pelvic oncology, anal, prolonged surgical procedure 08/08/24 1300   Output (mL) 100 mL 08/08/24 1500       External Urinary Catheter Female (Active)   External Catheter Status Changed 08/08/24 0130       Tourniquet Times:     Total Tourniquet Time Documented:  Thigh (Right) - 72 minutes  Total: Thigh (Right) - 72 minutes      Implants:  Implants       Type Name Action Serial No.      Joint CEMENT, BONE SIMPLEX P W/TOBRA - [id number] Implanted      Joint CEMENT, BONE SIMPLEX P W/TOBRA - [id number] Implanted      Joint CEMENT, BONE SIMPLEX P W/TOBRA - [id number] Implanted      Joint GUIDE WIRE, SMITH, 3.0MM X 1000MM - [id number] Used, Not Implanted      Joint GUIDE WIRE, SMITH, 3.0MM X 1000MM - [id number] Used, Not Implanted      Joint NAIL, TIBIAL, T2 ALPHA, 9 X 405MM - [id number] Implanted      Screw SCREW, LOCKING, 5 X 30MM - [id number] Implanted               Findings: entire right total knee infection, osteomyelitis patella    Indications: Adriana Wood is an 68 y.o. 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     The patient was seen in the preoperative area. The risks, benefits, complications, treatment options, non-operative alternatives, expected recovery and outcomes were discussed with the patient. The possibilities of reaction to medication, pulmonary aspiration,  injury to surrounding structures, bleeding, recurrent infection, the need for additional procedures, failure to diagnose a condition, and creating a complication requiring transfusion or operation were discussed with the patient. The patient concurred with the proposed plan, giving informed consent.  The site of surgery was properly noted/marked if necessary per policy. The patient has been actively warmed in preoperative area. Preoperative antibiotics have been ordered and given within 2 hours of incision. 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    Procedure explantation of right total knee replacement complete, temporizing fusion using 400 x 9 mm trans knee intramedullary nail locked with antibiotic spacer, closure of dehisced wound anterior knee.    Surgeon Tip first Assistant Dr. Ravi second assistant Dr. Frank Andersen    Anesthesia General Estimated blood loss 600cc ..  Multiple cultures taken and pathology of bone and prosthesis sent    Preoperative indications this is a 68-year-old female who has a long history of right knee problems dating back to 2021 when she sustained an open bicondylar tibial plateau fracture treated at an outside hospital.  2 and half months later she had debridement of her fracture here at [facility] by Dr. Tai and multiple surgeries.  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  At that point she was not weightbearing in the leg and the leg was fixed in flexion.  She had  severe osteoporosis.  In March of this year we did a conversion to a total knee replacement understanding the soft tissue was somewhat compromised from her previous surgeries.  She was taken back 2 months later for a debridement mostly because she developed a wound over the anterior incision scar which was part of her original trauma she underwent a poly exchange and we were following her in the office.  She did have some delayed healing but the wound was really partial-thickness but developed full-thickness it was quite small measuring less than about 5 mm.  Despite this I sent her to plastic surgery Dr.A Whatley in 2 months ago she was put on the schedule for free flap to cover this area to prevent infection.  At that point she declined the surgery and elected to go to a wound care service.  Apparently in the past several weeks the wound has increased in size she is developed malaise and chills and the swelling in the knee has progressed.  She also has pain which is considerable.  She was seen by Dr. Avila in his office and sent to the emergency room where she is found to have a grossly infected wound directly to her knee replacement over the patella tendon area.  She was kept n.p.o. she was medically cleared for surgery.  I talked her about the risks and benefits of what I believe need to be done.  Although she had a cemented stem down her tibia at least one third of the way I believe that the whole prosthesis most likely need to come out and if you are going to salvage the extremity we would need to remove this and place a spacer into the knee and even give her a temporary fusion which may last over a year.  She had very weak extensors of the knee consistent with her wound.  Risks and benefits of surgery were discussed with her and she did agree to proceed.  Please note that she did have some superficial cultures positive for methicillin-resistant staph from her wound service.    Operative procedure the patient  was wheeled into the operating room and a full huddle was performed with her awake and alert.  She then underwent general anesthesia.  I did place a Rosenbaum catheter understanding this would be an extensive operation.  We did not give her antibiotics but prepared vancomycin and Zosyn to be given after the cultures.  She was given TXA.    She was placed in a supine on a radiolucent table.  I prepped and draped her entire right lower extremity from the hip down.  We would use a sterile tourniquet in case we needed apply an external fixator proximally.  The right lower extremity was asked elevated and the tourniquet was elevated appropriately.  A full surgical pause was performed.    An incision was made in line with the previous scar.  Dehisced wound was cut out and about a 2 x 2 cm area which appeared to be have viable margins was preserved directly over the patella tendon which was completely involved in infection.  The patella tendon was essentially missing 80% of it.  I debrided both ends of it.  We did a medial parapatellar approach a small portion of the lateral tendon was still attached to the extensor mechanism but the patella appeared to be soft and nonviable.  With a small osteotome the patella completely came off of the soft tissue with the patella component.  The patella appeared to be involved with infection.  I then did a medial lateral release along the tibia.  We did a complete synovectomy of the anterior knee joint including the suprapatellar pouch.  We bent the knee.  Once again I had very much question whether there was any true extensor mechanism still attached attached to the tubercle.  We used a small saw to cut the post on the TS poly and remove the post.  We then with an impactor removed the poly component on the tibia.  We used osteotomes to gently pry the femoral component from the femur her bone was extremely soft around the femur so it was quite easy to do we did lose some bone but not not  too much of the cancellous bone.  We then moved onto the tibia where the infection was directly onto the tibia.  We used an osteotome to loosen up the margins superficially with the extractor for the universal tibial Connor component on the tibia and were able to back out the tibia.  This left the whole cement mantle within the tibia itself which extended to the proximal one third of the tibia.  We carefully meticulously using long osteotomes went down the tibia on the margins medial lateral anterior and posteriorly and removed each portion of the cement column from the tibia the last portion of the cement column was a plug a solid plug across the distal end we used a long osteotomes to release a but had no leverage to pull it out.  We then used a Schanz pin 5 mm into the distal plug and used a T-handle deborah to The cement out of the distal end of the cement mantle removing all the cement from the tibia.    We did take cultures x 3.  And we sent the bone attached and the patella to pathology.  We then administered 1 g of vancomycin and 3.35 of Zosyn antibiotic.  We then used a ball-tipped guidewire to ream the femur to the end of the femoral component of the total knee using up to a 14 mm reamer on the femur and a 13 mm on the tibia.  The tibia was also reamed to the distal end.  The patient had very poor bone quality.  We did release the tourniquet at this point there was bleeding but it certainly was not major bleeding.  We did not have any major vessels and we had very slow return to bleeding once the tourniquet was released.    We copiously irrigated out the area with pulsatile lavage and our plan at this point was to support the tibia and the femur and the knee using a 400 x 9 mm tibial nail.  We would use this nail in such a way as to cross the knee joint to form a temporizing fusion nail.  We made this decision because we do not know if the extensor mechanism is ever going to be repairable and either she  would require a knee fusion in the future permanent or leave this knee fusion as is for several years.  Other options would include above-knee amputation.    We placed the nail into the tibia and then up into the femur.  We stretch up into the femur enough to get support and we locked the tibial nail distally with 1 screw and into the femur with 2 screws.  We slightly shortened the defect in order that we might be able to close the wound anteriorly rather than because her free flap for limb salvage.  We then mixed 3 bags of tobramycin cement and placed 1 g of vancomycin in each of the bags.  We used a Gelfoam in the popliteal fossa to prevent any seeding of the neurovascular bundle and placed the cement around the nail at the site of the fusion between the femur and the tibia.  Once the cement set up we began closing.    The closure was somewhat complex we moved the flap medial slightly distally and lateral slightly proximally.  We really closed the deep fascia approximately the quad muscle but when we came to the with the patella used to be there was a shell of patella there was also remnant of the extensor patella mechanism which we were able to get down to the tibial tubercle which also had some soft tissue attachments.  We were able to close the deep compartment both medial and laterally and bring the extensor mechanism albeit excised partially down to the anterior tibia using #2 FiberWire.  The remainder the fascia was closed with #1 PDS suture.  The skin flaps were then manipulated in such a way as to bring the medial side more distally in the lateral side more proximally to fit into the 2 cm defect that we had presented with.  We used 0 Prolene sutures in the admission initially to bring the tissue together with some tension particularly over the area of the anterior tibia and the antibiotic spacer.  We used a 2-0 nylon suture in between and were able to get the skin closed.  Plastic surgery was consulted and  will follow the patient.  We then used a Prevena dressing over the entire incision.  And placed patient in a knee immobilizer.    Postoperatively I really think patient should be 50% weightbearing max with the knee immobilizer.  And that salvage in the future will most likely be shortening and fusion permanently.  We plan on leaving this temporary fusion mass in 4 extended period of time and soft tissue healing is imperative to limb salvage.  Patient will get standard DVT prophylaxis.  Infectious disease will be reconsulted for most likely at least 6 weeks of IV antibiotics and possible extended p.o. antibiotics.  We did not give the patient any blood products during the surgery but we will check her in the recovery room.  Patient otherwise tolerated procedure well.  Complications:  None; patient tolerated the procedure well.    Disposition: PACU - hemodynamically stable.  Condition: stable         Additional Details: see above    Attending Attestation: I was present and scrubbed for the entire procedure.    Andrzej Whelan  Phone Number: [phone]
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
## 2024-08-08 NOTE — ANESTHESIA PROCEDURE NOTES
Airway  Date/Time: 8/8/2024 8:44 AM  Urgency: elective    Airway not difficult    Staffing  Performed: HERMAN   Authorized by: Deangelo Rogers MD    Performed by: HERMAN Chiu  Patient location during procedure: OR    Indications and Patient Condition  Indications for airway management: anesthesia  Spontaneous Ventilation: absent  Sedation level: deep  Preoxygenated: yes  Patient position: sniffing  MILS maintained throughout  Mask difficulty assessment: 1 - vent by mask  Planned trial extubation    Final Airway Details  Final airway type: endotracheal airway      Successful airway: ETT  Cuffed: yes   Successful intubation technique: direct laryngoscopy  Facilitating devices/methods: intubating stylet  Endotracheal tube insertion site: oral  Blade: Orion  Blade size: #3  ETT size (mm): 7.0  Cormack-Lehane Classification: grade I - full view of glottis  Placement verified by: chest auscultation   Measured from: lips  ETT to lips (cm): 22  Number of attempts at approach: 1  Ventilation between attempts: supraglottic airway

## 2024-08-08 NOTE — PROGRESS NOTES
"Orthopaedic Surgery Progress Note    Subjective: Evaluated in immediate postoperative period. Pain well controlled considering recent surgery. Denies chest pain, shortness of breath, or fevers.    Objective:  /62 (BP Location: Left arm, Patient Position: Lying)   Pulse 60   Temp 36.4 °C (97.5 °F) (Temporal)   Resp 14   Ht 1.778 m (5' 10\")   Wt 70.3 kg (155 lb)   SpO2 92%   BMI 22.24 kg/m²     Gen: arousable, NAD, appropriately conversational  Cardiac: RRR to peripheral palpation  Resp: nonlabored on RA  GI: soft, nondistended    MSK:  Right Lower Extremity:   -Prevena with knee immobilizer  -Tender at site of injury with painful ROM.  -Fires DF/PF/EHL/FHL  -SILT in saph/sural/SPN/DPN distributions  -Foot warm, well perfused  -Palpable DP pulse, brisk cap refill  -Compartments soft and compressible      Assessment/Plan: 68 y.o. female PMHx CAD, HTN, right TKA with infection s/p right knee I&D and revision rTKA with antibiotic spacer on 2024 with Dr. Whelan.      Plan:  - Weight bearin% as tolerated RLE  - DVT ppx: SCDs, per primary  - Diet: Regular  - Pain: Tylenol, oxycodone 5/10  - Antibiotics: vanc/meropenam per ID  - Dressing: Prevena to stay in place  - FEN: HLIV with good PO intake  - Bowel Regimen: Colace, senna, dulcolax  - PT/OT  - Pulm: Encourage IS  - Continue home medications  - damon in place in PACU    Dispo: Pending    Frank Andersen MD  Orthopaedic Surgery PGY-1  [facility]  Available by Epic Chat    While inpatient, this patient will be followed by the Orthopedic trauma team. See below for contact information.     This patient will be followed by Ortho Trauma team (All chat preferred):     1st call: Sam Meek, PGY-1  1st call: Po Andersen, PGY-1  2nd call: Michael Forman, PGY-2  3rd call: Keshawn Lebron, PGY-3      "
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
## 2024-08-09 ENCOUNTER — APPOINTMENT (OUTPATIENT)
Dept: RADIOLOGY | Facility: HOSPITAL | Age: 69
End: 2024-08-09
Payer: MEDICARE

## 2024-08-09 LAB
ALBUMIN SERPL BCP-MCNC: 3.3 G/DL (ref 3.4–5)
ANION GAP SERPL CALC-SCNC: 13 MMOL/L (ref 10–20)
ATRIAL RATE: 111 BPM
BASOPHILS # BLD AUTO: 0.01 X10*3/UL (ref 0–0.1)
BASOPHILS NFR BLD AUTO: 0.1 %
BUN SERPL-MCNC: 6 MG/DL (ref 6–23)
CALCIUM SERPL-MCNC: 8.9 MG/DL (ref 8.6–10.6)
CHLORIDE SERPL-SCNC: 100 MMOL/L (ref 98–107)
CO2 SERPL-SCNC: 27 MMOL/L (ref 21–32)
CREAT SERPL-MCNC: 0.46 MG/DL (ref 0.5–1.05)
CRP SERPL-MCNC: 13.78 MG/DL
EGFRCR SERPLBLD CKD-EPI 2021: >90 ML/MIN/1.73M*2
EOSINOPHIL # BLD AUTO: 0 X10*3/UL (ref 0–0.7)
EOSINOPHIL NFR BLD AUTO: 0 %
ERYTHROCYTE [DISTWIDTH] IN BLOOD BY AUTOMATED COUNT: 13.5 % (ref 11.5–14.5)
FUNGUS SPEC CULT: NORMAL
FUNGUS SPEC CULT: NORMAL
FUNGUS SPEC FUNGUS STN: NORMAL
FUNGUS SPEC FUNGUS STN: NORMAL
GLUCOSE SERPL-MCNC: 115 MG/DL (ref 74–99)
HCT VFR BLD AUTO: 28.1 % (ref 36–46)
HGB BLD-MCNC: 8.7 G/DL (ref 12–16)
IMM GRANULOCYTES # BLD AUTO: 0.05 X10*3/UL (ref 0–0.7)
IMM GRANULOCYTES NFR BLD AUTO: 0.5 % (ref 0–0.9)
LYMPHOCYTES # BLD AUTO: 0.76 X10*3/UL (ref 1.2–4.8)
LYMPHOCYTES NFR BLD AUTO: 7.3 %
MAGNESIUM SERPL-MCNC: 1.9 MG/DL (ref 1.6–2.4)
MCH RBC QN AUTO: 26.4 PG (ref 26–34)
MCHC RBC AUTO-ENTMCNC: 31 G/DL (ref 32–36)
MCV RBC AUTO: 85 FL (ref 80–100)
MONOCYTES # BLD AUTO: 0.95 X10*3/UL (ref 0.1–1)
MONOCYTES NFR BLD AUTO: 9.2 %
NEUTROPHILS # BLD AUTO: 8.6 X10*3/UL (ref 1.2–7.7)
NEUTROPHILS NFR BLD AUTO: 82.9 %
NRBC BLD-RTO: 0 /100 WBCS (ref 0–0)
P AXIS: 76 DEGREES
P OFFSET: 181 MS
P ONSET: 128 MS
PHOSPHATE SERPL-MCNC: 3.9 MG/DL (ref 2.5–4.9)
PLATELET # BLD AUTO: 299 X10*3/UL (ref 150–450)
POTASSIUM SERPL-SCNC: 4.4 MMOL/L (ref 3.5–5.3)
PR INTERVAL: 206 MS
Q ONSET: 231 MS
QRS COUNT: 18 BEATS
QRS DURATION: 64 MS
QT INTERVAL: 346 MS
QTC CALCULATION(BAZETT): 470 MS
QTC FREDERICIA: 425 MS
R AXIS: 91 DEGREES
RBC # BLD AUTO: 3.29 X10*6/UL (ref 4–5.2)
SODIUM SERPL-SCNC: 136 MMOL/L (ref 136–145)
T AXIS: 102 DEGREES
T OFFSET: 404 MS
VANCOMYCIN SERPL-MCNC: 10.2 UG/ML (ref 5–20)
VENTRICULAR RATE: 111 BPM
WBC # BLD AUTO: 10.4 X10*3/UL (ref 4.4–11.3)

## 2024-08-09 PROCEDURE — 97161 PT EVAL LOW COMPLEX 20 MIN: CPT | Mod: GP

## 2024-08-09 PROCEDURE — 36569 INSJ PICC 5 YR+ W/O IMAGING: CPT

## 2024-08-09 PROCEDURE — 97530 THERAPEUTIC ACTIVITIES: CPT | Mod: GO

## 2024-08-09 PROCEDURE — 85025 COMPLETE CBC W/AUTO DIFF WBC: CPT

## 2024-08-09 PROCEDURE — 2500000005 HC RX 250 GENERAL PHARMACY W/O HCPCS

## 2024-08-09 PROCEDURE — 2780000003 HC OR 278 NO HCPCS

## 2024-08-09 PROCEDURE — 86140 C-REACTIVE PROTEIN: CPT

## 2024-08-09 PROCEDURE — 36415 COLL VENOUS BLD VENIPUNCTURE: CPT

## 2024-08-09 PROCEDURE — 99231 SBSQ HOSP IP/OBS SF/LOW 25: CPT

## 2024-08-09 PROCEDURE — 2500000001 HC RX 250 WO HCPCS SELF ADMINISTERED DRUGS (ALT 637 FOR MEDICARE OP)

## 2024-08-09 PROCEDURE — 97165 OT EVAL LOW COMPLEX 30 MIN: CPT | Mod: GO

## 2024-08-09 PROCEDURE — 83735 ASSAY OF MAGNESIUM: CPT

## 2024-08-09 PROCEDURE — 80069 RENAL FUNCTION PANEL: CPT

## 2024-08-09 PROCEDURE — 2500000004 HC RX 250 GENERAL PHARMACY W/ HCPCS (ALT 636 FOR OP/ED)

## 2024-08-09 PROCEDURE — 99233 SBSQ HOSP IP/OBS HIGH 50: CPT | Performed by: INTERNAL MEDICINE

## 2024-08-09 PROCEDURE — 97535 SELF CARE MNGMENT TRAINING: CPT | Mod: GO

## 2024-08-09 PROCEDURE — C1751 CATH, INF, PER/CENT/MIDLINE: HCPCS

## 2024-08-09 PROCEDURE — 80202 ASSAY OF VANCOMYCIN: CPT

## 2024-08-09 PROCEDURE — 1200000002 HC GENERAL ROOM WITH TELEMETRY DAILY

## 2024-08-09 RX ORDER — OXYCODONE HYDROCHLORIDE 5 MG/1
10 TABLET ORAL EVERY 4 HOURS
Status: DISCONTINUED | OUTPATIENT
Start: 2024-08-09 | End: 2024-08-11

## 2024-08-09 RX ORDER — OXYCODONE HYDROCHLORIDE 5 MG/1
10 TABLET ORAL EVERY 6 HOURS
Status: DISCONTINUED | OUTPATIENT
Start: 2024-08-09 | End: 2024-08-09

## 2024-08-09 RX ORDER — HYDROMORPHONE HYDROCHLORIDE 1 MG/ML
0.4 INJECTION, SOLUTION INTRAMUSCULAR; INTRAVENOUS; SUBCUTANEOUS
Status: DISCONTINUED | OUTPATIENT
Start: 2024-08-09 | End: 2024-08-11

## 2024-08-09 RX ORDER — OXYCODONE HYDROCHLORIDE 5 MG/1
5 TABLET ORAL EVERY 6 HOURS
Status: DISCONTINUED | OUTPATIENT
Start: 2024-08-09 | End: 2024-08-09

## 2024-08-09 RX ORDER — KETOROLAC TROMETHAMINE 30 MG/ML
15 INJECTION, SOLUTION INTRAMUSCULAR; INTRAVENOUS EVERY 6 HOURS
Status: DISCONTINUED | OUTPATIENT
Start: 2024-08-09 | End: 2024-08-10

## 2024-08-09 RX ORDER — CYCLOBENZAPRINE HCL 10 MG
5 TABLET ORAL ONCE
Status: COMPLETED | OUTPATIENT
Start: 2024-08-09 | End: 2024-08-09

## 2024-08-09 RX ORDER — OXYCODONE HYDROCHLORIDE 5 MG/1
10 TABLET ORAL ONCE
Status: COMPLETED | OUTPATIENT
Start: 2024-08-09 | End: 2024-08-09

## 2024-08-09 RX ORDER — KETOROLAC TROMETHAMINE 30 MG/ML
15 INJECTION, SOLUTION INTRAMUSCULAR; INTRAVENOUS EVERY 6 HOURS PRN
Status: DISCONTINUED | OUTPATIENT
Start: 2024-08-09 | End: 2024-08-09

## 2024-08-09 RX ORDER — MORPHINE SULFATE IN 0.9 % NACL 30 MG/30ML
PATIENT CONTROLLED ANALGESIA SYRINGE INTRAVENOUS CONTINUOUS
Status: DISCONTINUED | OUTPATIENT
Start: 2024-08-09 | End: 2024-08-09

## 2024-08-09 RX ORDER — NALOXONE HYDROCHLORIDE 0.4 MG/ML
0.2 INJECTION, SOLUTION INTRAMUSCULAR; INTRAVENOUS; SUBCUTANEOUS AS NEEDED
Status: ACTIVE | OUTPATIENT
Start: 2024-08-09

## 2024-08-09 RX ORDER — HYDROMORPHONE HYDROCHLORIDE 1 MG/ML
0.4 INJECTION, SOLUTION INTRAMUSCULAR; INTRAVENOUS; SUBCUTANEOUS
Status: DISCONTINUED | OUTPATIENT
Start: 2024-08-09 | End: 2024-08-09

## 2024-08-09 RX ORDER — GABAPENTIN 100 MG/1
200 CAPSULE ORAL NIGHTLY
Status: DISPENSED | OUTPATIENT
Start: 2024-08-09

## 2024-08-09 RX ORDER — MAGNESIUM SULFATE 1 G/100ML
1 INJECTION INTRAVENOUS 3 TIMES DAILY
Status: DISPENSED | OUTPATIENT
Start: 2024-08-09 | End: 2024-08-10

## 2024-08-09 RX ORDER — CYCLOBENZAPRINE HCL 5 MG
7.5 TABLET ORAL NIGHTLY PRN
Status: DISPENSED | OUTPATIENT
Start: 2024-08-09

## 2024-08-09 RX ORDER — LIDOCAINE HYDROCHLORIDE 10 MG/ML
5 INJECTION, SOLUTION EPIDURAL; INFILTRATION; INTRACAUDAL; PERINEURAL ONCE
Status: DISPENSED | OUTPATIENT
Start: 2024-08-09

## 2024-08-09 ASSESSMENT — ACTIVITIES OF DAILY LIVING (ADL)
ADL_ASSISTANCE: INDEPENDENT
LACK_OF_TRANSPORTATION: NO
BATHING_ASSISTANCE: MODERATE
HOME_MANAGEMENT_TIME_ENTRY: 30

## 2024-08-09 ASSESSMENT — COGNITIVE AND FUNCTIONAL STATUS - GENERAL
TURNING FROM BACK TO SIDE WHILE IN FLAT BAD: A LITTLE
HELP NEEDED FOR BATHING: A LOT
CLIMB 3 TO 5 STEPS WITH RAILING: A LOT
DAILY ACTIVITIY SCORE: 16
STANDING UP FROM CHAIR USING ARMS: A LITTLE
MOBILITY SCORE: 11
MOVING FROM LYING ON BACK TO SITTING ON SIDE OF FLAT BED WITH BEDRAILS: A LITTLE
MOVING FROM LYING ON BACK TO SITTING ON SIDE OF FLAT BED WITH BEDRAILS: A LITTLE
DAILY ACTIVITIY SCORE: 19
STANDING UP FROM CHAIR USING ARMS: TOTAL
MOVING FROM LYING ON BACK TO SITTING ON SIDE OF FLAT BED WITH BEDRAILS: A LITTLE
TURNING FROM BACK TO SIDE WHILE IN FLAT BAD: A LITTLE
MOBILITY SCORE: 17
DRESSING REGULAR UPPER BODY CLOTHING: A LITTLE
MOBILITY SCORE: 10
TOILETING: A LOT
MOVING TO AND FROM BED TO CHAIR: A LOT
MOVING TO AND FROM BED TO CHAIR: A LITTLE
CLIMB 3 TO 5 STEPS WITH RAILING: TOTAL
WALKING IN HOSPITAL ROOM: TOTAL
TOILETING: A LITTLE
CLIMB 3 TO 5 STEPS WITH RAILING: TOTAL
MOVING TO AND FROM BED TO CHAIR: TOTAL
WALKING IN HOSPITAL ROOM: TOTAL
HELP NEEDED FOR BATHING: A LOT
TURNING FROM BACK TO SIDE WHILE IN FLAT BAD: A LITTLE
PERSONAL GROOMING: A LITTLE
STANDING UP FROM CHAIR USING ARMS: TOTAL
DRESSING REGULAR LOWER BODY CLOTHING: A LOT
DRESSING REGULAR LOWER BODY CLOTHING: A LOT
WALKING IN HOSPITAL ROOM: A LITTLE

## 2024-08-09 ASSESSMENT — PAIN SCALES - GENERAL
PAINLEVEL_OUTOF10: 8
PAINLEVEL_OUTOF10: 10 - WORST POSSIBLE PAIN
PAINLEVEL_OUTOF10: 8
PAINLEVEL_OUTOF10: 10 - WORST POSSIBLE PAIN
PAINLEVEL_OUTOF10: 8
PAINLEVEL_OUTOF10: 0 - NO PAIN
PAINLEVEL_OUTOF10: 10 - WORST POSSIBLE PAIN
PAINLEVEL_OUTOF10: 8

## 2024-08-09 ASSESSMENT — PAIN DESCRIPTION - LOCATION
LOCATION: ANKLE
LOCATION: ANKLE

## 2024-08-09 ASSESSMENT — PAIN - FUNCTIONAL ASSESSMENT
PAIN_FUNCTIONAL_ASSESSMENT: 0-10

## 2024-08-09 ASSESSMENT — PAIN SCALES - WONG BAKER
WONGBAKER_NUMERICALRESPONSE: NO HURT
WONGBAKER_NUMERICALRESPONSE: NO HURT
WONGBAKER_NUMERICALRESPONSE: HURTS EVEN MORE
WONGBAKER_NUMERICALRESPONSE: NO HURT

## 2024-08-09 ASSESSMENT — PAIN SCALES - PAIN ASSESSMENT IN ADVANCED DEMENTIA (PAINAD): TOTALSCORE: RELAXATION TECHNIQUE

## 2024-08-09 ASSESSMENT — PAIN DESCRIPTION - DESCRIPTORS
DESCRIPTORS: ACHING;DISCOMFORT;SHOOTING
DESCRIPTORS: ACHING;SHARP;SHOOTING
DESCRIPTORS: SHARP;SHOOTING;STABBING

## 2024-08-09 ASSESSMENT — PAIN DESCRIPTION - ORIENTATION: ORIENTATION: RIGHT

## 2024-08-09 NOTE — PROGRESS NOTES
Occupational Therapy    Evaluation/Treatment    Patient Name: Adriana Wood  MRN: [id number]  : 1955  Today's Date: 24  Time Calculation  Start Time: 819  Stop Time: 921  Time Calculation (min): 62 min       Assessment:  Evaluation/Treatment Tolerance: Patient limited by pain  End of Session Communication: Bedside nurse  End of Session Patient Position: Bed, 2 rail up, Alarm off, not on at start of session  OT Assessment Results: Decreased ADL status, Decreased endurance, Decreased functional mobility, Decreased IADLs, Decreased sensation  Evaluation/Treatment Tolerance: Patient limited by pain    Plan:  Treatment Interventions: ADL retraining, Functional transfer training, UE strengthening/ROM, Endurance training, Patient/family training, Equipment evaluation/education, Compensatory technique education  OT Frequency: 3 times per week  OT Discharge Recommendations: Moderate intensity level of continued care    Subjective   General:   OT Received On: 24  General  Reason for Referral: s/p right knee I&D and revision rTKA with antibiotic spacer on 2024 with Dr. Whelan  Past Medical History Relevant to Rehab: CAD, HTN, right TKA with infection  Prior to Session Communication: Bedside nurse  Patient Position Received: Bed, 2 rail up, Alarm off, not on at start of session  General Comment: Pt supine in bed upon arrival, willing to participate in therapy; limited by significant /continuous pain in RLE this date, however, still engaged in OT session.    Precautions:  LE Weight Bearing Status:  (RLE 50% weight-bearing)  Medical Precautions: Fall precautions    Vital Signs:  Heart Rate:  ()    Pain:  Pain Assessment  Pain Assessment: 0-10  0-10 (Numeric) Pain Score: 10 - Worst possible pain  Pain Type: Surgical pain  Pain Location: Leg  Pain Orientation: Right  Pain Frequency: Constant/continuous  Pain Interventions: Repositioned, Emotional support, Therapeutic presence    Objective    Cognition:  Overall Cognitive Status: Within Functional Limits  Arousal/Alertness: Appropriate responses to stimuli  Orientation Level: Oriented X4  Following Commands: Follows multistep commands consistently  Attention:  (Mild decreased sustained attention -- requires intermittent VCs for sustained attention to conversation /task)  Processing Speed: Delayed (mild)    Home Living:  Type of Home: House  Lives With: Spouse (; +2 dogs; dtr and son-in-law to stay temporarily)  Home Adaptive Equipment: Walker rolling or standard, Wheelchair-manual, Scooter  Home Layout: Able to live on main level with bedroom/bathroom, Laundry main level, Full bath main level (pt reports increased difficulty accessing bathroom d/t narrow doorway +WC -- has been utilizing BSC and sponge bathing)  Home Access: Ramped entrance  Bathroom Shower/Tub: Tub/shower unit  Bathroom Toilet: Standard  Bathroom Equipment: Shower chair with back, Bedside commode    Prior Function:  Hand Dominance: Right  Prior Function Comments: INDEP for UB dressing and toileting; intermittent assist for bathing and LB dressing; WC in house and scooter in community for func mob    IADL History:  Current License:  ((-) drive)  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  Leisure and Hobbies: Enjoys spending time with family and drawing    ADL:  Eating Assistance: Independent (anticipate)  Grooming Assistance: Minimal (UB grooming, oral hygiene, and hair care while seated EOB (75% with RLE supported on bed and LLE dangling); assist for thoroughness and grooming back)  Bathing Assistance: Moderate (anticipate)  UE Dressing Assistance: Stand by (don /doff hosp gown)  LE Dressing Assistance: Maximal (don /doff B socks)  Toileting Assistance with Device: Moderate (anticipate)    Bed Mobility/Transfers:   Bed Mobility 1  Bed Mobility 1: Supine to sitting, Sitting to supine  Level of Assistance 1: Minimum assistance  Bed Mobility Comments 1: 75% EOB sitting (LLE  dangling and RLE supported on bed d/t increased pain); HOB slightly elevated and assist at BLEs /trunk    Bed Mobility 2  Bed Mobility  2: Scooting  Level of Assistance 2: Contact guard  Bed Mobility Comments 2: BUE support on bed    Bed Mobility 3  Bed Mobility 3:  (Boost towards HOB)  Level of Assistance 3: Minimum assistance  Bed Mobility Comments 3: HOB flat; pt use of LLE to bridge and boost    Transfers  Transfer:  (Deferred this date despite encouragement d/t increased pain in RLE)    Therapy/Activity:   Therapeutic Activity  Therapeutic Activity 1: Pt completes EOB sitting ~15 minutes with SBA-CGA to address func mob, activity tolerance, pulmonary hygiene, and BADLs    Therapeutic Activity 2: OT facilitates skilled empathetic discussion and active listening 2/2 pt endorsing frustration with hosp course, pain, and overall CLOF; pt highly appreciative and receptive towards positive coping skills and positive self-talk -- appreciative towards OT services this date.    Therapeutic Activity 3: Extra time delegated towards optimal positioning to mitigate pain -- pt endorses comfort while supine at end of session    Vision:  Vision - Basic Assessment  Current Vision: No visual deficits    Sensation:  Sensation Comment: N/T in RLE likely d/t nerve block; denies additional areas of deficits    Strength:  Strength Comments: BUE grossly 4/5    Outcome Measures:   [facility] Daily Activity  Putting on and taking off regular lower body clothing: A lot  Bathing (including washing, rinsing, drying): A lot  Putting on and taking off regular upper body clothing: A little  Toileting, which includes using toilet, bedpan or urinal: A lot  Taking care of personal grooming such as brushing teeth: A little  Eating Meals: None  Daily Activity - Total Score: 16    OT Adult Other Outcome Measures  4AT: Negative    Education Documentation  Body Mechanics, taught by Deyanira Ferro OT at 8/9/2024 10:00 AM.  Learner: Patient  Readiness:  Acceptance  Method: Explanation  Response: Verbalizes Understanding    Precautions, taught by Deyanira Ferro OT at 8/9/2024 10:00 AM.  Learner: Patient  Readiness: Acceptance  Method: Explanation  Response: Verbalizes Understanding    ADL Training, taught by Deyanira Ferro OT at 8/9/2024 10:00 AM.  Learner: Patient  Readiness: Acceptance  Method: Explanation  Response: Verbalizes Understanding    Education Comments  No comments found.      Goals:  Encounter Problems       Encounter Problems (Active)       ADLs       Pt will complete UB /LB bathing tasks with contact guard level of assistance while seated and AE as needed.  (Progressing)       Start:  08/09/24    Expected End:  08/23/24            Pt will complete LB dressing with contact guard level of assistance while seated and/or standing and AE as needed.  (Progressing)       Start:  08/09/24    Expected End:  08/23/24            Pt will complete toilet hygiene while seated /standing with contact guard level of assistance.   (Progressing)       Start:  08/09/24    Expected End:  08/23/24               TRANSFERS       Pt will complete supine <> sitting with supervision level of assistance to maximize function and safety in preparation for I/ADLs.   (Progressing)       Start:  08/09/24    Expected End:  08/23/24            Pt will complete functional transfers with contact guard level of assistance and LRAD.   (Progressing)       Start:  08/09/24    Expected End:  08/23/24                   ---------------  Deyanira Ferro (OTR/L, OTD)  Inpatient Occupational Therapist   Rehab Office: [phone]
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
## 2024-08-09 NOTE — NURSING NOTE
8/8/24 1254  IV Team asked to place PIV. Able to place 22 G to left forearm but unable to advance cannula completely. IV does have a brisk blood return. Pt is scheduled to receive a Midline this afternoon as she will need LT antibiotics.

## 2024-08-09 NOTE — PROGRESS NOTES
Vancomycin Dosing by Pharmacy- FOLLOW UP    Adriana Wood is a 68 y.o. year old female who Pharmacy has been consulted for vancomycin dosing for other prosthetic joint infection . Based on the patient's indication and renal status this patient is being dosed based on a goal AUC of 400-600.     Renal function is currently stable.    Current vancomycin dose: 1250 mg given every 12 hours    Estimated vancomycin AUC on current dose: 511 mg/L.hr     Visit Vitals  /60 (BP Location: Left arm)   Pulse 73   Temp 36.6 °C (97.9 °F) (Temporal)   Resp 16        Lab Results   Component Value Date    CREATININE 0.46 (L) 2024    CREATININE 0.47 (L) 2024    CREATININE 0.47 (L) 2024    CREATININE 0.60 2024        Patient weight is as follows:   Vitals:    24 1318   Weight: 70.3 kg (155 lb)       Cultures:  Susceptibility data for the encounter in last 14 days.  Collected Specimen Info Organism   24 Swab from ABSCESS Staphylococcus aureus        I/O last 3 completed shifts:  In: 3450 (49.1 mL/kg) [I.V.:400 (5.7 mL/kg); IV Piggyback:3050]  Out: 4175 (59.4 mL/kg) [Urine:3575 (1.4 mL/kg/hr); Blood:600]  Weight: 70.3 kg   I/O during current shift:  No intake/output data recorded.    Temp (24hrs), Av.4 °C (97.5 °F), Min:35.8 °C (96.4 °F), Max:36.6 °C (97.9 °F)      Assessment/Plan    Within goal AUC range. Continue current vancomycin regimen.    This dosing regimen is predicted by [employer] to result in the following pharmacokinetic parameters:  Loading dose: N/A  Regimen: 1250 mg IV every 12 hours.  Start time: 11:26 on 2024  Exposure target: AUC24 (range)400-600 mg/L.hr   AUC24,ss: 511 mg/L.hr  Probability of AUC24 > 400: 92 %  Ctrough,ss: 14.6 mg/L  Probability of Ctrough,ss > 20: 12 %  Probability of nephrotoxicity (Lodise JOSE LUIS ): 10 %      The next level will be obtained on 8/12 at Am labs. May be obtained sooner if clinically indicated.   Will continue to monitor renal  function daily while on vancomycin and order serum creatinine at least every 48 hours if not already ordered.  Follow for continued vancomycin needs, clinical response, and signs/symptoms of toxicity.       Acosta Bright, PharmD

## 2024-08-09 NOTE — PROGRESS NOTES
Recommendations:     - IV Mg 1g Q8H x 3 doses  - IV Toradol 30mg Q6H for 3 doses per surgical service  - IV Tylenol 1000mg Q8H, time 3 hours between tylenol and toradol  - PO Robaxin 1000mg TID  - Lidocaine patches around affected site as needed  - Gabapentin 300mg at bedtime  - Oxycodone 5/10mg Q4H for mod/severe pain  - Dilaudid 0.2mg Q4H for breakthrough pain  - Continuous pulse ox    Acute Pain Team  pg [phone] ph [phone]

## 2024-08-09 NOTE — PROGRESS NOTES
"Orthopaedic Surgery Progress Note    Subjective: NAEON. Pain well controlled considering recent surgery. Denies chest pain, shortness of breath, or fevers.    Objective:  /60 (BP Location: Left arm)   Pulse 73   Temp 36.6 °C (97.9 °F) (Temporal)   Resp 16   Ht 1.778 m (5' 10\")   Wt 70.3 kg (155 lb)   SpO2 94%   BMI 22.24 kg/m²     Gen: arousable, NAD, appropriately conversational  Cardiac: RRR to peripheral palpation  Resp: nonlabored on RA    MSK:  Right Lower Extremity:   -Prevena with knee immobilizer  -Tender at site of injury with painful ROM.  -Fires DF/PF/EHL/FHL  -SILT in saph/sural. Diminished SPN/DPN distributions which is her baseline (hx peroneal nerve injury)  -Foot warm, well perfused  -Palpable DP pulse, brisk cap refill  -Compartments soft and compressible      Assessment/Plan: 68 y.o. female PMHx CAD, HTN, right TKA with infection s/p right knee I&D and revision rTKA with antibiotic spacer on 2024 with Dr. Whelan.      Plan:  - Weight bearin% as tolerated RLE  - DVT ppx: SCDs, per primary  - Diet: No restrictions from orthopaedic standpoint  - Pain: Per primary  - Antibiotics: vanc/meropenam per ID   -Follow up cultures  - Dressing: Prevena to stay in place  - FEN: HLIV with good PO intake  - Bowel Regimen: Per primary  - PT/OT  - Pulm: Encourage IS  - Continue home medications    Dispo: Pending PT/OT, infectious workup    Frank Andersen MD  Orthopaedic Surgery PGY-1  [facility]  Available by Epic Chat    While inpatient, this patient will be followed by the Orthopedic trauma team. See below for contact information.     This patient will be followed by Ortho Trauma team (All chat preferred):     1st call: Sam Meek, PGY-1  1st call: Po Andersen, PGY-1  2nd call: Michael Forman, PGY-2  3rd call: Keshawn Lebron, PGY-3      "

## 2024-08-09 NOTE — PROGRESS NOTES
Social Work note:    PEREZ saw the patient on this date.  ELDABELLA spoke with her about SNF.  Patient stated that she is interested in going home with IV atb and said her  and daughter could help.  She explained shed most likely need a catheter at home since her family would not be able to lift her onto the toilet.  LISW reported understanding.  She explained that if she had to go to a SNF she would only go to [facility] ([place], [place]).  Patient reported that she mainly uses a wheelchair at home.  PEREZ let TCC know.  PEREZ will continue to follow    CAITLIN Conway, PEREZ-S
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
## 2024-08-09 NOTE — PROGRESS NOTES
08/09/24 0800   Discharge Planning   Living Arrangements Spouse/significant other   Support Systems Spouse/significant other;Children;Family members   Assistance Needed Daughter and spouse   Type of Residence Private residence   Number of Stairs to Enter Residence 3   Number of Stairs Within Residence 0   Do you have animals or pets at home? Yes   Type of Animals or Pets two energetic dogs   Who is requesting discharge planning? Provider   Home or Post Acute Services In home services   Type of Home Care Services Home health aide   Expected Discharge Disposition  Services   Does the patient need discharge transport arranged? No  (Will need ride if patient chooses SNF.)   Financial Resource Strain   How hard is it for you to pay for the very basics like food, housing, medical care, and heating? Not hard   Housing Stability   In the last 12 months, was there a time when you were not able to pay the mortgage or rent on time? N   In the past 12 months, how many times have you moved where you were living? 0   At any time in the past 12 months, were you homeless or living in a shelter (including now)? N   Transportation Needs   In the past 12 months, has lack of transportation kept you from medical appointments or from getting medications? no   In the past 12 months, has lack of transportation kept you from meetings, work, or from getting things needed for daily living? No     Assessment Note:  Met with patient and Introduced myself as care coordinator and member of the Care Transitions team for discharge planning. Pt feels safe at home.   Transportation: Patient has ride home unless going to SNF then will need ride.  Pharmacy: [facility] in [place].  DME: Uses wheelchair and transport chair at home.  Previous home care: Yes through the state she uses non-medical personal assistants 35 hours a week through [facility].  Falls: No  PCP: [facility]  Dialysis: No    Patient was given list and has chosen [facility] [address] and they have accepted her, but patient would like some PT/OT here first to see if she can get stronger and go home, she is just afraid family will not be able to transport her on toilet or bedside commode and would like to keep damon in or at least catheter if going home. If patient goes home daughter and  can be teachable caregivers for IV antibiotics.

## 2024-08-09 NOTE — PROGRESS NOTES
Adriana Wood is a 68 y.o. female on day 2 of admission presenting with Prosthetic joint infection, subsequent encounter.    Transitional Care Coordination Progress Note:  Patient discussed during interdisciplinary rounds.   Team members present: MD and TCC  Plan per Medical/Surgical team: Patient received new prosthetic yesterday, due to old one having infection. Waiting on ID for long term IV antibiotics. PT/OT to evaluate.  Payor: Medicare  Discharge disposition: Waiting on PT/OT recommendations, but may need SNF for long term IV antibiotics also.  Potential Barriers: None  ADOD: 08/12/24    NICHOLE WRIGHT RN

## 2024-08-09 NOTE — PROGRESS NOTES
Physical Therapy    Physical Therapy Evaluation    Patient Name: Adriana Wood  MRN: [id number]  Today's Date: 8/9/2024   Time Calculation  Start Time: 0931  Stop Time: 1010  Time Calculation (min): 39 min    Assessment/Plan   PT Assessment  PT Assessment Results: Decreased strength, Decreased range of motion, Decreased endurance, Impaired balance, Decreased mobility, Orthopedic restrictions, Pain  Rehab Prognosis: Good  Barriers to Discharge: none  Evaluation/Treatment Tolerance: Patient limited by fatigue, Patient limited by pain  Medical Staff Made Aware: Yes  Strengths: Attitude of self, Coping skills  Barriers to Participation: Comorbidities  End of Session Communication: Bedside nurse  Assessment Comment: The pt demonstrated unsafe bed mobility due to  R LE intolerable pain.  End of Session Patient Position: Bed, 3 rail up, Alarm off, not on at start of session  IP OR SWING BED PT PLAN  Inpatient or Swing Bed: Inpatient  PT Plan  Treatment/Interventions: Bed mobility, Transfer training, Gait training, Balance training, Strengthening, Endurance training, Therapeutic exercise, Therapeutic activity, Home exercise program  PT Plan: Ongoing PT  PT Frequency: 3 times per week  PT Discharge Recommendations: Moderate intensity level of continued care  Equipment Recommended upon Discharge:  (none)  PT Recommended Transfer Status: Assist x1  PT - OK to Discharge: Yes      Subjective   General Visit Information:  General  Reason for Referral: E. Coli infection causing right TKA infection and wound dehiscence s/p TKA revision with antibiotic spacer  Past Medical History Relevant to Rehab: CAD, HTN, right TKA with infection s/p right knee I&D and revision rTKA with antibiotic spacer on 8/8/2024, bradycardia, cardiomyopathy, cardiac arrest due to electrolyte abnormalities, traumatic brain injury  Prior to Session Communication: Bedside nurse  Patient Position Received: Bed, 3 rail up, Alarm off, not on at start of  session  Preferred Learning Style: verbal, visual, written  General Comment: The pt was pleasant, cooperative and willing to participate in therapy.  Home Living:  Home Living  Type of Home: House  Lives With: Spouse  Home Adaptive Equipment: Walker rolling or standard, Cane, Wheelchair-manual  Home Layout: Laundry in basement, One level  Home Access: Ramped entrance  Bathroom Shower/Tub: Tub/shower unit  Bathroom Toilet: Standard  Bathroom Equipment: Shower chair with back, Grab bars in shower  Prior Level of Function:  Prior Function Per Pt/Caregiver Report  Level of [place]: Independent with ADLs and functional transfers, Independent with homemaking with ambulation (Prior to injury.)  Receives Help From: Family  ADL Assistance: Independent (Prior to injury.)  Homemaking Assistance: Independent (Prior to injury.)  Ambulatory Assistance: Independent (Prior to injury.)  Vocational: Retired  Leisure: Draw  Hand Dominance: Right  Prior Function Comments: The pt was independent with all mobility without an assistive device in the household and in the community prior to injury.  Precautions:  Precautions  Hearing/Visual Limitations: Hearing and vision WFL  LE Weight Bearing Status: Right Partial Weight Bearing (50%)  Medical Precautions: Fall precautions  Braces Applied: Right knee immobilizer  Precautions Comment: The pt in compliance with precautions throughout PT session.     Objective   Pain:  Pain Assessment  Pain Assessment: 0-10  0-10 (Numeric) Pain Score: 10 - Worst possible pain  Pain Type: Acute pain, Surgical pain  Pain Location: Leg  Pain Orientation: Right  Pain Descriptors: Sharp, Shooting, Stabbing  Pain Frequency: Constant/continuous  Pain Onset: Ongoing  Clinical Progression: Gradually worsening  Pain Interventions: Therapeutic presence  Response to Interventions: Pain increased  Cognition:  Cognition  Overall Cognitive Status: Within Functional Limits  Arousal/Alertness: Appropriate responses to  stimuli  Orientation Level: Oriented X4    General Assessments:  General Observation  General Observation: The pt is severely pain-limited and unable to perform sit to stand to sit transfers at this time.     Activity Tolerance  Endurance: Decreased tolerance for upright activites    Sensation  Light Touch: No apparent deficits    Strength  Strength Comments: R LE decreased strength.  Strength  Strength Comments: R LE decreased strength.    Coordination  Movements are Fluid and Coordinated: Yes  Coordination Comment: WFL    Postural Control  Postural Control: Impaired  Posture Comment: Pt presented with impaired sitting posture due to intolerable R LE pain.    Static Sitting Balance  Static Sitting-Balance Support: Bilateral upper extremity supported, Feet supported  Static Sitting-Level of Assistance: Minimum assistance  Dynamic Sitting Balance  Dynamic Sitting-Balance Support: Bilateral upper extremity supported, Feet supported  Dynamic Sitting-Comments: Minimal assistance    Static Standing Balance  Static Standing-Balance Support:  (Not assessed due to intolerable pain.)  Functional Assessments:  Bed Mobility  Bed Mobility: Yes  Bed Mobility 1  Bed Mobility 1: Supine to sitting  Level of Assistance 1: Minimum assistance  Bed Mobility Comments 1: HOB elevated with partial transfer limited by R LE intolerable pain.  Bed Mobility 2  Bed Mobility  2: Sitting to supine  Level of Assistance 2: Minimum assistance  Bed Mobility Comments 2: HOB elevated    Transfers  Transfer: No    Ambulation/Gait Training  Ambulation/Gait Training Performed: No    Stairs  Stairs: No  Extremity/Trunk Assessments:  Cervical Spine   Cervical Spine: Within Functional Limits  Lumbar Spine   Lumbar Spine : Within Functional Limits    RUE   RUE : Within Functional Limits  LUE   LUE: Within Functional Limits  RLE   RLE : Exceptions to WFL  AROM RLE (degrees)  RLE AROM Comment: Hip decreased flexion, knee immobilized, and ankle decreased  ROM  Strength RLE  R Hip Flexion: 3-/5  R Knee Flexion: 1/5  R Knee Extension: 1/5  R Ankle Dorsiflexion: 3-/5  R Ankle Plantar Flexion: 3-/5  LLE   LLE : Within Functional Limits  Outcome Measures:  [facility] Basic Mobility  Turning from your back to your side while in a flat bed without using bedrails: A little  Moving from lying on your back to sitting on the side of a flat bed without using bedrails: A little  Moving to and from bed to chair (including a wheelchair): Total  Standing up from a chair using your arms (e.g. wheelchair or bedside chair): Total  To walk in hospital room: Total  Climbing 3-5 steps with railing: Total  Basic Mobility - Total Score: 10    Encounter Problems       Encounter Problems (Active)       Balance       STG - Maintains SBA dynamic standing balance with upper extremity support using a wheeled walker. (Progressing)       Start:  08/09/24    Expected End:  08/23/24            STG - Maintains SBA static standing balance with upper extremity support using a wheeled walker. (Progressing)       Start:  08/09/24    Expected End:  08/23/24               Mobility       STG - Patient will ambulate 580ft with SBA using a wheeled walker. (Progressing)       Start:  08/09/24    Expected End:  08/23/24               PT Transfers       STG - Transfer from bed to chair with SBA using a wheeled walker. (Progressing)       Start:  08/09/24    Expected End:  08/23/24            STG - Patient to transfer to and from sit to supine with SBA. (Progressing)       Start:  08/09/24    Expected End:  08/23/24            STG - Patient will transfer sit to and from stand with SBA using a wheeled walker. (Progressing)       Start:  08/09/24    Expected End:  08/23/24                   Education Documentation  Precautions, taught by Beltran Meade, PT at 8/9/2024 10:41 AM.  Learner: Patient  Readiness: Acceptance  Method: Explanation, Demonstration  Response: Verbalizes Understanding, Demonstrated  Understanding    Body Mechanics, taught by Beltran Meade, PT at 8/9/2024 10:41 AM.  Learner: Patient  Readiness: Acceptance  Method: Explanation, Demonstration  Response: Verbalizes Understanding, Demonstrated Understanding    Home Exercise Program, taught by Beltran Meade, PT at 8/9/2024 10:41 AM.  Learner: Patient  Readiness: Acceptance  Method: Explanation, Demonstration  Response: Verbalizes Understanding, Demonstrated Understanding    Mobility Training, taught by Beltran Meade, PT at 8/9/2024 10:41 AM.  Learner: Patient  Readiness: Acceptance  Method: Explanation, Demonstration  Response: Verbalizes Understanding, Demonstrated Understanding    Education Comments  No comments found.

## 2024-08-09 NOTE — PROGRESS NOTES
Postop Pain HPI -   Palliative: relieved with IV analgesics and regional local anesthetics  Provocative: movement  Quality:  burning and aching  Radiation:  none  Severity:  9/10  Timing: constant    24-HOUR OPIOID CONSUMPTION:  Oxycodone 5mg x 3    Scheduled medications  acetaminophen, 975 mg, oral, TID  enoxaparin, 40 mg, subcutaneous, q24h  gabapentin, 200 mg, oral, Nightly  magnesium sulfate, 1 g, intravenous, TID  meropenem, 1 g, intravenous, q8h  oxyCODONE, 10 mg, oral, q6h  vancomycin, 1,250 mg, intravenous, q12h      Continuous medications     PRN medications  PRN medications: albuterol, benzocaine-menthol, cyclobenzaprine, HYDROmorphone, ketorolac, naloxone, vancomycin     Physical Exam:  Constitutional:  no distress, alert and cooperative  Eyes: clear sclera  Head/Neck: No apparent injury, trachea midline  Respiratory/Thorax: Patent airways, thorax symmetric, breathing comfortably  Cardiovascular: no pitting edema  Gastrointestinal: Nondistended  Musculoskeletal: ROM intact  Extremities: no clubbing  Neurological: alert, campuzano x4  Psychological: Appropriate affect    Results for orders placed or performed during the hospital encounter of 08/07/24 (from the past 24 hour(s))   CBC   Result Value Ref Range    WBC 7.5 4.4 - 11.3 x10*3/uL    nRBC 0.0 0.0 - 0.0 /100 WBCs    RBC 3.20 (L) 4.00 - 5.20 x10*6/uL    Hemoglobin 8.6 (L) 12.0 - 16.0 g/dL    Hematocrit 26.0 (L) 36.0 - 46.0 %    MCV 81 80 - 100 fL    MCH 26.9 26.0 - 34.0 pg    MCHC 33.1 32.0 - 36.0 g/dL    RDW 13.6 11.5 - 14.5 %    Platelets 301 150 - 450 x10*3/uL   POCT GLUCOSE   Result Value Ref Range    POCT Glucose 131 (H) 74 - 99 mg/dL   CBC and Auto Differential   Result Value Ref Range    WBC 10.4 4.4 - 11.3 x10*3/uL    nRBC 0.0 0.0 - 0.0 /100 WBCs    RBC 3.29 (L) 4.00 - 5.20 x10*6/uL    Hemoglobin 8.7 (L) 12.0 - 16.0 g/dL    Hematocrit 28.1 (L) 36.0 - 46.0 %    MCV 85 80 - 100 fL    MCH 26.4 26.0 - 34.0 pg    MCHC 31.0 (L) 32.0 - 36.0 g/dL    RDW  13.5 11.5 - 14.5 %    Platelets 299 150 - 450 x10*3/uL    Neutrophils % 82.9 40.0 - 80.0 %    Immature Granulocytes %, Automated 0.5 0.0 - 0.9 %    Lymphocytes % 7.3 13.0 - 44.0 %    Monocytes % 9.2 2.0 - 10.0 %    Eosinophils % 0.0 0.0 - 6.0 %    Basophils % 0.1 0.0 - 2.0 %    Neutrophils Absolute 8.60 (H) 1.20 - 7.70 x10*3/uL    Immature Granulocytes Absolute, Automated 0.05 0.00 - 0.70 x10*3/uL    Lymphocytes Absolute 0.76 (L) 1.20 - 4.80 x10*3/uL    Monocytes Absolute 0.95 0.10 - 1.00 x10*3/uL    Eosinophils Absolute 0.00 0.00 - 0.70 x10*3/uL    Basophils Absolute 0.01 0.00 - 0.10 x10*3/uL   Renal Function Panel   Result Value Ref Range    Glucose 115 (H) 74 - 99 mg/dL    Sodium 136 136 - 145 mmol/L    Potassium 4.4 3.5 - 5.3 mmol/L    Chloride 100 98 - 107 mmol/L    Bicarbonate 27 21 - 32 mmol/L    Anion Gap 13 10 - 20 mmol/L    Urea Nitrogen 6 6 - 23 mg/dL    Creatinine 0.46 (L) 0.50 - 1.05 mg/dL    eGFR >90 >60 mL/min/1.73m*2    Calcium 8.9 8.6 - 10.6 mg/dL    Phosphorus 3.9 2.5 - 4.9 mg/dL    Albumin 3.3 (L) 3.4 - 5.0 g/dL   Magnesium   Result Value Ref Range    Magnesium 1.90 1.60 - 2.40 mg/dL   C-reactive protein   Result Value Ref Range    C-Reactive Protein 13.78 (H) <1.00 mg/dL   Vancomycin   Result Value Ref Range    Vancomycin 10.2 5.0 - 20.0 ug/mL        Adriana Wood is a 68 y.o. year old female patient who presents for R Arthroplasty Total Knee EXPLAN TNA HARDWARE AND ANTIBIOTIC SPACER  with Dr. Whelan. Acute Pain consulted for block for postoperative pain control.     Plan:     - R Adductor Canal single shot block performed postoperatively on 08/08/24   -Acute Pain Service will sign off at this time. Please let us know if any further questions or concerns.   - Pain medications per primary team     Acute Pain Team  pg [phone] ph [phone].

## 2024-08-10 LAB
ALBUMIN SERPL BCP-MCNC: 3 G/DL (ref 3.4–5)
ANION GAP SERPL CALC-SCNC: 10 MMOL/L (ref 10–20)
BACTERIA SPEC CULT: ABNORMAL
BACTERIA SPEC CULT: ABNORMAL
BASOPHILS # BLD AUTO: 0.02 X10*3/UL (ref 0–0.1)
BASOPHILS NFR BLD AUTO: 0.2 %
BUN SERPL-MCNC: 10 MG/DL (ref 6–23)
CALCIUM SERPL-MCNC: 8.5 MG/DL (ref 8.6–10.6)
CHLORIDE SERPL-SCNC: 101 MMOL/L (ref 98–107)
CO2 SERPL-SCNC: 30 MMOL/L (ref 21–32)
CREAT SERPL-MCNC: 0.53 MG/DL (ref 0.5–1.05)
EGFRCR SERPLBLD CKD-EPI 2021: >90 ML/MIN/1.73M*2
EOSINOPHIL # BLD AUTO: 0.07 X10*3/UL (ref 0–0.7)
EOSINOPHIL NFR BLD AUTO: 0.9 %
ERYTHROCYTE [DISTWIDTH] IN BLOOD BY AUTOMATED COUNT: 13.7 % (ref 11.5–14.5)
GLUCOSE BLD MANUAL STRIP-MCNC: 95 MG/DL (ref 74–99)
GLUCOSE SERPL-MCNC: 93 MG/DL (ref 74–99)
GRAM STN SPEC: ABNORMAL
HCT VFR BLD AUTO: 25.3 % (ref 36–46)
HGB BLD-MCNC: 8.1 G/DL (ref 12–16)
IMM GRANULOCYTES # BLD AUTO: 0.05 X10*3/UL (ref 0–0.7)
IMM GRANULOCYTES NFR BLD AUTO: 0.6 % (ref 0–0.9)
LYMPHOCYTES # BLD AUTO: 1.62 X10*3/UL (ref 1.2–4.8)
LYMPHOCYTES NFR BLD AUTO: 19.8 %
MAGNESIUM SERPL-MCNC: 2.09 MG/DL (ref 1.6–2.4)
MCH RBC QN AUTO: 26.5 PG (ref 26–34)
MCHC RBC AUTO-ENTMCNC: 32 G/DL (ref 32–36)
MCV RBC AUTO: 83 FL (ref 80–100)
MONOCYTES # BLD AUTO: 0.71 X10*3/UL (ref 0.1–1)
MONOCYTES NFR BLD AUTO: 8.7 %
NEUTROPHILS # BLD AUTO: 5.72 X10*3/UL (ref 1.2–7.7)
NEUTROPHILS NFR BLD AUTO: 69.8 %
NRBC BLD-RTO: 0 /100 WBCS (ref 0–0)
PHOSPHATE SERPL-MCNC: 3.5 MG/DL (ref 2.5–4.9)
PLATELET # BLD AUTO: 288 X10*3/UL (ref 150–450)
POTASSIUM SERPL-SCNC: 4.9 MMOL/L (ref 3.5–5.3)
RBC # BLD AUTO: 3.06 X10*6/UL (ref 4–5.2)
SODIUM SERPL-SCNC: 136 MMOL/L (ref 136–145)
WBC # BLD AUTO: 8.2 X10*3/UL (ref 4.4–11.3)

## 2024-08-10 PROCEDURE — 80069 RENAL FUNCTION PANEL: CPT

## 2024-08-10 PROCEDURE — 2500000004 HC RX 250 GENERAL PHARMACY W/ HCPCS (ALT 636 FOR OP/ED)

## 2024-08-10 PROCEDURE — 85025 COMPLETE CBC W/AUTO DIFF WBC: CPT

## 2024-08-10 PROCEDURE — 2500000005 HC RX 250 GENERAL PHARMACY W/O HCPCS

## 2024-08-10 PROCEDURE — 2500000001 HC RX 250 WO HCPCS SELF ADMINISTERED DRUGS (ALT 637 FOR MEDICARE OP)

## 2024-08-10 PROCEDURE — 99233 SBSQ HOSP IP/OBS HIGH 50: CPT | Performed by: INTERNAL MEDICINE

## 2024-08-10 PROCEDURE — 36415 COLL VENOUS BLD VENIPUNCTURE: CPT

## 2024-08-10 PROCEDURE — 83735 ASSAY OF MAGNESIUM: CPT

## 2024-08-10 PROCEDURE — 1200000002 HC GENERAL ROOM WITH TELEMETRY DAILY

## 2024-08-10 PROCEDURE — 02HV33Z INSERTION OF INFUSION DEVICE INTO SUPERIOR VENA CAVA, PERCUTANEOUS APPROACH: ICD-10-PCS | Performed by: INTERNAL MEDICINE

## 2024-08-10 PROCEDURE — 82947 ASSAY GLUCOSE BLOOD QUANT: CPT

## 2024-08-10 RX ORDER — POLYETHYLENE GLYCOL 3350 17 G/17G
17 POWDER, FOR SOLUTION ORAL 2 TIMES DAILY
Status: DISPENSED | OUTPATIENT
Start: 2024-08-10

## 2024-08-10 RX ORDER — AMOXICILLIN 250 MG
2 CAPSULE ORAL 2 TIMES DAILY
Status: DISPENSED | OUTPATIENT
Start: 2024-08-10

## 2024-08-10 ASSESSMENT — COGNITIVE AND FUNCTIONAL STATUS - GENERAL
CLIMB 3 TO 5 STEPS WITH RAILING: TOTAL
DAILY ACTIVITIY SCORE: 18
MOBILITY SCORE: 11
STANDING UP FROM CHAIR USING ARMS: A LOT
MOVING TO AND FROM BED TO CHAIR: A LOT
DRESSING REGULAR UPPER BODY CLOTHING: A LITTLE
TURNING FROM BACK TO SIDE WHILE IN FLAT BAD: A LITTLE
DRESSING REGULAR LOWER BODY CLOTHING: A LITTLE
DAILY ACTIVITIY SCORE: 18
MOBILITY SCORE: 11
STANDING UP FROM CHAIR USING ARMS: A LOT
MOVING TO AND FROM BED TO CHAIR: A LOT
HELP NEEDED FOR BATHING: A LOT
TOILETING: A LOT
PERSONAL GROOMING: A LOT
PERSONAL GROOMING: A LITTLE
DRESSING REGULAR UPPER BODY CLOTHING: A LITTLE
TOILETING: A LITTLE
TURNING FROM BACK TO SIDE WHILE IN FLAT BAD: A LOT
CLIMB 3 TO 5 STEPS WITH RAILING: TOTAL
MOVING FROM LYING ON BACK TO SITTING ON SIDE OF FLAT BED WITH BEDRAILS: A LOT
PERSONAL GROOMING: A LITTLE
MOBILITY SCORE: 11
MOVING FROM LYING ON BACK TO SITTING ON SIDE OF FLAT BED WITH BEDRAILS: A LITTLE
STANDING UP FROM CHAIR USING ARMS: A LOT
WALKING IN HOSPITAL ROOM: TOTAL
MOBILITY SCORE: 11
TURNING FROM BACK TO SIDE WHILE IN FLAT BAD: A LOT
HELP NEEDED FOR BATHING: A LOT
MOVING FROM LYING ON BACK TO SITTING ON SIDE OF FLAT BED WITH BEDRAILS: A LOT
STANDING UP FROM CHAIR USING ARMS: TOTAL
HELP NEEDED FOR BATHING: A LOT
MOVING FROM LYING ON BACK TO SITTING ON SIDE OF FLAT BED WITH BEDRAILS: A LOT
TOILETING: A LITTLE
CLIMB 3 TO 5 STEPS WITH RAILING: TOTAL
MOVING TO AND FROM BED TO CHAIR: A LOT
DRESSING REGULAR UPPER BODY CLOTHING: A LOT
PERSONAL GROOMING: A LITTLE
HELP NEEDED FOR BATHING: A LOT
DAILY ACTIVITIY SCORE: 18
DRESSING REGULAR UPPER BODY CLOTHING: A LITTLE
WALKING IN HOSPITAL ROOM: A LOT
TOILETING: A LITTLE
DAILY ACTIVITIY SCORE: 15
WALKING IN HOSPITAL ROOM: A LOT
WALKING IN HOSPITAL ROOM: A LOT
MOVING TO AND FROM BED TO CHAIR: A LOT
CLIMB 3 TO 5 STEPS WITH RAILING: TOTAL
DRESSING REGULAR LOWER BODY CLOTHING: A LITTLE
DRESSING REGULAR LOWER BODY CLOTHING: A LITTLE
TURNING FROM BACK TO SIDE WHILE IN FLAT BAD: A LOT
DRESSING REGULAR LOWER BODY CLOTHING: A LITTLE

## 2024-08-10 ASSESSMENT — PAIN - FUNCTIONAL ASSESSMENT
PAIN_FUNCTIONAL_ASSESSMENT: 0-10

## 2024-08-10 ASSESSMENT — PAIN SCALES - GENERAL
PAINLEVEL_OUTOF10: 8
PAINLEVEL_OUTOF10: 3
PAINLEVEL_OUTOF10: 7
PAINLEVEL_OUTOF10: 7
PAINLEVEL_OUTOF10: 8
PAINLEVEL_OUTOF10: 7
PAINLEVEL_OUTOF10: 10 - WORST POSSIBLE PAIN
PAINLEVEL_OUTOF10: 7
PAINLEVEL_OUTOF10: 7

## 2024-08-10 ASSESSMENT — PAIN DESCRIPTION - LOCATION
LOCATION: LEG
LOCATION: KNEE
LOCATION: ANKLE

## 2024-08-10 ASSESSMENT — PAIN DESCRIPTION - DESCRIPTORS
DESCRIPTORS: ACHING;THROBBING
DESCRIPTORS: ACHING;CRAMPING

## 2024-08-10 ASSESSMENT — PAIN DESCRIPTION - ORIENTATION
ORIENTATION: RIGHT

## 2024-08-10 NOTE — NURSING NOTE
Upon arrival the patient was AO+4 , vss denying pain c/o piv burning. Head to toe findings recorded, MSP+4, GCS+15 but notably anxious, . Pain controlled rounding ensued , IS education , no changes of condition to report.

## 2024-08-10 NOTE — PROCEDURES
Pre-Procedure Checklist:  Emergent Line Insertion: No  Type of Line to be Placed: PICC  Consent Obtained: Yes  Emergency Medication Necessary: No  Patient Identified with 2 Independent Identifiers: Yes  Review of Allergies, Anticoagulation, Relevant Labs, ECG/Telemetry: Yes  Risks/Benefits/Alternatives Discussed with Patient/POA/Legal Representative: Yes  Stop Sign on Door: Yes  Time Out Performed: Yes  Catheter Exchange: No    Positioning Checklist:  All People, Including Patient, in the Room with Cap and Mask: Yes  Fluoroscopy Used to Identify Vessel and Guide Insertion: No   Sterile Cover Used: Yes  Full Barrier Precautions Followed (Mask, Cap, Gown, Gloves): Yes  Hands Washed: Yes  Monitors Attached with Sound Alarms On: No  Full Body Sterile Drape (Head-to-Toe) Used to Cover Patient: Yes  Trendelenburg Position (For IJ and Subclavian): No  CHG Skin Prep Used and Allowed to Air Dry to Skin Procedure: Yes    Procedure Checklist:  Blood Aspirated From All Lumens, All Ports Subsequently Flushed: Yes  Catheter Caps Placed on All Lumens; Lumens Clamped: Yes  Maintain Guidewire Control Throughout, Ensuring Guidewire Removal: Yes  Maintain Sterile Field Throughout Insertion: Yes  Catheter Secured: Yes  Confirmatory Test of Venous Placement: Non-Pulsatile Blood    Post Procedure Checklist:  Date and Time Written on Dressing: Yes  Sharp and Wire Count and Safe Disposal of all Sharps/Wires: Yes  Sterile Dressing Applied Per Protocol: Yes  X-ray Ordered or ECG Image: Yes    PICC Insertion Details:  Size (Fr): 4  Lumen Type:  Catheter to Vein Ratio Less Than 50%: Yes  Total Length (cm): 40  External Length (cm): 0  Orientation: (left or right) right  Site Prep: Chlorohexidine; Usual sterile procedure followed  Local Anesthetic: Injectable/Subcutaneous  Insertion Team Members in the Room: Nurse, LPN  Initial Extremity Circumference (cm):   Insertion Attempts: 1  Patient Tolerance: Tolerated Well, Age Appropriate  Comfort  Measures: Subcutaneous anesthetic; Verbal  Procedure Location: Bedside  Safety Measures: Patient specific safety measures addressed with RN  Estimated Blood Loss (mL): 1  Vessel Fully Compressible Proximally and Distally to Insertion Site: Yes  Brisk Blood Return Obtained and Line Draws Easily: Yes  Tip Location: SVC  Line Confirmation: ECG  Lot #:[id number]  : Bard  PICC Line Exp Date: 0930/2025  Securement: Stat Lock  Post Procedure Checklist: Handoff with RN; Obtain all new IV tubing prior to use; Bed at lowest level and wheels locked; Line discharge information at bedside.  Additional Details: Line was inserted using Modified Seldinger's Technique.   Placed by: Chase Butler RN

## 2024-08-10 NOTE — CARE PLAN
Problem: Skin  Goal: Promote/optimize nutrition  Outcome: Progressing  Goal: Promote skin healing  Outcome: Progressing     Problem: Fall/Injury  Goal: Not fall by end of shift  Outcome: Progressing  Goal: Be free from injury by end of the shift  Outcome: Progressing  Goal: Verbalize understanding of personal risk factors for fall in the hospital  Outcome: Progressing     Problem: Pain  Goal: Takes deep breaths with improved pain control throughout the shift  Outcome: Progressing  Goal: Turns in bed with improved pain control throughout the shift  Outcome: Progressing
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
## 2024-08-11 VITALS
TEMPERATURE: 98.2 F | HEIGHT: 70 IN | DIASTOLIC BLOOD PRESSURE: 85 MMHG | BODY MASS INDEX: 22.19 KG/M2 | RESPIRATION RATE: 20 BRPM | WEIGHT: 155 LBS | OXYGEN SATURATION: 95 % | SYSTOLIC BLOOD PRESSURE: 141 MMHG | HEART RATE: 98 BPM

## 2024-08-11 LAB
ALBUMIN SERPL BCP-MCNC: 3 G/DL (ref 3.4–5)
ANION GAP SERPL CALC-SCNC: 14 MMOL/L (ref 10–20)
BACTERIA BLD CULT: NORMAL
BACTERIA BLD CULT: NORMAL
BASOPHILS # BLD AUTO: 0.03 X10*3/UL (ref 0–0.1)
BASOPHILS NFR BLD AUTO: 0.4 %
BUN SERPL-MCNC: 9 MG/DL (ref 6–23)
CALCIUM SERPL-MCNC: 8.6 MG/DL (ref 8.6–10.6)
CHLORIDE SERPL-SCNC: 99 MMOL/L (ref 98–107)
CO2 SERPL-SCNC: 29 MMOL/L (ref 21–32)
CREAT SERPL-MCNC: 0.42 MG/DL (ref 0.5–1.05)
CRP SERPL-MCNC: 13.46 MG/DL
EGFRCR SERPLBLD CKD-EPI 2021: >90 ML/MIN/1.73M*2
EOSINOPHIL # BLD AUTO: 0.26 X10*3/UL (ref 0–0.7)
EOSINOPHIL NFR BLD AUTO: 3.4 %
ERYTHROCYTE [DISTWIDTH] IN BLOOD BY AUTOMATED COUNT: 13.6 % (ref 11.5–14.5)
GLUCOSE SERPL-MCNC: 85 MG/DL (ref 74–99)
HCT VFR BLD AUTO: 27.2 % (ref 36–46)
HGB BLD-MCNC: 8.5 G/DL (ref 12–16)
IMM GRANULOCYTES # BLD AUTO: 0.04 X10*3/UL (ref 0–0.7)
IMM GRANULOCYTES NFR BLD AUTO: 0.5 % (ref 0–0.9)
LYMPHOCYTES # BLD AUTO: 1.84 X10*3/UL (ref 1.2–4.8)
LYMPHOCYTES NFR BLD AUTO: 23.9 %
MAGNESIUM SERPL-MCNC: 1.98 MG/DL (ref 1.6–2.4)
MCH RBC QN AUTO: 26.1 PG (ref 26–34)
MCHC RBC AUTO-ENTMCNC: 31.3 G/DL (ref 32–36)
MCV RBC AUTO: 83 FL (ref 80–100)
MONOCYTES # BLD AUTO: 0.54 X10*3/UL (ref 0.1–1)
MONOCYTES NFR BLD AUTO: 7 %
NEUTROPHILS # BLD AUTO: 5 X10*3/UL (ref 1.2–7.7)
NEUTROPHILS NFR BLD AUTO: 64.8 %
NRBC BLD-RTO: 0 /100 WBCS (ref 0–0)
PHOSPHATE SERPL-MCNC: 4.5 MG/DL (ref 2.5–4.9)
PLATELET # BLD AUTO: 311 X10*3/UL (ref 150–450)
POTASSIUM SERPL-SCNC: 4.5 MMOL/L (ref 3.5–5.3)
RBC # BLD AUTO: 3.26 X10*6/UL (ref 4–5.2)
SODIUM SERPL-SCNC: 137 MMOL/L (ref 136–145)
WBC # BLD AUTO: 7.7 X10*3/UL (ref 4.4–11.3)

## 2024-08-11 PROCEDURE — 83735 ASSAY OF MAGNESIUM: CPT

## 2024-08-11 PROCEDURE — 2500000004 HC RX 250 GENERAL PHARMACY W/ HCPCS (ALT 636 FOR OP/ED)

## 2024-08-11 PROCEDURE — 80069 RENAL FUNCTION PANEL: CPT

## 2024-08-11 PROCEDURE — 85025 COMPLETE CBC W/AUTO DIFF WBC: CPT

## 2024-08-11 PROCEDURE — 2500000001 HC RX 250 WO HCPCS SELF ADMINISTERED DRUGS (ALT 637 FOR MEDICARE OP)

## 2024-08-11 PROCEDURE — 1200000002 HC GENERAL ROOM WITH TELEMETRY DAILY

## 2024-08-11 PROCEDURE — 99232 SBSQ HOSP IP/OBS MODERATE 35: CPT | Performed by: INTERNAL MEDICINE

## 2024-08-11 PROCEDURE — 2500000005 HC RX 250 GENERAL PHARMACY W/O HCPCS

## 2024-08-11 PROCEDURE — 2500000004 HC RX 250 GENERAL PHARMACY W/ HCPCS (ALT 636 FOR OP/ED): Mod: JZ

## 2024-08-11 PROCEDURE — 86140 C-REACTIVE PROTEIN: CPT

## 2024-08-11 RX ORDER — HYDROMORPHONE HYDROCHLORIDE 1 MG/ML
0.4 INJECTION, SOLUTION INTRAMUSCULAR; INTRAVENOUS; SUBCUTANEOUS
Status: DISPENSED | OUTPATIENT
Start: 2024-08-11

## 2024-08-11 RX ORDER — OXYCODONE HYDROCHLORIDE 5 MG/1
10 TABLET ORAL EVERY 6 HOURS
Status: DISPENSED | OUTPATIENT
Start: 2024-08-11

## 2024-08-11 ASSESSMENT — PAIN DESCRIPTION - ORIENTATION
ORIENTATION: RIGHT

## 2024-08-11 ASSESSMENT — COGNITIVE AND FUNCTIONAL STATUS - GENERAL
STANDING UP FROM CHAIR USING ARMS: A LOT
STANDING UP FROM CHAIR USING ARMS: TOTAL
MOVING FROM LYING ON BACK TO SITTING ON SIDE OF FLAT BED WITH BEDRAILS: A LOT
EATING MEALS: A LOT
DRESSING REGULAR LOWER BODY CLOTHING: A LOT
MOVING TO AND FROM BED TO CHAIR: A LOT
MOBILITY SCORE: 7
WALKING IN HOSPITAL ROOM: TOTAL
STANDING UP FROM CHAIR USING ARMS: TOTAL
DRESSING REGULAR LOWER BODY CLOTHING: A LOT
TOILETING: A LOT
DRESSING REGULAR LOWER BODY CLOTHING: A LITTLE
DAILY ACTIVITIY SCORE: 17
DAILY ACTIVITIY SCORE: 12
WALKING IN HOSPITAL ROOM: TOTAL
WALKING IN HOSPITAL ROOM: TOTAL
DAILY ACTIVITIY SCORE: 18
DAILY ACTIVITIY SCORE: 12
MOVING FROM LYING ON BACK TO SITTING ON SIDE OF FLAT BED WITH BEDRAILS: A LOT
TURNING FROM BACK TO SIDE WHILE IN FLAT BAD: A LOT
DRESSING REGULAR LOWER BODY CLOTHING: A LOT
PERSONAL GROOMING: A LOT
MOBILITY SCORE: 8
MOVING TO AND FROM BED TO CHAIR: TOTAL
MOVING TO AND FROM BED TO CHAIR: TOTAL
TOILETING: A LITTLE
PERSONAL GROOMING: A LITTLE
EATING MEALS: A LOT
TOILETING: A LOT
TURNING FROM BACK TO SIDE WHILE IN FLAT BAD: TOTAL
CLIMB 3 TO 5 STEPS WITH RAILING: TOTAL
HELP NEEDED FOR BATHING: A LOT
MOBILITY SCORE: 11
CLIMB 3 TO 5 STEPS WITH RAILING: TOTAL
MOBILITY SCORE: 7
DRESSING REGULAR UPPER BODY CLOTHING: A LITTLE
PERSONAL GROOMING: A LITTLE
MOVING FROM LYING ON BACK TO SITTING ON SIDE OF FLAT BED WITH BEDRAILS: A LOT
CLIMB 3 TO 5 STEPS WITH RAILING: TOTAL
MOVING FROM LYING ON BACK TO SITTING ON SIDE OF FLAT BED WITH BEDRAILS: A LOT
CLIMB 3 TO 5 STEPS WITH RAILING: TOTAL
HELP NEEDED FOR BATHING: A LOT
DRESSING REGULAR UPPER BODY CLOTHING: A LOT
PERSONAL GROOMING: A LOT
TURNING FROM BACK TO SIDE WHILE IN FLAT BAD: A LOT
HELP NEEDED FOR BATHING: A LOT
DRESSING REGULAR UPPER BODY CLOTHING: A LOT
TOILETING: A LITTLE
HELP NEEDED FOR BATHING: A LOT
TURNING FROM BACK TO SIDE WHILE IN FLAT BAD: TOTAL
MOVING TO AND FROM BED TO CHAIR: TOTAL
WALKING IN HOSPITAL ROOM: A LOT
DRESSING REGULAR UPPER BODY CLOTHING: A LITTLE
STANDING UP FROM CHAIR USING ARMS: TOTAL

## 2024-08-11 ASSESSMENT — PAIN - FUNCTIONAL ASSESSMENT
PAIN_FUNCTIONAL_ASSESSMENT: 0-10
PAIN_FUNCTIONAL_ASSESSMENT: 0-10

## 2024-08-11 ASSESSMENT — PAIN DESCRIPTION - LOCATION
LOCATION: KNEE
LOCATION: LEG

## 2024-08-11 ASSESSMENT — PAIN SCALES - GENERAL
PAINLEVEL_OUTOF10: 9
PAINLEVEL_OUTOF10: 0 - NO PAIN
PAINLEVEL_OUTOF10: 9
PAINLEVEL_OUTOF10: 0 - NO PAIN
PAINLEVEL_OUTOF10: 8
PAINLEVEL_OUTOF10: 9
PAINLEVEL_OUTOF10: 8
PAINLEVEL_OUTOF10: 8
PAINLEVEL_OUTOF10: 9

## 2024-08-11 ASSESSMENT — PAIN DESCRIPTION - DESCRIPTORS: DESCRIPTORS: SHOOTING;PINS AND NEEDLES

## 2024-08-11 ASSESSMENT — PAIN SCALES - WONG BAKER
WONGBAKER_NUMERICALRESPONSE: HURTS LITTLE MORE
WONGBAKER_NUMERICALRESPONSE: NO HURT

## 2024-08-11 NOTE — NURSING NOTE
Pt's BP reported to be 83/52. This RN rechecked manual for 82/52. Aside from feeling tired, pt reported no other symptoms. MD notified. Fluid bolus given. Will continue to monitor.

## 2024-08-11 NOTE — CARE PLAN
The patient's goals for the shift include Decreased pain    T  Problem: Resident is recovering from orthopedic surgery  Goal: I will report effective pain management  Outcome: Progressing     Problem: Fall/Injury  Goal: Not fall by end of shift  Outcome: Progressing     Problem: Fall/Injury  Goal: Be free from injury by end of the shift  Outcome: Progressing     Problem: Pain  Goal: Takes deep breaths with improved pain control throughout the shift  Outcome: Progressing
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
## 2024-08-11 NOTE — CARE PLAN
Problem: Skin  Goal: Decreased wound size/increased tissue granulation at next dressing change  Outcome: Progressing  Flowsheets (Taken 8/11/2024 1000)  Decreased wound size/increased tissue granulation at next dressing change: Promote sleep for wound healing  Goal: Promote/optimize nutrition  Outcome: Progressing  Flowsheets (Taken 8/11/2024 1000)  Promote/optimize nutrition: Consume > 50% meals/supplements  Goal: Promote skin healing  Outcome: Progressing  Flowsheets (Taken 8/11/2024 1000)  Promote skin healing: Turn/reposition every 2 hours/use positioning/transfer devices

## 2024-08-11 NOTE — CARE PLAN
The patient's goals for the shift include Decreased pain    The clinical goals for the shift include Adriana will report pain at a tolerable level this shift    Problem: Resident is recovering from orthopedic surgery  Goal: I will report effective pain management  8/10/2024 2129 by Angelia Stern RN  Outcome: Progressing  8/10/2024 2125 by Angelia Stern RN  Outcome: Progressing  Goal: I will remain free from infection  Outcome: Progressing  Goal: I will verbalize and demonstrate increased strength and ability to move  8/10/2024 2129 by Angelia Stern RN  Outcome: Progressing  8/10/2024 2125 by Angelia Stern RN  Outcome: Progressing  Goal: I will demonstrate an understanding of plan to heal skin and care for incision  Outcome: Progressing     Problem: Skin  Goal: Decreased wound size/increased tissue granulation at next dressing change  8/10/2024 2129 by Angelia Stern RN  Outcome: Progressing  8/10/2024 2125 by Angelia Setrn RN  Outcome: Progressing  Goal: Promote/optimize nutrition  Outcome: Progressing  Flowsheets (Taken 8/10/2024 2129)  Promote/optimize nutrition: Offer water/supplements/favorite foods  Goal: Promote skin healing  Outcome: Progressing     Problem: Fall/Injury  Goal: Not fall by end of shift  8/10/2024 2129 by Angelia Stern RN  Outcome: Progressing  8/10/2024 2125 by Angelia Stern RN  Outcome: Progressing  Goal: Be free from injury by end of the shift  8/10/2024 2129 by Angelia Stern RN  Outcome: Progressing  8/10/2024 2125 by Angelia Stern RN  Outcome: Progressing  Goal: Verbalize understanding of personal risk factors for fall in the hospital  Outcome: Progressing     Problem: Pain  Goal: Takes deep breaths with improved pain control throughout the shift  8/10/2024 2129 by Angelia Stern RN  Outcome: Progressing  8/10/2024 2125 by Angelia Stern RN  Outcome: Progressing  Goal: Turns in bed with improved pain control throughout the shift  8/10/2024 2129 by Angelia  Jarred RN  Outcome: Progressing  8/10/2024 2125 by Angelia Stern, RN  Outcome: Progressing  Goal: Free from acute confusion related to pain meds throughout the shift  Outcome: Progressing

## 2024-08-12 LAB
ALBUMIN SERPL BCP-MCNC: 2.9 G/DL (ref 3.4–5)
ANION GAP SERPL CALC-SCNC: 11 MMOL/L (ref 10–20)
BACTERIA BLD CULT: NORMAL
BACTERIA BLD CULT: NORMAL
BASOPHILS # BLD AUTO: 0.03 X10*3/UL (ref 0–0.1)
BASOPHILS NFR BLD AUTO: 0.5 %
BUN SERPL-MCNC: 9 MG/DL (ref 6–23)
CALCIUM SERPL-MCNC: 8.7 MG/DL (ref 8.6–10.6)
CHLORIDE SERPL-SCNC: 98 MMOL/L (ref 98–107)
CO2 SERPL-SCNC: 31 MMOL/L (ref 21–32)
CREAT SERPL-MCNC: 0.39 MG/DL (ref 0.5–1.05)
CRP SERPL-MCNC: 10.69 MG/DL
EGFRCR SERPLBLD CKD-EPI 2021: >90 ML/MIN/1.73M*2
EOSINOPHIL # BLD AUTO: 0.29 X10*3/UL (ref 0–0.7)
EOSINOPHIL NFR BLD AUTO: 4.4 %
ERYTHROCYTE [DISTWIDTH] IN BLOOD BY AUTOMATED COUNT: 13.6 % (ref 11.5–14.5)
FUNGUS SPEC CULT: NORMAL
FUNGUS SPEC CULT: NORMAL
FUNGUS SPEC FUNGUS STN: NORMAL
FUNGUS SPEC FUNGUS STN: NORMAL
GLUCOSE SERPL-MCNC: 87 MG/DL (ref 74–99)
HCT VFR BLD AUTO: 26.9 % (ref 36–46)
HGB BLD-MCNC: 8.5 G/DL (ref 12–16)
IMM GRANULOCYTES # BLD AUTO: 0.02 X10*3/UL (ref 0–0.7)
IMM GRANULOCYTES NFR BLD AUTO: 0.3 % (ref 0–0.9)
LYMPHOCYTES # BLD AUTO: 1.53 X10*3/UL (ref 1.2–4.8)
LYMPHOCYTES NFR BLD AUTO: 23.4 %
MAGNESIUM SERPL-MCNC: 1.79 MG/DL (ref 1.6–2.4)
MCH RBC QN AUTO: 26.3 PG (ref 26–34)
MCHC RBC AUTO-ENTMCNC: 31.6 G/DL (ref 32–36)
MCV RBC AUTO: 83 FL (ref 80–100)
MONOCYTES # BLD AUTO: 0.43 X10*3/UL (ref 0.1–1)
MONOCYTES NFR BLD AUTO: 6.6 %
NEUTROPHILS # BLD AUTO: 4.25 X10*3/UL (ref 1.2–7.7)
NEUTROPHILS NFR BLD AUTO: 64.8 %
NRBC BLD-RTO: 0 /100 WBCS (ref 0–0)
PHOSPHATE SERPL-MCNC: 4.5 MG/DL (ref 2.5–4.9)
PLATELET # BLD AUTO: 344 X10*3/UL (ref 150–450)
POTASSIUM SERPL-SCNC: 4.1 MMOL/L (ref 3.5–5.3)
RBC # BLD AUTO: 3.23 X10*6/UL (ref 4–5.2)
SODIUM SERPL-SCNC: 136 MMOL/L (ref 136–145)
VANCOMYCIN SERPL-MCNC: 24.1 UG/ML (ref 5–20)
WBC # BLD AUTO: 6.6 X10*3/UL (ref 4.4–11.3)

## 2024-08-12 PROCEDURE — 2500000004 HC RX 250 GENERAL PHARMACY W/ HCPCS (ALT 636 FOR OP/ED)

## 2024-08-12 PROCEDURE — 99233 SBSQ HOSP IP/OBS HIGH 50: CPT | Performed by: INTERNAL MEDICINE

## 2024-08-12 PROCEDURE — 86140 C-REACTIVE PROTEIN: CPT

## 2024-08-12 PROCEDURE — 85025 COMPLETE CBC W/AUTO DIFF WBC: CPT

## 2024-08-12 PROCEDURE — 80069 RENAL FUNCTION PANEL: CPT

## 2024-08-12 PROCEDURE — 2500000005 HC RX 250 GENERAL PHARMACY W/O HCPCS

## 2024-08-12 PROCEDURE — 80202 ASSAY OF VANCOMYCIN: CPT | Performed by: INTERNAL MEDICINE

## 2024-08-12 PROCEDURE — 2500000001 HC RX 250 WO HCPCS SELF ADMINISTERED DRUGS (ALT 637 FOR MEDICARE OP)

## 2024-08-12 PROCEDURE — 83735 ASSAY OF MAGNESIUM: CPT

## 2024-08-12 PROCEDURE — 1200000002 HC GENERAL ROOM WITH TELEMETRY DAILY

## 2024-08-12 RX ORDER — BISACODYL 10 MG/1
10 SUPPOSITORY RECTAL DAILY
Status: DISCONTINUED | OUTPATIENT
Start: 2024-08-12 | End: 2024-08-14 | Stop reason: HOSPADM

## 2024-08-12 RX ORDER — OXYCODONE HYDROCHLORIDE 5 MG/1
10 TABLET ORAL EVERY 8 HOURS
Status: DISCONTINUED | OUTPATIENT
Start: 2024-08-12 | End: 2024-08-13

## 2024-08-12 RX ORDER — PROCHLORPERAZINE MALEATE 10 MG
10 TABLET ORAL EVERY 6 HOURS PRN
Status: DISCONTINUED | OUTPATIENT
Start: 2024-08-12 | End: 2024-08-14 | Stop reason: HOSPADM

## 2024-08-12 RX ORDER — PROCHLORPERAZINE EDISYLATE 5 MG/ML
10 INJECTION INTRAMUSCULAR; INTRAVENOUS EVERY 6 HOURS PRN
Status: DISCONTINUED | OUTPATIENT
Start: 2024-08-12 | End: 2024-08-14 | Stop reason: HOSPADM

## 2024-08-12 RX ORDER — PANTOPRAZOLE SODIUM 40 MG/1
40 TABLET, DELAYED RELEASE ORAL
Status: DISCONTINUED | OUTPATIENT
Start: 2024-08-13 | End: 2024-08-14 | Stop reason: HOSPADM

## 2024-08-12 RX ORDER — CYCLOBENZAPRINE HCL 5 MG
7.5 TABLET ORAL 3 TIMES DAILY PRN
Status: DISCONTINUED | OUTPATIENT
Start: 2024-08-12 | End: 2024-08-14 | Stop reason: HOSPADM

## 2024-08-12 RX ORDER — GABAPENTIN 100 MG/1
100 CAPSULE ORAL 3 TIMES DAILY
Status: DISCONTINUED | OUTPATIENT
Start: 2024-08-12 | End: 2024-08-14 | Stop reason: HOSPADM

## 2024-08-12 RX ORDER — MAGNESIUM SULFATE HEPTAHYDRATE 40 MG/ML
2 INJECTION, SOLUTION INTRAVENOUS ONCE
Status: COMPLETED | OUTPATIENT
Start: 2024-08-12 | End: 2024-08-12

## 2024-08-12 ASSESSMENT — PAIN - FUNCTIONAL ASSESSMENT
PAIN_FUNCTIONAL_ASSESSMENT: 0-10
PAIN_FUNCTIONAL_ASSESSMENT: 0-10

## 2024-08-12 ASSESSMENT — PAIN SCALES - WONG BAKER
WONGBAKER_NUMERICALRESPONSE: NO HURT
WONGBAKER_NUMERICALRESPONSE: HURTS LITTLE BIT

## 2024-08-12 ASSESSMENT — COGNITIVE AND FUNCTIONAL STATUS - GENERAL
TURNING FROM BACK TO SIDE WHILE IN FLAT BAD: A LOT
DAILY ACTIVITIY SCORE: 12
DRESSING REGULAR LOWER BODY CLOTHING: A LOT
STANDING UP FROM CHAIR USING ARMS: A LOT
EATING MEALS: A LOT
WALKING IN HOSPITAL ROOM: A LOT
MOBILITY SCORE: 13
DRESSING REGULAR UPPER BODY CLOTHING: A LOT
HELP NEEDED FOR BATHING: A LOT
PERSONAL GROOMING: A LOT
TOILETING: A LOT
CLIMB 3 TO 5 STEPS WITH RAILING: A LOT
MOVING FROM LYING ON BACK TO SITTING ON SIDE OF FLAT BED WITH BEDRAILS: A LITTLE
MOVING TO AND FROM BED TO CHAIR: A LOT

## 2024-08-12 ASSESSMENT — PAIN SCALES - GENERAL
PAINLEVEL_OUTOF10: 8
PAINLEVEL_OUTOF10: 3
PAINLEVEL_OUTOF10: 10 - WORST POSSIBLE PAIN
PAINLEVEL_OUTOF10: 8
PAINLEVEL_OUTOF10: 0 - NO PAIN

## 2024-08-12 ASSESSMENT — PAIN DESCRIPTION - ORIENTATION: ORIENTATION: RIGHT

## 2024-08-12 ASSESSMENT — PAIN SCALES - PAIN ASSESSMENT IN ADVANCED DEMENTIA (PAINAD): TOTALSCORE: REPOSITIONED

## 2024-08-12 ASSESSMENT — PAIN DESCRIPTION - DESCRIPTORS: DESCRIPTORS: ACHING;SPASM

## 2024-08-12 NOTE — PROGRESS NOTES
"Orthopaedic Surgery Progress Note    Subjective: NAEON. Pain well controlled. Denies chest pain, shortness of breath, or fevers.    Objective:  /67   Pulse 68   Temp 36.4 °C (97.5 °F) (Temporal)   Resp 20   Ht 1.778 m (5' 10\")   Wt 70.3 kg (155 lb)   SpO2 94%   BMI 22.24 kg/m²     Gen: arousable, NAD, appropriately conversational  Cardiac: RRR to peripheral palpation  Resp: nonlabored on RA    MSK:  Right Lower Extremity:   -Prevena with knee immobilizer  -Tender at site of injury with painful ROM.  -Fires DF/PF/EHL/FHL  -SILT in saph/sural. Diminished SPN/DPN distributions which is her baseline (hx peroneal nerve injury)  -Foot warm, well perfused  -Palpable DP pulse, brisk cap refill  -Compartments soft and compressible      Assessment/Plan: 68 y.o. female PMHx CAD, HTN, right TKA with infection s/p right knee I&D and revision rTKA with antibiotic spacer on 2024 with Dr. Whelan.      Plan:  - Weight bearin% as tolerated RLE  - DVT ppx: SCDs, per primary  - Diet: No restrictions from orthopaedic standpoint  - Pain: Per primary  - Antibiotics: vanc/meropenam per ID, growing MRSA  - Dressing: Prevena to stay in place  - FEN: HLIV with good PO intake  - Bowel Regimen: Per primary  - Highly encourage PT/OT  - Pulm: Encourage IS  - Continue home medications    Keshawn Lebron MD  Orthopaedic Surgery, PGY-3  Available by Epic Chat  24  6:07 AM      While inpatient, this patient will be followed by the Orthopedic trauma team. See below for contact information.     This patient will be followed by Ortho Trauma team (All chat preferred):     1st call: Sam Meek, PGY-1  1st call: Po Andersen, PGY-1  2nd call: Michael Forman, PGY-2  3rd call: Keshawn Lebron, PGY-3      "

## 2024-08-12 NOTE — PROGRESS NOTES
Vancomycin Dosing by Pharmacy- FOLLOW UP    Adriana Wood is a 68 y.o. year old female who Pharmacy has been consulted for vancomycin dosing for other prosthetic joint infection: MRSA . Based on the patient's indication and renal status this patient is being dosed based on a goal AUC of 500-600.     Renal function is currently stable.    Current vancomycin dose: 1250 mg given every 12 hours    Estimated vancomycin AUC on current dose: 555 mg/L.hr     Visit Vitals  /67   Pulse 68   Temp 36.4 °C (97.5 °F) (Temporal)   Resp 20        Lab Results   Component Value Date    CREATININE 0.39 (L) 2024    CREATININE 0.42 (L) 2024    CREATININE 0.53 08/10/2024    CREATININE 0.46 (L) 2024        Patient weight is as follows:   Vitals:    24 1318   Weight: 70.3 kg (155 lb)       Cultures:  Susceptibility data for the encounter in last 14 days.  Collected Specimen Info Organism Clindamycin Erythromycin Oxacillin Tetracycline Trimethoprim/Sulfamethoxazole Vancomycin   24 Swab from [facility]SCESS Methicillin Resistant Staphylococcus aureus (MRSA)  R  R  R  S  S  S   24 Swab from ABSCESS Staphylococcus aureus              I/O last 3 completed shifts:  In: 590 (8.4 mL/kg) [P.O.:240; I.V.:100 (1.4 mL/kg); IV Piggyback:250]  Out: 2950 (42 mL/kg) [Urine:2950 (1.2 mL/kg/hr)]  Weight: 70.3 kg   I/O during current shift:  No intake/output data recorded.    Temp (24hrs), Av.3 °C (97.4 °F), Min:35.8 °C (96.4 °F), Max:36.8 °C (98.2 °F)      Assessment/Plan    Within goal AUC range. Continue current vancomycin regimen.    This dosing regimen is predicted by [employer] to result in the following pharmacokinetic parameters:    Loading dose: N/A  Regimen: 1250 mg IV every 12 hours.  Start time: 12:00 on 2024  Exposure target: AUC24 (range)400-600 mg/L.hr   AUC24,ss: 555 mg/L.hr  Probability of AUC24 > 400: 100 %  Ctrough,ss: 16.7 mg/L  Probability of Ctrough,ss > 20: 19 %  Probability of  nephrotoxicity (Lodise JOSE LUIS 2009): 12 %      The next level will be obtained on 8/15 at AM labs. May be obtained sooner if clinically indicated.   Will continue to monitor renal function daily while on vancomycin and order serum creatinine at least every 48 hours if not already ordered.  Follow for continued vancomycin needs, clinical response, and signs/symptoms of toxicity.       Acosta Bright, PharmD

## 2024-08-12 NOTE — PROGRESS NOTES
8/12/24 1149 Transitional Care Coordinator Notes:    Spoke with patient regarding discharge needs. Patient stated her goal is to return home with home antibiotics. Patient has a PICC line already placed. If she doesn't feel like she is strong enough, then will discharge to the [facility] in [facility]. Will send updated notes to the facility and keep them updated regarding decision at time of discharge. Patient has Medicare so no precert should be needed.                    Assessment/Plan   Principal Problem:    Prosthetic joint infection, subsequent encounter  Active Problems:    Wound dehiscence               Josette Qureshi RN
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
## 2024-08-12 NOTE — CARE PLAN
The patient's goals for the shift include Decreased pain    The clinical goals for the shift include pt will remain free of injury during shift    Problem: Resident is recovering from orthopedic surgery  Goal: I will report effective pain management  Outcome: Progressing  Goal: I will remain free from infection  Outcome: Progressing  Goal: I will verbalize and demonstrate increased strength and ability to move  Outcome: Progressing  Goal: I will demonstrate an understanding of plan to heal skin and care for incision  Outcome: Progressing     Problem: Skin  Goal: Decreased wound size/increased tissue granulation at next dressing change  Outcome: Progressing  Goal: Promote/optimize nutrition  Outcome: Progressing  Goal: Promote skin healing  Outcome: Progressing     Problem: Pain  Goal: Takes deep breaths with improved pain control throughout the shift  Outcome: Progressing  Goal: Turns in bed with improved pain control throughout the shift  Outcome: Progressing  Goal: Free from acute confusion related to pain meds throughout the shift  Outcome: Progressing

## 2024-08-13 ENCOUNTER — APPOINTMENT (OUTPATIENT)
Dept: CARDIOLOGY | Facility: HOSPITAL | Age: 69
End: 2024-08-13
Payer: MEDICARE

## 2024-08-13 LAB
ALBUMIN SERPL BCP-MCNC: 3.1 G/DL (ref 3.4–5)
ANION GAP SERPL CALC-SCNC: 13 MMOL/L (ref 10–20)
BASOPHILS # BLD AUTO: 0.03 X10*3/UL (ref 0–0.1)
BASOPHILS NFR BLD AUTO: 0.5 %
BUN SERPL-MCNC: 8 MG/DL (ref 6–23)
CALCIUM SERPL-MCNC: 8.5 MG/DL (ref 8.6–10.6)
CHLORIDE SERPL-SCNC: 98 MMOL/L (ref 98–107)
CO2 SERPL-SCNC: 29 MMOL/L (ref 21–32)
CREAT SERPL-MCNC: 0.4 MG/DL (ref 0.5–1.05)
EGFRCR SERPLBLD CKD-EPI 2021: >90 ML/MIN/1.73M*2
EOSINOPHIL # BLD AUTO: 0.1 X10*3/UL (ref 0–0.7)
EOSINOPHIL NFR BLD AUTO: 1.6 %
ERYTHROCYTE [DISTWIDTH] IN BLOOD BY AUTOMATED COUNT: 13.6 % (ref 11.5–14.5)
GLUCOSE SERPL-MCNC: 94 MG/DL (ref 74–99)
HCT VFR BLD AUTO: 29.9 % (ref 36–46)
HGB BLD-MCNC: 9.4 G/DL (ref 12–16)
IMM GRANULOCYTES # BLD AUTO: 0.03 X10*3/UL (ref 0–0.7)
IMM GRANULOCYTES NFR BLD AUTO: 0.5 % (ref 0–0.9)
LABORATORY COMMENT REPORT: NORMAL
LYMPHOCYTES # BLD AUTO: 1.24 X10*3/UL (ref 1.2–4.8)
LYMPHOCYTES NFR BLD AUTO: 19.4 %
MAGNESIUM SERPL-MCNC: 2 MG/DL (ref 1.6–2.4)
MCH RBC QN AUTO: 26.1 PG (ref 26–34)
MCHC RBC AUTO-ENTMCNC: 31.4 G/DL (ref 32–36)
MCV RBC AUTO: 83 FL (ref 80–100)
MONOCYTES # BLD AUTO: 0.49 X10*3/UL (ref 0.1–1)
MONOCYTES NFR BLD AUTO: 7.7 %
NEUTROPHILS # BLD AUTO: 4.51 X10*3/UL (ref 1.2–7.7)
NEUTROPHILS NFR BLD AUTO: 70.3 %
NRBC BLD-RTO: 0 /100 WBCS (ref 0–0)
PATH REPORT.FINAL DX SPEC: NORMAL
PATH REPORT.GROSS SPEC: NORMAL
PATH REPORT.RELEVANT HX SPEC: NORMAL
PATH REPORT.TOTAL CANCER: NORMAL
PHOSPHATE SERPL-MCNC: 3.2 MG/DL (ref 2.5–4.9)
PLATELET # BLD AUTO: 365 X10*3/UL (ref 150–450)
POTASSIUM SERPL-SCNC: 4.3 MMOL/L (ref 3.5–5.3)
RBC # BLD AUTO: 3.6 X10*6/UL (ref 4–5.2)
SODIUM SERPL-SCNC: 136 MMOL/L (ref 136–145)
WBC # BLD AUTO: 6.4 X10*3/UL (ref 4.4–11.3)

## 2024-08-13 PROCEDURE — 2500000001 HC RX 250 WO HCPCS SELF ADMINISTERED DRUGS (ALT 637 FOR MEDICARE OP)

## 2024-08-13 PROCEDURE — 2500000004 HC RX 250 GENERAL PHARMACY W/ HCPCS (ALT 636 FOR OP/ED)

## 2024-08-13 PROCEDURE — 85025 COMPLETE CBC W/AUTO DIFF WBC: CPT

## 2024-08-13 PROCEDURE — 93010 ELECTROCARDIOGRAM REPORT: CPT | Performed by: INTERNAL MEDICINE

## 2024-08-13 PROCEDURE — 80069 RENAL FUNCTION PANEL: CPT

## 2024-08-13 PROCEDURE — 99232 SBSQ HOSP IP/OBS MODERATE 35: CPT

## 2024-08-13 PROCEDURE — 1210000001 HC SEMI-PRIVATE ROOM DAILY

## 2024-08-13 PROCEDURE — 2500000004 HC RX 250 GENERAL PHARMACY W/ HCPCS (ALT 636 FOR OP/ED): Mod: JZ

## 2024-08-13 PROCEDURE — 2500000005 HC RX 250 GENERAL PHARMACY W/O HCPCS

## 2024-08-13 PROCEDURE — 83735 ASSAY OF MAGNESIUM: CPT

## 2024-08-13 PROCEDURE — 93005 ELECTROCARDIOGRAM TRACING: CPT

## 2024-08-13 RX ORDER — HYDROMORPHONE HYDROCHLORIDE 1 MG/ML
0.4 INJECTION, SOLUTION INTRAMUSCULAR; INTRAVENOUS; SUBCUTANEOUS ONCE
Status: COMPLETED | OUTPATIENT
Start: 2024-08-13 | End: 2024-08-13

## 2024-08-13 RX ORDER — OXYCODONE HYDROCHLORIDE 5 MG/1
5 TABLET ORAL ONCE
Status: COMPLETED | OUTPATIENT
Start: 2024-08-13 | End: 2024-08-13

## 2024-08-13 RX ORDER — OXYCODONE HYDROCHLORIDE 5 MG/1
5 TABLET ORAL EVERY 8 HOURS
Status: DISCONTINUED | OUTPATIENT
Start: 2024-08-14 | End: 2024-08-14

## 2024-08-13 RX ORDER — ACETAMINOPHEN 10 MG/ML
1000 INJECTION, SOLUTION INTRAVENOUS ONCE
Status: COMPLETED | OUTPATIENT
Start: 2024-08-13 | End: 2024-08-13

## 2024-08-13 RX ORDER — HYDROMORPHONE HYDROCHLORIDE 1 MG/ML
0.2 INJECTION, SOLUTION INTRAMUSCULAR; INTRAVENOUS; SUBCUTANEOUS
Status: DISCONTINUED | OUTPATIENT
Start: 2024-08-14 | End: 2024-08-14

## 2024-08-13 RX ORDER — ACETAMINOPHEN 10 MG/ML
1000 INJECTION, SOLUTION INTRAVENOUS EVERY 6 HOURS SCHEDULED
Status: COMPLETED | OUTPATIENT
Start: 2024-08-14 | End: 2024-08-14

## 2024-08-13 RX ORDER — OXYCODONE HYDROCHLORIDE 5 MG/1
5 TABLET ORAL EVERY 8 HOURS
Status: DISCONTINUED | OUTPATIENT
Start: 2024-08-13 | End: 2024-08-13

## 2024-08-13 ASSESSMENT — COGNITIVE AND FUNCTIONAL STATUS - GENERAL
TURNING FROM BACK TO SIDE WHILE IN FLAT BAD: A LOT
DRESSING REGULAR UPPER BODY CLOTHING: A LOT
EATING MEALS: A LITTLE
CLIMB 3 TO 5 STEPS WITH RAILING: TOTAL
WALKING IN HOSPITAL ROOM: A LOT
TOILETING: A LITTLE
WALKING IN HOSPITAL ROOM: A LOT
TOILETING: A LITTLE
DRESSING REGULAR LOWER BODY CLOTHING: A LOT
DAILY ACTIVITIY SCORE: 15
MOVING TO AND FROM BED TO CHAIR: A LOT
MOVING TO AND FROM BED TO CHAIR: A LOT
DAILY ACTIVITIY SCORE: 15
STANDING UP FROM CHAIR USING ARMS: A LOT
DRESSING REGULAR UPPER BODY CLOTHING: A LOT
EATING MEALS: A LITTLE
HELP NEEDED FOR BATHING: A LOT
TURNING FROM BACK TO SIDE WHILE IN FLAT BAD: A LOT
HELP NEEDED FOR BATHING: A LOT
MOBILITY SCORE: 12
MOVING FROM LYING ON BACK TO SITTING ON SIDE OF FLAT BED WITH BEDRAILS: A LITTLE
PERSONAL GROOMING: A LITTLE
PERSONAL GROOMING: A LITTLE
DRESSING REGULAR LOWER BODY CLOTHING: A LOT
STANDING UP FROM CHAIR USING ARMS: A LOT
MOVING FROM LYING ON BACK TO SITTING ON SIDE OF FLAT BED WITH BEDRAILS: A LITTLE
MOBILITY SCORE: 12
CLIMB 3 TO 5 STEPS WITH RAILING: TOTAL

## 2024-08-13 ASSESSMENT — PAIN SCALES - WONG BAKER: WONGBAKER_NUMERICALRESPONSE: HURTS WHOLE LOT

## 2024-08-13 ASSESSMENT — PAIN SCALES - PAIN ASSESSMENT IN ADVANCED DEMENTIA (PAINAD)
TOTALSCORE: MEDICATION (SEE MAR)
TOTALSCORE: MEDICATION (SEE MAR)

## 2024-08-13 ASSESSMENT — PAIN SCALES - GENERAL
PAINLEVEL_OUTOF10: 8
PAINLEVEL_OUTOF10: 8
PAINLEVEL_OUTOF10: 9
PAINLEVEL_OUTOF10: 9
PAINLEVEL_OUTOF10: 8

## 2024-08-13 ASSESSMENT — PAIN DESCRIPTION - LOCATION
LOCATION: ANKLE
LOCATION: LEG
LOCATION: LEG

## 2024-08-13 ASSESSMENT — PAIN - FUNCTIONAL ASSESSMENT: PAIN_FUNCTIONAL_ASSESSMENT: 0-10
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
## 2024-08-13 NOTE — DISCHARGE INSTRUCTIONS
Dear Ms. Wood,    You were admitted on 08/08/2024 for a prosthetic joint infection. The orthopedic surgery team washed out your knee. We started you on antibiotics (Vancomycin and Meropenem) that were later narrowed down to only Vancomycin. You will be on Vancomycin for 6 weeks. In the meantime, please have your labs drawn and faxed to the ID office ([phone]) at [facility] to be reviewed by Dr. Borja, whom you have an appointment with at 9:00 AM on the 25th of September - 2024.    MEDICATION CHANGES:     START:  IV Vancomycin every 12 hours for 6 weeks.  Neurontin 100mg three times a day.  Glycolax, Miralax 17g once daily.  Roxicodone 5mg once every 4 hours if needed for severe pain.  Compazine 10mg once every 6 hours if needed for vomiting or nausea  (Stephany-Colace) 2 times a day if needed for constipation.  Cepastat Sore Throat) every 2 hours if needed for sore throat.    CONTINUE:   Albutarol 90 mcg/actuation inhaler every 6 hours if needed.  Flexeril 5mg at bedtime if needed for muscle spasm,  Apply MediHoney, Honey topically once daily. For daily dressing changes of the right knee.      FOLLOW UP APPOINTMENTS:  - Please follow up with Dr. Whelan and Dr. Borja. Referral orders have been placed.   - Please call and schedule an appointment with your Electrophysiology doctor, Dr. Nogueira for follow up    Future Appointments   Date Time Provider Department Center   8/22/2024 11:40 AM Andrzej Whelan MD [id number] [facility]   9/17/2024  9:00 AM Michael Borja MD [id number] [facility]   10/23/2024  1:00 PM Sabas ALLEN DO [id number] [facility]

## 2024-08-14 VITALS
HEART RATE: 66 BPM | DIASTOLIC BLOOD PRESSURE: 62 MMHG | SYSTOLIC BLOOD PRESSURE: 111 MMHG | HEIGHT: 70 IN | TEMPERATURE: 97.7 F | RESPIRATION RATE: 18 BRPM | OXYGEN SATURATION: 93 % | WEIGHT: 155 LBS | BODY MASS INDEX: 22.19 KG/M2

## 2024-08-14 LAB
ALBUMIN SERPL BCP-MCNC: 3.3 G/DL (ref 3.4–5)
ANION GAP SERPL CALC-SCNC: 15 MMOL/L (ref 10–20)
ATRIAL RATE: 95 BPM
BASOPHILS # BLD AUTO: 0.03 X10*3/UL (ref 0–0.1)
BASOPHILS NFR BLD AUTO: 0.5 %
BUN SERPL-MCNC: 8 MG/DL (ref 6–23)
CALCIUM SERPL-MCNC: 8.7 MG/DL (ref 8.6–10.6)
CHLORIDE SERPL-SCNC: 99 MMOL/L (ref 98–107)
CO2 SERPL-SCNC: 28 MMOL/L (ref 21–32)
CREAT SERPL-MCNC: 0.4 MG/DL (ref 0.5–1.05)
EGFRCR SERPLBLD CKD-EPI 2021: >90 ML/MIN/1.73M*2
EOSINOPHIL # BLD AUTO: 0.08 X10*3/UL (ref 0–0.7)
EOSINOPHIL NFR BLD AUTO: 1.3 %
ERYTHROCYTE [DISTWIDTH] IN BLOOD BY AUTOMATED COUNT: 13.8 % (ref 11.5–14.5)
GLUCOSE SERPL-MCNC: 100 MG/DL (ref 74–99)
HCT VFR BLD AUTO: 31 % (ref 36–46)
HGB BLD-MCNC: 9.6 G/DL (ref 12–16)
IMM GRANULOCYTES # BLD AUTO: 0.03 X10*3/UL (ref 0–0.7)
IMM GRANULOCYTES NFR BLD AUTO: 0.5 % (ref 0–0.9)
LYMPHOCYTES # BLD AUTO: 1.6 X10*3/UL (ref 1.2–4.8)
LYMPHOCYTES NFR BLD AUTO: 25.9 %
MAGNESIUM SERPL-MCNC: 2.04 MG/DL (ref 1.6–2.4)
MCH RBC QN AUTO: 26.1 PG (ref 26–34)
MCHC RBC AUTO-ENTMCNC: 31 G/DL (ref 32–36)
MCV RBC AUTO: 84 FL (ref 80–100)
MONOCYTES # BLD AUTO: 0.55 X10*3/UL (ref 0.1–1)
MONOCYTES NFR BLD AUTO: 8.9 %
NEUTROPHILS # BLD AUTO: 3.88 X10*3/UL (ref 1.2–7.7)
NEUTROPHILS NFR BLD AUTO: 62.9 %
NRBC BLD-RTO: 0 /100 WBCS (ref 0–0)
P AXIS: 42 DEGREES
P OFFSET: 175 MS
P ONSET: 117 MS
PHOSPHATE SERPL-MCNC: 4 MG/DL (ref 2.5–4.9)
PLATELET # BLD AUTO: 412 X10*3/UL (ref 150–450)
POTASSIUM SERPL-SCNC: 4 MMOL/L (ref 3.5–5.3)
PR INTERVAL: 192 MS
Q ONSET: 213 MS
QRS COUNT: 15 BEATS
QRS DURATION: 72 MS
QT INTERVAL: 388 MS
QTC CALCULATION(BAZETT): 487 MS
QTC FREDERICIA: 452 MS
R AXIS: 33 DEGREES
RBC # BLD AUTO: 3.68 X10*6/UL (ref 4–5.2)
SODIUM SERPL-SCNC: 138 MMOL/L (ref 136–145)
T AXIS: 61 DEGREES
T OFFSET: 407 MS
VENTRICULAR RATE: 95 BPM
WBC # BLD AUTO: 6.2 X10*3/UL (ref 4.4–11.3)

## 2024-08-14 PROCEDURE — 2500000004 HC RX 250 GENERAL PHARMACY W/ HCPCS (ALT 636 FOR OP/ED)

## 2024-08-14 PROCEDURE — 2500000004 HC RX 250 GENERAL PHARMACY W/ HCPCS (ALT 636 FOR OP/ED): Mod: JZ

## 2024-08-14 PROCEDURE — 2500000005 HC RX 250 GENERAL PHARMACY W/O HCPCS

## 2024-08-14 PROCEDURE — 2500000001 HC RX 250 WO HCPCS SELF ADMINISTERED DRUGS (ALT 637 FOR MEDICARE OP)

## 2024-08-14 PROCEDURE — 85025 COMPLETE CBC W/AUTO DIFF WBC: CPT

## 2024-08-14 PROCEDURE — 80069 RENAL FUNCTION PANEL: CPT

## 2024-08-14 PROCEDURE — 97110 THERAPEUTIC EXERCISES: CPT | Mod: GP

## 2024-08-14 PROCEDURE — 83735 ASSAY OF MAGNESIUM: CPT

## 2024-08-14 PROCEDURE — 99239 HOSP IP/OBS DSCHRG MGMT >30: CPT

## 2024-08-14 RX ORDER — POLYETHYLENE GLYCOL 3350 17 G/17G
17 POWDER, FOR SOLUTION ORAL DAILY
Start: 2024-08-14

## 2024-08-14 RX ORDER — AMOXICILLIN 250 MG
2 CAPSULE ORAL 2 TIMES DAILY PRN
Start: 2024-08-14

## 2024-08-14 RX ORDER — OXYCODONE HYDROCHLORIDE 5 MG/1
5 TABLET ORAL EVERY 4 HOURS PRN
Qty: 15 TABLET | Refills: 0 | Status: SHIPPED | OUTPATIENT
Start: 2024-08-14 | End: 2024-08-17

## 2024-08-14 RX ORDER — OXYCODONE HYDROCHLORIDE 5 MG/1
5 TABLET ORAL EVERY 4 HOURS PRN
Start: 2024-08-14 | End: 2024-08-14

## 2024-08-14 RX ORDER — PROCHLORPERAZINE MALEATE 10 MG
10 TABLET ORAL EVERY 6 HOURS PRN
Start: 2024-08-14

## 2024-08-14 RX ORDER — NALOXONE HYDROCHLORIDE 0.4 MG/ML
0.2 INJECTION, SOLUTION INTRAMUSCULAR; INTRAVENOUS; SUBCUTANEOUS EVERY 5 MIN PRN
Start: 2024-08-14

## 2024-08-14 RX ORDER — OXYCODONE HYDROCHLORIDE 5 MG/1
5 TABLET ORAL EVERY 6 HOURS PRN
Status: DISCONTINUED | OUTPATIENT
Start: 2024-08-14 | End: 2024-08-14

## 2024-08-14 RX ORDER — GABAPENTIN 100 MG/1
100 CAPSULE ORAL 3 TIMES DAILY
Start: 2024-08-14

## 2024-08-14 RX ORDER — POLYETHYLENE GLYCOL 3350 17 G/17G
17 POWDER, FOR SOLUTION ORAL 2 TIMES DAILY
Status: CANCELLED
Start: 2024-08-14

## 2024-08-14 RX ORDER — OXYCODONE HYDROCHLORIDE 5 MG/1
5 TABLET ORAL EVERY 4 HOURS PRN
Status: DISCONTINUED | OUTPATIENT
Start: 2024-08-14 | End: 2024-08-14 | Stop reason: HOSPADM

## 2024-08-14 ASSESSMENT — PAIN SCALES - GENERAL
PAINLEVEL_OUTOF10: 7
PAINLEVEL_OUTOF10: 7
PAINLEVEL_OUTOF10: 8
PAINLEVEL_OUTOF10: 8
PAINLEVEL_OUTOF10: 6

## 2024-08-14 ASSESSMENT — COGNITIVE AND FUNCTIONAL STATUS - GENERAL
TURNING FROM BACK TO SIDE WHILE IN FLAT BAD: A LOT
MOBILITY SCORE: 9
CLIMB 3 TO 5 STEPS WITH RAILING: TOTAL
MOVING TO AND FROM BED TO CHAIR: TOTAL
MOVING FROM LYING ON BACK TO SITTING ON SIDE OF FLAT BED WITH BEDRAILS: A LITTLE
WALKING IN HOSPITAL ROOM: TOTAL
STANDING UP FROM CHAIR USING ARMS: TOTAL

## 2024-08-14 ASSESSMENT — PAIN - FUNCTIONAL ASSESSMENT
PAIN_FUNCTIONAL_ASSESSMENT: 0-10

## 2024-08-14 ASSESSMENT — PAIN DESCRIPTION - DESCRIPTORS
DESCRIPTORS: SHARP;SHOOTING
DESCRIPTORS: SHARP;SHOOTING;STABBING

## 2024-08-14 NOTE — NURSING NOTE
Patient discharged to [facility] this evening. A copy of the [place], AVS, and medication summary were all printed and sent with her in a discharge folder. A prescription for oxycodone was also sent in her discharge folder. I helped pack all her belongings. Her peripheral IV was removed with tip intact. Her PICC line stayed in place at discharge time for IV antibiotics at the facility. Her bedside nurse Annika MCBRIDE called nursing report to [phone] (400 nurse) She was picked up and transported there by the [facility] ambulance team.
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
## 2024-08-14 NOTE — CARE PLAN
The patient's goals for the shift include Decreased pain    The clinical goals for the shift include Patient will have more tolerable pain level during shift    Over the shift, the patient did not make progress toward the following goals. Barriers to progression include acute disease process. Recommendations to address these barriers include adherence to treatment plan.

## 2024-08-14 NOTE — SIGNIFICANT EVENT
Discussed with ortho for wound care recs:   - prevena placed today  - maintain prevena to continuous suction and maintain in place until ortho follow up on 8/22 with Dr. Whelan   
RAPID RESPONSE RN NOTE - RADAR 6   08/14/24 1200   Onset Documentation   Rapid Response Initiated By Radar auto page   Location/Room [facility]  ([facility] 6067-A)   Pager Time [phone]   Arrival Time 1200   Event End Time 1215   Level II Called No   Primary Reason for Call Radar auto page  (RADAR 6)     VS: 36.3, 117, 18, 92/67, 93%.  Patient found relaxing in bed, comfortable- recently completed session with rehab services  Per Annika RN the patient's current VS are consistent with her trend. No acute concerns from Nursing at this time.  RN to contact Rapid Response Team with any further issues or patient deterioration.  
Retired

## 2024-08-14 NOTE — PROGRESS NOTES
08/14/24 1420   Discharge Planning   Home or Post Acute Services Post acute facilities (Rehab/SNF/etc)  ([facility])   Type of Post Acute Facility Services Skilled nursing   Expected Discharge Disposition SNF   Does the patient need discharge transport arranged? Yes   RoundTrip coordination needed? Yes   Has discharge transport been arranged? Yes   What day is the transport expected? 08/14/24   What time is the transport expected? 1600       Patient was approved for [facility]. 7000 completed and uploaded into Decal[employer]. IMM signed and placed in chart. Transportation is arranged for 4:00 pm via [facility]. Nursing given number for report.    Number for report:     [phone] ask for 400 nurse      none
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
## 2024-08-14 NOTE — PROGRESS NOTES
Physical Therapy    Physical Therapy Treatment    Patient Name: Adriana Wood  MRN: [id number]  Today's Date: 8/14/2024  Time Calculation  Start Time: 0955  Stop Time: 1041  Time Calculation (min): 46 min    Assessment/Plan   PT Assessment  PT Assessment Results: Decreased strength, Decreased range of motion, Decreased endurance, Impaired balance, Decreased mobility, Orthopedic restrictions, Pain  Rehab Prognosis: Good  Barriers to Discharge: none  Evaluation/Treatment Tolerance: Patient limited by fatigue, Patient limited by pain  Medical Staff Made Aware: Yes  Strengths: Attitude of self, Coping skills  Barriers to Participation: Comorbidities  End of Session Communication: Bedside nurse  Assessment Comment: The pt significantly pain-limited with R LE movement, but presented with improved R LE movement.  End of Session Patient Position: Bed, 3 rail up, Alarm off, not on at start of session  PT Plan  Inpatient/Swing Bed or Outpatient: Inpatient  PT Plan  Treatment/Interventions: Bed mobility, Transfer training, Gait training, Balance training, Strengthening, Endurance training, Therapeutic exercise, Therapeutic activity, Home exercise program  PT Plan: Ongoing PT  PT Frequency: 3 times per week  PT Discharge Recommendations: Moderate intensity level of continued care  Equipment Recommended upon Discharge:  (none)  PT Recommended Transfer Status: Assist x1  PT - OK to Discharge: Yes      General Visit Information:   PT  Visit  PT Received On: 08/14/24  Response to Previous Treatment: Patient reporting fatigue but able to participate.  General  Prior to Session Communication: Bedside nurse  Patient Position Received: Bed, 3 rail up, Alarm off, not on at start of session  Preferred Learning Style: verbal, visual, written  General Comment: The pt presented with high level right knee pain and agreeable to supine therapeutic exercises.    Subjective   Precautions:  Precautions  Hearing/Visual Limitations: Hearing and  vision WFL  LE Weight Bearing Status: Right Partial Weight Bearing (50%)  Medical Precautions: Fall precautions  Braces Applied: Right knee immobilizer  Precautions Comment: The pt in compliance with precautions throughout PT session.  Vital Signs:  Vital Signs  Heart Rate: (!) 128  Heart Rate Source: Monitor  SpO2: 98 % (on RA)  Patient Position: Lying    Objective   Pain:  Pain Assessment  Pain Assessment: 0-10  0-10 (Numeric) Pain Score: 8  Pain Type: Acute pain, Surgical pain  Pain Location: Knee  Pain Orientation: Right  Pain Descriptors: Sharp, Shooting, Stabbing  Pain Frequency: Intermittent  Pain Onset: Sudden  Clinical Progression: Gradually worsening  Pain Interventions: Therapeutic presence  Response to Interventions: Pain stable  Cognition:  Cognition  Overall Cognitive Status: Within Functional Limits  Arousal/Alertness: Appropriate responses to stimuli  Orientation Level: Oriented X4  Coordination:  Movements are Fluid and Coordinated: Yes  Coordination Comment: WFL  Postural Control:  Postural Control  Postural Control:  (Not assessed)  Static Sitting Balance  Static Sitting-Balance Support:  (Not assessed)  Static Standing Balance  Static Standing-Balance Support:  (Not assessed)  Extremity/Trunk Assessments:  Cervical Spine   Cervical Spine: Within Functional Limits  Lumbar Spine   Lumbar Spine : Within Functional Limits    RUE   RUE : Within Functional Limits  LUE   LUE: Within Functional Limits  RLE   RLE : Exceptions to WFL  AROM RLE (degrees)  RLE AROM Comment: Hip decreased flexion, knee immobilized, and ankle decreased ROM  Strength RLE  R Hip Flexion: 3+/5  R Knee Flexion: 1/5  R Knee Extension: 1/5  R Ankle Dorsiflexion: 3/5  R Ankle Plantar Flexion: 3/5  LLE   LLE : Within Functional Limits  Activity Tolerance:  Activity Tolerance  Endurance: Tolerates 10 - 20 min exercise with multiple rests  Treatments:  Therapeutic Exercise  Therapeutic Exercise Performed: Yes  Therapeutic Exercise  Activity 1: ankle pumps x 15  Therapeutic Exercise Activity 2: heel slides x 15  Therapeutic Exercise Activity 3: SAQ x 15  Therapeutic Exercise Activity 4: SLR x 15  Therapeutic Exercise Activity 5: Hip ABD x 15    Therapeutic Activity  Therapeutic Activity Performed: No    Balance/Neuromuscular Re-Education  Balance/Neuromuscular Re-Education Activity Performed: No    Bed Mobility  Bed Mobility: No    Ambulation/Gait Training  Ambulation/Gait Training Performed: No  Transfers  Transfer: No    Stairs  Stairs: No    Outcome Measures:  [facility] Basic Mobility  Turning from your back to your side while in a flat bed without using bedrails: A little  Moving from lying on your back to sitting on the side of a flat bed without using bedrails: A lot  Moving to and from bed to chair (including a wheelchair): Total  Standing up from a chair using your arms (e.g. wheelchair or bedside chair): Total  To walk in hospital room: Total  Climbing 3-5 steps with railing: Total  Basic Mobility - Total Score: 9    OP EDUCATION:  Outpatient Education  Individual(s) Educated: Patient  Education Provided: Home Exercise Program  Patient Response to Education: Patient/Caregiver Verbalized Understanding of Information, Patient/Caregiver Performed Return Demonstration of Exercises/Activities    Encounter Problems       Encounter Problems (Active)       Balance       STG - Maintains SBA dynamic standing balance with upper extremity support using a wheeled walker. (Progressing)       Start:  08/09/24    Expected End:  08/23/24            STG - Maintains SBA static standing balance with upper extremity support using a wheeled walker. (Progressing)       Start:  08/09/24    Expected End:  08/23/24               Mobility       STG - Patient will ambulate 580ft with SBA using a wheeled walker. (Progressing)       Start:  08/09/24    Expected End:  08/23/24               PT Transfers       STG - Transfer from bed to chair with SBA using a wheeled  walker. (Progressing)       Start:  08/09/24    Expected End:  08/23/24            STG - Patient to transfer to and from sit to supine with SBA. (Progressing)       Start:  08/09/24    Expected End:  08/23/24            STG - Patient will transfer sit to and from stand with SBA using a wheeled walker. (Progressing)       Start:  08/09/24    Expected End:  08/23/24

## 2024-08-15 ENCOUNTER — DOCUMENTATION (OUTPATIENT)
Dept: PRIMARY CARE | Facility: CLINIC | Age: 69
End: 2024-08-15
Payer: MEDICARE

## 2024-08-15 ENCOUNTER — NURSING HOME VISIT (OUTPATIENT)
Dept: POST ACUTE CARE | Facility: EXTERNAL LOCATION | Age: 69
End: 2024-08-15
Payer: MEDICARE

## 2024-08-15 DIAGNOSIS — R11.0 NAUSEA: ICD-10-CM

## 2024-08-15 DIAGNOSIS — T84.50XD PROSTHETIC JOINT INFECTION, SUBSEQUENT ENCOUNTER: ICD-10-CM

## 2024-08-15 DIAGNOSIS — R11.2 NAUSEA AND VOMITING, UNSPECIFIED VOMITING TYPE: ICD-10-CM

## 2024-08-15 PROCEDURE — 99305 1ST NF CARE MODERATE MDM 35: CPT | Performed by: FAMILY MEDICINE
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
## 2024-08-16 ENCOUNTER — LAB REQUISITION (OUTPATIENT)
Dept: LAB | Facility: HOSPITAL | Age: 69
End: 2024-08-16

## 2024-08-16 ENCOUNTER — NURSING HOME VISIT (OUTPATIENT)
Dept: POST ACUTE CARE | Facility: EXTERNAL LOCATION | Age: 69
End: 2024-08-16
Payer: MEDICARE

## 2024-08-16 DIAGNOSIS — T81.30XA WOUND DEHISCENCE: ICD-10-CM

## 2024-08-16 DIAGNOSIS — R11.0 NAUSEA: ICD-10-CM

## 2024-08-16 DIAGNOSIS — K59.00 CONSTIPATION, UNSPECIFIED CONSTIPATION TYPE: ICD-10-CM

## 2024-08-16 DIAGNOSIS — J98.01 BRONCHOSPASM: ICD-10-CM

## 2024-08-16 DIAGNOSIS — T84.53XD INFECTION AND INFLAMMATORY REACTION DUE TO INTERNAL RIGHT KNEE PROSTHESIS, SUBSEQUENT ENCOUNTER: ICD-10-CM

## 2024-08-16 DIAGNOSIS — M17.31 POST-TRAUMATIC ARTHRITIS OF LOWER LEG, RIGHT: Primary | ICD-10-CM

## 2024-08-16 DIAGNOSIS — T84.53XD INFECTION ASSOCIATED WITH INTERNAL RIGHT KNEE PROSTHESIS, SUBSEQUENT ENCOUNTER: ICD-10-CM

## 2024-08-16 LAB
ANION GAP SERPL CALC-SCNC: 12 MMOL/L (ref 10–20)
BASOPHILS # BLD AUTO: 0.05 X10*3/UL (ref 0–0.1)
BASOPHILS NFR BLD AUTO: 0.7 %
BUN SERPL-MCNC: 9 MG/DL (ref 6–23)
CALCIUM SERPL-MCNC: 8.1 MG/DL (ref 8.6–10.3)
CHLORIDE SERPL-SCNC: 99 MMOL/L (ref 98–107)
CO2 SERPL-SCNC: 29 MMOL/L (ref 21–32)
CREAT SERPL-MCNC: 0.48 MG/DL (ref 0.5–1.05)
CRP SERPL-MCNC: 4.17 MG/DL
EGFRCR SERPLBLD CKD-EPI 2021: >90 ML/MIN/1.73M*2
EOSINOPHIL # BLD AUTO: 0.38 X10*3/UL (ref 0–0.7)
EOSINOPHIL NFR BLD AUTO: 5 %
ERYTHROCYTE [DISTWIDTH] IN BLOOD BY AUTOMATED COUNT: 14.6 % (ref 11.5–14.5)
GLUCOSE SERPL-MCNC: 132 MG/DL (ref 74–99)
HCT VFR BLD AUTO: 32.3 % (ref 36–46)
HGB BLD-MCNC: 10 G/DL (ref 12–16)
IMM GRANULOCYTES # BLD AUTO: 0.04 X10*3/UL (ref 0–0.7)
IMM GRANULOCYTES NFR BLD AUTO: 0.5 % (ref 0–0.9)
LYMPHOCYTES # BLD AUTO: 2.2 X10*3/UL (ref 1.2–4.8)
LYMPHOCYTES NFR BLD AUTO: 29.1 %
MCH RBC QN AUTO: 26.4 PG (ref 26–34)
MCHC RBC AUTO-ENTMCNC: 31 G/DL (ref 32–36)
MCV RBC AUTO: 85 FL (ref 80–100)
MONOCYTES # BLD AUTO: 0.65 X10*3/UL (ref 0.1–1)
MONOCYTES NFR BLD AUTO: 8.6 %
NEUTROPHILS # BLD AUTO: 4.23 X10*3/UL (ref 1.2–7.7)
NEUTROPHILS NFR BLD AUTO: 56.1 %
NRBC BLD-RTO: 0 /100 WBCS (ref 0–0)
PLATELET # BLD AUTO: 412 X10*3/UL (ref 150–450)
POTASSIUM SERPL-SCNC: 3.7 MMOL/L (ref 3.5–5.3)
RBC # BLD AUTO: 3.79 X10*6/UL (ref 4–5.2)
SODIUM SERPL-SCNC: 136 MMOL/L (ref 136–145)
VANCOMYCIN TROUGH SERPL-MCNC: 14.3 UG/ML (ref 5–20)
WBC # BLD AUTO: 7.6 X10*3/UL (ref 4.4–11.3)

## 2024-08-16 PROCEDURE — 85025 COMPLETE CBC W/AUTO DIFF WBC: CPT | Mod: OUT | Performed by: FAMILY MEDICINE

## 2024-08-16 PROCEDURE — 86140 C-REACTIVE PROTEIN: CPT | Mod: OUT | Performed by: FAMILY MEDICINE

## 2024-08-16 PROCEDURE — 80048 BASIC METABOLIC PNL TOTAL CA: CPT | Mod: OUT | Performed by: FAMILY MEDICINE

## 2024-08-16 PROCEDURE — 99310 SBSQ NF CARE HIGH MDM 45: CPT | Performed by: NURSE PRACTITIONER

## 2024-08-16 PROCEDURE — 80202 ASSAY OF VANCOMYCIN: CPT | Mod: OUT | Performed by: FAMILY MEDICINE

## 2024-08-16 ASSESSMENT — ENCOUNTER SYMPTOMS
RHINORRHEA: 0
SHORTNESS OF BREATH: 0
FEVER: 0
COUGH: 0
FATIGUE: 0
SORE THROAT: 0
CHILLS: 0
HEMATURIA: 0
NECK STIFFNESS: 0
HEADACHES: 0
ADENOPATHY: 0
DYSURIA: 0
BRUISES/BLEEDS EASILY: 0
WOUND: 1
PALPITATIONS: 0
ABDOMINAL PAIN: 0
BACK PAIN: 0
HALLUCINATIONS: 0
WEAKNESS: 1
EYE REDNESS: 0
EYE PAIN: 0
BLOOD IN STOOL: 0
POLYDIPSIA: 0

## 2024-08-19 ENCOUNTER — NURSING HOME VISIT (OUTPATIENT)
Dept: POST ACUTE CARE | Facility: EXTERNAL LOCATION | Age: 69
End: 2024-08-19
Payer: MEDICARE

## 2024-08-19 DIAGNOSIS — G47.00 INSOMNIA, UNSPECIFIED TYPE: ICD-10-CM

## 2024-08-19 DIAGNOSIS — T84.50XD PROSTHETIC JOINT INFECTION, SUBSEQUENT ENCOUNTER: ICD-10-CM

## 2024-08-19 LAB
FUNGUS SPEC CULT: NORMAL
FUNGUS SPEC CULT: NORMAL
FUNGUS SPEC FUNGUS STN: NORMAL
FUNGUS SPEC FUNGUS STN: NORMAL

## 2024-08-19 PROCEDURE — 99308 SBSQ NF CARE LOW MDM 20: CPT | Performed by: FAMILY MEDICINE
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
## 2024-08-21 ENCOUNTER — NURSING HOME VISIT (OUTPATIENT)
Dept: POST ACUTE CARE | Facility: EXTERNAL LOCATION | Age: 69
End: 2024-08-21
Payer: MEDICARE

## 2024-08-21 DIAGNOSIS — T84.50XD PROSTHETIC JOINT INFECTION, SUBSEQUENT ENCOUNTER: Primary | ICD-10-CM

## 2024-08-21 DIAGNOSIS — T81.30XA WOUND DEHISCENCE: ICD-10-CM

## 2024-08-21 DIAGNOSIS — J98.01 BRONCHOSPASM: ICD-10-CM

## 2024-08-21 DIAGNOSIS — R11.0 NAUSEA: ICD-10-CM

## 2024-08-21 DIAGNOSIS — M17.31 POST-TRAUMATIC ARTHRITIS OF LOWER LEG, RIGHT: ICD-10-CM

## 2024-08-21 DIAGNOSIS — K59.00 CONSTIPATION, UNSPECIFIED CONSTIPATION TYPE: ICD-10-CM

## 2024-08-21 PROCEDURE — 99309 SBSQ NF CARE MODERATE MDM 30: CPT | Performed by: NURSE PRACTITIONER
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
## 2024-08-22 ENCOUNTER — HOSPITAL ENCOUNTER (OUTPATIENT)
Dept: RADIOLOGY | Facility: CLINIC | Age: 69
Discharge: HOME | End: 2024-08-22
Payer: MEDICARE

## 2024-08-22 ENCOUNTER — OFFICE VISIT (OUTPATIENT)
Dept: ORTHOPEDIC SURGERY | Facility: CLINIC | Age: 69
End: 2024-08-22
Payer: MEDICARE

## 2024-08-22 VITALS — BODY MASS INDEX: 22.19 KG/M2 | HEIGHT: 70 IN | WEIGHT: 155 LBS

## 2024-08-22 DIAGNOSIS — Z96.651 STATUS POST TOTAL RIGHT KNEE REPLACEMENT: ICD-10-CM

## 2024-08-22 DIAGNOSIS — T84.50XD PROSTHETIC JOINT INFECTION, SUBSEQUENT ENCOUNTER: ICD-10-CM

## 2024-08-22 DIAGNOSIS — T79.8XXA OTHER EARLY COMPLICATIONS OF TRAUMA, INITIAL ENCOUNTER (CMS-HCC): Primary | ICD-10-CM

## 2024-08-22 PROBLEM — G47.00 INSOMNIA: Status: ACTIVE | Noted: 2024-08-22

## 2024-08-22 PROCEDURE — 1157F ADVNC CARE PLAN IN RCRD: CPT | Performed by: ORTHOPAEDIC SURGERY

## 2024-08-22 PROCEDURE — 73552 X-RAY EXAM OF FEMUR 2/>: CPT | Mod: RT

## 2024-08-22 PROCEDURE — 73590 X-RAY EXAM OF LOWER LEG: CPT | Mod: RT

## 2024-08-22 PROCEDURE — 1159F MED LIST DOCD IN RCRD: CPT | Performed by: ORTHOPAEDIC SURGERY

## 2024-08-22 PROCEDURE — 99211 OFF/OP EST MAY X REQ PHY/QHP: CPT | Performed by: ORTHOPAEDIC SURGERY

## 2024-08-22 PROCEDURE — 1036F TOBACCO NON-USER: CPT | Performed by: ORTHOPAEDIC SURGERY

## 2024-08-22 PROCEDURE — 1125F AMNT PAIN NOTED PAIN PRSNT: CPT | Performed by: ORTHOPAEDIC SURGERY

## 2024-08-22 PROCEDURE — 1111F DSCHRG MED/CURRENT MED MERGE: CPT | Performed by: ORTHOPAEDIC SURGERY

## 2024-08-22 PROCEDURE — 3008F BODY MASS INDEX DOCD: CPT | Performed by: ORTHOPAEDIC SURGERY

## 2024-08-22 PROCEDURE — 73560 X-RAY EXAM OF KNEE 1 OR 2: CPT | Mod: RT

## 2024-08-22 ASSESSMENT — PAIN - FUNCTIONAL ASSESSMENT: PAIN_FUNCTIONAL_ASSESSMENT: 0-10

## 2024-08-22 ASSESSMENT — PAIN SCALES - GENERAL: PAINLEVEL_OUTOF10: 9

## 2024-08-22 ASSESSMENT — PAIN DESCRIPTION - DESCRIPTORS: DESCRIPTORS: THROBBING

## 2024-08-22 NOTE — PROGRESS NOTES
Chief complaint patient is status post explantation of total knee replacement and temporary fusion of right knee for wound and infection on 8/8/2024.    History this is a 68-year-old female who had a wound dehiscence distance from a previous area of fracture.  She had bad soft tissue the wound did not heal she had redebridement and closure and then it reinfected.  She did have plastic surgery consultation for muscle flap but that she did not follow-up and 2 weeks ago I removed her knee replacement put a temporizing fusion mass antibiotic spacer with the adrienne and we were able to excised the bad skin and closure.  She has been in a skilled care facility.  Her cultures at the time are positive and methicillin resistant staph for which she currently is on vancomycin .  She has a knee immobilizer on.  She has been putting some weight on the leg    Physical examination today in the office her brace was taken down her dressing was changed.  She has excellent early healing of her flap over the knee.  I do not see any marginal necrosis as we had previously seen.  There is minimal drainage if none today.  She is able to move her foot right side.  Her knee is fused in a stable position.    X-rays were done of her right knee fusion which show intramedullary nail in good position locked with antibiotic spacer.    Assessment 2 weeks status post explantation right total knee replacement temporizing fusion with antibiotic spacer, debridement and primary closure of wound which is healing, IV antibiotics vancomycin.    Plan today in the office we ask removed some of the sutures half of them from the right knee but left the remaining sutures to be removed in 2 weeks.  Will continue to be 50% weightbearing on the leg if needed with knee immobilizer in place.  Would recommend dressing changes with Xeroform, gauze and rewrapping Ace wrap daily.  Patient is requesting a wheelchair for mobility in the nursing home and eventually at home.  I  would agree with this request she will need a appendage for her right leg for a leg solorio on the wheelchair.  She cannot bend her right leg.  Follow-up with me in 2 weeks no x-rays at that time
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
## 2024-08-23 ENCOUNTER — LAB REQUISITION (OUTPATIENT)
Dept: LAB | Facility: HOSPITAL | Age: 69
End: 2024-08-23
Payer: MEDICARE

## 2024-08-23 DIAGNOSIS — Z79.2 LONG TERM (CURRENT) USE OF ANTIBIOTICS: ICD-10-CM

## 2024-08-23 LAB
ANION GAP SERPL CALC-SCNC: 12 MMOL/L (ref 10–20)
BASOPHILS # BLD AUTO: 0.02 X10*3/UL (ref 0–0.1)
BASOPHILS NFR BLD AUTO: 0.4 %
BUN SERPL-MCNC: 7 MG/DL (ref 6–23)
CALCIUM SERPL-MCNC: 8.2 MG/DL (ref 8.6–10.3)
CHLORIDE SERPL-SCNC: 101 MMOL/L (ref 98–107)
CO2 SERPL-SCNC: 27 MMOL/L (ref 21–32)
CREAT SERPL-MCNC: 0.47 MG/DL (ref 0.5–1.05)
CRP SERPL-MCNC: 2.36 MG/DL
EGFRCR SERPLBLD CKD-EPI 2021: >90 ML/MIN/1.73M*2
EOSINOPHIL # BLD AUTO: 0.08 X10*3/UL (ref 0–0.7)
EOSINOPHIL NFR BLD AUTO: 1.5 %
ERYTHROCYTE [DISTWIDTH] IN BLOOD BY AUTOMATED COUNT: 15.4 % (ref 11.5–14.5)
GLUCOSE SERPL-MCNC: 82 MG/DL (ref 74–99)
HCT VFR BLD AUTO: 31.9 % (ref 36–46)
HGB BLD-MCNC: 9.5 G/DL (ref 12–16)
IMM GRANULOCYTES # BLD AUTO: 0.02 X10*3/UL (ref 0–0.7)
IMM GRANULOCYTES NFR BLD AUTO: 0.4 % (ref 0–0.9)
LYMPHOCYTES # BLD AUTO: 1.7 X10*3/UL (ref 1.2–4.8)
LYMPHOCYTES NFR BLD AUTO: 32.5 %
MCH RBC QN AUTO: 26 PG (ref 26–34)
MCHC RBC AUTO-ENTMCNC: 29.8 G/DL (ref 32–36)
MCV RBC AUTO: 87 FL (ref 80–100)
MONOCYTES # BLD AUTO: 0.61 X10*3/UL (ref 0.1–1)
MONOCYTES NFR BLD AUTO: 11.7 %
NEUTROPHILS # BLD AUTO: 2.8 X10*3/UL (ref 1.2–7.7)
NEUTROPHILS NFR BLD AUTO: 53.5 %
NRBC BLD-RTO: 0 /100 WBCS (ref 0–0)
PLATELET # BLD AUTO: 330 X10*3/UL (ref 150–450)
POTASSIUM SERPL-SCNC: 4 MMOL/L (ref 3.5–5.3)
RBC # BLD AUTO: 3.65 X10*6/UL (ref 4–5.2)
SODIUM SERPL-SCNC: 136 MMOL/L (ref 136–145)
VANCOMYCIN TROUGH SERPL-MCNC: 36.5 UG/ML (ref 5–20)
WBC # BLD AUTO: 5.2 X10*3/UL (ref 4.4–11.3)

## 2024-08-23 PROCEDURE — 80202 ASSAY OF VANCOMYCIN: CPT | Mod: OUT | Performed by: FAMILY MEDICINE

## 2024-08-23 PROCEDURE — 80048 BASIC METABOLIC PNL TOTAL CA: CPT | Mod: OUT | Performed by: FAMILY MEDICINE

## 2024-08-23 PROCEDURE — 86140 C-REACTIVE PROTEIN: CPT | Mod: OUT | Performed by: FAMILY MEDICINE

## 2024-08-23 PROCEDURE — 36415 COLL VENOUS BLD VENIPUNCTURE: CPT | Performed by: FAMILY MEDICINE

## 2024-08-23 PROCEDURE — 85025 COMPLETE CBC W/AUTO DIFF WBC: CPT | Mod: OUT | Performed by: FAMILY MEDICINE

## 2024-08-23 NOTE — DOCUMENTATION CLARIFICATION NOTE
"    PATIENT:               ALVARO GOSS  ACCT #:                  [id number]  MRN:                       [id number]  :                       1955  ADMIT DATE:       2024 8:40 PM  DISCH DATE:        2024 7:12 PM  RESPONDING PROVIDER #:        [id number]          PROVIDER RESPONSE TEXT:    Mild Protein Calorie Malnutrition    CDI QUERY TEXT:    Clarification    Instruction:    Based on your assessment of the patient and the clinical information, please provide the requested documentation by clicking on the appropriate radio button and enter any additional information if prompted.    Question: Please further clarify this patient nutritional status as    When answering this query, please exercise your independent professional judgment. The fact that a question is being asked, does not imply that any particular answer is desired or expected.    The patient's clinical indicators include:  Clinical Information: The patient is a 68 year old female presenting with Prosthetic joint infection s/p right knee I&D and revision rTKA with antibiotic spacer on 2024.    Clinical Indicators: BMI 22.24    Per the Consult Note from 2024 at 10:29 am:  \"Weight Change %:  Weight History / % Weight Change: Pt with a 9.9% wt loss x 3 months  Significant Weight Loss: Yes    Nutrition Diagnosis  Malnutrition Diagnosis  Patient has Malnutrition Diagnosis:  (suspect she likely has malnutrition; however, uncertain degree d/t lack of nutrition hx and NFPE able to be obtained at this time)    Nutrition Diagnosis  Patient has Nutrition Diagnosis: Yes  Diagnosis Status (1): New  Nutrition Diagnosis 1: Increased nutrient needs  Related to (1): increased metabolic demand  As Evidenced by (1): s/p right knee I&D and revision rTKA\"    Treatment: Nutrition consult, Ensure High Protein TID    Risk Factors: s/p a right knee I&D and TKA, Pt with a 9.9% wt loss x 3 months  Options provided:  -- Mild Protein Calorie Malnutrition  -- " Moderate Protein Calorie Malnutrition  -- Severe Protein Calorie Malnutrition  -- Protein Calorie Malnutrition, Please specify severity  -- Other - I will add my own diagnosis  -- Refer to Clinical Documentation Reviewer    Query created by: Patricia Petersen on 8/14/2024 8:58 AM      Electronically signed by:  JOSÉ MANUEL EVANS MD 8/23/2024 7:15 AM

## 2024-08-23 NOTE — DOCUMENTATION CLARIFICATION NOTE
PATIENT:               ALVARO GOSS  ACCT #:                  [id number]  MRN:                       [id number]  :                       1955  ADMIT DATE:       2024 8:40 PM  DISCH DATE:        2024 7:12 PM  RESPONDING PROVIDER #:        [id number]          PROVIDER RESPONSE TEXT:    Anemia not requiring treatment or evaluation    CDI QUERY TEXT:    Clarification    Instruction:    Based on your assessment of the patient and the clinical information, please provide the requested documentation by clicking on the appropriate radio button and enter any additional information if prompted.    Question: Is there a diagnosis indicative of the lab values or image study    When answering this query, please exercise your independent professional judgment. The fact that a question is being asked, does not imply that any particular answer is desired or expected.    The patient's clinical indicators include:  Clinical Information: The patient is a 68 year old female presenting with Prosthetic joint infection s/p right knee I&D and revision rTKA with antibiotic spacer on 2024.    Clinical Indicators: Pt s/p a right knee I&D and revision rTKA with antibiotic spacer on 2024. EBL: 600 ML    Hgb/Hct on 24: 12.6/38.6, : 9.8/31.0, 8.6/26.0, : 8.7/28.1, 8/10: 8.1/25.3, : 8.5/27.2, : 8.5/26.9, :09.4/29.9, : 9.6/31.0    Treatment: 500 ml LR bolus on  at 0850, 1L LR bolus on  at 0100    Risk Factors: post-operative  Options provided:  -- Acute Blood Loss Anemia is clinically significant and required treatment/monitoring  -- Anemia not requiring treatment or evaluation  -- Other - I will add my own diagnosis  -- Refer to Clinical Documentation Reviewer    Query created by: Patricia Petersen on 2024 9:29 AM      Electronically signed by:  JOSÉ MANUEL EVANS MD 2024 7:15 AM

## 2024-08-24 ENCOUNTER — LAB REQUISITION (OUTPATIENT)
Dept: LAB | Facility: HOSPITAL | Age: 69
End: 2024-08-24
Payer: MEDICARE

## 2024-08-24 DIAGNOSIS — Z79.2 LONG TERM (CURRENT) USE OF ANTIBIOTICS: ICD-10-CM

## 2024-08-24 LAB — VANCOMYCIN TROUGH SERPL-MCNC: 15.7 UG/ML (ref 5–20)

## 2024-08-24 PROCEDURE — 80202 ASSAY OF VANCOMYCIN: CPT | Mod: OUT | Performed by: FAMILY MEDICINE

## 2024-08-26 ENCOUNTER — NURSING HOME VISIT (OUTPATIENT)
Dept: POST ACUTE CARE | Facility: EXTERNAL LOCATION | Age: 69
End: 2024-08-26
Payer: MEDICARE

## 2024-08-26 DIAGNOSIS — T84.50XD PROSTHETIC JOINT INFECTION, SUBSEQUENT ENCOUNTER: ICD-10-CM

## 2024-08-26 DIAGNOSIS — G47.00 INSOMNIA, UNSPECIFIED TYPE: ICD-10-CM

## 2024-08-26 PROCEDURE — 99308 SBSQ NF CARE LOW MDM 20: CPT | Performed by: FAMILY MEDICINE
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
## 2024-08-28 ENCOUNTER — NURSING HOME VISIT (OUTPATIENT)
Dept: POST ACUTE CARE | Facility: EXTERNAL LOCATION | Age: 69
End: 2024-08-28
Payer: MEDICARE

## 2024-08-28 DIAGNOSIS — T81.30XA WOUND DEHISCENCE: ICD-10-CM

## 2024-08-28 DIAGNOSIS — R11.0 NAUSEA: ICD-10-CM

## 2024-08-28 DIAGNOSIS — K59.00 CONSTIPATION, UNSPECIFIED CONSTIPATION TYPE: ICD-10-CM

## 2024-08-28 DIAGNOSIS — T84.50XD PROSTHETIC JOINT INFECTION, SUBSEQUENT ENCOUNTER: Primary | ICD-10-CM

## 2024-08-28 DIAGNOSIS — J98.01 BRONCHOSPASM: ICD-10-CM

## 2024-08-28 DIAGNOSIS — M17.31 POST-TRAUMATIC ARTHRITIS OF LOWER LEG, RIGHT: ICD-10-CM

## 2024-08-28 PROCEDURE — 99308 SBSQ NF CARE LOW MDM 20: CPT | Performed by: NURSE PRACTITIONER

## 2024-08-29 ENCOUNTER — APPOINTMENT (OUTPATIENT)
Dept: ORTHOPEDIC SURGERY | Facility: CLINIC | Age: 69
End: 2024-08-29
Payer: MEDICARE

## 2024-08-30 ENCOUNTER — LAB REQUISITION (OUTPATIENT)
Dept: LAB | Facility: HOSPITAL | Age: 69
End: 2024-08-30
Payer: MEDICARE

## 2024-08-30 DIAGNOSIS — I50.9 HEART FAILURE, UNSPECIFIED (MULTI): ICD-10-CM

## 2024-08-30 DIAGNOSIS — Z79.2 LONG TERM (CURRENT) USE OF ANTIBIOTICS: ICD-10-CM

## 2024-08-30 DIAGNOSIS — I10 ESSENTIAL (PRIMARY) HYPERTENSION: ICD-10-CM

## 2024-08-30 LAB
ANION GAP SERPL CALC-SCNC: 13 MMOL/L (ref 10–20)
BASOPHILS # BLD AUTO: 0.03 X10*3/UL (ref 0–0.1)
BASOPHILS NFR BLD AUTO: 0.6 %
BUN SERPL-MCNC: 7 MG/DL (ref 6–23)
CALCIUM SERPL-MCNC: 8.5 MG/DL (ref 8.6–10.3)
CHLORIDE SERPL-SCNC: 101 MMOL/L (ref 98–107)
CO2 SERPL-SCNC: 27 MMOL/L (ref 21–32)
CREAT SERPL-MCNC: 0.45 MG/DL (ref 0.5–1.05)
CRP SERPL-MCNC: 4.49 MG/DL
EGFRCR SERPLBLD CKD-EPI 2021: >90 ML/MIN/1.73M*2
EOSINOPHIL # BLD AUTO: 0.12 X10*3/UL (ref 0–0.7)
EOSINOPHIL NFR BLD AUTO: 2.6 %
ERYTHROCYTE [DISTWIDTH] IN BLOOD BY AUTOMATED COUNT: 14.8 % (ref 11.5–14.5)
GLUCOSE SERPL-MCNC: 92 MG/DL (ref 74–99)
HCT VFR BLD AUTO: 32.3 % (ref 36–46)
HGB BLD-MCNC: 9.9 G/DL (ref 12–16)
IMM GRANULOCYTES # BLD AUTO: 0.01 X10*3/UL (ref 0–0.7)
IMM GRANULOCYTES NFR BLD AUTO: 0.2 % (ref 0–0.9)
LYMPHOCYTES # BLD AUTO: 1.39 X10*3/UL (ref 1.2–4.8)
LYMPHOCYTES NFR BLD AUTO: 29.8 %
MCH RBC QN AUTO: 25.4 PG (ref 26–34)
MCHC RBC AUTO-ENTMCNC: 30.7 G/DL (ref 32–36)
MCV RBC AUTO: 83 FL (ref 80–100)
MONOCYTES # BLD AUTO: 0.61 X10*3/UL (ref 0.1–1)
MONOCYTES NFR BLD AUTO: 13.1 %
NEUTROPHILS # BLD AUTO: 2.51 X10*3/UL (ref 1.2–7.7)
NEUTROPHILS NFR BLD AUTO: 53.7 %
NRBC BLD-RTO: 0 /100 WBCS (ref 0–0)
PLATELET # BLD AUTO: 326 X10*3/UL (ref 150–450)
POTASSIUM SERPL-SCNC: 4.2 MMOL/L (ref 3.5–5.3)
RBC # BLD AUTO: 3.89 X10*6/UL (ref 4–5.2)
SODIUM SERPL-SCNC: 137 MMOL/L (ref 136–145)
VANCOMYCIN TROUGH SERPL-MCNC: 35.7 UG/ML (ref 5–20)
WBC # BLD AUTO: 4.7 X10*3/UL (ref 4.4–11.3)

## 2024-08-30 PROCEDURE — 86140 C-REACTIVE PROTEIN: CPT | Mod: OUT | Performed by: FAMILY MEDICINE

## 2024-08-30 PROCEDURE — 80048 BASIC METABOLIC PNL TOTAL CA: CPT | Mod: OUT | Performed by: FAMILY MEDICINE

## 2024-08-30 PROCEDURE — 85025 COMPLETE CBC W/AUTO DIFF WBC: CPT | Mod: OUT | Performed by: FAMILY MEDICINE

## 2024-08-30 PROCEDURE — 80202 ASSAY OF VANCOMYCIN: CPT | Mod: OUT | Performed by: FAMILY MEDICINE

## 2024-08-31 ENCOUNTER — LAB REQUISITION (OUTPATIENT)
Dept: LAB | Facility: HOSPITAL | Age: 69
End: 2024-08-31
Payer: MEDICARE

## 2024-08-31 DIAGNOSIS — Z51.81 ENCOUNTER FOR THERAPEUTIC DRUG LEVEL MONITORING: ICD-10-CM

## 2024-08-31 LAB — VANCOMYCIN TROUGH SERPL-MCNC: 8.7 UG/ML (ref 5–20)

## 2024-08-31 PROCEDURE — 80202 ASSAY OF VANCOMYCIN: CPT | Mod: OUT | Performed by: FAMILY MEDICINE
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
## 2024-09-03 ENCOUNTER — LAB REQUISITION (OUTPATIENT)
Dept: LAB | Facility: HOSPITAL | Age: 69
End: 2024-09-03
Payer: MEDICARE

## 2024-09-03 DIAGNOSIS — Z79.2 LONG TERM (CURRENT) USE OF ANTIBIOTICS: ICD-10-CM

## 2024-09-03 LAB — VANCOMYCIN SERPL-MCNC: 11.6 UG/ML (ref 5–20)

## 2024-09-03 PROCEDURE — 36415 COLL VENOUS BLD VENIPUNCTURE: CPT | Mod: OUT | Performed by: FAMILY MEDICINE

## 2024-09-03 PROCEDURE — 80202 ASSAY OF VANCOMYCIN: CPT | Mod: OUT | Performed by: FAMILY MEDICINE

## 2024-09-04 ENCOUNTER — NURSING HOME VISIT (OUTPATIENT)
Dept: POST ACUTE CARE | Facility: EXTERNAL LOCATION | Age: 69
End: 2024-09-04
Payer: MEDICARE

## 2024-09-04 DIAGNOSIS — R11.0 NAUSEA: ICD-10-CM

## 2024-09-04 DIAGNOSIS — J98.01 BRONCHOSPASM: ICD-10-CM

## 2024-09-04 DIAGNOSIS — M17.31 POST-TRAUMATIC ARTHRITIS OF LOWER LEG, RIGHT: Primary | ICD-10-CM

## 2024-09-04 DIAGNOSIS — T81.30XA WOUND DEHISCENCE: ICD-10-CM

## 2024-09-04 DIAGNOSIS — T84.50XD PROSTHETIC JOINT INFECTION, SUBSEQUENT ENCOUNTER: ICD-10-CM

## 2024-09-04 DIAGNOSIS — E56.9 VITAMIN DEFICIENCY: ICD-10-CM

## 2024-09-04 DIAGNOSIS — K59.00 CONSTIPATION, UNSPECIFIED CONSTIPATION TYPE: ICD-10-CM

## 2024-09-04 PROCEDURE — 99309 SBSQ NF CARE MODERATE MDM 30: CPT | Performed by: NURSE PRACTITIONER

## 2024-09-05 ENCOUNTER — APPOINTMENT (OUTPATIENT)
Dept: ORTHOPEDIC SURGERY | Facility: CLINIC | Age: 69
End: 2024-09-05
Payer: MEDICARE

## 2024-09-05 ENCOUNTER — OFFICE VISIT (OUTPATIENT)
Dept: ORTHOPEDIC SURGERY | Facility: CLINIC | Age: 69
End: 2024-09-05
Payer: MEDICARE

## 2024-09-05 DIAGNOSIS — Z96.651 STATUS POST TOTAL RIGHT KNEE REPLACEMENT: ICD-10-CM

## 2024-09-05 DIAGNOSIS — T84.50XD PROSTHETIC JOINT INFECTION, SUBSEQUENT ENCOUNTER: ICD-10-CM

## 2024-09-05 PROCEDURE — 1111F DSCHRG MED/CURRENT MED MERGE: CPT | Performed by: ORTHOPAEDIC SURGERY

## 2024-09-05 PROCEDURE — 99211 OFF/OP EST MAY X REQ PHY/QHP: CPT | Performed by: ORTHOPAEDIC SURGERY

## 2024-09-05 PROCEDURE — 87205 SMEAR GRAM STAIN: CPT | Mod: AHULAB | Performed by: ORTHOPAEDIC SURGERY

## 2024-09-05 PROCEDURE — 1036F TOBACCO NON-USER: CPT | Performed by: ORTHOPAEDIC SURGERY

## 2024-09-05 PROCEDURE — 87070 CULTURE OTHR SPECIMN AEROBIC: CPT | Mod: AHULAB | Performed by: ORTHOPAEDIC SURGERY

## 2024-09-05 PROCEDURE — 1157F ADVNC CARE PLAN IN RCRD: CPT | Performed by: ORTHOPAEDIC SURGERY

## 2024-09-05 PROCEDURE — 1159F MED LIST DOCD IN RCRD: CPT | Performed by: ORTHOPAEDIC SURGERY

## 2024-09-05 NOTE — PROGRESS NOTES
Chief complaint patient is status post explantation of total knee replacement and temporary fusion of right knee for wound and infection on 8/8/2024.   I hurt her incision is doing well but she has another area of blistering.     History this is a 68-year-old female who had a wound dehiscence distance from a previous area of fracture.  She had bad soft tissue the wound did not heal she had redebridement and closure and then it reinfected.  She did have plastic surgery consultation for muscle flap but that she did not follow-up and 2 weeks ago I removed her knee replacement put a temporizing fusion mass antibiotic spacer with the adrienne and we were able to excised the bad skin and closure.  She has been in a skilled care facility.  Her cultures at the time are positive and methicillin resistant staph for which she currently is on vancomycin .  She has a knee immobilizer on.  She has been putting some weight on the leg     Physical examination today in the office her brace was taken down her dressing was changed.  She has excellent early healing of her flap over the knee.  I do not see any marginal necrosis as we had previously seen.  She has a blister medial 2 cm from the original incision and an area of an old scar which looks to me like an isolated infection off the original infection.    Previous xx-rays were done of her right knee fusion which show intramedullary nail in good position locked with antibiotic spacer.     Assessment 4 weeks status post explantation right total knee replacement temporizing fusion with antibiotic spacer, debridement and primary closure of wound which is healing, IV antibiotics vancomycin.  New blister medial knee area     Plan today in the office I removed all the sutures from the original incision which were well-maintained.  There is no evidence of necrosis.  There is a small bit of drainage from the very mid part of the incision but the rest of it appears to be well-healed.   Unfortunately there is another area of blister more medial away from the original skin incision I did aspirate this today and appeared to have some purulent drainage I sent that for separate culture.  I expressed some drainage from the area as well with my fingers.  She is not clinically ill.  At this time I have put sterile dressings on will allow her to weight-bear as tolerated I am going to see her back in the office again next week I want an AP and lateral of the knee at that time.  If this does not seal up quickly or if this is a different bacteria we may have to change antibiotics versus redebridement and refusion.  Will see her back next week for in the meantime continue weightbearing as tolerated with the knee immobilizer and PT.  Will conditionally get and would have to sign him if I am in the wrong thing.

## 2024-09-06 ENCOUNTER — LAB REQUISITION (OUTPATIENT)
Dept: LAB | Facility: HOSPITAL | Age: 69
End: 2024-09-06
Payer: MEDICARE

## 2024-09-06 DIAGNOSIS — Z79.2 LONG TERM (CURRENT) USE OF ANTIBIOTICS: ICD-10-CM

## 2024-09-06 LAB
ANION GAP SERPL CALC-SCNC: 15 MMOL/L (ref 10–20)
BASOPHILS # BLD AUTO: 0.01 X10*3/UL (ref 0–0.1)
BASOPHILS NFR BLD AUTO: 0.2 %
BUN SERPL-MCNC: 7 MG/DL (ref 6–23)
CALCIUM SERPL-MCNC: 8.5 MG/DL (ref 8.6–10.3)
CHLORIDE SERPL-SCNC: 101 MMOL/L (ref 98–107)
CO2 SERPL-SCNC: 25 MMOL/L (ref 21–32)
CREAT SERPL-MCNC: 0.49 MG/DL (ref 0.5–1.05)
CRP SERPL-MCNC: 2 MG/DL
EGFRCR SERPLBLD CKD-EPI 2021: >90 ML/MIN/1.73M*2
EOSINOPHIL # BLD AUTO: 0.11 X10*3/UL (ref 0–0.7)
EOSINOPHIL NFR BLD AUTO: 2.7 %
ERYTHROCYTE [DISTWIDTH] IN BLOOD BY AUTOMATED COUNT: 14.6 % (ref 11.5–14.5)
GLUCOSE SERPL-MCNC: 107 MG/DL (ref 74–99)
HCT VFR BLD AUTO: 33.3 % (ref 36–46)
HGB BLD-MCNC: 10 G/DL (ref 12–16)
IMM GRANULOCYTES # BLD AUTO: 0.01 X10*3/UL (ref 0–0.7)
IMM GRANULOCYTES NFR BLD AUTO: 0.2 % (ref 0–0.9)
LYMPHOCYTES # BLD AUTO: 1.37 X10*3/UL (ref 1.2–4.8)
LYMPHOCYTES NFR BLD AUTO: 33 %
MCH RBC QN AUTO: 25.3 PG (ref 26–34)
MCHC RBC AUTO-ENTMCNC: 30 G/DL (ref 32–36)
MCV RBC AUTO: 84 FL (ref 80–100)
MONOCYTES # BLD AUTO: 0.48 X10*3/UL (ref 0.1–1)
MONOCYTES NFR BLD AUTO: 11.6 %
NEUTROPHILS # BLD AUTO: 2.17 X10*3/UL (ref 1.2–7.7)
NEUTROPHILS NFR BLD AUTO: 52.3 %
NRBC BLD-RTO: 0 /100 WBCS (ref 0–0)
PLATELET # BLD AUTO: 266 X10*3/UL (ref 150–450)
POTASSIUM SERPL-SCNC: 4.1 MMOL/L (ref 3.5–5.3)
RBC # BLD AUTO: 3.95 X10*6/UL (ref 4–5.2)
SODIUM SERPL-SCNC: 137 MMOL/L (ref 136–145)
VANCOMYCIN TROUGH SERPL-MCNC: 24.3 UG/ML (ref 5–20)
WBC # BLD AUTO: 4.2 X10*3/UL (ref 4.4–11.3)

## 2024-09-06 PROCEDURE — 86140 C-REACTIVE PROTEIN: CPT | Mod: OUT | Performed by: FAMILY MEDICINE

## 2024-09-06 PROCEDURE — 80202 ASSAY OF VANCOMYCIN: CPT | Mod: OUT | Performed by: FAMILY MEDICINE

## 2024-09-06 PROCEDURE — 85025 COMPLETE CBC W/AUTO DIFF WBC: CPT | Mod: OUT | Performed by: FAMILY MEDICINE

## 2024-09-06 PROCEDURE — 80048 BASIC METABOLIC PNL TOTAL CA: CPT | Mod: OUT | Performed by: FAMILY MEDICINE

## 2024-09-06 PROCEDURE — 36415 COLL VENOUS BLD VENIPUNCTURE: CPT | Mod: OUT | Performed by: FAMILY MEDICINE

## 2024-09-08 LAB
BACTERIA FLD CULT: NORMAL
GRAM STN SPEC: NORMAL
GRAM STN SPEC: NORMAL
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
## 2024-09-09 ENCOUNTER — NURSING HOME VISIT (OUTPATIENT)
Dept: POST ACUTE CARE | Facility: EXTERNAL LOCATION | Age: 69
End: 2024-09-09
Payer: MEDICARE

## 2024-09-09 DIAGNOSIS — G47.00 INSOMNIA, UNSPECIFIED TYPE: ICD-10-CM

## 2024-09-09 DIAGNOSIS — T84.50XD PROSTHETIC JOINT INFECTION, SUBSEQUENT ENCOUNTER: ICD-10-CM

## 2024-09-09 PROCEDURE — 99309 SBSQ NF CARE MODERATE MDM 30: CPT | Performed by: FAMILY MEDICINE
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
## 2024-09-11 ENCOUNTER — APPOINTMENT (OUTPATIENT)
Dept: CARDIOLOGY | Facility: CLINIC | Age: 69
End: 2024-09-11
Payer: MEDICARE

## 2024-09-11 DIAGNOSIS — I49.3 PVC (PREMATURE VENTRICULAR CONTRACTION): Primary | ICD-10-CM

## 2024-09-11 PROCEDURE — 1157F ADVNC CARE PLAN IN RCRD: CPT | Performed by: INTERNAL MEDICINE

## 2024-09-11 PROCEDURE — 1111F DSCHRG MED/CURRENT MED MERGE: CPT | Performed by: INTERNAL MEDICINE

## 2024-09-11 PROCEDURE — 99214 OFFICE O/P EST MOD 30 MIN: CPT | Performed by: INTERNAL MEDICINE
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
## 2024-09-12 ENCOUNTER — HOSPITAL ENCOUNTER (OUTPATIENT)
Dept: RADIOLOGY | Facility: CLINIC | Age: 69
Discharge: HOME | End: 2024-09-12
Payer: MEDICARE

## 2024-09-12 ENCOUNTER — OFFICE VISIT (OUTPATIENT)
Dept: ORTHOPEDIC SURGERY | Facility: CLINIC | Age: 69
End: 2024-09-12
Payer: MEDICARE

## 2024-09-12 VITALS — BODY MASS INDEX: 22.19 KG/M2 | HEIGHT: 70 IN | WEIGHT: 155 LBS

## 2024-09-12 DIAGNOSIS — T84.50XD PROSTHETIC JOINT INFECTION, SUBSEQUENT ENCOUNTER: ICD-10-CM

## 2024-09-12 PROCEDURE — 1111F DSCHRG MED/CURRENT MED MERGE: CPT | Performed by: ORTHOPAEDIC SURGERY

## 2024-09-12 PROCEDURE — 1157F ADVNC CARE PLAN IN RCRD: CPT | Performed by: ORTHOPAEDIC SURGERY

## 2024-09-12 PROCEDURE — 73560 X-RAY EXAM OF KNEE 1 OR 2: CPT | Mod: RIGHT SIDE | Performed by: RADIOLOGY

## 2024-09-12 PROCEDURE — 1159F MED LIST DOCD IN RCRD: CPT | Performed by: ORTHOPAEDIC SURGERY

## 2024-09-12 PROCEDURE — 3008F BODY MASS INDEX DOCD: CPT | Performed by: ORTHOPAEDIC SURGERY

## 2024-09-12 PROCEDURE — 73560 X-RAY EXAM OF KNEE 1 OR 2: CPT | Mod: RT

## 2024-09-12 PROCEDURE — 1036F TOBACCO NON-USER: CPT | Performed by: ORTHOPAEDIC SURGERY

## 2024-09-12 PROCEDURE — 99211 OFF/OP EST MAY X REQ PHY/QHP: CPT | Performed by: ORTHOPAEDIC SURGERY

## 2024-09-12 ASSESSMENT — PAIN - FUNCTIONAL ASSESSMENT: PAIN_FUNCTIONAL_ASSESSMENT: NO/DENIES PAIN
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
## 2024-09-12 NOTE — PROGRESS NOTES
Chief complaint patient returns 1 week after debriding a little abscess.    History 68-year-old debridement 5 weeks removal of total knee replacement debridement placement of antibiotic spacer and closure of infected right total knee replacement.  1 week ago I had her back and she had a little superficial abscess medial not along the incision which we debrided and cultured.  And the cultures were negative but there was 4+ white blood cells which probably represented the original part of the original infection.  She is here today for evaluation.    Her exam her dressing was taken down her wound has healed over where I greeted the 8 mm abscess.  Is healing fine there is minimal erythema there is been no drainage lately.    Assessment is patient is currently on IV antibiotics which cover the small abscess area antibiotic spacer in place with knee fusion nail.    Plan follow-up again in 4 weeks.  Continue IV antibiotics per infectious disease.  Okay to switch out the knee immobilizer to a hinged knee brace which she has which can be locked out full extension.  She thinks it has been more comfortable.  She can weight-bear as tolerated.  She probably needs 1 aide for assistance in transport.  Follow-up in 4 weeks I would like to x-ray the right knee at that time.

## 2024-09-13 ENCOUNTER — NURSING HOME VISIT (OUTPATIENT)
Dept: POST ACUTE CARE | Facility: EXTERNAL LOCATION | Age: 69
End: 2024-09-13
Payer: MEDICARE

## 2024-09-13 ENCOUNTER — LAB REQUISITION (OUTPATIENT)
Dept: LAB | Facility: HOSPITAL | Age: 69
End: 2024-09-13
Payer: MEDICARE

## 2024-09-13 DIAGNOSIS — J98.01 BRONCHOSPASM: ICD-10-CM

## 2024-09-13 DIAGNOSIS — E56.9 VITAMIN DEFICIENCY: ICD-10-CM

## 2024-09-13 DIAGNOSIS — K59.00 CONSTIPATION, UNSPECIFIED CONSTIPATION TYPE: ICD-10-CM

## 2024-09-13 DIAGNOSIS — T84.50XD PROSTHETIC JOINT INFECTION, SUBSEQUENT ENCOUNTER: Primary | ICD-10-CM

## 2024-09-13 DIAGNOSIS — R11.0 NAUSEA: ICD-10-CM

## 2024-09-13 DIAGNOSIS — M17.31 POST-TRAUMATIC ARTHRITIS OF LOWER LEG, RIGHT: ICD-10-CM

## 2024-09-13 DIAGNOSIS — T81.30XA WOUND DEHISCENCE: ICD-10-CM

## 2024-09-13 DIAGNOSIS — Z79.2 LONG TERM (CURRENT) USE OF ANTIBIOTICS: ICD-10-CM

## 2024-09-13 LAB
ANION GAP SERPL CALC-SCNC: 12 MMOL/L (ref 10–20)
BASOPHILS # BLD AUTO: 0.04 X10*3/UL (ref 0–0.1)
BASOPHILS NFR BLD AUTO: 0.8 %
BUN SERPL-MCNC: 8 MG/DL (ref 6–23)
CALCIUM SERPL-MCNC: 8.7 MG/DL (ref 8.6–10.3)
CHLORIDE SERPL-SCNC: 102 MMOL/L (ref 98–107)
CO2 SERPL-SCNC: 26 MMOL/L (ref 21–32)
CREAT SERPL-MCNC: 0.5 MG/DL (ref 0.5–1.05)
CRP SERPL-MCNC: 2.52 MG/DL
EGFRCR SERPLBLD CKD-EPI 2021: >90 ML/MIN/1.73M*2
EOSINOPHIL # BLD AUTO: 0.11 X10*3/UL (ref 0–0.7)
EOSINOPHIL NFR BLD AUTO: 2.1 %
ERYTHROCYTE [DISTWIDTH] IN BLOOD BY AUTOMATED COUNT: 14.4 % (ref 11.5–14.5)
GLUCOSE SERPL-MCNC: 94 MG/DL (ref 74–99)
HCT VFR BLD AUTO: 33.3 % (ref 36–46)
HGB BLD-MCNC: 10.2 G/DL (ref 12–16)
IMM GRANULOCYTES # BLD AUTO: 0.01 X10*3/UL (ref 0–0.7)
IMM GRANULOCYTES NFR BLD AUTO: 0.2 % (ref 0–0.9)
LYMPHOCYTES # BLD AUTO: 1.49 X10*3/UL (ref 1.2–4.8)
LYMPHOCYTES NFR BLD AUTO: 28.2 %
MCH RBC QN AUTO: 25 PG (ref 26–34)
MCHC RBC AUTO-ENTMCNC: 30.6 G/DL (ref 32–36)
MCV RBC AUTO: 82 FL (ref 80–100)
MONOCYTES # BLD AUTO: 0.53 X10*3/UL (ref 0.1–1)
MONOCYTES NFR BLD AUTO: 10 %
NEUTROPHILS # BLD AUTO: 3.11 X10*3/UL (ref 1.2–7.7)
NEUTROPHILS NFR BLD AUTO: 58.7 %
NRBC BLD-RTO: 0 /100 WBCS (ref 0–0)
PLATELET # BLD AUTO: 316 X10*3/UL (ref 150–450)
POTASSIUM SERPL-SCNC: 4.1 MMOL/L (ref 3.5–5.3)
RBC # BLD AUTO: 4.08 X10*6/UL (ref 4–5.2)
SODIUM SERPL-SCNC: 136 MMOL/L (ref 136–145)
VANCOMYCIN TROUGH SERPL-MCNC: 29.5 UG/ML (ref 5–20)
WBC # BLD AUTO: 5.3 X10*3/UL (ref 4.4–11.3)

## 2024-09-13 PROCEDURE — 80048 BASIC METABOLIC PNL TOTAL CA: CPT | Mod: OUT | Performed by: FAMILY MEDICINE

## 2024-09-13 PROCEDURE — 80202 ASSAY OF VANCOMYCIN: CPT | Mod: OUT | Performed by: FAMILY MEDICINE

## 2024-09-13 PROCEDURE — 85025 COMPLETE CBC W/AUTO DIFF WBC: CPT | Mod: OUT | Performed by: FAMILY MEDICINE

## 2024-09-13 PROCEDURE — 86140 C-REACTIVE PROTEIN: CPT | Mod: OUT | Performed by: FAMILY MEDICINE

## 2024-09-13 PROCEDURE — 99309 SBSQ NF CARE MODERATE MDM 30: CPT | Performed by: NURSE PRACTITIONER

## 2024-09-16 ENCOUNTER — NURSING HOME VISIT (OUTPATIENT)
Dept: POST ACUTE CARE | Facility: EXTERNAL LOCATION | Age: 69
End: 2024-09-16
Payer: MEDICARE

## 2024-09-16 ENCOUNTER — LAB REQUISITION (OUTPATIENT)
Dept: LAB | Facility: HOSPITAL | Age: 69
End: 2024-09-16
Payer: MEDICARE

## 2024-09-16 DIAGNOSIS — T84.50XD PROSTHETIC JOINT INFECTION, SUBSEQUENT ENCOUNTER: ICD-10-CM

## 2024-09-16 DIAGNOSIS — F43.10 PTSD (POST-TRAUMATIC STRESS DISORDER): ICD-10-CM

## 2024-09-16 DIAGNOSIS — B95.62 METHICILLIN RESISTANT STAPHYLOCOCCUS AUREUS INFECTION AS THE CAUSE OF DISEASES CLASSIFIED ELSEWHERE: ICD-10-CM

## 2024-09-16 LAB — VANCOMYCIN TROUGH SERPL-MCNC: 5.2 UG/ML (ref 5–20)

## 2024-09-16 PROCEDURE — 36415 COLL VENOUS BLD VENIPUNCTURE: CPT | Mod: OUT | Performed by: FAMILY MEDICINE

## 2024-09-16 PROCEDURE — 99308 SBSQ NF CARE LOW MDM 20: CPT | Performed by: FAMILY MEDICINE

## 2024-09-16 PROCEDURE — 80202 ASSAY OF VANCOMYCIN: CPT | Mod: OUT | Performed by: FAMILY MEDICINE
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
## 2024-09-17 ENCOUNTER — APPOINTMENT (OUTPATIENT)
Dept: INFECTIOUS DISEASES | Facility: CLINIC | Age: 69
End: 2024-09-17
Payer: MEDICARE
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
## 2024-09-18 ENCOUNTER — NURSING HOME VISIT (OUTPATIENT)
Dept: POST ACUTE CARE | Facility: EXTERNAL LOCATION | Age: 69
End: 2024-09-18
Payer: MEDICARE

## 2024-09-18 DIAGNOSIS — J98.01 BRONCHOSPASM: ICD-10-CM

## 2024-09-18 DIAGNOSIS — T84.50XD PROSTHETIC JOINT INFECTION, SUBSEQUENT ENCOUNTER: Primary | ICD-10-CM

## 2024-09-18 DIAGNOSIS — M17.31 POST-TRAUMATIC ARTHRITIS OF LOWER LEG, RIGHT: ICD-10-CM

## 2024-09-18 DIAGNOSIS — R11.0 NAUSEA: ICD-10-CM

## 2024-09-18 DIAGNOSIS — K59.00 CONSTIPATION, UNSPECIFIED CONSTIPATION TYPE: ICD-10-CM

## 2024-09-18 DIAGNOSIS — E56.9 VITAMIN DEFICIENCY: ICD-10-CM

## 2024-09-18 DIAGNOSIS — T81.30XA WOUND DEHISCENCE: ICD-10-CM

## 2024-09-18 PROCEDURE — 99309 SBSQ NF CARE MODERATE MDM 30: CPT | Performed by: NURSE PRACTITIONER

## 2024-09-19 ENCOUNTER — APPOINTMENT (OUTPATIENT)
Dept: INFECTIOUS DISEASES | Facility: CLINIC | Age: 69
End: 2024-09-19
Payer: MEDICARE

## 2024-09-19 DIAGNOSIS — M86.461 CHRONIC OSTEOMYELITIS OF RIGHT TIBIA WITH DRAINING SINUS (MULTI): Primary | ICD-10-CM

## 2024-09-19 PROCEDURE — 99214 OFFICE O/P EST MOD 30 MIN: CPT | Performed by: INTERNAL MEDICINE

## 2024-09-19 PROCEDURE — 1157F ADVNC CARE PLAN IN RCRD: CPT | Performed by: INTERNAL MEDICINE

## 2024-09-19 PROCEDURE — 1159F MED LIST DOCD IN RCRD: CPT | Performed by: INTERNAL MEDICINE

## 2024-09-19 ASSESSMENT — ENCOUNTER SYMPTOMS
RESPIRATORY NEGATIVE: 1
CARDIOVASCULAR NEGATIVE: 1
MUSCULOSKELETAL NEGATIVE: 1
HEMATOLOGIC/LYMPHATIC NEGATIVE: 1
GASTROINTESTINAL NEGATIVE: 1
NEUROLOGICAL NEGATIVE: 1
ENDOCRINE NEGATIVE: 1
PSYCHIATRIC NEGATIVE: 1
EYES NEGATIVE: 1
CONSTITUTIONAL NEGATIVE: 1
ALLERGIC/IMMUNOLOGIC NEGATIVE: 1
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
## 2024-09-20 ENCOUNTER — LAB REQUISITION (OUTPATIENT)
Dept: LAB | Facility: HOSPITAL | Age: 69
End: 2024-09-20
Payer: MEDICARE

## 2024-09-20 DIAGNOSIS — Z79.2 LONG TERM (CURRENT) USE OF ANTIBIOTICS: ICD-10-CM

## 2024-09-20 LAB
ALBUMIN SERPL BCP-MCNC: 3.8 G/DL (ref 3.4–5)
ALP SERPL-CCNC: 110 U/L (ref 33–136)
ALT SERPL W P-5'-P-CCNC: 10 U/L (ref 7–45)
ANION GAP SERPL CALC-SCNC: 14 MMOL/L (ref 10–20)
AST SERPL W P-5'-P-CCNC: 18 U/L (ref 9–39)
BASOPHILS # BLD AUTO: 0.04 X10*3/UL (ref 0–0.1)
BASOPHILS NFR BLD AUTO: 0.8 %
BILIRUB SERPL-MCNC: 0.4 MG/DL (ref 0–1.2)
BUN SERPL-MCNC: 10 MG/DL (ref 6–23)
CALCIUM SERPL-MCNC: 8.8 MG/DL (ref 8.6–10.3)
CHLORIDE SERPL-SCNC: 100 MMOL/L (ref 98–107)
CO2 SERPL-SCNC: 27 MMOL/L (ref 21–32)
CREAT SERPL-MCNC: 0.51 MG/DL (ref 0.5–1.05)
CRP SERPL-MCNC: 2.41 MG/DL
EGFRCR SERPLBLD CKD-EPI 2021: >90 ML/MIN/1.73M*2
EOSINOPHIL # BLD AUTO: 0.28 X10*3/UL (ref 0–0.7)
EOSINOPHIL NFR BLD AUTO: 5.5 %
ERYTHROCYTE [DISTWIDTH] IN BLOOD BY AUTOMATED COUNT: 13.8 % (ref 11.5–14.5)
ERYTHROCYTE [SEDIMENTATION RATE] IN BLOOD BY WESTERGREN METHOD: 68 MM/H (ref 0–30)
GLUCOSE SERPL-MCNC: 86 MG/DL (ref 74–99)
HCT VFR BLD AUTO: 34.5 % (ref 36–46)
HGB BLD-MCNC: 10.5 G/DL (ref 12–16)
IMM GRANULOCYTES # BLD AUTO: 0.01 X10*3/UL (ref 0–0.7)
IMM GRANULOCYTES NFR BLD AUTO: 0.2 % (ref 0–0.9)
LYMPHOCYTES # BLD AUTO: 1.38 X10*3/UL (ref 1.2–4.8)
LYMPHOCYTES NFR BLD AUTO: 27.3 %
MCH RBC QN AUTO: 24.6 PG (ref 26–34)
MCHC RBC AUTO-ENTMCNC: 30.4 G/DL (ref 32–36)
MCV RBC AUTO: 81 FL (ref 80–100)
MONOCYTES # BLD AUTO: 0.51 X10*3/UL (ref 0.1–1)
MONOCYTES NFR BLD AUTO: 10.1 %
NEUTROPHILS # BLD AUTO: 2.84 X10*3/UL (ref 1.2–7.7)
NEUTROPHILS NFR BLD AUTO: 56.1 %
NRBC BLD-RTO: 0 /100 WBCS (ref 0–0)
PLATELET # BLD AUTO: 316 X10*3/UL (ref 150–450)
POTASSIUM SERPL-SCNC: 4.1 MMOL/L (ref 3.5–5.3)
PROT SERPL-MCNC: 6.9 G/DL (ref 6.4–8.2)
RBC # BLD AUTO: 4.27 X10*6/UL (ref 4–5.2)
SODIUM SERPL-SCNC: 137 MMOL/L (ref 136–145)
VANCOMYCIN TROUGH SERPL-MCNC: 13.9 UG/ML (ref 5–20)
WBC # BLD AUTO: 5.1 X10*3/UL (ref 4.4–11.3)

## 2024-09-20 PROCEDURE — 85025 COMPLETE CBC W/AUTO DIFF WBC: CPT | Mod: OUT | Performed by: NURSE PRACTITIONER

## 2024-09-20 PROCEDURE — 86140 C-REACTIVE PROTEIN: CPT | Mod: OUT | Performed by: NURSE PRACTITIONER

## 2024-09-20 PROCEDURE — 80202 ASSAY OF VANCOMYCIN: CPT | Mod: OUT | Performed by: NURSE PRACTITIONER

## 2024-09-20 PROCEDURE — 85652 RBC SED RATE AUTOMATED: CPT | Mod: OUT | Performed by: NURSE PRACTITIONER

## 2024-09-20 PROCEDURE — 84075 ASSAY ALKALINE PHOSPHATASE: CPT | Mod: OUT | Performed by: NURSE PRACTITIONER

## 2024-09-20 PROCEDURE — 36415 COLL VENOUS BLD VENIPUNCTURE: CPT | Mod: OUT | Performed by: NURSE PRACTITIONER

## 2024-09-23 ENCOUNTER — LAB REQUISITION (OUTPATIENT)
Dept: LAB | Facility: HOSPITAL | Age: 69
End: 2024-09-23
Payer: MEDICARE

## 2024-09-23 ENCOUNTER — NURSING HOME VISIT (OUTPATIENT)
Dept: POST ACUTE CARE | Facility: EXTERNAL LOCATION | Age: 69
End: 2024-09-23
Payer: MEDICARE

## 2024-09-23 DIAGNOSIS — M62.838 MUSCLE SPASM: ICD-10-CM

## 2024-09-23 DIAGNOSIS — Z79.2 LONG TERM (CURRENT) USE OF ANTIBIOTICS: ICD-10-CM

## 2024-09-23 DIAGNOSIS — T84.50XD PROSTHETIC JOINT INFECTION, SUBSEQUENT ENCOUNTER: ICD-10-CM

## 2024-09-23 LAB — VANCOMYCIN TROUGH SERPL-MCNC: 16.8 UG/ML (ref 5–20)

## 2024-09-23 PROCEDURE — 99309 SBSQ NF CARE MODERATE MDM 30: CPT | Performed by: FAMILY MEDICINE

## 2024-09-23 PROCEDURE — 36415 COLL VENOUS BLD VENIPUNCTURE: CPT | Mod: OUT | Performed by: FAMILY MEDICINE

## 2024-09-23 PROCEDURE — 80202 ASSAY OF VANCOMYCIN: CPT | Mod: OUT | Performed by: FAMILY MEDICINE
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
## 2024-09-24 PROBLEM — M62.838 MUSCLE SPASM: Status: ACTIVE | Noted: 2024-09-24
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
## 2024-09-25 ENCOUNTER — LAB REQUISITION (OUTPATIENT)
Dept: LAB | Facility: HOSPITAL | Age: 69
End: 2024-09-25
Payer: MEDICARE

## 2024-09-25 ENCOUNTER — NURSING HOME VISIT (OUTPATIENT)
Dept: POST ACUTE CARE | Facility: EXTERNAL LOCATION | Age: 69
End: 2024-09-25
Payer: MEDICARE

## 2024-09-25 DIAGNOSIS — E56.9 VITAMIN DEFICIENCY: ICD-10-CM

## 2024-09-25 DIAGNOSIS — T84.50XD PROSTHETIC JOINT INFECTION, SUBSEQUENT ENCOUNTER: Primary | ICD-10-CM

## 2024-09-25 DIAGNOSIS — M17.31 POST-TRAUMATIC ARTHRITIS OF LOWER LEG, RIGHT: ICD-10-CM

## 2024-09-25 DIAGNOSIS — Z79.2 LONG TERM (CURRENT) USE OF ANTIBIOTICS: ICD-10-CM

## 2024-09-25 DIAGNOSIS — K59.00 CONSTIPATION, UNSPECIFIED CONSTIPATION TYPE: ICD-10-CM

## 2024-09-25 DIAGNOSIS — R11.0 NAUSEA: ICD-10-CM

## 2024-09-25 DIAGNOSIS — J98.01 BRONCHOSPASM: ICD-10-CM

## 2024-09-25 DIAGNOSIS — T81.30XA WOUND DEHISCENCE: ICD-10-CM

## 2024-09-25 LAB — VANCOMYCIN TROUGH SERPL-MCNC: 12.5 UG/ML (ref 5–20)

## 2024-09-25 PROCEDURE — 99309 SBSQ NF CARE MODERATE MDM 30: CPT | Performed by: NURSE PRACTITIONER

## 2024-09-25 PROCEDURE — 80202 ASSAY OF VANCOMYCIN: CPT | Mod: OUT | Performed by: FAMILY MEDICINE

## 2024-09-27 ENCOUNTER — LAB REQUISITION (OUTPATIENT)
Dept: LAB | Facility: HOSPITAL | Age: 69
End: 2024-09-27
Payer: MEDICARE

## 2024-09-27 DIAGNOSIS — Z79.2 LONG TERM (CURRENT) USE OF ANTIBIOTICS: ICD-10-CM

## 2024-09-27 LAB
ANION GAP SERPL CALC-SCNC: 14 MMOL/L (ref 10–20)
BASOPHILS # BLD AUTO: 0.04 X10*3/UL (ref 0–0.1)
BASOPHILS NFR BLD AUTO: 1 %
BUN SERPL-MCNC: 9 MG/DL (ref 6–23)
CALCIUM SERPL-MCNC: 8.4 MG/DL (ref 8.6–10.3)
CHLORIDE SERPL-SCNC: 103 MMOL/L (ref 98–107)
CO2 SERPL-SCNC: 26 MMOL/L (ref 21–32)
CREAT SERPL-MCNC: 0.49 MG/DL (ref 0.5–1.05)
CRP SERPL-MCNC: 1.55 MG/DL
EGFRCR SERPLBLD CKD-EPI 2021: >90 ML/MIN/1.73M*2
EOSINOPHIL # BLD AUTO: 0.25 X10*3/UL (ref 0–0.7)
EOSINOPHIL NFR BLD AUTO: 6 %
ERYTHROCYTE [DISTWIDTH] IN BLOOD BY AUTOMATED COUNT: 13.7 % (ref 11.5–14.5)
GLUCOSE SERPL-MCNC: 81 MG/DL (ref 74–99)
HCT VFR BLD AUTO: 33.8 % (ref 36–46)
HGB BLD-MCNC: 10 G/DL (ref 12–16)
IMM GRANULOCYTES # BLD AUTO: 0.01 X10*3/UL (ref 0–0.7)
IMM GRANULOCYTES NFR BLD AUTO: 0.2 % (ref 0–0.9)
LYMPHOCYTES # BLD AUTO: 1.25 X10*3/UL (ref 1.2–4.8)
LYMPHOCYTES NFR BLD AUTO: 30 %
MCH RBC QN AUTO: 23.9 PG (ref 26–34)
MCHC RBC AUTO-ENTMCNC: 29.6 G/DL (ref 32–36)
MCV RBC AUTO: 81 FL (ref 80–100)
MONOCYTES # BLD AUTO: 0.44 X10*3/UL (ref 0.1–1)
MONOCYTES NFR BLD AUTO: 10.6 %
NEUTROPHILS # BLD AUTO: 2.18 X10*3/UL (ref 1.2–7.7)
NEUTROPHILS NFR BLD AUTO: 52.2 %
NRBC BLD-RTO: 0 /100 WBCS (ref 0–0)
PLATELET # BLD AUTO: 264 X10*3/UL (ref 150–450)
POTASSIUM SERPL-SCNC: 3.8 MMOL/L (ref 3.5–5.3)
RBC # BLD AUTO: 4.18 X10*6/UL (ref 4–5.2)
SODIUM SERPL-SCNC: 139 MMOL/L (ref 136–145)
VANCOMYCIN TROUGH SERPL-MCNC: 14.4 UG/ML (ref 5–20)
WBC # BLD AUTO: 4.2 X10*3/UL (ref 4.4–11.3)

## 2024-09-27 PROCEDURE — 80048 BASIC METABOLIC PNL TOTAL CA: CPT | Mod: OUT | Performed by: FAMILY MEDICINE

## 2024-09-27 PROCEDURE — 86140 C-REACTIVE PROTEIN: CPT | Mod: OUT | Performed by: FAMILY MEDICINE

## 2024-09-27 PROCEDURE — 85025 COMPLETE CBC W/AUTO DIFF WBC: CPT | Mod: OUT | Performed by: FAMILY MEDICINE

## 2024-09-27 PROCEDURE — 36415 COLL VENOUS BLD VENIPUNCTURE: CPT | Mod: OUT | Performed by: FAMILY MEDICINE

## 2024-09-27 PROCEDURE — 80202 ASSAY OF VANCOMYCIN: CPT | Mod: OUT | Performed by: FAMILY MEDICINE

## 2024-09-30 ENCOUNTER — NURSING HOME VISIT (OUTPATIENT)
Dept: POST ACUTE CARE | Facility: EXTERNAL LOCATION | Age: 69
End: 2024-09-30
Payer: MEDICARE

## 2024-09-30 ENCOUNTER — LAB REQUISITION (OUTPATIENT)
Dept: LAB | Facility: HOSPITAL | Age: 69
End: 2024-09-30
Payer: MEDICARE

## 2024-09-30 DIAGNOSIS — T84.50XD PROSTHETIC JOINT INFECTION, SUBSEQUENT ENCOUNTER: ICD-10-CM

## 2024-09-30 DIAGNOSIS — M62.838 MUSCLE SPASM: ICD-10-CM

## 2024-09-30 DIAGNOSIS — T84.53XD INFECTION AND INFLAMMATORY REACTION DUE TO INTERNAL RIGHT KNEE PROSTHESIS, SUBSEQUENT ENCOUNTER: ICD-10-CM

## 2024-09-30 LAB — VANCOMYCIN TROUGH SERPL-MCNC: 20.4 UG/ML (ref 5–20)

## 2024-09-30 PROCEDURE — 99316 NF DSCHRG MGMT 30 MIN+: CPT | Performed by: FAMILY MEDICINE

## 2024-09-30 PROCEDURE — 36415 COLL VENOUS BLD VENIPUNCTURE: CPT | Mod: OUT | Performed by: FAMILY MEDICINE

## 2024-09-30 PROCEDURE — 80202 ASSAY OF VANCOMYCIN: CPT | Mod: OUT | Performed by: FAMILY MEDICINE
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
## 2024-10-02 ENCOUNTER — HOME HEALTH ADMISSION (OUTPATIENT)
Dept: HOME HEALTH SERVICES | Facility: HOME HEALTH | Age: 69
End: 2024-10-02
Payer: MEDICARE
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
## 2024-10-04 ENCOUNTER — DOCUMENTATION (OUTPATIENT)
Dept: HOME HEALTH SERVICES | Facility: HOME HEALTH | Age: 69
End: 2024-10-04
Payer: MEDICARE

## 2024-10-04 ENCOUNTER — TELEPHONE (OUTPATIENT)
Dept: PRIMARY CARE | Facility: CLINIC | Age: 69
End: 2024-10-04
Payer: MEDICARE

## 2024-10-04 DIAGNOSIS — M86.461 CHRONIC OSTEOMYELITIS OF RIGHT TIBIA WITH DRAINING SINUS (MULTI): ICD-10-CM

## 2024-10-04 DIAGNOSIS — T84.50XD PROSTHETIC JOINT INFECTION, SUBSEQUENT ENCOUNTER: Primary | ICD-10-CM

## 2024-10-04 DIAGNOSIS — M17.31 POST-TRAUMATIC ARTHRITIS OF LOWER LEG, RIGHT: Primary | ICD-10-CM

## 2024-10-04 RX ORDER — SULFAMETHOXAZOLE AND TRIMETHOPRIM 800; 160 MG/1; MG/1
1 TABLET ORAL 2 TIMES DAILY
Qty: 60 TABLET | Refills: 0 | Status: SHIPPED | OUTPATIENT
Start: 2024-10-04 | End: 2024-11-03

## 2024-10-04 RX ORDER — OXYCODONE HYDROCHLORIDE 5 MG/1
5 TABLET ORAL
Qty: 150 TABLET | Refills: 0 | Status: SHIPPED | OUTPATIENT
Start: 2024-10-04 | End: 2024-11-03

## 2024-10-04 NOTE — TELEPHONE ENCOUNTER
Patient is home from [facility] and is ask for Oxycodone 5 mg sent to [facility].

## 2024-10-04 NOTE — HH CARE COORDINATION
Home Care received a Referral for Nursing, Physical Therapy, Occupational Therapy, and Speech Language Pathology. We have processed the referral for a Start of Care on 10/06-10/07.     If you have any questions or concerns, please feel free to contact us at [phone]. Follow the prompts, enter your five digit zip code, and you will be directed to your care team on EAST 2.

## 2024-10-05 ENCOUNTER — HOME CARE VISIT (OUTPATIENT)
Dept: HOME HEALTH SERVICES | Facility: HOME HEALTH | Age: 69
End: 2024-10-05
Payer: MEDICARE

## 2024-10-05 VITALS
OXYGEN SATURATION: 98 % | HEART RATE: 52 BPM | SYSTOLIC BLOOD PRESSURE: 110 MMHG | BODY MASS INDEX: 20.95 KG/M2 | RESPIRATION RATE: 22 BRPM | DIASTOLIC BLOOD PRESSURE: 80 MMHG | TEMPERATURE: 96.9 F | WEIGHT: 146.31 LBS | HEIGHT: 70 IN

## 2024-10-05 PROCEDURE — G0299 HHS/HOSPICE OF RN EA 15 MIN: HCPCS | Mod: HHH

## 2024-10-05 PROCEDURE — 169592 NO-PAY CLAIM PROCEDURE

## 2024-10-05 ASSESSMENT — ENCOUNTER SYMPTOMS
PAIN: 1
PAIN LOCATION - EXACERBATING FACTORS: ANY ROM
LOSS OF SENSATION IN FEET: 1
FATIGUE: 1
PAIN LOCATION: BACK
FREQUENCY: 1
PAIN LOCATION: CHEST
HYPOTENSION: 1
DIZZINESS: 1
PAIN LOCATION - PAIN FREQUENCY: CONSTANT
SUBJECTIVE PAIN PROGRESSION: GRADUALLY IMPROVING
PAIN LOCATION - RELIEVING FACTORS: PAIN MEDS
PAIN LOCATION - PAIN SEVERITY: 0/10
PAIN LOCATION - EXACERBATING FACTORS: HEADACHE
CONSTIPATION: 1
HEADACHES: 1
LAST BOWEL MOVEMENT: 67118
LOWEST PAIN SEVERITY IN PAST 24 HOURS: 6/10
PAIN LOCATION: HEAD
LOWER EXTREMITY EDEMA: 1
PAIN LOCATION - PAIN DURATION: VARIES
PAIN LOCATION - EXACERBATING FACTORS: ANY MOBILITY
PERSON REPORTING PAIN: PATIENT
PAIN LOCATION - PAIN SEVERITY: 6/10
PAIN LOCATION - PAIN FREQUENCY: FREQUENT
PAIN LOCATION - PAIN SEVERITY: 9/10
PAIN LOCATION - PAIN QUALITY: SHARP
DEPRESSED MOOD: 1
PAIN LOCATION - EXACERBATING FACTORS: ANY MOVEMENT
PAIN LOCATION - PAIN QUALITY: ACHY
APPETITE LEVEL: GOOD
PAIN LOCATION - PAIN SEVERITY: 8/10
PAIN LOCATION: RIGHT ARM
PAIN LOCATION - PAIN DURATION: CONTINUOUS
PAIN LOCATION - PAIN QUALITY: ACHY
PAIN SEVERITY GOAL: 0/10
PAIN LOCATION - RELIEVING FACTORS: PAIN MEDS
PAIN LOCATION - PAIN FREQUENCY: INFREQUENT
DEPRESSION: 1
PAIN LOCATION - PAIN FREQUENCY: INFREQUENT
HIGHEST PAIN SEVERITY IN PAST 24 HOURS: 10/10
PAIN LOCATION - PAIN DURATION: CONTINUOUS
PAIN LOCATION - EXACERBATING FACTORS: ANY MOVEMENT
PAIN LOCATION - PAIN SEVERITY: 3/10
OCCASIONAL FEELINGS OF UNSTEADINESS: 1
PAIN LOCATION - PAIN DURATION: VARIES
PAIN LOCATION - PAIN FREQUENCY: CONSTANT
FATIGUES EASILY: 1
PAIN LOCATION - PAIN DURATION: VARIES
PAIN LOCATION: RIGHT LEG
PAIN LOCATION - PAIN QUALITY: SHARP
TROUBLE SWALLOWING: 1
FLUID RETENTION: 1

## 2024-10-05 ASSESSMENT — ACTIVITIES OF DAILY LIVING (ADL)
ENTERING_EXITING_HOME: ONE PERSON
LIGHT HOUSEKEEPING: DEPENDENT
PREPARING MEALS: NEEDS ASSISTANCE
TOILETING: 1
CURRENT_FUNCTION: MINIMUM ASSIST
AMBULATION ASSISTANCE: ONE PERSON
LAUNDRY: DEPENDENT
AMBULATION ASSISTANCE: MODERATE ASSIST
DRESSING_UB_CURRENT_FUNCTION: MINIMUM ASSIST
DRESSING_LB_CURRENT_FUNCTION: MODERATE ASSIST
GROOMING_CURRENT_FUNCTION: STAND BY ASSIST
HOUSEKEEPING ASSESSED: 1
TOILETING: MODERATE ASSIST
TOILETING: MINIMUM ASSIST
USING THE TELPHONE: INDEPENDENT
BATHING_CURRENT_FUNCTION: ONE PERSON
BATHING_CURRENT_FUNCTION: MODERATE ASSIST
PHYSICAL TRANSFERS ASSESSED: 1
TELEPHONE USE ASSESSED: 1
TRANSPORTATION ASSESSED: 1
FEEDING: INDEPENDENT
BATHING ASSESSED: 1
SHOPPING: DEPENDENT
AMBULATION ASSISTANCE: 1
ORAL_CARE_ASSESSED: 1
OASIS_M1830: 04
ORAL_CARE_CURRENT_FUNCTION: INDEPENDENT
TRANSPORTATION: DEPENDENT
LAUNDRY ASSESSED: 1
CURRENT_FUNCTION: CONTACT GUARD ASSIST
GROOMING ASSESSED: 1
SHOPPING ASSESSED: 1
FEEDING ASSESSED: 1

## 2024-10-05 ASSESSMENT — LIFESTYLE VARIABLES: SMOKING_STATUS: 0

## 2024-10-06 NOTE — CASE COMMUNICATION
Pt has sacral pressure ulcer stage 2. Pt would like Medihoney, Calium Alginate foam, then boarder jose.   Would like to wash with NS or soap and water then apply above daily  Do you approve?

## 2024-10-07 ENCOUNTER — PATIENT OUTREACH (OUTPATIENT)
Dept: PRIMARY CARE | Facility: CLINIC | Age: 69
End: 2024-10-07
Payer: MEDICARE

## 2024-10-07 ENCOUNTER — DOCUMENTATION (OUTPATIENT)
Dept: PRIMARY CARE | Facility: CLINIC | Age: 69
End: 2024-10-07
Payer: MEDICARE
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
## 2024-10-09 ENCOUNTER — HOME CARE VISIT (OUTPATIENT)
Dept: HOME HEALTH SERVICES | Facility: HOME HEALTH | Age: 69
End: 2024-10-09
Payer: MEDICARE

## 2024-10-09 VITALS
DIASTOLIC BLOOD PRESSURE: 60 MMHG | TEMPERATURE: 97.7 F | HEART RATE: 91 BPM | SYSTOLIC BLOOD PRESSURE: 130 MMHG | RESPIRATION RATE: 18 BRPM | OXYGEN SATURATION: 98 %

## 2024-10-09 PROCEDURE — G0151 HHCP-SERV OF PT,EA 15 MIN: HCPCS | Mod: HHH

## 2024-10-09 PROCEDURE — G0152 HHCP-SERV OF OT,EA 15 MIN: HCPCS | Mod: HHH

## 2024-10-09 ASSESSMENT — ENCOUNTER SYMPTOMS
HIGHEST PAIN SEVERITY IN PAST 24 HOURS: 5/10
PAIN LOCATION - PAIN FREQUENCY: CONSTANT
LOWEST PAIN SEVERITY IN PAST 24 HOURS: 9/10
PAIN LOCATION - PAIN QUALITY: ACHING
SUBJECTIVE PAIN PROGRESSION: WAXING AND WANING
PAIN LOCATION - RELIEVING FACTORS: REST, MEDS
PAIN SEVERITY GOAL: 0/10
PAIN: 1
PERSON REPORTING PAIN: PATIENT
PAIN LOCATION - PAIN SEVERITY: 4/10
PAIN LOCATION - EXACERBATING FACTORS: ROM, WB
PAIN LOCATION: RIGHT ANKLE

## 2024-10-09 ASSESSMENT — ACTIVITIES OF DAILY LIVING (ADL)
DRESSING_LB_CURRENT_FUNCTION: SUPERVISION
PREPARING MEALS: INDEPENDENT
BATHING_CURRENT_FUNCTION: STAND BY ASSIST
TOILETING: SUPERVISION
TOILETING: 1
PHYSICAL TRANSFERS ASSESSED: 1
BATHING ASSESSED: 1
LAUNDRY: INDEPENDENT
CURRENT_FUNCTION: SUPERVISION
DRESSING_UB_CURRENT_FUNCTION: INDEPENDENT
LAUNDRY ASSESSED: 1

## 2024-10-10 ENCOUNTER — OFFICE VISIT (OUTPATIENT)
Dept: ORTHOPEDIC SURGERY | Facility: CLINIC | Age: 69
End: 2024-10-10
Payer: MEDICARE

## 2024-10-10 ENCOUNTER — HOSPITAL ENCOUNTER (OUTPATIENT)
Dept: RADIOLOGY | Facility: CLINIC | Age: 69
Discharge: HOME | End: 2024-10-10
Payer: MEDICARE

## 2024-10-10 ENCOUNTER — HOME CARE VISIT (OUTPATIENT)
Dept: HOME HEALTH SERVICES | Facility: HOME HEALTH | Age: 69
End: 2024-10-10
Payer: MEDICARE

## 2024-10-10 VITALS — OXYGEN SATURATION: 97 % | DIASTOLIC BLOOD PRESSURE: 87 MMHG | HEART RATE: 118 BPM | SYSTOLIC BLOOD PRESSURE: 113 MMHG

## 2024-10-10 DIAGNOSIS — Z96.651 STATUS POST TOTAL RIGHT KNEE REPLACEMENT: ICD-10-CM

## 2024-10-10 PROCEDURE — 99211 OFF/OP EST MAY X REQ PHY/QHP: CPT | Performed by: ORTHOPAEDIC SURGERY

## 2024-10-10 PROCEDURE — 73560 X-RAY EXAM OF KNEE 1 OR 2: CPT | Mod: RIGHT SIDE | Performed by: RADIOLOGY

## 2024-10-10 PROCEDURE — 73560 X-RAY EXAM OF KNEE 1 OR 2: CPT | Mod: RT

## 2024-10-10 PROCEDURE — 1036F TOBACCO NON-USER: CPT | Performed by: ORTHOPAEDIC SURGERY

## 2024-10-10 PROCEDURE — 1157F ADVNC CARE PLAN IN RCRD: CPT | Performed by: ORTHOPAEDIC SURGERY

## 2024-10-10 PROCEDURE — 1125F AMNT PAIN NOTED PAIN PRSNT: CPT | Performed by: ORTHOPAEDIC SURGERY

## 2024-10-10 SDOH — HEALTH STABILITY: PHYSICAL HEALTH: EXERCISE COMMENTS: PATIENT HAS BEEN FAMILIAR WITH THEREXS FOR BLES FROM PREVIOUS THERAPY SESSIONS

## 2024-10-10 SDOH — HEALTH STABILITY: PHYSICAL HEALTH: PHYSICAL EXERCISE: SEATED

## 2024-10-10 SDOH — HEALTH STABILITY: PHYSICAL HEALTH: EXERCISE ACTIVITY: HIP FLEXION BLES, LAQ LLE, APS

## 2024-10-10 SDOH — HEALTH STABILITY: PHYSICAL HEALTH: EXERCISE ACTIVITY: STRETCHING RIGHT ANKLE

## 2024-10-10 ASSESSMENT — ENCOUNTER SYMPTOMS
PAIN LOCATION - RELIEVING FACTORS: MEDS
PAIN LOCATION - PAIN SEVERITY: 6/10
PAIN LOCATION: RIGHT ANKLE
PAIN: 1
LIMITED RANGE OF MOTION: 1
MUSCLE WEAKNESS: 1
PAIN LOCATION: RIGHT KNEE
PERSON REPORTING PAIN: PATIENT
OCCASIONAL FEELINGS OF UNSTEADINESS: 1
PAIN LOCATION - RELIEVING FACTORS: MEDS
PAIN LOCATION - PAIN SEVERITY: 4/10

## 2024-10-10 ASSESSMENT — ACTIVITIES OF DAILY LIVING (ADL)
AMBULATION ASSISTANCE: CONTACT GUARD ASSIST
AMBULATION ASSISTANCE ON FLAT SURFACES: 1
PHYSICAL TRANSFERS ASSESSED: 1
CURRENT_FUNCTION: STAND BY ASSIST
AMBULATION ASSISTANCE: 1
AMBULATION_DISTANCE/DURATION_TOLERATED: 30 FEET

## 2024-10-10 ASSESSMENT — PAIN SCALES - GENERAL: PAINLEVEL_OUTOF10: 8

## 2024-10-10 ASSESSMENT — PAIN - FUNCTIONAL ASSESSMENT: PAIN_FUNCTIONAL_ASSESSMENT: 0-10

## 2024-10-10 NOTE — PROGRESS NOTES
Chief complaint I am home now my knee is not leaking and I want p.o. antibiotics.  The Bactrim is bothering my stomach.        History 68-year-old debridement 11 weeks removal of total knee replacement debridement placement of antibiotic spacer and closure of infected right total knee replacement.  She has been weightbearing in a knee immobilizer but prefers not to wear it.  She has had no drainage for the last several weeks.  She was sent home and currently is on Bactrim for the last several days which is bothering her stomach.  Please note that she had MRSA from her recent knee originally.    Her physical exam today reveals that she has no drainage from any of the wounds around the right knee.  All the incisions of healed up.  She does have some erythema and slight warmth over the knee.  There is no lymphadenopathy.  She remains neurovascularly intact.    Assessment status post removal of infected knee replacement now with temporary bridging antibiotic spacer with soft tissue problems previously but now healed.    Plan I have sent a epic chat message to infectious disease to see if they can change her antibiotics from Bactrim to something else.  I have talked her about reimplantation of the knee and put her on the surgery schedule for February 2025.  This is actually my first available spot for this big operation.  I am going to see her back again in 8 weeks I do not need x-rays at that time

## 2024-10-14 ENCOUNTER — HOME CARE VISIT (OUTPATIENT)
Dept: HOME HEALTH SERVICES | Facility: HOME HEALTH | Age: 69
End: 2024-10-14
Payer: MEDICARE

## 2024-10-14 PROCEDURE — G0151 HHCP-SERV OF PT,EA 15 MIN: HCPCS | Mod: HHH

## 2024-10-14 ASSESSMENT — ENCOUNTER SYMPTOMS
PERSON REPORTING PAIN: PATIENT
PAIN LOCATION - RELIEVING FACTORS: MEDS
PAIN LOCATION: RIGHT LEG
PAIN: 1
PAIN LOCATION - PAIN SEVERITY: 8/10

## 2024-10-17 ENCOUNTER — APPOINTMENT (OUTPATIENT)
Dept: PRIMARY CARE | Facility: CLINIC | Age: 69
End: 2024-10-17
Payer: MEDICARE

## 2024-10-17 VITALS
BODY MASS INDEX: 20.99 KG/M2 | HEART RATE: 47 BPM | OXYGEN SATURATION: 98 % | DIASTOLIC BLOOD PRESSURE: 84 MMHG | WEIGHT: 146.31 LBS | SYSTOLIC BLOOD PRESSURE: 126 MMHG
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
## 2024-10-17 DIAGNOSIS — T84.7XXD HARDWARE COMPLICATING WOUND INFECTION, SUBSEQUENT ENCOUNTER: ICD-10-CM

## 2024-10-17 DIAGNOSIS — T84.50XD PROSTHETIC JOINT INFECTION, SUBSEQUENT ENCOUNTER: ICD-10-CM

## 2024-10-17 DIAGNOSIS — M17.31 POST-TRAUMATIC ARTHRITIS OF LOWER LEG, RIGHT: ICD-10-CM

## 2024-10-17 RX ORDER — CYCLOBENZAPRINE HCL 5 MG
TABLET ORAL
COMMUNITY
Start: 2024-10-03

## 2024-10-17 ASSESSMENT — ENCOUNTER SYMPTOMS
FEVER: 0
APPETITE CHANGE: 0
BLOOD IN STOOL: 0
TROUBLE SWALLOWING: 0
CONSTIPATION: 0
DIFFICULTY URINATING: 0
SEIZURES: 0
HEMATURIA: 0
JOINT SWELLING: 1
PALPITATIONS: 0
SHORTNESS OF BREATH: 0
DIARRHEA: 0
ARTHRALGIAS: 1
COLOR CHANGE: 0
NERVOUS/ANXIOUS: 0
UNEXPECTED WEIGHT CHANGE: 0
CONFUSION: 0
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
## 2024-10-18 ENCOUNTER — HOME CARE VISIT (OUTPATIENT)
Dept: HOME HEALTH SERVICES | Facility: HOME HEALTH | Age: 69
End: 2024-10-18
Payer: MEDICARE

## 2024-10-18 VITALS
TEMPERATURE: 97.4 F | HEART RATE: 50 BPM | DIASTOLIC BLOOD PRESSURE: 72 MMHG | SYSTOLIC BLOOD PRESSURE: 122 MMHG | RESPIRATION RATE: 18 BRPM | OXYGEN SATURATION: 97 %

## 2024-10-18 PROCEDURE — G0300 HHS/HOSPICE OF LPN EA 15 MIN: HCPCS | Mod: HHH

## 2024-10-18 ASSESSMENT — ENCOUNTER SYMPTOMS
PAIN LOCATION: RIGHT LEG
LOWEST PAIN SEVERITY IN PAST 24 HOURS: 8/10
HIGHEST PAIN SEVERITY IN PAST 24 HOURS: 10/10
PAIN SEVERITY GOAL: 5/10
CHANGE IN APPETITE: UNCHANGED
LAST BOWEL MOVEMENT: 67129
MUSCLE WEAKNESS: 1
PAIN: 1
APPETITE LEVEL: POOR

## 2024-10-21 ENCOUNTER — HOME CARE VISIT (OUTPATIENT)
Dept: HOME HEALTH SERVICES | Facility: HOME HEALTH | Age: 69
End: 2024-10-21
Payer: MEDICARE

## 2024-10-21 PROCEDURE — G0151 HHCP-SERV OF PT,EA 15 MIN: HCPCS | Mod: HHH

## 2024-10-21 ASSESSMENT — ENCOUNTER SYMPTOMS
PAIN LOCATION - RELIEVING FACTORS: PAIN MEDS
PAIN LOCATION: BACK
PERSON REPORTING PAIN: PATIENT
PAIN LOCATION - PAIN SEVERITY: 8/10
PAIN LOCATION: RIGHT KNEE
PAIN: 1
PAIN LOCATION - PAIN SEVERITY: 8/10
PAIN LOCATION - RELIEVING FACTORS: PAIN MEDS

## 2024-10-22 ENCOUNTER — HOME CARE VISIT (OUTPATIENT)
Dept: HOME HEALTH SERVICES | Facility: HOME HEALTH | Age: 69
End: 2024-10-22
Payer: MEDICARE

## 2024-10-22 VITALS
RESPIRATION RATE: 18 BRPM | HEART RATE: 52 BPM | SYSTOLIC BLOOD PRESSURE: 110 MMHG | OXYGEN SATURATION: 96 % | TEMPERATURE: 97.5 F | DIASTOLIC BLOOD PRESSURE: 64 MMHG

## 2024-10-22 PROCEDURE — G0300 HHS/HOSPICE OF LPN EA 15 MIN: HCPCS | Mod: HHH

## 2024-10-22 ASSESSMENT — ENCOUNTER SYMPTOMS
HIGHEST PAIN SEVERITY IN PAST 24 HOURS: 10/10
PAIN LOCATION: GENERALIZED
MUSCLE WEAKNESS: 1
PAIN: 1
CHANGE IN APPETITE: UNCHANGED
APPETITE LEVEL: GOOD
PAIN SEVERITY GOAL: 4/10
LAST BOWEL MOVEMENT: 67134
LOWEST PAIN SEVERITY IN PAST 24 HOURS: 8/10

## 2024-10-23 ENCOUNTER — APPOINTMENT (OUTPATIENT)
Dept: PRIMARY CARE | Facility: CLINIC | Age: 69
End: 2024-10-23
Payer: MEDICARE

## 2024-10-28 ENCOUNTER — HOME CARE VISIT (OUTPATIENT)
Dept: HOME HEALTH SERVICES | Facility: HOME HEALTH | Age: 69
End: 2024-10-28
Payer: MEDICARE

## 2024-10-28 PROCEDURE — G0151 HHCP-SERV OF PT,EA 15 MIN: HCPCS | Mod: HHH

## 2024-10-28 ASSESSMENT — ENCOUNTER SYMPTOMS
PAIN LOCATION: RIGHT ANKLE
PERSON REPORTING PAIN: PATIENT
SUBJECTIVE PAIN PROGRESSION: UNCHANGED
PAIN LOCATION - PAIN SEVERITY: 7/10
PAIN LOCATION - RELIEVING FACTORS: PAIN PILLS
PAIN: 1

## 2024-10-30 ENCOUNTER — HOME CARE VISIT (OUTPATIENT)
Dept: HOME HEALTH SERVICES | Facility: HOME HEALTH | Age: 69
End: 2024-10-30
Payer: MEDICARE

## 2024-10-30 VITALS
HEIGHT: 70 IN | SYSTOLIC BLOOD PRESSURE: 124 MMHG | BODY MASS INDEX: 20.97 KG/M2 | WEIGHT: 146.5 LBS | RESPIRATION RATE: 16 BRPM | OXYGEN SATURATION: 99 % | DIASTOLIC BLOOD PRESSURE: 76 MMHG | HEART RATE: 76 BPM | TEMPERATURE: 97.1 F

## 2024-10-30 PROCEDURE — G0299 HHS/HOSPICE OF RN EA 15 MIN: HCPCS | Mod: HHH

## 2024-10-30 ASSESSMENT — ENCOUNTER SYMPTOMS
PAIN LOCATION: RIGHT LEG
PAIN LOCATION - PAIN DURATION: INTERMITTENT
FATIGUES EASILY: 1
PAIN LOCATION - EXACERBATING FACTORS: ACTIVITY
PAIN LOCATION - RELIEVING FACTORS: REST, MEDICATION
PAIN LOCATION - PAIN QUALITY: ACHING
LOWEST PAIN SEVERITY IN PAST 24 HOURS: 4/10
LIMITED RANGE OF MOTION: 1
MUSCLE WEAKNESS: 1
SUBJECTIVE PAIN PROGRESSION: UNCHANGED
PERSON REPORTING PAIN: PATIENT
PAIN LOCATION - PAIN FREQUENCY: CONSTANT
PAIN LOCATION - PAIN SEVERITY: 7/10
PAIN: 1
HIGHEST PAIN SEVERITY IN PAST 24 HOURS: 7/10
PAIN SEVERITY GOAL: 1/10

## 2024-10-30 ASSESSMENT — ACTIVITIES OF DAILY LIVING (ADL)
PHYSICAL TRANSFERS ASSESSED: 1
CURRENT_FUNCTION: ONE PERSON
AMBULATION ASSISTANCE: ONE PERSON
AMBULATION ASSISTANCE: 1

## 2024-11-04 ENCOUNTER — HOME CARE VISIT (OUTPATIENT)
Dept: HOME HEALTH SERVICES | Facility: HOME HEALTH | Age: 69
End: 2024-11-04
Payer: MEDICARE

## 2024-11-04 DIAGNOSIS — M17.31 POST-TRAUMATIC ARTHRITIS OF LOWER LEG, RIGHT: ICD-10-CM

## 2024-11-04 DIAGNOSIS — M86.461 CHRONIC OSTEOMYELITIS OF RIGHT TIBIA WITH DRAINING SINUS (MULTI): ICD-10-CM

## 2024-11-04 RX ORDER — OXYCODONE HYDROCHLORIDE 5 MG/1
5 TABLET ORAL
Qty: 150 TABLET | Refills: 0 | Status: SHIPPED | OUTPATIENT
Start: 2024-11-04 | End: 2024-12-04

## 2024-11-06 ENCOUNTER — PATIENT OUTREACH (OUTPATIENT)
Dept: PRIMARY CARE | Facility: CLINIC | Age: 69
End: 2024-11-06
Payer: MEDICARE

## 2024-11-06 NOTE — PROGRESS NOTES
Call regarding appt with Dr. Rueda after hospitalization. At time of outreach call, the patient stated she is doing okay. She is sleeping a lot and her pain is manageable with medications. Patient to have further surgery on her knee in February. No questions or concerns at this time.

## 2024-11-13 ENCOUNTER — HOME CARE VISIT (OUTPATIENT)
Dept: HOME HEALTH SERVICES | Facility: HOME HEALTH | Age: 69
End: 2024-11-13
Payer: MEDICARE

## 2024-11-13 PROCEDURE — G0151 HHCP-SERV OF PT,EA 15 MIN: HCPCS | Mod: HHH

## 2024-11-13 SDOH — HEALTH STABILITY: PHYSICAL HEALTH: PHYSICAL EXERCISE: SEATED

## 2024-11-13 SDOH — HEALTH STABILITY: PHYSICAL HEALTH: EXERCISE ACTIVITY: HIP ABD, SLR

## 2024-11-13 SDOH — HEALTH STABILITY: PHYSICAL HEALTH: PHYSICAL EXERCISE: 10

## 2024-11-13 SDOH — HEALTH STABILITY: PHYSICAL HEALTH: PHYSICAL EXERCISE: STANDING

## 2024-11-13 SDOH — HEALTH STABILITY: PHYSICAL HEALTH: EXERCISE ACTIVITY: HIP FLEXION LLE, LAQ LLE

## 2024-11-13 ASSESSMENT — ENCOUNTER SYMPTOMS
LIMITED RANGE OF MOTION: 1
PAIN LOCATION - RELIEVING FACTORS: PAIN MEDS
SUBJECTIVE PAIN PROGRESSION: UNCHANGED
LOWEST PAIN SEVERITY IN PAST 24 HOURS: 4/10
PERSON REPORTING PAIN: PATIENT
HIGHEST PAIN SEVERITY IN PAST 24 HOURS: 10/10
PAIN LOCATION - PAIN SEVERITY: 7/10
PAIN LOCATION: RIGHT LEG
PAIN: 1

## 2024-11-13 ASSESSMENT — ACTIVITIES OF DAILY LIVING (ADL)
HOME_HEALTH_OASIS: 01
AMBULATION_DISTANCE/DURATION_TOLERATED: HOUSEHOLD DISTANCES
OASIS_M1830: 02

## 2024-11-13 NOTE — CASE COMMUNICATION
Patient discharegd from home care. Ind with the wc and hep. Will continue with standing tolerance and therexs. Surgery planned in Feburary for right tkr.

## 2024-11-21 ENCOUNTER — LAB (OUTPATIENT)
Dept: LAB | Facility: LAB | Age: 69
End: 2024-11-21
Payer: MEDICARE

## 2024-11-21 ENCOUNTER — OFFICE VISIT (OUTPATIENT)
Dept: ORTHOPEDIC SURGERY | Facility: CLINIC | Age: 69
End: 2024-11-21
Payer: MEDICARE

## 2024-11-21 DIAGNOSIS — T84.50XD PROSTHETIC JOINT INFECTION, SUBSEQUENT ENCOUNTER: ICD-10-CM

## 2024-11-21 LAB
BASOPHILS # BLD AUTO: 0.03 X10*3/UL (ref 0–0.1)
BASOPHILS NFR BLD AUTO: 0.5 %
CRP SERPL-MCNC: 0.49 MG/DL
EOSINOPHIL # BLD AUTO: 0.08 X10*3/UL (ref 0–0.7)
EOSINOPHIL NFR BLD AUTO: 1.3 %
ERYTHROCYTE [DISTWIDTH] IN BLOOD BY AUTOMATED COUNT: 17.5 % (ref 11.5–14.5)
ERYTHROCYTE [SEDIMENTATION RATE] IN BLOOD BY WESTERGREN METHOD: 46 MM/H (ref 0–30)
HCT VFR BLD AUTO: 42.1 % (ref 36–46)
HGB BLD-MCNC: 12.9 G/DL (ref 12–16)
IMM GRANULOCYTES # BLD AUTO: 0.01 X10*3/UL (ref 0–0.7)
IMM GRANULOCYTES NFR BLD AUTO: 0.2 % (ref 0–0.9)
LYMPHOCYTES # BLD AUTO: 2.03 X10*3/UL (ref 1.2–4.8)
LYMPHOCYTES NFR BLD AUTO: 33.2 %
MCH RBC QN AUTO: 24.7 PG (ref 26–34)
MCHC RBC AUTO-ENTMCNC: 30.6 G/DL (ref 32–36)
MCV RBC AUTO: 81 FL (ref 80–100)
MONOCYTES # BLD AUTO: 0.51 X10*3/UL (ref 0.1–1)
MONOCYTES NFR BLD AUTO: 8.3 %
NEUTROPHILS # BLD AUTO: 3.46 X10*3/UL (ref 1.2–7.7)
NEUTROPHILS NFR BLD AUTO: 56.5 %
NRBC BLD-RTO: 0 /100 WBCS (ref 0–0)
PLATELET # BLD AUTO: 291 X10*3/UL (ref 150–450)
RBC # BLD AUTO: 5.23 X10*6/UL (ref 4–5.2)
WBC # BLD AUTO: 6.1 X10*3/UL (ref 4.4–11.3)

## 2024-11-21 PROCEDURE — 99214 OFFICE O/P EST MOD 30 MIN: CPT | Performed by: ORTHOPAEDIC SURGERY

## 2024-11-21 PROCEDURE — 86140 C-REACTIVE PROTEIN: CPT

## 2024-11-21 PROCEDURE — 1157F ADVNC CARE PLAN IN RCRD: CPT | Performed by: ORTHOPAEDIC SURGERY

## 2024-11-21 PROCEDURE — 85652 RBC SED RATE AUTOMATED: CPT

## 2024-11-21 PROCEDURE — 85025 COMPLETE CBC W/AUTO DIFF WBC: CPT
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
## 2024-12-04 ENCOUNTER — PATIENT OUTREACH (OUTPATIENT)
Dept: PRIMARY CARE | Facility: CLINIC | Age: 69
End: 2024-12-04
Payer: MEDICARE

## 2024-12-04 DIAGNOSIS — M86.461 CHRONIC OSTEOMYELITIS OF RIGHT TIBIA WITH DRAINING SINUS (MULTI): ICD-10-CM

## 2024-12-04 DIAGNOSIS — M17.31 POST-TRAUMATIC ARTHRITIS OF LOWER LEG, RIGHT: ICD-10-CM

## 2024-12-04 RX ORDER — OXYCODONE HYDROCHLORIDE 5 MG/1
5 TABLET ORAL
Qty: 150 TABLET | Refills: 0 | Status: SHIPPED | OUTPATIENT
Start: 2024-12-04 | End: 2025-01-03

## 2025-01-03 ENCOUNTER — PATIENT OUTREACH (OUTPATIENT)
Dept: PRIMARY CARE | Facility: CLINIC | Age: 70
End: 2025-01-03

## 2025-01-03 ENCOUNTER — OFFICE VISIT (OUTPATIENT)
Dept: PRIMARY CARE | Facility: CLINIC | Age: 70
End: 2025-01-03
Payer: MEDICARE

## 2025-01-03 VITALS
HEART RATE: 38 BPM | BODY MASS INDEX: 22.1 KG/M2 | OXYGEN SATURATION: 98 % | TEMPERATURE: 98 F | DIASTOLIC BLOOD PRESSURE: 80 MMHG | WEIGHT: 154 LBS | SYSTOLIC BLOOD PRESSURE: 130 MMHG

## 2025-01-03 DIAGNOSIS — M86.461 CHRONIC OSTEOMYELITIS OF RIGHT TIBIA WITH DRAINING SINUS (MULTI): ICD-10-CM

## 2025-01-03 DIAGNOSIS — M17.31 POST-TRAUMATIC ARTHRITIS OF LOWER LEG, RIGHT: ICD-10-CM

## 2025-01-03 DIAGNOSIS — J01.90 ACUTE SINUSITIS, RECURRENCE NOT SPECIFIED, UNSPECIFIED LOCATION: Primary | ICD-10-CM

## 2025-01-03 PROCEDURE — 1157F ADVNC CARE PLAN IN RCRD: CPT | Performed by: FAMILY MEDICINE

## 2025-01-03 PROCEDURE — 1160F RVW MEDS BY RX/DR IN RCRD: CPT | Performed by: FAMILY MEDICINE

## 2025-01-03 PROCEDURE — 1159F MED LIST DOCD IN RCRD: CPT | Performed by: FAMILY MEDICINE

## 2025-01-03 PROCEDURE — 99213 OFFICE O/P EST LOW 20 MIN: CPT | Performed by: FAMILY MEDICINE

## 2025-01-03 PROCEDURE — 1036F TOBACCO NON-USER: CPT | Performed by: FAMILY MEDICINE

## 2025-01-03 RX ORDER — CEFDINIR 300 MG/1
300 CAPSULE ORAL 2 TIMES DAILY
Qty: 20 CAPSULE | Refills: 0 | Status: SHIPPED | OUTPATIENT
Start: 2025-01-03 | End: 2025-01-13

## 2025-01-03 RX ORDER — OXYCODONE HYDROCHLORIDE 5 MG/1
5 TABLET ORAL
Qty: 150 TABLET | Refills: 0 | OUTPATIENT
Start: 2025-01-03 | End: 2025-02-02

## 2025-01-03 RX ORDER — OXYCODONE HYDROCHLORIDE 5 MG/1
5 TABLET ORAL
Qty: 150 TABLET | Refills: 0 | Status: SHIPPED | OUTPATIENT
Start: 2025-01-03 | End: 2025-02-02

## 2025-01-03 NOTE — PROGRESS NOTES
Subjective   Patient ID: Adriana Wood is a 69 y.o. female who presents for Cough (Productive cough, low grade temp.  Started over a week ago with nasal discharge. Coughing up white phlegm, sore throat and she thinks her leg may be infected and can;t get ahold of the infectious disease doctor.).    HPI       Started with severe fatigue  - the past month   (Dealing with chronic infection of right leg - on Bactrim)     When she was on stronger antibiotics in SNF she felt better  - but since on Bactrim she feels like it hasn't worked as well     Nasal drainage started 10 days ago     The past 3- 4 days - coughing up white phlegm - that is getting worse  - lots of drainage and mucous     Low grade fevers     Review of Systems    Objective   /80 (BP Location: Left arm, Patient Position: Sitting, BP Cuff Size: Large adult)   Pulse (!) 38   Temp 36.7 °C (98 °F) (Oral)   Wt 69.9 kg (154 lb)   SpO2 98%   BMI 22.10 kg/m²     Physical Exam  Vitals reviewed.   Constitutional:       Appearance: Normal appearance. She is normal weight.      Comments: IN motorized wheelchair -   Right leg in fixed extended position    HENT:      Head: Normocephalic and atraumatic.      Nose: Congestion and rhinorrhea present.      Mouth/Throat:      Mouth: Mucous membranes are moist.      Pharynx: Oropharynx is clear. No oropharyngeal exudate or posterior oropharyngeal erythema.   Eyes:      Conjunctiva/sclera: Conjunctivae normal.      Pupils: Pupils are equal, round, and reactive to light.   Cardiovascular:      Rate and Rhythm: Normal rate and regular rhythm.   Pulmonary:      Effort: Pulmonary effort is normal.      Breath sounds: Normal breath sounds.   Musculoskeletal:      Cervical back: Neck supple. Tenderness present.   Lymphadenopathy:      Cervical: Cervical adenopathy present.   Neurological:      Mental Status: She is alert.         Assessment/Plan   Problem List Items Addressed This Visit    None  Visit Diagnoses          Codes    Acute sinusitis, recurrence not specified, unspecified location    -  Primary J01.90    Relevant Medications    cefdinir (Omnicef) 300 mg capsule          She feels she has tolerated Cefdinir in the past -   Will try that -   She has a telehealth appt with ID in 3 days - urged her to keep that.     Education on sinus care     We discussed at visit any disease processes that were of concern as well as the risks, benefits and instructions of any new medication provided.    See orders and discussion section for information provided to patient in their After Visit Summary.   Patient (and/or caretaker of patient if present)  stated all questions were answered, and they voiced understanding of instructions.

## 2025-01-03 NOTE — PATIENT INSTRUCTIONS
TREATMENT FOR SINUSITIS AND UPPER RESPIRATORY INFECTIONS:     Drink plenty of fluids, especially water.     Used humidifiers, steam, hot liquids to moisten your nasal passages.      Saline nasal spray often helps,  Simply Saline is a nice over the counter saline spray that you can use as much as you want.       Mucinex or guaifenesin is an over the counter medication that often helps loosen the mucous.  DO NOT USE IN CHILDREN UNDER 6 YEARS OF AGE.      Please be sure to call or follow-up if you are not better in 5-10 days, or if you are worsening.      The most common cause of upper respiratory infections are viruses - which no not need an antibiotic to get better.   We want your own immune system to fight the virus so you or your child develop immunity to it.    However,  people can develop pneumonia, sinus infections and sometimes ear infections from a virus  - which may need an antibiotic.   So if you are showing signs of breathing issues,  or severe ear pain or facial pain with fevers, of if you are no better after 10 days , its important that you contact us.        If you are prescribed an antibiotic,  it's a good idea to take a probiotic to help prevent diarrhea and yeast infections.  This would be eating a yogurt daily or taking something like acidophillus or Culturelle.      EDUCATION ABOUT TAKING ANTIBIOTICS:     It is very important to complete the entire course of antibiotics as directed.  This helps prevent antibiotic resistant forms of bacteria.     You may want to create a chart, and michael off the doses taken to remember them all.     Common side effects of antibiotics include yeast infections, diarrhea and nausea. Sometimes over-the-counter probiotics (such as eating yogurt or taking acidophilus or Culturelle)  may help prevent the diarrhea and yeast infections caused by antibiotics. If you develop persistent or bloody diarrhea after taking an antibiotic, please contact your provider about the  possibility of a serious secondary infection of your colon caused by the antibiotic. Sometimes the nausea from antibiotics can be helped by taking the antibiotic with food unless otherwise specified not to by the pharmacist.     If you develop a rash while on the antibiotic, if could be from the antibiotic, from the illness itself, or could be from a response by some viral infections to the antibiotic. Please discuss this with your provider before assuming that you are allergic to the medication.    If it is determined that you have had an allergic reaction to the antibiotic, please make sure you make note of that for yourself to be sure to never get that antibiotic again as a more serious reaction - called anaphylaxis - may occur.   You should also ask about similar antibiotics that may be dangerous as well.    If you are a woman on birth control, it is important you use a back up form of contraception for the next month to prevent pregnancy as some antibiotics reduce the effectiveness of birth control. This could result in an unplanned pregnancy.

## 2025-01-06 ENCOUNTER — APPOINTMENT (OUTPATIENT)
Dept: INFECTIOUS DISEASES | Facility: CLINIC | Age: 70
End: 2025-01-06
Payer: MEDICARE

## 2025-01-06 DIAGNOSIS — M86.461 CHRONIC OSTEOMYELITIS OF RIGHT TIBIA WITH DRAINING SINUS (MULTI): Primary | ICD-10-CM

## 2025-01-06 PROCEDURE — 1157F ADVNC CARE PLAN IN RCRD: CPT | Performed by: INTERNAL MEDICINE

## 2025-01-06 PROCEDURE — G2211 COMPLEX E/M VISIT ADD ON: HCPCS | Performed by: INTERNAL MEDICINE

## 2025-01-06 PROCEDURE — 99214 OFFICE O/P EST MOD 30 MIN: CPT | Performed by: INTERNAL MEDICINE

## 2025-01-06 ASSESSMENT — ENCOUNTER SYMPTOMS
CARDIOVASCULAR NEGATIVE: 1
RESPIRATORY NEGATIVE: 1
ALLERGIC/IMMUNOLOGIC NEGATIVE: 1
NEUROLOGICAL NEGATIVE: 1
EYES NEGATIVE: 1
ENDOCRINE NEGATIVE: 1
GASTROINTESTINAL NEGATIVE: 1
HEMATOLOGIC/LYMPHATIC NEGATIVE: 1
PSYCHIATRIC NEGATIVE: 1
CONSTITUTIONAL NEGATIVE: 1
MUSCULOSKELETAL NEGATIVE: 1
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
## 2025-01-07 PROBLEM — K52.9 CHRONIC DIARRHEA: Status: ACTIVE | Noted: 2025-01-07

## 2025-01-07 PROBLEM — R06.00 DYSPNEA: Status: ACTIVE | Noted: 2025-01-07

## 2025-01-07 PROBLEM — H25.811 COMBINED FORMS OF AGE-RELATED CATARACT OF RIGHT EYE: Status: ACTIVE | Noted: 2023-08-09

## 2025-01-07 PROBLEM — K21.9 GERD (GASTROESOPHAGEAL REFLUX DISEASE): Status: ACTIVE | Noted: 2025-01-07

## 2025-01-07 PROBLEM — K59.09 CHRONIC CONSTIPATION: Status: ACTIVE | Noted: 2025-01-07

## 2025-01-07 PROBLEM — M54.81 OCCIPITAL NEURALGIA OF RIGHT SIDE: Status: ACTIVE | Noted: 2025-01-07

## 2025-01-07 PROBLEM — F32.A DEPRESSION, ACUTE: Status: ACTIVE | Noted: 2025-01-07

## 2025-01-07 PROBLEM — E83.42 HYPOMAGNESEMIA: Status: RESOLVED | Noted: 2025-01-07 | Resolved: 2025-01-07

## 2025-01-07 PROBLEM — Z86.79 HISTORY OF DRUG-INDUCED PROLONGED QT INTERVAL WITH TORSADE DE POINTES: Status: ACTIVE | Noted: 2025-01-07

## 2025-01-07 PROBLEM — M86.161: Status: ACTIVE | Noted: 2021-10-21

## 2025-01-07 PROBLEM — R53.1 WEAKNESS: Status: ACTIVE | Noted: 2021-10-21

## 2025-01-07 PROBLEM — S52.531B: Status: RESOLVED | Noted: 2025-01-07 | Resolved: 2025-01-07

## 2025-01-07 PROBLEM — Z96.649 S/P HIP REPLACEMENT: Status: ACTIVE | Noted: 2024-03-28

## 2025-01-07 PROBLEM — M86.9 OSTEOMYELITIS OF RIGHT TIBIA (MULTI): Status: ACTIVE | Noted: 2023-03-13

## 2025-01-07 PROBLEM — R51.9 HEADACHE: Status: ACTIVE | Noted: 2025-01-07

## 2025-01-07 PROBLEM — F43.12 POST-TRAUMATIC STRESS DISORDER, CHRONIC: Status: ACTIVE | Noted: 2021-12-01

## 2025-01-07 PROBLEM — Q78.2 OSTEOPETROSIS (HHS-HCC): Status: ACTIVE | Noted: 2025-01-07

## 2025-01-07 PROBLEM — L01.00 IMPETIGO: Status: RESOLVED | Noted: 2025-01-07 | Resolved: 2025-01-07

## 2025-01-08 ENCOUNTER — APPOINTMENT (OUTPATIENT)
Dept: CARDIOLOGY | Facility: CLINIC | Age: 70
End: 2025-01-08
Payer: MEDICARE

## 2025-01-08 DIAGNOSIS — I46.2 CARDIAC ARREST DUE TO UNDERLYING CARDIAC CONDITION: Primary | ICD-10-CM

## 2025-01-08 DIAGNOSIS — M17.31 POST-TRAUMATIC ARTHRITIS OF LOWER LEG, RIGHT: ICD-10-CM

## 2025-01-08 DIAGNOSIS — Z96.651 STATUS POST TOTAL RIGHT KNEE REPLACEMENT: ICD-10-CM

## 2025-01-08 DIAGNOSIS — T84.50XD PROSTHETIC JOINT INFECTION, SUBSEQUENT ENCOUNTER: ICD-10-CM

## 2025-01-08 PROCEDURE — G2211 COMPLEX E/M VISIT ADD ON: HCPCS | Performed by: INTERNAL MEDICINE

## 2025-01-08 PROCEDURE — 99214 OFFICE O/P EST MOD 30 MIN: CPT | Performed by: INTERNAL MEDICINE

## 2025-01-08 PROCEDURE — 1157F ADVNC CARE PLAN IN RCRD: CPT | Performed by: INTERNAL MEDICINE

## 2025-01-08 NOTE — PROGRESS NOTES
I had the pleasure seeing Adriana Wood     No chief complaint on file.    She presents for a 4 month PVC and Bradycardia follow-up visit.      Current Outpatient Medications   Medication Instructions    albuterol 90 mcg/actuation inhaler 2 puffs, inhalation, Every 6 hours PRN    cefdinir (OMNICEF) 300 mg, oral, 2 times daily    cyclobenzaprine (Flexeril) 5 mg tablet GIVE 1 TABLET BY MOUTH EVERY 8 HOURS AS NEEDED FOR MUSCLE SPASMS    naloxone (NARCAN) 0.2 mg, intravenous, Every 5 min PRN    oxyCODONE (ROXICODONE) 5 mg, oral, 5 times daily    polyethylene glycol (GLYCOLAX, MIRALAX) 17 g, oral, Daily    prochlorperazine (COMPAZINE) 10 mg, oral, Every 6 hours PRN    sennosides-docusate sodium (Stephany-Colace) 8.6-50 mg tablet 2 tablets, oral, 2 times daily PRN      Adriana Wood is a 69 y.o. with:     Pmh includes Osteomyelitis Rt Tibia, PTSD, Paroxysmal Positional Vertigo, Osteoporosis, OA, HTN, and GERD.      Cardiac History    Non-obstructive CAD   Takotsubo CM    Cardiac Arrest - (Sept 2021) Post op she had an arrest lasting 2 minutes. However she was not on telemetry and it was unclear what rhythm she was in. She did have however prolonged QT interval (QTc >600ms) (attributed to Zofran and electrolyte disturbance) and Takatsubo  Bradycardia, frequent PVCS - including V-bigeminy     Feb 2023:  The origin of her PVC is in the outflow tract, the transition is somewhat later but so it is in the intrinsic transition, so it is difficult to tell if these are right or left outflow ( I do suspect left coronary cusp PVC). The clinical significance of these is what needs to be determined. She is not very symptomatic and has had PVC all her life. They have not resulted in lowering of her EF. From this stand point of view she can continue with the surgery without additional testing . I don't know if these have been a factor in initiation torsades back in October of 2021. There are no recordings and usually PVCs from the  outflow are not malignant. The key would be to avoid anything that might prolong the QT. Once her orthopedic surgeries are completed she will try to address the PVCs. Obviously because of the prolonged QT one would like to avoid antiarrhythmics. We will reconvene again to discuss the PVCs after her surgeries.         Cardiac TESTING     -ECHO:  (2/23/23) LVEF 50-55%.  Mildly elevated RVSP 34.1mmHg.  Freq PVCs noted during exam.    -ECHO: TTE (Feb 9, 2023) LVEF 50-55%. Mildly elevated RVSP 34.1 mmHg. Frequent PVCs     -MONITOR: [employer] 3 days (Feb 2023) showed mostly NSR with frequent PVCs. Min HR 46 bpm, Max  bpm, Avg HR 76 bpm. SVEs (Charlotte <1%) VE (Charlotte 39.54%) including 28 V-triplets.       Objective   Physical Exam  Constitutional: alert and in no acute distress.   Eyes: no erythema, swelling or discharge from the eye .   Neck: neck is supple, symmetric, trachea midline, no masses .   Pulmonary: no increased work of breathing or signs of respiratory distress  and lungs clear to auscultation.    Cardiovascular:  regular rhythm, normal S1 and S2, no murmurs , pedal pulses 2+ bilaterally  and no edema .   Abdomen: abdomen non-tender, no masses .   Skin: skin warm and dry, normal skin turgor .   Psychiatric judgment and insight is normal  and oriented to person, place and time .             Assessment/Plan   Adriana has been doing well from the cardiac stand point of view. She is still struggling with knee surgeries and infections.  She is scheduled for RESURFACING ARTHROPLASTY of the knee. Her ECG shows NSR with PVCs. She can proceed with surgery without additional testing and treatments. Avoid zofran and QT prolonging medications.

## 2025-01-09 ENCOUNTER — APPOINTMENT (OUTPATIENT)
Dept: INFECTIOUS DISEASES | Facility: CLINIC | Age: 70
End: 2025-01-09
Payer: MEDICARE

## 2025-01-16 ENCOUNTER — OFFICE VISIT (OUTPATIENT)
Dept: ORTHOPEDIC SURGERY | Facility: CLINIC | Age: 70
End: 2025-01-16
Payer: MEDICARE

## 2025-01-16 ENCOUNTER — HOSPITAL ENCOUNTER (OUTPATIENT)
Dept: RADIOLOGY | Facility: CLINIC | Age: 70
Discharge: HOME | End: 2025-01-16
Payer: MEDICARE

## 2025-01-16 DIAGNOSIS — Z96.651 STATUS POST TOTAL RIGHT KNEE REPLACEMENT: ICD-10-CM

## 2025-01-16 DIAGNOSIS — T84.50XD PROSTHETIC JOINT INFECTION, SUBSEQUENT ENCOUNTER: ICD-10-CM

## 2025-01-16 PROCEDURE — 1157F ADVNC CARE PLAN IN RCRD: CPT | Performed by: ORTHOPAEDIC SURGERY

## 2025-01-16 PROCEDURE — 73560 X-RAY EXAM OF KNEE 1 OR 2: CPT | Mod: RT

## 2025-01-16 PROCEDURE — 99215 OFFICE O/P EST HI 40 MIN: CPT | Performed by: ORTHOPAEDIC SURGERY

## 2025-01-16 PROCEDURE — 77073 BONE LENGTH STUDIES: CPT

## 2025-01-16 ASSESSMENT — ENCOUNTER SYMPTOMS
COLOR CHANGE: 1
VOMITING: 0
DIFFICULTY URINATING: 0
SHORTNESS OF BREATH: 0
NUMBNESS: 0
FLANK PAIN: 0
WEAKNESS: 0
NAUSEA: 0
CHILLS: 0
WOUND: 1
DYSURIA: 0
FEVER: 0
HEMATURIA: 0

## 2025-01-16 NOTE — PROGRESS NOTES
Subjective   Patient ID: Adriana Wood is a 69 y.o. female presenting for follow up prior to combined surgery with Dr. Whelan on 2/28/25.    ARTUR Wright is a 68-year-old female with a history of HTN, bradycardia, cardiomyopathy, CAD, cardiac arrest due to electrolyte abnormalities and zofran use post-operatively, and traumatic brain injury. Patient had a total knee right knee done by Dr. Whelan in March 2024. Which was complicated by E. Coli infection. Patient had right TKA wound dehiscence s/p I&D right knee with polyswap and attempted re-closure of complex wound on 5/10/2024 by Dr. Whelan. She is currently on ceftriaxone until June 19th. Patients current wound care regimen for right knee wound is xeroform and ABD followed by ACE wrap. Patient is referred back from Dr. Whelan who plans on 2/28/25 to pull the antibiotic spacer and place a new spacer with an antibiotic adrienne in place for stability. The patient presents today to discuss possible free flap for tissue coverage.      Review of Systems   Constitutional:  Negative for chills and fever.   Respiratory:  Negative for shortness of breath.    Cardiovascular:  Negative for chest pain.   Gastrointestinal:  Negative for nausea and vomiting.   Genitourinary:  Negative for difficulty urinating, dysuria, flank pain and hematuria.   Musculoskeletal:  Positive for gait problem.   Skin:  Positive for color change and wound. Negative for rash.   Neurological:  Negative for weakness and numbness.     Physical Exam  Vitals and nursing note reviewed. Exam conducted with a chaperone present.   Constitutional:       General: She is not in acute distress.     Appearance: She is not ill-appearing.   Eyes:      Extraocular Movements: Extraocular movements intact.      Conjunctiva/sclera: Conjunctivae normal.      Pupils: Pupils are equal, round, and reactive to light.   Cardiovascular:      Rate and Rhythm: Normal rate and regular rhythm.      Pulses: Normal pulses.    Pulmonary:      Effort: Pulmonary effort is normal.      Breath sounds: Normal breath sounds.   Abdominal:      Palpations: Abdomen is soft. There is no mass.      Tenderness: There is no abdominal tenderness.      Hernia: No hernia is present.   Musculoskeletal:         General: No swelling or tenderness.      Cervical back: Normal range of motion and neck supple.   Skin:     Capillary Refill: Capillary refill takes less than 2 seconds.      Coloration: Skin is not jaundiced.      Findings: Right knee wounds (total 2), measuring:   The upper wound is about 1cm x 2cm and tracks 3 cm deep to knee joint , the lower wound is 0.5x0.5 cm. Both wounds are overlying right kneecap. Wounds appear to have fibrinous slough overlying wound bed. Notable surrounding erythema and warmth. Expressible drainage appreciated with purulent fluid. Decreased ROM in RLE, unable to fully extend right leg, varus deformity  Neurological:      General: No focal deficit present.      Mental Status: She is oriented to person, place, and time.   Psychiatric:         Mood and Affect: Mood normal.         Behavior: Behavior normal.         Thought Content: Thought content normal.         Judgment: Judgment normal.     Objective   Wound on 6/24/24:      Wound today 8/7/24 8/1/24 Tissue Wound Culture:     (4+) Abundant Methicillin Resistant Staphylococcus aureus (MRSA)        Wound Care: cleansed with betadine and packed with Aquacel Ag and covered with adhesive dressing    Assessment/Plan         We discussed possible risks of flap surgery including but not limited to reaction to medication, deep vein thrombosis, pulmonary embolism, cardiac complications, infection, bleeding and hematoma, seroma, wound healing issues,  partial or total flap necrosis, and need for additional surgery chronic regional pain syndrome. Patient verbalized good understanding and after lengthy discussion, she was in favor of postponing the surgery. She will let us  know when she would like to proceed with surgery or not in the near future. Discussed monitoring for signs of infection (fever, malodorous, purulent drainage, warmth, erythema, etc.).

## 2025-01-16 NOTE — PROGRESS NOTES
The note and we do the favor I just dictated in the note here I cannot find it did not get put into the note Subjective    Patient ID: Adriana Wood is a 69 y.o. female.    Chief Complaint: Follow-up of the Right Knee     Last Surgery: Revision Arthroplasty Total Knee EXPLAN TNA HARDWARE AND ANTIBIOTIC SPACER - Right and Lower Extremity I  and  D - Right  Last Surgery Date: 8/8/2024    HPI    Objective   Ortho Exam    Image Results:  XR knee right 1-2 views  Interpreted By:  Jason Morris,   STUDY:  XR KNEE RIGHT 1-2 VIEWS;  10/10/2024 10:08 am      INDICATION:  Signs/Symptoms:pain.      COMPARISON:  09/12/2024      ACCESSION NUMBER(S):  [id number]      ORDERING CLINICIAN:  DARSHAN LARSON      FINDINGS:  Interval grossly unchanged appearance of postsurgical changes of the  right knee including hardware explantation, placement of antibiotic  impregnated cement spacer, and intramedullary adrienne fusion with 2  proximal and single distal locking screw. Remote fracture deformity  of the proximal right tibia and fibula. Alignment is unchanged. No  acute fracture or dislocation.      IMPRESSION  Unchanged examination compared to prior. No fracture or dislocation.  No osseous lesion.          MACRO  none      Signed by: Jason Morris 10/11/2024 10:28 PM  Dictation workstation:   [id number]      Assessment/Plan   Encounter Diagnoses:  Prosthetic joint infection, subsequent encounter    Status post total right knee replacement    Orders Placed This Encounter    XR lower extremity leg lengevaluation    XR knee right 1-2 views     No follow-ups on file.

## 2025-01-16 NOTE — PROGRESS NOTES
Chief complaint… My knee hurts and my wound started draining just a little bit    History 69-year-old female who I did a debridement removal of her total knee and placement of a temporizing fusion nail across the knee after developing an infection in her total knee replacement. She has a large antibiotic cement spacer in place to the make up for the defect. Her cultures grew out methicillin resistant Staph aureus. She has soft tissue problems with multiple incisions from her previous trauma and one of the incisions broke down at that time. We did a fusion nail with antibiotic spacer she was setting up of antibiotics IV PICC line although last visit she was not draining and sealed she started draining approximately 6 weeks ago.  Although it is only a drop or 2 a day the anterior wound is not completely healed..  In the interim she has seen infectious disease whose recommendation was pulley antibiotic spacer if she still drains.  She was not placed on any more antibiotics at this time    Her examination today unfortunately shows about a 3 or 4 mm area of the middle incision where the worst scarring was located that is draining a drop of drainage.  The incision is scarred in and there is very little movement of the soft tissue for any type of localized flap coverage.  She has pain when I tried to  the leg so I do believe there is slight toggle motion causing pain at the temporized fusion.  She does have active dorsiflexion and plantarflexion of the ankle although it does hurt her when she tries to do this.  She has good hip range of motion.    Her x-rays show the temporizing fusion nail across the knee antibiotic spacer has some radiolucency around it.  The adrienne is in good position there is no evidence of fracture overall alignment is quite good.    Last ESR was elevated 46 but CRP was normal.    Assessment soft tissue defect anterior knee temporizing fusion with antibiotic spacer, no gross sign of infection but  have to believe contamination.  Patella is still accessible.  Extensor mechanism still appears to be intact but soft tissue anteriorly has failed.    Plan I have talked to the patient in length today about her knee.  She currently is on my schedule for February 28, 2025 but we will be unable to do a reimplantation of her knee because of the risk of reinfection and the soft tissue breakdown she already has.  At this point we will need to get plastic surgery Dr. Avila involved as he previously was and she will need most likely a free flap to cover the anterior knee.  At the same time I will pull the antibiotic spacer and place a new spacer with an antibiotic adrienne in place for stability and he will do the free flap.  I do believe this is the only way to salvage her knee.  I did speak to her today about knee fusion but I still think we would need to do a free flap even with a knee fusion.  And she would be considerably shorter at least an inch and a half.  She did not want to proceed with knee fusion.  We also talked about above-knee amputation and prosthetic wear today in the office as a option which may be required in the future if her limb salvage should fail.  I do believe that we have a good chance of salvage with a free flap and redebridement.  I would say within 3 to 6 months after her free flap she would possibly be a candidate for reimplantation of knee replacement.  I did speak to Dr. Avila today.  Patient is totally disabled at this time with state health insurance [employer].  She is in my opinion also completely disabled for so security disability.  We will schedule appointment to see Dr. Avila in his office.

## 2025-01-21 ENCOUNTER — APPOINTMENT (OUTPATIENT)
Dept: INFECTIOUS DISEASES | Facility: CLINIC | Age: 70
End: 2025-01-21
Payer: MEDICARE

## 2025-01-23 ENCOUNTER — APPOINTMENT (OUTPATIENT)
Dept: PLASTIC SURGERY | Facility: CLINIC | Age: 70
End: 2025-01-23
Payer: MEDICARE

## 2025-01-23 DIAGNOSIS — T81.30XA WOUND DEHISCENCE: ICD-10-CM

## 2025-01-23 DIAGNOSIS — Z96.651 STATUS POST TOTAL RIGHT KNEE REPLACEMENT: Primary | ICD-10-CM

## 2025-01-23 DIAGNOSIS — T14.8XXA WOUND INFECTION: ICD-10-CM

## 2025-01-23 DIAGNOSIS — L08.9 WOUND INFECTION: ICD-10-CM

## 2025-01-23 DIAGNOSIS — Z01.818 PRE-OP EVALUATION: ICD-10-CM

## 2025-01-23 DIAGNOSIS — M12.561 TRAUMATIC ARTHRITIS OF KNEE, RIGHT: ICD-10-CM

## 2025-01-23 PROCEDURE — 1157F ADVNC CARE PLAN IN RCRD: CPT

## 2025-01-23 PROCEDURE — G2211 COMPLEX E/M VISIT ADD ON: HCPCS

## 2025-01-23 PROCEDURE — 99212 OFFICE O/P EST SF 10 MIN: CPT

## 2025-02-05 ENCOUNTER — HOSPITAL ENCOUNTER (OUTPATIENT)
Dept: RADIOLOGY | Facility: HOSPITAL | Age: 70
Discharge: HOME | End: 2025-02-05
Payer: MEDICARE

## 2025-02-05 DIAGNOSIS — Z01.818 PRE-OP EVALUATION: ICD-10-CM

## 2025-02-05 PROCEDURE — 73700 CT LOWER EXTREMITY W/O DYE: CPT | Mod: LT

## 2025-02-07 NOTE — PROGRESS NOTES
Subjective   Patient ID: Adriana Wood is a 69 y.o. female presenting for follow up prior to combined surgery with Dr. Whelan on 2/28/25.    HPI Adriana is a 68-year-old female with a history of HTN, bradycardia, cardiomyopathy, CAD, cardiac arrest due to electrolyte abnormalities and zofran use post-operatively, and traumatic brain injury. Patient had a total knee right knee done by Dr. Whelan in March 2024. Which was complicated by E. Coli infection. Patient had right TKA wound dehiscence s/p I&D right knee with polyswap and attempted re-closure of complex wound on 5/10/2024 by Dr. Whelan. She is currently on ceftriaxone until June 19th. Patients current wound care regimen for right knee wound is xeroform and ABD followed by ACE wrap. Patient is referred back from Dr. Whelan who plans on 2/28/25 to pull the antibiotic spacer and place a new spacer with an antibiotic adrienne in place for stability. The patient presents today to discuss plan of care.       Review of Systems  ROS: All 10 systems were reviewed and are unremarkable except for those mentioned in HPI.       Physical Exam  Vitals and nursing note reviewed. Exam conducted with a chaperone present.   Constitutional:       General: not in acute distress.     Appearance: not ill-appearing.   Eyes:      Extraocular Movements: Extraocular movements intact.      Conjunctiva/sclera: Conjunctivae normal.      Pupils: Pupils are equal, round, and reactive to light.   Cardiovascular:      Rate and Rhythm: Normal rate and regular rhythm.      Pulses: Normal pulses.   Pulmonary:      Effort: Pulmonary effort is normal.      Breath sounds: Normal breath sounds.   Abdominal:      Palpations: Abdomen is soft. There is no mass.      Tenderness: There is no abdominal tenderness.      Hernia: No hernia is present.   Musculoskeletal:         General: No swelling or tenderness.      Cervical back: Normal range of motion and neck supple.   Skin:     Capillary Refill:  Capillary refill takes less than 2 seconds.      Coloration: Skin is not jaundiced.      Findings: No bruising or rash.   Neurological:      General: No focal deficit present.      Mental Status: oriented to person, place, and time.   Psychiatric:         Mood and Affect: Mood normal.         Behavior: Behavior normal.         Thought Content: Thought content normal.         Judgment: Judgment normal.     Assessment/Plan          hospital stay and estimated post-operative recovery course, including non-weight bearing status for two-weeks at least, need fro rehab, need for blood thinners and blood transfusion, and I discussed complications including but not limited to  reaction to medication, deep vein thrombosis, pulmonary embolism, infection, bleeding and hematoma, seroma, partial and complete necrosis of the flap, wound haling issues, sensory disturbances at the surgical sites, persistent pain, need for additional surgeries.    We also discussed preadmission testing given he extensive medical history.     Patient expressed good understanding and stated she would like to proceed with surgery to improve her ability to walk again.    Plan:    Case request for flap coverage within 4 weeks from today.

## 2025-02-10 ENCOUNTER — APPOINTMENT (OUTPATIENT)
Dept: PLASTIC SURGERY | Facility: CLINIC | Age: 70
End: 2025-02-10
Payer: OTHER MISCELLANEOUS

## 2025-02-12 ENCOUNTER — CLINICAL SUPPORT (OUTPATIENT)
Dept: PREADMISSION TESTING | Facility: HOSPITAL | Age: 70
End: 2025-02-12
Payer: OTHER MISCELLANEOUS

## 2025-02-12 ENCOUNTER — TELEPHONE (OUTPATIENT)
Dept: PRIMARY CARE | Facility: CLINIC | Age: 70
End: 2025-02-12
Payer: OTHER MISCELLANEOUS

## 2025-02-12 DIAGNOSIS — T84.50XD PROSTHETIC JOINT INFECTION, SUBSEQUENT ENCOUNTER: ICD-10-CM
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
## 2025-02-12 DIAGNOSIS — M17.31 POST-TRAUMATIC ARTHRITIS OF LOWER LEG, RIGHT: ICD-10-CM

## 2025-02-12 DIAGNOSIS — M86.461 CHRONIC OSTEOMYELITIS OF RIGHT TIBIA WITH DRAINING SINUS (MULTI): ICD-10-CM

## 2025-02-12 RX ORDER — OXYCODONE HYDROCHLORIDE 5 MG/1
5 TABLET ORAL
Qty: 150 TABLET | Refills: 0 | Status: SHIPPED | OUTPATIENT
Start: 2025-02-12 | End: 2025-03-14
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
## 2025-02-20 ENCOUNTER — PRE-ADMISSION TESTING (OUTPATIENT)
Dept: PREADMISSION TESTING | Facility: HOSPITAL | Age: 70
End: 2025-02-20
Payer: MEDICARE

## 2025-02-20 VITALS
HEART RATE: 64 BPM | SYSTOLIC BLOOD PRESSURE: 118 MMHG | WEIGHT: 152 LBS | HEIGHT: 70 IN | OXYGEN SATURATION: 96 % | BODY MASS INDEX: 21.76 KG/M2 | DIASTOLIC BLOOD PRESSURE: 77 MMHG | TEMPERATURE: 97.6 F

## 2025-02-20 DIAGNOSIS — T84.50XD PROSTHETIC JOINT INFECTION, SUBSEQUENT ENCOUNTER: ICD-10-CM

## 2025-02-20 DIAGNOSIS — Z01.818 PREOPERATIVE EXAMINATION: Primary | ICD-10-CM

## 2025-02-20 DIAGNOSIS — T81.30XA WOUND DEHISCENCE: ICD-10-CM

## 2025-02-20 DIAGNOSIS — M17.31 POST-TRAUMATIC ARTHRITIS OF LOWER LEG, RIGHT: ICD-10-CM

## 2025-02-20 DIAGNOSIS — Z96.651 STATUS POST TOTAL RIGHT KNEE REPLACEMENT: ICD-10-CM

## 2025-02-20 LAB
ABO GROUP (TYPE) IN BLOOD: NORMAL
ALBUMIN SERPL BCP-MCNC: 4.3 G/DL (ref 3.4–5)
ALP SERPL-CCNC: 86 U/L (ref 33–136)
ALT SERPL W P-5'-P-CCNC: 11 U/L (ref 7–45)
ANION GAP SERPL CALC-SCNC: 14 MMOL/L (ref 10–20)
ANTIBODY SCREEN: NORMAL
AST SERPL W P-5'-P-CCNC: 12 U/L (ref 9–39)
BASOPHILS # BLD AUTO: 0.04 X10*3/UL (ref 0–0.1)
BASOPHILS NFR BLD AUTO: 0.6 %
BILIRUB SERPL-MCNC: 0.4 MG/DL (ref 0–1.2)
BUN SERPL-MCNC: 10 MG/DL (ref 6–23)
CALCIUM SERPL-MCNC: 9.4 MG/DL (ref 8.6–10.6)
CHLORIDE SERPL-SCNC: 100 MMOL/L (ref 98–107)
CO2 SERPL-SCNC: 28 MMOL/L (ref 21–32)
CREAT SERPL-MCNC: 0.52 MG/DL (ref 0.5–1.05)
CRP SERPL-MCNC: 0.99 MG/DL
EGFRCR SERPLBLD CKD-EPI 2021: >90 ML/MIN/1.73M*2
EOSINOPHIL # BLD AUTO: 0.07 X10*3/UL (ref 0–0.7)
EOSINOPHIL NFR BLD AUTO: 1.1 %
ERYTHROCYTE [DISTWIDTH] IN BLOOD BY AUTOMATED COUNT: 13.2 % (ref 11.5–14.5)
ERYTHROCYTE [SEDIMENTATION RATE] IN BLOOD BY WESTERGREN METHOD: 34 MM/H (ref 0–30)
GLUCOSE SERPL-MCNC: 105 MG/DL (ref 74–99)
HCT VFR BLD AUTO: 39.6 % (ref 36–46)
HGB BLD-MCNC: 13.3 G/DL (ref 12–16)
IMM GRANULOCYTES # BLD AUTO: 0.02 X10*3/UL (ref 0–0.7)
IMM GRANULOCYTES NFR BLD AUTO: 0.3 % (ref 0–0.9)
LYMPHOCYTES # BLD AUTO: 1.67 X10*3/UL (ref 1.2–4.8)
LYMPHOCYTES NFR BLD AUTO: 26.6 %
MCH RBC QN AUTO: 28.5 PG (ref 26–34)
MCHC RBC AUTO-ENTMCNC: 33.6 G/DL (ref 32–36)
MCV RBC AUTO: 85 FL (ref 80–100)
MONOCYTES # BLD AUTO: 0.42 X10*3/UL (ref 0.1–1)
MONOCYTES NFR BLD AUTO: 6.7 %
NEUTROPHILS # BLD AUTO: 4.05 X10*3/UL (ref 1.2–7.7)
NEUTROPHILS NFR BLD AUTO: 64.7 %
NRBC BLD-RTO: 0 /100 WBCS (ref 0–0)
PLATELET # BLD AUTO: 312 X10*3/UL (ref 150–450)
POTASSIUM SERPL-SCNC: 4.2 MMOL/L (ref 3.5–5.3)
PROT SERPL-MCNC: 7.3 G/DL (ref 6.4–8.2)
RBC # BLD AUTO: 4.66 X10*6/UL (ref 4–5.2)
RH FACTOR (ANTIGEN D): NORMAL
SODIUM SERPL-SCNC: 138 MMOL/L (ref 136–145)
WBC # BLD AUTO: 6.3 X10*3/UL (ref 4.4–11.3)

## 2025-02-20 PROCEDURE — 87081 CULTURE SCREEN ONLY: CPT

## 2025-02-20 PROCEDURE — 86880 COOMBS TEST DIRECT: CPT

## 2025-02-20 PROCEDURE — 86140 C-REACTIVE PROTEIN: CPT

## 2025-02-20 PROCEDURE — 99215 OFFICE O/P EST HI 40 MIN: CPT

## 2025-02-20 PROCEDURE — 85025 COMPLETE CBC W/AUTO DIFF WBC: CPT

## 2025-02-20 PROCEDURE — 85652 RBC SED RATE AUTOMATED: CPT

## 2025-02-20 PROCEDURE — 86901 BLOOD TYPING SEROLOGIC RH(D): CPT

## 2025-02-20 PROCEDURE — 86870 RBC ANTIBODY IDENTIFICATION: CPT

## 2025-02-20 PROCEDURE — 80053 COMPREHEN METABOLIC PANEL: CPT

## 2025-02-20 RX ORDER — CHLORHEXIDINE GLUCONATE ORAL RINSE 1.2 MG/ML
15 SOLUTION DENTAL AS NEEDED
Qty: 120 ML | Refills: 0 | Status: ON HOLD | OUTPATIENT
Start: 2025-02-20 | End: 2025-02-22

## 2025-02-20 RX ORDER — CHLORHEXIDINE GLUCONATE 40 MG/ML
SOLUTION TOPICAL DAILY PRN
Qty: 473 ML | Refills: 0 | Status: ON HOLD | OUTPATIENT
Start: 2025-02-20

## 2025-02-20 ASSESSMENT — ENCOUNTER SYMPTOMS
ARTHRALGIAS: 0
NECK STIFFNESS: 0
DYSPNEA WITH EXERTION: 0
WOUND: 1
LIGHT-HEADEDNESS: 0
DIARRHEA: 0
FEVER: 0
SHORTNESS OF BREATH: 0
HEMOPTYSIS: 0
NERVOUS/ANXIOUS: 0
BRUISES/BLEEDS EASILY: 0
EYE DISCHARGE: 0
LIMITED RANGE OF MOTION: 0
VERTIGO: 0
COUGH: 0
ABDOMINAL DISTENTION: 0
PND: 0
NECK PAIN: 0
SINUS CONGESTION: 0
NAUSEA: 1
MYALGIAS: 0
ABDOMINAL PAIN: 0
TREMORS: 0
JOINT SWELLING: 0
SKIN CHANGES: 0
TROUBLE SWALLOWING: 0
CONSTIPATION: 0
NUMBNESS: 0
WEAKNESS: 0
DOUBLE VISION: 0
WHEEZING: 0
CHILLS: 0
VOICE CHANGE: 0
DYSPNEA AT REST: 0
CONFUSION: 0
UNEXPECTED WEIGHT CHANGE: 0
EXCESSIVE BLEEDING: 0
DIFFICULTY URINATING: 0
NECK MASS: 0
VISUAL CHANGE: 0
BLOOD IN STOOL: 0
DYSURIA: 0
POLYDIPSIA: 0
RHINORRHEA: 0
NECK SWELLING: 0
PALPITATIONS: 0
VOMITING: 0

## 2025-02-20 ASSESSMENT — DUKE ACTIVITY SCORE INDEX (DASI)
CAN YOU WALK INDOORS, SUCH AS AROUND YOUR HOUSE: NO
CAN YOU HAVE SEXUAL RELATIONS: NO
DASI METS SCORE: 3.4
CAN YOU DO HEAVY WORK AROUND THE HOUSE LIKE SCRUBBING FLOORS OR LIFTING AND MOVING HEAVY FURNITURE: NO
CAN YOU RUN A SHORT DISTANCE: NO
CAN YOU TAKE CARE OF YOURSELF (EAT, DRESS, BATHE, OR USE TOILET): YES
CAN YOU PARTICIPATE IN STRENOUS SPORTS LIKE SWIMMING, SINGLES TENNIS, FOOTBALL, BASKETBALL, OR SKIING: NO
CAN YOU PARTICIPATE IN MODERATE RECREATIONAL ACTIVITIES LIKE GOLF, BOWLING, DANCING, DOUBLES TENNIS OR THROWING A BASEBALL OR FOOTBALL: NO
CAN YOU DO LIGHT WORK AROUND THE HOUSE LIKE DUSTING OR WASHING DISHES: YES
CAN YOU DO YARD WORK LIKE RAKING LEAVES, WEEDING OR PUSHING A MOWER: NO
CAN YOU DO MODERATE WORK AROUND THE HOUSE LIKE VACUUMING, SWEEPING FLOORS OR CARRYING GROCERIES: NO
CAN YOU CLIMB A FLIGHT OF STAIRS OR WALK UP A HILL: NO
TOTAL_SCORE: 5.45
CAN YOU WALK A BLOCK OR TWO ON LEVEL GROUND: NO

## 2025-02-20 ASSESSMENT — LIFESTYLE VARIABLES: SMOKING_STATUS: NONSMOKER

## 2025-02-20 NOTE — CPM/PAT H&P
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 Mcnally PA-C       ABO TYPE O     Rh TYPE POS     ANTIBODY SCREEN POS    Sedimentation Rate    Collection Time: 02/20/25 10:51 AM   Result Value Ref Range    Sedimentation Rate 34 (H) 0 - 30 mm/h   CBC and Auto Differential    Collection Time: 02/20/25 10:51 AM   Result Value Ref Range    WBC 6.3 4.4 - 11.3 x10*3/uL    nRBC 0.0 0.0 - 0.0 /100 WBCs    RBC 4.66 4.00 - 5.20 x10*6/uL    Hemoglobin 13.3 12.0 - 16.0 g/dL    Hematocrit 39.6 36.0 - 46.0 %    MCV 85 80 - 100 fL    MCH 28.5 26.0 - 34.0 pg    MCHC 33.6 32.0 - 36.0 g/dL    RDW 13.2 11.5 - 14.5 %    Platelets 312 150 - 450 x10*3/uL    Neutrophils % 64.7 40.0 - 80.0 %    Immature Granulocytes %, Automated 0.3 0.0 - 0.9 %    Lymphocytes % 26.6 13.0 - 44.0 %    Monocytes % 6.7 2.0 - 10.0 %    Eosinophils % 1.1 0.0 - 6.0 %    Basophils % 0.6 0.0 - 2.0 %    Neutrophils Absolute 4.05 1.20 - 7.70 x10*3/uL    Immature Granulocytes Absolute, Automated 0.02 0.00 - 0.70 x10*3/uL    Lymphocytes Absolute 1.67 1.20 - 4.80 x10*3/uL    Monocytes Absolute 0.42 0.10 - 1.00 x10*3/uL    Eosinophils Absolute 0.07 0.00 - 0.70 x10*3/uL    Basophils Absolute 0.04 0.00 - 0.10 x10*3/uL   Comprehensive Metabolic Panel    Collection Time: 02/20/25 10:51 AM   Result Value Ref Range    Glucose 105 (H) 74 - 99 mg/dL    Sodium 138 136 - 145 mmol/L    Potassium 4.2 3.5 - 5.3 mmol/L    Chloride 100 98 - 107 mmol/L    Bicarbonate 28 21 - 32 mmol/L    Anion Gap 14 10 - 20 mmol/L    Urea Nitrogen 10 6 - 23 mg/dL    Creatinine 0.52 0.50 - 1.05 mg/dL    eGFR >90 >60 mL/min/1.73m*2    Calcium 9.4 8.6 - 10.6 mg/dL    Albumin 4.3 3.4 - 5.0 g/dL    Alkaline Phosphatase 86 33 - 136 U/L    Total Protein 7.3 6.4 - 8.2 g/dL    AST 12 9 - 39 U/L    Bilirubin, Total 0.4 0.0 - 1.2 mg/dL    ALT 11 7 - 45 U/L   C-Reactive Protein    Collection Time: 02/20/25 10:51 AM   Result Value Ref Range    C-Reactive Protein 0.99 <1.00 mg/dL   BB ORDER ONLY - Antibody Identification    Collection Time: 02/20/25 10:51 AM   Result Value Ref  Range    Antibody ID Inconclusive     CASE # [id number]    Direct Antiglobulin Test    Collection Time: 02/20/25 10:51 AM   Result Value Ref Range    GOMEZ-POLYSPECIFIC NEG            Medication instructions and NPO guidelines reviewed with the patient.  All questions or concerns discussed and addressed.      Farida Mcnally PA-C

## 2025-02-20 NOTE — H&P (VIEW-ONLY)
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 surgeon for details. Follows with ID, Plastic surgery, ortho surgery. She denies any antibiotics at this time. Cultures positive for MRSA 8/8/24. (Please see LOV from Dr Whelan and Kris Whatley for further details)2      Other:     Hold all vitamins and supplements 7 days prior to surgery  Tylenol okay to continue, please hold Aleve/naproxen/ibuprofen (NSAIDs) for 7 days prior to surgery        Labs ordered  Recent Results (from the past 3 weeks)   Staphylococcus aureus/MRSA colonization, Culture    Collection Time: 02/20/25 10:51 AM    Specimen: Nares/Axilla/Groin; Swab   Result Value Ref Range    Staph/MRSA Screen Culture No Staphylococcus aureus isolated    Type And Screen Is this order related to pregnancy or an upcoming surgery? Yes; Where will this surgery/delivery be performed? [facility]; What is the date of the surgery? 2/28/2025; Has this patient ever had a transfusion? No; Has t...    Collection Time: 02/20/25 10:51 AM   Result Value Ref Range     ABO TYPE O     Rh TYPE POS     ANTIBODY SCREEN POS    Sedimentation Rate    Collection Time: 02/20/25 10:51 AM   Result Value Ref Range    Sedimentation Rate 34 (H) 0 - 30 mm/h   CBC and Auto Differential    Collection Time: 02/20/25 10:51 AM   Result Value Ref Range    WBC 6.3 4.4 - 11.3 x10*3/uL    nRBC 0.0 0.0 - 0.0 /100 WBCs    RBC 4.66 4.00 - 5.20 x10*6/uL    Hemoglobin 13.3 12.0 - 16.0 g/dL    Hematocrit 39.6 36.0 - 46.0 %    MCV 85 80 - 100 fL    MCH 28.5 26.0 - 34.0 pg    MCHC 33.6 32.0 - 36.0 g/dL    RDW 13.2 11.5 - 14.5 %    Platelets 312 150 - 450 x10*3/uL    Neutrophils % 64.7 40.0 - 80.0 %    Immature Granulocytes %, Automated 0.3 0.0 - 0.9 %    Lymphocytes % 26.6 13.0 - 44.0 %    Monocytes % 6.7 2.0 - 10.0 %    Eosinophils % 1.1 0.0 - 6.0 %    Basophils % 0.6 0.0 - 2.0 %    Neutrophils Absolute 4.05 1.20 - 7.70 x10*3/uL    Immature Granulocytes Absolute, Automated 0.02 0.00 - 0.70 x10*3/uL    Lymphocytes Absolute 1.67 1.20 - 4.80 x10*3/uL    Monocytes Absolute 0.42 0.10 - 1.00 x10*3/uL    Eosinophils Absolute 0.07 0.00 - 0.70 x10*3/uL    Basophils Absolute 0.04 0.00 - 0.10 x10*3/uL   Comprehensive Metabolic Panel    Collection Time: 02/20/25 10:51 AM   Result Value Ref Range    Glucose 105 (H) 74 - 99 mg/dL    Sodium 138 136 - 145 mmol/L    Potassium 4.2 3.5 - 5.3 mmol/L    Chloride 100 98 - 107 mmol/L    Bicarbonate 28 21 - 32 mmol/L    Anion Gap 14 10 - 20 mmol/L    Urea Nitrogen 10 6 - 23 mg/dL    Creatinine 0.52 0.50 - 1.05 mg/dL    eGFR >90 >60 mL/min/1.73m*2    Calcium 9.4 8.6 - 10.6 mg/dL    Albumin 4.3 3.4 - 5.0 g/dL    Alkaline Phosphatase 86 33 - 136 U/L    Total Protein 7.3 6.4 - 8.2 g/dL    AST 12 9 - 39 U/L    Bilirubin, Total 0.4 0.0 - 1.2 mg/dL    ALT 11 7 - 45 U/L   C-Reactive Protein    Collection Time: 02/20/25 10:51 AM   Result Value Ref Range    C-Reactive Protein 0.99 <1.00 mg/dL   BB ORDER ONLY - Antibody Identification    Collection Time: 02/20/25 10:51 AM   Result Value Ref  Range    Antibody ID Inconclusive     CASE # [id number]    Direct Antiglobulin Test    Collection Time: 02/20/25 10:51 AM   Result Value Ref Range    GOMEZ-POLYSPECIFIC NEG            Medication instructions and NPO guidelines reviewed with the patient.  All questions or concerns discussed and addressed.      Farida Mcnally PA-C

## 2025-02-20 NOTE — PREPROCEDURE INSTRUCTIONS
"      Thank you for visiting [facility] ([facility]) today for your pre-procedure evaluation, you were seen by     Farida Mcnally PA-C   Department of Anesthesiology and Perioperative Medicine  Main phone [phone]  Fax [phone]      This summary includes instructions and information to aid you during your perioperative period.  Please read carefully. If you have any questions about your visit today, please call the number listed above.  If you become ill or have any changes to your health before your surgery, please contact your primary care provider and alert your surgeon.    Preparing for Surgery       Preoperative Fasting Guidelines    Why must I stop eating and drinking near surgery time?  With sedation, food or liquid in your stomach can enter your lungs causing serious complications  Food can increase nausea and vomiting  When do I need to stop eating and drinking before my surgery?  Do not eat any food after midnight the night before your surgery/procedure.  You may have up to 13.5 ounces of clear liquid until TWO hours before your instructed arrival time to the hospital.  This includes water, black tea/coffee, (no milk or cream) apple juice, and electrolyte drinks (Gatorade)  You may chew gum until TWO hours before your surgery/procedure    Tobacco and Alcohol;  Do not drink alcohol or smoke within 24 hours of surgery.  It is best to quit smoking for as long as possible before any surgery or procedure.       Other Instructions  Why did I have my nose, under my arms, and groin swabbed? The purpose of the swab is to identify Staphylococcus aureus inside your nose or on your skin.  The swab was sent to the laboratory for culture.  A positive swab/culture for Staphylococcus aureus is called colonization or carriage.   What is Staphylococcus aureus? Staphylococcus aureus, also known as \"staph\", is a germ found on the skin or in the nose of healthy people.  Sometimes Staphylococcus " aureus can get into the body and cause an infection.  This can be minor (such as pimples, boils, or other skin problems).  It might also be serious (such as a blood infection, pneumonia, or a surgical site infection). What is Staphylococcus aureus colonization or carriage? Colonization or carriage means that a person has the germ but is not sick from it.  These bacteria can be spread on the hands or when breathing or sneezing. How is Staphylococcus aureus spread? It is most often spread by close contact with a person or item that carries it. What happens if my culture is positive for Staphylococcus aureus? Your doctor/medical team will use this information to guide any antibiotic treatment which may be necessary.  Regardless of the culture results, we will clean the inside of your nose with a betadine swab just before you have your surgery. Will I get an infection if I have Staphylococcus aureus in my nose or on my skin? Anyone can get an infection with Staphylococcus aureus.  However, the best way to reduce your risk of infection is to follow the instructions provided to you for the use of your CHG soap and dental rinse.  , Body Wash; What is a home preoperative antibacterial shower? This shower is a way of cleaning the skin with a germ-killing solution before surgery.  The solution contains chlorhexidine, commonly known as CHG.  CHG is a skin cleanser with germ-killing ability.  Let your doctor know if you are allergic to chlorhexidine. Why do I need to take a preoperative antibacterial shower? Skin is not sterile.  It is best to try to make your skin as free of germs as possible before surgery.  Proper cleansing with a germ-killing soap before surgery can lower the number of germs on your skin.  This helps to reduce the risk of infection at the surgical site.  Following the instructions listed below will help you prepare your skin for surgery.   How do I use the solution? Steps:  Begin using your CHG soap 5 days  before your scheduled surgery on ___________.   First, wash and rinse your hair using the CHG soap. Keep CHG soap away from ear canals and eyes.  Rinse completely, do not condition.  Hair extensions should be removed. , Oral/Dental Rinse: What is oral/dental rinse?  It is a mouthwash. It is a way of cleaning the mouth with a germ-killing solution before your surgery.  The solution contains chlorhexidine, commonly known as CHG. It is used inside the mouth to kill a bacteria known as Staphylococcus aureus.  Let your doctor know if you are allergic to Chlorhexidine. Why do I need to use CHG oral/dental rinse? The CHG oral/dental rinse helps to kill a bacteria in your mouth known as Staphylococcus aureus.  This reduces the risk of infection at the surgical site.  Using your CHG oral/dental rinse STEPS: Use your CHG oral/dental rinse after you brush your teeth the night before (at bedtime) and the morning of your surgery.  Follow all directions on your prescription label.  Use the cap on the container to measure 15 ml.  Swish (gargle if you can) the mouthwash in your mouth for at least 30 seconds, (do not swallow) and spit out.  After you use your CHG rinse, do not rinse your mouth with water, drink or eat.  Please refer to the prescription label for the appropriate time to resume oral intake What side effects might I have using the CHG oral/dental rinse? CHG rinse will stick to plaque on the teeth.  Brush and floss just before use.  Teeth brushing will help avoid staining of plaque during use.       The Week before Surgery        Seven days before Surgery  Check your CPM medication instructions  Do the exercises provided to you by CPM   Arrange for a responsible, adult licensed  to take you home after surgery and stay with you for 24 hours.  You will not be permitted to drive yourself home if you have received any anesthetic/sedation  Five days before surgery  Check your CPM medication instructions  Do the  exercises provided to you by C[facility]   Start using Chlorhexidene (CHG) body wash if prescribed (Continue till day of surgery)      The Day before Surgery       Check your CPM medication and all other CPM instructions including when to stop eating and drinking  You will be called with your arrival time for surgery in the late afternoon.  If you do not receive a call please reach out to your surgeon's office.  Do not smoke or drink 24 hours before surgery  Prepare items to bring with you to the hospital  Shower with your chlorhexidine wash if prescribed  Brush your teeth and use your chlorhexidine dental rinse if prescribed    The Day of Surgery       Check your C[facility] medication instructions  Ensure you follow the instructions for when to stop eating and drinking  Shower, if prescribed use CHG.  Do not apply any lotions, creams, moisturizers, perfume or deodorant  Brush your teeth and use your CHG dental rinse if prescribed  Wear loose comfortable clothing  Avoid make-up  Remove  jewelry and piercings, consider professional piercing removal with a plastic spacer if needed  Bring photo ID and Insurance card  Bring an accurate medication list that includes medication dose, frequency and allergies  Bring a copy of your advanced directives (will, health care power of )  Bring any devices and controllers as well as medical devices you have been provided with for surgery (CPAP, slings, braces, etc.)  Dentures, eyeglasses, and contacts will be removed before surgery, please bring cases for contacts or glasses    Preoperative Deep Breathing Exercises    Why it is important to do deep breathing exercises before my surgery?  Deep breathing exercises strengthen your breathing muscles.  This helps you to recover after your surgery and decreases the chance of breathing complications.    How are the deep breathing exercises done?  Sit straight with your back supported.  Breathe in deeply and slowly through your nose. Your lower  rib cage should expand and your abdomen may move forward.  Hold that breath for 3 to 5 seconds.  Breathe out through pursed lips, slowly and completely.  Rest and repeat 10 times every hour while awake.  Rest longer if you become dizzy or lightheaded.      Preoperative Brain Exercises    What are brain exercises?  A brain exercise is any activity that engages your thinking (cognitive) skills.    What types of activities are considered brain exercises?  Jigsaw puzzles, crossword puzzles, word jumble, memory games, word search, and many more.  Many can be found free online or on your phone via a mobile jenise.    Why should I do brain exercises before my surgery?  More recent research has shown brain exercise before surgery can lower the risk of postoperative delirium (confusion) which can be especially important for older adults.  Patients who did brain exercises for 5 to 10 hours the days before surgery, cut their risk of postoperative delirium in half up to 1 week after surgery.    Sit-to-Stand Exercise    What is the sit-to-stand exercise?  The sit-to-stand exercise strengthens the muscles of your lower body and muscles in the center of your body (core muscles for stability) helping to maintain and improve your strength and mobility.  How do I do the sit-to-stand exercise?  The goal is to do this exercise without using your arms or hands.  If this is too difficult, use your arms and hands or a chair with armrests to help slowly push yourself to the standing position and lower yourself back to the sitting position. As the movement becomes easier use your arms and hands less.    Steps to the sit-to-stand exercise  Sit up tall in a sturdy chair, knees bent, feet flat on the floor shoulder-width apart.  Shift your hips/pelvis forward in the chair to correctly position yourself for the next movement.  Lean forward at your hips.  Stand up straight putting equal weight on both feet.  Check to be sure you are properly  aligned with the chair, in a slow controlled movement sit back down.  Repeat this exercise 10-15 times.  If needed you can do it fewer times until your strength improves.  Rest for 1 minute.  Do another 10-15 sit-to-stand exercises.  Try to do this in the morning and evening.       Simple things you can do to help prevent blood clots     Blood clots are blockages that can form in the body's veins. When a blood clot forms in your deep veins, it may be called a deep vein thrombosis, or DVT for short. Blood clots can happen in any part of the body where blood flows, but they are most common in the arms and legs. If a piece of a blood clot breaks free and travels to the lungs, it is called a pulmonary embolus (PE). A PE can be a very serious problem.      Being in the hospital or having surgery can raise your chances of getting a blood clot because you may not be well enough to move around as much as you normally do.         Ways you can help prevent blood clots in the hospital       Wearing SCDs  SCDs stands for Sequential Compression Devices.   SCDs are special sleeves that wrap around your legs. They attach to a pump that fills them with air to gently squeeze your legs every few minutes.  This helps return the blood in your legs to your heart.   SCDs should only be taken off when walking or bathing. SCDs may not be comfortable, but they can help save your life.              Pump SCD leg sleeves  Wearing compression stockings - if your doctor orders them. These special snug-fitting stockings gently squeeze your legs to help blood flow.       Walking. Walking helps move the blood in your legs.   If your doctor says it is ok, try walking the halls at least   5 times a day. Ask us to help you get up, so you don't fall.      Taking any blood-thinning medicines your doctor orders.              Ways you can help prevent blood clots at home         Wearing compression stockings - if your doctor orders them.   Walking - to  help move the blood in your legs.    Taking any blood-thinning medicines your doctor orders.      Signs of a blood clot or PE    Tell your doctor or nurse right away if you have any of the problems listed below.         If you are at home, seek medical care right away. Call 911 for chest pain or problems breathing.            Signs of a blood clot (DVT) - such as pain, swelling, redness, or warmth in your arm or legs.  Signs of a pulmonary embolism (PE) - such as chest pain or feeling short of breath

## 2025-02-21 LAB
DAT-POLYSPECIFIC: NORMAL
STAPHYLOCOCCUS SPEC CULT: NORMAL

## 2025-02-24 ENCOUNTER — APPOINTMENT (OUTPATIENT)
Dept: PLASTIC SURGERY | Facility: CLINIC | Age: 70
End: 2025-02-24
Payer: MEDICARE

## 2025-02-24 VITALS — HEART RATE: 35 BPM | DIASTOLIC BLOOD PRESSURE: 64 MMHG | SYSTOLIC BLOOD PRESSURE: 102 MMHG

## 2025-02-24 DIAGNOSIS — T14.8XXA WOUND INFECTION: Primary | ICD-10-CM

## 2025-02-24 DIAGNOSIS — T81.30XA WOUND DEHISCENCE: ICD-10-CM

## 2025-02-24 DIAGNOSIS — L08.9 WOUND INFECTION: Primary | ICD-10-CM

## 2025-02-24 DIAGNOSIS — Z71.89 ENCOUNTER TO DISCUSS TREATMENT OPTIONS: ICD-10-CM

## 2025-02-24 LAB
BB ANTIBODY IDENTIFICATION: NORMAL
CASE #: NORMAL

## 2025-02-24 PROCEDURE — 1159F MED LIST DOCD IN RCRD: CPT

## 2025-02-24 PROCEDURE — G2211 COMPLEX E/M VISIT ADD ON: HCPCS

## 2025-02-24 PROCEDURE — 1157F ADVNC CARE PLAN IN RCRD: CPT

## 2025-02-24 PROCEDURE — 1125F AMNT PAIN NOTED PAIN PRSNT: CPT

## 2025-02-24 PROCEDURE — 1036F TOBACCO NON-USER: CPT

## 2025-02-24 PROCEDURE — 99215 OFFICE O/P EST HI 40 MIN: CPT

## 2025-02-24 ASSESSMENT — PAIN SCALES - GENERAL: PAINLEVEL_OUTOF10: 6

## 2025-02-25 LAB — PATH REV-IMMUNOHEMATOLOGY-PR30: NORMAL

## 2025-02-26 ENCOUNTER — LAB (OUTPATIENT)
Dept: LAB | Facility: HOSPITAL | Age: 70
End: 2025-02-26
Payer: OTHER MISCELLANEOUS

## 2025-02-26 LAB
INR PPP: 1.1 (ref 0.9–1.1)
PROTHROMBIN TIME: 12 SECONDS (ref 9.8–12.4)

## 2025-02-26 PROCEDURE — 85610 PROTHROMBIN TIME: CPT

## 2025-02-27 ENCOUNTER — ANESTHESIA EVENT (OUTPATIENT)
Dept: OPERATING ROOM | Facility: HOSPITAL | Age: 70
End: 2025-02-27
Payer: MEDICARE

## 2025-02-27 NOTE — PROGRESS NOTES
Pharmacy Medication History Review    Adriana Wood is a 69 y.o. female who is planned to be admitted for Status post total right knee replacement. Pharmacy called the patient prior to their scheduled procedure and reviewed the patient's [id number] medications for accuracy.    Medications ADDED:  none  Medications CHANGED:  none  Medications REMOVED:   Cyclobenzaprine 5mg  Compazine 10mg  Stephany-colace 8.6-50mg    Please review updated prior to admission medication list and comments regarding how patient may be taking medications differently by going to Admission tab --> Admission Orders --> Admit Orders / Review prior to admission medications.     Preferred pharmacy, last doses of medications, and allergies to be confirmed with patient by nursing the day of procedure.     Sources used to complete the med history [facility]:  [facility]  Pharmacy dispense history  Patient interview  Chart Review  Care Everywhere     Below are additional concerns with the patient's PTA list.  Patient states they only take oxycodone 5mg daily. Albuterol and miralax are taken as needed. These are the only medications the patient is taking at this time    Katheryn Devi [facility]  Please reach out via Secure Chat for questions

## 2025-02-28 ENCOUNTER — ANESTHESIA (OUTPATIENT)
Dept: OPERATING ROOM | Facility: HOSPITAL | Age: 70
End: 2025-02-28
Payer: MEDICARE

## 2025-02-28 ENCOUNTER — APPOINTMENT (OUTPATIENT)
Dept: RADIOLOGY | Facility: HOSPITAL | Age: 70
End: 2025-02-28
Payer: MEDICARE

## 2025-02-28 ENCOUNTER — HOSPITAL ENCOUNTER (INPATIENT)
Facility: HOSPITAL | Age: 70
End: 2025-02-28
Attending: ORTHOPAEDIC SURGERY | Admitting: ORTHOPAEDIC SURGERY
Payer: MEDICARE

## 2025-02-28 DIAGNOSIS — T84.7XXD HARDWARE COMPLICATING WOUND INFECTION, SUBSEQUENT ENCOUNTER: ICD-10-CM

## 2025-02-28 DIAGNOSIS — Z96.651 STATUS POST TOTAL RIGHT KNEE REPLACEMENT: ICD-10-CM

## 2025-02-28 DIAGNOSIS — T84.50XD PROSTHETIC JOINT INFECTION, SUBSEQUENT ENCOUNTER: ICD-10-CM

## 2025-02-28 DIAGNOSIS — M17.31 POST-TRAUMATIC ARTHRITIS OF LOWER LEG, RIGHT: ICD-10-CM

## 2025-02-28 PROBLEM — M25.561 RIGHT KNEE PAIN: Status: ACTIVE | Noted: 2025-02-28

## 2025-02-28 LAB
ABO GROUP (TYPE) IN BLOOD: NORMAL
ALBUMIN SERPL BCP-MCNC: 4.2 G/DL (ref 3.4–5)
ANION GAP SERPL CALC-SCNC: 15 MMOL/L (ref 10–20)
ANTIBODY SCREEN: NORMAL
BUN SERPL-MCNC: 13 MG/DL (ref 6–23)
CALCIUM SERPL-MCNC: 8.5 MG/DL (ref 8.6–10.6)
CHLORIDE SERPL-SCNC: 101 MMOL/L (ref 98–107)
CO2 SERPL-SCNC: 27 MMOL/L (ref 21–32)
CREAT SERPL-MCNC: 0.51 MG/DL (ref 0.5–1.05)
DAT-POLYSPECIFIC: NORMAL
EGFRCR SERPLBLD CKD-EPI 2021: >90 ML/MIN/1.73M*2
ERYTHROCYTE [DISTWIDTH] IN BLOOD BY AUTOMATED COUNT: 13 % (ref 11.5–14.5)
GLUCOSE SERPL-MCNC: 123 MG/DL (ref 74–99)
HCT VFR BLD AUTO: 30.8 % (ref 36–46)
HGB BLD-MCNC: 10.5 G/DL (ref 12–16)
MAGNESIUM SERPL-MCNC: 1.88 MG/DL (ref 1.6–2.4)
MCH RBC QN AUTO: 28.4 PG (ref 26–34)
MCHC RBC AUTO-ENTMCNC: 34.1 G/DL (ref 32–36)
MCV RBC AUTO: 83 FL (ref 80–100)
NRBC BLD-RTO: 0 /100 WBCS (ref 0–0)
PHOSPHATE SERPL-MCNC: 3.9 MG/DL (ref 2.5–4.9)
PLATELET # BLD AUTO: 211 X10*3/UL (ref 150–450)
POTASSIUM SERPL-SCNC: 3.8 MMOL/L (ref 3.5–5.3)
RBC # BLD AUTO: 3.7 X10*6/UL (ref 4–5.2)
RH FACTOR (ANTIGEN D): NORMAL
SODIUM SERPL-SCNC: 139 MMOL/L (ref 136–145)
WBC # BLD AUTO: 13.6 X10*3/UL (ref 4.4–11.3)

## 2025-02-28 PROCEDURE — 0SPC04Z REMOVAL OF INTERNAL FIXATION DEVICE FROM RIGHT KNEE JOINT, OPEN APPROACH: ICD-10-PCS | Performed by: ORTHOPAEDIC SURGERY

## 2025-02-28 PROCEDURE — P9045 ALBUMIN (HUMAN), 5%, 250 ML: HCPCS | Mod: JZ,TB | Performed by: NURSE ANESTHETIST, CERTIFIED REGISTERED

## 2025-02-28 PROCEDURE — 0SHC08Z INSERTION OF SPACER INTO RIGHT KNEE JOINT, OPEN APPROACH: ICD-10-PCS | Performed by: ORTHOPAEDIC SURGERY

## 2025-02-28 PROCEDURE — 3600000004 HC OR TIME - INITIAL BASE CHARGE - PROCEDURE LEVEL FOUR: Performed by: ORTHOPAEDIC SURGERY

## 2025-02-28 PROCEDURE — 2720000007 HC OR 272 NO HCPCS: Performed by: ORTHOPAEDIC SURGERY

## 2025-02-28 PROCEDURE — 80069 RENAL FUNCTION PANEL: CPT | Performed by: STUDENT IN AN ORGANIZED HEALTH CARE EDUCATION/TRAINING PROGRAM

## 2025-02-28 PROCEDURE — 2780000003 HC OR 278 NO HCPCS: Performed by: ORTHOPAEDIC SURGERY

## 2025-02-28 PROCEDURE — 86901 BLOOD TYPING SEROLOGIC RH(D): CPT | Performed by: STUDENT IN AN ORGANIZED HEALTH CARE EDUCATION/TRAINING PROGRAM

## 2025-02-28 PROCEDURE — 73560 X-RAY EXAM OF KNEE 1 OR 2: CPT | Mod: RT

## 2025-02-28 PROCEDURE — 64447 NJX AA&/STRD FEMORAL NRV IMG: CPT

## 2025-02-28 PROCEDURE — 7100000001 HC RECOVERY ROOM TIME - INITIAL BASE CHARGE: Performed by: ORTHOPAEDIC SURGERY

## 2025-02-28 PROCEDURE — 2500000002 HC RX 250 W HCPCS SELF ADMINISTERED DRUGS (ALT 637 FOR MEDICARE OP, ALT 636 FOR OP/ED): Performed by: NURSE ANESTHETIST, CERTIFIED REGISTERED

## 2025-02-28 PROCEDURE — 87075 CULTR BACTERIA EXCEPT BLOOD: CPT | Performed by: ORTHOPAEDIC SURGERY

## 2025-02-28 PROCEDURE — 99223 1ST HOSP IP/OBS HIGH 75: CPT

## 2025-02-28 PROCEDURE — 2500000004 HC RX 250 GENERAL PHARMACY W/ HCPCS (ALT 636 FOR OP/ED)

## 2025-02-28 PROCEDURE — 2500000001 HC RX 250 WO HCPCS SELF ADMINISTERED DRUGS (ALT 637 FOR MEDICARE OP)

## 2025-02-28 PROCEDURE — 0QBL0ZZ EXCISION OF RIGHT TARSAL, OPEN APPROACH: ICD-10-PCS | Performed by: ORTHOPAEDIC SURGERY

## 2025-02-28 PROCEDURE — C1713 ANCHOR/SCREW BN/BN,TIS/BN: HCPCS | Performed by: ORTHOPAEDIC SURGERY

## 2025-02-28 PROCEDURE — 99221 1ST HOSP IP/OBS SF/LOW 40: CPT

## 2025-02-28 PROCEDURE — 1200000002 HC GENERAL ROOM WITH TELEMETRY DAILY

## 2025-02-28 PROCEDURE — 3600000009 HC OR TIME - EACH INCREMENTAL 1 MINUTE - PROCEDURE LEVEL FOUR: Performed by: ORTHOPAEDIC SURGERY

## 2025-02-28 PROCEDURE — 7100000002 HC RECOVERY ROOM TIME - EACH INCREMENTAL 1 MINUTE: Performed by: ORTHOPAEDIC SURGERY

## 2025-02-28 PROCEDURE — 73560 X-RAY EXAM OF KNEE 1 OR 2: CPT | Mod: RIGHT SIDE | Performed by: STUDENT IN AN ORGANIZED HEALTH CARE EDUCATION/TRAINING PROGRAM

## 2025-02-28 PROCEDURE — 27640 PARTIAL REMOVAL OF TIBIA: CPT | Performed by: ORTHOPAEDIC SURGERY

## 2025-02-28 PROCEDURE — 02HV33Z INSERTION OF INFUSION DEVICE INTO SUPERIOR VENA CAVA, PERCUTANEOUS APPROACH: ICD-10-PCS | Performed by: ORTHOPAEDIC SURGERY

## 2025-02-28 PROCEDURE — 87070 CULTURE OTHR SPECIMN AEROBIC: CPT | Performed by: ORTHOPAEDIC SURGERY

## 2025-02-28 PROCEDURE — 7100000024 HC EXTENDED STAY RECOVERY PER MINUTE- PACU: Performed by: ORTHOPAEDIC SURGERY

## 2025-02-28 PROCEDURE — 3700000002 HC GENERAL ANESTHESIA TIME - EACH INCREMENTAL 1 MINUTE: Performed by: ORTHOPAEDIC SURGERY

## 2025-02-28 PROCEDURE — 3700000001 HC GENERAL ANESTHESIA TIME - INITIAL BASE CHARGE: Performed by: ORTHOPAEDIC SURGERY

## 2025-02-28 PROCEDURE — 27071 PART REMOVAL HIP BONE DEEP: CPT | Performed by: ORTHOPAEDIC SURGERY

## 2025-02-28 PROCEDURE — 2500000005 HC RX 250 GENERAL PHARMACY W/O HCPCS: Performed by: ORTHOPAEDIC SURGERY

## 2025-02-28 PROCEDURE — 2500000004 HC RX 250 GENERAL PHARMACY W/ HCPCS (ALT 636 FOR OP/ED): Performed by: ORTHOPAEDIC SURGERY

## 2025-02-28 PROCEDURE — 86870 RBC ANTIBODY IDENTIFICATION: CPT

## 2025-02-28 PROCEDURE — 83735 ASSAY OF MAGNESIUM: CPT | Performed by: STUDENT IN AN ORGANIZED HEALTH CARE EDUCATION/TRAINING PROGRAM

## 2025-02-28 PROCEDURE — 27071 PART REMOVAL HIP BONE DEEP: CPT | Performed by: PHYSICIAN ASSISTANT

## 2025-02-28 PROCEDURE — 2500000004 HC RX 250 GENERAL PHARMACY W/ HCPCS (ALT 636 FOR OP/ED): Performed by: NURSE ANESTHETIST, CERTIFIED REGISTERED

## 2025-02-28 PROCEDURE — 11983 REMOVE/INSERT DRUG IMPLANT: CPT | Performed by: ORTHOPAEDIC SURGERY

## 2025-02-28 PROCEDURE — 2500000004 HC RX 250 GENERAL PHARMACY W/ HCPCS (ALT 636 FOR OP/ED): Performed by: STUDENT IN AN ORGANIZED HEALTH CARE EDUCATION/TRAINING PROGRAM

## 2025-02-28 PROCEDURE — 2500000005 HC RX 250 GENERAL PHARMACY W/O HCPCS: Performed by: NURSE ANESTHETIST, CERTIFIED REGISTERED

## 2025-02-28 PROCEDURE — C1769 GUIDE WIRE: HCPCS | Performed by: ORTHOPAEDIC SURGERY

## 2025-02-28 PROCEDURE — 0QB80ZZ EXCISION OF RIGHT FEMORAL SHAFT, OPEN APPROACH: ICD-10-PCS | Performed by: ORTHOPAEDIC SURGERY

## 2025-02-28 PROCEDURE — 11983 REMOVE/INSERT DRUG IMPLANT: CPT | Performed by: PHYSICIAN ASSISTANT

## 2025-02-28 PROCEDURE — 86880 COOMBS TEST DIRECT: CPT

## 2025-02-28 PROCEDURE — 2500000001 HC RX 250 WO HCPCS SELF ADMINISTERED DRUGS (ALT 637 FOR MEDICARE OP): Performed by: STUDENT IN AN ORGANIZED HEALTH CARE EDUCATION/TRAINING PROGRAM

## 2025-02-28 PROCEDURE — P9045 ALBUMIN (HUMAN), 5%, 250 ML: HCPCS | Mod: JZ,TB | Performed by: STUDENT IN AN ORGANIZED HEALTH CARE EDUCATION/TRAINING PROGRAM

## 2025-02-28 PROCEDURE — 85027 COMPLETE CBC AUTOMATED: CPT | Performed by: STUDENT IN AN ORGANIZED HEALTH CARE EDUCATION/TRAINING PROGRAM

## 2025-02-28 DEVICE — TOBRA FULL DOSE ANTIBIOTIC BONE CEMENT, 10 PACK CATALOG NUMBER IS 6197-9-010
Type: IMPLANTABLE DEVICE | Site: KNEE | Status: FUNCTIONAL
Brand: SIMPLEX

## 2025-02-28 DEVICE — ROD, CONNECTING 11 X 250 HOFFMANN3: Type: IMPLANTABLE DEVICE | Site: TIBIA | Status: FUNCTIONAL

## 2025-02-28 DEVICE — GUIDE WIRE, BALL-TIPPED, STERILE: Type: IMPLANTABLE DEVICE | Site: TIBIA | Status: NON-FUNCTIONAL

## 2025-02-28 RX ORDER — ASPIRIN 81 MG/1
81 TABLET ORAL 2 TIMES DAILY
Status: CANCELLED | OUTPATIENT
Start: 2025-03-01

## 2025-02-28 RX ORDER — SODIUM CHLORIDE, SODIUM LACTATE, POTASSIUM CHLORIDE, CALCIUM CHLORIDE 600; 310; 30; 20 MG/100ML; MG/100ML; MG/100ML; MG/100ML
100 INJECTION, SOLUTION INTRAVENOUS CONTINUOUS
Status: ACTIVE | OUTPATIENT
Start: 2025-02-28 | End: 2025-03-01

## 2025-02-28 RX ORDER — TRANEXAMIC ACID 100 MG/ML
INJECTION, SOLUTION INTRAVENOUS AS NEEDED
Status: DISCONTINUED | OUTPATIENT
Start: 2025-02-28 | End: 2025-02-28

## 2025-02-28 RX ORDER — SODIUM CHLORIDE, SODIUM LACTATE, POTASSIUM CHLORIDE, CALCIUM CHLORIDE 600; 310; 30; 20 MG/100ML; MG/100ML; MG/100ML; MG/100ML
INJECTION, SOLUTION INTRAVENOUS CONTINUOUS PRN
Status: DISCONTINUED | OUTPATIENT
Start: 2025-02-28 | End: 2025-02-28

## 2025-02-28 RX ORDER — PHENYLEPHRINE 10 MG/250 ML(40 MCG/ML)IN 0.9 % SOD.CHLORIDE INTRAVENOUS
0-2 CONTINUOUS
Status: DISPENSED | OUTPATIENT
Start: 2025-02-28

## 2025-02-28 RX ORDER — VANCOMYCIN HYDROCHLORIDE 1 G/20ML
INJECTION, POWDER, LYOPHILIZED, FOR SOLUTION INTRAVENOUS AS NEEDED
Status: DISCONTINUED | OUTPATIENT
Start: 2025-02-28 | End: 2025-02-28

## 2025-02-28 RX ORDER — PROPOFOL 10 MG/ML
INJECTION, EMULSION INTRAVENOUS AS NEEDED
Status: DISCONTINUED | OUTPATIENT
Start: 2025-02-28 | End: 2025-02-28

## 2025-02-28 RX ORDER — PROCHLORPERAZINE MALEATE 10 MG
10 TABLET ORAL EVERY 6 HOURS PRN
Status: DISPENSED | OUTPATIENT
Start: 2025-02-28

## 2025-02-28 RX ORDER — PROCHLORPERAZINE EDISYLATE 5 MG/ML
10 INJECTION INTRAMUSCULAR; INTRAVENOUS EVERY 6 HOURS PRN
Status: DISPENSED | OUTPATIENT
Start: 2025-02-28

## 2025-02-28 RX ORDER — VANCOMYCIN HYDROCHLORIDE 1 G/200ML
1000 INJECTION, SOLUTION INTRAVENOUS EVERY 12 HOURS
Status: DISPENSED | OUTPATIENT
Start: 2025-02-28

## 2025-02-28 RX ORDER — NALOXONE HYDROCHLORIDE 0.4 MG/ML
0.2 INJECTION, SOLUTION INTRAMUSCULAR; INTRAVENOUS; SUBCUTANEOUS EVERY 5 MIN PRN
Status: ACTIVE | OUTPATIENT
Start: 2025-02-28

## 2025-02-28 RX ORDER — OXYCODONE HYDROCHLORIDE 5 MG/1
2.5 TABLET ORAL EVERY 4 HOURS PRN
Status: ACTIVE | OUTPATIENT
Start: 2025-02-28

## 2025-02-28 RX ORDER — GLYCOPYRROLATE 0.2 MG/ML
INJECTION INTRAMUSCULAR; INTRAVENOUS AS NEEDED
Status: DISCONTINUED | OUTPATIENT
Start: 2025-02-28 | End: 2025-02-28

## 2025-02-28 RX ORDER — SODIUM CHLORIDE 0.9 G/100ML
INJECTION, SOLUTION IRRIGATION AS NEEDED
Status: DISCONTINUED | OUTPATIENT
Start: 2025-02-28 | End: 2025-02-28 | Stop reason: HOSPADM

## 2025-02-28 RX ORDER — POLYETHYLENE GLYCOL 3350 17 G/17G
17 POWDER, FOR SOLUTION ORAL DAILY
Status: ACTIVE | OUTPATIENT
Start: 2025-02-28

## 2025-02-28 RX ORDER — DIPHENHYDRAMINE HYDROCHLORIDE 50 MG/ML
25 INJECTION INTRAMUSCULAR; INTRAVENOUS EVERY 6 HOURS PRN
Status: ACTIVE | OUTPATIENT
Start: 2025-02-28

## 2025-02-28 RX ORDER — AMOXICILLIN 250 MG
2 CAPSULE ORAL 2 TIMES DAILY
Status: DISPENSED | OUTPATIENT
Start: 2025-02-28

## 2025-02-28 RX ORDER — BISACODYL 10 MG/1
10 SUPPOSITORY RECTAL DAILY PRN
Status: ACTIVE | OUTPATIENT
Start: 2025-02-28

## 2025-02-28 RX ORDER — ACETAMINOPHEN 10 MG/ML
1000 INJECTION, SOLUTION INTRAVENOUS ONCE
Status: COMPLETED | OUTPATIENT
Start: 2025-02-28 | End: 2025-02-28

## 2025-02-28 RX ORDER — APREPITANT 40 MG/1
CAPSULE ORAL AS NEEDED
Status: DISCONTINUED | OUTPATIENT
Start: 2025-02-28 | End: 2025-02-28

## 2025-02-28 RX ORDER — ACETAMINOPHEN 325 MG/1
650 TABLET ORAL EVERY 6 HOURS SCHEDULED
Status: DISPENSED | OUTPATIENT
Start: 2025-02-28

## 2025-02-28 RX ORDER — ALBUMIN HUMAN 50 G/1000ML
25 SOLUTION INTRAVENOUS ONCE
Status: COMPLETED | OUTPATIENT
Start: 2025-02-28 | End: 2025-02-28

## 2025-02-28 RX ORDER — PHENYLEPHRINE 10 MG/250 ML(40 MCG/ML)IN 0.9 % SOD.CHLORIDE INTRAVENOUS
CONTINUOUS PRN
Status: DISCONTINUED | OUTPATIENT
Start: 2025-02-28 | End: 2025-02-28

## 2025-02-28 RX ORDER — ALBUTEROL SULFATE 0.83 MG/ML
2.5 SOLUTION RESPIRATORY (INHALATION) ONCE AS NEEDED
Status: DISCONTINUED | OUTPATIENT
Start: 2025-02-28 | End: 2025-02-28 | Stop reason: HOSPADM

## 2025-02-28 RX ORDER — MAGNESIUM SULFATE 1 G/100ML
1 INJECTION INTRAVENOUS ONCE
Status: COMPLETED | OUTPATIENT
Start: 2025-02-28 | End: 2025-02-28

## 2025-02-28 RX ORDER — ROCURONIUM BROMIDE 10 MG/ML
INJECTION, SOLUTION INTRAVENOUS AS NEEDED
Status: DISCONTINUED | OUTPATIENT
Start: 2025-02-28 | End: 2025-02-28

## 2025-02-28 RX ORDER — CYCLOBENZAPRINE HCL 10 MG
10 TABLET ORAL 3 TIMES DAILY PRN
Status: DISPENSED | OUTPATIENT
Start: 2025-02-28

## 2025-02-28 RX ORDER — DIPHENHYDRAMINE HCL 25 MG
25 CAPSULE ORAL EVERY 6 HOURS PRN
Status: ACTIVE | OUTPATIENT
Start: 2025-02-28

## 2025-02-28 RX ORDER — LIDOCAINE HYDROCHLORIDE 10 MG/ML
0.1 INJECTION, SOLUTION INFILTRATION; PERINEURAL ONCE
Status: DISCONTINUED | OUTPATIENT
Start: 2025-02-28 | End: 2025-02-28 | Stop reason: HOSPADM

## 2025-02-28 RX ORDER — OXYCODONE HYDROCHLORIDE 10 MG/1
10 TABLET ORAL EVERY 4 HOURS PRN
Status: DISPENSED | OUTPATIENT
Start: 2025-02-28

## 2025-02-28 RX ORDER — HYDROMORPHONE HYDROCHLORIDE 1 MG/ML
INJECTION, SOLUTION INTRAMUSCULAR; INTRAVENOUS; SUBCUTANEOUS AS NEEDED
Status: DISCONTINUED | OUTPATIENT
Start: 2025-02-28 | End: 2025-02-28

## 2025-02-28 RX ORDER — HYDRALAZINE HYDROCHLORIDE 20 MG/ML
5 INJECTION INTRAMUSCULAR; INTRAVENOUS EVERY 30 MIN PRN
Status: DISCONTINUED | OUTPATIENT
Start: 2025-02-28 | End: 2025-02-28 | Stop reason: HOSPADM

## 2025-02-28 RX ORDER — VANCOMYCIN HYDROCHLORIDE 1 G/20ML
INJECTION, POWDER, LYOPHILIZED, FOR SOLUTION INTRAVENOUS AS NEEDED
Status: DISCONTINUED | OUTPATIENT
Start: 2025-02-28 | End: 2025-02-28 | Stop reason: HOSPADM

## 2025-02-28 RX ORDER — NORETHINDRONE AND ETHINYL ESTRADIOL 0.5-0.035
15 KIT ORAL ONCE
Status: DISCONTINUED | OUTPATIENT
Start: 2025-02-28 | End: 2025-02-28 | Stop reason: HOSPADM

## 2025-02-28 RX ORDER — ALBUMIN HUMAN 50 G/1000ML
SOLUTION INTRAVENOUS AS NEEDED
Status: DISCONTINUED | OUTPATIENT
Start: 2025-02-28 | End: 2025-02-28

## 2025-02-28 RX ORDER — VANCOMYCIN HYDROCHLORIDE 1 G/20ML
INJECTION, POWDER, LYOPHILIZED, FOR SOLUTION INTRAVENOUS DAILY PRN
Status: DISPENSED | OUTPATIENT
Start: 2025-02-28

## 2025-02-28 RX ORDER — OXYCODONE HYDROCHLORIDE 5 MG/1
5 TABLET ORAL EVERY 4 HOURS PRN
Status: DISCONTINUED | OUTPATIENT
Start: 2025-02-28 | End: 2025-02-28 | Stop reason: HOSPADM

## 2025-02-28 RX ORDER — PHENYLEPHRINE HCL IN 0.9% NACL 0.4MG/10ML
SYRINGE (ML) INTRAVENOUS AS NEEDED
Status: DISCONTINUED | OUTPATIENT
Start: 2025-02-28 | End: 2025-02-28

## 2025-02-28 RX ORDER — LABETALOL HYDROCHLORIDE 5 MG/ML
5 INJECTION, SOLUTION INTRAVENOUS ONCE AS NEEDED
Status: DISCONTINUED | OUTPATIENT
Start: 2025-02-28 | End: 2025-02-28 | Stop reason: HOSPADM

## 2025-02-28 RX ORDER — ALBUTEROL SULFATE 90 UG/1
2 INHALANT RESPIRATORY (INHALATION) EVERY 6 HOURS PRN
Status: DISPENSED | OUTPATIENT
Start: 2025-02-28

## 2025-02-28 RX ORDER — FENTANYL CITRATE 50 UG/ML
50 INJECTION, SOLUTION INTRAMUSCULAR; INTRAVENOUS EVERY 5 MIN PRN
Status: DISCONTINUED | OUTPATIENT
Start: 2025-02-28 | End: 2025-02-28 | Stop reason: HOSPADM

## 2025-02-28 RX ORDER — FENTANYL CITRATE 50 UG/ML
INJECTION, SOLUTION INTRAMUSCULAR; INTRAVENOUS AS NEEDED
Status: DISCONTINUED | OUTPATIENT
Start: 2025-02-28 | End: 2025-02-28

## 2025-02-28 RX ORDER — LIDOCAINE HCL/PF 100 MG/5ML
SYRINGE (ML) INTRAVENOUS AS NEEDED
Status: DISCONTINUED | OUTPATIENT
Start: 2025-02-28 | End: 2025-02-28

## 2025-02-28 RX ORDER — OXYCODONE HYDROCHLORIDE 5 MG/1
5 TABLET ORAL EVERY 6 HOURS PRN
Status: ACTIVE | OUTPATIENT
Start: 2025-02-28

## 2025-02-28 RX ORDER — PROCHLORPERAZINE 25 MG/1
25 SUPPOSITORY RECTAL EVERY 12 HOURS PRN
Status: ACTIVE | OUTPATIENT
Start: 2025-02-28

## 2025-02-28 RX ORDER — ADHESIVE BANDAGE
30 BANDAGE TOPICAL DAILY PRN
Status: DISPENSED | OUTPATIENT
Start: 2025-02-28

## 2025-02-28 RX ORDER — PANTOPRAZOLE SODIUM 40 MG/1
40 TABLET, DELAYED RELEASE ORAL
Status: DISPENSED | OUTPATIENT
Start: 2025-03-01 | End: 2025-03-22

## 2025-02-28 RX ORDER — DIPHENHYDRAMINE HCL 12.5MG/5ML
25 LIQUID (ML) ORAL EVERY 6 HOURS PRN
Status: DISPENSED | OUTPATIENT
Start: 2025-02-28

## 2025-02-28 RX ADMIN — SENNOSIDES AND DOCUSATE SODIUM 2 TABLET: 50; 8.6 TABLET ORAL at 21:14

## 2025-02-28 RX ADMIN — ROCURONIUM BROMIDE 20 MG: 10 INJECTION INTRAVENOUS at 09:54

## 2025-02-28 RX ADMIN — HYDROMORPHONE HYDROCHLORIDE 1 MG: 1 INJECTION, SOLUTION INTRAMUSCULAR; INTRAVENOUS; SUBCUTANEOUS at 09:50

## 2025-02-28 RX ADMIN — OXYCODONE HYDROCHLORIDE 5 MG: 5 TABLET ORAL at 16:07

## 2025-02-28 RX ADMIN — ACETAMINOPHEN 1000 MG: 10 INJECTION, SOLUTION INTRAVENOUS at 14:55

## 2025-02-28 RX ADMIN — ROCURONIUM BROMIDE 50 MG: 10 INJECTION INTRAVENOUS at 07:52

## 2025-02-28 RX ADMIN — ALBUMIN HUMAN 25 G: 0.05 INJECTION, SOLUTION INTRAVENOUS at 12:26

## 2025-02-28 RX ADMIN — HYDROMORPHONE HYDROCHLORIDE 1 MG: 1 INJECTION, SOLUTION INTRAMUSCULAR; INTRAVENOUS; SUBCUTANEOUS at 08:27

## 2025-02-28 RX ADMIN — ACETAMINOPHEN 650 MG: 325 TABLET ORAL at 20:57

## 2025-02-28 RX ADMIN — SUGAMMADEX 400 MG: 100 INJECTION, SOLUTION INTRAVENOUS at 10:19

## 2025-02-28 RX ADMIN — FENTANYL CITRATE 50 MCG: 50 INJECTION, SOLUTION INTRAMUSCULAR; INTRAVENOUS at 07:37

## 2025-02-28 RX ADMIN — LIDOCAINE HYDROCHLORIDE 100 MG: 20 INJECTION INTRAVENOUS at 07:52

## 2025-02-28 RX ADMIN — PROPOFOL 150 MG: 10 INJECTION, EMULSION INTRAVENOUS at 07:52

## 2025-02-28 RX ADMIN — POLYETHYLENE GLYCOL 3350 17 G: 17 POWDER, FOR SOLUTION ORAL at 20:59

## 2025-02-28 RX ADMIN — PROCHLORPERAZINE EDISYLATE 10 MG: 5 INJECTION INTRAMUSCULAR; INTRAVENOUS at 21:57

## 2025-02-28 RX ADMIN — Medication 120 MCG: at 08:24

## 2025-02-28 RX ADMIN — Medication 80 MCG: at 08:13

## 2025-02-28 RX ADMIN — PHENYLEPHRINE-NACL IV SOLUTION 10 MG/250ML-0.9% 0.4 MCG/KG/MIN: 10-0.9/25 SOLUTION at 08:35

## 2025-02-28 RX ADMIN — Medication 120 MCG: at 08:19

## 2025-02-28 RX ADMIN — SODIUM CHLORIDE, POTASSIUM CHLORIDE, SODIUM LACTATE AND CALCIUM CHLORIDE 100 ML/HR: 600; 310; 30; 20 INJECTION, SOLUTION INTRAVENOUS at 20:58

## 2025-02-28 RX ADMIN — OXYCODONE HYDROCHLORIDE 10 MG: 10 TABLET ORAL at 20:57

## 2025-02-28 RX ADMIN — DEXAMETHASONE SODIUM PHOSPHATE 10 MG: 4 INJECTION INTRA-ARTICULAR; INTRALESIONAL; INTRAMUSCULAR; INTRAVENOUS; SOFT TISSUE at 08:20

## 2025-02-28 RX ADMIN — ALBUMIN HUMAN 250 ML: 0.05 INJECTION, SOLUTION INTRAVENOUS at 08:40

## 2025-02-28 RX ADMIN — APREPITANT 40 MG: 40 CAPSULE ORAL at 07:30

## 2025-02-28 RX ADMIN — CYCLOBENZAPRINE 10 MG: 10 TABLET, FILM COATED ORAL at 20:57

## 2025-02-28 RX ADMIN — Medication 80 MCG: at 09:46

## 2025-02-28 RX ADMIN — SODIUM CHLORIDE, POTASSIUM CHLORIDE, SODIUM LACTATE AND CALCIUM CHLORIDE: 600; 310; 30; 20 INJECTION, SOLUTION INTRAVENOUS at 07:37

## 2025-02-28 RX ADMIN — Medication 120 MCG: at 07:52

## 2025-02-28 RX ADMIN — TRANEXAMIC ACID 1000 MG: 100 INJECTION INTRAVENOUS at 08:19

## 2025-02-28 RX ADMIN — MAGNESIUM SULFATE HEPTAHYDRATE 1 G: 1 INJECTION, SOLUTION INTRAVENOUS at 14:41

## 2025-02-28 RX ADMIN — GLYCOPYRROLATE 0.2 MG: 0.2 INJECTION INTRAMUSCULAR; INTRAVENOUS at 07:52

## 2025-02-28 RX ADMIN — VANCOMYCIN HYDROCHLORIDE 1 G: 1025 INJECTION, POWDER, FOR SOLUTION INTRAVENOUS; ORAL at 08:35

## 2025-02-28 RX ADMIN — PROPOFOL 50 MG: 10 INJECTION, EMULSION INTRAVENOUS at 08:06

## 2025-02-28 RX ADMIN — Medication 200 MCG: at 08:01

## 2025-02-28 RX ADMIN — FENTANYL CITRATE 50 MCG: 50 INJECTION, SOLUTION INTRAMUSCULAR; INTRAVENOUS at 07:52

## 2025-02-28 RX ADMIN — VANCOMYCIN HYDROCHLORIDE 1000 MG: 1 INJECTION, SOLUTION INTRAVENOUS at 22:28

## 2025-02-28 RX ADMIN — ALBUMIN HUMAN 25 G: 0.05 INJECTION, SOLUTION INTRAVENOUS at 14:23

## 2025-02-28 ASSESSMENT — PAIN SCALES - GENERAL
PAINLEVEL_OUTOF10: 0 - NO PAIN
PAINLEVEL_OUTOF10: 8
PAINLEVEL_OUTOF10: 9
PAINLEVEL_OUTOF10: 0 - NO PAIN
PAINLEVEL_OUTOF10: 5 - MODERATE PAIN

## 2025-02-28 ASSESSMENT — PAIN - FUNCTIONAL ASSESSMENT
PAIN_FUNCTIONAL_ASSESSMENT: 0-10
PAIN_FUNCTIONAL_ASSESSMENT: UNABLE TO SELF-REPORT
PAIN_FUNCTIONAL_ASSESSMENT: 0-10

## 2025-02-28 NOTE — ANESTHESIA PROCEDURE NOTES
Airway  Date/Time: 2/28/2025 8:08 AM  Urgency: elective    Airway not difficult    Staffing  Performed: CRNA   Authorized by: Lindsey Rodriguez MD    Performed by: ISABEL Lopez-GÓMEZ  Patient location during procedure: OR    Indications and Patient Condition  Indications for airway management: anesthesia and airway protection  Spontaneous Ventilation: absent  Sedation level: deep  Preoxygenated: yes  Patient position: sniffing  MILS not maintained throughout  Mask difficulty assessment: 1 - vent by mask  Planned trial extubation    Final Airway Details  Final airway type: endotracheal airway      Successful airway: ETT  Cuffed: yes   Successful intubation technique: direct laryngoscopy  Facilitating devices/methods: intubating stylet  Endotracheal tube insertion site: oral  Blade: Orion  Blade size: #3  Cormack-Lehane Classification: grade I - full view of glottis  Placement verified by: chest auscultation and capnometry   Cuff volume (mL): 10  Measured from: teeth  ETT to teeth (cm): 21  Number of attempts at approach: 1  Ventilation between attempts: BVM  Number of other approaches attempted: 0    Additional Comments  Ett taped in place

## 2025-02-28 NOTE — PERIOPERATIVE NURSING NOTE
BP decreased to 74 37.  Phenylephrine gtt started at Patient weight not available./mcg/minute then stopped because of bradycardia.  15mg Ephedrine IV given by Dr Solares. Report to Ngoc for coverage

## 2025-02-28 NOTE — OP NOTE
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]     * Post-traumatic arthritis of lower leg, right [M17.31]     * Status post total right knee replacement [Z96.651]     Procedures  EXCISION, TIBIA OR FIBULA  87598 - HI PARTIAL EXCISION BONE TIBIA    INSERTION, DRUG DELIVERY IMPLANT, NON-BIODEGRADABLE  24484 - HI INSERTION DRUG DELIVERY IMPLANT    DEBRIDEMENT, KNEE, OPEN  22359 - HI DEBRIDEMENT BONE 1ST 20 SQ CM/<      Surgeons      * Andrzej Whelan - Primary    Resident/Fellow/Other Assistant:  Surgeons and Role:     * Chanda Reddy PA-C - MARC First Assist    Staff:   Circulator: Carol Posadas Person: Deyanira  Circulator: Jamari    Anesthesia Staff: Anesthesiologist: Lindsey Rodriguez MD  CRNA: ISABEL Lopez-CRNA    Procedure Summary  Anesthesia: Anesthesia type not filed in the log.  ASA: ASA status not filed in the log.  Estimated Blood Loss: 250mL  Intra-op Medications:   Administrations occurring from 0650 to 1130 on 25:   Medication Name Total Dose   sodium chloride 0.9 % irrigation solution 1,000 mL   vancomycin (Vancocin) vial for injection 3 g   albumin human 5 % 250 mL   aprepitant (Emend) capsule 40 mg   dexAMETHasone (Decadron) injection 4 mg/mL 10 mg   fentaNYL (Sublimaze) injection 50 mcg/mL 100 mcg   glycopyrrolate (Robinul) injection 0.2 mg   HYDROmorphone (Dilaudid) injection 1 mg/mL 2 mg   lactated Ringer's infusion Cannot be calculated   lidocaine (cardiac) injection  2% prefilled syringe 100 mg   phenylephrine (Robert-Synephrine) 10 mg in sodium chloride 0.9% 250 mL (0.04 mg/mL) infusion (premix) 4.33 mg   phenylephrine 40 mcg/mL syringe 10 mL 720 mcg   propofol (Diprivan) injection 10 mg/mL 200 mg   rocuronium (ZeMuron) 50 mg/5 mL injection 70 mg   tranexamic acid (Cyklokapron) injection 1,000 mg   vancomycin 1 g 1 g              Anesthesia Record               Intraprocedure I/O Totals          Intake    Tranexamic Acid 0.00 mL    The total shown is the total volume documented since Anesthesia Start was filed.    Phenylephrine Drip 0.00 mL    The total shown is the total volume documented since Anesthesia Start was filed.    Total Intake 0 mL       Output    Urine 225 mL    Total Output 225 mL       Net    Net Volume -225 mL          Specimen:   ID Type Source Tests Collected by Time   A : Fistula Right Knee Tissue ABSCESS TISSUE/WOUND CULTURE/SMEAR Andrzej Whelan MD 2/28/2025 0828   B : Deep 1 Right knee Tissue SOFT TISSUE BIOPSY TISSUE/WOUND CULTURE/SMEAR Andrzej Whelan MD 2/28/2025 0844   C : Deep 2 Right knee Tissue SOFT TISSUE BIOPSY TISSUE/WOUND CULTURE/SMEAR Andrzej Whelan MD 2/28/2025 0845                 Drains and/or Catheters:   Urethral Catheter Double-lumen 16 Fr. (Active)       Tourniquet Times:     Total Tourniquet Time Documented:  Thigh (Right) - 69 minutes  Total: Thigh (Right) - 69 minutes      Implants:  Implants       Type Name Action Serial No.      Joint CEMENT, BONE SIMPLEX P W/TOBRA - [id number] Implanted      Joint CEMENT, BONE SIMPLEX P W/TOBRA - [id number] Implanted      Joint CEMENT, BONE SIMPLEX P W/TOBRA - [id number] Implanted      Joint GUIDE WIRE, SMITH, 3.0MM X 1000MM - [id number] Used, Not Implanted      Implant VIVIEN, CONNECTING 11 X 250 HOFFMANN3 - [id number] Implanted               Findings: recurrent infection with open wound femur and tibia    Indications: Adriana Wood is an 69 y.o. female who is having surgery for Prosthetic joint  infection, subsequent encounter [T84.50XD]  Post-traumatic arthritis of lower leg, right [M17.31]  Status post total right knee replacement [Z96.651].     The patient was seen in the preoperative area. The risks, benefits, complications, treatment options, non-operative alternatives, expected recovery and outcomes were discussed with the patient. The possibilities of reaction to medication, pulmonary aspiration, injury to surrounding structures, bleeding, recurrent infection, the need for additional procedures, failure to diagnose a condition, and creating a complication requiring transfusion or operation were discussed with the patient. The patient concurred with the proposed plan, giving informed consent.  The site of surgery was properly noted/marked if necessary per policy. The patient has been actively warmed in preoperative area. Preoperative antibiotics have been ordered and given within 2 hours of incision. Venous thrombosis prophylaxis have been ordered including chemical prophylaxis    Procedure Details operative procedure    Preoperative diagnosis recurrent infection status post explantation of right total knee replacement with temporizing fusion nail and antibiotic spacer in place.    Postop diagnosis same    Procedure removal of titanium intramedullary nail right knee, removal of antibiotic spacer and placement of antibiotic spacer with carbon adrienne right knee, debridement of infection excisionally tibia and femoral metaphysis and shaft.    Surgeon  Tip first Assistant Chanda Lutz PA-C participated in this case as the first assistant, performing components of the positioning, retraction, exposure, reduction, stabilization, and closure. Due to the nature and complexity of the case, no qualified resident of an appropriate level was available to assist.    Second assistant Amadeo Han    Anesthesia General…  cc    Preoperative indications this is a 69-year-old female who  has a long history of right knee problems dating back to 2021 when she sustained an open bicondylar tibial plateau fracture treated at an outside hospital.  2 and half months later she had debridement of her fracture here at [facility] by Dr. Tai and multiple surgeries.  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  At that point she was not weightbearing in the leg and the leg was fixed in flexion.  She had severe osteoporosis.  In March of this year we did a conversion to a total knee replacement understanding the soft tissue was somewhat compromised from her previous surgeries.  She was taken back 2 months later for a debridement mostly because she developed a wound over the anterior incision scar which was part of her original trauma she underwent a poly exchange and we were following her in the office.  Because of her poor soft tissue healing and recurrent infection 6 months ago I removed her total knee replacement and placed a intramedullary nail from the femur to tibia for stability and an antibiotic spacer and she underwent appropriate IV antibiotics in preparation for possibly a staged knee replacement revision ORIF fused knee.  Unfortunately she continued to drain recently and conservative management was not working.  It was determined by both myself and Dr. Avila that a free flap would most likely be required for limb salvage even if we needed to do a knee fusion later versus a hinged knee replacement.  The soft tissue envelope was much too scarred and.  I felt that she had risk of developing systemic infection and needed to remove the spacer and do a debridement.  Although Dr. Avila advised this he could not do the free flap for several weeks we will attempt to debride remove the infection get deep cultures remove the metal intramedullary adrienne placed a carbon adrienne and a new antibiotic  spacer and attempt to try to reclose the wound removing the fistula.  We also understood that this was not a soft tissue envelope that would accommodate any major surgery in the future and that we can get rid of the infection free flap would be required for soft tissue coverage for any type of limb salvage reconstruction.  The risks and benefits were discussed with the patient and she agreed to proceed.    Procedure patient was taken to the operating room and a full huddle was performed the patient awake and alert.  We did not administer any IV antibiotics we did administer TXA.  Patient was placed supine on the radiolucent Javier table a Rosenbaum catheter was placed because of possible length of surgery and because of the possible blood loss.  The entire right lower extremity was then prepped and draped in usual sterile fashion we placed a sterile tourniquet on the proximal thigh.  Patient was noted in the fistula in the center of the previous incision.  The right lower extremity as it was elevated in the tourniquet was elevated appropriately.  Surgical pause was performed and the surgery was commenced.    We followed the midline incision over the quad tendon and then over the remnant of the patella tendon the scar tissue was directly down to bone we went medially which was part of the fistula and cut out for still about 5 mm but it had a larger abscess underneath it which we debrided and cultured we extended an incision on the medial proximal tibia where the original incision was we had no and no choice but to go with the original scar line.  The tissue was completely here down to the tibia part of the tibial tubercle was not there from previous fracture.  The extensor mechanism was still attached to laterally.  The patella was basically gone but the extensor mechanism was still intact there was a small remnant of the patella which would is what which was adhering to the femur.  We cultured numerous times in this  area we carefully  the subcutaneous tissue from the quad tendon proximally medial and laterally around the resected knee in order to create soft tissue that we could repair later.  We did need to do a quadriceps snip which was directed obliquely across the quadricep which had been done previously removed all the old suture.  We removed the antibiotic spacer with osteotomes and piecemeal.  We then cut the 400 mm intramedullary 8 mm nail with a edward bur drill in order to get it out we remove the screw distally in the nail under fluoroscopic image and we remove the 2 screws proximally in the femur under image and removed both portions of the nail.  We then able to bend the knee slightly.  We then excisionally debrided starting the femur with large curettes and we placed an intramedullary both the guidewire up to the bottom part of the total hip and we reamed the femur up to a 14.5 reamer excisionally debriding the femur bone and scar tissue and pseudocapsule.  We then moved to the tibia where we put a ball-tipped guidewire down the tibia and reamed the tibia up to a 13 excisionally debriding it.  We also excisionally debrided the proximal tibial metaphysis using a large curette.  We used an oscillating saw to cut the distal femur very little bit of bone was removed maybe 3 to 4 mm.  There was not much cancellous bone left.  We then went to the tibia and cut another 3 mm of bone on the top of the tibia along the previous cut to clean cut it.  After copious amounts of irrigation again we proceeded to place the antibiotic spacer and the adrienne in the knee joint.    We used a 11 mm carbon [employer] external fixation adrienne 250 mm in length.  We placed the adrienne up the femur first and then down the tibia centering it around the defect.  We then used 3 bags of tobramycin cement mixed with 1 bag of vancomycin each and created a cement spacer in the defect which measured about 3 to 4 cm around the carbon adrienne.  We did place a  Gelfoam behind the cement in order to protect the neurovascular bundles from the heat.  When the cement was hardened we went ahead and started closing.  Please note that we did take the tourniquet down after the reaming process tourniquet was only up for about an hour.  We also gave the patient 1 g of vancomycin antibiotic after we took the culture.  Hard part was closing the fascia we could not get the fascia closed because there was a defect anteriorly from the infection and that in the we had to remove.  But we did get the quad tendon closed proximally and most of the fascia closed with #1 Ethibond suture and #1 Vicryl we then closed the skin with 2-0 Vicryl and 2-0 nylon suture.  We did get the skin closed completely we used Prevena dressing.  Sterile dressing was applied and the patient was placed in a knee immobilizer.    Patient would be weightbearing as tolerated with a straight knee brace.  Patient would receive IV antibiotics appropriate per infectious disease.  Patient would most likely need a PICC line again considering the amount of infection.  Patient tolerated this procedure well  Complications:  None; patient tolerated the procedure well.    Disposition: PACU - hemodynamically stable.  Condition: stable                 Additional Details:     Attending Attestation: I was present and scrubbed for the entire procedure.    Andrzej Whelan  Phone Number: [phone]

## 2025-02-28 NOTE — SIGNIFICANT EVENT
Ortho update:    Called to PACU for patient postop.     Patient had PVCs and hypotension requiring treatment including short duration of pressors + albumin. On arrival seems to be more HD stable and off pressors. Notable PMH includes CAD, long QT, and post operative cardiac arrest. Discussed patient with anesthesia team. Going to monitor for a while longer in PACU. If patient becomes stable enough for RNF, plan for medicine consult for comanagement. If patient does not improve plan is SICU. Plan is for PACU to reach out to ortho team via pager [phone] pending decision for SICU v. RNF. Family made aware.     D/w staff.     Reviewed and approved by CLAUDIA CABALLERO on 2/28/25 at 2:27 PM.

## 2025-02-28 NOTE — PROCEDURES
Peripheral Block    Patient location during procedure: post-op  Start time: 2/28/2025 11:39 AM  End time: 2/28/2025 11:45 AM  Reason for block: at surgeon's request and post-op pain management  Staffing  Performed: fellow   Authorized by: Audelia Stark MD    Performed by: Liz Conde MD  Preanesthetic Checklist  Completed: patient identified, IV checked, site marked, risks and benefits discussed, surgical consent, monitors and equipment checked, pre-op evaluation and timeout performed   Timeout performed at: 2/28/2025 11:40 AM  Peripheral Block  Patient position: laying flat  Prep: ChloraPrep  Patient monitoring: heart rate, continuous pulse ox and cardiac monitor  Block type: femoral  Laterality: right  Injection technique: single-shot  Guidance: ultrasound guided  Local infiltration: lidocaine  Infiltration strength: 1 %  Dose: 3 mL  Needle  Needle type: short-bevel   Needle gauge: 22 G  Needle length: 8 cm  Needle localization: ultrasound guidance     image stored in chart  Assessment  Injection assessment: negative aspiration for heme, incremental injection and local visualized surrounding nerve on ultrasound  Paresthesia pain: none  Heart rate change: no  Slow fractionated injection: yes  Additional Notes  Femoral nerve block: Informed consent obtained.  Risks, benefits, and alternatives discussed.  ASA monitors placed and timeout performed.  Patient positioned, prepped with chlorhexidine, and draped with sterile towels.      Ultrasound guidance was used to visualize the femoral nerve at the inguinal crease and surrounding structures, including femoral artery and femoral nerve, with visualization of the needle throughout duration of the procedure.  Aspiration was negative. A total of ropivacaine 0.5% 20 ml, 4 mg decadron, 1:200,000 epinephrine and 8 mcg precedex was divided and injected on right side. Patient tolerated the procedure well with no complications.    Timeout done by LAURIE Paiz

## 2025-02-28 NOTE — ANESTHESIA PREPROCEDURE EVALUATION
Patient: Adriana Wood    Procedure Information       Anesthesia Start Date/Time: 02/28/25 0744    Procedures:       EXCISION, TIBIA OR FIBULA (Right: Leg Lower) - Chon placement and ABX spacer      INSERTION, DRUG DELIVERY IMPLANT, NON-BIODEGRADABLE (Right: Leg Lower)      DEBRIDEMENT, KNEE, OPEN (Right: Knee)    Location: [facility] OR 09 / [facility] OR    Surgeons: Andrzej Whelan MD            Relevant Problems   Anesthesia (within normal limits)      Cardiac   (+) Bigeminy   (+) Cardiac arrest due to underlying cardiac condition   (+) Long QT interval   (+) PVC (premature ventricular contraction)   (+) Torsades de pointes (Multi)      Neuro   (+) Depression, acute   (+) Intractable migraine with aura without status migrainosus   (+) Occipital neuralgia of right side   (+) Post-traumatic stress disorder, chronic      GI   (+) Chronic diarrhea   (+) GERD (gastroesophageal reflux disease)      /Renal   (+) Nephrolithiasis      Musculoskeletal   (+) Arthritis of right knee   (+) Osteoarthritis of knee   (+) Post-traumatic arthritis of lower leg, right      ID   (+) Hardware complicating wound infection (CMS-HCC)   (+) Osteomyelitis of right tibia (Multi)   (+) Other acute osteomyelitis, right tibia and fibula (Multi)   (+) Prosthetic joint infection, subsequent encounter   (+) Wound infection      Respiratory   (+) Dyspnea      Circulatory   (+) Cardiomyopathy       Clinical information reviewed:   Tobacco  Allergies  Meds   Med Hx  Surg Hx  OB Status  Fam Hx  Soc   Hx        NPO Detail:  NPO/Void Status  Date of Last Liquid: 02/28/25  Time of Last Liquid: 0300  Date of Last Solid: 02/27/25  Time of Last Solid: 2330  Last Intake Type: Clear fluids         Physical Exam    Airway  Mallampati: II  TM distance: >3 FB  Neck ROM: full     Cardiovascular   Rhythm: regular  Rate: normal     Dental    Pulmonary   Breath sounds clear to auscultation     Abdominal   Abdomen: soft             Anesthesia  Plan    History of general anesthesia?: yes  History of complications of general anesthesia?: no    ASA 3     general     intravenous induction   Postoperative administration of opioids is intended.  Anesthetic plan and risks discussed with patient.  Use of blood products discussed with patient who consented to blood products.    Plan discussed with CRNA and attending.
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
## 2025-02-28 NOTE — PROGRESS NOTES
Orthopedic Surgery Note    S:  69F s/p right knee irrigation&debridement, revision static spacer w Dr. Whelan 2/28/25.     Brought to PACU in stable condition.       O:  /58   Pulse 53   Temp 35.5 °C (95.9 °F)   Resp 12   SpO2 99%      NAD. Peripherally WWP. Breathing comfortably. No obvious focal deficits.     MSK injured extremity/RLE:  - Dressing DCI  - Prevena holding suction  - Palpable pulses, toes WWP    -Neuro exam limited by somnolence    Labs: reviewed.     Imaging: reviewed.     A&P:  69F s/p right knee irrigation&debridement, revision static spacer w Dr. Whelan 2/28/25.      Plan:   - Diet: Regular  - Vanc pharmacy to dose; pending cx & ID recs  - ID c/s, appreciate recs  - IVF pending PO  - MM pain regimen  - PRN antiemetic. NO ZOFRAN.   - DVT Proph: SCDs/ ambulate/ eliquis 2.5bid+ PPI ppx  - Bowel regimen  - Neurovascular checks every 4 hours and as needed  - Ice affected extremity for 20 mins every hour as needed  - Prevena 14 day  - Rosenbaum removed POD1  - Maintain ace wrap/dressing. Reinforce as needed.  - PWB 50% RLE  - PT/OT c/s  - Monitor VS every 4 hours   - Labs ordered for AM   - IS every hour while awake   - Encourage ambulation / OOB as tolerated      Dispo: Cx/PT/OT/pain     D/w attending.     Reviewed and approved by AMADEO HAN on 2/28/25 at 11:16 AM.      This patient will be followed by the Orthopaedic Trauma service. Please page or Epic Chat the corresponding residents below with questions or concerns.      Ortho Trauma Service (Epic Chat Preferred)  First call: Martin Gasca  Second call: Malvin Barrett  Third call: Amadeo Han     6pm-6am M-F, Holidays, and weekends page Ortho on-call @[phone] with urgent questions/concerns.

## 2025-02-28 NOTE — BRIEF OP NOTE
Date: 2025  OR Location: [facility] [place]    Name: Adriana Wood, : 1955, Age: 69 y.o., MRN: [id number], Sex: female    Diagnosis  Pre-op Diagnosis      * Prosthetic joint infection, subsequent encounter [T84.50XD]     * Post-traumatic arthritis of lower leg, right [M17.31]     * Status post total right knee replacement [Z96.651] Post-op Diagnosis     * Prosthetic joint infection, subsequent encounter [T84.50XD]     * Post-traumatic arthritis of lower leg, right [M17.31]     * Status post total right knee replacement [Z96.651]     Procedures  EXCISION, TIBIA OR FIBULA  86086 - TX PARTIAL EXCISION BONE TIBIA    INSERTION, DRUG DELIVERY IMPLANT, NON-BIODEGRADABLE  99301 - TX INSERTION DRUG DELIVERY IMPLANT    DEBRIDEMENT, KNEE, OPEN  74370 - TX DEBRIDEMENT BONE 1ST 20 SQ CM/<      Surgeons      * Andrzej Whelan - Primary    Resident/Fellow/Other Assistant:  Surgeons and Role:     * Chanda Reddy PA-C - [facility] First Assist    Staff:   Sarahulator: Carol Posadas Person: Deyanira  Circulator: Jamari    Anesthesia Staff: Anesthesiologist: Lindsey Rodriguez MD  CRNA: ISABEL Lopez-CRNA    Procedure Summary  Anesthesia: General  ASA: ASA status not filed in the log.  Estimated Blood Loss: 250mL  Intra-op Medications:   Administrations occurring from 0650 to 1130 on 25:   Medication Name Total Dose   sodium chloride 0.9 % irrigation solution 1,000 mL   vancomycin (Vancocin) vial for injection 3 g   albumin human 5 % 250 mL   aprepitant (Emend) capsule 40 mg   dexAMETHasone (Decadron) injection 4 mg/mL 10 mg   fentaNYL (Sublimaze) injection 50 mcg/mL 100 mcg   glycopyrrolate (Robinul) injection 0.2 mg   HYDROmorphone (Dilaudid) injection 1 mg/mL 2 mg   lactated Ringer's infusion Cannot be calculated   lidocaine (cardiac) injection 2% prefilled syringe 100 mg   phenylephrine (Robert-Synephrine) 10 mg in sodium chloride 0.9% 250 mL (0.04 mg/mL) infusion (premix) 2.6 mg   phenylephrine 40 mcg/mL  syringe 10 mL 720 mcg   propofol (Diprivan) injection 10 mg/mL 200 mg   rocuronium (ZeMuron) 50 mg/5 mL injection 70 mg   sugammadex (Bridion) 200 mg/2 mL injection 400 mg   tranexamic acid (Cyklokapron) injection 1,000 mg   vancomycin 1 g 1 g              Anesthesia Record               Intraprocedure I/O Totals          Intake    Tranexamic Acid 0.00 mL    The total shown is the total volume documented since Anesthesia Start was filed.    Phenylephrine Drip 0.00 mL    The total shown is the total volume documented since Anesthesia Start was filed.    lactated Ringer's 800.00 mL    Total Intake 800 mL       Output    Urine 225 mL    Est. Blood Loss 100 mL    Total Output 325 mL       Net    Net Volume 475 mL          Specimen:   ID Type Source Tests Collected by Time   A : Fistula Right Knee Tissue ABSCESS TISSUE/WOUND CULTURE/SMEAR Andrzej Whelan MD 2/28/2025 0828   B : Deep 1 Right knee Tissue SOFT TISSUE BIOPSY TISSUE/WOUND CULTURE/SMEAR Andrzej Whelan MD 2/28/2025 0844   C : Deep 2 Right knee Tissue SOFT TISSUE BIOPSY TISSUE/WOUND CULTURE/SMEAR Andrzej Whelan MD 2/28/2025 0845                  Findings: right knee with sinus tract and fistula tracking down to intact cemented static spacer with intact intramedullary nail    Complications:  None; patient tolerated the procedure well.     Disposition: PACU - hemodynamically stable.  Condition: stable  Specimens Collected:   ID Type Source Tests Collected by Time   A : Fistula Right Knee Tissue ABSCESS TISSUE/WOUND CULTURE/SMEAR Andrzej Whelan MD 2/28/2025 0828   B : Deep 1 Right knee Tissue SOFT TISSUE BIOPSY TISSUE/WOUND CULTURE/SMEAR Andrzej Whelan MD 2/28/2025 0844   C : Deep 2 Right knee Tissue SOFT TISSUE BIOPSY TISSUE/WOUND CULTURE/SMEAR Andrzej Whelan MD 2/28/2025 0845     Attending Attestation:     Andrzej Whelan  Phone Number: [phone]

## 2025-02-28 NOTE — ANESTHESIA POSTPROCEDURE EVALUATION
Patient: Adriana Wodo    Procedure Summary       Date: 02/28/25 Room / Location: [facility] OR 09 / [facility] OR    Anesthesia Start: 0744 Anesthesia Stop: 1056    Procedures:       EXCISION, TIBIA OR FIBULA (Right: Leg Lower)      INSERTION, DRUG DELIVERY IMPLANT, NON-BIODEGRADABLE (Right: Leg Lower)      DEBRIDEMENT, KNEE, OPEN (Right: Knee) Diagnosis:       Prosthetic joint infection, subsequent encounter      Post-traumatic arthritis of lower leg, right      Status post total right knee replacement      (Prosthetic joint infection, subsequent encounter [T84.50XD])      (Post-traumatic arthritis of lower leg, right [M17.31])      (Status post total right knee replacement [Z96.651])    Surgeons: Andrzej Whelan MD Responsible Provider: Lindsey Rodriguez MD    Anesthesia Type: general ASA Status: 3            Anesthesia Type: general    Vitals Value Taken Time   /58 02/28/25 1445   Temp 36 °C (96.8 °F) 02/28/25 1315   Pulse 70 02/28/25 1459   Resp 13 02/28/25 1459   SpO2 100 % 02/28/25 1459   Vitals shown include unfiled device data.    Anesthesia Post Evaluation    Patient location during evaluation: PACU  Patient participation: complete - patient participated  Level of consciousness: awake and alert  Pain management: adequate  Airway patency: patent  Cardiovascular status: blood pressure returned to baseline  Respiratory status: acceptable  Hydration status: acceptable  Postoperative Nausea and Vomiting: none        There were no known notable events for this encounter.
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
## 2025-03-01 ENCOUNTER — APPOINTMENT (OUTPATIENT)
Dept: CARDIOLOGY | Facility: HOSPITAL | Age: 70
End: 2025-03-01
Payer: MEDICARE

## 2025-03-01 LAB
ALBUMIN SERPL BCP-MCNC: 4.3 G/DL (ref 3.4–5)
ANION GAP SERPL CALC-SCNC: 15 MMOL/L (ref 10–20)
BUN SERPL-MCNC: 10 MG/DL (ref 6–23)
CALCIUM SERPL-MCNC: 9.5 MG/DL (ref 8.6–10.6)
CHLORIDE SERPL-SCNC: 102 MMOL/L (ref 98–107)
CO2 SERPL-SCNC: 22 MMOL/L (ref 21–32)
CREAT SERPL-MCNC: 0.47 MG/DL (ref 0.5–1.05)
EGFRCR SERPLBLD CKD-EPI 2021: >90 ML/MIN/1.73M*2
ERYTHROCYTE [DISTWIDTH] IN BLOOD BY AUTOMATED COUNT: 13.4 % (ref 11.5–14.5)
GLUCOSE SERPL-MCNC: 117 MG/DL (ref 74–99)
HCT VFR BLD AUTO: 30.1 % (ref 36–46)
HGB BLD-MCNC: 9.9 G/DL (ref 12–16)
MAGNESIUM SERPL-MCNC: 2.13 MG/DL (ref 1.6–2.4)
MCH RBC QN AUTO: 28.1 PG (ref 26–34)
MCHC RBC AUTO-ENTMCNC: 32.9 G/DL (ref 32–36)
MCV RBC AUTO: 86 FL (ref 80–100)
NRBC BLD-RTO: 0 /100 WBCS (ref 0–0)
PHOSPHATE SERPL-MCNC: 3.5 MG/DL (ref 2.5–4.9)
PLATELET # BLD AUTO: 203 X10*3/UL (ref 150–450)
POTASSIUM SERPL-SCNC: 4.4 MMOL/L (ref 3.5–5.3)
RBC # BLD AUTO: 3.52 X10*6/UL (ref 4–5.2)
SODIUM SERPL-SCNC: 135 MMOL/L (ref 136–145)
VANCOMYCIN SERPL-MCNC: 11.6 UG/ML (ref 5–20)
WBC # BLD AUTO: 11.4 X10*3/UL (ref 4.4–11.3)

## 2025-03-01 PROCEDURE — 85027 COMPLETE CBC AUTOMATED: CPT

## 2025-03-01 PROCEDURE — 93005 ELECTROCARDIOGRAM TRACING: CPT

## 2025-03-01 PROCEDURE — 83735 ASSAY OF MAGNESIUM: CPT

## 2025-03-01 PROCEDURE — 99232 SBSQ HOSP IP/OBS MODERATE 35: CPT

## 2025-03-01 PROCEDURE — 2500000001 HC RX 250 WO HCPCS SELF ADMINISTERED DRUGS (ALT 637 FOR MEDICARE OP)

## 2025-03-01 PROCEDURE — 99231 SBSQ HOSP IP/OBS SF/LOW 25: CPT

## 2025-03-01 PROCEDURE — 97162 PT EVAL MOD COMPLEX 30 MIN: CPT | Mod: GP

## 2025-03-01 PROCEDURE — 2500000004 HC RX 250 GENERAL PHARMACY W/ HCPCS (ALT 636 FOR OP/ED)

## 2025-03-01 PROCEDURE — 93010 ELECTROCARDIOGRAM REPORT: CPT | Performed by: INTERNAL MEDICINE

## 2025-03-01 PROCEDURE — 80202 ASSAY OF VANCOMYCIN: CPT

## 2025-03-01 PROCEDURE — 97530 THERAPEUTIC ACTIVITIES: CPT | Mod: GP

## 2025-03-01 PROCEDURE — 2500000004 HC RX 250 GENERAL PHARMACY W/ HCPCS (ALT 636 FOR OP/ED): Mod: JZ,TB

## 2025-03-01 PROCEDURE — 80069 RENAL FUNCTION PANEL: CPT

## 2025-03-01 PROCEDURE — 99232 SBSQ HOSP IP/OBS MODERATE 35: CPT | Performed by: PHYSICIAN ASSISTANT

## 2025-03-01 PROCEDURE — 99222 1ST HOSP IP/OBS MODERATE 55: CPT | Performed by: INTERNAL MEDICINE

## 2025-03-01 PROCEDURE — 1200000002 HC GENERAL ROOM WITH TELEMETRY DAILY

## 2025-03-01 RX ADMIN — APIXABAN 2.5 MG: 2.5 TABLET, FILM COATED ORAL at 21:26

## 2025-03-01 RX ADMIN — OXYCODONE HYDROCHLORIDE 10 MG: 10 TABLET ORAL at 21:26

## 2025-03-01 RX ADMIN — APIXABAN 2.5 MG: 2.5 TABLET, FILM COATED ORAL at 10:17

## 2025-03-01 RX ADMIN — SENNOSIDES AND DOCUSATE SODIUM 2 TABLET: 50; 8.6 TABLET ORAL at 21:26

## 2025-03-01 RX ADMIN — ACETAMINOPHEN 650 MG: 325 TABLET ORAL at 05:48

## 2025-03-01 RX ADMIN — HYDROMORPHONE HYDROCHLORIDE 0.5 MG: 1 INJECTION, SOLUTION INTRAMUSCULAR; INTRAVENOUS; SUBCUTANEOUS at 16:10

## 2025-03-01 RX ADMIN — ACETAMINOPHEN 650 MG: 325 TABLET ORAL at 12:46

## 2025-03-01 RX ADMIN — OXYCODONE HYDROCHLORIDE 10 MG: 10 TABLET ORAL at 05:48

## 2025-03-01 RX ADMIN — OXYCODONE HYDROCHLORIDE 10 MG: 10 TABLET ORAL at 12:51

## 2025-03-01 RX ADMIN — HYDROMORPHONE HYDROCHLORIDE 0.5 MG: 1 INJECTION, SOLUTION INTRAMUSCULAR; INTRAVENOUS; SUBCUTANEOUS at 10:31

## 2025-03-01 RX ADMIN — VANCOMYCIN HYDROCHLORIDE 1000 MG: 1 INJECTION, SOLUTION INTRAVENOUS at 10:19

## 2025-03-01 RX ADMIN — SODIUM CHLORIDE, POTASSIUM CHLORIDE, SODIUM LACTATE AND CALCIUM CHLORIDE 100 ML/HR: 600; 310; 30; 20 INJECTION, SOLUTION INTRAVENOUS at 16:15

## 2025-03-01 RX ADMIN — SODIUM CHLORIDE, POTASSIUM CHLORIDE, SODIUM LACTATE AND CALCIUM CHLORIDE 500 ML: 600; 310; 30; 20 INJECTION, SOLUTION INTRAVENOUS at 20:50

## 2025-03-01 RX ADMIN — OXYCODONE HYDROCHLORIDE 10 MG: 10 TABLET ORAL at 17:35

## 2025-03-01 RX ADMIN — VANCOMYCIN HYDROCHLORIDE 1000 MG: 1 INJECTION, SOLUTION INTRAVENOUS at 23:15

## 2025-03-01 RX ADMIN — ACETAMINOPHEN 650 MG: 325 TABLET ORAL at 17:35

## 2025-03-01 RX ADMIN — POLYETHYLENE GLYCOL 3350 17 G: 17 POWDER, FOR SOLUTION ORAL at 10:17

## 2025-03-01 ASSESSMENT — PAIN SCALES - GENERAL
PAINLEVEL_OUTOF10: 8
PAINLEVEL_OUTOF10: 4
PAINLEVEL_OUTOF10: 8
PAINLEVEL_OUTOF10: 4
PAINLEVEL_OUTOF10: 8

## 2025-03-01 ASSESSMENT — ENCOUNTER SYMPTOMS
CARDIOVASCULAR NEGATIVE: 1
FATIGUE: 0
HEMATOLOGIC/LYMPHATIC NEGATIVE: 1
HEADACHES: 0
CHEST TIGHTNESS: 0
NEUROLOGICAL NEGATIVE: 1
DYSURIA: 0
WOUND: 1
NAUSEA: 0
MUSCULOSKELETAL NEGATIVE: 1
PSYCHIATRIC NEGATIVE: 1
COUGH: 0
MYALGIAS: 0
GASTROINTESTINAL NEGATIVE: 1
RHINORRHEA: 0
LIGHT-HEADEDNESS: 0
FEVER: 0
EYES NEGATIVE: 1
RESPIRATORY NEGATIVE: 1
DIARRHEA: 0
CHILLS: 0
ABDOMINAL DISTENTION: 0
NUMBNESS: 0
PALPITATIONS: 0
ALLERGIC/IMMUNOLOGIC NEGATIVE: 1
VOMITING: 0
DIAPHORESIS: 0
ENDOCRINE NEGATIVE: 1
SLEEP DISTURBANCE: 0
SHORTNESS OF BREATH: 0
ABDOMINAL PAIN: 0
CONSTIPATION: 0
CONSTITUTIONAL NEGATIVE: 1
DIZZINESS: 0

## 2025-03-01 ASSESSMENT — PAIN - FUNCTIONAL ASSESSMENT
PAIN_FUNCTIONAL_ASSESSMENT: 0-10

## 2025-03-01 ASSESSMENT — COGNITIVE AND FUNCTIONAL STATUS - GENERAL
TURNING FROM BACK TO SIDE WHILE IN FLAT BAD: A LITTLE
MOVING FROM LYING ON BACK TO SITTING ON SIDE OF FLAT BED WITH BEDRAILS: A LITTLE
MOVING TO AND FROM BED TO CHAIR: A LOT
WALKING IN HOSPITAL ROOM: TOTAL
DRESSING REGULAR LOWER BODY CLOTHING: A LITTLE
STANDING UP FROM CHAIR USING ARMS: A LITTLE
MOBILITY SCORE: 15
STANDING UP FROM CHAIR USING ARMS: TOTAL
MOVING TO AND FROM BED TO CHAIR: TOTAL
HELP NEEDED FOR BATHING: A LITTLE
MOBILITY SCORE: 10
CLIMB 3 TO 5 STEPS WITH RAILING: A LOT
MOVING FROM LYING ON BACK TO SITTING ON SIDE OF FLAT BED WITH BEDRAILS: A LITTLE
DAILY ACTIVITIY SCORE: 20
TURNING FROM BACK TO SIDE WHILE IN FLAT BAD: A LITTLE
TOILETING: A LITTLE
DRESSING REGULAR UPPER BODY CLOTHING: A LITTLE
CLIMB 3 TO 5 STEPS WITH RAILING: TOTAL
WALKING IN HOSPITAL ROOM: A LOT

## 2025-03-01 ASSESSMENT — ACTIVITIES OF DAILY LIVING (ADL)
LACK_OF_TRANSPORTATION: NO
ADL_ASSISTANCE: NEEDS ASSISTANCE

## 2025-03-01 ASSESSMENT — PAIN DESCRIPTION - LOCATION: LOCATION: HIP

## 2025-03-01 NOTE — PROGRESS NOTES
Adriana Wood is a 69 y.o. female on day 1 of admission presenting with Status post total right knee replacement.      Subjective   Patient feeling well overall today, denies any cp/sob       Objective     Last Recorded Vitals  BP 90/50 (Patient Position: Lying)   Pulse 59   Temp 36 °C (96.8 °F) (Temporal)   Resp 16   Wt 68.9 kg (151 lb 14.4 oz) Comment: Weight needed to verify stat med, used 2/20/25  SpO2 94%   Intake/Output last 3 Shifts:    Intake/Output Summary (Last 24 hours) at 3/1/2025 1715  Last data filed at 3/1/2025 1300  Gross per 24 hour   Intake 240 ml   Output 2650 ml   Net -2410 ml       Admission Weight  Weight: 68.9 kg (151 lb 14.4 oz) (Weight needed to verify stat med, used 2/20/25) (02/28/25 1348)    Daily Weight  02/28/25 : 68.9 kg (151 lb 14.4 oz)    Image Results  XR knee right 1-2 views  Narrative: Interpreted By:  Juan Smith,   STUDY:  XR KNEE RIGHT 1-2 VIEWS; ;  2/28/2025 11:36 am      INDICATION:  Signs/Symptoms:PACU.          COMPARISON:  01/16/2025.      ACCESSION NUMBER(S):  [id number]      ORDERING CLINICIAN:  DARSHAN LARSON      FINDINGS:  Two views of the right knee      Postsurgical changes of right knee fusion with cement and productive  changes within the joint space. The knee joint alignment is similar  compared to prior. Interval removal of intramedullary nail with ghost  tract. Similar-appearing remote fractures of the proximal fibula and  proximal tibia. Osteopenic changes. Vascular calcifications. Soft  tissue edema over the anterior knee with overlying wound VAC and  subcutaneous air, likely postsurgical. No definitive new fracture.      Impression: 1. Postsurgical changes of the right knee with fusion of the joint  space and interval removal of the intramedullary nail. Overlying soft  tissue edema and wound VAC.              MACRO:  None      Signed by: Juan Smith 2/28/2025 12:56 PM  Dictation workstation:   [id number]  FL fluoro images no  charge  These images are not reportable by radiology and will not be interpreted   by  Radiologists.      Physical Exam    Constitutional:  no distress, alert and cooperative  Eyes: clear sclera  Head/Neck: No apparent injury, trachea midline  Respiratory/Thorax: Patent airways, thorax symmetric, breathing comfortably  Cardiovascular: no pitting edema, irregular rhythm  Gastrointestinal: Nondistended  Musculoskeletal: ROM intact  Extremities: no clubbing  Neurological: alert, campuzano x4  Psychological: Appropriate affect    Relevant Results  Results for orders placed or performed during the hospital encounter of 02/28/25 (from the past 24 hours)   CBC   Result Value Ref Range    WBC 11.4 (H) 4.4 - 11.3 x10*3/uL    nRBC 0.0 0.0 - 0.0 /100 WBCs    RBC 3.52 (L) 4.00 - 5.20 x10*6/uL    Hemoglobin 9.9 (L) 12.0 - 16.0 g/dL    Hematocrit 30.1 (L) 36.0 - 46.0 %    MCV 86 80 - 100 fL    MCH 28.1 26.0 - 34.0 pg    MCHC 32.9 32.0 - 36.0 g/dL    RDW 13.4 11.5 - 14.5 %    Platelets 203 150 - 450 x10*3/uL   Vancomycin   Result Value Ref Range    Vancomycin 11.6 5.0 - 20.0 ug/mL   Renal function panel   Result Value Ref Range    Glucose 117 (H) 74 - 99 mg/dL    Sodium 135 (L) 136 - 145 mmol/L    Potassium 4.4 3.5 - 5.3 mmol/L    Chloride 102 98 - 107 mmol/L    Bicarbonate 22 21 - 32 mmol/L    Anion Gap 15 10 - 20 mmol/L    Urea Nitrogen 10 6 - 23 mg/dL    Creatinine 0.47 (L) 0.50 - 1.05 mg/dL    eGFR >90 >60 mL/min/1.73m*2    Calcium 9.5 8.6 - 10.6 mg/dL    Phosphorus 3.5 2.5 - 4.9 mg/dL    Albumin 4.3 3.4 - 5.0 g/dL   Magnesium   Result Value Ref Range    Magnesium 2.13 1.60 - 2.40 mg/dL     *Note: Due to a large number of results and/or encounters for the requested time period, some results have not been displayed. A complete set of results can be found in Results Review.           Assessment/Plan        Adrianashahbaz Wood is a 69 y.o. year old female patient with PMHx Cardiomyopathy, HTN, PVC, Cardiac Arrest (9/2021), Asthma,  "GERD, Hepatitis, Prosthetic joint infection who presents for RLE adrienne placement and Abx spacer placement. In PACU, Patient had PVCs and hypotension requiring treatment including short duration of pressors + albumin. On arrival seems to be more HD stable and off pressors. Patient stable for RNF with tele and continuous SpO2 monitoring. Primary differential in hypovolemia, patient was likely hypovolemic prior to surgery as well as had a 3 point drop in her Hb, although there was not a large EBL recorded.     Patient does follow with Cardiology-EP outpatient. Per their last note on 1/8/25:  \"Feb 2023: The origin of her PVC is in the outflow tract, the transition is somewhat later but so it is in the intrinsic transition, so it is difficult to tell if these are right or left outflow ( I do suspect left coronary cusp PVC). The clinical significance of these is what needs to be determined. She is not very symptomatic and has had PVC all her life. They have not resulted in lowering of her EF. From this stand point of view she can continue with the surgery without additional testing . I don't know if these have been a factor in initiation torsades back in October of 2021. There are no recordings and usually PVCs from the outflow are not malignant. The key would be to avoid anything that might prolong the QT. Once her orthopedic surgeries are completed she will try to address the PVCs. Obviously because of the prolonged QT one would like to avoid antiarrhythmics. We will reconvene again to discuss the PVCs after her surgeries. \"     Recs:  - AVOID QT prolonging medications  - Daily RFP/Mg levels with repletion goals K>4, Mg>2  - Recommend telemetry and continuous SpO2           Assessment & Plan  Status post total right knee replacement    Post-traumatic arthritis of lower leg, right    Right knee pain    Prosthetic joint infection, subsequent encounter                  Reyna Garcia MD      "

## 2025-03-01 NOTE — PROGRESS NOTES
03/01/25 1041   Discharge Planning   Living Arrangements Spouse/significant other;Children;Friends   Support Systems Spouse/significant other;Children;Family members;Friends/neighbors   Assistance Needed Patient requires assistance with ADLs prior to admission   Type of Residence Private residence   Number of Stairs to Enter Residence 3  (ramp entrance)   Number of Stairs Within Residence 0   Do you have animals or pets at home? Yes   Type of Animals or Pets 3 Dogs and 4 Cats   Who is requesting discharge planning? Provider   Home or Post Acute Services Other (Comment)  (Pending therapy evaluation and recommendation)   Expected Discharge Disposition Othe  (Pending therapy evaluation and recommendation)   Financial Resource Strain   How hard is it for you to pay for the very basics like food, housing, medical care, and heating? Not hard   Housing Stability   In the last 12 months, was there a time when you were not able to pay the mortgage or rent on time? N   In the past 12 months, how many times have you moved where you were living? 0   At any time in the past 12 months, were you homeless or living in a shelter (including now)? N   Transportation Needs   In the past 12 months, has lack of transportation kept you from medical appointments or from getting medications? no   In the past 12 months, has lack of transportation kept you from meetings, work, or from getting things needed for daily living? No   Patient Choice   Provider Choice list and CMS website (https://medicare.gov/care-compare#search) for post-acute Quality and Resource Measure Data were provided and reviewed with: Patient   Patient / Family choosing to utilize agency / facility established prior to hospitalization No     Transitional Care Coordinator Note: TCC met with patient introduced self and role to complete assessment (see above) and discuss discharge planning. Patient confirmed demographics:      Address: [facility] [place]  [address]   Alternate/Emergency Contact: Mario Wood () [phone] Mercy Sal (daughter) [phone]  Patient Contact: [phone]  DME: standard and electric wheelchair, grab bars, and shower bench   Homecare: Active with [facility] for PT, OT, nursing and hha   Oxygen Use: Denies use of oxygen, bipap and cpap   Diabetic: Denies   Dialysis: Denies   Falls: 1 Fall   PCP: Sabas Rueda-last visit 1 month ago  Pharmacy: [place], [place]   Insurance: Medicare       Patient lives in ranch style home with ramp entrance. Patient lives with , daughter, son in law and family friend. Per patient family and friends assist with needs and support. Patient requires assistance with ADLs prior to admission. Patient declined needs for assistance or housing, financial or transportation insecurities at this time. Per patient does not drive, family and friends assist with transportation needs.  Patient discussed with medical team (ortho resident), per ortho team patient is medically ready. Pending ID rec. Discharge dispo: Pending PT and OT evaluation and recommendation. Per patient has been to SNF [facility] in the past and if rec for SNF would like to discharge to [facility]. TCC inquired if patient has a second choice if [facility] can't accept. Patient stated she did not have a second choice, however stated she does not want a referral to be sent to [facility]. Request placed in TCC/Therapy communication column for updated note.     Anastacio Day RN BSN   Transitional Care Coordinator

## 2025-03-01 NOTE — PROGRESS NOTES
Postop Pain HPI -   Palliative: relieved with IV analgesics and regional local anesthetics  Provocative: movement  Quality:  burning and aching  Radiation:  none  Severity:  8/10  Timing: constant    24-HOUR OPIOID CONSUMPTION:  Oxy 20    Scheduled medications  acetaminophen, 650 mg, oral, q6h FELIX  apixaban, 2.5 mg, oral, BID  pantoprazole, 40 mg, oral, Daily before breakfast  polyethylene glycol, 17 g, oral, Daily  sennosides-docusate sodium, 2 tablet, oral, BID  vancomycin, 1,000 mg, intravenous, q12h      Continuous medications  lactated Ringer's, 100 mL/hr, Last Rate: 100 mL/hr (02/28/25 2058)  phenylephrine, 0-2 mcg/kg/min      PRN medications  PRN medications: albuterol, bisacodyl, cyclobenzaprine, diphenhydrAMINE **OR** diphenhydrAMINE **OR** diphenhydrAMINE, HYDROmorphone, magnesium hydroxide, naloxone, oxyCODONE, oxyCODONE, oxyCODONE, prochlorperazine **OR** prochlorperazine **OR** prochlorperazine, vancomycin     Physical Exam:  Constitutional:  no distress, alert and cooperative  Eyes: clear sclera  Head/Neck: No apparent injury, trachea midline  Respiratory/Thorax: Patent airways, thorax symmetric, breathing comfortably  Cardiovascular: no pitting edema  Gastrointestinal: Nondistended  Musculoskeletal: ROM intact  Extremities: no clubbing  Neurological: alert, campuzano x4  Psychological: Appropriate affect    Results for orders placed or performed during the hospital encounter of 02/28/25 (from the past 24 hours)   CBC   Result Value Ref Range    WBC 13.6 (H) 4.4 - 11.3 x10*3/uL    nRBC 0.0 0.0 - 0.0 /100 WBCs    RBC 3.70 (L) 4.00 - 5.20 x10*6/uL    Hemoglobin 10.5 (L) 12.0 - 16.0 g/dL    Hematocrit 30.8 (L) 36.0 - 46.0 %    MCV 83 80 - 100 fL    MCH 28.4 26.0 - 34.0 pg    MCHC 34.1 32.0 - 36.0 g/dL    RDW 13.0 11.5 - 14.5 %    Platelets 211 150 - 450 x10*3/uL   Renal Function Panel   Result Value Ref Range    Glucose 123 (H) 74 - 99 mg/dL    Sodium 139 136 - 145 mmol/L    Potassium 3.8 3.5 - 5.3 mmol/L     Chloride 101 98 - 107 mmol/L    Bicarbonate 27 21 - 32 mmol/L    Anion Gap 15 10 - 20 mmol/L    Urea Nitrogen 13 6 - 23 mg/dL    Creatinine 0.51 0.50 - 1.05 mg/dL    eGFR >90 >60 mL/min/1.73m*2    Calcium 8.5 (L) 8.6 - 10.6 mg/dL    Phosphorus 3.9 2.5 - 4.9 mg/dL    Albumin 4.2 3.4 - 5.0 g/dL   Magnesium   Result Value Ref Range    Magnesium 1.88 1.60 - 2.40 mg/dL   CBC   Result Value Ref Range    WBC 11.4 (H) 4.4 - 11.3 x10*3/uL    nRBC 0.0 0.0 - 0.0 /100 WBCs    RBC 3.52 (L) 4.00 - 5.20 x10*6/uL    Hemoglobin 9.9 (L) 12.0 - 16.0 g/dL    Hematocrit 30.1 (L) 36.0 - 46.0 %    MCV 86 80 - 100 fL    MCH 28.1 26.0 - 34.0 pg    MCHC 32.9 32.0 - 36.0 g/dL    RDW 13.4 11.5 - 14.5 %    Platelets 203 150 - 450 x10*3/uL   Vancomycin   Result Value Ref Range    Vancomycin 11.6 5.0 - 20.0 ug/mL   Renal function panel   Result Value Ref Range    Glucose 117 (H) 74 - 99 mg/dL    Sodium 135 (L) 136 - 145 mmol/L    Potassium 4.4 3.5 - 5.3 mmol/L    Chloride 102 98 - 107 mmol/L    Bicarbonate 22 21 - 32 mmol/L    Anion Gap 15 10 - 20 mmol/L    Urea Nitrogen 10 6 - 23 mg/dL    Creatinine 0.47 (L) 0.50 - 1.05 mg/dL    eGFR >90 >60 mL/min/1.73m*2    Calcium 9.5 8.6 - 10.6 mg/dL    Phosphorus 3.5 2.5 - 4.9 mg/dL    Albumin 4.3 3.4 - 5.0 g/dL   Magnesium   Result Value Ref Range    Magnesium 2.13 1.60 - 2.40 mg/dL     *Note: Due to a large number of results and/or encounters for the requested time period, some results have not been displayed. A complete set of results can be found in Results Review.      - Right single shot femoral blocks performed pre-operatively 2/28/2025  - Tylenol 975mg Q8H, time 3 hours between tylenol and toradol  - PO Robaxin 1000mg QID  - Lidocaine patches around affected site as needed  - IV Toradol 30mg Q6H for 6 doses per surgical service  - IV Mg 2g Q8H x 3 doses  - IV dilaudid 0.2mg Q4H for breakthrough pain  - Increase Oxycodone to 10mg q3h   - Continuous pulse ox    Acute Pain Resident  Pg [id number] Pg  [id number]
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
## 2025-03-01 NOTE — CARE PLAN
The patient's goals for the shift include      The clinical goals for the shift include patient will increase mobility throughout the shift .      Problem: Pain - Adult  Goal: Verbalizes/displays adequate comfort level or baseline comfort level  Outcome: Progressing     Problem: Safety - Adult  Goal: Free from fall injury  Outcome: Progressing     Problem: Discharge Planning  Goal: Discharge to home or other facility with appropriate resources  Outcome: Progressing     Problem: Nutrition  Goal: Nutrient intake appropriate for maintaining nutritional needs  Outcome: Progressing     Problem: Pain  Goal: Takes deep breaths with improved pain control throughout the shift  Outcome: Progressing  Goal: Turns in bed with improved pain control throughout the shift  Outcome: Progressing  Goal: Walks with improved pain control throughout the shift  Outcome: Progressing  Goal: Performs ADL's with improved pain control throughout shift  Outcome: Progressing  Goal: Participates in PT with improved pain control throughout the shift  Outcome: Progressing  Goal: Free from opioid side effects throughout the shift  Outcome: Progressing  Goal: Free from acute confusion related to pain meds throughout the shift  Outcome: Progressing     Problem: Skin  Goal: Decreased wound size/increased tissue granulation at next dressing change  Outcome: Progressing  Goal: Participates in plan/prevention/treatment measures  Outcome: Progressing  Goal: Prevent/manage excess moisture  Outcome: Progressing  Goal: Prevent/minimize sheer/friction injuries  Outcome: Progressing  Goal: Promote/optimize nutrition  Outcome: Progressing  Goal: Promote skin healing  Outcome: Progressing
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
## 2025-03-01 NOTE — CONSULTS
Vancomycin Dosing by Pharmacy- INITIAL    Adriana Wood is a 69 y.o. year old female who Pharmacy has been consulted for vancomycin dosing for osteomyelitis/septic arthritis. Based on the patient's indication and renal status this patient will be dosed based on a goal AUC of 400-600.     Renal function is currently stable.    Visit Vitals  /73 (BP Location: Left arm, Patient Position: Lying)   Pulse 58   Temp 36.1 °C (97 °F) (Temporal)   Resp 12        Lab Results   Component Value Date    CREATININE 0.51 2025    CREATININE 0.52 2025    CREATININE 0.49 (L) 2024    CREATININE 0.51 2024        Patient weight is as follows:   Vitals:    25 1348   Weight: 68.9 kg (151 lb 14.4 oz)       Cultures:  No results found for the encounter in last 14 days.        I/O last 3 completed shifts:  In: 800 (11.6 mL/kg) [I.V.:800 (11.6 mL/kg)]  Out: 1585 (23 mL/kg) [Urine:1485 (0.6 mL/kg/hr); Blood:100]  Weight: 68.9 kg   I/O during current shift:  No intake/output data recorded.    Temp (24hrs), Av.9 °C (96.7 °F), Min:35.5 °C (95.9 °F), Max:36.1 °C (97 °F)         Assessment/Plan     Patient has already been given a loading dose of 1000 mg in the OR.  Will initiate vancomycin maintenance,  1000 mg every 12 hours.    This dosing regimen is predicted by [employer] to result in the following pharmacokinetic parameters:    Regimen: 1000 mg IV every 12 hours.  Exposure target: AUC24 (range)400-600 mg/L.hr   YLD00-67: 409 mg/L.hr  AUC24,ss: 452 mg/L.hr  Probability of AUC24 > 400: 63 %  Ctrough,ss: 13.2 mg/L  Probability of Ctrough,ss > 20: 20 %      Follow-up level will be ordered on 3/1 at 1st am labs unless clinically indicated sooner.  Will continue to monitor renal function daily while on vancomycin and order serum creatinine at least every 48 hours if not already ordered.  Follow for continued vancomycin needs, clinical response, and signs/symptoms of toxicity.       Karri Rabago, PharmD

## 2025-03-01 NOTE — PROGRESS NOTES
Vancomycin Dosing by Pharmacy- FOLLOW UP    Adriana Wood is a 69 y.o. year old female who Pharmacy has been consulted for vancomycin dosing for osteomyelitis/septic arthritis. Based on the patient's indication and renal status this patient is being dosed based on a goal AUC of 400-600.     Renal function is currently stable.    Current vancomycin dose: 1000 mg given every 12 hours    Estimated vancomycin AUC on current dose: 498 mg/L.hr     Visit Vitals  BP 91/53 (BP Location: Right arm, Patient Position: Lying)   Pulse 67   Temp 36.1 °C (97 °F) (Temporal)   Resp 16        Lab Results   Component Value Date    CREATININE 0.47 (L) 2025    CREATININE 0.51 2025    CREATININE 0.52 2025    CREATININE 0.49 (L) 2024        Patient weight is as follows:   Vitals:    25 1348   Weight: 68.9 kg (151 lb 14.4 oz)       Cultures:  No results found for the encounter in last 14 days.       I/O last 3 completed shifts:  In: 800 (11.6 mL/kg) [I.V.:800 (11.6 mL/kg)]  Out: 3085 (44.8 mL/kg) [Urine:2985 (1.2 mL/kg/hr); Blood:100]  Weight: 68.9 kg   I/O during current shift:  No intake/output data recorded.    Temp (24hrs), Av.1 °C (96.9 °F), Min:35.5 °C (95.9 °F), Max:36.5 °C (97.7 °F)      Assessment/Plan    Within goal AUC range. Continue current vancomycin regimen.    This dosing regimen is predicted by [employer] to result in the following pharmacokinetic parameters:  Loading dose: N/A  Regimen: 1000 mg IV every 12 hours.  Start time: 22:19 on 2025  Exposure target: AUC24 (range)400-600 mg/L.hr   IIT38-58: 481 mg/L.hr  AUC24,ss: 498 mg/L.hr  Probability of AUC24 > 400: 90 %  Ctrough,ss: 14.8 mg/L  Probability of Ctrough,ss > 20: 11 %    The next level will be obtained on 3/4 at am draw. May be obtained sooner if clinically indicated.   Will continue to monitor renal function daily while on vancomycin and order serum creatinine at least every 48 hours if not already ordered.  Follow for  continued vancomycin needs, clinical response, and signs/symptoms of toxicity.       Jeannette Kent, PharmD

## 2025-03-01 NOTE — PROGRESS NOTES
Orthopedic Surgery Note    S:  69F s/p right knee irrigation&debridement, revision static spacer w Dr. Whelan 2/28/25.     Resting comfortably. NAEON. Pain controlled. Denies CP/SOB.     On LT8 tele     O:  BP 91/53 (BP Location: Right arm, Patient Position: Lying)   Pulse 67   Temp 36.1 °C (97 °F) (Temporal)   Resp 16   Wt 68.9 kg (151 lb 14.4 oz) Comment: Weight needed to verify stat med, used 2/20/25  SpO2 94%   BMI 21.79 kg/m²      NAD. Peripherally WWP. Breathing comfortably. No obvious focal deficits.     MSK injured extremity/RLE:  - Dressing DCI  - Prevena holding suction  - Palpable pulses, toes WWP  - Wiggles toes, SILT    Labs: reviewed.     Imaging: reviewed.     A&P:  69F s/p right knee irrigation&debridement, revision static spacer w Dr. Whelan 2/28/25.      Plan:   - Diet: Regular  - Vanc pharmacy to dose; pending cx & ID recs  - Cx NGTD  - ID c/s, appreciate recs  - IVF pending PO  - MM pain regimen  - PRN antiemetic. NO ZOFRAN.   - DVT Proph: SCDs/ ambulate/ eliquis 2.5bid+ PPI ppx  - Bowel regimen  - Neurovascular checks every 4 hours and as needed  - Ice affected extremity for 20 mins every hour as needed  - Prevena 14 day  - Rosenbaum removed POD1  - Maintain ace wrap/dressing. Reinforce as needed.  - PWB 50% RLE  - PT/OT c/s  - Monitor VS every 4 hours   - Labs ordered for AM   - IS every hour while awake   - Encourage ambulation / OOB as tolerated     Medicine consult:  -Daily RFP/Mg w lyte repletion  -EKG baseline ordered  -Tele w continuous pulse ox     Dispo: Cx/PT/OT/pain     D/w attending.     Reviewed and approved by AMADEO HAN on 3/1/25 at 8:48 AM.      This patient will be followed by the Orthopaedic Trauma service. Please page or Epic Chat the corresponding residents below with questions or concerns.      Ortho Trauma Service (Epic Chat Preferred)  First call: Martin Gasca  Second call: Malvin Barrett  Third call: Amadeo Han     6pm-6am M-F, Holidays, and weekends page  Ortho on-call @[phone] with urgent questions/concerns.

## 2025-03-02 VITALS
SYSTOLIC BLOOD PRESSURE: 97 MMHG | RESPIRATION RATE: 17 BRPM | OXYGEN SATURATION: 95 % | DIASTOLIC BLOOD PRESSURE: 55 MMHG | TEMPERATURE: 96.8 F | BODY MASS INDEX: 21.79 KG/M2 | HEART RATE: 40 BPM | WEIGHT: 151.9 LBS

## 2025-03-02 LAB
ANION GAP SERPL CALC-SCNC: 12 MMOL/L (ref 10–20)
BACTERIA SPEC CULT: NORMAL
BUN SERPL-MCNC: 15 MG/DL (ref 6–23)
CALCIUM SERPL-MCNC: 8.9 MG/DL (ref 8.6–10.6)
CHLORIDE SERPL-SCNC: 104 MMOL/L (ref 98–107)
CO2 SERPL-SCNC: 25 MMOL/L (ref 21–32)
CREAT SERPL-MCNC: 0.52 MG/DL (ref 0.5–1.05)
EGFRCR SERPLBLD CKD-EPI 2021: >90 ML/MIN/1.73M*2
ERYTHROCYTE [DISTWIDTH] IN BLOOD BY AUTOMATED COUNT: 13.7 % (ref 11.5–14.5)
GLUCOSE SERPL-MCNC: 76 MG/DL (ref 74–99)
GRAM STN SPEC: NORMAL
HCT VFR BLD AUTO: 29.2 % (ref 36–46)
HGB BLD-MCNC: 9.1 G/DL (ref 12–16)
MCH RBC QN AUTO: 28.4 PG (ref 26–34)
MCHC RBC AUTO-ENTMCNC: 31.2 G/DL (ref 32–36)
MCV RBC AUTO: 91 FL (ref 80–100)
NRBC BLD-RTO: 0 /100 WBCS (ref 0–0)
PLATELET # BLD AUTO: 200 X10*3/UL (ref 150–450)
POTASSIUM SERPL-SCNC: 4.3 MMOL/L (ref 3.5–5.3)
RBC # BLD AUTO: 3.2 X10*6/UL (ref 4–5.2)
SODIUM SERPL-SCNC: 137 MMOL/L (ref 136–145)
WBC # BLD AUTO: 7.3 X10*3/UL (ref 4.4–11.3)

## 2025-03-02 PROCEDURE — 85027 COMPLETE CBC AUTOMATED: CPT

## 2025-03-02 PROCEDURE — 97165 OT EVAL LOW COMPLEX 30 MIN: CPT | Mod: GO

## 2025-03-02 PROCEDURE — 80048 BASIC METABOLIC PNL TOTAL CA: CPT

## 2025-03-02 PROCEDURE — 2500000001 HC RX 250 WO HCPCS SELF ADMINISTERED DRUGS (ALT 637 FOR MEDICARE OP)

## 2025-03-02 PROCEDURE — 1200000002 HC GENERAL ROOM WITH TELEMETRY DAILY

## 2025-03-02 PROCEDURE — 97530 THERAPEUTIC ACTIVITIES: CPT | Mod: GO

## 2025-03-02 PROCEDURE — 99232 SBSQ HOSP IP/OBS MODERATE 35: CPT

## 2025-03-02 PROCEDURE — 36415 COLL VENOUS BLD VENIPUNCTURE: CPT

## 2025-03-02 PROCEDURE — 82374 ASSAY BLOOD CARBON DIOXIDE: CPT

## 2025-03-02 PROCEDURE — 2500000004 HC RX 250 GENERAL PHARMACY W/ HCPCS (ALT 636 FOR OP/ED)

## 2025-03-02 RX ORDER — WATER
200 LIQUID (ML) MISCELLANEOUS 3 TIMES DAILY PRN
Status: SHIPPED | OUTPATIENT
Start: 2025-03-02

## 2025-03-02 RX ADMIN — ACETAMINOPHEN 650 MG: 325 TABLET ORAL at 05:01

## 2025-03-02 RX ADMIN — VANCOMYCIN HYDROCHLORIDE 1000 MG: 1 INJECTION, SOLUTION INTRAVENOUS at 10:34

## 2025-03-02 RX ADMIN — ACETAMINOPHEN 650 MG: 325 TABLET ORAL at 17:18

## 2025-03-02 RX ADMIN — PANTOPRAZOLE SODIUM 40 MG: 40 TABLET, DELAYED RELEASE ORAL at 08:23

## 2025-03-02 RX ADMIN — APIXABAN 2.5 MG: 2.5 TABLET, FILM COATED ORAL at 20:12

## 2025-03-02 RX ADMIN — ACETAMINOPHEN 650 MG: 325 TABLET ORAL at 12:42

## 2025-03-02 RX ADMIN — HYDROMORPHONE HYDROCHLORIDE 0.5 MG: 1 INJECTION, SOLUTION INTRAMUSCULAR; INTRAVENOUS; SUBCUTANEOUS at 23:36

## 2025-03-02 RX ADMIN — CYCLOBENZAPRINE 10 MG: 10 TABLET, FILM COATED ORAL at 04:31

## 2025-03-02 RX ADMIN — APIXABAN 2.5 MG: 2.5 TABLET, FILM COATED ORAL at 08:24

## 2025-03-02 RX ADMIN — SODIUM CHLORIDE, POTASSIUM CHLORIDE, SODIUM LACTATE AND CALCIUM CHLORIDE 500 ML: 600; 310; 30; 20 INJECTION, SOLUTION INTRAVENOUS at 10:33

## 2025-03-02 RX ADMIN — OXYCODONE HYDROCHLORIDE 10 MG: 10 TABLET ORAL at 04:38

## 2025-03-02 RX ADMIN — OXYCODONE HYDROCHLORIDE 10 MG: 10 TABLET ORAL at 13:37

## 2025-03-02 RX ADMIN — OXYCODONE HYDROCHLORIDE 10 MG: 10 TABLET ORAL at 20:12

## 2025-03-02 RX ADMIN — ACETAMINOPHEN 650 MG: 325 TABLET ORAL at 23:36

## 2025-03-02 RX ADMIN — VANCOMYCIN HYDROCHLORIDE 1000 MG: 1 INJECTION, SOLUTION INTRAVENOUS at 23:02

## 2025-03-02 RX ADMIN — HYDROMORPHONE HYDROCHLORIDE 0.5 MG: 1 INJECTION, SOLUTION INTRAMUSCULAR; INTRAVENOUS; SUBCUTANEOUS at 00:30

## 2025-03-02 ASSESSMENT — PAIN SCALES - GENERAL
PAINLEVEL_OUTOF10: 7
PAINLEVEL_OUTOF10: 2
PAINLEVEL_OUTOF10: 3
PAINLEVEL_OUTOF10: 2
PAINLEVEL_OUTOF10: 8
PAINLEVEL_OUTOF10: 9
PAINLEVEL_OUTOF10: 9
PAINLEVEL_OUTOF10: 7
PAINLEVEL_OUTOF10: 8
PAINLEVEL_OUTOF10: 7

## 2025-03-02 ASSESSMENT — COGNITIVE AND FUNCTIONAL STATUS - GENERAL
PERSONAL GROOMING: A LITTLE
HELP NEEDED FOR BATHING: A LITTLE
MOBILITY SCORE: 24
CLIMB 3 TO 5 STEPS WITH RAILING: A LOT
WALKING IN HOSPITAL ROOM: A LOT
MOBILITY SCORE: 16
TOILETING: A LOT
HELP NEEDED FOR BATHING: A LOT
DRESSING REGULAR LOWER BODY CLOTHING: A LOT
EATING MEALS: A LITTLE
DRESSING REGULAR UPPER BODY CLOTHING: A LITTLE
MOVING TO AND FROM BED TO CHAIR: A LITTLE
PERSONAL GROOMING: A LITTLE
TURNING FROM BACK TO SIDE WHILE IN FLAT BAD: A LITTLE
PERSONAL GROOMING: A LITTLE
EATING MEALS: TOTAL
DRESSING REGULAR LOWER BODY CLOTHING: A LOT
EATING MEALS: A LITTLE
DAILY ACTIVITIY SCORE: 14
STANDING UP FROM CHAIR USING ARMS: A LITTLE
MOVING FROM LYING ON BACK TO SITTING ON SIDE OF FLAT BED WITH BEDRAILS: A LITTLE
TOILETING: TOTAL
DAILY ACTIVITIY SCORE: 15
DRESSING REGULAR UPPER BODY CLOTHING: A LITTLE
HELP NEEDED FOR BATHING: A LOT
DAILY ACTIVITIY SCORE: 19

## 2025-03-02 ASSESSMENT — PAIN - FUNCTIONAL ASSESSMENT
PAIN_FUNCTIONAL_ASSESSMENT: 0-10

## 2025-03-02 ASSESSMENT — ACTIVITIES OF DAILY LIVING (ADL): ADL_ASSISTANCE: NEEDS ASSISTANCE

## 2025-03-02 NOTE — SIGNIFICANT EVENT
Rapid Response Nurse Note: RADAR alert: 6    Pager time: [phone]  Arrival time: 940  Event end time: 950  Location: [place] [address]  [] Triage by phone or secure messaging    Rapid response initiated by:  [] Rapid response RN [] Family [] Nursing Supervisor [] Physician   [x] RADAR auto page [] Sepsis auto-page [] RN [] RT   [] NP/PA [] Other:     Primary reason for call:   [] BAT [] New CPAP/BiPAP [] Bleeding [] Change in mental status   [] Chest pain [] Code blue [] FiO2 >/= 50% [] HR </= 40 bpm   [] HR >/= 130 bpm [] Hyperglycemia [] Hypoglycemia [x] RADAR    [] RR </= 8 bpm [] RR >/= 30 bpm [] SBP </= 90 mmHg [] SpO2 < 90%   [] Seizure [] Sepsis [] Shortness of breath  [] Staff concern: see comments     Initial VS and/or RADAR VS: T 36.3 °C; HR 35; RR 14; BP 95/49; SPO2 94%.    Providers present at bedside (if applicable):     Name of ICU Provider contacted (if applicable):     Interventions:  [x] None [] ABG/VBG [] Assist w/ICU transfer [] BAT paged    [] Bag mask [] Blood [] Cardioversion [] Code Blue   [] Code blue for intubation [] Code status changed [] Chest x-ray [] EKG   [] IV fluid/bolus [] KUB x-ray [] Labs/cultures [] Medication   [] Nebulizer treatment [] NIPPV (CPAP/BiPAP) [] Oxygen [] Oral airway   [] Peripheral IV [] Palliative care consult [] CT/MRI [] Sepsis protocol    [] Suctioned [] Other:     Outcome:  [] Coded and  [] Code blue for intubation [] Coded and transferred to ICU []  on division   [x] Remained on division (no change) [] Remained on division + additional monitoring [] Remained in ED [] Transferred to ED   [] Transferred to ICU [] Transferred to inpatient status [] Transferred for interventions (procedure) [] Transferred to ICU stepdown    [] Transferred to surgery [] Transferred to telemetry [] Sepsis protocol [] STEMI protocol   [] Stroke protocol [x] Bedside nurse instructed to page rapid response for any concerns or acute change in condition/VS     Additional Comments:      Radar auto-page received for a radar score of 6 with the above listed vital signs.  Vital signs were confirmed and reviewed with the primary RN.  She is currently asymptomatic.  EKG reviewed and is sinus with bigeminy.  EP following at baseline.  Labs are pending.  There are no indications for future concerns or signs of clinical decompensation.  RN to contact Rapid Response with any future concerns or signs of clinical decompensation.

## 2025-03-02 NOTE — CARE PLAN
Problem: Pain - Adult  Goal: Verbalizes/displays adequate comfort level or baseline comfort level  Outcome: Progressing     Problem: Safety - Adult  Goal: Free from fall injury  Outcome: Progressing     Problem: Nutrition  Goal: Nutrient intake appropriate for maintaining nutritional needs  Outcome: Progressing     Problem: Skin  Goal: Promote/optimize nutrition  Outcome: Progressing  Flowsheets (Taken 3/2/2025 1218)  Promote/optimize nutrition: Consume > 50% meals/supplements   The patient's goals for the shift include      The clinical goals for the shift include HDS/ remain safe

## 2025-03-02 NOTE — PROGRESS NOTES
Adriana Wood is a 69 y.o. female on day 2 of admission presenting with Status post total right knee replacement.      Subjective   Patient had episode of hypotension last night where bp dropped to low 80's, she was given 500cc bolus and improved. Her I's and O's show she is net negative 3L, she is asymptomatic today other than feeling tired.       Objective     Last Recorded Vitals  BP 91/58 (BP Location: Right arm, Patient Position: Lying)   Pulse (!) 43 Comment: i spoke with pt nurse concerning pt heartrate  Temp 36.6 °C (97.9 °F) (Temporal)   Resp 16   Wt 68.9 kg (151 lb 14.4 oz) Comment: Weight needed to verify stat med, used 2/20/25  SpO2 95%   Intake/Output last 3 Shifts:    Intake/Output Summary (Last 24 hours) at 3/2/2025 0922  Last data filed at 3/2/2025 0837  Gross per 24 hour   Intake 819.17 ml   Output 3275 ml   Net -2455.83 ml       Admission Weight  Weight: 68.9 kg (151 lb 14.4 oz) (Weight needed to verify stat med, used 2/20/25) (02/28/25 1348)    Daily Weight  02/28/25 : 68.9 kg (151 lb 14.4 oz)    Image Results  XR knee right 1-2 views  Narrative: Interpreted By:  Juan Smith,   STUDY:  XR KNEE RIGHT 1-2 VIEWS; ;  2/28/2025 11:36 am      INDICATION:  Signs/Symptoms:PACU.          COMPARISON:  01/16/2025.      ACCESSION NUMBER(S):  [id number]      ORDERING CLINICIAN:  DARSHAN LARSON      FINDINGS:  Two views of the right knee      Postsurgical changes of right knee fusion with cement and productive  changes within the joint space. The knee joint alignment is similar  compared to prior. Interval removal of intramedullary nail with ghost  tract. Similar-appearing remote fractures of the proximal fibula and  proximal tibia. Osteopenic changes. Vascular calcifications. Soft  tissue edema over the anterior knee with overlying wound VAC and  subcutaneous air, likely postsurgical. No definitive new fracture.      Impression: 1. Postsurgical changes of the right knee with fusion of the  "joint  space and interval removal of the intramedullary nail. Overlying soft  tissue edema and wound VAC.              MACRO:  None      Signed by: Juan Smith 2/28/2025 12:56 PM  Dictation workstation:   [id number]  FL fluoro images no charge  These images are not reportable by radiology and will not be interpreted   by  Radiologists.      Physical Exam    Constitutional:  no distress, alert and cooperative  Eyes: clear sclera  Head/Neck: No apparent injury, trachea midline  Respiratory/Thorax: Patent airways, thorax symmetric, breathing comfortably  Cardiovascular: no pitting edema, irregular rhythm  Gastrointestinal: Nondistended  Musculoskeletal: ROM intact  Extremities: no clubbing  Neurological: alert, campuzano x4  Psychological: Appropriate affect        Assessment/Plan      Adriana Wood is a 69 y.o. year old female patient with PMHx Cardiomyopathy, HTN, PVC, Cardiac Arrest (9/2021), Asthma, GERD, Hepatitis, Prosthetic joint infection who presents for RLE adrienne placement and Abx spacer placement. In PACU, Patient had PVCs and hypotension requiring treatment including short duration of pressors + albumin. On arrival seems to be more HD stable and off pressors. Patient stable for RNF with tele and continuous SpO2 monitoring. Primary differential in hypovolemia, patient was likely hypovolemic prior to surgery as well as had a 3 point drop in her Hb, although there was not a large EBL recorded.     Patient does follow with Cardiology-EP outpatient. Per their last note on 1/8/25:  \"Feb 2023: The origin of her PVC is in the outflow tract, the transition is somewhat later but so it is in the intrinsic transition, so it is difficult to tell if these are right or left outflow ( I do suspect left coronary cusp PVC). The clinical significance of these is what needs to be determined. She is not very symptomatic and has had PVC all her life. They have not resulted in lowering of her EF. From this stand point of " "view she can continue with the surgery without additional testing . I don't know if these have been a factor in initiation torsades back in October of 2021. There are no recordings and usually PVCs from the outflow are not malignant. The key would be to avoid anything that might prolong the QT. Once her orthopedic surgeries are completed she will try to address the PVCs. Obviously because of the prolonged QT one would like to avoid antiarrhythmics. We will reconvene again to discuss the PVCs after her surgeries. \"     Recs:  - Can give another 1L of NS today  - Will continue to follow peripherally   - AVOID QT prolonging medications  - Daily RFP/Mg levels with repletion goals K>4, Mg>2  - Recommend telemetry and continuous SpO2          Assessment & Plan  Status post total right knee replacement    Post-traumatic arthritis of lower leg, right    Right knee pain    Prosthetic joint infection, subsequent encounter        Reyna Garcia MD      "
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
## 2025-03-02 NOTE — PROGRESS NOTES
Orthopedic Surgery Note    S:  69F s/p right knee irrigation&debridement, revision static spacer w Dr. Whelan 2/28/25.     Resting comfortably. NAEON. Pain controlled. Denies CP/SOB. On LT8 tele.    Had soft pressures without symptoms overnight, responsive to small bolus. Claims her BP runs low overnight usually and was not personally concerned by this.      O:  BP 91/58 (BP Location: Right arm, Patient Position: Lying)   Pulse (!) 43 Comment: i spoke with pt nurse concerning pt heartrate  Temp 36.6 °C (97.9 °F) (Temporal)   Resp 16   Wt 68.9 kg (151 lb 14.4 oz) Comment: Weight needed to verify stat med, used 2/20/25  SpO2 95%   BMI 21.79 kg/m²      NAD. Peripherally WWP. Breathing comfortably. No obvious focal deficits.     MSK injured extremity/RLE:  - Dressing DCI  - Prevena holding suction  - Palpable pulses, toes WWP  - Wiggles toes, SILT    Labs: reviewed.     Imaging: reviewed.     A&P:  69F s/p right knee irrigation&debridement, revision static spacer w Dr. Whelan 2/28/25.      Plan:   - Diet: Regular  - Vanc pharmacy to dose; pending cx & ID recs  - Cx NGTD  - ID c/s, appreciate recs --> continue vanc, remainder pending cs  - IVF pending PO  - MM pain regimen  - PRN antiemetic. NO ZOFRAN.   - DVT Proph: SCDs/ ambulate/ eliquis 2.5bid+ PPI ppx  - Bowel regimen  - Neurovascular checks every 4 hours and as needed  - Ice affected extremity for 20 mins every hour as needed  - Prevena 14 day  - Rosenbaum removed POD1  - Maintain ace wrap/dressing. Reinforce as needed.  - PWB 50% RLE  - PT/OT c/s  - Monitor VS every 4 hours   - Labs ordered for AM   - IS every hour while awake   - Encourage ambulation / OOB as tolerated     Orthotics consult for [place] splint while in bed    Medicine consult:  -Daily RFP/Mg w lyte repletion  -EKG baseline ordered  -Tele w continuous pulse ox  --3/2: discussed overnight soft pressures w medicine. Seems most likely to be due to I&O -3K rather than pain regimen. Tentative plan  for bolus PRN but waiting on formal eval/recs.      Dispo: Cx/PT/OT/pain     D/w attending.     Reviewed and approved by AMADEO HAN on 3/2/25 at 9:28 AM.      This patient will be followed by the Orthopaedic Trauma service. Please page or Epic Chat the corresponding residents below with questions or concerns.      Ortho Trauma Service (Epic Chat Preferred)  First call: Martin Gasca  Second call: Malvin Barrett  Third call: Amadeo Han     6pm-6am M-F, Holidays, and weekends page Ortho on-call @[phone] with urgent questions/concerns.

## 2025-03-03 ENCOUNTER — APPOINTMENT (OUTPATIENT)
Dept: CARDIOLOGY | Facility: HOSPITAL | Age: 70
End: 2025-03-03
Payer: MEDICARE

## 2025-03-03 ENCOUNTER — APPOINTMENT (OUTPATIENT)
Dept: RADIOLOGY | Facility: HOSPITAL | Age: 70
End: 2025-03-03
Payer: MEDICARE

## 2025-03-03 LAB
ANION GAP SERPL CALC-SCNC: 14 MMOL/L (ref 10–20)
BUN SERPL-MCNC: 14 MG/DL (ref 6–23)
CALCIUM SERPL-MCNC: 9.1 MG/DL (ref 8.6–10.6)
CHLORIDE SERPL-SCNC: 103 MMOL/L (ref 98–107)
CO2 SERPL-SCNC: 27 MMOL/L (ref 21–32)
CREAT SERPL-MCNC: 0.59 MG/DL (ref 0.5–1.05)
EGFRCR SERPLBLD CKD-EPI 2021: >90 ML/MIN/1.73M*2
ERYTHROCYTE [DISTWIDTH] IN BLOOD BY AUTOMATED COUNT: 13.5 % (ref 11.5–14.5)
GLUCOSE SERPL-MCNC: 134 MG/DL (ref 74–99)
HCT VFR BLD AUTO: 32.9 % (ref 36–46)
HGB BLD-MCNC: 10.2 G/DL (ref 12–16)
MCH RBC QN AUTO: 28.3 PG (ref 26–34)
MCHC RBC AUTO-ENTMCNC: 31 G/DL (ref 32–36)
MCV RBC AUTO: 91 FL (ref 80–100)
NRBC BLD-RTO: 0 /100 WBCS (ref 0–0)
PLATELET # BLD AUTO: 217 X10*3/UL (ref 150–450)
POTASSIUM SERPL-SCNC: 3.8 MMOL/L (ref 3.5–5.3)
RBC # BLD AUTO: 3.61 X10*6/UL (ref 4–5.2)
SODIUM SERPL-SCNC: 140 MMOL/L (ref 136–145)
WBC # BLD AUTO: 7 X10*3/UL (ref 4.4–11.3)

## 2025-03-03 PROCEDURE — 85027 COMPLETE CBC AUTOMATED: CPT

## 2025-03-03 PROCEDURE — 80048 BASIC METABOLIC PNL TOTAL CA: CPT

## 2025-03-03 PROCEDURE — 93005 ELECTROCARDIOGRAM TRACING: CPT

## 2025-03-03 PROCEDURE — 36415 COLL VENOUS BLD VENIPUNCTURE: CPT

## 2025-03-03 PROCEDURE — 36573 INSJ PICC RS&I 5 YR+: CPT

## 2025-03-03 PROCEDURE — 2500000001 HC RX 250 WO HCPCS SELF ADMINISTERED DRUGS (ALT 637 FOR MEDICARE OP)

## 2025-03-03 PROCEDURE — 83735 ASSAY OF MAGNESIUM: CPT

## 2025-03-03 PROCEDURE — 93010 ELECTROCARDIOGRAM REPORT: CPT | Performed by: INTERNAL MEDICINE

## 2025-03-03 PROCEDURE — 2780000003 HC OR 278 NO HCPCS

## 2025-03-03 PROCEDURE — 2500000004 HC RX 250 GENERAL PHARMACY W/ HCPCS (ALT 636 FOR OP/ED)

## 2025-03-03 PROCEDURE — 97530 THERAPEUTIC ACTIVITIES: CPT | Mod: GP

## 2025-03-03 PROCEDURE — 99232 SBSQ HOSP IP/OBS MODERATE 35: CPT | Performed by: INTERNAL MEDICINE

## 2025-03-03 PROCEDURE — 1200000002 HC GENERAL ROOM WITH TELEMETRY DAILY

## 2025-03-03 PROCEDURE — C1751 CATH, INF, PER/CENT/MIDLINE: HCPCS

## 2025-03-03 PROCEDURE — 99232 SBSQ HOSP IP/OBS MODERATE 35: CPT

## 2025-03-03 RX ORDER — LIDOCAINE HYDROCHLORIDE 10 MG/ML
5 INJECTION, SOLUTION INFILTRATION; PERINEURAL ONCE
Status: DISCONTINUED | OUTPATIENT
Start: 2025-03-03 | End: 2025-03-08 | Stop reason: HOSPADM

## 2025-03-03 RX ADMIN — OXYCODONE HYDROCHLORIDE 10 MG: 10 TABLET ORAL at 16:00

## 2025-03-03 RX ADMIN — VANCOMYCIN HYDROCHLORIDE 1000 MG: 1 INJECTION, SOLUTION INTRAVENOUS at 09:50

## 2025-03-03 RX ADMIN — OXYCODONE HYDROCHLORIDE 10 MG: 10 TABLET ORAL at 07:18

## 2025-03-03 RX ADMIN — APIXABAN 2.5 MG: 2.5 TABLET, FILM COATED ORAL at 20:25

## 2025-03-03 RX ADMIN — ACETAMINOPHEN 650 MG: 325 TABLET ORAL at 07:01

## 2025-03-03 RX ADMIN — PANTOPRAZOLE SODIUM 40 MG: 40 TABLET, DELAYED RELEASE ORAL at 07:01

## 2025-03-03 RX ADMIN — ACETAMINOPHEN 650 MG: 325 TABLET ORAL at 11:35

## 2025-03-03 RX ADMIN — OXYCODONE HYDROCHLORIDE 10 MG: 10 TABLET ORAL at 20:25

## 2025-03-03 RX ADMIN — SODIUM CHLORIDE, POTASSIUM CHLORIDE, SODIUM LACTATE AND CALCIUM CHLORIDE 1000 ML: 600; 310; 30; 20 INJECTION, SOLUTION INTRAVENOUS at 17:04

## 2025-03-03 RX ADMIN — VANCOMYCIN HYDROCHLORIDE 1000 MG: 1 INJECTION, SOLUTION INTRAVENOUS at 23:28

## 2025-03-03 RX ADMIN — APIXABAN 2.5 MG: 2.5 TABLET, FILM COATED ORAL at 09:49

## 2025-03-03 RX ADMIN — ACETAMINOPHEN 650 MG: 325 TABLET ORAL at 23:28

## 2025-03-03 RX ADMIN — OXYCODONE HYDROCHLORIDE 10 MG: 10 TABLET ORAL at 11:35

## 2025-03-03 RX ADMIN — ACETAMINOPHEN 650 MG: 325 TABLET ORAL at 17:05

## 2025-03-03 RX ADMIN — POLYETHYLENE GLYCOL 3350 17 G: 17 POWDER, FOR SOLUTION ORAL at 09:48

## 2025-03-03 ASSESSMENT — COGNITIVE AND FUNCTIONAL STATUS - GENERAL
DRESSING REGULAR UPPER BODY CLOTHING: A LITTLE
MOVING TO AND FROM BED TO CHAIR: A LITTLE
TURNING FROM BACK TO SIDE WHILE IN FLAT BAD: A LITTLE
WALKING IN HOSPITAL ROOM: A LITTLE
STANDING UP FROM CHAIR USING ARMS: A LOT
DAILY ACTIVITIY SCORE: 20
MOVING TO AND FROM BED TO CHAIR: A LITTLE
CLIMB 3 TO 5 STEPS WITH RAILING: A LOT
TURNING FROM BACK TO SIDE WHILE IN FLAT BAD: A LITTLE
MOBILITY SCORE: 16
WALKING IN HOSPITAL ROOM: A LOT
MOVING FROM LYING ON BACK TO SITTING ON SIDE OF FLAT BED WITH BEDRAILS: A LITTLE
HELP NEEDED FOR BATHING: A LITTLE
CLIMB 3 TO 5 STEPS WITH RAILING: TOTAL
DRESSING REGULAR LOWER BODY CLOTHING: A LITTLE
TOILETING: A LITTLE
STANDING UP FROM CHAIR USING ARMS: A LITTLE
MOVING FROM LYING ON BACK TO SITTING ON SIDE OF FLAT BED WITH BEDRAILS: A LITTLE
MOBILITY SCORE: 15

## 2025-03-03 ASSESSMENT — PAIN - FUNCTIONAL ASSESSMENT
PAIN_FUNCTIONAL_ASSESSMENT: 0-10

## 2025-03-03 ASSESSMENT — PAIN DESCRIPTION - ORIENTATION: ORIENTATION: RIGHT

## 2025-03-03 ASSESSMENT — PAIN SCALES - GENERAL
PAINLEVEL_OUTOF10: 9
PAINLEVEL_OUTOF10: 7
PAINLEVEL_OUTOF10: 8
PAINLEVEL_OUTOF10: 4
PAINLEVEL_OUTOF10: 8
PAINLEVEL_OUTOF10: 10 - WORST POSSIBLE PAIN

## 2025-03-03 ASSESSMENT — PAIN DESCRIPTION - LOCATION: LOCATION: KNEE

## 2025-03-03 ASSESSMENT — PAIN DESCRIPTION - DESCRIPTORS: DESCRIPTORS: SHARP;DISCOMFORT

## 2025-03-03 NOTE — CARE PLAN
The patient's goals for the shift include      The clinical goals for the shift include pt will remain HDS throughout shift       Problem: Pain - Adult  Goal: Verbalizes/displays adequate comfort level or baseline comfort level  Outcome: Progressing     Problem: Safety - Adult  Goal: Free from fall injury  Outcome: Progressing     Problem: Discharge Planning  Goal: Discharge to home or other facility with appropriate resources  Outcome: Progressing     Problem: Nutrition  Goal: Nutrient intake appropriate for maintaining nutritional needs  Outcome: Progressing     Problem: Pain  Goal: Takes deep breaths with improved pain control throughout the shift  Outcome: Progressing  Goal: Turns in bed with improved pain control throughout the shift  Outcome: Progressing  Goal: Walks with improved pain control throughout the shift  Outcome: Progressing  Goal: Performs ADL's with improved pain control throughout shift  Outcome: Progressing  Goal: Participates in PT with improved pain control throughout the shift  Outcome: Progressing  Goal: Free from opioid side effects throughout the shift  Outcome: Progressing  Goal: Free from acute confusion related to pain meds throughout the shift  Outcome: Progressing     Problem: Skin  Goal: Decreased wound size/increased tissue granulation at next dressing change  Outcome: Progressing  Goal: Participates in plan/prevention/treatment measures  Outcome: Progressing  Goal: Prevent/manage excess moisture  Outcome: Progressing  Goal: Prevent/minimize sheer/friction injuries  Outcome: Progressing  Goal: Promote/optimize nutrition  Outcome: Progressing  Goal: Promote skin healing  Outcome: Progressing

## 2025-03-03 NOTE — CARE PLAN
The patient's goals for the shift include      The clinical goals for the shift include HDS/ remain safe      Problem: Pain - Adult  Goal: Verbalizes/displays adequate comfort level or baseline comfort level  Outcome: Progressing     Problem: Safety - Adult  Goal: Free from fall injury  Outcome: Progressing     Problem: Discharge Planning  Goal: Discharge to home or other facility with appropriate resources  Outcome: Progressing     Problem: Nutrition  Goal: Nutrient intake appropriate for maintaining nutritional needs  Outcome: Progressing     Problem: Pain  Goal: Takes deep breaths with improved pain control throughout the shift  Outcome: Progressing  Goal: Turns in bed with improved pain control throughout the shift  Outcome: Progressing  Goal: Walks with improved pain control throughout the shift  Outcome: Progressing  Goal: Performs ADL's with improved pain control throughout shift  Outcome: Progressing  Goal: Participates in PT with improved pain control throughout the shift  Outcome: Progressing  Goal: Free from opioid side effects throughout the shift  Outcome: Progressing  Goal: Free from acute confusion related to pain meds throughout the shift  Outcome: Progressing     Problem: Skin  Goal: Decreased wound size/increased tissue granulation at next dressing change  Outcome: Progressing  Flowsheets (Taken 3/3/2025 0456)  Decreased wound size/increased tissue granulation at next dressing change:   Protective dressings over bony prominences   Promote sleep for wound healing  Goal: Participates in plan/prevention/treatment measures  Outcome: Progressing  Flowsheets (Taken 3/3/2025 0456)  Participates in plan/prevention/treatment measures: Elevate heels  Goal: Prevent/manage excess moisture  Outcome: Progressing  Flowsheets (Taken 3/3/2025 0456)  Prevent/manage excess moisture:   Cleanse incontinence/protect with barrier cream   Monitor for/manage infection if present  Goal: Prevent/minimize sheer/friction  injuries  Outcome: Progressing  Flowsheets (Taken 3/3/2025 0456)  Prevent/minimize sheer/friction injuries:   Use pull sheet   Increase activity/out of bed for meals   Complete micro-shifts as needed if patient unable. Adjust patient position to relieve pressure points, not a full turn  Goal: Promote/optimize nutrition  Outcome: Progressing  Flowsheets (Taken 3/3/2025 0456)  Promote/optimize nutrition:   Monitor/record intake including meals   Offer water/supplements/favorite foods  Goal: Promote skin healing  Outcome: Progressing  Flowsheets (Taken 3/3/2025 0456)  Promote skin healing:   Assess skin/pad under line(s)/device(s)   Turn/reposition every 2 hours/use positioning/transfer devices   Protective dressings over bony prominences

## 2025-03-03 NOTE — PROGRESS NOTES
Orthopedic Surgery Note    S:  69F s/p right knee irrigation&debridement, revision static spacer w Dr. Whelan 2/28/25.     Resting comfortably. NAEON. Pain controlled. Denies CP/SOB. On LT8 tele.    Running bradycardic. Asymptomatic. Per patient, this is normal for her and her home cardiologist is aware. Patient also received her seattle splint and says she likes it.      O:  BP 94/54 (BP Location: Right arm, Patient Position: Lying)   Pulse 67   Temp 36 °C (96.8 °F) (Temporal)   Resp 17   Wt 68.9 kg (151 lb 14.4 oz) Comment: Weight needed to verify stat med, used 2/20/25  SpO2 97%   BMI 21.79 kg/m²      NAD. Peripherally WWP. Breathing comfortably. No obvious focal deficits.     MSK injured extremity/RLE:  - Dressing DCI  - Prevena holding suction  - Palpable pulses, toes WWP  - Wiggles toes, SILT    Labs: reviewed.     Imaging: reviewed.     A&P:  69F s/p right knee irrigation&debridement, revision static spacer w Dr. Whelan 2/28/25.      Plan:   - Diet: Regular  - Vanc pharmacy to dose; pending cx & ID recs  - Cx NGTD  - ID c/s, appreciate recs --> continue vanc, remainder pending cs  - IVF pending PO  - MM pain regimen  - PRN antiemetic. NO ZOFRAN.   - DVT Proph: SCDs/ ambulate/ eliquis 2.5bid+ PPI ppx  - Bowel regimen  - Neurovascular checks every 4 hours and as needed  - Ice affected extremity for 20 mins every hour as needed  - Prevena 14 day  - Rosenbaum removed POD1  - Maintain ace wrap/dressing. Reinforce as needed.  - PWB 50% RLE  - PT/OT c/s  - Monitor VS every 4 hours   - Labs ordered for AM   - IS every hour while awake   - Encourage ambulation / OOB as tolerated   - Orthotics consult for seattle splint while in bed (done)    Medicine consult:  -Daily RFP/Mg w lyte repletion  -EKG baseline ordered  -Tele w continuous pulse ox  --3/2: discussed overnight soft pressures w medicine. Seems most likely to be due to I&O -3K rather than pain regimen. Tentative plan for bolus PRN but waiting on formal  eval/recs.   --3/3: planning to discuss bradycardia. Though given this is her baseline and assymptomatic, do not plan on any intervention.      Dispo: Cx/PT/OT/pain     D/w attending.     Reviewed and approved by AMADEO HAN on 3/3/25 at 6:04 AM.      This patient will be followed by the Orthopaedic Trauma service. Please page or Epic Chat the corresponding residents below with questions or concerns.      Ortho Trauma Service (Epic Chat Preferred)  First call: Martin Gasca  Second call: Malvin Barrett  Third call: Amadeo Han     6pm-6am M-F, Holidays, and weekends page Ortho on-call @[phone] with urgent questions/concerns.

## 2025-03-03 NOTE — NURSING NOTE
PICC order received. Per chart notes, not ready for discharge pending ID recs, and possible PICC. Will re evaluate tomorrow for final recs.
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
## 2025-03-03 NOTE — PROGRESS NOTES
Adriana Wood is a 69 y.o. female on day 3 of admission presenting with Status post total right knee replacement.      Subjective   Bradycardia noted with some softer BP although stable and asymptomatic. Patient continues to endorse long-standing hx with close follow up with Cardiology outpatient who confirms she is stable.    Tele reviewed, showing continued bouts of Bigeminy.       Objective     Last Recorded Vitals  /77 (BP Location: Right arm, Patient Position: Lying)   Pulse 66   Temp 36.3 °C (97.3 °F) (Temporal)   Resp 15   Wt 68.9 kg (151 lb 14.4 oz) Comment: Weight needed to verify stat med, used 2/20/25  SpO2 96%   Intake/Output last 3 Shifts:    Intake/Output Summary (Last 24 hours) at 3/3/2025 1037  Last data filed at 3/3/2025 0700  Gross per 24 hour   Intake 800 ml   Output 2075 ml   Net -1275 ml       Admission Weight  Weight: 68.9 kg (151 lb 14.4 oz) (Weight needed to verify stat med, used 2/20/25) (02/28/25 1348)    Daily Weight  02/28/25 : 68.9 kg (151 lb 14.4 oz)    Image Results  ECG 12 lead  Sinus tachycardia with frequent Premature ventricular complexes in a pattern of bigeminy  Nonspecific T wave abnormality  Abnormal ECG  When compared with ECG of 01-MAR-2025 09:07,  Significant changes have occurred      Physical Exam    Constitutional:  no distress, alert and cooperative  Eyes: clear sclera  Head/Neck: No apparent injury, trachea midline  Respiratory/Thorax: Patent airways, thorax symmetric, breathing comfortably  Cardiovascular: no pitting edema, irregular rhythm  Gastrointestinal: Nondistended  Musculoskeletal: ROM intact  Extremities: no clubbing  Neurological: alert, campuzano x4  Psychological: Appropriate affect        Assessment/Plan      Adriana Wood is a 69 y.o. year old female patient with PMHx Cardiomyopathy, HTN, PVC, Cardiac Arrest (9/2021), Asthma, GERD, Hepatitis, Prosthetic joint infection who presents for RLE adrienne placement and Abx spacer placement. In PACU,  "Patient had PVCs and hypotension requiring treatment including short duration of pressors + albumin. On arrival seems to be more HD stable and off pressors. Patient stable for RNF with tele and continuous SpO2 monitoring. Primary differential in hypovolemia, patient was likely hypovolemic prior to surgery as well as had a 3 point drop in her Hb, although there was not a large EBL recorded.     Patient does follow with Cardiology-EP outpatient. Per their last note on 1/8/25:  \"Feb 2023: The origin of her PVC is in the outflow tract, the transition is somewhat later but so it is in the intrinsic transition, so it is difficult to tell if these are right or left outflow ( I do suspect left coronary cusp PVC). The clinical significance of these is what needs to be determined. She is not very symptomatic and has had PVC all her life. They have not resulted in lowering of her EF. From this stand point of view she can continue with the surgery without additional testing . I don't know if these have been a factor in initiation torsades back in October of 2021. There are no recordings and usually PVCs from the outflow are not malignant. The key would be to avoid anything that might prolong the QT. Once her orthopedic surgeries are completed she will try to address the PVCs. Obviously because of the prolonged QT one would like to avoid antiarrhythmics. We will reconvene again to discuss the PVCs after her surgeries. \"    3/3: Continued Bigeminy on Tele, 1st degree AV block on EKG. Stable, nothing to do regarding this.      Recs:  - Can give another 1L of NS today if continues to be hypotensive  - Will continue to follow peripherally   - AVOID QT prolonging medications  - Daily RFP/Mg levels with repletion goals K>4, Mg>2  - Recommend telemetry and continuous SpO2      Patient seen and discussed with attending physician Dr. Chaparro.      Kashmir Rose, DO PGY1  Department of Anesthesiology        "

## 2025-03-03 NOTE — SIGNIFICANT EVENT
Rapid Response Nurse Note: RADAR alert: 8    Pager time: [phone] (delayed, VS entered 417)  Arrival time: 455  Event end time: 505  Location: L8  [x] Triage by phone or secure messaging    Rapid response initiated by:  [] Rapid response RN [] Family [] Nursing Supervisor [] Physician   [x] RADAR auto page [] Sepsis auto-page [] RN [] RT   [] NP/PA [] Other:     Primary reason for call:   [] BAT [] New CPAP/BiPAP [] Bleeding [] Change in mental status   [] Chest pain [] Code blue [] FiO2 >/= 50% [] HR </= 40 bpm   [] HR >/= 130 bpm [] Hyperglycemia [] Hypoglycemia [] RADAR    [] RR </= 8 bpm [] RR >/= 30 bpm [] SBP </= 90 mmHg [] SpO2 < 90%   [] Seizure [] Sepsis [] Shortness of breath  [] Staff concern: see comments     Initial VS and/or RADAR VS: T 36 °C; HR 38; RR 17; BP 94/54; SPO2 97%.      Interventions:  [x] None [] ABG/VBG [] Assist w/ICU transfer [] BAT paged    [] Bag mask [] Blood [] Cardioversion [] Code Blue   [] Code blue for intubation [] Code status changed [] Chest x-ray [] EKG   [] IV fluid/bolus [] KUB x-ray [] Labs/cultures [] Medication   [] Nebulizer treatment [] NIPPV (CPAP/BiPAP) [] Oxygen [] Oral airway   [] Peripheral IV [] Palliative care consult [] CT/MRI [] Sepsis protocol    [] Suctioned [] Other:     Outcome:  [] Coded and  [] Code blue for intubation [] Coded and transferred to ICU []  on division   [x] Remained on division (no change) [] Remained on division + additional monitoring [] Remained in ED [] Transferred to ED   [] Transferred to ICU [] Transferred to inpatient status [] Transferred for interventions (procedure) [] Transferred to ICU stepdown    [] Transferred to surgery [] Transferred to telemetry [] Sepsis protocol [] STEMI protocol   [] Stroke protocol [x] Bedside nurse instructed to page rapid response for any concerns or acute change in condition/VS     Additional Comments: Reviewed above RADAR VS with bedside RN via Epic chat.  Vital signs within  patient's current trends. Reassessment of HR is 67. No acute change in condition.  No interventions by rapid response team indicated at this time.  Staff to page rapid response for any concerns or acute change in condition or VS.

## 2025-03-03 NOTE — PROGRESS NOTES
Physical Therapy    Physical Therapy Treatment    Patient Name: Adriana Wood  MRN: [id number]  Department: [place] [address]  Room: 8052/8052-A  Today's Date: 3/3/2025  Time Calculation  Start Time: 0847  Stop Time: 0931  Time Calculation (min): 44 min       Assessment/Plan   PT Assessment  Evaluation/Treatment Tolerance: Patient limited by pain  Medical Staff Made Aware: Yes  End of Session Communication: Bedside nurse  Assessment Comment: Pt was able to ambulate ~5 lateral steps with walker from bed <> chair x2 trials due to needing EKG reading.  Pt tolerated treatment well but was primarily pain limiting.  Pt progressed to CGA-Brian upon mobility.  End of Session Patient Position: Alarm on, Bed, 3 rail up  PT Plan  Inpatient/Swing Bed or Outpatient: Inpatient  PT Plan  Treatment/Interventions: Bed mobility, Gait training, Transfer training, Balance training, Endurance training, Strengthening, Therapeutic exercise, Therapeutic activity, Home exercise program, Positioning  PT Plan: Ongoing PT  PT Frequency: 5 times per week  PT Discharge Recommendations: Moderate intensity level of continued care  PT Recommended Transfer Status: Assist x1  PT - OK to Discharge: Yes (eval complete, d/c recommendation made)      General Visit Information:   PT  Visit  PT Received On: 03/03/25  Response to Previous Treatment: Patient with no complaints from previous session.  General  Family/Caregiver Present: No  Prior to Session Communication: Bedside nurse  Patient Position Received: Bed, 3 rail up, Alarm off, not on at start of session  General Comment: Pt supine in bed upon arrival, pleasant and agreeable to therapy today.  R knee immobilizer and boot donned.  Pt presents with slight anteversion at R foot.    Subjective   Precautions:  Precautions  LE Weight Bearing Status: Right Partial Weight Bearing (RLE PWB (50%))  Medical Precautions: Fall precautions  Braces Applied: Right hinge knee brace locked in extension and seattle splint  donned prior to PT session.     03/03/25 0847 03/03/25 0931   Vital Signs   Vitals Session Pre PT Post PT   Heart Rate 91 101           Objective   Pain:  Pain Assessment  Pain Assessment: 0-10  0-10 (Numeric) Pain Score: 7  Cognition:  Cognition  Overall Cognitive Status: Within Functional Limits  Orientation Level: Oriented X4    Postural Control:  Postural Control  Postural Control: Within Functional Limits  Static Sitting Balance  Static Sitting-Balance Support: Feet supported  Static Sitting-Level of Assistance: Close supervision  Static Sitting-Comment/Number of Minutes: 2 mins  Static Standing Balance  Static Standing-Balance Support: Bilateral upper extremity supported (Walker)  Static Standing-Level of Assistance: Minimum assistance  Static Standing-Comment/Number of Minutes: 1 min    Activity Tolerance:  Activity Tolerance  Endurance: Endurance does not limit participation in activity  Early Mobility/Exercise Safety Screen: Proceed with mobilization - No exclusion criteria met  Activity Tolerance Comments: Primarily pain limiting  Treatments:  Therapeutic Exercise  Therapeutic Exercise Performed: No    Therapeutic Activity  Therapeutic Activity Performed: Yes  Therapeutic Activity 1: Pt performed 2 STS trials and transferred bed <> chair x2 trials due to needing EKG reading.  Therapeutic Activity 2: Pt performed bed mobility with CGA-SBA.  Therapeutic Activity 3: Increased time due to pt education on transfers, WB precaution, and managing RLE brace/boot.    Balance/Neuromuscular Re-Education  Balance/Neuromuscular Re-Education Activity Performed: No    Bed Mobility  Bed Mobility: Yes  Bed Mobility 1  Bed Mobility 1: Supine to sitting  Level of Assistance 1: Contact guard  Bed Mobility Comments 1: HOB elevated  Bed Mobility 2  Bed Mobility  2: Sitting to supine  Level of Assistance 2: Close supervision  Bed Mobility Comments 2: HOB flat    Ambulation/Gait Training  Ambulation/Gait Training Performed:  Yes  Ambulation/Gait Training 1  Surface 1: Level tile  Device 1: Rolling walker  Gait Support Devices: Knee immobilizer (RLE)  Assistance 1: Minimum assistance, Contact guard (Pt was CGA when transferring from bed <> chair, and min-A from chair <> bed.)  Quality of Gait 1: Narrow base of support, Forward flexed posture, Antalgic  Comments/Distance (ft) 1: ~5 lateral steps x2 trials from bed <> chair; pt hesitation to WB 50% on RLE due to pain  Transfers  Transfer: Yes  Transfer 1  Transfer From 1: Sit to, Stand to  Transfer to 1: Stand, Sit  Technique 1: Sit to stand, Stand to sit  Transfer Device 1: Walker  Transfer Level of Assistance 1: Minimum assistance, Contact guard (Pt was min-A at first STS trial and CGA at second trial from chair.)  Trials/Comments 1: Bed elevated, verbal cues for hand placement onto walker and positioning towards EOB.    Stairs  Stairs: No    Outcome Measures:  [facility] Basic Mobility  Turning from your back to your side while in a flat bed without using bedrails: A little  Moving from lying on your back to sitting on the side of a flat bed without using bedrails: A little  Moving to and from bed to chair (including a wheelchair): A little  Standing up from a chair using your arms (e.g. wheelchair or bedside chair): A little  To walk in hospital room: A little  Climbing 3-5 steps with railing: Total  Basic Mobility - Total Score: 16    Education Documentation  Precautions, taught by JEFF Bui at 3/3/2025  1:21 PM.  Learner: Patient  Readiness: Acceptance  Method: Explanation  Response: Verbalizes Understanding  Comment: PT POC, WB precautions    Body Mechanics, taught by JEFF Bui at 3/3/2025  1:21 PM.  Learner: Patient  Readiness: Acceptance  Method: Explanation  Response: Verbalizes Understanding  Comment: PT POC, WB precautions    Mobility Training, taught by JEFF Bui at 3/3/2025  1:21 PM.  Learner: Patient  Readiness: Acceptance  Method:  Explanation  Response: Verbalizes Understanding  Comment: PT POC, WB precautions    Education Comments  No comments found.        OP EDUCATION:       Encounter Problems       Encounter Problems (Active)       PT Problem       Pt will perform supine<>Sit mod I (Progressing)       Start:  03/01/25    Expected End:  03/15/25            Pt will complete dynamic reaching, outside TANMAY, in sitting x10 reps without LOB (Not Progressing)       Start:  03/01/25    Expected End:  03/15/25            Pt will perform sit<>stand with FWW and CGA (Progressing)       Start:  03/01/25    Expected End:  03/15/25            Pt will perform bed<>chair transfer with supervision (Progressing)       Start:  03/01/25    Expected End:  03/15/25               Pain - Adult            CARRI BLOOM, S-PT

## 2025-03-03 NOTE — PROGRESS NOTES
Transitional Care Coordinator Note: Patient discussed with medical team (ortho resident), per ortho team patient is not medically ready, pending ID recommendations and possible PICC. Discharge dispo: Plan for patient to discharge to SNF [facility] [place], pending acceptance. Patient remains under review at SNF, updated clinicals provided to facility. Patient will not require a pre-cert prior to discharge.      Anastacio Day RN BSN   Transitional Care Coordinator

## 2025-03-04 DIAGNOSIS — T84.50XD PROSTHETIC JOINT INFECTION, SUBSEQUENT ENCOUNTER: ICD-10-CM

## 2025-03-04 LAB
BB ANTIBODY IDENTIFICATION: NORMAL
CASE #: NORMAL
MAGNESIUM SERPL-MCNC: 1.63 MG/DL (ref 1.6–2.4)
PATH REV-IMMUNOHEMATOLOGY-PR30: NORMAL
VANCOMYCIN SERPL-MCNC: 33.4 UG/ML (ref 5–20)

## 2025-03-04 PROCEDURE — 87081 CULTURE SCREEN ONLY: CPT

## 2025-03-04 PROCEDURE — 36415 COLL VENOUS BLD VENIPUNCTURE: CPT | Performed by: ORTHOPAEDIC SURGERY

## 2025-03-04 PROCEDURE — 1100000001 HC PRIVATE ROOM DAILY

## 2025-03-04 PROCEDURE — 80202 ASSAY OF VANCOMYCIN: CPT | Performed by: ORTHOPAEDIC SURGERY

## 2025-03-04 PROCEDURE — 2500000004 HC RX 250 GENERAL PHARMACY W/ HCPCS (ALT 636 FOR OP/ED)

## 2025-03-04 PROCEDURE — 99232 SBSQ HOSP IP/OBS MODERATE 35: CPT

## 2025-03-04 PROCEDURE — 2500000001 HC RX 250 WO HCPCS SELF ADMINISTERED DRUGS (ALT 637 FOR MEDICARE OP)

## 2025-03-04 RX ORDER — ACETAMINOPHEN 325 MG/1
650 TABLET ORAL EVERY 6 HOURS PRN
Qty: 120 TABLET | Refills: 0 | Status: ON HOLD | OUTPATIENT
Start: 2025-03-04 | End: 2025-03-19

## 2025-03-04 RX ORDER — OXYCODONE HYDROCHLORIDE 5 MG/1
5 TABLET ORAL EVERY 4 HOURS PRN
Qty: 42 TABLET | Refills: 0 | Status: ON HOLD | OUTPATIENT
Start: 2025-03-04 | End: 2025-03-14

## 2025-03-04 RX ORDER — DOCUSATE SODIUM 100 MG/1
100 CAPSULE, LIQUID FILLED ORAL 2 TIMES DAILY PRN
Qty: 30 CAPSULE | Refills: 0 | Status: ON HOLD | OUTPATIENT
Start: 2025-03-04 | End: 2025-03-19

## 2025-03-04 RX ORDER — ASPIRIN 81 MG/1
81 TABLET ORAL 2 TIMES DAILY
Qty: 84 TABLET | Refills: 0 | Status: SHIPPED | OUTPATIENT
Start: 2025-03-04 | End: 2025-03-04 | Stop reason: HOSPADM

## 2025-03-04 RX ADMIN — ACETAMINOPHEN 650 MG: 325 TABLET ORAL at 06:31

## 2025-03-04 RX ADMIN — APIXABAN 2.5 MG: 2.5 TABLET, FILM COATED ORAL at 20:35

## 2025-03-04 RX ADMIN — VANCOMYCIN HYDROCHLORIDE 1000 MG: 1 INJECTION, SOLUTION INTRAVENOUS at 10:11

## 2025-03-04 RX ADMIN — POLYETHYLENE GLYCOL 3350 17 G: 17 POWDER, FOR SOLUTION ORAL at 09:22

## 2025-03-04 RX ADMIN — ACETAMINOPHEN 650 MG: 325 TABLET ORAL at 17:50

## 2025-03-04 RX ADMIN — HYDROMORPHONE HYDROCHLORIDE 0.5 MG: 1 INJECTION, SOLUTION INTRAMUSCULAR; INTRAVENOUS; SUBCUTANEOUS at 09:22

## 2025-03-04 RX ADMIN — HYDROMORPHONE HYDROCHLORIDE 0.5 MG: 1 INJECTION, SOLUTION INTRAMUSCULAR; INTRAVENOUS; SUBCUTANEOUS at 18:45

## 2025-03-04 RX ADMIN — APIXABAN 2.5 MG: 2.5 TABLET, FILM COATED ORAL at 09:22

## 2025-03-04 RX ADMIN — OXYCODONE HYDROCHLORIDE 10 MG: 10 TABLET ORAL at 06:31

## 2025-03-04 RX ADMIN — PANTOPRAZOLE SODIUM 40 MG: 40 TABLET, DELAYED RELEASE ORAL at 06:32

## 2025-03-04 RX ADMIN — OXYCODONE HYDROCHLORIDE 10 MG: 10 TABLET ORAL at 15:40

## 2025-03-04 RX ADMIN — HYDROMORPHONE HYDROCHLORIDE 0.5 MG: 1 INJECTION, SOLUTION INTRAMUSCULAR; INTRAVENOUS; SUBCUTANEOUS at 00:39

## 2025-03-04 RX ADMIN — OXYCODONE HYDROCHLORIDE 10 MG: 10 TABLET ORAL at 12:28

## 2025-03-04 RX ADMIN — OXYCODONE HYDROCHLORIDE 10 MG: 10 TABLET ORAL at 22:31

## 2025-03-04 ASSESSMENT — PAIN SCALES - GENERAL
PAINLEVEL_OUTOF10: 0 - NO PAIN
PAINLEVEL_OUTOF10: 0 - NO PAIN
PAINLEVEL_OUTOF10: 8
PAINLEVEL_OUTOF10: 9
PAINLEVEL_OUTOF10: 8
PAINLEVEL_OUTOF10: 9
PAINLEVEL_OUTOF10: 8
PAINLEVEL_OUTOF10: 9

## 2025-03-04 ASSESSMENT — PAIN - FUNCTIONAL ASSESSMENT
PAIN_FUNCTIONAL_ASSESSMENT: 0-10

## 2025-03-04 ASSESSMENT — PAIN DESCRIPTION - LOCATION: LOCATION: LEG

## 2025-03-04 ASSESSMENT — PAIN DESCRIPTION - ORIENTATION: ORIENTATION: RIGHT

## 2025-03-04 ASSESSMENT — PAIN DESCRIPTION - DESCRIPTORS
DESCRIPTORS: DISCOMFORT;SHARP
DESCRIPTORS: SHARP

## 2025-03-04 NOTE — PROGRESS NOTES
Transitional Care Coordinator Note: Patient discussed with medical team (ortho resident), per ortho team patient is not medically ready. Plan for patient to get PICC for 6 weeks of antibiotics (vancomycin). Discharge dispo: Plan for patient to discharge to SNF [facility]. Patient will note require a pre-cert. Updated clinicals sent to facility. TCC inquired if SNF would be able to accommodate antibiotics and PICC, pending response.     Anastacio Day RN BSN   Transitional Care Coordinator

## 2025-03-04 NOTE — PROGRESS NOTES
Physical Therapy                 Therapy Communication Note    Patient Name: Adriana Wood  MRN: [id number]  Department: [place] [address]  Room: [facility]52  Today's Date: 3/4/2025     Discipline: Physical Therapy    PT Missed Visit: Yes     Missed Visit Reason: Missed Visit Reason:  (1202: Pt receiving PICC line currently, will hold PT tx at this time.)    Missed Time: Attempt    Yaritza Torres, PT

## 2025-03-04 NOTE — POST-PROCEDURE NOTE
Pre-Procedure Checklist:  Emergent Line Insertion: No  Type of Line to be Placed: PICC  Consent Obtained: Yes  Emergency Medication Necessary: No  Patient Identified with 2 Independent Identifiers: Yes  Review of Allergies, Anticoagulation, Relevant Labs, ECG/Telemetry: Yes  Risks/Benefits/Alternatives Discussed with Patient/POA/Legal Representative: Yes  Stop Sign on Door: Yes  Time Out Performed: Yes  Catheter Exchange: No    Positioning Checklist:  All People, Including Patient, in the Room with Cap and Mask: Yes  Fluoroscopy Used to Identify Vessel and Guide Insertion: No   Sterile Cover Used: Yes  Full Barrier Precautions Followed (Mask, Cap, Gown, Gloves): Yes  Hands Washed: Yes  Monitors Attached with Sound Alarms On: No  Full Body Sterile Drape (Head-to-Toe) Used to Cover Patient: Yes  Trendelenburg Position (For IJ and Subclavian): No  CHG Skin Prep Used and Allowed to Air Dry to Skin Procedure: Yes    Procedure Checklist:  Blood Aspirated From All Lumens, All Ports Subsequently Flushed: Yes  Catheter Caps Placed on All Lumens; Lumens Clamped: Yes  Maintain Guidewire Control Throughout, Ensuring Guidewire Removal: Yes  Maintain Sterile Field Throughout Insertion: Yes  Catheter Secured: Yes  Confirmatory Test of Venous Placement: Non-Pulsatile Blood    Post Procedure Checklist:  Date and Time Written on Dressing: Yes  Sharp and Wire Count and Safe Disposal of all Sharps/Wires: Yes  Sterile Dressing Applied Per Protocol: Yes  X-ray Ordered or ECG Image: Yes    PICC Insertion Details:  Size (Fr): 4  Lumen Type: single  Catheter to Vein Ratio Less Than 50%: Yes  Total Length (cm): 37  External Length (cm): 1   Orientation: right upper arm  Location: brachial vein  Site Prep: Chlorohexidine; Usual sterile procedure followed  Local Anesthetic: Injectable/Subcutaneous  Indication: IV antibiotics x6 weeks  Insertion Team Members in the Room: Nurse, LPN  Initial Extremity Circumference (cm): 29  Insertion Attempts:  1  Patient Tolerance: Tolerated Well, Age Appropriate  Comfort Measures: Subcutaneous anesthetic; Verbal  Procedure Location: Bedside  Safety Measures: Patient specific safety measures addressed with RN  Estimated Blood Loss (mL): 0.5   Vessel Fully Compressible Proximally and Distally to Insertion Site: Yes  Brisk Blood Return Obtained and Line Draws Easily: Yes  Tip Location: SVC  Line Confirmation: ECG  Lot #: [id number]  : BD  PICC Line Exp Date: 10/31/2025  Securement: Stat Lock  Post Procedure Checklist: Handoff with RN; Obtain all new IV tubing prior to use; Bed at lowest level and wheels locked; Line discharge information at bedside.  Additional Details: Line was inserted using Modified Seldinger's Technique.   Placed by: Sandra Varma RN

## 2025-03-04 NOTE — PROGRESS NOTES
Vancomycin Dosing by Pharmacy- FOLLOW UP    Adriana Wood is a 69 y.o. year old female who Pharmacy has been consulted for vancomycin dosing for osteomyelitis/septic arthritis. Based on the patient's indication and renal status this patient is being dosed based on a goal AUC of 400-600.     Renal function is currently stable.    Current vancomycin dose: 1000 mg given every 12 hours    Estimated vancomycin AUC on current dose: 782 mg/L.hr     Visit Vitals  BP 95/63 (BP Location: Right arm, Patient Position: Lying)   Pulse (!) 47   Temp 36 °C (96.8 °F) (Temporal)   Resp 16        Lab Results   Component Value Date    CREATININE 0.59 2025    CREATININE 0.52 2025    CREATININE 0.47 (L) 2025    CREATININE 0.51 2025        Patient weight is as follows:   Vitals:    25 1348   Weight: 68.9 kg (151 lb 14.4 oz)       Cultures:  No results found for the encounter in last 14 days.       I/O last 3 completed shifts:  In: 600 (8.7 mL/kg) [IV Piggyback:600]  Out: 2375 (34.5 mL/kg) [Urine:2375 (1 mL/kg/hr)]  Weight: 68.9 kg   I/O during current shift:  No intake/output data recorded.    Temp (24hrs), Av.3 °C (97.4 °F), Min:35.9 °C (96.6 °F), Max:37.2 °C (98.9 °F)      Assessment/Plan    Above goal AUC. Orders placed for new vancomcyin regimen of 1250 mg every 24 hours to begin at 0900 on 3/5.    This dosing regimen is predicted by [employer] to result in the following pharmacokinetic parameters:  Loading dose: N/A  Regimen: 1250 mg IV every 24 hours.  Start time: 10:11 on 2025  Exposure target: AUC24 (range)400-600 mg/L.hr   APE05-45: 587 mg/L.hr  AUC24,ss: 516 mg/L.hr  Probability of AUC24 > 400: 94 %  Ctrough,ss: 14.2 mg/L  Probability of Ctrough,ss > 20: 14 %    The next level will be obtained on 3/6 at 0500. May be obtained sooner if clinically indicated.   Will continue to monitor renal function daily while on vancomycin and order serum creatinine at least every 48 hours if not  already ordered.  Follow for continued vancomycin needs, clinical response, and signs/symptoms of toxicity.       Teresa Spicer, PharmD

## 2025-03-04 NOTE — PROGRESS NOTES
Orthopedic Surgery Note    S:  69F s/p right knee irrigation&debridement, revision static spacer w Dr. Whelan 2/28/25.     Resting comfortably. NAEON. Pain controlled. Denies CP/SOB. On LT8 tele.     O:  /64 (BP Location: Right arm, Patient Position: Lying)   Pulse 50   Temp 36.3 °C (97.3 °F) (Temporal)   Resp 16   Wt 68.9 kg (151 lb 14.4 oz) Comment: Weight needed to verify stat med, used 2/20/25  SpO2 95%   BMI 21.79 kg/m²      NAD. Peripherally WWP. Breathing comfortably. No obvious focal deficits.     MSK injured extremity/RLE:  - Dressing DCI  - Prevena holding suction  - Palpable pulses, toes WWP  - Wiggles toes, SILT    Labs: reviewed.     Imaging: reviewed.     A&P:  69F s/p right knee irrigation&debridement, revision static spacer w Dr. Whelan 2/28/25.      Plan:   - Diet: Regular  - Vanc pharmacy to dose; pending cx & ID recs  -- ID recommends PICC with 6 weeks vanc  - Cx NGTD  - IVF pending PO  - MM pain regimen  - PRN antiemetic. NO ZOFRAN.   - DVT Proph: SCDs/ ambulate/ eliquis 2.5bid+ PPI ppx  - Bowel regimen  - Neurovascular checks every 4 hours and as needed  - Ice affected extremity for 20 mins every hour as needed  - Prevena 14 day  - Rosenbaum removed POD1  - Maintain ace wrap/dressing. Reinforce as needed.  - PWB 50% RLE  - PT/OT c/s  - Monitor VS every 4 hours   - Labs ordered for AM   - IS every hour while awake   - Encourage ambulation / OOB as tolerated   - Orthotics consult for [place] splint while in bed (done)    Medicine consult:  -Daily RFP/Mg w lyte repletion  -EKG baseline ordered  -Tele w continuous pulse ox  --3/2: discussed overnight soft pressures w medicine. Seems most likely to be due to I&O -3K rather than pain regimen. Tentative plan for bolus PRN but waiting on formal eval/recs.   --3/3: planning to discuss bradycardia. Though given this is her baseline and assymptomatic, do not plan on any intervention.      Dispo: PICC/SNF     D/w attending.     Reviewed and  approved by AMADEO HAN on 3/4/25 at 5:27 AM.      This patient will be followed by the Orthopaedic Trauma service. Please page or Epic Chat the corresponding residents below with questions or concerns.      Ortho Trauma Service (Epic Chat Preferred)  First call: Martin Gasca  Second call: Malvin Barrett  Third call: Amadeo Han     6pm-6am M-F, Holidays, and weekends page Ortho on-call @[phone] with urgent questions/concerns.

## 2025-03-04 NOTE — CARE PLAN
The patient's goals for the shift include      The clinical goals for the shift include pt will remain HDS   Problem: Pain - Adult  Goal: Verbalizes/displays adequate comfort level or baseline comfort level  Outcome: Progressing     Problem: Safety - Adult  Goal: Free from fall injury  Outcome: Progressing     Problem: Discharge Planning  Goal: Discharge to home or other facility with appropriate resources  Outcome: Progressing     Problem: Nutrition  Goal: Nutrient intake appropriate for maintaining nutritional needs  Outcome: Progressing     Problem: Pain  Goal: Takes deep breaths with improved pain control throughout the shift  Outcome: Progressing  Goal: Turns in bed with improved pain control throughout the shift  Outcome: Progressing  Goal: Walks with improved pain control throughout the shift  Outcome: Progressing  Goal: Performs ADL's with improved pain control throughout shift  Outcome: Progressing  Goal: Participates in PT with improved pain control throughout the shift  Outcome: Progressing  Goal: Free from opioid side effects throughout the shift  Outcome: Progressing  Goal: Free from acute confusion related to pain meds throughout the shift  Outcome: Progressing     Problem: Skin  Goal: Decreased wound size/increased tissue granulation at next dressing change  Outcome: Progressing  Flowsheets (Taken 3/4/2025 0140)  Decreased wound size/increased tissue granulation at next dressing change: Protective dressings over bony prominences  Goal: Participates in plan/prevention/treatment measures  Outcome: Progressing  Flowsheets (Taken 3/4/2025 0140)  Participates in plan/prevention/treatment measures: Elevate heels  Goal: Prevent/manage excess moisture  Outcome: Progressing  Flowsheets (Taken 3/4/2025 0140)  Prevent/manage excess moisture: Cleanse incontinence/protect with barrier cream  Goal: Prevent/minimize sheer/friction injuries  Outcome: Progressing  Flowsheets (Taken 3/4/2025 0140)  Prevent/minimize  sheer/friction injuries:   Use pull sheet   Increase activity/out of bed for meals   Complete micro-shifts as needed if patient unable. Adjust patient position to relieve pressure points, not a full turn   HOB 30 degrees or less   Turn/reposition every 2 hours/use positioning/transfer devices  Goal: Promote/optimize nutrition  Outcome: Progressing  Flowsheets (Taken 3/4/2025 0140)  Promote/optimize nutrition:   Monitor/record intake including meals   Offer water/supplements/favorite foods  Goal: Promote skin healing  Outcome: Progressing  Flowsheets (Taken 3/4/2025 0140)  Promote skin healing:   Protective dressings over bony prominences   Turn/reposition every 2 hours/use positioning/transfer devices   Assess skin/pad under line(s)/device(s)

## 2025-03-04 NOTE — PROGRESS NOTES
Adriana Wood is a 69 y.o. female on day 4 of admission presenting with Status post total right knee replacement.      Subjective   Bradycardia noted with some softer BP although stable and asymptomatic. Patient continues to endorse long-standing hx with close follow up with Cardiology outpatient who confirms she is stable.         Objective     Last Recorded Vitals  /61 (BP Location: Right arm, Patient Position: Lying)   Pulse (!) 47   Temp 36 °C (96.8 °F) (Temporal)   Resp 16   Wt 68.9 kg (151 lb 14.4 oz) Comment: Weight needed to verify stat med, used 2/20/25  SpO2 94%   Intake/Output last 3 Shifts:    Intake/Output Summary (Last 24 hours) at 3/4/2025 0937  Last data filed at 3/4/2025 0841  Gross per 24 hour   Intake 600 ml   Output 850 ml   Net -250 ml       Admission Weight  Weight: 68.9 kg (151 lb 14.4 oz) (Weight needed to verify stat med, used 2/20/25) (02/28/25 1348)    Daily Weight  02/28/25 : 68.9 kg (151 lb 14.4 oz)    Image Results  ECG 12 lead  Sinus tachycardia with frequent Premature ventricular complexes in a pattern of bigeminy  Nonspecific T wave abnormality  Abnormal ECG  When compared with ECG of 01-MAR-2025 09:07,  Significant changes have occurred      Physical Exam    Constitutional:  no distress, alert and cooperative  Eyes: clear sclera  Head/Neck: No apparent injury, trachea midline  Respiratory/Thorax: Patent airways, thorax symmetric, breathing comfortably  Cardiovascular: no pitting edema, irregular rhythm  Gastrointestinal: Nondistended  Musculoskeletal: ROM intact  Extremities: no clubbing  Neurological: alert, campuzano x4  Psychological: Appropriate affect        Assessment/Plan      Adriana Wood is a 69 y.o. year old female patient with PMHx Cardiomyopathy, HTN, PVC, Cardiac Arrest (9/2021), Asthma, GERD, Hepatitis, Prosthetic joint infection who presents for RLE adrienne placement and Abx spacer placement. In PACU, Patient had PVCs and hypotension requiring treatment  "including short duration of pressors + albumin. On arrival seems to be more HD stable and off pressors. Patient stable for RNF with tele and continuous SpO2 monitoring. Primary differential in hypovolemia, patient was likely hypovolemic prior to surgery as well as had a 3 point drop in her Hb, although there was not a large EBL recorded.     Patient does follow with Cardiology-EP outpatient. Per their last note on 1/8/25:  \"Feb 2023: The origin of her PVC is in the outflow tract, the transition is somewhat later but so it is in the intrinsic transition, so it is difficult to tell if these are right or left outflow ( I do suspect left coronary cusp PVC). The clinical significance of these is what needs to be determined. She is not very symptomatic and has had PVC all her life. They have not resulted in lowering of her EF. From this stand point of view she can continue with the surgery without additional testing . I don't know if these have been a factor in initiation torsades back in October of 2021. There are no recordings and usually PVCs from the outflow are not malignant. The key would be to avoid anything that might prolong the QT. Once her orthopedic surgeries are completed she will try to address the PVCs. Obviously because of the prolonged QT one would like to avoid antiarrhythmics. We will reconvene again to discuss the PVCs after her surgeries. \"    3/4: noted some bradycardia overnight which is normal for her, patient remained asymptomatic.     Recs:  - Can give another 1L of NS today if continues to be hypotensive  - AVOID QT prolonging medications  - Daily RFP/Mg levels with repletion goals K>4, Mg>2  - Recommend telemetry and continuous SpO2    - Medicine will sign off at this time.    Patient seen and discussed with attending physician Dr. Chaparro.      Kashmir Roes DO PGY1  Department of Anesthesiology        "

## 2025-03-05 LAB
ATRIAL RATE: 101 BPM
P AXIS: 32 DEGREES
P OFFSET: 191 MS
P ONSET: 135 MS
PR INTERVAL: 180 MS
Q ONSET: 225 MS
QRS COUNT: 16 BEATS
QRS DURATION: 90 MS
QT INTERVAL: 442 MS
QTC CALCULATION(BAZETT): 573 MS
QTC FREDERICIA: 525 MS
R AXIS: 31 DEGREES
T AXIS: 35 DEGREES
T OFFSET: 446 MS
VENTRICULAR RATE: 101 BPM

## 2025-03-05 PROCEDURE — 2500000005 HC RX 250 GENERAL PHARMACY W/O HCPCS

## 2025-03-05 PROCEDURE — 1100000001 HC PRIVATE ROOM DAILY

## 2025-03-05 PROCEDURE — 2500000001 HC RX 250 WO HCPCS SELF ADMINISTERED DRUGS (ALT 637 FOR MEDICARE OP)

## 2025-03-05 PROCEDURE — 97530 THERAPEUTIC ACTIVITIES: CPT | Mod: GP

## 2025-03-05 PROCEDURE — 2500000004 HC RX 250 GENERAL PHARMACY W/ HCPCS (ALT 636 FOR OP/ED): Mod: JZ,TB

## 2025-03-05 PROCEDURE — 97112 NEUROMUSCULAR REEDUCATION: CPT | Mod: GP

## 2025-03-05 PROCEDURE — 2500000004 HC RX 250 GENERAL PHARMACY W/ HCPCS (ALT 636 FOR OP/ED)

## 2025-03-05 RX ADMIN — VANCOMYCIN HYDROCHLORIDE 1250 MG: 5 INJECTION, POWDER, LYOPHILIZED, FOR SOLUTION INTRAVENOUS at 09:16

## 2025-03-05 RX ADMIN — CYCLOBENZAPRINE 10 MG: 10 TABLET, FILM COATED ORAL at 17:52

## 2025-03-05 RX ADMIN — OXYCODONE HYDROCHLORIDE 10 MG: 10 TABLET ORAL at 06:09

## 2025-03-05 RX ADMIN — PANTOPRAZOLE SODIUM 40 MG: 40 TABLET, DELAYED RELEASE ORAL at 06:06

## 2025-03-05 RX ADMIN — OXYCODONE HYDROCHLORIDE 10 MG: 10 TABLET ORAL at 17:52

## 2025-03-05 RX ADMIN — HYDROMORPHONE HYDROCHLORIDE 0.5 MG: 1 INJECTION, SOLUTION INTRAMUSCULAR; INTRAVENOUS; SUBCUTANEOUS at 09:35

## 2025-03-05 RX ADMIN — ACETAMINOPHEN 650 MG: 325 TABLET ORAL at 23:47

## 2025-03-05 RX ADMIN — OXYCODONE 5 MG: 5 TABLET ORAL at 12:47

## 2025-03-05 RX ADMIN — ACETAMINOPHEN 650 MG: 325 TABLET ORAL at 17:52

## 2025-03-05 RX ADMIN — ACETAMINOPHEN 650 MG: 325 TABLET ORAL at 09:16

## 2025-03-05 RX ADMIN — APIXABAN 2.5 MG: 2.5 TABLET, FILM COATED ORAL at 20:19

## 2025-03-05 RX ADMIN — APIXABAN 2.5 MG: 2.5 TABLET, FILM COATED ORAL at 09:16

## 2025-03-05 RX ADMIN — ACETAMINOPHEN 650 MG: 325 TABLET ORAL at 01:45

## 2025-03-05 RX ADMIN — POLYETHYLENE GLYCOL 3350 17 G: 17 POWDER, FOR SOLUTION ORAL at 09:12

## 2025-03-05 RX ADMIN — OXYCODONE HYDROCHLORIDE 10 MG: 10 TABLET ORAL at 23:47

## 2025-03-05 ASSESSMENT — COGNITIVE AND FUNCTIONAL STATUS - GENERAL
STANDING UP FROM CHAIR USING ARMS: A LITTLE
MOBILITY SCORE: 16
TURNING FROM BACK TO SIDE WHILE IN FLAT BAD: A LITTLE
CLIMB 3 TO 5 STEPS WITH RAILING: TOTAL
WALKING IN HOSPITAL ROOM: A LITTLE
MOVING FROM LYING ON BACK TO SITTING ON SIDE OF FLAT BED WITH BEDRAILS: A LITTLE
MOVING TO AND FROM BED TO CHAIR: A LITTLE

## 2025-03-05 ASSESSMENT — PAIN - FUNCTIONAL ASSESSMENT
PAIN_FUNCTIONAL_ASSESSMENT: 0-10

## 2025-03-05 ASSESSMENT — PAIN SCALES - GENERAL
PAINLEVEL_OUTOF10: 6
PAINLEVEL_OUTOF10: 6
PAINLEVEL_OUTOF10: 9
PAINLEVEL_OUTOF10: 9
PAINLEVEL_OUTOF10: 4
PAINLEVEL_OUTOF10: 8
PAINLEVEL_OUTOF10: 9

## 2025-03-05 NOTE — PROGRESS NOTES
Care Transitions Progress Note:   Patient was accepted to [place] [facility] [place] [facility].   Patient will need 3 midnights before able to admit.  [facility] does not have beds available currently but will update as they come available.  [facility] will update the patient and get more choices.    TCC met with patient to update on possibility [facility] will not have bed available on Saturday.  Patient stated [facility] is her only choice.

## 2025-03-05 NOTE — PROGRESS NOTES
Physical Therapy    Physical Therapy Treatment    Patient Name: Adriana Wood  MRN: [id number]  Department: [place] [address]  Room: 80/8052-A  Today's Date: 3/5/2025  Time Calculation  Start Time: 0924  Stop Time: 1004  Time Calculation (min): 40 min         Assessment/Plan   PT Assessment  Evaluation/Treatment Tolerance: Patient limited by pain  Medical Staff Made Aware: Yes  End of Session Communication: Bedside nurse  Assessment Comment: Pt was able to ambulate from bed to chair today with walker and min-A.  Pt practiced R lateral and forward leans to help promote PWB ~50% on the RLE.  Pt continues to benefit from skilled PT.  End of Session Patient Position: Up in chair, On cart  PT Plan  Inpatient/Swing Bed or Outpatient: Inpatient  PT Plan  Treatment/Interventions: Bed mobility, Gait training, Transfer training, Balance training, Endurance training, Strengthening, Therapeutic exercise, Therapeutic activity, Home exercise program, Positioning  PT Plan: Ongoing PT  PT Frequency: 5 times per week  PT Discharge Recommendations: Moderate intensity level of continued care  PT Recommended Transfer Status: Assist x1  PT - OK to Discharge: Yes (eval complete, d/c recommendation made)      General Visit Information:   PT  Visit  PT Received On: 03/05/25  Response to Previous Treatment: Patient with no complaints from previous session.  General  Family/Caregiver Present: No  Prior to Session Communication: Bedside nurse  Patient Position Received: Bed, 3 rail up, Alarm off, not on at start of session  General Comment: Pt laying supine in bed upon arrival, agreeable to therapy today.  Knee immobilizer locked into extension and [place] splint donned prior to start of session.    Subjective   Precautions:  Precautions  LE Weight Bearing Status: Right Partial Weight Bearing (RLE PWB ~ 50%)  Medical Precautions: Fall precautions  Braces Applied: Right hinge knee brace locked in extension and [place] splint donned prior to PT  session.  Precautions Comment: [place] splint doffed for OOB activity       03/05/25 0924 03/05/25 0945 03/05/25 1004   Vital Signs   Vitals Session Pre PT During PT Post PT   Heart Rate 75 78 76   SpO2 93 %  --   --    /75 126/82 123/69   MAP (mmHg) 87 96 87   Patient Position Lying Sitting Sitting   Vital Signs Comment  --  Seated EOB Seated up in chair       Objective   Pain:  Pain Assessment  Pain Assessment: 0-10  0-10 (Numeric) Pain Score: 8  Pain Type: Surgical pain  Pain Location: Leg  Pain Orientation: Right  Cognition:  Cognition  Overall Cognitive Status: Within Functional Limits  Orientation Level: Oriented X4    Postural Control:  Postural Control  Postural Control: Within Functional Limits  Static Sitting Balance  Static Sitting-Balance Support: Feet supported  Static Sitting-Level of Assistance: Close supervision  Static Sitting-Comment/Number of Minutes: 5 mins total  Static Standing Balance  Static Standing-Balance Support: Bilateral upper extremity supported (Walker)  Static Standing-Level of Assistance: Minimum assistance  Static Standing-Comment/Number of Minutes: 2 mins  Dynamic Standing Balance  Dynamic Standing-Balance Support: Right upper extremity supported  Dynamic Standing-Level of Assistance: Minimum assistance  Dynamic Standing-Balance: Forward lean, Lateral lean (Onto RLE to help encourage PWB ~ 50%)  Dynamic Standing-Comments: x5 R lateral lean, x5 forward lean with walker and mod-A x1    Activity Tolerance:  Activity Tolerance  Endurance: Endurance does not limit participation in activity  Early Mobility/Exercise Safety Screen: Proceed with mobilization - No exclusion criteria met  Activity Tolerance Comments: Primarily pain limiting  Treatments:  Therapeutic Exercise  Therapeutic Exercise Performed: No    Therapeutic Activity  Therapeutic Activity Performed: Yes  Therapeutic Activity 1: Pt performed 2 STS trials, lateral and forward leans with walker and min-A, and ambulated ~  8 lateral steps from bed <> chair.  Therapeutic Activity 2: Increased time due to purewick change and cleanup, as well as encouraging pt mobility.    Balance/Neuromuscular Re-Education  Balance/Neuromuscular Re-Education Activity Performed: Yes  Balance/Neuromuscular Re-Education Activity 1: Pt practiced R lateral and forward leans with walker and min-A to promote PWB ~50% due to inability to weightbear through heel and enforce dynamic standing balance onto RLE.    Bed Mobility  Bed Mobility: Yes  Bed Mobility 1  Bed Mobility 1: Supine to sitting  Level of Assistance 1: Contact guard  Bed Mobility Comments 1: HOB elevated    Ambulation/Gait Training  Ambulation/Gait Training Performed: Yes  Ambulation/Gait Training 1  Surface 1: Level tile  Device 1: Rolling walker  Gait Support Devices: Knee immobilizer (RLE)  Assistance 1: Minimum assistance  Quality of Gait 1: Narrow base of support, Forward flexed posture, Antalgic  Comments/Distance (ft) 1: ~ 8 lateral steps from bed to chair  Transfers  Transfer: Yes  Transfer 1  Transfer From 1: Sit to, Stand to  Transfer to 1: Stand, Sit  Technique 1: Sit to stand, Stand to sit  Transfer Device 1: Walker  Transfer Level of Assistance 1: Minimum assistance  Trials/Comments 1: Bed elevated, pt more hesitant today due to RLE pain    Stairs  Stairs: No    Outcome Measures:  [facility] Basic Mobility  Turning from your back to your side while in a flat bed without using bedrails: A little  Moving from lying on your back to sitting on the side of a flat bed without using bedrails: A little  Moving to and from bed to chair (including a wheelchair): A little  Standing up from a chair using your arms (e.g. wheelchair or bedside chair): A little  To walk in hospital room: A little  Climbing 3-5 steps with railing: Total  Basic Mobility - Total Score: 16    Education Documentation  Precautions, taught by DON BuiPT at 3/5/2025  3:15 PM.  Learner: Patient  Readiness:  Acceptance  Method: Explanation  Response: Verbalizes Understanding  Comment: PT POC, WB precautions    Body Mechanics, taught by DON HilliardPT at 3/5/2025  3:15 PM.  Learner: Patient  Readiness: Acceptance  Method: Explanation  Response: Verbalizes Understanding  Comment: PT POC, WB precautions    Mobility Training, taught by DON HilliardPT at 3/5/2025  3:15 PM.  Learner: Patient  Readiness: Acceptance  Method: Explanation  Response: Verbalizes Understanding  Comment: PT POC, WB precautions    Education Comments  No comments found.        OP EDUCATION:       Encounter Problems       Encounter Problems (Active)       PT Problem       Pt will perform supine<>Sit mod I (Progressing)       Start:  03/01/25    Expected End:  03/15/25            Pt will complete dynamic reaching, outside TANMAY, in sitting x10 reps without LOB (Progressing)       Start:  03/01/25    Expected End:  03/15/25            Pt will perform sit<>stand with FWW and CGA (Progressing)       Start:  03/01/25    Expected End:  03/15/25            Pt will perform bed<>chair transfer with supervision (Progressing)       Start:  03/01/25    Expected End:  03/15/25               Pain - Adult            JEFF HILLIARD

## 2025-03-05 NOTE — CARE PLAN
Problem: Pain - Adult  Goal: Verbalizes/displays adequate comfort level or baseline comfort level  Outcome: Progressing     Problem: Safety - Adult  Goal: Free from fall injury  Outcome: Progressing     Problem: Discharge Planning  Goal: Discharge to home or other facility with appropriate resources  Outcome: Progressing     Problem: Nutrition  Goal: Nutrient intake appropriate for maintaining nutritional needs  Outcome: Progressing     Problem: Pain  Goal: Takes deep breaths with improved pain control throughout the shift  Outcome: Progressing  Goal: Turns in bed with improved pain control throughout the shift  Outcome: Progressing     Problem: Skin  Goal: Promote/optimize nutrition  Outcome: Progressing  Goal: Promote skin healing  Outcome: Progressing     Problem: Fall/Injury  Goal: Not fall by end of shift  Outcome: Progressing  Goal: Be free from injury by end of the shift  Outcome: Progressing   The patient's goals for the shift include pain management     The clinical goals for the shift include pain management

## 2025-03-05 NOTE — PROGRESS NOTES
Orthopedic Surgery Note    S:  69F s/p right knee irrigation&debridement, revision static spacer w Dr. Whelan 2/28/25.     Resting comfortably. NAEON. Pain controlled. Denies CP/SOB. On LT8 tele. Got her picc. Ready for discharge.      O:  /59   Pulse 59   Temp 36.5 °C (97.7 °F)   Resp 16   Wt 68.9 kg (151 lb 14.4 oz) Comment: Weight needed to verify stat med, used 2/20/25  SpO2 97%   BMI 21.79 kg/m²      NAD. Peripherally WWP. Breathing comfortably. No obvious focal deficits.     MSK injured extremity/RLE:  - Dressing DCI  - Prevena holding suction  - Palpable pulses, toes WWP  - Wiggles toes, SILT    Labs: reviewed.     Imaging: reviewed.     A&P:  69F s/p right knee irrigation&debridement, revision static spacer w Dr. Whelan 2/28/25.      Plan:   - Diet: Regular  - Vanc pharmacy to dose; pending cx & ID recs  -- ID recommends PICC with 6 weeks vanc  -- PICC placed  - Cx NGTD  - IVF pending PO  - MM pain regimen  - PRN antiemetic. NO ZOFRAN.   - DVT Proph: SCDs/ ambulate/ eliquis 2.5bid+ PPI ppx  - Bowel regimen  - Neurovascular checks every 4 hours and as needed  - Ice affected extremity for 20 mins every hour as needed  - Prevena 14 day  - Rosenbaum removed POD1  - Maintain ace wrap/dressing. Reinforce as needed.  - PWB 50% RLE  - PT/OT c/s  - Monitor VS every 4 hours   - Labs ordered for AM   - IS every hour while awake   - Encourage ambulation / OOB as tolerated   - Orthotics consult for [place] splint while in bed (done)    Medicine consult:  -Daily RFP/Mg w lyte repletion  -EKG baseline ordered  -Tele w continuous pulse ox  --3/2: discussed overnight soft pressures w medicine. Seems most likely to be due to I&O -3K rather than pain regimen. Tentative plan for bolus PRN but waiting on formal eval/recs.   --3/3: planning to discuss bradycardia. Though given this is her baseline and assymptomatic, do not plan on any intervention.      Dispo: SNF     D/w attending.     Reviewed and approved by  AMADEO HAN on 3/5/25 at 7:07 AM.      This patient will be followed by the Orthopaedic Trauma service. Please page or Epic Chat the corresponding residents below with questions or concerns.      Ortho Trauma Service (Epic Chat Preferred)  First call: Martin Gasca  Second call: Malvin Barrett  Third call: Amadeo Han     6pm-6am M-F, Holidays, and weekends page Ortho on-call @[phone] with urgent questions/concerns.

## 2025-03-06 LAB
ALBUMIN SERPL BCP-MCNC: 3.9 G/DL (ref 3.4–5)
ANION GAP SERPL CALC-SCNC: 14 MMOL/L (ref 10–20)
ATRIAL RATE: 59 BPM
BUN SERPL-MCNC: 15 MG/DL (ref 6–23)
CALCIUM SERPL-MCNC: 9.4 MG/DL (ref 8.6–10.6)
CHLORIDE SERPL-SCNC: 102 MMOL/L (ref 98–107)
CO2 SERPL-SCNC: 27 MMOL/L (ref 21–32)
CREAT SERPL-MCNC: 0.54 MG/DL (ref 0.5–1.05)
EGFRCR SERPLBLD CKD-EPI 2021: >90 ML/MIN/1.73M*2
GLUCOSE SERPL-MCNC: 147 MG/DL (ref 74–99)
P AXIS: 63 DEGREES
P OFFSET: 162 MS
P ONSET: 101 MS
PHOSPHATE SERPL-MCNC: 4.1 MG/DL (ref 2.5–4.9)
POTASSIUM SERPL-SCNC: 3.9 MMOL/L (ref 3.5–5.3)
PR INTERVAL: 218 MS
Q ONSET: 210 MS
QRS COUNT: 9 BEATS
QRS DURATION: 88 MS
QT INTERVAL: 498 MS
QTC CALCULATION(BAZETT): 493 MS
QTC FREDERICIA: 495 MS
R AXIS: 46 DEGREES
SODIUM SERPL-SCNC: 139 MMOL/L (ref 136–145)
STAPHYLOCOCCUS SPEC CULT: NORMAL
T AXIS: 74 DEGREES
T OFFSET: 459 MS
VANCOMYCIN SERPL-MCNC: 7.4 UG/ML (ref 5–20)
VENTRICULAR RATE: 59 BPM

## 2025-03-06 PROCEDURE — 99232 SBSQ HOSP IP/OBS MODERATE 35: CPT | Performed by: PHYSICIAN ASSISTANT

## 2025-03-06 PROCEDURE — 2500000001 HC RX 250 WO HCPCS SELF ADMINISTERED DRUGS (ALT 637 FOR MEDICARE OP)

## 2025-03-06 PROCEDURE — 1100000001 HC PRIVATE ROOM DAILY

## 2025-03-06 PROCEDURE — 2500000004 HC RX 250 GENERAL PHARMACY W/ HCPCS (ALT 636 FOR OP/ED)

## 2025-03-06 PROCEDURE — 80069 RENAL FUNCTION PANEL: CPT

## 2025-03-06 PROCEDURE — 80202 ASSAY OF VANCOMYCIN: CPT

## 2025-03-06 RX ORDER — VANCOMYCIN HYDROCHLORIDE 750 MG/150ML
750 INJECTION, SOLUTION INTRAVENOUS EVERY 12 HOURS
Status: DISCONTINUED | OUTPATIENT
Start: 2025-03-06 | End: 2025-03-08 | Stop reason: HOSPADM

## 2025-03-06 RX ADMIN — ACETAMINOPHEN 650 MG: 325 TABLET ORAL at 13:28

## 2025-03-06 RX ADMIN — OXYCODONE HYDROCHLORIDE 10 MG: 10 TABLET ORAL at 23:59

## 2025-03-06 RX ADMIN — CYCLOBENZAPRINE 10 MG: 10 TABLET, FILM COATED ORAL at 17:44

## 2025-03-06 RX ADMIN — CYCLOBENZAPRINE 10 MG: 10 TABLET, FILM COATED ORAL at 13:28

## 2025-03-06 RX ADMIN — PANTOPRAZOLE SODIUM 40 MG: 40 TABLET, DELAYED RELEASE ORAL at 06:02

## 2025-03-06 RX ADMIN — APIXABAN 2.5 MG: 2.5 TABLET, FILM COATED ORAL at 20:48

## 2025-03-06 RX ADMIN — VANCOMYCIN HYDROCHLORIDE 750 MG: 750 INJECTION, SOLUTION INTRAVENOUS at 13:31

## 2025-03-06 RX ADMIN — POLYETHYLENE GLYCOL 3350 17 G: 17 POWDER, FOR SOLUTION ORAL at 08:20

## 2025-03-06 RX ADMIN — ACETAMINOPHEN 650 MG: 325 TABLET ORAL at 17:44

## 2025-03-06 RX ADMIN — OXYCODONE HYDROCHLORIDE 10 MG: 10 TABLET ORAL at 06:04

## 2025-03-06 RX ADMIN — ACETAMINOPHEN 650 MG: 325 TABLET ORAL at 06:02

## 2025-03-06 RX ADMIN — ACETAMINOPHEN 650 MG: 325 TABLET ORAL at 23:59

## 2025-03-06 RX ADMIN — OXYCODONE HYDROCHLORIDE 10 MG: 10 TABLET ORAL at 17:44

## 2025-03-06 RX ADMIN — OXYCODONE HYDROCHLORIDE 10 MG: 10 TABLET ORAL at 13:28

## 2025-03-06 RX ADMIN — APIXABAN 2.5 MG: 2.5 TABLET, FILM COATED ORAL at 08:20

## 2025-03-06 ASSESSMENT — PAIN SCALES - GENERAL
PAINLEVEL_OUTOF10: 9
PAINLEVEL_OUTOF10: 6

## 2025-03-06 ASSESSMENT — PAIN - FUNCTIONAL ASSESSMENT: PAIN_FUNCTIONAL_ASSESSMENT: 0-10

## 2025-03-06 NOTE — PROGRESS NOTES
Vancomycin Dosing by Pharmacy- FOLLOW UP    Adriana Wood is a 69 y.o. year old female who Pharmacy has been consulted for vancomycin dosing for osteomyelitis/septic arthritis. Based on the patient's indication and renal status this patient is being dosed based on a goal AUC of 400-600.     Renal function is currently stable.    Current vancomycin dose: 1250 mg given every 24 hours    Estimated vancomycin AUC on current dose: 380 mg/L.hr     Visit Vitals  BP 98/62 (BP Location: Left arm, Patient Position: Lying)   Pulse 69   Temp 36.1 °C (97 °F) (Temporal)   Resp 17        Lab Results   Component Value Date    CREATININE 0.54 2025    CREATININE 0.59 2025    CREATININE 0.52 2025    CREATININE 0.47 (L) 2025        Patient weight is as follows:   Vitals:    25 1348   Weight: 68.9 kg (151 lb 14.4 oz)       Cultures:  No results found for the encounter in last 14 days.       I/O last 3 completed shifts:  In: - (0 mL/kg)   Out: 2750 (39.9 mL/kg) [Urine:2750 (1.1 mL/kg/hr)]  Weight: 68.9 kg   I/O during current shift:  No intake/output data recorded.    Temp (24hrs), Av.3 °C (97.3 °F), Min:36.1 °C (96.9 °F), Max:36.6 °C (97.8 °F)      Assessment/Plan    Below goal AUC. Orders placed for new vancomcyin regimen of 750 every 12 hours to begin at 1200 on 3/6.     This dosing regimen is predicted by [employer] to result in the following pharmacokinetic parameters:  Regimen: 1250 mg IV every 24 hours.  Start time: 10:59 on 2025  Exposure target: AUC24 (range)400-600 mg/L.hr   [id number]: 386 mg/L.hr  AUC24,ss: 385 mg/L.hr  Probability of AUC24 > 400: 34 %  Ctrough,ss: 6.8 mg/L  Probability of Ctrough,ss > 20: 0 %    The next level will be obtained on 3/7 at 2000. May be obtained sooner if clinically indicated.   Will continue to monitor renal function daily while on vancomycin and order serum creatinine at least every 48 hours if not already ordered.  Follow for continued vancomycin  needs, clinical response, and signs/symptoms of toxicity.       Frank Torres, PharmD

## 2025-03-06 NOTE — PROGRESS NOTES
Orthopedic Surgery Note    S:  69F s/p right knee irrigation&debridement, revision static spacer w Dr. Whelan 2/28/25.     Resting comfortably. NAEON. Pain controlled. Denies CP/SOB.      O:  /71 (BP Location: Left arm, Patient Position: Lying)   Pulse 61   Temp 36.3 °C (97.3 °F) (Temporal)   Resp 17   Wt 68.9 kg (151 lb 14.4 oz) Comment: Weight needed to verify stat med, used 2/20/25  SpO2 91%   BMI 21.79 kg/m²      NAD. Peripherally WWP. Breathing comfortably. No obvious focal deficits.     MSK injured extremity/RLE:  - Dressing DCI  - Prevena holding suction  - Palpable pulses, toes WWP  - Wiggles toes, SILT    Labs: reviewed.     Imaging: reviewed.     A&P:  69F s/p right knee irrigation&debridement, revision static spacer w Dr. Whelan 2/28/25.      Plan:   - Diet: Regular  - Vanc pharmacy to dose; pending cx & ID recs  -- ID recommends PICC with 6 weeks vanc  -- PICC placed  - Cx NGTD  - IVF pending PO  - MM pain regimen  - PRN antiemetic. NO ZOFRAN.   - DVT Proph: SCDs/ ambulate/ eliquis 2.5bid+ PPI ppx  - Bowel regimen  - Neurovascular checks every 4 hours and as needed  - Ice affected extremity for 20 mins every hour as needed  - Prevena 14 day  - Rosenbaum removed POD1  - Maintain ace wrap/dressing. Reinforce as needed.  - PWB 50% RLE  - PT/OT c/s  - Monitor VS every 4 hours   - Labs ordered for AM   - IS every hour while awake   - Encourage ambulation / OOB as tolerated   - Orthotics consult for seattle splint while in bed (done)    Medicine consult:  -Daily RFP/Mg w lyte repletion  -EKG baseline ordered  -Tele w continuous pulse ox  --3/2: discussed overnight soft pressures w medicine. Seems most likely to be due to I&O -3K rather than pain regimen. Tentative plan for bolus PRN but waiting on formal eval/recs.   --3/3: planning to discuss bradycardia. Though given this is her baseline and assymptomatic, do not plan on any intervention.      Dispo: SNF     D/w attending.     Reviewed and approved  by AMADEO HAN on 3/6/25 at 5:37 AM.      This patient will be followed by the Orthopaedic Trauma service. Please page or Epic Chat the corresponding residents below with questions or concerns.      Ortho Trauma Service (Epic Chat Preferred)  First call: Martin Gasca  Second call: Malvin Barrett  Third call: Amadeo Han     6pm-6am M-F, Holidays, and weekends page Ortho on-call @[phone] with urgent questions/concerns.

## 2025-03-06 NOTE — PROGRESS NOTES
Physical Therapy                 Therapy Communication Note    Patient Name: Adraina Wood  MRN: [id number]  Department: [place] [address]  Room: 8052/8052-A  Today's Date: 3/6/2025     Discipline: Physical Therapy    PT Missed Visit: Yes     Missed Visit Reason: Missed Visit Reason: Patient refused (pt stated that she wasn't feeling well. she promised she would do therapy tomorrow. Will reattempt as schedule permits.)    Missed Time: Attempt    Comment:

## 2025-03-06 NOTE — PROGRESS NOTES
Division of Plastic and Reconstructive Surgery  Progress Note    Patient Name: Adriana Wood  MRN: [id number]  Date:  03/06/25     Subjective    Denies acute concerns, RLE pain controlled when at rest, increases some with movement. She was able to be OOB to the chair yesterday. Updated on plans for RTOR with plastic surgery tentatively on 3/14 for RLE wound coverage via free flap.     Objective    Vital Signs  BP 98/62 (BP Location: Left arm, Patient Position: Lying)   Pulse 69   Temp 36.1 °C (97 °F) (Temporal)   Resp 17   Wt 68.9 kg (151 lb 14.4 oz) Comment: Weight needed to verify stat med, used 2/20/25  SpO2 93%   BMI 21.79 kg/m²      Physical Exam  Constitutional: A&Ox3, calm and cooperative, NAD.  Eyes: EOMI, clear sclera.  ENMT: Moist mucous membranes, no apparent injuries or lesions.  Head/Neck: NC/AT. Neck supple.   Cardiovascular: Bradycardic rate.  Respiratory/Thorax: Breathing comfortably with regular respirations on RA. Good symmetric chest expansion.   Gastrointestinal: Abdomen soft, non-tender.   Extremities: Prevena incisional wound vac intact at R pre tibial region, maintaining low continuous suction at -125mmHg, no alarms for leak, obstruction or malfunction. RLE covered with ACE wrap dressing which is without evidence of strikethrough drainage or blood. RLE positioning in posterior leg [place] resting splint. Able to wiggle toes. SILT at distal aspect of RLE.   Neurological: A&Ox3.   Psychological: Appropriate mood and behavior.   Skin: Warm and dry.     Current Medications  Scheduled medications  acetaminophen, 650 mg, oral, q6h FELIX  apixaban, 2.5 mg, oral, BID  lidocaine, 5 mL, infiltration, Once  pantoprazole, 40 mg, oral, Daily before breakfast  polyethylene glycol, 17 g, oral, Daily  sennosides-docusate sodium, 2 tablet, oral, BID  vancomycin, 1,250 mg, intravenous, q24h      Continuous medications  phenylephrine, 0-2 mcg/kg/min      PRN medications  PRN medications: albuterol,  alteplase, bisacodyl, cyclobenzaprine, diphenhydrAMINE **OR** diphenhydrAMINE **OR** diphenhydrAMINE, HYDROmorphone, magnesium hydroxide, naloxone, oral hydration, oxyCODONE, oxyCODONE, oxyCODONE, [Held by provider] prochlorperazine **OR** [Held by provider] prochlorperazine **OR** [Held by provider] prochlorperazine, vancomycin     Assessment   Adriana Wood is a 69 y.o. female with PMH of bradycardia, cardiomyopathy, CAD, cardiac arrest due to electrolyte abnormalities and zofran use post-operatively, and traumatic brain injury. Patient had a total knee right knee done by Dr. Whelan in March 2024 which was complicated by E. Coli infection. Patient had right TKA wound dehiscence s/p I&D right knee with polyswap and attempted re-closure of complex wound on 5/10/2024 by Dr. Whelan. She was referred to plastic surgery (Dr. Reyes) perioperatively given projected anticipated need for possible flap coverage of the wound/defect site. Patient re-admitted and returned to the OR with orthopedics on 2/28/2025 for right knee I&D, revision static spacer and application of incisional wound vac. Plastic surgery service consulted postoperatively to follow for flap recommendations/timing.      Plan/Recommendations  - Anticipate tentative plan for return to OR with plastic surgery for RLE/R knee free flap coverage on Friday 3/14 pending finalized OR scheduling  - May return for surgery if planned for DC to SNF in the interim  - Patient will need to transition from home PO Eliquis to Heparin 2-3 days prior to OR   - Maintain prevena incisional wound vac overlying RLE surgical incision per orthopedic surgery team in the interim   - Continue abx per primary/ID recs   - Follow up 2/28 intraoperative cx results: NGTD (final 3/2)  - WB per orthopedic surgery recs   - Plastic Surgery will continue to follow peripherally while in house pending OR involvement      Patient and plan discussed with Dr. Reyes.    Eileen Escalera,  CHEPE  Plastic and Reconstructive Surgery   [place]  Pager #[phone]  Team phones: [phone]

## 2025-03-07 DIAGNOSIS — T84.50XD PROSTHETIC JOINT INFECTION, SUBSEQUENT ENCOUNTER: ICD-10-CM

## 2025-03-07 LAB — VANCOMYCIN SERPL-MCNC: 13.3 UG/ML (ref 5–20)

## 2025-03-07 PROCEDURE — 2500000001 HC RX 250 WO HCPCS SELF ADMINISTERED DRUGS (ALT 637 FOR MEDICARE OP)

## 2025-03-07 PROCEDURE — 1100000001 HC PRIVATE ROOM DAILY

## 2025-03-07 PROCEDURE — 2500000004 HC RX 250 GENERAL PHARMACY W/ HCPCS (ALT 636 FOR OP/ED)

## 2025-03-07 PROCEDURE — 80202 ASSAY OF VANCOMYCIN: CPT

## 2025-03-07 PROCEDURE — 97530 THERAPEUTIC ACTIVITIES: CPT | Mod: GP

## 2025-03-07 RX ADMIN — APIXABAN 2.5 MG: 2.5 TABLET, FILM COATED ORAL at 09:32

## 2025-03-07 RX ADMIN — OXYCODONE HYDROCHLORIDE 10 MG: 10 TABLET ORAL at 15:23

## 2025-03-07 RX ADMIN — CYCLOBENZAPRINE 10 MG: 10 TABLET, FILM COATED ORAL at 10:41

## 2025-03-07 RX ADMIN — OXYCODONE HYDROCHLORIDE 10 MG: 10 TABLET ORAL at 10:40

## 2025-03-07 RX ADMIN — CYCLOBENZAPRINE 10 MG: 10 TABLET, FILM COATED ORAL at 21:41

## 2025-03-07 RX ADMIN — SENNOSIDES AND DOCUSATE SODIUM 2 TABLET: 50; 8.6 TABLET ORAL at 09:32

## 2025-03-07 RX ADMIN — APIXABAN 2.5 MG: 2.5 TABLET, FILM COATED ORAL at 20:33

## 2025-03-07 RX ADMIN — POLYETHYLENE GLYCOL 3350 17 G: 17 POWDER, FOR SOLUTION ORAL at 09:32

## 2025-03-07 RX ADMIN — OXYCODONE HYDROCHLORIDE 10 MG: 10 TABLET ORAL at 20:32

## 2025-03-07 RX ADMIN — VANCOMYCIN HYDROCHLORIDE 750 MG: 750 INJECTION, SOLUTION INTRAVENOUS at 00:04

## 2025-03-07 RX ADMIN — ACETAMINOPHEN 650 MG: 325 TABLET ORAL at 13:14

## 2025-03-07 RX ADMIN — PANTOPRAZOLE SODIUM 40 MG: 40 TABLET, DELAYED RELEASE ORAL at 06:24

## 2025-03-07 RX ADMIN — CYCLOBENZAPRINE 10 MG: 10 TABLET, FILM COATED ORAL at 15:24

## 2025-03-07 RX ADMIN — VANCOMYCIN HYDROCHLORIDE 750 MG: 750 INJECTION, SOLUTION INTRAVENOUS at 13:14

## 2025-03-07 RX ADMIN — VANCOMYCIN HYDROCHLORIDE 750 MG: 750 INJECTION, SOLUTION INTRAVENOUS at 23:53

## 2025-03-07 ASSESSMENT — PAIN DESCRIPTION - DESCRIPTORS: DESCRIPTORS: ACHING

## 2025-03-07 ASSESSMENT — PAIN SCALES - GENERAL
PAINLEVEL_OUTOF10: 3
PAINLEVEL_OUTOF10: 8
PAINLEVEL_OUTOF10: 9
PAINLEVEL_OUTOF10: 6
PAINLEVEL_OUTOF10: 9
PAINLEVEL_OUTOF10: 8

## 2025-03-07 ASSESSMENT — PAIN - FUNCTIONAL ASSESSMENT
PAIN_FUNCTIONAL_ASSESSMENT: 0-10

## 2025-03-07 ASSESSMENT — COGNITIVE AND FUNCTIONAL STATUS - GENERAL
CLIMB 3 TO 5 STEPS WITH RAILING: TOTAL
TURNING FROM BACK TO SIDE WHILE IN FLAT BAD: A LITTLE
WALKING IN HOSPITAL ROOM: A LITTLE
MOVING TO AND FROM BED TO CHAIR: A LITTLE
MOBILITY SCORE: 16
MOVING FROM LYING ON BACK TO SITTING ON SIDE OF FLAT BED WITH BEDRAILS: A LITTLE
STANDING UP FROM CHAIR USING ARMS: A LITTLE

## 2025-03-07 ASSESSMENT — PAIN DESCRIPTION - LOCATION: LOCATION: KNEE

## 2025-03-07 NOTE — PROGRESS NOTES
Orthopedic Surgery Note    S:  69F s/p right knee irrigation&debridement, revision static spacer w Dr. Whelan 2/28/25.     Resting comfortably. NAEON. Pain controlled. Denies CP/SOB.      O:  /85 (BP Location: Left arm, Patient Position: Lying)   Pulse (!) 43   Temp 36 °C (96.8 °F) (Temporal)   Resp 17   Wt 68.9 kg (151 lb 14.4 oz) Comment: Weight needed to verify stat med, used 2/20/25  SpO2 93%   BMI 21.79 kg/m²      NAD. Peripherally WWP. Breathing comfortably. No obvious focal deficits.     MSK injured extremity/RLE:  - Dressing DCI  - Prevena holding suction  - Palpable pulses, toes WWP  - Wiggles toes, SILT    Labs: reviewed.     Imaging: reviewed.     A&P:  69F s/p right knee irrigation&debridement, revision static spacer w Dr. Whelan 2/28/25.      Plan:   - Diet: Regular  - Vanc pharmacy to dose; pending cx & ID recs  -- ID recommends PICC with 6 weeks vanc  -- PICC placed  - Cx NGTD  - IVF pending PO  - MM pain regimen  - PRN antiemetic. NO ZOFRAN.   - DVT Proph: SCDs/ ambulate/ eliquis 2.5bid+ PPI ppx  - Bowel regimen  - Neurovascular checks every 4 hours and as needed  - Ice affected extremity for 20 mins every hour as needed  - Prevena 14 day  - Rosenbaum removed POD1  - Maintain ace wrap/dressing. Reinforce as needed.  - PWB 50% RLE  - PT/OT c/s  - Monitor VS every 4 hours   - Labs ordered for AM   - IS every hour while awake   - Encourage ambulation / OOB as tolerated   - Orthotics consult for [place] splint while in bed (done)    Medicine consult:  -Daily RFP/Mg w lyte repletion  -EKG baseline ordered  -Tele w continuous pulse ox  --3/2: discussed overnight soft pressures w medicine. Seems most likely to be due to I&O -3K rather than pain regimen. Tentative plan for bolus PRN but waiting on formal eval/recs.   --3/3: planning to discuss bradycardia. Though given this is her baseline and assymptomatic, do not plan on any intervention.      Dispo: SNF     D/w attending.     Reviewed and  approved by AMADEO HAN on 3/7/25 at 6:25 AM.      This patient will be followed by the Orthopaedic Trauma service. Please page or Epic Chat the corresponding residents below with questions or concerns.      Ortho Trauma Service (Epic Chat Preferred)  First call: Martin Gasca  Second call: Malvin Barrett  Third call: Amadeo Han     6pm-6am M-F, Holidays, and weekends page Ortho on-call @[phone] with urgent questions/concerns.

## 2025-03-07 NOTE — PROGRESS NOTES
Physical Therapy    Physical Therapy Treatment    Patient Name: Adriana Wood  MRN: [id number]  Department: [place] [address]  Room: [facility]8052-A  Today's Date: 3/7/2025  Time Calculation  Start Time: 1118  Stop Time: 1149  Time Calculation (min): 31 min         Assessment/Plan   PT Assessment  Evaluation/Treatment Tolerance: Patient limited by pain  Medical Staff Made Aware: Yes  End of Session Communication: Bedside nurse  Assessment Comment: Pt progressed to CGA-SBA throughout treatment today.  Pt was able to tolerate 50% PWB onto RLE with verbal cues and walker.  Pt performed 3 STS trials with increased time in static standing for PT assist with pericare.  End of Session Patient Position: Up in chair, Alarm on  PT Plan  Inpatient/Swing Bed or Outpatient: Inpatient  PT Plan  Treatment/Interventions: Bed mobility, Gait training, Transfer training, Balance training, Endurance training, Strengthening, Therapeutic exercise, Therapeutic activity, Home exercise program, Positioning  PT Plan: Ongoing PT  PT Frequency: 5 times per week  PT Discharge Recommendations: Moderate intensity level of continued care  PT Recommended Transfer Status: Assist x1  PT - OK to Discharge: Yes (eval complete, d/c recommendation made)      General Visit Information:   PT  Visit  PT Received On: 03/07/25  Response to Previous Treatment: Patient with no complaints from previous session.  General  Family/Caregiver Present: No  Prior to Session Communication: Bedside nurse  Patient Position Received: Bed, 3 rail up, Alarm off, not on at start of session  General Comment: Pt laying supine upon arrival, willing to participate in therapy today.    Subjective   Precautions:  Precautions  LE Weight Bearing Status: Right Partial Weight Bearing (RLE PWB (~50%))  Medical Precautions: Fall precautions  Braces Applied: Right hinge knee brace locked in extension and [place] splint donned prior to PT session.  Precautions Comment: [place] splint doffed for OOB  activity     Date/Time Vitals Session Patient Position Pulse Resp SpO2 BP MAP (mmHg)    03/07/25 1100 --  --  110  18  94 %  106/78  --     03/07/25 1118 Pre PT  Lying  93  --  94 %  100/68  --     03/07/25 1149 Post PT  Sitting  120  --  --  116/85  --           Vital Signs Comment: Seated up in chair     Objective   Pain:  Pain Assessment  Pain Assessment: 0-10  0-10 (Numeric) Pain Score: 8  Pain Type: Surgical pain  Pain Location: Leg  Pain Orientation: Right  Cognition:  Cognition  Overall Cognitive Status: Within Functional Limits  Orientation Level: Oriented X4  Coordination:  Movements are Fluid and Coordinated: Yes  Postural Control:  Postural Control  Postural Control: Within Functional Limits  Static Sitting Balance  Static Sitting-Balance Support: Feet supported  Static Sitting-Level of Assistance: Close supervision  Static Sitting-Comment/Number of Minutes: 5 mins total between STS trials  Static Standing Balance  Static Standing-Balance Support: Bilateral upper extremity supported (WW)  Static Standing-Level of Assistance: Contact guard  Static Standing-Comment/Number of Minutes: 5 mins total between STS trials    Activity Tolerance:  Activity Tolerance  Endurance: Endurance does not limit participation in activity  Early Mobility/Exercise Safety Screen: Proceed with mobilization - No exclusion criteria met  Activity Tolerance Comments: Pt primarily pain limiting  Treatments:  Therapeutic Exercise  Therapeutic Exercise Performed: No    Therapeutic Activity  Therapeutic Activity Performed: Yes  Therapeutic Activity 1: Pt performed 3 STS  trials and ambulated ~ 8 lateral steps from bed to chair.  Increased time due to pt needing bed pan and pericare management.    Balance/Neuromuscular Re-Education  Balance/Neuromuscular Re-Education Activity Performed: Yes  Balance/Neuromuscular Re-Education Activity 1: Pt maintained static standing balance ~ 5 mins total between STS trials with PT assist for  tamy.    Bed Mobility  Bed Mobility: Yes  Bed Mobility 1  Bed Mobility 1: Supine to sitting  Level of Assistance 1: Close supervision  Bed Mobility Comments 1: HOB elevated  Bed Mobility 3  Bed Mobility 3: Scooting  Level of Assistance 3: Close supervision    Ambulation/Gait Training  Ambulation/Gait Training Performed: Yes  Ambulation/Gait Training 1  Surface 1: Level tile  Device 1: Rolling walker  Gait Support Devices: Knee immobilizer (RLE)  Assistance 1: Contact guard  Quality of Gait 1: Narrow base of support, Forward flexed posture, Antalgic  Comments/Distance (ft) 1: ~ 8 lateral steps from bed to chair, pt progressed with PWB 50% onto RLE.  Transfers  Transfer: Yes  Transfer 1  Transfer From 1: Sit to, Stand to  Transfer to 1: Stand, Sit  Technique 1: Sit to stand, Stand to sit  Transfer Device 1: Walker  Transfer Level of Assistance 1: Contact guard  Trials/Comments 1: Bed elevated    Stairs  Stairs: No    Outcome Measures:  [facility] Basic Mobility  Turning from your back to your side while in a flat bed without using bedrails: A little  Moving from lying on your back to sitting on the side of a flat bed without using bedrails: A little  Moving to and from bed to chair (including a wheelchair): A little  Standing up from a chair using your arms (e.g. wheelchair or bedside chair): A little  To walk in hospital room: A little  Climbing 3-5 steps with railing: Total  Basic Mobility - Total Score: 16    Education Documentation  Precautions, taught by JEFF Bui at 3/7/2025 12:41 PM.  Learner: Patient  Readiness: Acceptance  Method: Explanation  Response: Verbalizes Understanding  Comment: PT POC    Body Mechanics, taught by JEFF Bui at 3/7/2025 12:41 PM.  Learner: Patient  Readiness: Acceptance  Method: Explanation  Response: Verbalizes Understanding  Comment: PT POC    Mobility Training, taught by JEFF Bui at 3/7/2025 12:41 PM.  Learner: Patient  Readiness:  Acceptance  Method: Explanation  Response: Verbalizes Understanding  Comment: PT POC    Education Comments  No comments found.        OP EDUCATION:       Encounter Problems       Encounter Problems (Active)       PT Problem       Pt will perform supine<>Sit mod I (Progressing)       Start:  03/01/25    Expected End:  03/15/25            Pt will complete dynamic reaching, outside TANMAY, in sitting x10 reps without LOB (Progressing)       Start:  03/01/25    Expected End:  03/15/25            Pt will perform sit<>stand with FWW and CGA (Progressing)       Start:  03/01/25    Expected End:  03/15/25            Pt will perform bed<>chair transfer with supervision (Progressing)       Start:  03/01/25    Expected End:  03/15/25               Pain - Adult            PALMA HILLIARD-PT

## 2025-03-07 NOTE — PROGRESS NOTES
Transitional Care Coordinator Note: Patient discussed with medical team (ortho resident), per ortho team patient is medically ready. Discharge dispo: Plan for patient to discharge to SNF [facility] [place]. TCC met with patient to update patient on discharge plan. Patient expressed understanding and agreeable to discharge plan as TCC informed patient per ortho no plan for patient to return to OR 3/10. Patient requested for  to transport her in her wheelchair using her wheelchair van to SNF. TCC updated ortho team to confirm if team is agreeable to family transporting, pending confirmation from Dr. Whelan. Virgil deleon sent to facility and [facility] to complete 7000.     Anastacio Day RN BSN   Transitional Care Coordinator

## 2025-03-07 NOTE — DISCHARGE INSTRUCTIONS
Plastic Surgery Post Discharge Instructions    Additional Notes:  - Transition Eliquis to heparin on 3/12  - No anticoagulants morning of surgery  - NPO at midnight prior to surgery date  -Will receive call day prior to surgery with directions and arrival time for surgery on 3/14

## 2025-03-08 VITALS
RESPIRATION RATE: 18 BRPM | WEIGHT: 151.9 LBS | SYSTOLIC BLOOD PRESSURE: 125 MMHG | HEART RATE: 92 BPM | DIASTOLIC BLOOD PRESSURE: 80 MMHG | OXYGEN SATURATION: 94 % | TEMPERATURE: 97.2 F | BODY MASS INDEX: 21.79 KG/M2

## 2025-03-08 PROCEDURE — 2500000001 HC RX 250 WO HCPCS SELF ADMINISTERED DRUGS (ALT 637 FOR MEDICARE OP)

## 2025-03-08 PROCEDURE — 2500000004 HC RX 250 GENERAL PHARMACY W/ HCPCS (ALT 636 FOR OP/ED)

## 2025-03-08 RX ADMIN — POLYETHYLENE GLYCOL 3350 17 G: 17 POWDER, FOR SOLUTION ORAL at 08:58

## 2025-03-08 RX ADMIN — OXYCODONE HYDROCHLORIDE 10 MG: 10 TABLET ORAL at 11:45

## 2025-03-08 RX ADMIN — PANTOPRAZOLE SODIUM 40 MG: 40 TABLET, DELAYED RELEASE ORAL at 06:01

## 2025-03-08 RX ADMIN — ACETAMINOPHEN 650 MG: 325 TABLET ORAL at 11:45

## 2025-03-08 RX ADMIN — APIXABAN 2.5 MG: 2.5 TABLET, FILM COATED ORAL at 08:58

## 2025-03-08 RX ADMIN — VANCOMYCIN HYDROCHLORIDE 750 MG: 750 INJECTION, SOLUTION INTRAVENOUS at 11:46

## 2025-03-08 RX ADMIN — CYCLOBENZAPRINE 10 MG: 10 TABLET, FILM COATED ORAL at 11:45

## 2025-03-08 RX ADMIN — OXYCODONE HYDROCHLORIDE 10 MG: 10 TABLET ORAL at 03:22

## 2025-03-08 ASSESSMENT — PAIN - FUNCTIONAL ASSESSMENT
PAIN_FUNCTIONAL_ASSESSMENT: 0-10
PAIN_FUNCTIONAL_ASSESSMENT: 0-10

## 2025-03-08 ASSESSMENT — PAIN SCALES - GENERAL
PAINLEVEL_OUTOF10: 8
PAINLEVEL_OUTOF10: 10 - WORST POSSIBLE PAIN
PAINLEVEL_OUTOF10: 2

## 2025-03-08 ASSESSMENT — PAIN DESCRIPTION - DESCRIPTORS: DESCRIPTORS: ACHING

## 2025-03-08 NOTE — PROGRESS NOTES
Orthopedic Surgery Note    S:  69F s/p right knee irrigation&debridement, revision static spacer w Dr. Whelan 2/28/25.     Resting comfortably. NAEON. Pain controlled. Denies CP/SOB.      O:  /71 (BP Location: Left arm, Patient Position: Lying)   Pulse 103   Temp 36.3 °C (97.3 °F) (Temporal)   Resp 17   Wt 68.9 kg (151 lb 14.4 oz) Comment: Weight needed to verify stat med, used 2/20/25  SpO2 94%   BMI 21.79 kg/m²      NAD. Peripherally WWP. Breathing comfortably. No obvious focal deficits.     MSK injured extremity/RLE:  - Dressing DCI  - Prevena holding suction  - Palpable pulses, toes WWP  - Wiggles toes, SILT, in [place] splint    Labs: reviewed.     Imaging: reviewed.     A&P:  69F s/p right knee irrigation&debridement, revision static spacer w Dr. Whelan 2/28/25.      Plan:   - Diet: Regular  - Vanc pharmacy to dose; pending cx & ID recs  -- ID recommends PICC with 6 weeks vanc  -- PICC placed  - Cx NGTD  - IVF pending PO  - MM pain regimen  - PRN antiemetic. NO ZOFRAN.   - DVT Proph: SCDs/ ambulate/ eliquis 2.5bid+ PPI ppx  - Bowel regimen  - Neurovascular checks every 4 hours and as needed  - Ice affected extremity for 20 mins every hour as needed  - Prevena 14 day  - Rosenbaum removed POD1  - Maintain ace wrap/dressing. Reinforce as needed.  - PWB 50% RLE  - PT/OT c/s  - Monitor VS every 4 hours   - Labs ordered for AM   - IS every hour while awake   - Encourage ambulation / OOB as tolerated   - Orthotics consult for [place] splint while in bed (done)    Medicine consult:  -Daily RFP/Mg w lyte repletion  -EKG baseline ordered  -Tele w continuous pulse ox  --3/2: discussed overnight soft pressures w medicine. Seems most likely to be due to I&O -3K rather than pain regimen. Tentative plan for bolus PRN but waiting on formal eval/recs.   --3/3: planning to discuss bradycardia. Though given this is her baseline and assymptomatic, do not plan on any intervention.      Dispo: SNF today     D/w attending.      Reviewed and approved by BRODY GRIFFITHS on 3/8/25 at 8:46 AM.      This patient will be followed by the Orthopaedic Trauma service. Please page or Epic Chat the corresponding residents below with questions or concerns.      Ortho Trauma Service (Epic Chat Preferred)  First call: Po Griffiths  Second call: Malvin Barrett  Third call: Amadeo Han     6pm-6am M-F, Holidays, and weekends page Ortho on-call @[phone] with urgent questions/concerns.

## 2025-03-08 NOTE — PROGRESS NOTES
Adriana Wood is a 69 y.o. female on day 8 of admission presenting with Status post total right knee replacement.    Transitional Care Coordinator Note: Met with patient and  at bedside to discuss discharge planning s/p admission.  As previously arranged patient's spouse to transport patient at noon to facility today. No questions from either,  to provide packet for  to provide to facility. Caroline Martinez RN TCC via Epic.'

## 2025-03-08 NOTE — NURSING NOTE
RN went over discharge paperwork with pt and family member all concerns were addressed. Pt belongings packed and with pt IV x1 removed.

## 2025-03-08 NOTE — CARE PLAN
Problem: Pain - Adult  Goal: Verbalizes/displays adequate comfort level or baseline comfort level  Outcome: Progressing     Problem: Safety - Adult  Goal: Free from fall injury  Outcome: Progressing   The patient's goals for the shift include  safety    The clinical goals for the shift include PAIN CONTROL    Over the shift, the patient did make progress toward the following goals.

## 2025-03-08 NOTE — CARE PLAN
Problem: Pain - Adult  Goal: Verbalizes/displays adequate comfort level or baseline comfort level  Outcome: Progressing     Problem: Safety - Adult  Goal: Free from fall injury  Outcome: Progressing     Problem: Discharge Planning  Goal: Discharge to home or other facility with appropriate resources  Outcome: Progressing     Problem: Nutrition  Goal: Nutrient intake appropriate for maintaining nutritional needs  Outcome: Progressing     Problem: Pain  Goal: Takes deep breaths with improved pain control throughout the shift  Outcome: Progressing  Goal: Turns in bed with improved pain control throughout the shift  Outcome: Progressing     Problem: Skin  Goal: Promote/optimize nutrition  Outcome: Progressing  Goal: Promote skin healing  Outcome: Progressing     Problem: Fall/Injury  Goal: Not fall by end of shift  Outcome: Progressing  Goal: Be free from injury by end of the shift  Outcome: Progressing   The patient's goals for the shift include discharge planning     The clinical goals for the shift include pt will remain HDS

## 2025-03-09 NOTE — DISCHARGE SUMMARY
Discharge Diagnosis  Status post total right knee replacement    Issues Requiring Follow-Up  Postoperative appointment    Test Results Pending At Discharge  Pending Labs       No current pending labs.            Hospital Course  69 year-old female who presented with right knee static spacer infection. Patient is now s/p right knee irrigation and debridement and revision antibiotic static spacer placement on 2/28/25 by Dr. Whelan. On the day of surgery, patient was identified in the pre-operative holding area and agreeable to proceed with surgery. Risks and benefits were discussed and written consent was obtained from the parents.  Please see operative note for further details of this procedure. Patient received zaheer-operative antibiotics and eventually had picc line placed for 6 weeks of vancomycin with appropriate abs and infectious disease follow up coordinated. Patient recovered in the PACU before transfer to a regular nursing floor. Patient's pain controlled with oxycodone, tylenol. Patient was seen and cleared by physical therapy while admitted for placement at a skilled nursing facility. During admission patient had some overngiht low blood pressures and bradycardia per her baseline. Medicine team was involved with care. Patient responsive to LR bolus as needed and found to be medically clear for discharge. On the day of discharge, patient was afebrile with stable vital signs. Patient was neurovascularly intact at time of discharge. Patient will follow-up with Dr. Whelan in 2-3 weeks for post-operative visit.     Pertinent Physical Exam At Time of Discharge  Physical Exam  /85 (BP Location: Left arm, Patient Position: Lying)   Pulse (!) 43   Temp 36 °C (96.8 °F) (Temporal)   Resp 17   Wt 68.9 kg (151 lb 14.4 oz) Comment: Weight needed to verify stat med, used 2/20/25  SpO2 93%   BMI 21.79 kg/m²       NAD. Peripherally WWP. Breathing comfortably. No obvious focal deficits.      MSK injured  extremity/RLE:  - Dressing DCI  - Prevena holding suction  - Palpable pulses, toes WWP  - Wiggles toes, SILT    Home Medications     Medication List      START taking these medications     acetaminophen 325 mg tablet; Commonly known as: Tylenol; Take 2 tablets   (650 mg) by mouth every 6 hours if needed for mild pain (1 - 3) for up to   15 days.   docusate sodium 100 mg capsule; Commonly known as: Colace; Take 1   capsule (100 mg) by mouth 2 times a day as needed for constipation for up   to 15 days.   vancomycin (Vancocin) 1250 mg/250 mL piggyback IV; Commonly known as:   Vancocin; Infuse 275 mL (1,250 mg) at 220 mL/hr over 75 minutes into a   venous catheter once every 24 hours. Do not fill before March 5, 2025.     CHANGE how you take these medications     chlorhexidine 4 % external liquid; Commonly known as: Hibiclens; Apply   topically once daily as needed for wound care. Use for 5 days prior to   surgery as body wash, do not use on face or genital region; What changed:   Another medication with the same name was removed. Continue taking this   medication, and follow the directions you see here.   oxyCODONE 5 mg immediate release tablet; Commonly known as: Roxicodone;   Take 1 tablet (5 mg) by mouth every 4 hours if needed for severe pain (7 -   10) or moderate pain (4 - 6) for up to 7 days.; What changed: when to take   this, reasons to take this     CONTINUE taking these medications     cyclobenzaprine 5 mg tablet; Commonly known as: Flexeril   naloxone 0.4 mg/mL injection; Commonly known as: Narcan; Infuse 0.5 mL   (0.2 mg) into a venous catheter every 5 minutes if needed for respiratory   depression.   polyethylene glycol 17 gram packet; Commonly known as: Glycolax,   Miralax; Take 17 g by mouth once daily.     ASK your doctor about these medications     albuterol 90 mcg/actuation inhaler; Inhale 2 puffs every 6 hours if   needed for shortness of breath.   prochlorperazine 10 mg tablet; Commonly known as:  Compazine; Take 1   tablet (10 mg) by mouth every 6 hours if needed for vomiting or nausea.   sennosides-docusate sodium 8.6-50 mg tablet; Commonly known as:   Stephany-Colace; Take 2 tablets by mouth 2 times a day as needed for   constipation.       Outpatient Follow-Up  Future Appointments   Date Time Provider Department Center   3/20/2025 11:40 AM Andrzej Whelan MD [id number] Maximiliano Han DO

## 2025-03-09 NOTE — DISCHARGE SUMMARY
Discharge Diagnosis  Status post total right knee replacement    Issues Requiring Follow-Up  ***    Test Results Pending At Discharge  Pending Labs       No current pending labs.            Hospital Course   ***    Pertinent Physical Exam At Time of Discharge  Physical Exam    Home Medications     Medication List      START taking these medications     acetaminophen 325 mg tablet; Commonly known as: Tylenol; Take 2 tablets   (650 mg) by mouth every 6 hours if needed for mild pain (1 - 3) for up to   15 days.   docusate sodium 100 mg capsule; Commonly known as: Colace; Take 1   capsule (100 mg) by mouth 2 times a day as needed for constipation for up   to 15 days.   vancomycin (Vancocin) 1250 mg/250 mL piggyback IV; Commonly known as:   Vancocin; Infuse 275 mL (1,250 mg) at 220 mL/hr over 75 minutes into a   venous catheter once every 24 hours. Do not fill before March 5, 2025.     CHANGE how you take these medications     chlorhexidine 4 % external liquid; Commonly known as: Hibiclens; Apply   topically once daily as needed for wound care. Use for 5 days prior to   surgery as body wash, do not use on face or genital region; What changed:   Another medication with the same name was removed. Continue taking this   medication, and follow the directions you see here.   oxyCODONE 5 mg immediate release tablet; Commonly known as: Roxicodone;   Take 1 tablet (5 mg) by mouth every 4 hours if needed for severe pain (7 -   10) or moderate pain (4 - 6) for up to 7 days.; What changed: when to take   this, reasons to take this     CONTINUE taking these medications     cyclobenzaprine 5 mg tablet; Commonly known as: Flexeril   naloxone 0.4 mg/mL injection; Commonly known as: Narcan; Infuse 0.5 mL   (0.2 mg) into a venous catheter every 5 minutes if needed for respiratory   depression.   polyethylene glycol 17 gram packet; Commonly known as: Glycolax,   Miralax; Take 17 g by mouth once daily.     ASK your doctor about these  medications     albuterol 90 mcg/actuation inhaler; Inhale 2 puffs every 6 hours if   needed for shortness of breath.   prochlorperazine 10 mg tablet; Commonly known as: Compazine; Take 1   tablet (10 mg) by mouth every 6 hours if needed for vomiting or nausea.   sennosides-docusate sodium 8.6-50 mg tablet; Commonly known as:   Stephany-Colace; Take 2 tablets by mouth 2 times a day as needed for   constipation.       Outpatient Follow-Up  Future Appointments   Date Time Provider Department Center   3/20/2025 11:40 AM Andrzej Whelan MD [id number] [facility]       Andrzej Whelan MD

## 2025-03-09 NOTE — HOSPITAL COURSE
69 year-old female who presented with right knee static spacer infection. Patient is now s/p right knee irrigation and debridement and revision antibiotic static spacer placement on 2/28/25 by Dr. Whelan. On the day of surgery, patient was identified in the pre-operative holding area and agreeable to proceed with surgery. Risks and benefits were discussed and written consent was obtained from the parents.  Please see operative note for further details of this procedure. Patient received zaheer-operative antibiotics and eventually had picc line placed for 6 weeks of vancomycin with appropriate abs and infectious disease follow up coordinated. Patient recovered in the PACU before transfer to a regular nursing floor. Patient's pain controlled with oxycodone, tylenol. Patient was seen and cleared by physical therapy while admitted for placement at a skilled nursing facility. During admission patient had some overngiht low blood pressures and bradycardia per her baseline. Medicine team was involved with care. Patient responsive to LR bolus as needed and found to be medically clear for discharge. On the day of discharge, patient was afebrile with stable vital signs. Patient was neurovascularly intact at time of discharge. Patient will follow-up with Dr. Whelan in 2-3 weeks for post-operative visit.

## 2025-03-10 ENCOUNTER — NURSING HOME VISIT (OUTPATIENT)
Dept: POST ACUTE CARE | Facility: EXTERNAL LOCATION | Age: 70
End: 2025-03-10
Payer: MEDICARE

## 2025-03-10 ENCOUNTER — LAB REQUISITION (OUTPATIENT)
Dept: LAB | Facility: HOSPITAL | Age: 70
End: 2025-03-10
Payer: MEDICARE

## 2025-03-10 DIAGNOSIS — T84.53XD INFECTION AND INFLAMMATORY REACTION DUE TO INTERNAL RIGHT KNEE PROSTHESIS, SUBSEQUENT ENCOUNTER: ICD-10-CM

## 2025-03-10 DIAGNOSIS — F43.10 PTSD (POST-TRAUMATIC STRESS DISORDER): ICD-10-CM

## 2025-03-10 DIAGNOSIS — T84.50XD PROSTHETIC JOINT INFECTION, SUBSEQUENT ENCOUNTER: Primary | ICD-10-CM

## 2025-03-10 DIAGNOSIS — M17.31 POST-TRAUMATIC ARTHRITIS OF LOWER LEG, RIGHT: ICD-10-CM

## 2025-03-10 DIAGNOSIS — M62.838 MUSCLE SPASM: ICD-10-CM

## 2025-03-10 LAB
ALBUMIN SERPL BCP-MCNC: 3.9 G/DL (ref 3.4–5)
ALP SERPL-CCNC: 102 U/L (ref 33–136)
ALT SERPL W P-5'-P-CCNC: 15 U/L (ref 7–45)
ANION GAP SERPL CALC-SCNC: 13 MMOL/L (ref 10–20)
AST SERPL W P-5'-P-CCNC: 20 U/L (ref 9–39)
BILIRUB SERPL-MCNC: 0.5 MG/DL (ref 0–1.2)
BUN SERPL-MCNC: 12 MG/DL (ref 6–23)
CALCIUM SERPL-MCNC: 8.7 MG/DL (ref 8.6–10.3)
CHLORIDE SERPL-SCNC: 99 MMOL/L (ref 98–107)
CO2 SERPL-SCNC: 29 MMOL/L (ref 21–32)
CREAT SERPL-MCNC: 0.54 MG/DL (ref 0.5–1.05)
EGFRCR SERPLBLD CKD-EPI 2021: >90 ML/MIN/1.73M*2
ERYTHROCYTE [DISTWIDTH] IN BLOOD BY AUTOMATED COUNT: 13.7 % (ref 11.5–14.5)
GLUCOSE SERPL-MCNC: 113 MG/DL (ref 74–99)
HCT VFR BLD AUTO: 36.7 % (ref 36–46)
HGB BLD-MCNC: 11.7 G/DL (ref 12–16)
MCH RBC QN AUTO: 28.8 PG (ref 26–34)
MCHC RBC AUTO-ENTMCNC: 31.9 G/DL (ref 32–36)
MCV RBC AUTO: 90 FL (ref 80–100)
NRBC BLD-RTO: 0 /100 WBCS (ref 0–0)
PLATELET # BLD AUTO: 301 X10*3/UL (ref 150–450)
POTASSIUM SERPL-SCNC: 3.8 MMOL/L (ref 3.5–5.3)
PROT SERPL-MCNC: 6.5 G/DL (ref 6.4–8.2)
RBC # BLD AUTO: 4.06 X10*6/UL (ref 4–5.2)
SODIUM SERPL-SCNC: 137 MMOL/L (ref 136–145)
WBC # BLD AUTO: 6.3 X10*3/UL (ref 4.4–11.3)

## 2025-03-10 PROCEDURE — 99305 1ST NF CARE MODERATE MDM 35: CPT | Performed by: FAMILY MEDICINE

## 2025-03-10 PROCEDURE — 80053 COMPREHEN METABOLIC PANEL: CPT | Mod: OUT | Performed by: FAMILY MEDICINE

## 2025-03-10 PROCEDURE — 85027 COMPLETE CBC AUTOMATED: CPT | Mod: OUT | Performed by: FAMILY MEDICINE

## 2025-03-10 PROCEDURE — 36415 COLL VENOUS BLD VENIPUNCTURE: CPT | Mod: OUT | Performed by: FAMILY MEDICINE
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
## 2025-03-11 ENCOUNTER — NURSING HOME VISIT (OUTPATIENT)
Dept: POST ACUTE CARE | Facility: EXTERNAL LOCATION | Age: 70
End: 2025-03-11
Payer: MEDICARE

## 2025-03-11 DIAGNOSIS — K59.00 CONSTIPATION, UNSPECIFIED CONSTIPATION TYPE: ICD-10-CM

## 2025-03-11 DIAGNOSIS — T84.50XD PROSTHETIC JOINT INFECTION, SUBSEQUENT ENCOUNTER: ICD-10-CM

## 2025-03-11 DIAGNOSIS — M62.838 MUSCLE SPASM: ICD-10-CM

## 2025-03-11 DIAGNOSIS — M17.31 POST-TRAUMATIC ARTHRITIS OF LOWER LEG, RIGHT: Primary | ICD-10-CM

## 2025-03-11 DIAGNOSIS — T81.30XA WOUND DEHISCENCE: ICD-10-CM

## 2025-03-11 DIAGNOSIS — R11.0 NAUSEA: ICD-10-CM

## 2025-03-11 PROCEDURE — 99310 SBSQ NF CARE HIGH MDM 45: CPT | Performed by: NURSE PRACTITIONER

## 2025-03-12 ENCOUNTER — LAB REQUISITION (OUTPATIENT)
Dept: LAB | Facility: HOSPITAL | Age: 70
End: 2025-03-12
Payer: MEDICARE

## 2025-03-12 DIAGNOSIS — T84.50XD PROSTHETIC JOINT INFECTION, SUBSEQUENT ENCOUNTER: ICD-10-CM

## 2025-03-12 DIAGNOSIS — T84.53XD INFECTION AND INFLAMMATORY REACTION DUE TO INTERNAL RIGHT KNEE PROSTHESIS, SUBSEQUENT ENCOUNTER: ICD-10-CM

## 2025-03-12 LAB — VANCOMYCIN TROUGH SERPL-MCNC: 8.8 UG/ML (ref 5–20)

## 2025-03-12 PROCEDURE — 36415 COLL VENOUS BLD VENIPUNCTURE: CPT | Mod: OUT | Performed by: FAMILY MEDICINE

## 2025-03-12 PROCEDURE — 80202 ASSAY OF VANCOMYCIN: CPT | Mod: OUT | Performed by: FAMILY MEDICINE

## 2025-03-12 ASSESSMENT — ENCOUNTER SYMPTOMS
COUGH: 0
HALLUCINATIONS: 0
CHILLS: 0
ADENOPATHY: 0
BACK PAIN: 0
RHINORRHEA: 0
POLYDIPSIA: 0
SORE THROAT: 0
WEAKNESS: 1
EYE PAIN: 0
HEADACHES: 0
FEVER: 0
EYE REDNESS: 0
ABDOMINAL PAIN: 0
HEMATURIA: 0
FATIGUE: 0
ARTHRALGIAS: 1
WOUND: 1
SHORTNESS OF BREATH: 0
BLOOD IN STOOL: 0
PALPITATIONS: 0
NECK STIFFNESS: 0
DYSURIA: 0
BRUISES/BLEEDS EASILY: 0
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
## 2025-03-13 ENCOUNTER — TELEPHONE (OUTPATIENT)
Dept: PLASTIC SURGERY | Facility: CLINIC | Age: 70
End: 2025-03-13
Payer: MEDICARE

## 2025-03-13 ENCOUNTER — ANESTHESIA EVENT (OUTPATIENT)
Dept: OPERATING ROOM | Facility: HOSPITAL | Age: 70
End: 2025-03-13
Payer: MEDICARE

## 2025-03-13 NOTE — PROGRESS NOTES
"Pharmacy Medication History Review    Adriana Wood is a 69 y.o. female who is planned to be admitted for Hardware complicating wound infection (CMS-[facility]).    Per chart review, appears patient is currently living in a skilled nursing facility, plan for medication list to be sent from facility the day of procedure. Home medications can be updated day of procedure. For a medication history on the day of admission, please contact the [facility] pharmacy by calling [phone] or [employer] [facility]\".    Preferred pharmacy and allergies to be confirmed with patient by nursing the day of procedure.     Katheryn Devi  CPhT  "

## 2025-03-13 NOTE — TELEPHONE ENCOUNTER
Spoke with patient's RN at Skilled nursing to ensure eliquis was stopped yesterday due to planned OR tomorrow. The patient has not been on eliquis since admission to [facility]. Gave verbal orders to start 40mg Lovenox daily per Dr. Reyes. RN will give Lovenox dose today and patient will be NPO at midnight.

## 2025-03-14 ENCOUNTER — HOSPITAL ENCOUNTER (INPATIENT)
Facility: HOSPITAL | Age: 70
DRG: 908 | End: 2025-03-14
Payer: MEDICARE

## 2025-03-14 ENCOUNTER — ANESTHESIA (OUTPATIENT)
Dept: OPERATING ROOM | Facility: HOSPITAL | Age: 70
End: 2025-03-14
Payer: MEDICARE

## 2025-03-14 DIAGNOSIS — T84.50XD PROSTHETIC JOINT INFECTION, SUBSEQUENT ENCOUNTER: ICD-10-CM

## 2025-03-14 DIAGNOSIS — B95.62 METHICILLIN RESISTANT STAPHYLOCOCCUS AUREUS INFECTION AS THE CAUSE OF DISEASES CLASSIFIED ELSEWHERE: ICD-10-CM

## 2025-03-14 DIAGNOSIS — M24.561 KNEE CONTRACTURE, RIGHT: Primary | ICD-10-CM

## 2025-03-14 DIAGNOSIS — Z98.890 POST-OPERATIVE STATE: ICD-10-CM

## 2025-03-14 DIAGNOSIS — T79.8XXA OTHER EARLY COMPLICATIONS OF TRAUMA, INITIAL ENCOUNTER: ICD-10-CM

## 2025-03-14 LAB
ABO GROUP (TYPE) IN BLOOD: NORMAL
ALBUMIN SERPL BCP-MCNC: 4.2 G/DL (ref 3.4–5)
ANION GAP BLDA CALCULATED.4IONS-SCNC: 8 MMO/L (ref 10–25)
ANION GAP BLDA CALCULATED.4IONS-SCNC: 8 MMO/L (ref 10–25)
ANION GAP SERPL CALC-SCNC: 13 MMOL/L (ref 10–20)
ANTIBODY SCREEN: NORMAL
BASE EXCESS BLDA CALC-SCNC: 2.1 MMOL/L (ref -2–3)
BASE EXCESS BLDA CALC-SCNC: 2.8 MMOL/L (ref -2–3)
BODY TEMPERATURE: 37 DEGREES CELSIUS
BODY TEMPERATURE: 37 DEGREES CELSIUS
BUN SERPL-MCNC: 9 MG/DL (ref 6–23)
CA-I BLDA-SCNC: 1.14 MMOL/L (ref 1.1–1.33)
CA-I BLDA-SCNC: 1.17 MMOL/L (ref 1.1–1.33)
CALCIUM SERPL-MCNC: 8.6 MG/DL (ref 8.6–10.6)
CHLORIDE BLDA-SCNC: 104 MMOL/L (ref 98–107)
CHLORIDE BLDA-SCNC: 105 MMOL/L (ref 98–107)
CHLORIDE SERPL-SCNC: 102 MMOL/L (ref 98–107)
CO2 SERPL-SCNC: 25 MMOL/L (ref 21–32)
CREAT SERPL-MCNC: 0.44 MG/DL (ref 0.5–1.05)
DAT-POLYSPECIFIC: NORMAL
EGFRCR SERPLBLD CKD-EPI 2021: >90 ML/MIN/1.73M*2
ERYTHROCYTE [DISTWIDTH] IN BLOOD BY AUTOMATED COUNT: 13.7 % (ref 11.5–14.5)
GLUCOSE BLDA-MCNC: 108 MG/DL (ref 74–99)
GLUCOSE BLDA-MCNC: 133 MG/DL (ref 74–99)
GLUCOSE SERPL-MCNC: 157 MG/DL (ref 74–99)
HCO3 BLDA-SCNC: 27.3 MMOL/L (ref 22–26)
HCO3 BLDA-SCNC: 27.9 MMOL/L (ref 22–26)
HCT VFR BLD AUTO: 28.5 % (ref 36–46)
HCT VFR BLD EST: 32 % (ref 36–46)
HCT VFR BLD EST: 38 % (ref 36–46)
HGB BLD-MCNC: 9.5 G/DL (ref 12–16)
HGB BLDA-MCNC: 10.8 G/DL (ref 12–16)
HGB BLDA-MCNC: 12.6 G/DL (ref 12–16)
INHALED O2 CONCENTRATION: 50 %
INHALED O2 CONCENTRATION: 50 %
LACTATE BLDA-SCNC: 1.2 MMOL/L (ref 0.4–2)
LACTATE BLDA-SCNC: 1.5 MMOL/L (ref 0.4–2)
MAGNESIUM SERPL-MCNC: 1.76 MG/DL (ref 1.6–2.4)
MCH RBC QN AUTO: 28.6 PG (ref 26–34)
MCHC RBC AUTO-ENTMCNC: 33.3 G/DL (ref 32–36)
MCV RBC AUTO: 86 FL (ref 80–100)
NRBC BLD-RTO: 0 /100 WBCS (ref 0–0)
OXYHGB MFR BLDA: 96.8 % (ref 94–98)
OXYHGB MFR BLDA: 97.1 % (ref 94–98)
PCO2 BLDA: 44 MM HG (ref 38–42)
PCO2 BLDA: 44 MM HG (ref 38–42)
PH BLDA: 7.4 PH (ref 7.38–7.42)
PH BLDA: 7.41 PH (ref 7.38–7.42)
PHOSPHATE SERPL-MCNC: 3.3 MG/DL (ref 2.5–4.9)
PLATELET # BLD AUTO: 252 X10*3/UL (ref 150–450)
PO2 BLDA: 195 MM HG (ref 85–95)
PO2 BLDA: 205 MM HG (ref 85–95)
POTASSIUM BLDA-SCNC: 3.3 MMOL/L (ref 3.5–5.3)
POTASSIUM BLDA-SCNC: 4 MMOL/L (ref 3.5–5.3)
POTASSIUM SERPL-SCNC: 4.1 MMOL/L (ref 3.5–5.3)
RBC # BLD AUTO: 3.32 X10*6/UL (ref 4–5.2)
RH FACTOR (ANTIGEN D): NORMAL
SAO2 % BLDA: 100 % (ref 94–100)
SAO2 % BLDA: 100 % (ref 94–100)
SODIUM BLDA-SCNC: 136 MMOL/L (ref 136–145)
SODIUM BLDA-SCNC: 137 MMOL/L (ref 136–145)
SODIUM SERPL-SCNC: 136 MMOL/L (ref 136–145)
WBC # BLD AUTO: 11.4 X10*3/UL (ref 4.4–11.3)

## 2025-03-14 PROCEDURE — 96372 THER/PROPH/DIAG INJ SC/IM: CPT | Performed by: NURSE ANESTHETIST, CERTIFIED REGISTERED

## 2025-03-14 PROCEDURE — 15860 IV NJX TST VASC FLO FLAP/GRF: CPT

## 2025-03-14 PROCEDURE — 2500000005 HC RX 250 GENERAL PHARMACY W/O HCPCS

## 2025-03-14 PROCEDURE — 0KRS07Z REPLACEMENT OF RIGHT LOWER LEG MUSCLE WITH AUTOLOGOUS TISSUE SUBSTITUTE, OPEN APPROACH: ICD-10-PCS

## 2025-03-14 PROCEDURE — P9045 ALBUMIN (HUMAN), 5%, 250 ML: HCPCS | Mod: JZ,TB | Performed by: NURSE ANESTHETIST, CERTIFIED REGISTERED

## 2025-03-14 PROCEDURE — 7100000001 HC RECOVERY ROOM TIME - INITIAL BASE CHARGE

## 2025-03-14 PROCEDURE — 2500000005 HC RX 250 GENERAL PHARMACY W/O HCPCS: Performed by: ANESTHESIOLOGY

## 2025-03-14 PROCEDURE — 2500000004 HC RX 250 GENERAL PHARMACY W/ HCPCS (ALT 636 FOR OP/ED): Performed by: NURSE ANESTHETIST, CERTIFIED REGISTERED

## 2025-03-14 PROCEDURE — 82947 ASSAY GLUCOSE BLOOD QUANT: CPT

## 2025-03-14 PROCEDURE — 15756 FREE MYO/SKIN FLAP MICROVASC: CPT

## 2025-03-14 PROCEDURE — 2500000001 HC RX 250 WO HCPCS SELF ADMINISTERED DRUGS (ALT 637 FOR MEDICARE OP)

## 2025-03-14 PROCEDURE — 3700000002 HC GENERAL ANESTHESIA TIME - EACH INCREMENTAL 1 MINUTE

## 2025-03-14 PROCEDURE — 86077 PHYS BLOOD BANK SERV XMATCH: CPT | Performed by: PATHOLOGY

## 2025-03-14 PROCEDURE — 3600000003 HC OR TIME - INITIAL BASE CHARGE - PROCEDURE LEVEL THREE

## 2025-03-14 PROCEDURE — 3600000008 HC OR TIME - EACH INCREMENTAL 1 MINUTE - PROCEDURE LEVEL THREE

## 2025-03-14 PROCEDURE — 99232 SBSQ HOSP IP/OBS MODERATE 35: CPT | Performed by: PHYSICIAN ASSISTANT

## 2025-03-14 PROCEDURE — 83735 ASSAY OF MAGNESIUM: CPT

## 2025-03-14 PROCEDURE — 7100000002 HC RECOVERY ROOM TIME - EACH INCREMENTAL 1 MINUTE

## 2025-03-14 PROCEDURE — 86922 COMPATIBILITY TEST ANTIGLOB: CPT

## 2025-03-14 PROCEDURE — 0KBF0ZZ EXCISION OF RIGHT TRUNK MUSCLE, OPEN APPROACH: ICD-10-PCS

## 2025-03-14 PROCEDURE — 86870 RBC ANTIBODY IDENTIFICATION: CPT

## 2025-03-14 PROCEDURE — 3700000001 HC GENERAL ANESTHESIA TIME - INITIAL BASE CHARGE

## 2025-03-14 PROCEDURE — 86900 BLOOD TYPING SEROLOGIC ABO: CPT | Performed by: NURSE ANESTHETIST, CERTIFIED REGISTERED

## 2025-03-14 PROCEDURE — 2720000007 HC OR 272 NO HCPCS

## 2025-03-14 PROCEDURE — 82435 ASSAY OF BLOOD CHLORIDE: CPT | Performed by: NURSE ANESTHETIST, CERTIFIED REGISTERED

## 2025-03-14 PROCEDURE — 2500000005 HC RX 250 GENERAL PHARMACY W/O HCPCS: Performed by: NURSE ANESTHETIST, CERTIFIED REGISTERED

## 2025-03-14 PROCEDURE — 2500000004 HC RX 250 GENERAL PHARMACY W/ HCPCS (ALT 636 FOR OP/ED): Performed by: ANESTHESIOLOGY

## 2025-03-14 PROCEDURE — 85027 COMPLETE CBC AUTOMATED: CPT

## 2025-03-14 PROCEDURE — 2500000004 HC RX 250 GENERAL PHARMACY W/ HCPCS (ALT 636 FOR OP/ED)

## 2025-03-14 PROCEDURE — 1200000003 HC ONCOLOGY  ROOM WITH TELEMETRY DAILY

## 2025-03-14 PROCEDURE — 86880 COOMBS TEST DIRECT: CPT

## 2025-03-14 RX ORDER — ALBUMIN HUMAN 50 G/1000ML
SOLUTION INTRAVENOUS AS NEEDED
Status: DISCONTINUED | OUTPATIENT
Start: 2025-03-14 | End: 2025-03-14

## 2025-03-14 RX ORDER — HEPARIN SODIUM 5000 [USP'U]/ML
INJECTION, SOLUTION INTRAVENOUS; SUBCUTANEOUS AS NEEDED
Status: DISCONTINUED | OUTPATIENT
Start: 2025-03-14 | End: 2025-03-14

## 2025-03-14 RX ORDER — VANCOMYCIN HYDROCHLORIDE 1 G/20ML
INJECTION, POWDER, LYOPHILIZED, FOR SOLUTION INTRAVENOUS AS NEEDED
Status: DISCONTINUED | OUTPATIENT
Start: 2025-03-14 | End: 2025-03-14

## 2025-03-14 RX ORDER — ACETAMINOPHEN 325 MG/1
650 TABLET ORAL EVERY 6 HOURS SCHEDULED
Status: DISCONTINUED | OUTPATIENT
Start: 2025-03-14 | End: 2025-03-14

## 2025-03-14 RX ORDER — INDOCYANINE GREEN AND WATER 25 MG
KIT INJECTION AS NEEDED
Status: DISCONTINUED | OUTPATIENT
Start: 2025-03-14 | End: 2025-03-14

## 2025-03-14 RX ORDER — MIDAZOLAM HYDROCHLORIDE 1 MG/ML
INJECTION INTRAMUSCULAR; INTRAVENOUS AS NEEDED
Status: DISCONTINUED | OUTPATIENT
Start: 2025-03-14 | End: 2025-03-14

## 2025-03-14 RX ORDER — METHOCARBAMOL 100 MG/ML
1000 INJECTION, SOLUTION INTRAMUSCULAR; INTRAVENOUS ONCE
Status: DISCONTINUED | OUTPATIENT
Start: 2025-03-14 | End: 2025-03-14 | Stop reason: HOSPADM

## 2025-03-14 RX ORDER — ALBUTEROL SULFATE 0.83 MG/ML
2.5 SOLUTION RESPIRATORY (INHALATION) ONCE AS NEEDED
Status: DISCONTINUED | OUTPATIENT
Start: 2025-03-14 | End: 2025-03-14 | Stop reason: HOSPADM

## 2025-03-14 RX ORDER — SODIUM CHLORIDE, SODIUM LACTATE, POTASSIUM CHLORIDE, CALCIUM CHLORIDE 600; 310; 30; 20 MG/100ML; MG/100ML; MG/100ML; MG/100ML
INJECTION, SOLUTION INTRAVENOUS CONTINUOUS PRN
Status: DISCONTINUED | OUTPATIENT
Start: 2025-03-14 | End: 2025-03-14

## 2025-03-14 RX ORDER — METHOCARBAMOL 100 MG/ML
500 INJECTION, SOLUTION INTRAMUSCULAR; INTRAVENOUS EVERY 8 HOURS
Status: DISCONTINUED | OUTPATIENT
Start: 2025-03-14 | End: 2025-03-15

## 2025-03-14 RX ORDER — NORETHINDRONE AND ETHINYL ESTRADIOL 0.5-0.035
KIT ORAL AS NEEDED
Status: DISCONTINUED | OUTPATIENT
Start: 2025-03-14 | End: 2025-03-14

## 2025-03-14 RX ORDER — FENTANYL CITRATE 50 UG/ML
INJECTION, SOLUTION INTRAMUSCULAR; INTRAVENOUS AS NEEDED
Status: DISCONTINUED | OUTPATIENT
Start: 2025-03-14 | End: 2025-03-14

## 2025-03-14 RX ORDER — SODIUM CHLORIDE, SODIUM LACTATE, POTASSIUM CHLORIDE, CALCIUM CHLORIDE 600; 310; 30; 20 MG/100ML; MG/100ML; MG/100ML; MG/100ML
100 INJECTION, SOLUTION INTRAVENOUS CONTINUOUS
Status: ACTIVE | OUTPATIENT
Start: 2025-03-14 | End: 2025-03-14

## 2025-03-14 RX ORDER — ACETAMINOPHEN 325 MG/1
975 TABLET ORAL EVERY 8 HOURS
Status: DISCONTINUED | OUTPATIENT
Start: 2025-03-14 | End: 2025-03-14

## 2025-03-14 RX ORDER — OXYCODONE HYDROCHLORIDE 10 MG/1
10 TABLET ORAL EVERY 4 HOURS PRN
Status: DISCONTINUED | OUTPATIENT
Start: 2025-03-14 | End: 2025-03-24 | Stop reason: HOSPADM

## 2025-03-14 RX ORDER — POLYETHYLENE GLYCOL 3350 17 G/17G
17 POWDER, FOR SOLUTION ORAL DAILY
Status: DISCONTINUED | OUTPATIENT
Start: 2025-03-14 | End: 2025-03-24 | Stop reason: HOSPADM

## 2025-03-14 RX ORDER — LIDOCAINE HYDROCHLORIDE 20 MG/ML
INJECTION, SOLUTION INFILTRATION; PERINEURAL AS NEEDED
Status: DISCONTINUED | OUTPATIENT
Start: 2025-03-14 | End: 2025-03-14 | Stop reason: HOSPADM

## 2025-03-14 RX ORDER — VANCOMYCIN HYDROCHLORIDE 1 G/200ML
1000 INJECTION, SOLUTION INTRAVENOUS EVERY 12 HOURS
Status: DISCONTINUED | OUTPATIENT
Start: 2025-03-14 | End: 2025-03-16

## 2025-03-14 RX ORDER — ROCURONIUM BROMIDE 10 MG/ML
INJECTION, SOLUTION INTRAVENOUS AS NEEDED
Status: DISCONTINUED | OUTPATIENT
Start: 2025-03-14 | End: 2025-03-14

## 2025-03-14 RX ORDER — PROPOFOL 10 MG/ML
INJECTION, EMULSION INTRAVENOUS AS NEEDED
Status: DISCONTINUED | OUTPATIENT
Start: 2025-03-14 | End: 2025-03-14

## 2025-03-14 RX ORDER — ACETAMINOPHEN 325 MG/1
975 TABLET ORAL EVERY 8 HOURS
Status: DISCONTINUED | OUTPATIENT
Start: 2025-03-15 | End: 2025-03-24 | Stop reason: HOSPADM

## 2025-03-14 RX ORDER — ACETAMINOPHEN 325 MG/1
650 TABLET ORAL EVERY 4 HOURS PRN
Status: DISCONTINUED | OUTPATIENT
Start: 2025-03-14 | End: 2025-03-14

## 2025-03-14 RX ORDER — DIPHENHYDRAMINE HYDROCHLORIDE 50 MG/ML
12.5 INJECTION, SOLUTION INTRAMUSCULAR; INTRAVENOUS ONCE AS NEEDED
Status: DISCONTINUED | OUTPATIENT
Start: 2025-03-14 | End: 2025-03-14 | Stop reason: HOSPADM

## 2025-03-14 RX ORDER — HEPARIN 100 UNIT/ML
500 SYRINGE INTRAVENOUS ONCE
Status: DISCONTINUED | OUTPATIENT
Start: 2025-03-14 | End: 2025-03-14

## 2025-03-14 RX ORDER — AMOXICILLIN 250 MG
2 CAPSULE ORAL 2 TIMES DAILY
Status: DISCONTINUED | OUTPATIENT
Start: 2025-03-14 | End: 2025-03-24 | Stop reason: HOSPADM

## 2025-03-14 RX ORDER — PHENYLEPHRINE HCL IN 0.9% NACL 0.4MG/10ML
SYRINGE (ML) INTRAVENOUS AS NEEDED
Status: DISCONTINUED | OUTPATIENT
Start: 2025-03-14 | End: 2025-03-14

## 2025-03-14 RX ORDER — VANCOMYCIN HYDROCHLORIDE 1 G/20ML
INJECTION, POWDER, LYOPHILIZED, FOR SOLUTION INTRAVENOUS DAILY PRN
Status: DISCONTINUED | OUTPATIENT
Start: 2025-03-14 | End: 2025-03-24 | Stop reason: HOSPADM

## 2025-03-14 RX ORDER — ALBUTEROL SULFATE 0.83 MG/ML
2.5 SOLUTION RESPIRATORY (INHALATION) EVERY 6 HOURS PRN
Status: DISCONTINUED | OUTPATIENT
Start: 2025-03-14 | End: 2025-03-21

## 2025-03-14 RX ORDER — TALC
3 POWDER (GRAM) TOPICAL NIGHTLY PRN
Status: DISCONTINUED | OUTPATIENT
Start: 2025-03-14 | End: 2025-03-24 | Stop reason: HOSPADM

## 2025-03-14 RX ORDER — METOCLOPRAMIDE HYDROCHLORIDE 5 MG/ML
10 INJECTION INTRAMUSCULAR; INTRAVENOUS ONCE AS NEEDED
Status: DISCONTINUED | OUTPATIENT
Start: 2025-03-14 | End: 2025-03-14 | Stop reason: HOSPADM

## 2025-03-14 RX ORDER — POTASSIUM CHLORIDE 14.9 MG/ML
INJECTION INTRAVENOUS AS NEEDED
Status: DISCONTINUED | OUTPATIENT
Start: 2025-03-14 | End: 2025-03-14

## 2025-03-14 RX ORDER — PHENYLEPHRINE 10 MG/250 ML(40 MCG/ML)IN 0.9 % SOD.CHLORIDE INTRAVENOUS
CONTINUOUS PRN
Status: DISCONTINUED | OUTPATIENT
Start: 2025-03-14 | End: 2025-03-14

## 2025-03-14 RX ORDER — OXYCODONE HYDROCHLORIDE 5 MG/1
5 TABLET ORAL EVERY 6 HOURS PRN
Status: DISCONTINUED | OUTPATIENT
Start: 2025-03-14 | End: 2025-03-24 | Stop reason: HOSPADM

## 2025-03-14 RX ORDER — MIDAZOLAM HYDROCHLORIDE 1 MG/ML
0.5 INJECTION INTRAMUSCULAR; INTRAVENOUS
Status: DISCONTINUED | OUTPATIENT
Start: 2025-03-14 | End: 2025-03-14 | Stop reason: HOSPADM

## 2025-03-14 RX ORDER — HYDROMORPHONE HYDROCHLORIDE 0.2 MG/ML
0.2 INJECTION INTRAMUSCULAR; INTRAVENOUS; SUBCUTANEOUS EVERY 5 MIN PRN
Status: DISCONTINUED | OUTPATIENT
Start: 2025-03-14 | End: 2025-03-14 | Stop reason: HOSPADM

## 2025-03-14 RX ORDER — LIDOCAINE HCL/PF 100 MG/5ML
SYRINGE (ML) INTRAVENOUS AS NEEDED
Status: DISCONTINUED | OUTPATIENT
Start: 2025-03-14 | End: 2025-03-14

## 2025-03-14 RX ORDER — HEPARIN SODIUM 5000 [USP'U]/ML
5000 INJECTION, SOLUTION INTRAVENOUS; SUBCUTANEOUS EVERY 8 HOURS
Status: DISPENSED | OUTPATIENT
Start: 2025-03-14 | End: 2025-03-17

## 2025-03-14 RX ORDER — SODIUM CHLORIDE, SODIUM LACTATE, POTASSIUM CHLORIDE, CALCIUM CHLORIDE 600; 310; 30; 20 MG/100ML; MG/100ML; MG/100ML; MG/100ML
50 INJECTION, SOLUTION INTRAVENOUS CONTINUOUS
Status: ACTIVE | OUTPATIENT
Start: 2025-03-14 | End: 2025-03-15

## 2025-03-14 RX ORDER — ACETAMINOPHEN 10 MG/ML
INJECTION, SOLUTION INTRAVENOUS AS NEEDED
Status: DISCONTINUED | OUTPATIENT
Start: 2025-03-14 | End: 2025-03-14

## 2025-03-14 RX ORDER — LIDOCAINE HYDROCHLORIDE 10 MG/ML
0.1 INJECTION, SOLUTION INFILTRATION; PERINEURAL ONCE
Status: CANCELLED | OUTPATIENT
Start: 2025-03-14 | End: 2025-03-14

## 2025-03-14 RX ORDER — NALOXONE HYDROCHLORIDE 0.4 MG/ML
0.2 INJECTION, SOLUTION INTRAMUSCULAR; INTRAVENOUS; SUBCUTANEOUS EVERY 5 MIN PRN
Status: DISCONTINUED | OUTPATIENT
Start: 2025-03-14 | End: 2025-03-24 | Stop reason: HOSPADM

## 2025-03-14 RX ORDER — HYDROMORPHONE HYDROCHLORIDE 1 MG/ML
INJECTION, SOLUTION INTRAMUSCULAR; INTRAVENOUS; SUBCUTANEOUS AS NEEDED
Status: DISCONTINUED | OUTPATIENT
Start: 2025-03-14 | End: 2025-03-14

## 2025-03-14 RX ORDER — DROPERIDOL 2.5 MG/ML
0.62 INJECTION, SOLUTION INTRAMUSCULAR; INTRAVENOUS ONCE AS NEEDED
Status: DISCONTINUED | OUTPATIENT
Start: 2025-03-14 | End: 2025-03-14 | Stop reason: HOSPADM

## 2025-03-14 RX ORDER — CALCIUM CARBONATE 200(500)MG
500 TABLET,CHEWABLE ORAL 4 TIMES DAILY PRN
Status: DISCONTINUED | OUTPATIENT
Start: 2025-03-14 | End: 2025-03-24 | Stop reason: HOSPADM

## 2025-03-14 RX ORDER — HYDRALAZINE HYDROCHLORIDE 20 MG/ML
5 INJECTION INTRAMUSCULAR; INTRAVENOUS EVERY 30 MIN PRN
Status: DISCONTINUED | OUTPATIENT
Start: 2025-03-14 | End: 2025-03-14 | Stop reason: HOSPADM

## 2025-03-14 RX ORDER — PAPAVERINE HYDROCHLORIDE 30 MG/ML
INJECTION INTRAMUSCULAR; INTRAVENOUS AS NEEDED
Status: DISCONTINUED | OUTPATIENT
Start: 2025-03-14 | End: 2025-03-14 | Stop reason: HOSPADM

## 2025-03-14 RX ORDER — LABETALOL HYDROCHLORIDE 5 MG/ML
5 INJECTION, SOLUTION INTRAVENOUS ONCE AS NEEDED
Status: DISCONTINUED | OUTPATIENT
Start: 2025-03-14 | End: 2025-03-14 | Stop reason: HOSPADM

## 2025-03-14 RX ADMIN — INDOCYANINE GREEN AND WATER 7.5 MG: KIT at 11:40

## 2025-03-14 RX ADMIN — FENTANYL CITRATE 50 MCG: 50 INJECTION, SOLUTION INTRAMUSCULAR; INTRAVENOUS at 10:19

## 2025-03-14 RX ADMIN — EPHEDRINE SULFATE 20 MG: 50 INJECTION, SOLUTION INTRAVENOUS at 08:34

## 2025-03-14 RX ADMIN — LIDOCAINE HYDROCHLORIDE 60 MG: 20 INJECTION INTRAVENOUS at 07:43

## 2025-03-14 RX ADMIN — HEPARIN SODIUM 5000 UNITS: 5000 INJECTION INTRAVENOUS; SUBCUTANEOUS at 10:06

## 2025-03-14 RX ADMIN — EPHEDRINE SULFATE 10 MG: 50 INJECTION, SOLUTION INTRAVENOUS at 08:25

## 2025-03-14 RX ADMIN — EPHEDRINE SULFATE 20 MG: 50 INJECTION, SOLUTION INTRAVENOUS at 08:29

## 2025-03-14 RX ADMIN — HYDROMORPHONE HYDROCHLORIDE 0.5 MG: 1 INJECTION, SOLUTION INTRAMUSCULAR; INTRAVENOUS; SUBCUTANEOUS at 15:36

## 2025-03-14 RX ADMIN — PROPOFOL 30 MG: 10 INJECTION, EMULSION INTRAVENOUS at 08:37

## 2025-03-14 RX ADMIN — LIDOCAINE HYDROCHLORIDE 40 MG: 20 INJECTION INTRAVENOUS at 07:45

## 2025-03-14 RX ADMIN — VANCOMYCIN HYDROCHLORIDE 1 G: 1025 INJECTION, POWDER, FOR SOLUTION INTRAVENOUS; ORAL at 08:45

## 2025-03-14 RX ADMIN — ROCURONIUM BROMIDE 20 MG: 10 INJECTION INTRAVENOUS at 13:44

## 2025-03-14 RX ADMIN — ROCURONIUM BROMIDE 40 MG: 10 INJECTION INTRAVENOUS at 07:47

## 2025-03-14 RX ADMIN — ALBUMIN HUMAN 250 ML: 0.05 INJECTION, SOLUTION INTRAVENOUS at 09:30

## 2025-03-14 RX ADMIN — HYDROMORPHONE HYDROCHLORIDE 0.5 MG: 1 INJECTION, SOLUTION INTRAMUSCULAR; INTRAVENOUS; SUBCUTANEOUS at 15:50

## 2025-03-14 RX ADMIN — ACETAMINOPHEN 1000 MG: 10 INJECTION, SOLUTION INTRAVENOUS at 15:34

## 2025-03-14 RX ADMIN — ROCURONIUM BROMIDE 30 MG: 10 INJECTION INTRAVENOUS at 10:50

## 2025-03-14 RX ADMIN — MIDAZOLAM HYDROCHLORIDE 2 MG: 1 INJECTION, SOLUTION INTRAMUSCULAR; INTRAVENOUS at 07:30

## 2025-03-14 RX ADMIN — FENTANYL CITRATE 25 MCG: 50 INJECTION, SOLUTION INTRAMUSCULAR; INTRAVENOUS at 08:38

## 2025-03-14 RX ADMIN — HYDROMORPHONE HYDROCHLORIDE 0.4 MG: 0.5 INJECTION, SOLUTION INTRAMUSCULAR; INTRAVENOUS; SUBCUTANEOUS at 17:03

## 2025-03-14 RX ADMIN — PROPOFOL 100 MG: 10 INJECTION, EMULSION INTRAVENOUS at 07:44

## 2025-03-14 RX ADMIN — FENTANYL CITRATE 25 MCG: 50 INJECTION, SOLUTION INTRAMUSCULAR; INTRAVENOUS at 07:42

## 2025-03-14 RX ADMIN — ALBUMIN HUMAN 250 ML: 0.05 INJECTION, SOLUTION INTRAVENOUS at 10:50

## 2025-03-14 RX ADMIN — Medication 160 MCG: at 07:47

## 2025-03-14 RX ADMIN — INDOCYANINE GREEN AND WATER 7.5 MG: KIT at 14:46

## 2025-03-14 RX ADMIN — HYDROMORPHONE HYDROCHLORIDE 0.5 MG: 1 INJECTION, SOLUTION INTRAMUSCULAR; INTRAVENOUS; SUBCUTANEOUS at 16:14

## 2025-03-14 RX ADMIN — Medication 3 L/MIN: at 16:17

## 2025-03-14 RX ADMIN — VANCOMYCIN HYDROCHLORIDE 1000 MG: 1 INJECTION, SOLUTION INTRAVENOUS at 21:21

## 2025-03-14 RX ADMIN — HEPARIN SODIUM 5000 UNITS: 5000 INJECTION, SOLUTION INTRAVENOUS; SUBCUTANEOUS at 21:21

## 2025-03-14 RX ADMIN — PHENYLEPHRINE-NACL IV SOLUTION 10 MG/250ML-0.9% 0.4 MCG/KG/MIN: 10-0.9/25 SOLUTION at 09:38

## 2025-03-14 RX ADMIN — Medication 240 MCG: at 07:52

## 2025-03-14 RX ADMIN — SODIUM CHLORIDE, SODIUM LACTATE, POTASSIUM CHLORIDE, CALCIUM CHLORIDE: 600; 310; 30; 20 INJECTION, SOLUTION INTRAVENOUS at 08:40

## 2025-03-14 RX ADMIN — HYDROMORPHONE HYDROCHLORIDE 0.5 MG: 1 INJECTION, SOLUTION INTRAMUSCULAR; INTRAVENOUS; SUBCUTANEOUS at 16:28

## 2025-03-14 RX ADMIN — Medication 200 MCG: at 07:59

## 2025-03-14 RX ADMIN — OXYCODONE HYDROCHLORIDE 10 MG: 10 TABLET ORAL at 19:15

## 2025-03-14 RX ADMIN — METHOCARBAMOL 500 MG: 1000 INJECTION, SOLUTION INTRAMUSCULAR; INTRAVENOUS at 19:06

## 2025-03-14 RX ADMIN — Medication 200 MCG: at 08:51

## 2025-03-14 RX ADMIN — ROCURONIUM BROMIDE 10 MG: 10 INJECTION INTRAVENOUS at 09:11

## 2025-03-14 RX ADMIN — Medication 200 MCG: at 08:35

## 2025-03-14 RX ADMIN — SODIUM CHLORIDE, POTASSIUM CHLORIDE, SODIUM LACTATE AND CALCIUM CHLORIDE 100 ML/HR: 600; 310; 30; 20 INJECTION, SOLUTION INTRAVENOUS at 17:06

## 2025-03-14 RX ADMIN — SUGAMMADEX 200 MG: 100 INJECTION, SOLUTION INTRAVENOUS at 15:51

## 2025-03-14 RX ADMIN — SODIUM CHLORIDE, POTASSIUM CHLORIDE, SODIUM LACTATE AND CALCIUM CHLORIDE: 600; 310; 30; 20 INJECTION, SOLUTION INTRAVENOUS at 07:30

## 2025-03-14 RX ADMIN — DEXAMETHASONE SODIUM PHOSPHATE 4 MG: 4 INJECTION INTRA-ARTICULAR; INTRALESIONAL; INTRAMUSCULAR; INTRAVENOUS; SOFT TISSUE at 10:44

## 2025-03-14 RX ADMIN — POTASSIUM CHLORIDE 20 MEQ: 14.9 INJECTION, SOLUTION INTRAVENOUS at 11:32

## 2025-03-14 RX ADMIN — SODIUM CHLORIDE, POTASSIUM CHLORIDE, SODIUM LACTATE AND CALCIUM CHLORIDE 50 ML/HR: 600; 310; 30; 20 INJECTION, SOLUTION INTRAVENOUS at 19:00

## 2025-03-14 SDOH — HEALTH STABILITY: MENTAL HEALTH: HOW OFTEN DO YOU HAVE A DRINK CONTAINING ALCOHOL?: NEVER

## 2025-03-14 SDOH — ECONOMIC STABILITY: FOOD INSECURITY: WITHIN THE PAST 12 MONTHS, THE FOOD YOU BOUGHT JUST DIDN'T LAST AND YOU DIDN'T HAVE MONEY TO GET MORE.: NEVER TRUE

## 2025-03-14 SDOH — HEALTH STABILITY: MENTAL HEALTH: HOW OFTEN DO YOU HAVE SIX OR MORE DRINKS ON ONE OCCASION?: NEVER

## 2025-03-14 SDOH — ECONOMIC STABILITY: INCOME INSECURITY: IN THE PAST 12 MONTHS HAS THE ELECTRIC, GAS, OIL, OR WATER COMPANY THREATENED TO SHUT OFF SERVICES IN YOUR HOME?: NO

## 2025-03-14 SDOH — SOCIAL STABILITY: SOCIAL INSECURITY: WERE YOU ABLE TO COMPLETE ALL THE BEHAVIORAL HEALTH SCREENINGS?: YES

## 2025-03-14 SDOH — ECONOMIC STABILITY: TRANSPORTATION INSECURITY: IN THE PAST 12 MONTHS, HAS LACK OF TRANSPORTATION KEPT YOU FROM MEDICAL APPOINTMENTS OR FROM GETTING MEDICATIONS?: NO

## 2025-03-14 SDOH — ECONOMIC STABILITY: HOUSING INSECURITY: IN THE LAST 12 MONTHS, WAS THERE A TIME WHEN YOU WERE NOT ABLE TO PAY THE MORTGAGE OR RENT ON TIME?: NO

## 2025-03-14 SDOH — SOCIAL STABILITY: SOCIAL INSECURITY: ARE THERE ANY APPARENT SIGNS OF INJURIES/BEHAVIORS THAT COULD BE RELATED TO ABUSE/NEGLECT?: NO

## 2025-03-14 SDOH — ECONOMIC STABILITY: FOOD INSECURITY: WITHIN THE PAST 12 MONTHS, YOU WORRIED THAT YOUR FOOD WOULD RUN OUT BEFORE YOU GOT THE MONEY TO BUY MORE.: NEVER TRUE

## 2025-03-14 SDOH — SOCIAL STABILITY: SOCIAL INSECURITY
WITHIN THE LAST YEAR, HAVE YOU BEEN RAPED OR FORCED TO HAVE ANY KIND OF SEXUAL ACTIVITY BY YOUR PARTNER OR EX-PARTNER?: NO

## 2025-03-14 SDOH — HEALTH STABILITY: MENTAL HEALTH: HOW MANY DRINKS CONTAINING ALCOHOL DO YOU HAVE ON A TYPICAL DAY WHEN YOU ARE DRINKING?: PATIENT DOES NOT DRINK

## 2025-03-14 SDOH — ECONOMIC STABILITY: FOOD INSECURITY: HOW HARD IS IT FOR YOU TO PAY FOR THE VERY BASICS LIKE FOOD, HOUSING, MEDICAL CARE, AND HEATING?: NOT VERY HARD

## 2025-03-14 SDOH — SOCIAL STABILITY: SOCIAL INSECURITY: HAS ANYONE EVER THREATENED TO HURT YOUR FAMILY OR YOUR PETS?: NO

## 2025-03-14 SDOH — SOCIAL STABILITY: SOCIAL INSECURITY: WITHIN THE LAST YEAR, HAVE YOU BEEN AFRAID OF YOUR PARTNER OR EX-PARTNER?: NO

## 2025-03-14 SDOH — SOCIAL STABILITY: SOCIAL INSECURITY: WITHIN THE LAST YEAR, HAVE YOU BEEN HUMILIATED OR EMOTIONALLY ABUSED IN OTHER WAYS BY YOUR PARTNER OR EX-PARTNER?: NO

## 2025-03-14 SDOH — SOCIAL STABILITY: SOCIAL INSECURITY: ABUSE: ADULT

## 2025-03-14 SDOH — ECONOMIC STABILITY: HOUSING INSECURITY: IN THE PAST 12 MONTHS, HOW MANY TIMES HAVE YOU MOVED WHERE YOU WERE LIVING?: 0

## 2025-03-14 SDOH — SOCIAL STABILITY: SOCIAL INSECURITY: HAVE YOU HAD ANY THOUGHTS OF HARMING ANYONE ELSE?: NO

## 2025-03-14 SDOH — SOCIAL STABILITY: SOCIAL INSECURITY: DOES ANYONE TRY TO KEEP YOU FROM HAVING/CONTACTING OTHER FRIENDS OR DOING THINGS OUTSIDE YOUR HOME?: NO

## 2025-03-14 SDOH — ECONOMIC STABILITY: HOUSING INSECURITY: DO YOU FEEL UNSAFE GOING BACK TO THE PLACE WHERE YOU LIVE?: NO

## 2025-03-14 SDOH — ECONOMIC STABILITY: HOUSING INSECURITY: AT ANY TIME IN THE PAST 12 MONTHS, WERE YOU HOMELESS OR LIVING IN A SHELTER (INCLUDING NOW)?: NO

## 2025-03-14 SDOH — SOCIAL STABILITY: SOCIAL INSECURITY: DO YOU FEEL ANYONE HAS EXPLOITED OR TAKEN ADVANTAGE OF YOU FINANCIALLY OR OF YOUR PERSONAL PROPERTY?: NO

## 2025-03-14 SDOH — SOCIAL STABILITY: SOCIAL INSECURITY: DO YOU FEEL UNSAFE GOING BACK TO THE PLACE WHERE YOU ARE LIVING?: NO

## 2025-03-14 SDOH — SOCIAL STABILITY: SOCIAL INSECURITY: ARE YOU OR HAVE YOU BEEN THREATENED OR ABUSED PHYSICALLY, EMOTIONALLY, OR SEXUALLY BY ANYONE?: NO

## 2025-03-14 SDOH — SOCIAL STABILITY: SOCIAL INSECURITY: HAVE YOU HAD THOUGHTS OF HARMING ANYONE ELSE?: NO

## 2025-03-14 SDOH — SOCIAL STABILITY: SOCIAL INSECURITY
ASK PARENT OR GUARDIAN: ARE THERE TIMES WHEN YOU, YOUR CHILD(REN), OR ANY MEMBER OF YOUR HOUSEHOLD FEEL UNSAFE, HARMED, OR THREATENED AROUND PERSONS WITH WHOM YOU KNOW OR LIVE?: NO

## 2025-03-14 ASSESSMENT — COGNITIVE AND FUNCTIONAL STATUS - GENERAL
HELP NEEDED FOR BATHING: A LOT
MOVING TO AND FROM BED TO CHAIR: A LOT
MOVING TO AND FROM BED TO CHAIR: A LITTLE
WALKING IN HOSPITAL ROOM: TOTAL
CLIMB 3 TO 5 STEPS WITH RAILING: TOTAL
WALKING IN HOSPITAL ROOM: TOTAL
PATIENT BASELINE BEDBOUND: NO
DRESSING REGULAR LOWER BODY CLOTHING: A LOT
TURNING FROM BACK TO SIDE WHILE IN FLAT BAD: A LITTLE
STANDING UP FROM CHAIR USING ARMS: A LITTLE
STANDING UP FROM CHAIR USING ARMS: A LITTLE
DAILY ACTIVITIY SCORE: 19
TOILETING: A LOT
TOILETING: A LITTLE
MOBILITY SCORE: 14
MOBILITY SCORE: 16
HELP NEEDED FOR BATHING: A LITTLE
CLIMB 3 TO 5 STEPS WITH RAILING: TOTAL
DRESSING REGULAR LOWER BODY CLOTHING: A LITTLE
DAILY ACTIVITIY SCORE: 20

## 2025-03-14 ASSESSMENT — PAIN SCALES - GENERAL
PAINLEVEL_OUTOF10: 4
PAINLEVEL_OUTOF10: 8
PAINLEVEL_OUTOF10: 4
PAINLEVEL_OUTOF10: 7
PAINLEVEL_OUTOF10: 7
PAIN_LEVEL: 4
PAINLEVEL_OUTOF10: 4

## 2025-03-14 ASSESSMENT — ACTIVITIES OF DAILY LIVING (ADL)
GROOMING: NEEDS ASSISTANCE
JUDGMENT_ADEQUATE_SAFELY_COMPLETE_DAILY_ACTIVITIES: YES
HEARING - RIGHT EAR: FUNCTIONAL
LACK_OF_TRANSPORTATION: NO
LACK_OF_TRANSPORTATION: NO
PATIENT'S MEMORY ADEQUATE TO SAFELY COMPLETE DAILY ACTIVITIES?: YES
FEEDING YOURSELF: INDEPENDENT
WALKS IN HOME: NEEDS ASSISTANCE
BATHING: NEEDS ASSISTANCE
HEARING - LEFT EAR: FUNCTIONAL
DRESSING YOURSELF: NEEDS ASSISTANCE
TOILETING: NEEDS ASSISTANCE
ASSISTIVE_DEVICE: WALKER;WHEELCHAIR
ADEQUATE_TO_COMPLETE_ADL: YES
LACK_OF_TRANSPORTATION: NO

## 2025-03-14 ASSESSMENT — LIFESTYLE VARIABLES
SKIP TO QUESTIONS 9-10: 1
AUDIT-C TOTAL SCORE: 0
SKIP TO QUESTIONS 9-10: 1
AUDIT-C TOTAL SCORE: 0

## 2025-03-14 ASSESSMENT — PAIN - FUNCTIONAL ASSESSMENT
PAIN_FUNCTIONAL_ASSESSMENT: 0-10
PAIN_FUNCTIONAL_ASSESSMENT: CPOT (CRITICAL CARE PAIN OBSERVATION TOOL)
PAIN_FUNCTIONAL_ASSESSMENT: CPOT (CRITICAL CARE PAIN OBSERVATION TOOL)
PAIN_FUNCTIONAL_ASSESSMENT: 0-10
PAIN_FUNCTIONAL_ASSESSMENT: UNABLE TO SELF-REPORT
PAIN_FUNCTIONAL_ASSESSMENT: 0-10
PAIN_FUNCTIONAL_ASSESSMENT: CPOT (CRITICAL CARE PAIN OBSERVATION TOOL)

## 2025-03-14 ASSESSMENT — PATIENT HEALTH QUESTIONNAIRE - PHQ9
1. LITTLE INTEREST OR PLEASURE IN DOING THINGS: NOT AT ALL
2. FEELING DOWN, DEPRESSED OR HOPELESS: NOT AT ALL
SUM OF ALL RESPONSES TO PHQ9 QUESTIONS 1 & 2: 0

## 2025-03-14 ASSESSMENT — PAIN DESCRIPTION - LOCATION: LOCATION: BACK

## 2025-03-14 NOTE — BRIEF OP NOTE
Date: 3/14/2025  OR Location: [facility] OR    Name: Adriana Wood, : 1955, Age: 69 y.o., MRN: [id number], Sex: female    Diagnosis  Pre-op Diagnosis      * Hardware complicating wound infection, subsequent encounter [T84.7XXD] Post-op Diagnosis     * Hardware complicating wound infection, subsequent encounter [T84.7XXD]     Procedures  CREATION, FREE FLAP, LOWER EXTREMITY  [address] - [place] FREE SKIN FLAP W/MICROVASCULAR ANASTOMOSIS      Surgeons      * Haydee Reyes - Primary    Resident/Fellow/Other Assistant:  Surgeons and Role:     * Zeinab Grimaldo MD - Resident - Assisting    Staff:   Circulator: Deidre Hopsonub Person: Ryan Posadas Person: Jakob Kennedy Circulator: Yin Kennedy Scrub: Tiffanie Kennedy Circulator: Acosta    Anesthesia Staff: Anesthesiologist: Mali Cabral MD; Carlos Blackwell MD  CRNA: ISABEL Treviño-CRNA  C-AA: HERAMN Paulino; HERMAN Townsend    Procedure Summary  Anesthesia: General  ASA: III  Estimated Blood Loss: 25 mL  Intra-op Medications:   Administrations occurring from 0655 to 1455 on 25:   Medication Name Total Dose   papaverine injection 420 mg   lidocaine (Xylocaine) 20 mg/mL (2 %) injection 30 mL   heparin 25,000 Units in sodium chloride 0.9 % 250 mL irrigation 250 mL   albumin human 5 % 500 mL   dexAMETHasone (Decadron) injection 4 mg/mL 4 mg   ePHEDrine injection 50 mg   fentaNYL (Sublimaze) injection 50 mcg/mL 100 mcg   heparin 5,000 units/mL 5,000 Units   indocyanine green (IC-Green) injection 25 mg 15 mg   LR infusion Cannot be calculated   LR infusion Cannot be calculated   lidocaine (cardiac) injection 2% prefilled syringe 100 mg   midazolam PF (Versed) injection 1 mg/mL 2 mg   phenylephrine (Robert-Synephrine) 10 mg/250 mL NS (40 mcg/mL) infusion 3.42 mg   phenylephrine 40 mcg/mL syringe 10 mL 1,000 mcg   potassium chloride 20 mEq in 100 mL IV premix 20 mEq   propofol (Diprivan) injection 10 mg/mL 130 mg   rocuronium (ZeMuron) 50 mg/5 mL  injection 100 mg   vancomycin 1 g 1 g              Anesthesia Record               Intraprocedure I/O Totals          Intake    Phenylephrine Drip 0.00 mL    The total shown is the total volume documented since Anesthesia Start was filed.    LR infusion 2300.00 mL    Total Intake 2300 mL       Output    Urine 1235 mL    Total Output 1235 mL       Net    Net Volume 1065 mL          Specimen: No specimens collected               Findings:   Old hematoma in prior right knee incision site  Lat flap to right knee   MASSIEL x 2 (donor and recipient site)  Incisional wound vac    Complications:  None; patient tolerated the procedure well.     Disposition: PACU - hemodynamically stable.  Condition: stable  Specimens Collected: No specimens collected  Attending Attestation:     Haydee Reyes  Phone Number: [phone]

## 2025-03-14 NOTE — ANESTHESIA PROCEDURE NOTES
Airway  Date/Time: 3/14/2025 7:43 AM  Urgency: elective    Airway not difficult    Staffing  Performed: CRNA   Authorized by: Mali Cabral MD    Performed by: ISABEL Treviño-GÓMEZ  Patient location during procedure: OR    Indications and Patient Condition  Indications for airway management: anesthesia  Spontaneous Ventilation: absent  Sedation level: deep  Preoxygenated: yes  Patient position: sniffing  MILS maintained throughout  Mask difficulty assessment: 1 - vent by mask    Final Airway Details  Final airway type: endotracheal airway      Successful airway: ETT  Cuffed: yes   Successful intubation technique: direct laryngoscopy  Endotracheal tube insertion site: oral  Blade: Orion  Blade size: #3  ETT size (mm): 7.0  Cormack-Lehane Classification: grade I - full view of glottis  Placement verified by: chest auscultation and capnometry   Measured from: lips  ETT to lips (cm): 22  Number of attempts at approach: 1  Ventilation between attempts: none  Number of other approaches attempted: 0

## 2025-03-14 NOTE — PROGRESS NOTES
Vancomycin Dosing by Pharmacy  INITIAL CONSULT      Adriana Wood is a 69 y.o. female who Pharmacy is consulted to dose vancomycin for cellulitis/skin and soft tissue infection.     Based on the patient's indication and renal status, this patient will be dosed based on a goal vancomycin AUC of 400-600.     Renal function is currently stable.    Estimated Creatinine Clearance: 125 mL/min (A) (by C-G formula based on SCr of 0.44 mg/dL (L)).    Results from last 7 days   Lab Units 03/14/25  1637 03/10/25  0948   CREATININE mg/dL 0.44* 0.54   BUN mg/dL 9 12   WBC AUTO x10*3/uL 11.4* 6.3        Visit Vitals  /72   Pulse 83   Temp 36 °C (96.8 °F) (Temporal)   Resp 16       Staph/MRSA Screen Culture   Date/Time Value Ref Range Status   03/04/2025 02:29 PM No Staphylococcus aureus isolated  Final     Gram Stain   Date/Time Value Ref Range Status   02/28/2025 08:45 AM (1+) Rare Polymorphonuclear leukocytes  Final   02/28/2025 08:45 AM No organisms seen  Final     Urine Culture   Date/Time Value Ref Range Status   03/27/2024 05:12 PM No significant growth  Final     Blood Culture   Date/Time Value Ref Range Status   08/08/2024 12:40 AM No growth at 4 days -  FINAL REPORT  Final   08/08/2024 12:40 AM No growth at 4 days -  FINAL REPORT  Final     Tissue/Wound Culture/Smear   Date/Time Value Ref Range Status   02/28/2025 08:45 AM No growth aerobically and anaerobically  Final        No results found for the last 90 days.    Assessment/Plan     Will order maintenance dose of 1000 mg every 12 hours. This dosing regimen is predicted by [employer] to result in the following pharmacokinetic parameters:     Vancomycin follow-up level will be ordered for 3/16 AM labs, unless clinically indicated sooner.  Will continue to monitor renal function daily while on vancomycin and order serum creatinine at least every 48 hours if not already ordered.  Will follow for continued vancomycin needs, clinical response, and signs/symptoms  of toxicity.     Ellen Wang, [facility]

## 2025-03-14 NOTE — ANESTHESIA PREPROCEDURE EVALUATION
Patient: Adriana Wood    Procedure Information       Anesthesia Start Date/Time: 03/14/25 0730    Procedure: CREATION, FREE FLAP, LOWER EXTREMITY (Right)    Location: [facility] OR  / [facility] OR    Surgeons: Haydee Reyes MD            Relevant Problems   Cardiac   (+) Bigeminy   (+) Cardiac arrest due to underlying cardiac condition   (+) Long QT interval   (+) PVC (premature ventricular contraction)   (+) Torsades de pointes (Multi)      Neuro   (+) Depression, acute   (+) Occipital neuralgia of right side   (+) Post-traumatic stress disorder, chronic      GI   (+) Chronic diarrhea   (+) GERD (gastroesophageal reflux disease)      /Renal   (+) Nephrolithiasis      Musculoskeletal   (+) Osteoarthritis of knee      ID   (+) Hardware complicating wound infection (CMS-HCC)   (+) Osteomyelitis of right tibia (Multi)   (+) Other acute osteomyelitis, right tibia and fibula (Multi)   (+) Prosthetic joint infection, subsequent encounter   (+) Wound infection       Clinical information reviewed:   Tobacco  Allergies  Meds   Med Hx  Surg Hx  OB Status  Fam Hx  Soc   Hx        NPO Detail:  NPO/Void Status  Carbohydrate Drink Given Prior to Surgery? : N  Date of Last Liquid: 03/13/25  Time of Last Liquid: 2300  Date of Last Solid: 03/13/25  Time of Last Solid: 2300  Last Intake Type: Clear fluids  Time of Last Void: 0500         Physical Exam    Airway  Mallampati: II  TM distance: >3 FB  Neck ROM: limited  Comments: Can bite upper lip   Cardiovascular    Dental   (+) upper dentures, lower dentures     Pulmonary    Abdominal            Anesthesia Plan    History of general anesthesia?: yes  History of complications of general anesthesia?: no    ASA 3     general     intravenous induction   Anesthetic plan and risks discussed with patient.    Plan discussed with CRNA and attending.

## 2025-03-14 NOTE — ANESTHESIA POSTPROCEDURE EVALUATION
Patient: Adriana Wood    Procedure Summary       Date: 03/14/25 Room / Location: [facility] OR 06 / [facility] OR    Anesthesia Start: 0730 Anesthesia Stop: 1630    Procedure: CREATION, FREE FLAP, LOWER EXTREMITY (Right: Knee) Diagnosis:       Hardware complicating wound infection, subsequent encounter      (Hardware complicating wound infection, subsequent encounter [T84.7XXD])    Surgeons: Haydee Reyes MD Responsible Provider: Carlos Blackwell MD    Anesthesia Type: general ASA Status: 3            Anesthesia Type: general    Vitals Value Taken Time   /59 03/14/25 1715   Temp 36 °C (96.8 °F) 03/14/25 1617   Pulse 75 03/14/25 1724   Resp 5 03/14/25 1724   SpO2 98 % 03/14/25 1724   Vitals shown include unfiled device data.    Anesthesia Post Evaluation    Patient location during evaluation: PACU  Patient participation: complete - patient participated  Level of consciousness: awake and alert  Pain score: 4  Pain management: adequate  Airway patency: patent  Cardiovascular status: acceptable  Respiratory status: acceptable  Hydration status: acceptable  Postoperative Nausea and Vomiting: none    No notable events documented.

## 2025-03-15 LAB
ALBUMIN SERPL BCP-MCNC: 4.1 G/DL (ref 3.4–5)
ANION GAP SERPL CALC-SCNC: 13 MMOL/L (ref 10–20)
BUN SERPL-MCNC: 8 MG/DL (ref 6–23)
CALCIUM SERPL-MCNC: 9.1 MG/DL (ref 8.6–10.6)
CHLORIDE SERPL-SCNC: 102 MMOL/L (ref 98–107)
CO2 SERPL-SCNC: 27 MMOL/L (ref 21–32)
CREAT SERPL-MCNC: 0.41 MG/DL (ref 0.5–1.05)
EGFRCR SERPLBLD CKD-EPI 2021: >90 ML/MIN/1.73M*2
ERYTHROCYTE [DISTWIDTH] IN BLOOD BY AUTOMATED COUNT: 13.9 % (ref 11.5–14.5)
GLUCOSE SERPL-MCNC: 98 MG/DL (ref 74–99)
HCT VFR BLD AUTO: 27.5 % (ref 36–46)
HGB BLD-MCNC: 9.2 G/DL (ref 12–16)
MAGNESIUM SERPL-MCNC: 2.01 MG/DL (ref 1.6–2.4)
MCH RBC QN AUTO: 28 PG (ref 26–34)
MCHC RBC AUTO-ENTMCNC: 33.5 G/DL (ref 32–36)
MCV RBC AUTO: 84 FL (ref 80–100)
NRBC BLD-RTO: 0 /100 WBCS (ref 0–0)
PHOSPHATE SERPL-MCNC: 4 MG/DL (ref 2.5–4.9)
PLATELET # BLD AUTO: 271 X10*3/UL (ref 150–450)
POTASSIUM SERPL-SCNC: 4.1 MMOL/L (ref 3.5–5.3)
RBC # BLD AUTO: 3.29 X10*6/UL (ref 4–5.2)
SODIUM SERPL-SCNC: 138 MMOL/L (ref 136–145)
WBC # BLD AUTO: 8.7 X10*3/UL (ref 4.4–11.3)

## 2025-03-15 PROCEDURE — 85027 COMPLETE CBC AUTOMATED: CPT

## 2025-03-15 PROCEDURE — 2500000004 HC RX 250 GENERAL PHARMACY W/ HCPCS (ALT 636 FOR OP/ED)

## 2025-03-15 PROCEDURE — 2500000001 HC RX 250 WO HCPCS SELF ADMINISTERED DRUGS (ALT 637 FOR MEDICARE OP)

## 2025-03-15 PROCEDURE — 2500000004 HC RX 250 GENERAL PHARMACY W/ HCPCS (ALT 636 FOR OP/ED): Mod: JW,TB | Performed by: PHYSICIAN ASSISTANT

## 2025-03-15 PROCEDURE — 83735 ASSAY OF MAGNESIUM: CPT

## 2025-03-15 PROCEDURE — 80069 RENAL FUNCTION PANEL: CPT

## 2025-03-15 PROCEDURE — 1200000003 HC ONCOLOGY  ROOM WITH TELEMETRY DAILY

## 2025-03-15 PROCEDURE — 99232 SBSQ HOSP IP/OBS MODERATE 35: CPT | Performed by: PHYSICIAN ASSISTANT

## 2025-03-15 RX ORDER — METHOCARBAMOL 500 MG/1
500 TABLET, FILM COATED ORAL EVERY 8 HOURS SCHEDULED
Status: DISCONTINUED | OUTPATIENT
Start: 2025-03-15 | End: 2025-03-24 | Stop reason: HOSPADM

## 2025-03-15 RX ORDER — METHOCARBAMOL 500 MG/1
500 TABLET, FILM COATED ORAL EVERY 8 HOURS SCHEDULED
Status: DISCONTINUED | OUTPATIENT
Start: 2025-03-15 | End: 2025-03-15

## 2025-03-15 RX ORDER — SODIUM CHLORIDE, SODIUM LACTATE, POTASSIUM CHLORIDE, CALCIUM CHLORIDE 600; 310; 30; 20 MG/100ML; MG/100ML; MG/100ML; MG/100ML
50 INJECTION, SOLUTION INTRAVENOUS CONTINUOUS
Status: ACTIVE | OUTPATIENT
Start: 2025-03-15 | End: 2025-03-16

## 2025-03-15 RX ADMIN — SODIUM CHLORIDE, POTASSIUM CHLORIDE, SODIUM LACTATE AND CALCIUM CHLORIDE 50 ML/HR: 600; 310; 30; 20 INJECTION, SOLUTION INTRAVENOUS at 23:37

## 2025-03-15 RX ADMIN — HEPARIN SODIUM 5000 UNITS: 5000 INJECTION, SOLUTION INTRAVENOUS; SUBCUTANEOUS at 12:08

## 2025-03-15 RX ADMIN — METHOCARBAMOL 500 MG: 500 TABLET ORAL at 20:33

## 2025-03-15 RX ADMIN — METHOCARBAMOL 500 MG: 1000 INJECTION, SOLUTION INTRAMUSCULAR; INTRAVENOUS at 12:07

## 2025-03-15 RX ADMIN — ACETAMINOPHEN 975 MG: 325 TABLET, FILM COATED ORAL at 16:59

## 2025-03-15 RX ADMIN — SODIUM CHLORIDE, POTASSIUM CHLORIDE, SODIUM LACTATE AND CALCIUM CHLORIDE 50 ML/HR: 600; 310; 30; 20 INJECTION, SOLUTION INTRAVENOUS at 17:01

## 2025-03-15 RX ADMIN — METHOCARBAMOL 500 MG: 1000 INJECTION, SOLUTION INTRAMUSCULAR; INTRAVENOUS at 03:44

## 2025-03-15 RX ADMIN — OXYCODONE HYDROCHLORIDE 10 MG: 10 TABLET ORAL at 08:27

## 2025-03-15 RX ADMIN — HEPARIN SODIUM 5000 UNITS: 5000 INJECTION, SOLUTION INTRAVENOUS; SUBCUTANEOUS at 03:44

## 2025-03-15 RX ADMIN — OXYCODONE HYDROCHLORIDE 10 MG: 10 TABLET ORAL at 02:09

## 2025-03-15 RX ADMIN — VANCOMYCIN HYDROCHLORIDE 1000 MG: 1 INJECTION, SOLUTION INTRAVENOUS at 20:34

## 2025-03-15 RX ADMIN — ACETAMINOPHEN 975 MG: 325 TABLET, FILM COATED ORAL at 00:53

## 2025-03-15 RX ADMIN — HEPARIN SODIUM 5000 UNITS: 5000 INJECTION, SOLUTION INTRAVENOUS; SUBCUTANEOUS at 20:33

## 2025-03-15 RX ADMIN — OXYCODONE HYDROCHLORIDE 10 MG: 10 TABLET ORAL at 23:33

## 2025-03-15 RX ADMIN — ACETAMINOPHEN 975 MG: 325 TABLET, FILM COATED ORAL at 08:24

## 2025-03-15 RX ADMIN — ALTEPLASE 1 MG: 2.2 INJECTION, POWDER, LYOPHILIZED, FOR SOLUTION INTRAVENOUS at 00:53

## 2025-03-15 RX ADMIN — DIPHENHYDRAMINE HYDROCHLORIDE, ZINC ACETATE: 2; .1 CREAM TOPICAL at 11:26

## 2025-03-15 RX ADMIN — VANCOMYCIN HYDROCHLORIDE 1000 MG: 1 INJECTION, SOLUTION INTRAVENOUS at 08:36

## 2025-03-15 RX ADMIN — OXYCODONE HYDROCHLORIDE 10 MG: 10 TABLET ORAL at 18:32

## 2025-03-15 RX ADMIN — OXYCODONE HYDROCHLORIDE 10 MG: 10 TABLET ORAL at 14:27

## 2025-03-15 ASSESSMENT — COGNITIVE AND FUNCTIONAL STATUS - GENERAL
MOVING TO AND FROM BED TO CHAIR: A LOT
DRESSING REGULAR LOWER BODY CLOTHING: A LITTLE
DRESSING REGULAR UPPER BODY CLOTHING: A LITTLE
WALKING IN HOSPITAL ROOM: TOTAL
MOBILITY SCORE: 12
MOVING FROM LYING ON BACK TO SITTING ON SIDE OF FLAT BED WITH BEDRAILS: A LITTLE
CLIMB 3 TO 5 STEPS WITH RAILING: TOTAL
STANDING UP FROM CHAIR USING ARMS: A LOT
DAILY ACTIVITIY SCORE: 18
TURNING FROM BACK TO SIDE WHILE IN FLAT BAD: A LITTLE
PERSONAL GROOMING: A LITTLE
TOILETING: A LITTLE
EATING MEALS: A LITTLE
HELP NEEDED FOR BATHING: A LITTLE

## 2025-03-15 ASSESSMENT — PAIN SCALES - GENERAL
PAINLEVEL_OUTOF10: 8
PAINLEVEL_OUTOF10: 8
PAINLEVEL_OUTOF10: 0 - NO PAIN
PAINLEVEL_OUTOF10: 7
PAINLEVEL_OUTOF10: 7
PAINLEVEL_OUTOF10: 6
PAINLEVEL_OUTOF10: 8
PAINLEVEL_OUTOF10: 4

## 2025-03-15 ASSESSMENT — PAIN - FUNCTIONAL ASSESSMENT
PAIN_FUNCTIONAL_ASSESSMENT: 0-10

## 2025-03-15 ASSESSMENT — PAIN DESCRIPTION - LOCATION
LOCATION: KNEE
LOCATION: LEG

## 2025-03-15 ASSESSMENT — PAIN DESCRIPTION - ORIENTATION: ORIENTATION: RIGHT

## 2025-03-15 NOTE — PROGRESS NOTES
Pharmacy Medication History Review    Adriana Wood is a 69 y.o. female admitted for Hardware complicating wound infection (CMS-HCC). Pharmacy reviewed the patient's [id number] medications for accuracy.    Medications ADDED:  None  Medications CHANGED:  None  Medications REMOVED:   None     The list below reflects the updated PTA list.   Prior to Admission Medications   Prescriptions Last Dose Informant   acetaminophen (Tylenol) 325 mg tablet 3/13/2025 at 11:00 PM Self   Sig: Take 2 tablets (650 mg) by mouth every 6 hours if needed for mild pain (1 - 3) for up to 15 days.   albuterol 90 mcg/actuation inhaler More than a month Self   Sig: Inhale 2 puffs every 6 hours if needed for shortness of breath.   Patient not taking: Reported on 2/28/2025   chlorhexidine (Hibiclens) 4 % external liquid 3/14/2025 at  3:00 AM Self   Sig: Apply topically once daily as needed for wound care. Use for 5 days prior to surgery as body wash, do not use on face or genital region   cyclobenzaprine (Flexeril) 5 mg tablet Past Week Self   Sig: GIVE 1 TABLET BY MOUTH EVERY 8 HOURS AS NEEDED FOR MUSCLE SPASMS   docusate sodium (Colace) 100 mg capsule Not Taking Self   Sig: Take 1 capsule (100 mg) by mouth 2 times a day as needed for constipation for up to 15 days.   Patient not taking: Reported on 3/14/2025   naloxone (Narcan) 0.4 mg/mL injection Not Taking Self   Sig: Infuse 0.5 mL (0.2 mg) into a venous catheter every 5 minutes if needed for respiratory depression.   Patient not taking: Reported on 3/14/2025   oxyCODONE (Roxicodone) 5 mg immediate release tablet 3/13/2025 at 11:00 PM Self   Sig: Take 1 tablet (5 mg) by mouth every 4 hours if needed for severe pain (7 - 10) or moderate pain (4 - 6) for up to 7 days.   polyethylene glycol (Glycolax, Miralax) 17 gram packet Past Week Self   Sig: Take 17 g by mouth once daily.   prochlorperazine (Compazine) 10 mg tablet Not Taking Self   Sig: Take 1 tablet (10 mg) by mouth every 6  "hours if needed for vomiting or nausea.   Patient not taking: Reported on 2/27/2025   sennosides-docusate sodium (Stephany-Colace) 8.6-50 mg tablet Not Taking Self   Sig: Take 2 tablets by mouth 2 times a day as needed for constipation.   Patient not taking: Reported on 2/27/2025   vancomycin, Vancocin, (Vancocin) 1250 mg/250 mL piggyback IV 3/13/2025 at  3:00 PM Self   Sig: Infuse 275 mL (1,250 mg) at 220 mL/hr over 75 minutes into a venous catheter once every 24 hours. Do not fill before March 5, 2025.      Facility-Administered Medications: None      Patient was unable to be assessed for M2B at discharge.     Sources:   [facility]  Pharmacy dispense history  Patient Interview patient was at [facility] rehab facility in [place], [place] on [address].  Attempted to call rehab facility to fax med list but no one on nursing [address] were answering phone.  Patient was pretty familiar with her medications she was receiving at rehab and said has only been taking oxycodone, cyclobenzaprine, miralax, tylenol, and daily IV vancomycin (did not know dose)  Chart Review  Care Everywhere  SNF patient is at [facility] rehab facility in [place], [place] on [address].  Attempted to call rehab facility to fax med list but no one on nursing [address] were answering phone.   Reviewed [facility] discharge summary med list / AVS med list from 3/8/25    Additional Comments:  Patient reported she is taking 10mg tablet strength of both cyclobenzaprine and oxycodone at rehab, butl last pharmacy dispense on 3/10/25  [facility] only shows dispense history of 5mg tablets for both meds  Patient reports only other meds she has ordered at [facility] are prn meds which she has not been needing      Piero ArceoD  Transitions of Care Pharmacist  03/14/25     Secure Chat preferred   If no response call [phone] or Vocera \"Med Rec\"    "

## 2025-03-15 NOTE — NURSING NOTE
Pt c/o angelica in DMITRY. PA notified. Pulse checked with doppler. Positive, strong pulse. Will monitor.

## 2025-03-15 NOTE — PROGRESS NOTES
"  Department of Plastic and Reconstructive Surgery  Daily Progress Note    Patient Name: Adriana Wood  MRN: [id number]  Date:  03/15/25     Subjective  Patient resting in bed on exam. POD1 s/p R latissimus flap to R knee. Unfortunately tourniquet was left on patient's left arm under blood pressure cuff during surgery so continue to evaluate L arm for pain, numbness and tingling and intact pulses. She reports some itching to L upper arm where tourniquet was left, benadryl cream was ordered. Otherwise denies any fever, chills, night sweats, CP, SOB, palpitations, nausea, vomiting, or abdominal pain/discomfort.    Objective  Vital Signs  /77   Pulse 53   Temp 36.5 °C (97.7 °F) (Temporal)   Resp 18   Ht 1.778 m (5' 10\")   Wt 69.1 kg (152 lb 5.4 oz)   SpO2 97%   BMI 21.86 kg/m²      Physical Exam   Constitutional: NAD  Eyes: EOMI, clear sclera   ENMT: Moist mucous membranes, no apparent injuries or lesions  Head/Neck: NCAT  Cardiovascular: Extremities WWP  Respiratory/Thorax: Unlabored respirations on 2L NC  Gastrointestinal: Abdomen soft, non-distended, non-tender  Genitourinary: indwelling damon  Extremities: RLE flap: warm to touch, soft and compressible, strong biphasic signal at marked stitch, circumferential incisions around flap well approximated with scant amount of ss drainage, covered with xeroform, penrose exiting flap with ss output, vioptix in place with signal of 79% with 90% signal strength  Right posterior lat donor site: prevena incisional wound vac in place, holding suction at -125 mmHg, no alarm or leak noted, MASSIEL drain x 1 to right flank with ss output  Neurological: A&Ox3  Psychological: Appropriate mood and behavior  Skin: Warm and dry. L upper extremity with ecchymosis circumferentially where tourniquet was placed during surgery.    Diagnostics   Results for orders placed or performed during the hospital encounter of 03/14/25 (from the past 24 hours)   BLOOD GAS ARTERIAL FULL PANEL "   Result Value Ref Range    POCT pH, Arterial 7.40 7.38 - 7.42 pH    POCT pCO2, Arterial 44 (H) 38 - 42 mm Hg    POCT pO2, Arterial 195 (H) 85 - 95 mm Hg    POCT SO2, Arterial 100 94 - 100 %    POCT Oxy Hemoglobin, Arterial 96.8 94.0 - 98.0 %    POCT Hematocrit Calculated, Arterial 38.0 36.0 - 46.0 %    POCT Sodium, Arterial 136 136 - 145 mmol/L    POCT Potassium, Arterial 4.0 3.5 - 5.3 mmol/L    POCT Chloride, Arterial 105 98 - 107 mmol/L    POCT Ionized Calcium, Arterial 1.14 1.10 - 1.33 mmol/L    POCT Glucose, Arterial 133 (H) 74 - 99 mg/dL    POCT Lactate, Arterial 1.2 0.4 - 2.0 mmol/L    POCT Base Excess, Arterial 2.1 -2.0 - 3.0 mmol/L    POCT HCO3 Calculated, Arterial 27.3 (H) 22.0 - 26.0 mmol/L    POCT Hemoglobin, Arterial 12.6 12.0 - 16.0 g/dL    POCT Anion Gap, Arterial 8 (L) 10 - 25 mmo/L    Patient Temperature 37.0 degrees Celsius    FiO2 50 %   CBC   Result Value Ref Range    WBC 11.4 (H) 4.4 - 11.3 x10*3/uL    nRBC 0.0 0.0 - 0.0 /100 WBCs    RBC 3.32 (L) 4.00 - 5.20 x10*6/uL    Hemoglobin 9.5 (L) 12.0 - 16.0 g/dL    Hematocrit 28.5 (L) 36.0 - 46.0 %    MCV 86 80 - 100 fL    MCH 28.6 26.0 - 34.0 pg    MCHC 33.3 32.0 - 36.0 g/dL    RDW 13.7 11.5 - 14.5 %    Platelets 252 150 - 450 x10*3/uL   Renal Function Panel   Result Value Ref Range    Glucose 157 (H) 74 - 99 mg/dL    Sodium 136 136 - 145 mmol/L    Potassium 4.1 3.5 - 5.3 mmol/L    Chloride 102 98 - 107 mmol/L    Bicarbonate 25 21 - 32 mmol/L    Anion Gap 13 10 - 20 mmol/L    Urea Nitrogen 9 6 - 23 mg/dL    Creatinine 0.44 (L) 0.50 - 1.05 mg/dL    eGFR >90 >60 mL/min/1.73m*2    Calcium 8.6 8.6 - 10.6 mg/dL    Phosphorus 3.3 2.5 - 4.9 mg/dL    Albumin 4.2 3.4 - 5.0 g/dL   Magnesium   Result Value Ref Range    Magnesium 1.76 1.60 - 2.40 mg/dL   CBC   Result Value Ref Range    WBC 8.7 4.4 - 11.3 x10*3/uL    nRBC 0.0 0.0 - 0.0 /100 WBCs    RBC 3.29 (L) 4.00 - 5.20 x10*6/uL    Hemoglobin 9.2 (L) 12.0 - 16.0 g/dL    Hematocrit 27.5 (L) 36.0 - 46.0 %    MCV  84 80 - 100 fL    MCH 28.0 26.0 - 34.0 pg    MCHC 33.5 32.0 - 36.0 g/dL    RDW 13.9 11.5 - 14.5 %    Platelets 271 150 - 450 x10*3/uL   Renal Function Panel   Result Value Ref Range    Glucose 98 74 - 99 mg/dL    Sodium 138 136 - 145 mmol/L    Potassium 4.1 3.5 - 5.3 mmol/L    Chloride 102 98 - 107 mmol/L    Bicarbonate 27 21 - 32 mmol/L    Anion Gap 13 10 - 20 mmol/L    Urea Nitrogen 8 6 - 23 mg/dL    Creatinine 0.41 (L) 0.50 - 1.05 mg/dL    eGFR >90 >60 mL/min/1.73m*2    Calcium 9.1 8.6 - 10.6 mg/dL    Phosphorus 4.0 2.5 - 4.9 mg/dL    Albumin 4.1 3.4 - 5.0 g/dL   Magnesium   Result Value Ref Range    Magnesium 2.01 1.60 - 2.40 mg/dL     *Note: Due to a large number of results and/or encounters for the requested time period, some results have not been displayed. A complete set of results can be found in Results Review.     ECG 12 Lead    Result Date: 3/6/2025   Poor data quality, interpretation may be adversely affected Sinus bradycardia with 1st degree AV block Nonspecific T wave abnormality Prolonged QT Abnormal ECG Confirmed by Clyde Burkett ([id number]) on 3/6/2025 3:09:25 PM    ECG 12 lead    Result Date: 3/5/2025  Sinus tachycardia with frequent Premature ventricular complexes in a pattern of bigeminy Nonspecific T wave abnormality Abnormal ECG When compared with ECG of 01-MAR-2025 09:07, Significant changes have occurred Confirmed by Baron Sanders ([id number]) on 3/5/2025 8:34:27 AM    Bedside PICC Imaging    Result Date: 3/4/2025  These images are not reportable by radiology and will not be interpreted by  Radiologists.    XR knee right 1-2 views    Result Date: 2/28/2025  Interpreted By:  Juan Smith, STUDY: XR KNEE RIGHT 1-2 VIEWS; ;  2/28/2025 11:36 am   INDICATION: Signs/Symptoms:PACU.     COMPARISON: 01/16/2025.   ACCESSION NUMBER(S): [id number]   ORDERING CLINICIAN: DARSHAN LARSON   FINDINGS: Two views of the right knee   Postsurgical changes of right knee fusion with cement and productive changes within  the joint space. The knee joint alignment is similar compared to prior. Interval removal of intramedullary nail with ghost tract. Similar-appearing remote fractures of the proximal fibula and proximal tibia. Osteopenic changes. Vascular calcifications. Soft tissue edema over the anterior knee with overlying wound VAC and subcutaneous air, likely postsurgical. No definitive new fracture.       1. Postsurgical changes of the right knee with fusion of the joint space and interval removal of the intramedullary nail. Overlying soft tissue edema and wound VAC.       MACRO: None   Signed by: Juan Smith 2/28/2025 12:56 PM Dictation workstation:   [id number]    FL fluoro images no charge    Result Date: 2/28/2025  These images are not reportable by radiology and will not be interpreted by  Radiologists.      Current Medications  Scheduled medications  acetaminophen, 975 mg, oral, q8h  heparin (porcine), 5,000 Units, subcutaneous, q8h  methocarbamol, 500 mg, intravenous, q8h  polyethylene glycol, 17 g, oral, Daily  sennosides-docusate sodium, 2 tablet, oral, BID  vancomycin, 1,000 mg, intravenous, q12h      Continuous medications  lactated Ringer's, 50 mL/hr, Last Rate: 50 mL/hr (03/14/25 1900)      PRN medications  PRN medications: albuterol, alteplase, calcium carbonate, diphenhydramine-zinc acetate, HYDROmorphone, melatonin, naloxone, oxyCODONE, oxyCODONE, trimethobenzamide, vancomycin     Assessment  Adriana Wood is a 69 y.o. female with a past medical history of bradycardia, cardiomyopathy, CAD, cardiac arrest due to electrolyte abnormalities and zofran use post-operatively, and traumatic brain injury. Patient had a total knee right knee done by Dr. Whelan in March 2024 which was complicated by E. Coli infection. Patient had right TKA wound dehiscence s/p I&D right knee with polyswap and attempted re-closure of complex wound on 5/10/2024 by Dr. Whelan. She was referred to plastic surgery (Dr. Reyes)  perioperatively given projected anticipated need for possible flap coverage of the wound/defect site. Patient re-admitted and returned to the OR with orthopedics on 2/28/2025 for right knee I&D, revision static spacer and application of incisional wound vac. Plastic surgery service consulted postoperatively to follow for flap recommendations/timing, now s/p R latissimus free flap to R knee on 3/14 with Dr. Lundberg.     Plan/Recommendations  S/p R latissimus free flap to R knee on 3/14 with Dr. Lundberg  - Continue serial flap assessments Q1hour per nursing and Q2hour per plastic surgery team       ·  Assess Doppler pulse at marked site plus flap appearance (color, warmth, turgor)       ·  Nursing to notify plastic surgery team immediately if there are any acute changes          (Including decreased Doppler signal, pallor, temperature change, bleeding, etc.)  - Nursing to monitor and record consistency and volume of drainage from Penrose drain(s) at flap site  - Continue MASSIEL drain care per nursing (MASSIEL x 1)        ·  Strip drain tubing TID and PRN       ·  Monitor and record output [id number]        ·  Keep drain sites C/D/I with daily drain dressing changes        ·  Call plastics if drain output is >30cc in an hour or greater than 200cc over 8 hours  - Continue incisional wound vac therapy to R lat donor site x10-14 days post op        ·  Settings: 125 mmHg low continuous suction        ·  Please keep dressing C/D/I, reinforce PRN        ·  Record vac output per nursing q8h       ·  Please alert plastics team with any concerns regarding vac        ·  Plan to transition to Prevena homegoing vac on day of discharge    - IV Vancomycin while in-patient  - Maintain Vioptix > 30%, notify plastics if < 30%   - Keep RLE carefully positioned elevated with pillows        ·  Avoid positioning that would apply pressure to flap region or incisions   - NWB, patient to maintain strict bedrest    - Maintain Rosenbaum while on bedrest with  anticipated removal on POD#1-2   - CLD began POD1, may ADAT       ·  LR @ 50 mL while NPO and until adequate PO intake        ·  Strict monitoring and recording of I&Os per nursing  - Continue use of Rashid hugger        ·  Settings: low heat, medium continuous fan        ·  5 hours on, one hour off x 3 days post op   - Daily local wound care/dressing changes per plastic surgery APPs (Xeroform over incision lines)       ·  Monitor surgical sites for S/S of bleeding, infection or dehiscence  - Maintain BP of 110/60 minimum to ensure adequate flap perfusion   - Low transfusion threshold to ensure adequate flap perfusion  - SQ Heparin [id number] resumed postoperatively, transition to Lovenox on POD#3 (3/17)   - Monitor VS/pulse ox [id number]   - Monitor AM CBC/RFP    # Hx of septic arthritis of R knee   - Per ID note on 3/3: IV Vancomycin while in-patient, plan for 6 weeks of vancomycin from date of surgery 2/28/25 - until stop date 4/11/25    # Acute postoperative pain  - Well controlled per patient, continue current regimen       ·  Dilaudid x24 hours postoperatively, wean as tolerated on POD#1       ·  Scheduled Tylenol 975mg PO [id number]        ·  Scheduled Robaxin 500 mg IV [id number]       ·  Oxycodone 5/10mg PO [id number] PRN mod/severe pain   - Bowel regimen with Stephany-Colace while using narcotics  - IV Tigan PRN nausea due to narcotics (NO Zofran due to hx of cardiac arrest)  - Pain assessments [id number]     # L arm itching  - Benadryl cream ordered for itching  - L arm tourniquet was left on patients arm during OR procedure by anesthesia staff, continue to evaluate L arm sensation and distal pulses, consider ultrasound if worsening pain or surrounding skin changes    # QT Prolongation  - Avoid QT prolonging medications; NO Zofran (hx of cardiac arrest)  - Telemetry monitoring    # Post-Op Anemia   - HGB on AM labs at 9.2   - Presently no S/S of bleeding, considered likely due to intraoperative blood loss   - Keep T&S UTD, last  obtained 3/14  - Monitor for S/S of bleeding or symptomatic anemia   - Low transfusion threshold to ensure adequate flap perfusion  - Transfuse for HGB <7 per protocol   - Monitor AM CBC     Prophylaxis:   - DVT: SQ heparin [id number] (plan to transition to SQ Lovenox on POD#3), SCDs  - Encourage IS x10 every hour while awake   - Bowel regimen: Stephany-Colace       Disposition: Continue care on RNF. Will remain inpatient for continued flap monitoring and vac/drain surveillance postoperatively. Follow up appointment with plastic surgery department to be scheduled closer to anticipated date of discharge.      Patient and plan discussed with Dr. Toño Ruiz, PA-C   Plastic and Reconstructive Surgery   [place]  Pager #[phone]  Team phone: [phone]

## 2025-03-15 NOTE — CARE PLAN
Problem: Pain  Goal: Takes deep breaths with improved pain control throughout the shift  Outcome: Progressing  Goal: Turns in bed with improved pain control throughout the shift  Outcome: Progressing  Goal: Walks with improved pain control throughout the shift  Outcome: Progressing  Goal: Performs ADL's with improved pain control throughout shift  Outcome: Progressing  Goal: Participates in PT with improved pain control throughout the shift  Outcome: Progressing  Goal: Free from opioid side effects throughout the shift  Outcome: Progressing  Goal: Free from acute confusion related to pain meds throughout the shift  Outcome: Progressing   The patient's goals for the shift include      The clinical goals for the shift include pt will remain safe and free from injury

## 2025-03-15 NOTE — PROGRESS NOTES
Department of Plastic and Reconstructive Surgery   Plastic Surgery Post-Operative Check     Subjective:  Patient resting in bed. Doing well postoperatively. States she has a headache but nurse is grabbing her tylenol. Pain is well controlled. NPO. Unfortunately tourniquet was left on patients left arm under blood pressure cuff during surgery so continue to evaluate L arm for pain, numbness and tingling and intact pulses. Denies any fever, chills, night sweats, CP, SOB, palpitations, nausea, vomiting, or abdominal pain/discomfort.    Objective:  Physical Exam:   Constitutional: NAD  Eyes: EOMI, clear sclera   ENMT: Moist mucous membranes, no apparent injuries or lesions  Head/Neck: NCAT  Cardiovascular: Extremities WWP  Respiratory/Thorax: Unlabored respirations on 2L NC  Gastrointestinal: Abdomen soft, non-distended, non-tender  Genitourinary: indwelling damon  Extremities: RLE flap: warm to touch, soft and compressible, strong biphasic signal at marked stitch, circumferential incisions around flap well approximated with scant amount of ss drainage, covered with xeroform, penrose exiting flap with ss output, vioptix in place with signal of 75% with 91% signal strength  Right posterior lat: prevena incisional wound vac in place, holding suction at -125 mmHg, no alarm or leak noted, MASSIEL drain x 1 to right lat with ss output  Neurological: A&Ox3  Psychological: Appropriate mood and behavior  Skin: Warm and dry with no lesions or rashes    Assessment and Plan:  - doing well post operatively, pain well controlled  - NPO until reassessed by plastics tomorrow  - Doppler/Flap check Q1 hour per nursing, plastics doppler Q2 x 24-48 hrs  - Bedrest, assure no compression on R anterior lower leg/shin  - drain care- monitor MASSIEL output and strip drain Q4  - IV Ancef x 3 doses post operatively  - as needed antiemetic, NO ZOFRAN (Qtc prolongation)  - prn pain management  - maintain prevena incisional wound vac at 125 mmHg, notify  plastics with any alarms or leaks  - maintain Vioptix > 30%, notify plastics if < 30%  - VSQ4  - LR @ 50 cc/hr  - monitor labs, post-op Hgb 9.5, monitor AM labs 3/15  - maintain Rashid hugger to RLE on for 5 hours and off for 1 hour x 5 days  - Rosenbaum to GD- to remain in place likely for 3-5 days post op while on strict bedrest  - DVT ppx: Heparin SQ Q8 hrs post op at 2000 start ASA 81 mg daily tomorrow, on POD#3 transition to Lovenox  - Notify plastics immediately with any concerns for change in color, decreased doppler signal to flap  - L arm tourniquet was left on patients arm during entire procedure by anesthesia staff, continue to evaluate L arm sensation and distal pulses, consider ultrasound    Dispo planning: Will remain inpatient for 5-7 days on RNF for flap monitoring, strict bedrest, pain management.  Discussed plan and updated Dr. Reyes.     Ivy Nelson PA-C  Plastic and Reconstructive Surgery  [place]; [phone]

## 2025-03-16 ENCOUNTER — OFFICE VISIT (OUTPATIENT)
Dept: SURGICAL ONCOLOGY | Facility: HOSPITAL | Age: 70
DRG: 908 | End: 2025-03-16
Payer: MEDICARE

## 2025-03-16 VITALS
OXYGEN SATURATION: 95 % | DIASTOLIC BLOOD PRESSURE: 76 MMHG | TEMPERATURE: 97.8 F | WEIGHT: 152.34 LBS | HEART RATE: 85 BPM | SYSTOLIC BLOOD PRESSURE: 128 MMHG | RESPIRATION RATE: 18 BRPM | BODY MASS INDEX: 21.81 KG/M2 | HEIGHT: 70 IN

## 2025-03-16 LAB
ABO GROUP (TYPE) IN BLOOD: NORMAL
ANION GAP SERPL CALC-SCNC: 13 MMOL/L (ref 10–20)
ANTIBODY SCREEN: NORMAL
BLOOD EXPIRATION DATE: NORMAL
BUN SERPL-MCNC: 6 MG/DL (ref 6–23)
CALCIUM SERPL-MCNC: 8.8 MG/DL (ref 8.6–10.6)
CARDIAC TROPONIN I PNL SERPL HS: 3 NG/L (ref 0–34)
CHLORIDE SERPL-SCNC: 103 MMOL/L (ref 98–107)
CO2 SERPL-SCNC: 27 MMOL/L (ref 21–32)
CREAT SERPL-MCNC: 0.52 MG/DL (ref 0.5–1.05)
DAT-POLYSPECIFIC: NORMAL
DISPENSE STATUS: NORMAL
EGFRCR SERPLBLD CKD-EPI 2021: >90 ML/MIN/1.73M*2
ERYTHROCYTE [DISTWIDTH] IN BLOOD BY AUTOMATED COUNT: 14.4 % (ref 11.5–14.5)
GLUCOSE SERPL-MCNC: 94 MG/DL (ref 74–99)
HCT VFR BLD AUTO: 27.6 % (ref 36–46)
HGB BLD-MCNC: 8.8 G/DL (ref 12–16)
MAGNESIUM SERPL-MCNC: 1.89 MG/DL (ref 1.6–2.4)
MCH RBC QN AUTO: 28.2 PG (ref 26–34)
MCHC RBC AUTO-ENTMCNC: 31.9 G/DL (ref 32–36)
MCV RBC AUTO: 89 FL (ref 80–100)
NRBC BLD-RTO: 0 /100 WBCS (ref 0–0)
PLATELET # BLD AUTO: 270 X10*3/UL (ref 150–450)
POTASSIUM SERPL-SCNC: 4 MMOL/L (ref 3.5–5.3)
PRODUCT BLOOD TYPE: 5100
PRODUCT CODE: NORMAL
RBC # BLD AUTO: 3.12 X10*6/UL (ref 4–5.2)
RH FACTOR (ANTIGEN D): NORMAL
SODIUM SERPL-SCNC: 139 MMOL/L (ref 136–145)
UNIT ABO: NORMAL
UNIT NUMBER: NORMAL
UNIT RH: NORMAL
UNIT VOLUME: 350
VANCOMYCIN SERPL-MCNC: 21.7 UG/ML (ref 5–20)
WBC # BLD AUTO: 6.1 X10*3/UL (ref 4.4–11.3)
XM INTEP: NORMAL

## 2025-03-16 PROCEDURE — 93010 ELECTROCARDIOGRAM REPORT: CPT | Performed by: INTERNAL MEDICINE

## 2025-03-16 PROCEDURE — 2500000004 HC RX 250 GENERAL PHARMACY W/ HCPCS (ALT 636 FOR OP/ED)

## 2025-03-16 PROCEDURE — 86077 PHYS BLOOD BANK SERV XMATCH: CPT | Performed by: PATHOLOGY

## 2025-03-16 PROCEDURE — 83735 ASSAY OF MAGNESIUM: CPT | Performed by: PHYSICIAN ASSISTANT

## 2025-03-16 PROCEDURE — 36430 TRANSFUSION BLD/BLD COMPNT: CPT

## 2025-03-16 PROCEDURE — 86850 RBC ANTIBODY SCREEN: CPT

## 2025-03-16 PROCEDURE — 99221 1ST HOSP IP/OBS SF/LOW 40: CPT

## 2025-03-16 PROCEDURE — 80202 ASSAY OF VANCOMYCIN: CPT

## 2025-03-16 PROCEDURE — 80048 BASIC METABOLIC PNL TOTAL CA: CPT

## 2025-03-16 PROCEDURE — 93005 ELECTROCARDIOGRAM TRACING: CPT

## 2025-03-16 PROCEDURE — P9016 RBC LEUKOCYTES REDUCED: HCPCS

## 2025-03-16 PROCEDURE — 99222 1ST HOSP IP/OBS MODERATE 55: CPT | Performed by: INTERNAL MEDICINE

## 2025-03-16 PROCEDURE — 86870 RBC ANTIBODY IDENTIFICATION: CPT

## 2025-03-16 PROCEDURE — 2500000001 HC RX 250 WO HCPCS SELF ADMINISTERED DRUGS (ALT 637 FOR MEDICARE OP)

## 2025-03-16 PROCEDURE — 2500000005 HC RX 250 GENERAL PHARMACY W/O HCPCS

## 2025-03-16 PROCEDURE — 99232 SBSQ HOSP IP/OBS MODERATE 35: CPT | Performed by: PHYSICIAN ASSISTANT

## 2025-03-16 PROCEDURE — 86901 BLOOD TYPING SEROLOGIC RH(D): CPT

## 2025-03-16 PROCEDURE — 86880 COOMBS TEST DIRECT: CPT

## 2025-03-16 PROCEDURE — 1200000003 HC ONCOLOGY  ROOM WITH TELEMETRY DAILY

## 2025-03-16 PROCEDURE — 84484 ASSAY OF TROPONIN QUANT: CPT | Performed by: PHYSICIAN ASSISTANT

## 2025-03-16 PROCEDURE — 85027 COMPLETE CBC AUTOMATED: CPT

## 2025-03-16 RX ORDER — DICLOFENAC SODIUM 10 MG/G
4 GEL TOPICAL 4 TIMES DAILY PRN
Status: DISCONTINUED | OUTPATIENT
Start: 2025-03-16 | End: 2025-03-24 | Stop reason: HOSPADM

## 2025-03-16 RX ORDER — LIDOCAINE 560 MG/1
1 PATCH PERCUTANEOUS; TOPICAL; TRANSDERMAL DAILY
Status: DISCONTINUED | OUTPATIENT
Start: 2025-03-16 | End: 2025-03-24 | Stop reason: HOSPADM

## 2025-03-16 RX ORDER — SODIUM CHLORIDE, SODIUM LACTATE, POTASSIUM CHLORIDE, CALCIUM CHLORIDE 600; 310; 30; 20 MG/100ML; MG/100ML; MG/100ML; MG/100ML
50 INJECTION, SOLUTION INTRAVENOUS CONTINUOUS
Status: ACTIVE | OUTPATIENT
Start: 2025-03-16 | End: 2025-03-17

## 2025-03-16 RX ORDER — VANCOMYCIN HYDROCHLORIDE 1 G/200ML
1000 INJECTION, SOLUTION INTRAVENOUS EVERY 24 HOURS
Status: DISCONTINUED | OUTPATIENT
Start: 2025-03-17 | End: 2025-03-18

## 2025-03-16 RX ORDER — PANTOPRAZOLE SODIUM 40 MG/1
40 TABLET, DELAYED RELEASE ORAL
Status: DISCONTINUED | OUTPATIENT
Start: 2025-03-17 | End: 2025-03-24 | Stop reason: HOSPADM

## 2025-03-16 RX ORDER — LIDOCAINE 560 MG/1
1 PATCH PERCUTANEOUS; TOPICAL; TRANSDERMAL DAILY PRN
Status: DISCONTINUED | OUTPATIENT
Start: 2025-03-16 | End: 2025-03-24 | Stop reason: HOSPADM

## 2025-03-16 RX ADMIN — ACETAMINOPHEN 975 MG: 325 TABLET, FILM COATED ORAL at 08:06

## 2025-03-16 RX ADMIN — POLYETHYLENE GLYCOL 3350 17 G: 17 POWDER, FOR SOLUTION ORAL at 08:07

## 2025-03-16 RX ADMIN — HEPARIN SODIUM 5000 UNITS: 5000 INJECTION, SOLUTION INTRAVENOUS; SUBCUTANEOUS at 21:54

## 2025-03-16 RX ADMIN — OXYCODONE HYDROCHLORIDE 10 MG: 10 TABLET ORAL at 08:05

## 2025-03-16 RX ADMIN — HEPARIN SODIUM 5000 UNITS: 5000 INJECTION, SOLUTION INTRAVENOUS; SUBCUTANEOUS at 11:53

## 2025-03-16 RX ADMIN — OXYCODONE HYDROCHLORIDE 10 MG: 10 TABLET ORAL at 22:29

## 2025-03-16 RX ADMIN — HEPARIN SODIUM 5000 UNITS: 5000 INJECTION, SOLUTION INTRAVENOUS; SUBCUTANEOUS at 04:26

## 2025-03-16 RX ADMIN — LIDOCAINE 1 PATCH: 4 PATCH TOPICAL at 18:17

## 2025-03-16 RX ADMIN — VANCOMYCIN HYDROCHLORIDE 1000 MG: 1 INJECTION, SOLUTION INTRAVENOUS at 08:07

## 2025-03-16 RX ADMIN — METHOCARBAMOL 500 MG: 500 TABLET ORAL at 21:54

## 2025-03-16 RX ADMIN — SODIUM CHLORIDE, POTASSIUM CHLORIDE, SODIUM LACTATE AND CALCIUM CHLORIDE 50 ML/HR: 600; 310; 30; 20 INJECTION, SOLUTION INTRAVENOUS at 21:53

## 2025-03-16 RX ADMIN — ACETAMINOPHEN 975 MG: 325 TABLET, FILM COATED ORAL at 16:49

## 2025-03-16 RX ADMIN — OXYCODONE HYDROCHLORIDE 10 MG: 10 TABLET ORAL at 14:09

## 2025-03-16 RX ADMIN — OXYCODONE HYDROCHLORIDE 10 MG: 10 TABLET ORAL at 18:12

## 2025-03-16 SDOH — ECONOMIC STABILITY: FOOD INSECURITY: WITHIN THE PAST 12 MONTHS, YOU WORRIED THAT YOUR FOOD WOULD RUN OUT BEFORE YOU GOT THE MONEY TO BUY MORE.: NEVER TRUE

## 2025-03-16 SDOH — SOCIAL STABILITY: SOCIAL INSECURITY: WITHIN THE LAST YEAR, HAVE YOU BEEN AFRAID OF YOUR PARTNER OR EX-PARTNER?: NO

## 2025-03-16 SDOH — ECONOMIC STABILITY: HOUSING INSECURITY: IN THE LAST 12 MONTHS, WAS THERE A TIME WHEN YOU WERE NOT ABLE TO PAY THE MORTGAGE OR RENT ON TIME?: NO

## 2025-03-16 SDOH — ECONOMIC STABILITY: HOUSING INSECURITY: IN THE PAST 12 MONTHS, HOW MANY TIMES HAVE YOU MOVED WHERE YOU WERE LIVING?: 1

## 2025-03-16 SDOH — ECONOMIC STABILITY: FOOD INSECURITY: WITHIN THE PAST 12 MONTHS, THE FOOD YOU BOUGHT JUST DIDN'T LAST AND YOU DIDN'T HAVE MONEY TO GET MORE.: NEVER TRUE

## 2025-03-16 SDOH — ECONOMIC STABILITY: HOUSING INSECURITY: AT ANY TIME IN THE PAST 12 MONTHS, WERE YOU HOMELESS OR LIVING IN A SHELTER (INCLUDING NOW)?: NO

## 2025-03-16 SDOH — ECONOMIC STABILITY: INCOME INSECURITY: IN THE PAST 12 MONTHS HAS THE ELECTRIC, GAS, OIL, OR WATER COMPANY THREATENED TO SHUT OFF SERVICES IN YOUR HOME?: NO

## 2025-03-16 SDOH — SOCIAL STABILITY: SOCIAL INSECURITY: WITHIN THE LAST YEAR, HAVE YOU BEEN HUMILIATED OR EMOTIONALLY ABUSED IN OTHER WAYS BY YOUR PARTNER OR EX-PARTNER?: NO

## 2025-03-16 SDOH — ECONOMIC STABILITY: TRANSPORTATION INSECURITY: IN THE PAST 12 MONTHS, HAS LACK OF TRANSPORTATION KEPT YOU FROM MEDICAL APPOINTMENTS OR FROM GETTING MEDICATIONS?: NO

## 2025-03-16 ASSESSMENT — COGNITIVE AND FUNCTIONAL STATUS - GENERAL
EATING MEALS: A LITTLE
DRESSING REGULAR LOWER BODY CLOTHING: A LOT
CLIMB 3 TO 5 STEPS WITH RAILING: TOTAL
MOVING FROM LYING ON BACK TO SITTING ON SIDE OF FLAT BED WITH BEDRAILS: A LITTLE
MOBILITY SCORE: 12
HELP NEEDED FOR BATHING: A LITTLE
MOVING TO AND FROM BED TO CHAIR: A LOT
STANDING UP FROM CHAIR USING ARMS: A LOT
DAILY ACTIVITIY SCORE: 17
PERSONAL GROOMING: A LITTLE
TOILETING: A LITTLE
TURNING FROM BACK TO SIDE WHILE IN FLAT BAD: A LITTLE
WALKING IN HOSPITAL ROOM: TOTAL
DRESSING REGULAR UPPER BODY CLOTHING: A LITTLE

## 2025-03-16 ASSESSMENT — PAIN SCALES - GENERAL
PAINLEVEL_OUTOF10: 4
PAINLEVEL_OUTOF10: 4
PAINLEVEL_OUTOF10: 6
PAINLEVEL_OUTOF10: 8
PAINLEVEL_OUTOF10: 9
PAINLEVEL_OUTOF10: 7
PAINLEVEL_OUTOF10: 7

## 2025-03-16 ASSESSMENT — ACTIVITIES OF DAILY LIVING (ADL): LACK_OF_TRANSPORTATION: NO

## 2025-03-16 ASSESSMENT — PAIN - FUNCTIONAL ASSESSMENT
PAIN_FUNCTIONAL_ASSESSMENT: 0-10

## 2025-03-16 NOTE — CONSULTS
Medicine Consult     S  Pt had episode of chest pressure overnight with associated SOB. States she feels like she couldn't get enough air in. Reports she had woken up from a stressful/bad dream which she thinks contributed to her symptoms. She feels the symptoms were more related to panic. EKG overnight stable from prior, trop normal. She was placed on O2 for comfort which she feels helps her feel like she is getting enough air. This morning, states symptoms have resolved. She has some right rib pain/chest pain she reports is where the incision site is from surgery. Denies any more pressure-like sensation from last night. Denies chest pain at home on exertion.      Currently admitted to plastic surgery service for R latissimus free flap to R knee on 3/14. Internal Medicine consulted for chest pain.    Brief Hospital Course/HPI  68yo F with PMH most notable for nonobstructive CAD, bigeminy/PVCs (follows w EP), prior cardiac arrest iso long qtc, and prior TBI admitted to plastics for R knee flap.     Patient had a total knee right knee done by Dr. Whelan in March 2024 which was complicated by E. Coli infection. Subsequently underwent R TKA wound dehiscence s/p I&D right knee with polyswap and attempted re-closure of complex wound on 5/10/2024 by Dr. Whelan. Then referred to plastic surgery (Dr. Reyes) perioperatively given projected anticipated need for possible flap coverage of the wound/defect site. Patient re-admitted and returned to the OR with orthopedics on 2/28/2025 for right knee I&D, revision static spacer and application of incisional wound vac. Plastic surgery service consulted postoperatively to follow for flap recommendations/timing, now s/p R latissimus free flap to R knee on 3/14 with Dr. Lundberg.     PMH  -see HPI    ALL  Allergies   Allergen Reactions    Iodinated Contrast Media Anaphylaxis    Naproxen Other and GI bleeding     Causes internal bleeding    Penicillins Anaphylaxis, Rash and Other      Seizures    Tramadol Unknown and Other     stimulant behavior    Keeps her up all night    Zofran [Ondansetron Hcl] Cardiac arrhythmia/arrest     Long QT, went into cardiac arrest.    Vibramycin [Doxycycline Calcium] Nausea/vomiting    Codeine Nausea/vomiting    Augmentin [Amoxicillin-Pot Clavulanate] Rash    Doxycycline Hyclate Rash    Duloxetine Diarrhea        MEDs  Current Outpatient Medications   Medication Instructions    acetaminophen (TYLENOL) 650 mg, oral, Every 6 hours PRN    albuterol 90 mcg/actuation inhaler 2 puffs, inhalation, Every 6 hours PRN    chlorhexidine (Hibiclens) 4 % external liquid Topical, Daily PRN, Use for 5 days prior to surgery as body wash, do not use on face or genital region    cyclobenzaprine (Flexeril) 5 mg tablet GIVE 1 TABLET BY MOUTH EVERY 8 HOURS AS NEEDED FOR MUSCLE SPASMS    docusate sodium (COLACE) 100 mg, oral, 2 times daily PRN    naloxone (NARCAN) 0.2 mg, intravenous, Every 5 min PRN    polyethylene glycol (GLYCOLAX, MIRALAX) 17 g, oral, Daily    prochlorperazine (COMPAZINE) 10 mg, oral, Every 6 hours PRN    sennosides-docusate sodium (Stephany-Colace) 8.6-50 mg tablet 2 tablets, oral, 2 times daily PRN    vancomycin (Vancocin) (VANCOCIN) 1,250 mg, intravenous, Every 24 hours        PSH  -reviewed (non-contributory)    SOC  -tobacco: quit 2017 about a ppd prior to that  -alcohol: denies  -substance: denies      O  VT  Temp:  [35.9 °C (96.6 °F)-36.7 °C (98.1 °F)] 36 °C (96.8 °F)  Heart Rate:  [77-89] 77  Resp:  [18-22] 18  BP: ()/(54-77) 115/66     PE  General: no acute distress  Cardio: normal rate, regular rhythm, no murmurs  Pulm: non-labored, BS symmetric, no crackles/wheezing/stridor, on 2L weaned to RA  GI: non-distended, appropriately soft, no masses, non-tender  MSK: no joint swelling, pain on palpation of right rib  HEENT: grossly normocephalic  Neuro: grossly oriented, CN grossly intact, extremities moving symmetrically  Psych: appropriate affect  Volume:  mucous membranes moist  Perfusion: no cyanosis, distal extremities warm      A  68yo F with PMH most notable for nonobstructive CAD, bigeminy/PVCs (follows w EP), prior cardiac arrest iso long qtc, and prior TBI admitted to plastics for R knee flap. Medicine consulted for chest pain. Pt w transient episode of chest pain w stable EKG and trops at that time. Pt has known hx of bigeminy and PVCs (follows w EP outpatient). Given nature of chest pain, feel this is less likely to be cardiac and nature and more likely related to post-op pain/MSK. Recs as below.     P  -no additional cardiac workup is indicated as this likely related MSK chest pain  -could try lidocaine patch to R rib area to see if helps w pain  -avoid any qtc prolonging medications  -dispo: admitted to plastic surgery team  -medicine will sign off, can call back if have additional questions    Note not final until attested by attending physician - Dr. Mendez.      Gilda Grace MD  IM, PGY3

## 2025-03-16 NOTE — PROGRESS NOTES
Department of Plastic and Reconstructive Surgery  Post Op Check      Subjective: Resting in bed this evening states pain well controlled on current regimen. .Tolerating full liquid diet without nausea, Denies any fever, chills, night sweats, CP, SOB, palpitations, nausea, vomiting, diarrhea, constipation, dysuria, hematuria, hematochezia, hematemesis, flank pain.     Objective:   PE:  Constitutional: NAD  Eyes: EOMI, clear sclera   ENMT: Moist mucous membranes, no apparent injuries or lesions  Head/Neck: NCAT  Cardiovascular: Extremities WWP  Respiratory/Thorax: Unlabored respirations on 2L NC  Gastrointestinal: Abdomen soft, non-distended, non-tender  Genitourinary: indwelling damon  Extremities: RLE flap: warm to touch, soft and compressible, strong biphasic signal at marked stitch, circumferential incisions around flap well approximated with scant amount of ss drainage, covered with xeroform, penrose exiting flap with ss output, vioptix in place with signal of 77% with 91% signal strength, MASSIEL drain x1 to inner R mid leg with SS output  Right posterior lat donor site: prevena incisional wound vac in place, holding suction at -125 mmHg, no alarm or leak noted, MASSIEL drain x 1 to right flank with ss output  Neurological: A&Ox3  Psychological: Appropriate mood and behavior  Skin: Warm and dry. L upper extremity with ecchymosis circumferentially where tourniquet was placed during surgery    # S/p R latissimus free flap to R knee on 3/14 with Dr. Lundberg   A/P:    Flap surveillance:       - Continue serial flap assessments q1h per nursing with interval checks per plastic surgery team         -> Assess clinical appearance of flap (color, warmth, turgor) plus doppler pulse               signal at marked suture site          -> Check vioptix saturation level hourly (Must remain >30%)          -> Nursing to notify plastic surgery team immediately if there are any acute changes              (Including decreased  Doppler/Vioptix signal, pallor, temperature change, bleeding, etc.)      - Monitor surgical sites for s/s of bleeding, infection or dehiscence   - Remain on bedrest with tulio hugger  - encouraged use of IS  - all other care per daily progress note  - plan discussed with Dr. Toño Nelson PAVenessaC  Plastic and Reconstructive Surgery   Available via [employer]  Pager #[phone]  Team phone: [phone]

## 2025-03-16 NOTE — CARE PLAN
Problem: Pain  Goal: Takes deep breaths with improved pain control throughout the shift  Outcome: Progressing  Goal: Turns in bed with improved pain control throughout the shift  Outcome: Progressing  Goal: Walks with improved pain control throughout the shift  Outcome: Progressing  Goal: Performs ADL's with improved pain control throughout shift  Outcome: Progressing  Goal: Participates in PT with improved pain control throughout the shift  Outcome: Progressing  Goal: Free from opioid side effects throughout the shift  Outcome: Progressing  Goal: Free from acute confusion related to pain meds throughout the shift  Outcome: Progressing     Problem: Skin  Goal: Decreased wound size/increased tissue granulation at next dressing change  Outcome: Progressing  Flowsheets (Taken 3/16/2025 0302)  Decreased wound size/increased tissue granulation at next dressing change: Promote sleep for wound healing  Goal: Participates in plan/prevention/treatment measures  Outcome: Progressing  Flowsheets (Taken 3/16/2025 0302)  Participates in plan/prevention/treatment measures: Elevate heels  Goal: Prevent/manage excess moisture  Outcome: Progressing  Flowsheets (Taken 3/16/2025 0302)  Prevent/manage excess moisture:   Cleanse incontinence/protect with barrier cream   Monitor for/manage infection if present  Goal: Prevent/minimize sheer/friction injuries  Outcome: Progressing  Flowsheets (Taken 3/16/2025 0302)  Prevent/minimize sheer/friction injuries: Use pull sheet  Goal: Promote/optimize nutrition  Outcome: Progressing  Flowsheets (Taken 3/16/2025 0302)  Promote/optimize nutrition:   Monitor/record intake including meals   Consume > 50% meals/supplements   Offer water/supplements/favorite foods  Goal: Promote skin healing  Outcome: Progressing  Flowsheets (Taken 3/16/2025 0302)  Promote skin healing:   Assess skin/pad under line(s)/device(s)   Rotate device position/do not position patient on device

## 2025-03-16 NOTE — SIGNIFICANT EVENT
Patient reached out to nurse about having chest pain. I arrived to the room and she stated the chest pain had resolved but stated her R shoulder was achy and her R side. She stated that she was having a dream about running around her house cleaning and believes she might have had a panic attack. She stated at home when this happens she usually gets up and walks around and then she feels better. We applied some oxygen for comfort and she stated she felt much better. We repositioned her a bit and she was settled and felt much better. Vitals and EKG were stable throughout. She was told to press call light if chest pain returned. A message was sent to Dr. Lundberg.

## 2025-03-16 NOTE — PROGRESS NOTES
03/16/25 1847   Discharge Planning   Living Arrangements Other (Comment)  (currently at SNF)   Support Systems Spouse/significant other   Assistance Needed yes   Type of Residence Skilled nursing facility   Number of Stairs to Enter Residence 0   Number of Stairs Within Residence 0   Do you have animals or pets at home? No   Who is requesting discharge planning? Other (Comment)  (TCC assessment)   Home or Post Acute Services Post acute facilities (Rehab/SNF/etc)   Type of Post Acute Facility Services Skilled nursing   Expected Discharge Disposition SNF   Does the patient need discharge transport arranged? Yes   RoundTrip coordination needed? Yes   Has discharge transport been arranged? No   Financial Resource Strain   How hard is it for you to pay for the very basics like food, housing, medical care, and heating? Not very   Housing Stability   In the last 12 months, was there a time when you were not able to pay the mortgage or rent on time? N   In the past 12 months, how many times have you moved where you were living? 1   At any time in the past 12 months, were you homeless or living in a shelter (including now)? N   Transportation Needs   In the past 12 months, has lack of transportation kept you from medical appointments or from getting medications? no   In the past 12 months, has lack of transportation kept you from meetings, work, or from getting things needed for daily living? No   Intensity of Service   Intensity of Service 0-30 min     Transitional Care Coordinator Note: Met with patient to discuss discharge planning s/p admission.  Patient is currently a resident of [facility] [place] and plans to return at [place] .  Requires assistance in all ADL's. Requires assist devices for ambulation(WC).  Patient denies active home care or home care needs.  Demographics and contact information confirmed.  Will continue to monitor patient for all home going needs.  Caroline Martinez RN TCC via Epic.    Falls-  yes per patient at facility   Equipment -WC  HC - Currently at [facility]   Picc/Port- Yes, PICC   SW- none  O2/Cpap- none  Diabetes- none   Dialysis- none  PCP- facility   Pharm- facility  Transport at discharge Roundtrip  Therapy Recommendations-[facility] return

## 2025-03-16 NOTE — PROGRESS NOTES
"  Department of Plastic and Reconstructive Surgery  Daily Progress Note    Patient Name: Adriana Wood  MRN: [id number]  Date:  03/16/25     Subjective  Patient resting in bed on exam. POD2 s/p R latissimus flap to R knee. Overnight patient endorsed some R-sided chest pain, so EKG, troponin, and Mag were ordered, labs stable. Repeat EKG ordered this AM, as well as medicine team consult for evaluation and further work-up. Patient reports pain has much improved this morning after she received oxygen by NC overnight. Otherwise reports flatus but no BM yet.     Of note, tourniquet was left on patient's left arm under blood pressure cuff during surgery so continue to evaluate L arm for pain, numbness and tingling and intact pulses. She reports some itching to L upper arm where tourniquet was left, which has been controlled with benadryl cream. Otherwise denies any fever, chills, night sweats, CP, SOB, palpitations, nausea, vomiting, or abdominal pain/discomfort.    Objective  Vital Signs  /66 (BP Location: Left leg, Patient Position: Lying)   Pulse 77   Temp 36 °C (96.8 °F) (Temporal)   Resp 18   Ht 1.778 m (5' 10\")   Wt 69.1 kg (152 lb 5.4 oz)   SpO2 97%   BMI 21.86 kg/m²      Physical Exam   Constitutional: NAD  Eyes: EOMI, clear sclera   ENMT: Moist mucous membranes, no apparent injuries or lesions  Head/Neck: NCAT  Cardiovascular: Extremities WWP  Respiratory/Thorax: Unlabored respirations on 2L NC  Gastrointestinal: Abdomen soft, non-distended, non-tender  Genitourinary: indwelling damon  Extremities: RLE flap: warm to touch, soft and compressible, strong biphasic signal at marked stitch, circumferential incisions around flap well approximated with scant amount of ss drainage, covered with xeroform, penrose exiting flap with ss output, vioptix in place with signal of 80% with 91% signal strength  Right posterior lat donor site: prevena incisional wound vac in place, holding suction at -125 mmHg, no " alarm or leak noted, MASSIEL drain x 1 to right flank with ss output  Neurological: A&Ox3  Psychological: Appropriate mood and behavior  Skin: Warm and dry. L upper extremity with ecchymosis circumferentially where tourniquet was placed during surgery.    Diagnostics   Results for orders placed or performed during the hospital encounter of 03/14/25 (from the past 24 hours)   Vancomycin   Result Value Ref Range    Vancomycin 21.7 (H) 5.0 - 20.0 ug/mL   CBC   Result Value Ref Range    WBC 6.1 4.4 - 11.3 x10*3/uL    nRBC 0.0 0.0 - 0.0 /100 WBCs    RBC 3.12 (L) 4.00 - 5.20 x10*6/uL    Hemoglobin 8.8 (L) 12.0 - 16.0 g/dL    Hematocrit 27.6 (L) 36.0 - 46.0 %    MCV 89 80 - 100 fL    MCH 28.2 26.0 - 34.0 pg    MCHC 31.9 (L) 32.0 - 36.0 g/dL    RDW 14.4 11.5 - 14.5 %    Platelets 270 150 - 450 x10*3/uL   Basic metabolic panel   Result Value Ref Range    Glucose 94 74 - 99 mg/dL    Sodium 139 136 - 145 mmol/L    Potassium 4.0 3.5 - 5.3 mmol/L    Chloride 103 98 - 107 mmol/L    Bicarbonate 27 21 - 32 mmol/L    Anion Gap 13 10 - 20 mmol/L    Urea Nitrogen 6 6 - 23 mg/dL    Creatinine 0.52 0.50 - 1.05 mg/dL    eGFR >90 >60 mL/min/1.73m*2    Calcium 8.8 8.6 - 10.6 mg/dL   Troponin I, High Sensitivity   Result Value Ref Range    Troponin I, High Sensitivity (CMC) 3 0 - 34 ng/L   Magnesium   Result Value Ref Range    Magnesium 1.89 1.60 - 2.40 mg/dL     *Note: Due to a large number of results and/or encounters for the requested time period, some results have not been displayed. A complete set of results can be found in Results Review.     ECG 12 Lead    Result Date: 3/6/2025   Poor data quality, interpretation may be adversely affected Sinus bradycardia with 1st degree AV block Nonspecific T wave abnormality Prolonged QT Abnormal ECG Confirmed by Clyde Burkett ([id number]) on 3/6/2025 3:09:25 PM    ECG 12 lead    Result Date: 3/5/2025  Sinus tachycardia with frequent Premature ventricular complexes in a pattern of bigeminy Nonspecific T wave  abnormality Abnormal ECG When compared with ECG of 01-MAR-2025 09:07, Significant changes have occurred Confirmed by Baron Sanders ([id number]) on 3/5/2025 8:34:27 AM    Bedside PICC Imaging    Result Date: 3/4/2025  These images are not reportable by radiology and will not be interpreted by  Radiologists.    XR knee right 1-2 views    Result Date: 2/28/2025  Interpreted By:  Juan Smith, STUDY: XR KNEE RIGHT 1-2 VIEWS; ;  2/28/2025 11:36 am   INDICATION: Signs/Symptoms:PACU.     COMPARISON: 01/16/2025.   ACCESSION NUMBER(S): [id number]   ORDERING CLINICIAN: DARSHAN LARSON   FINDINGS: Two views of the right knee   Postsurgical changes of right knee fusion with cement and productive changes within the joint space. The knee joint alignment is similar compared to prior. Interval removal of intramedullary nail with ghost tract. Similar-appearing remote fractures of the proximal fibula and proximal tibia. Osteopenic changes. Vascular calcifications. Soft tissue edema over the anterior knee with overlying wound VAC and subcutaneous air, likely postsurgical. No definitive new fracture.       1. Postsurgical changes of the right knee with fusion of the joint space and interval removal of the intramedullary nail. Overlying soft tissue edema and wound VAC.       MACRO: None   Signed by: Juan Smith 2/28/2025 12:56 PM Dictation workstation:   [id number]    FL fluoro images no charge    Result Date: 2/28/2025  These images are not reportable by radiology and will not be interpreted by  Radiologists.      Current Medications  Scheduled medications  acetaminophen, 975 mg, oral, q8h  heparin (porcine), 5,000 Units, subcutaneous, q8h  methocarbamol, 500 mg, oral, q8h FELIX  polyethylene glycol, 17 g, oral, Daily  sennosides-docusate sodium, 2 tablet, oral, BID  vancomycin, 1,000 mg, intravenous, q12h      Continuous medications  lactated Ringer's, 50 mL/hr, Last Rate: 50 mL/hr (03/15/25 1799)      PRN medications  PRN  medications: albuterol, alteplase, calcium carbonate, diphenhydramine-zinc acetate, HYDROmorphone, melatonin, naloxone, oxyCODONE, oxyCODONE, trimethobenzamide, vancomycin     Assessment  Adriana Wood is a 69 y.o. female with a past medical history of bradycardia, cardiomyopathy, CAD, cardiac arrest due to electrolyte abnormalities and zofran use post-operatively, and traumatic brain injury. Patient had a total knee right knee done by Dr. Whelan in March 2024 which was complicated by E. Coli infection. Patient had right TKA wound dehiscence s/p I&D right knee with polyswap and attempted re-closure of complex wound on 5/10/2024 by Dr. Whelan. She was referred to plastic surgery (Dr. Reyes) perioperatively given projected anticipated need for possible flap coverage of the wound/defect site. Patient re-admitted and returned to the OR with orthopedics on 2/28/2025 for right knee I&D, revision static spacer and application of incisional wound vac. Plastic surgery service consulted postoperatively to follow for flap recommendations/timing, now s/p R latissimus free flap to R knee on 3/14 with Dr. Lundberg.     Plan/Recommendations  S/p R latissimus free flap to R knee on 3/14 with Dr. Lundberg  - Continue serial flap assessments Q1hour per nursing and Q2hour per plastic surgery team       ·  Assess Doppler pulse at marked site plus flap appearance (color, warmth, turgor)       ·  Nursing to notify plastic surgery team immediately if there are any acute changes          (Including decreased Doppler signal, pallor, temperature change, bleeding, etc.)  - Nursing to monitor and record consistency and volume of drainage from Penrose drain(s) at flap site  - Continue MASSIEL drain care per nursing (MASSIEL x 1)        ·  Strip drain tubing TID and PRN       ·  Monitor and record output [id number]        ·  Keep drain sites C/D/I with daily drain dressing changes        ·  Call plastics if drain output is >30cc in an hour or greater  than 200cc over 8 hours  - Continue incisional wound vac therapy to R lat donor site x10-14 days post op (3/28)        ·  Settings: 125 mmHg low continuous suction        ·  Please keep dressing C/D/I, reinforce PRN        ·  Record vac output per nursing q8h       ·  Please alert plastics team with any concerns regarding vac        ·  Plan to transition to Prevena homegoing vac on day of discharge    - IV Vancomycin while in-patient, ID re-engaged  - Maintain Vioptix > 30%, notify plastics if < 30%   - Keep RLE carefully positioned elevated with pillows        ·  Avoid positioning that would apply pressure to flap region or incisions   - NWB, patient to maintain strict bedrest    - Maintain Rosenbaum while on bedrest    - OK for regular diet       ·  LR @ 50 mL until adequate PO intake        ·  Strict monitoring and recording of I&Os per nursing  - Continue use of Rashid hugger        ·  Settings: low heat, medium continuous fan        ·  5 hours on, one hour off x 3 days post op   - Daily local wound care/dressing changes per plastic surgery APPs (Xeroform over incision lines)       ·  Monitor surgical sites for S/S of bleeding, infection or dehiscence  - Maintain BP of 110/60 minimum to ensure adequate flap perfusion   - Low transfusion threshold to ensure adequate flap perfusion  - SQ Heparin [id number] resumed postoperatively, transition to Lovenox on POD#3 (3/17)   - Monitor VS/pulse ox [id number]   - Monitor AM CBC/RFP    # New onset Chest pain (3/16)  - R-sided CP overnight 3/16, has improved with addition of NC, will continue to monitor  - EKG, troponin, Mag ordered, labs stable  - Repeat EKG ordered 3/16 AM  - Medicine team consulted, appreciate further recs    # Hx of septic arthritis of R knee   - Per ID note on 3/3 during previous admission: IV Vancomycin while in-patient, plan for 6 weeks of vancomycin from date of surgery 2/28/25 - until stop date 4/11/25  - Re-engaged ID 3/16, appreciate any updated recs    #  Acute postoperative pain  - Added PRN Lidocaine patch and Diclofenac gel for R anterior rib pain, will continue to monitor       ·  Scheduled Tylenol 975mg PO [id number]        ·  Scheduled Robaxin 500 mg IV [id number]       ·  Oxycodone 5/10mg PO [id number] PRN mod/severe pain   - Bowel regimen with Stephany-Colace while using narcotics  - IV Tigan PRN nausea due to narcotics (NO Zofran due to hx of cardiac arrest)  - Pain assessments [id number]     # L arm itching  - Benadryl cream ordered for itching 3/15, since improved  - L arm tourniquet was left on patients arm during OR procedure by anesthesia staff, continue to evaluate L arm sensation and distal pulses, consider ultrasound if worsening pain or surrounding skin changes    # QT Prolongation  - Avoid QT prolonging medications; NO Zofran (hx of cardiac arrest)  - Telemetry monitoring    # Post-Op Anemia   - HGB on AM labs at 8.8  - Presently no S/S of bleeding, considered likely due to intraoperative blood loss   - Keep T&S UTD, last obtained 3/14  - Monitor for S/S of bleeding or symptomatic anemia   - Low transfusion threshold to ensure adequate flap perfusion  - Transfuse for HGB <7 per protocol   - Monitor AM CBC     Prophylaxis:   - DVT: SQ heparin [id number] (plan to transition to SQ Lovenox on POD#3), SCDs  - Encourage IS x10 every hour while awake   - Bowel regimen: Stephany-Colace       Disposition: Continue care on RNF. Will remain inpatient for continued flap monitoring and vac/drain surveillance postoperatively. Follow up appointment with plastic surgery department to be scheduled closer to anticipated date of discharge.      Patient and plan discussed with Dr. Toño Ruiz, PA-C   Plastic and Reconstructive Surgery   [place]  Pager #[phone]  Team phone: [phone]

## 2025-03-16 NOTE — PROGRESS NOTES
Vancomycin Dosing by Pharmacy- FOLLOW UP    Adriana Wood is a 69 y.o. year old female who Pharmacy has been consulted for vancomycin dosing for cellulitis, skin and soft tissue. Based on the patient's indication and renal status this patient is being dosed based on a goal AUC of 400-600.     Renal function is currently stable.    Current vancomycin dose: 1000 mg given every 12 hours    Estimated vancomycin AUC on current dose: 684 mg/L.hr     Visit Vitals  /66 (BP Location: Left leg, Patient Position: Lying)   Pulse 77   Temp 36 °C (96.8 °F) (Temporal)   Resp 18        Lab Results   Component Value Date    CREATININE 0.52 2025    CREATININE 0.41 (L) 03/15/2025    CREATININE 0.44 (L) 2025    CREATININE 0.54 03/10/2025        Patient weight is as follows:   Vitals:    25 0600   Weight: 69.1 kg (152 lb 5.4 oz)       Cultures:  No results found for the encounter in last 14 days.       I/O last 3 completed shifts:  In: 3111 (45 mL/kg) [P.O.:900; I.V.:1811 (26.2 mL/kg); IV Piggyback:400]  Out: 4539 (65.7 mL/kg) [Urine:4375 (1.8 mL/kg/hr); Drains:164]  Weight: 69.1 kg   I/O during current shift:  I/O this shift:  In: 200 [IV Piggyback:200]  Out: 410 [Urine:400; Drains:10]    Temp (24hrs), Av.3 °C (97.3 °F), Min:35.9 °C (96.6 °F), Max:36.7 °C (98.1 °F)      Assessment/Plan    Above goal AUC. Orders placed for new vancomcyin regimen of 1000 mg every 24 hours to begin at 0800 on 3/17.    This dosing regimen is predicted by [employer] to result in the following pharmacokinetic parameters:  Regimen: 1000 mg IV every 24 hours.  Start time: 08:07 on 2025  Exposure target: AUC24 (range)400-600 mg/L.hr   LSL52-02: 504 mg/L.hr  AUC24,ss: 442 mg/L.hr  Probability of AUC24 > 400: 84 %  Ctrough,ss: 15.1 mg/L  Probability of Ctrough,ss > 20: 0 %    The next level will be obtained on 3/18 at AM labs. May be obtained sooner if clinically indicated.   Will continue to monitor renal function daily  while on vancomycin and order serum creatinine at least every 48 hours if not already ordered.  Follow for continued vancomycin needs, clinical response, and signs/symptoms of toxicity.       Romi Jason, PharmD
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
## 2025-03-17 LAB
ANION GAP SERPL CALC-SCNC: 13 MMOL/L (ref 10–20)
ATRIAL RATE: 77 BPM
ATRIAL RATE: 82 BPM
BB ANTIBODY IDENTIFICATION: NORMAL
BB ANTIBODY IDENTIFICATION: NORMAL
BUN SERPL-MCNC: 9 MG/DL (ref 6–23)
CALCIUM SERPL-MCNC: 8.9 MG/DL (ref 8.6–10.6)
CASE #: NORMAL
CASE #: NORMAL
CHLORIDE SERPL-SCNC: 102 MMOL/L (ref 98–107)
CO2 SERPL-SCNC: 29 MMOL/L (ref 21–32)
CREAT SERPL-MCNC: 0.53 MG/DL (ref 0.5–1.05)
EGFRCR SERPLBLD CKD-EPI 2021: >90 ML/MIN/1.73M*2
ERYTHROCYTE [DISTWIDTH] IN BLOOD BY AUTOMATED COUNT: 13.9 % (ref 11.5–14.5)
GLUCOSE SERPL-MCNC: 89 MG/DL (ref 74–99)
HCT VFR BLD AUTO: 32.5 % (ref 36–46)
HGB BLD-MCNC: 10.8 G/DL (ref 12–16)
MCH RBC QN AUTO: 28.6 PG (ref 26–34)
MCHC RBC AUTO-ENTMCNC: 33.2 G/DL (ref 32–36)
MCV RBC AUTO: 86 FL (ref 80–100)
NRBC BLD-RTO: 0 /100 WBCS (ref 0–0)
P AXIS: 54 DEGREES
P AXIS: 57 DEGREES
P OFFSET: 190 MS
P OFFSET: 192 MS
P ONSET: 129 MS
P ONSET: 132 MS
PLATELET # BLD AUTO: 271 X10*3/UL (ref 150–450)
POTASSIUM SERPL-SCNC: 4.2 MMOL/L (ref 3.5–5.3)
PR INTERVAL: 186 MS
PR INTERVAL: 192 MS
Q ONSET: 225 MS
Q ONSET: 225 MS
QRS COUNT: 13 BEATS
QRS COUNT: 13 BEATS
QRS DURATION: 86 MS
QRS DURATION: 90 MS
QT INTERVAL: 386 MS
QT INTERVAL: 424 MS
QTC CALCULATION(BAZETT): 436 MS
QTC CALCULATION(BAZETT): 495 MS
QTC FREDERICIA: 419 MS
QTC FREDERICIA: 470 MS
R AXIS: 50 DEGREES
R AXIS: 59 DEGREES
RBC # BLD AUTO: 3.77 X10*6/UL (ref 4–5.2)
SODIUM SERPL-SCNC: 140 MMOL/L (ref 136–145)
T AXIS: 80 DEGREES
T AXIS: 88 DEGREES
T OFFSET: 418 MS
T OFFSET: 437 MS
VENTRICULAR RATE: 77 BPM
VENTRICULAR RATE: 82 BPM
WBC # BLD AUTO: 5.3 X10*3/UL (ref 4.4–11.3)

## 2025-03-17 PROCEDURE — 2500000004 HC RX 250 GENERAL PHARMACY W/ HCPCS (ALT 636 FOR OP/ED)

## 2025-03-17 PROCEDURE — 2500000001 HC RX 250 WO HCPCS SELF ADMINISTERED DRUGS (ALT 637 FOR MEDICARE OP)

## 2025-03-17 PROCEDURE — 85027 COMPLETE CBC AUTOMATED: CPT

## 2025-03-17 PROCEDURE — 82374 ASSAY BLOOD CARBON DIOXIDE: CPT

## 2025-03-17 PROCEDURE — 2500000005 HC RX 250 GENERAL PHARMACY W/O HCPCS

## 2025-03-17 PROCEDURE — 99232 SBSQ HOSP IP/OBS MODERATE 35: CPT

## 2025-03-17 PROCEDURE — 1200000003 HC ONCOLOGY  ROOM WITH TELEMETRY DAILY

## 2025-03-17 RX ORDER — ENOXAPARIN SODIUM 100 MG/ML
40 INJECTION SUBCUTANEOUS DAILY
Status: DISCONTINUED | OUTPATIENT
Start: 2025-03-17 | End: 2025-03-24 | Stop reason: HOSPADM

## 2025-03-17 RX ADMIN — HEPARIN SODIUM 5000 UNITS: 5000 INJECTION, SOLUTION INTRAVENOUS; SUBCUTANEOUS at 06:52

## 2025-03-17 RX ADMIN — ACETAMINOPHEN 975 MG: 325 TABLET, FILM COATED ORAL at 17:35

## 2025-03-17 RX ADMIN — OXYCODONE HYDROCHLORIDE 10 MG: 10 TABLET ORAL at 20:03

## 2025-03-17 RX ADMIN — OXYCODONE HYDROCHLORIDE 10 MG: 10 TABLET ORAL at 06:52

## 2025-03-17 RX ADMIN — OXYCODONE HYDROCHLORIDE 10 MG: 10 TABLET ORAL at 02:47

## 2025-03-17 RX ADMIN — ENOXAPARIN SODIUM 40 MG: 100 INJECTION SUBCUTANEOUS at 22:48

## 2025-03-17 RX ADMIN — METHOCARBAMOL 500 MG: 500 TABLET ORAL at 06:52

## 2025-03-17 RX ADMIN — METHOCARBAMOL 500 MG: 500 TABLET ORAL at 22:48

## 2025-03-17 RX ADMIN — LIDOCAINE 1 PATCH: 4 PATCH TOPICAL at 08:45

## 2025-03-17 RX ADMIN — PANTOPRAZOLE SODIUM 40 MG: 40 TABLET, DELAYED RELEASE ORAL at 06:52

## 2025-03-17 RX ADMIN — ACETAMINOPHEN 975 MG: 325 TABLET, FILM COATED ORAL at 08:38

## 2025-03-17 RX ADMIN — HEPARIN SODIUM 5000 UNITS: 5000 INJECTION, SOLUTION INTRAVENOUS; SUBCUTANEOUS at 14:08

## 2025-03-17 RX ADMIN — VANCOMYCIN HYDROCHLORIDE 1000 MG: 1 INJECTION, SOLUTION INTRAVENOUS at 08:38

## 2025-03-17 RX ADMIN — OXYCODONE 5 MG: 5 TABLET ORAL at 14:08

## 2025-03-17 RX ADMIN — METHOCARBAMOL 500 MG: 500 TABLET ORAL at 14:04

## 2025-03-17 RX ADMIN — POLYETHYLENE GLYCOL 3350 17 G: 17 POWDER, FOR SOLUTION ORAL at 08:38

## 2025-03-17 ASSESSMENT — COGNITIVE AND FUNCTIONAL STATUS - GENERAL
MOBILITY SCORE: 11
MOVING FROM LYING ON BACK TO SITTING ON SIDE OF FLAT BED WITH BEDRAILS: A LITTLE
DRESSING REGULAR LOWER BODY CLOTHING: TOTAL
WALKING IN HOSPITAL ROOM: TOTAL
PERSONAL GROOMING: A LITTLE
WALKING IN HOSPITAL ROOM: TOTAL
DAILY ACTIVITIY SCORE: 12
MOVING TO AND FROM BED TO CHAIR: A LOT
HELP NEEDED FOR BATHING: TOTAL
STANDING UP FROM CHAIR USING ARMS: TOTAL
CLIMB 3 TO 5 STEPS WITH RAILING: TOTAL
STANDING UP FROM CHAIR USING ARMS: TOTAL
TURNING FROM BACK TO SIDE WHILE IN FLAT BAD: A LITTLE
DRESSING REGULAR LOWER BODY CLOTHING: TOTAL
DRESSING REGULAR UPPER BODY CLOTHING: TOTAL
MOVING TO AND FROM BED TO CHAIR: A LOT
PERSONAL GROOMING: A LITTLE
MOBILITY SCORE: 11
HELP NEEDED FOR BATHING: TOTAL
TOILETING: A LOT
TOILETING: A LOT
TURNING FROM BACK TO SIDE WHILE IN FLAT BAD: A LITTLE
MOVING FROM LYING ON BACK TO SITTING ON SIDE OF FLAT BED WITH BEDRAILS: A LITTLE
DAILY ACTIVITIY SCORE: 12
DRESSING REGULAR UPPER BODY CLOTHING: TOTAL
CLIMB 3 TO 5 STEPS WITH RAILING: TOTAL

## 2025-03-17 ASSESSMENT — PAIN - FUNCTIONAL ASSESSMENT
PAIN_FUNCTIONAL_ASSESSMENT: 0-10

## 2025-03-17 ASSESSMENT — PAIN SCALES - GENERAL
PAINLEVEL_OUTOF10: 8
PAINLEVEL_OUTOF10: 3
PAINLEVEL_OUTOF10: 4
PAINLEVEL_OUTOF10: 6
PAINLEVEL_OUTOF10: 4
PAINLEVEL_OUTOF10: 8
PAINLEVEL_OUTOF10: 0 - NO PAIN
PAINLEVEL_OUTOF10: 8
PAINLEVEL_OUTOF10: 3

## 2025-03-17 ASSESSMENT — PAIN DESCRIPTION - LOCATION: LOCATION: KNEE

## 2025-03-17 ASSESSMENT — PAIN DESCRIPTION - ORIENTATION: ORIENTATION: RIGHT

## 2025-03-17 NOTE — CARE PLAN
Problem: Pain  Goal: Takes deep breaths with improved pain control throughout the shift  Outcome: Progressing  Goal: Turns in bed with improved pain control throughout the shift  Outcome: Progressing  Goal: Walks with improved pain control throughout the shift  Outcome: Progressing  Goal: Performs ADL's with improved pain control throughout shift  Outcome: Progressing  Goal: Participates in PT with improved pain control throughout the shift  Outcome: Progressing  Goal: Free from opioid side effects throughout the shift  Outcome: Progressing  Goal: Free from acute confusion related to pain meds throughout the shift  Outcome: Progressing     Problem: Skin  Goal: Decreased wound size/increased tissue granulation at next dressing change  Outcome: Progressing  Flowsheets (Taken 3/17/2025 0151)  Decreased wound size/increased tissue granulation at next dressing change: Promote sleep for wound healing  Goal: Participates in plan/prevention/treatment measures  Outcome: Progressing  Goal: Prevent/manage excess moisture  Outcome: Progressing  Flowsheets (Taken 3/17/2025 0151)  Prevent/manage excess moisture:   Cleanse incontinence/protect with barrier cream   Monitor for/manage infection if present  Goal: Prevent/minimize sheer/friction injuries  Outcome: Progressing  Flowsheets (Taken 3/17/2025 0151)  Prevent/minimize sheer/friction injuries: Use pull sheet  Goal: Promote/optimize nutrition  Outcome: Progressing  Flowsheets (Taken 3/17/2025 0151)  Promote/optimize nutrition:   Offer water/supplements/favorite foods   Monitor/record intake including meals  Goal: Promote skin healing  Outcome: Progressing  Flowsheets (Taken 3/17/2025 0151)  Promote skin healing:   Assess skin/pad under line(s)/device(s)   Rotate device position/do not position patient on device

## 2025-03-17 NOTE — PROGRESS NOTES
Department of Plastic and Reconstructive Surgery  PM flap check      Subjective: Resting in bed this evening states pain well controlled on current regimen. Pain in R side and R shoulder better, Asked about possibly getting lidocaine patch for R shoulder so message sent to medicine. Also mentioned R ankle stiffness and I gave her some movements she can do while laying in bed to keep her foot relaxed. Tolerating regular diet without nausea. Denies any fever, chills, night sweats, CP, SOB, palpitations, nausea, vomiting, diarrhea, constipation, dysuria, hematuria, hematochezia, hematemesis, flank pain.     Objective:   PE:  Constitutional: NAD  Eyes: EOMI, clear sclera   ENMT: Moist mucous membranes, no apparent injuries or lesions  Head/Neck: NCAT  Cardiovascular: Extremities WWP  Respiratory/Thorax: Unlabored respirations on 2L NC  Gastrointestinal: Abdomen soft, non-distended, non-tender  Genitourinary: indwelling damon  Extremities: RLE flap: warm to touch, soft and compressible, strong biphasic signal at marked stitch, circumferential incisions around flap well approximated with scant amount of ss drainage, covered with xeroform, penrose exiting flap with ss output, vioptix in place with signal of 75% with 92% signal strength, MASSIEL drain x1 to inner R mid leg with SS output  Right posterior lat donor site: prevena incisional wound vac in place, holding suction at -125 mmHg, no alarm or leak noted, MASSIEL drain x 1 to right flank with ss output  Neurological: A&Ox3  Psychological: Appropriate mood and behavior  Skin: Warm and dry. L upper extremity with ecchymosis circumferentially where tourniquet was placed during surgery    # S/p R latissimus free flap to R knee on 3/14 with Dr. Lundberg   A/P:    Flap surveillance:       - Continue serial flap assessments q2h per nursing with interval checks per plastic surgery team         -> Assess clinical appearance of flap (color, warmth, turgor) plus doppler pulse                signal at marked suture site          -> Check vioptix saturation level hourly (Must remain >30%)          -> Nursing to notify plastic surgery team immediately if there are any acute changes              (Including decreased Doppler/Vioptix signal, pallor, temperature change, bleeding, etc.)      - Monitor surgical sites for s/s of bleeding, infection or dehiscence   - Remain on bedrest with marisol cha to remain until OOB  - encouraged use of IS  - all other care per daily progress note  - plan discussed with Dr. Toño Nelson PA-C  Plastic and Reconstructive Surgery   Available via [employer]  Pager #[phone]  Team phone: [phone]

## 2025-03-17 NOTE — CARE PLAN
The patient's goals for the shift include  pain management    The clinical goals for the shift include pt will remain HDS  Over the shift, the patient did make progress toward the following goals.   Problem: Pain  Goal: Takes deep breaths with improved pain control throughout the shift  Outcome: Progressing  Goal: Turns in bed with improved pain control throughout the shift  Outcome: Progressing  Goal: Walks with improved pain control throughout the shift  Outcome: Progressing  Goal: Performs ADL's with improved pain control throughout shift  Outcome: Progressing  Goal: Participates in PT with improved pain control throughout the shift  Outcome: Progressing  Goal: Free from opioid side effects throughout the shift  Outcome: Progressing  Goal: Free from acute confusion related to pain meds throughout the shift  Outcome: Progressing     Problem: Skin  Goal: Decreased wound size/increased tissue granulation at next dressing change  Outcome: Progressing  Flowsheets (Taken 3/17/2025 0942)  Decreased wound size/increased tissue granulation at next dressing change: Utilize specialty bed per algorithm  Goal: Participates in plan/prevention/treatment measures  Outcome: Progressing  Flowsheets (Taken 3/17/2025 0942)  Participates in plan/prevention/treatment measures: Discuss with provider PT/OT consult  Goal: Prevent/manage excess moisture  Outcome: Progressing  Flowsheets (Taken 3/17/2025 0942)  Prevent/manage excess moisture:   Moisturize dry skin   Cleanse incontinence/protect with barrier cream  Goal: Prevent/minimize sheer/friction injuries  Outcome: Progressing  Flowsheets (Taken 3/17/2025 0942)  Prevent/minimize sheer/friction injuries:   Use pull sheet   Increase activity/out of bed for meals  Goal: Promote/optimize nutrition  Outcome: Progressing  Flowsheets (Taken 3/17/2025 0942)  Promote/optimize nutrition:   Assist with feeding   Monitor/record intake including meals  Goal: Promote skin healing  Outcome:  Progressing  Flowsheets (Taken 3/17/2025 0940)  Promote skin healing:   Turn/reposition every 2 hours/use positioning/transfer devices   Protective dressings over bony prominences

## 2025-03-17 NOTE — PROGRESS NOTES
"  Department of Plastic and Reconstructive Surgery  Daily Progress Note    Patient Name: Adriana Wood  MRN: [id number]  Date:  03/17/25     Subjective  Patient resting in bed on exam. POD3 s/p R latissimus flap to R knee. She denies having any chest discomfort overnight. She states she still has not had a BM yet, however is passing gas, and this is normal for her to go a a few days without BM, she denies nausea and is tolerating her diet well.     Of note, tourniquet was left on patient's left arm under blood pressure cuff during surgery so continue to evaluate L arm for pain, numbness and tingling and intact pulses. She reports some itching to L upper arm where tourniquet was left, which has been controlled with benadryl cream. Otherwise denies any fever, chills, night sweats, CP, SOB, palpitations, nausea, vomiting, or abdominal pain/discomfort.    Objective  Vital Signs  /73 (BP Location: Left arm, Patient Position: Lying)   Pulse 82   Temp 36.5 °C (97.7 °F) (Temporal)   Resp 17   Ht 1.778 m (5' 10\")   Wt 69.1 kg (152 lb 5.4 oz)   SpO2 93%   BMI 21.86 kg/m²      Physical Exam   Constitutional: NAD  Eyes: EOMI, clear sclera   ENMT: Moist mucous membranes, no apparent injuries or lesions  Head/Neck: NCAT  Cardiovascular: Extremities WWP  Respiratory/Thorax: Unlabored respirations on 2L NC  Gastrointestinal: Abdomen soft, non-distended, non-tender  Genitourinary: indwelling damon  Extremities: RLE flap: warm to touch, soft and compressible, strong biphasic signal at marked stitch, circumferential incisions around flap well approximated with scant amount of ss drainage, covered with xeroform, penrose exiting flap with ss output, vioptix in place with signal of 75% with 93% signal strength  Right posterior lat donor site: prevena incisional wound vac in place, holding suction at -125 mmHg, no alarm or leak noted, MASSIEL drain x 1 to right flank with ss output  Neurological: A&Ox3  Psychological: " Appropriate mood and behavior  Skin: Warm and dry. L upper extremity with ecchymosis circumferentially where tourniquet was placed during surgery.    Diagnostics   Results for orders placed or performed during the hospital encounter of 03/14/25 (from the past 24 hours)   ECG 12 lead   Result Value Ref Range    Ventricular Rate 77 BPM    Atrial Rate 77 BPM    NY Interval 192 ms    QRS Duration 90 ms    QT Interval 386 ms    QTC Calculation(Bazett) 436 ms    P Axis 54 degrees    R Axis 50 degrees    T Axis 88 degrees    QRS Count 13 beats    Q Onset 225 ms    P Onset 129 ms    P Offset 190 ms    T Offset 418 ms    QTC Fredericia 419 ms   Prepare RBC: 1 Units   Result Value Ref Range    PRODUCT CODE B0910L06     Unit Number [id number]     Unit ABO O     Unit RH POS     XM INTEP COMP     Dispense Status TR     Blood Expiration Date 4/10/2025 11:59:00 PM EDT     PRODUCT BLOOD TYPE 5100     UNIT VOLUME 350    Type and screen   Result Value Ref Range    ABO TYPE O     Rh TYPE POS     ANTIBODY SCREEN POS    Direct Antiglobulin Test   Result Value Ref Range    GOMEZ-POLYSPECIFIC NEG    CBC   Result Value Ref Range    WBC 5.3 4.4 - 11.3 x10*3/uL    nRBC 0.0 0.0 - 0.0 /100 WBCs    RBC 3.77 (L) 4.00 - 5.20 x10*6/uL    Hemoglobin 10.8 (L) 12.0 - 16.0 g/dL    Hematocrit 32.5 (L) 36.0 - 46.0 %    MCV 86 80 - 100 fL    MCH 28.6 26.0 - 34.0 pg    MCHC 33.2 32.0 - 36.0 g/dL    RDW 13.9 11.5 - 14.5 %    Platelets 271 150 - 450 x10*3/uL   Basic metabolic panel   Result Value Ref Range    Glucose 89 74 - 99 mg/dL    Sodium 140 136 - 145 mmol/L    Potassium 4.2 3.5 - 5.3 mmol/L    Chloride 102 98 - 107 mmol/L    Bicarbonate 29 21 - 32 mmol/L    Anion Gap 13 10 - 20 mmol/L    Urea Nitrogen 9 6 - 23 mg/dL    Creatinine 0.53 0.50 - 1.05 mg/dL    eGFR >90 >60 mL/min/1.73m*2    Calcium 8.9 8.6 - 10.6 mg/dL     *Note: Due to a large number of results and/or encounters for the requested time period, some results have not been displayed. A  complete set of results can be found in Results Review.     ECG 12 Lead    Result Date: 3/6/2025   Poor data quality, interpretation may be adversely affected Sinus bradycardia with 1st degree AV block Nonspecific T wave abnormality Prolonged QT Abnormal ECG Confirmed by Clyde Burkett ([id number]) on 3/6/2025 3:09:25 PM    ECG 12 lead    Result Date: 3/5/2025  Sinus tachycardia with frequent Premature ventricular complexes in a pattern of bigeminy Nonspecific T wave abnormality Abnormal ECG When compared with ECG of 01-MAR-2025 09:07, Significant changes have occurred Confirmed by Baron Sanders ([id number]) on 3/5/2025 8:34:27 AM    Bedside PICC Imaging    Result Date: 3/4/2025  These images are not reportable by radiology and will not be interpreted by  Radiologists.    XR knee right 1-2 views    Result Date: 2/28/2025  Interpreted By:  Juan Smith, STUDY: XR KNEE RIGHT 1-2 VIEWS; ;  2/28/2025 11:36 am   INDICATION: Signs/Symptoms:PACU.     COMPARISON: 01/16/2025.   ACCESSION NUMBER(S): [id number]   ORDERING CLINICIAN: DARSHAN LARSON   FINDINGS: Two views of the right knee   Postsurgical changes of right knee fusion with cement and productive changes within the joint space. The knee joint alignment is similar compared to prior. Interval removal of intramedullary nail with ghost tract. Similar-appearing remote fractures of the proximal fibula and proximal tibia. Osteopenic changes. Vascular calcifications. Soft tissue edema over the anterior knee with overlying wound VAC and subcutaneous air, likely postsurgical. No definitive new fracture.       1. Postsurgical changes of the right knee with fusion of the joint space and interval removal of the intramedullary nail. Overlying soft tissue edema and wound VAC.       MACRO: None   Signed by: Juan Smith 2/28/2025 12:56 PM Dictation workstation:   [id number]    FL fluoro images no charge    Result Date: 2/28/2025  These images are not reportable by radiology and will  not be interpreted by  Radiologists.      Current Medications  Scheduled medications  acetaminophen, 975 mg, oral, q8h  heparin (porcine), 5,000 Units, subcutaneous, q8h  lidocaine, 1 patch, transdermal, Daily  methocarbamol, 500 mg, oral, q8h FELIX  pantoprazole, 40 mg, oral, Daily before breakfast  polyethylene glycol, 17 g, oral, Daily  sennosides-docusate sodium, 2 tablet, oral, BID  vancomycin, 1,000 mg, intravenous, q24h      Continuous medications  lactated Ringer's, 50 mL/hr, Last Rate: 50 mL/hr (03/16/25 2153)      PRN medications  PRN medications: albuterol, alteplase, calcium carbonate, diclofenac sodium, diphenhydramine-zinc acetate, lidocaine, melatonin, naloxone, oxyCODONE, oxyCODONE, trimethobenzamide, vancomycin     Assessment  Adriana Wood is a 69 y.o. female with a past medical history of bradycardia, cardiomyopathy, CAD, cardiac arrest due to electrolyte abnormalities and zofran use post-operatively, and traumatic brain injury. Patient had a total knee right knee done by Dr. Whelan in March 2024 which was complicated by E. Coli infection. Patient had right TKA wound dehiscence s/p I&D right knee with polyswap and attempted re-closure of complex wound on 5/10/2024 by Dr. Whelan. She was referred to plastic surgery (Dr. Reyes) perioperatively given projected anticipated need for possible flap coverage of the wound/defect site. Patient re-admitted and returned to the OR with orthopedics on 2/28/2025 for right knee I&D, revision static spacer and application of incisional wound vac. Plastic surgery service consulted postoperatively to follow for flap recommendations/timing, now s/p R latissimus free flap to R knee on 3/14 with Dr. Lundberg.     Plan/Recommendations  S/p R latissimus free flap to R knee on 3/14 with Dr. Lundberg  - Continue serial flap assessments Q2hour per nursing with interval checks per plastic surgery team       ·  Assess Doppler pulse at marked site plus flap appearance  (color, warmth, turgor)       ·  Nursing to notify plastic surgery team immediately if there are any acute changes          (Including decreased Doppler signal, pallor, temperature change, bleeding, etc.)  - Nursing to monitor and record consistency and volume of drainage from Penrose drain(s) at flap site  - Continue MASSIEL drain care per nursing (MASSIEL x 1)        ·  Strip drain tubing TID and PRN       ·  Monitor and record output [id number]        ·  Keep drain sites C/D/I with daily drain dressing changes        ·  Call plastics if drain output is >30cc in an hour or greater than 200cc over 8 hours  - Continue incisional wound vac therapy to R lat donor site x10-14 days post op (3/28)        ·  Settings: 125 mmHg low continuous suction        ·  Please keep dressing C/D/I, reinforce PRN        ·  Record vac output per nursing q8h       ·  Please alert plastics team with any concerns regarding vac        ·  Plan to transition to Prevena homegoing vac on day of discharge    - IV Vancomycin while in-patient, ID re-engaged  - Maintain Vioptix > 30%, notify plastics if < 30%   - Keep RLE carefully positioned elevated with pillows        ·  Avoid positioning that would apply pressure to flap region or incisions   - NWB, patient to maintain strict bedrest    - Maintain Rosenbaum while on bedrest    - OK for regular diet       ·  LR @ 50 mL until adequate PO intake        ·  Strict monitoring and recording of I&Os per nursing  - Continue use of Rashid hugger        ·  Settings: low heat, medium continuous fan        ·  5 hours on, one hour off x 3 days post op   - Daily local wound care/dressing changes per plastic surgery APPs (Xeroform over incision lines)       ·  Monitor surgical sites for S/S of bleeding, infection or dehiscence  - Maintain BP of 110/60 minimum to ensure adequate flap perfusion   - Low transfusion threshold to ensure adequate flap perfusion  - SQ Heparin [id number] resumed postoperatively, transition to Lovenox on  POD#3 (3/17)   - Monitor VS/pulse ox [id number]   - Monitor AM CBC/RFP    # New onset Chest pain (3/16) now resolved  - R-sided CP overnight 3/16, has improved with addition of NC, will continue to monitor  - EKG, troponin, Mag ordered, labs stable  - Repeat EKG ordered 3/16 AM  - Medicine team consulted, appreciate further recs       ·  No additional cardiac workup, likely msk in nature       ·  Lidocaine patch to R rib       ·  continue to avoid any QTC prolonging medications    # Hx of septic arthritis of R knee   - Per ID note on 3/3 during previous admission: IV Vancomycin while in-patient, plan for 6 weeks of vancomycin from date of surgery 2/28/25 - until stop date 4/11/25  - Re-engaged ID 3/16, appreciate any updated recs       ·  Continue vanc until 4/11       ·  Weekly labs CBC with diff, chem, quantitative CRP, and vanc trough    # Acute postoperative pain  - Added PRN Lidocaine patch and Diclofenac gel for R anterior rib pain, will continue to monitor       ·  Scheduled Tylenol 975mg PO [id number]        ·  Scheduled Robaxin 500 mg IV [id number]       ·  Oxycodone 5/10mg PO [id number] PRN mod/severe pain   - Bowel regimen with Stephany-Colace while using narcotics  - IV Tigan PRN nausea due to narcotics (NO Zofran due to hx of cardiac arrest)  - Pain assessments [id number]     # L arm itching  - Benadryl cream ordered for itching 3/15, since improved  - L arm tourniquet was left on patients arm during OR procedure by anesthesia staff, continue to evaluate L arm sensation and distal pulses, consider ultrasound if worsening pain or surrounding skin changes    # QT Prolongation  - Avoid QT prolonging medications; NO Zofran (hx of cardiac arrest)  - Telemetry monitoring    # Post-Op Anemia   - HGB on AM labs at 10.8  - Presently no S/S of bleeding, considered likely due to intraoperative blood loss   - Keep T&S UTD, last obtained 3/14  - Monitor for S/S of bleeding or symptomatic anemia   - Low transfusion threshold to ensure  adequate flap perfusion  - Transfuse for HGB <7 per protocol   - Monitor AM CBC     Prophylaxis:   - DVT: SQ heparin [id number] (plan to transition to SQ Lovenox on POD#3), SCDs  - Encourage IS x10 every hour while awake   - Bowel regimen: Stephany-Colace       Disposition: Continue care on RNF. Will remain inpatient for continued flap monitoring and vac/drain surveillance postoperatively. Follow up appointment with plastic surgery department to be scheduled closer to anticipated date of discharge.      Patient and plan discussed with ISABEL Hernandez-CNP   Plastic and Reconstructive Surgery   [place]  Pager #[phone]  Team phone: [phone]

## 2025-03-17 NOTE — PROGRESS NOTES
Physical Therapy                 Therapy Communication Note    Patient Name: Adriana Wood  MRN: [id number]  Department: [facility]  Room: 6030/6030-A  Today's Date: 3/17/2025     Discipline: Physical Therapy    PT Missed Visit: Yes     Missed Visit Reason:  (Reached out to plastics NP and advised to hold PT/activity at this time until further notice. Pt to remain on bedrest at this time. Will follow.)    Missed Time: Attempt
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
## 2025-03-18 LAB
ANION GAP SERPL CALC-SCNC: 11 MMOL/L (ref 10–20)
BUN SERPL-MCNC: 12 MG/DL (ref 6–23)
CALCIUM SERPL-MCNC: 9 MG/DL (ref 8.6–10.6)
CHLORIDE SERPL-SCNC: 102 MMOL/L (ref 98–107)
CO2 SERPL-SCNC: 30 MMOL/L (ref 21–32)
CREAT SERPL-MCNC: 0.5 MG/DL (ref 0.5–1.05)
EGFRCR SERPLBLD CKD-EPI 2021: >90 ML/MIN/1.73M*2
ERYTHROCYTE [DISTWIDTH] IN BLOOD BY AUTOMATED COUNT: 13.8 % (ref 11.5–14.5)
GLUCOSE SERPL-MCNC: 93 MG/DL (ref 74–99)
HCT VFR BLD AUTO: 32.9 % (ref 36–46)
HGB BLD-MCNC: 11.1 G/DL (ref 12–16)
MAGNESIUM SERPL-MCNC: 1.75 MG/DL (ref 1.6–2.4)
MCH RBC QN AUTO: 28.8 PG (ref 26–34)
MCHC RBC AUTO-ENTMCNC: 33.7 G/DL (ref 32–36)
MCV RBC AUTO: 85 FL (ref 80–100)
NRBC BLD-RTO: 0 /100 WBCS (ref 0–0)
PATH REV-IMMUNOHEMATOLOGY-PR30: NORMAL
PATH REV-IMMUNOHEMATOLOGY-PR30: NORMAL
PLATELET # BLD AUTO: 286 X10*3/UL (ref 150–450)
POTASSIUM SERPL-SCNC: 4.1 MMOL/L (ref 3.5–5.3)
RBC # BLD AUTO: 3.86 X10*6/UL (ref 4–5.2)
SODIUM SERPL-SCNC: 139 MMOL/L (ref 136–145)
VANCOMYCIN SERPL-MCNC: 9.3 UG/ML (ref 5–20)
WBC # BLD AUTO: 4.9 X10*3/UL (ref 4.4–11.3)

## 2025-03-18 PROCEDURE — 99232 SBSQ HOSP IP/OBS MODERATE 35: CPT

## 2025-03-18 PROCEDURE — 80202 ASSAY OF VANCOMYCIN: CPT

## 2025-03-18 PROCEDURE — 2500000005 HC RX 250 GENERAL PHARMACY W/O HCPCS

## 2025-03-18 PROCEDURE — 85027 COMPLETE CBC AUTOMATED: CPT

## 2025-03-18 PROCEDURE — 2500000004 HC RX 250 GENERAL PHARMACY W/ HCPCS (ALT 636 FOR OP/ED): Performed by: NURSE PRACTITIONER

## 2025-03-18 PROCEDURE — 2500000004 HC RX 250 GENERAL PHARMACY W/ HCPCS (ALT 636 FOR OP/ED)

## 2025-03-18 PROCEDURE — 83735 ASSAY OF MAGNESIUM: CPT | Performed by: NURSE PRACTITIONER

## 2025-03-18 PROCEDURE — 1200000003 HC ONCOLOGY  ROOM WITH TELEMETRY DAILY

## 2025-03-18 PROCEDURE — 2500000005 HC RX 250 GENERAL PHARMACY W/O HCPCS: Performed by: PHYSICIAN ASSISTANT

## 2025-03-18 PROCEDURE — 2500000001 HC RX 250 WO HCPCS SELF ADMINISTERED DRUGS (ALT 637 FOR MEDICARE OP)

## 2025-03-18 PROCEDURE — 80048 BASIC METABOLIC PNL TOTAL CA: CPT

## 2025-03-18 RX ORDER — MAGNESIUM SULFATE HEPTAHYDRATE 40 MG/ML
2 INJECTION, SOLUTION INTRAVENOUS ONCE
Status: COMPLETED | OUTPATIENT
Start: 2025-03-18 | End: 2025-03-18

## 2025-03-18 RX ORDER — BISACODYL 10 MG/1
10 SUPPOSITORY RECTAL DAILY PRN
Status: DISCONTINUED | OUTPATIENT
Start: 2025-03-18 | End: 2025-03-24 | Stop reason: HOSPADM

## 2025-03-18 RX ORDER — BISACODYL 10 MG/1
10 SUPPOSITORY RECTAL ONCE
Status: DISCONTINUED | OUTPATIENT
Start: 2025-03-18 | End: 2025-03-24 | Stop reason: HOSPADM

## 2025-03-18 RX ADMIN — METHOCARBAMOL 500 MG: 500 TABLET ORAL at 21:31

## 2025-03-18 RX ADMIN — OXYCODONE HYDROCHLORIDE 10 MG: 10 TABLET ORAL at 16:58

## 2025-03-18 RX ADMIN — ACETAMINOPHEN 975 MG: 325 TABLET, FILM COATED ORAL at 08:44

## 2025-03-18 RX ADMIN — METHOCARBAMOL 500 MG: 500 TABLET ORAL at 15:29

## 2025-03-18 RX ADMIN — VANCOMYCIN HYDROCHLORIDE 1500 MG: 5 INJECTION, POWDER, LYOPHILIZED, FOR SOLUTION INTRAVENOUS at 10:37

## 2025-03-18 RX ADMIN — ACETAMINOPHEN 975 MG: 325 TABLET, FILM COATED ORAL at 00:24

## 2025-03-18 RX ADMIN — OXYCODONE 5 MG: 5 TABLET ORAL at 00:25

## 2025-03-18 RX ADMIN — OXYCODONE HYDROCHLORIDE 10 MG: 10 TABLET ORAL at 05:19

## 2025-03-18 RX ADMIN — Medication 3 MG: at 21:29

## 2025-03-18 RX ADMIN — PANTOPRAZOLE SODIUM 40 MG: 40 TABLET, DELAYED RELEASE ORAL at 08:44

## 2025-03-18 RX ADMIN — MAGNESIUM SULFATE HEPTAHYDRATE 2 G: 40 INJECTION, SOLUTION INTRAVENOUS at 18:51

## 2025-03-18 RX ADMIN — OXYCODONE HYDROCHLORIDE 10 MG: 10 TABLET ORAL at 12:46

## 2025-03-18 RX ADMIN — ACETAMINOPHEN 975 MG: 325 TABLET, FILM COATED ORAL at 21:30

## 2025-03-18 RX ADMIN — ACETAMINOPHEN 975 MG: 325 TABLET, FILM COATED ORAL at 16:58

## 2025-03-18 RX ADMIN — METHOCARBAMOL 500 MG: 500 TABLET ORAL at 05:19

## 2025-03-18 RX ADMIN — ENOXAPARIN SODIUM 40 MG: 100 INJECTION SUBCUTANEOUS at 21:29

## 2025-03-18 RX ADMIN — POLYETHYLENE GLYCOL 3350 17 G: 17 POWDER, FOR SOLUTION ORAL at 08:44

## 2025-03-18 RX ADMIN — OXYCODONE HYDROCHLORIDE 10 MG: 10 TABLET ORAL at 21:29

## 2025-03-18 RX ADMIN — LIDOCAINE 1 PATCH: 4 PATCH TOPICAL at 21:30

## 2025-03-18 ASSESSMENT — PAIN - FUNCTIONAL ASSESSMENT
PAIN_FUNCTIONAL_ASSESSMENT: 0-10

## 2025-03-18 ASSESSMENT — PAIN SCALES - GENERAL
PAINLEVEL_OUTOF10: 0 - NO PAIN
PAINLEVEL_OUTOF10: 6
PAINLEVEL_OUTOF10: 0 - NO PAIN
PAINLEVEL_OUTOF10: 6
PAINLEVEL_OUTOF10: 8
PAINLEVEL_OUTOF10: 8
PAINLEVEL_OUTOF10: 6
PAINLEVEL_OUTOF10: 7
PAINLEVEL_OUTOF10: 9
PAINLEVEL_OUTOF10: 5 - MODERATE PAIN
PAINLEVEL_OUTOF10: 0 - NO PAIN

## 2025-03-18 ASSESSMENT — COGNITIVE AND FUNCTIONAL STATUS - GENERAL
DRESSING REGULAR UPPER BODY CLOTHING: TOTAL
TOILETING: A LOT
STANDING UP FROM CHAIR USING ARMS: A LOT
MOBILITY SCORE: 14
HELP NEEDED FOR BATHING: TOTAL
MOVING TO AND FROM BED TO CHAIR: A LOT
DAILY ACTIVITIY SCORE: 12
PERSONAL GROOMING: A LITTLE
CLIMB 3 TO 5 STEPS WITH RAILING: A LOT
DRESSING REGULAR LOWER BODY CLOTHING: TOTAL
MOVING FROM LYING ON BACK TO SITTING ON SIDE OF FLAT BED WITH BEDRAILS: A LITTLE
TURNING FROM BACK TO SIDE WHILE IN FLAT BAD: A LITTLE
WALKING IN HOSPITAL ROOM: A LOT

## 2025-03-18 ASSESSMENT — PAIN SCALES - WONG BAKER: WONGBAKER_NUMERICALRESPONSE: HURTS WHOLE LOT

## 2025-03-18 NOTE — CARE PLAN
Problem: Pain  Goal: Takes deep breaths with improved pain control throughout the shift  Outcome: Progressing  Goal: Turns in bed with improved pain control throughout the shift  Outcome: Progressing  Goal: Walks with improved pain control throughout the shift  Outcome: Progressing  Goal: Performs ADL's with improved pain control throughout shift  Outcome: Progressing  Goal: Participates in PT with improved pain control throughout the shift  Outcome: Progressing  Goal: Free from opioid side effects throughout the shift  Outcome: Progressing  Goal: Free from acute confusion related to pain meds throughout the shift  Outcome: Progressing   The patient's goals for the shift include      The clinical goals for the shift include patient to remain hemodynamically stable

## 2025-03-18 NOTE — HOSPITAL COURSE
BRIEF HISTORY:    Adriana Wood is a 69 y.o. female with a past medical history of bradycardia, cardiomyopathy, CAD, cardiac arrest due to electrolyte abnormalities and zofran use post-operatively, and traumatic brain injury. Patient had a total knee right knee done by Dr. Whelan in March 2024 which was complicated by E. Coli infection. Patient had right TKA wound dehiscence s/p I&D right knee with polyswap and attempted re-closure of complex wound on 5/10/2024 by Dr. Whelan. She was referred to plastic surgery (Dr. Reyes) perioperatively given projected anticipated need for possible flap coverage of the wound/defect site. Patient re-admitted and returned to the OR with orthopedics on 2/28/2025 for right knee I&D, revision static spacer and application of incisional wound vac. Plastic surgery service consulted postoperatively to follow for flap recommendations/timing, now s/p R latissimus free flap to R knee on 3/14 with Dr. Lundberg.      HOSPITAL COURSE:    Admitted post operatively for flap monitoring, bedrest, and pain control. Doing well post operatively but did have episode of chest pain on 3/16, normal labs, EKG normal which since resolved.    CONSULTATIONS:  Medicine consulted for acute onset of chest pain and elected to monitor, no additional cardiac workup, likely musculoskeletal in nature, lidocaine patches to right rib.     DAY OF DISCHARGE:    On the day of discharge, the patient was seen and evaluated by the Plastic Surgery team and deemed suitable for discharge to ***.  There were no significant events overnight. Vitals were reviewed and within normal limits. Labs were stable at discharge. On day of discharge the patient was tolerating a diet, pain was controlled on PO pain medication, was ambulating well and voiding spontaneously. The patient was given detailed discharge instructions and were scheduled to follow up as an outpatient.     was given detailed discharge instructions and were scheduled to follow up as an outpatient.

## 2025-03-18 NOTE — PROGRESS NOTES
"  Department of Plastic and Reconstructive Surgery  Daily Progress Note    Patient Name: Adriana Wood  MRN: [id number]  Date:  03/18/25     Subjective  Patient resting in bed comfortably this AM. She states she still has not had a BM yet, however is passing gas, and this is normal for her to go a a few days without BM, she denies nausea and is tolerating her diet well.  Otherwise denies any fever, chills, night sweats, CP, SOB, palpitations, nausea, vomiting, or abdominal pain/discomfort.    Objective  Vital Signs  /88 (BP Location: Left leg, Patient Position: Lying)   Pulse (!) 41   Temp 36.2 °C (97.2 °F) (Temporal)   Resp 16   Ht 1.778 m (5' 10\")   Wt 69.1 kg (152 lb 5.4 oz)   SpO2 94%   BMI 21.86 kg/m²      Physical Exam   Constitutional: NAD  Eyes: EOMI, clear sclera   ENMT: Moist mucous membranes, no apparent injuries or lesions  Head/Neck: NCAT  Cardiovascular: Extremities WWP  Respiratory/Thorax: Unlabored respirations on RA  Gastrointestinal: Abdomen soft, non-distended, non-tender  Genitourinary: indwelling damon  Extremities: RLE flap: warm to touch, soft and compressible, strong biphasic signal at marked stitch, circumferential incisions around flap well approximated with scant amount of ss drainage, covered with xeroform, penrose exiting flap with ss output, vioptix in place with signal of 75% with 93% signal strength  Right posterior lat donor site: prevena incisional wound vac in place, holding suction at -125 mmHg, no alarm or leak noted, MASSIEL drain x 1 to right flank with ss output  Neurological: A&Ox3  Psychological: Appropriate mood and behavior  Skin: Warm and dry. L upper extremity with ecchymosis circumferentially where tourniquet was placed during surgery.    Diagnostics   Results for orders placed or performed during the hospital encounter of 03/14/25 (from the past 24 hours)   CBC   Result Value Ref Range    WBC 4.9 4.4 - 11.3 x10*3/uL    nRBC 0.0 0.0 - 0.0 /100 WBCs    RBC " 3.86 (L) 4.00 - 5.20 x10*6/uL    Hemoglobin 11.1 (L) 12.0 - 16.0 g/dL    Hematocrit 32.9 (L) 36.0 - 46.0 %    MCV 85 80 - 100 fL    MCH 28.8 26.0 - 34.0 pg    MCHC 33.7 32.0 - 36.0 g/dL    RDW 13.8 11.5 - 14.5 %    Platelets 286 150 - 450 x10*3/uL   Basic metabolic panel   Result Value Ref Range    Glucose 93 74 - 99 mg/dL    Sodium 139 136 - 145 mmol/L    Potassium 4.1 3.5 - 5.3 mmol/L    Chloride 102 98 - 107 mmol/L    Bicarbonate 30 21 - 32 mmol/L    Anion Gap 11 10 - 20 mmol/L    Urea Nitrogen 12 6 - 23 mg/dL    Creatinine 0.50 0.50 - 1.05 mg/dL    eGFR >90 >60 mL/min/1.73m*2    Calcium 9.0 8.6 - 10.6 mg/dL   Vancomycin   Result Value Ref Range    Vancomycin 9.3 5.0 - 20.0 ug/mL   Magnesium   Result Value Ref Range    Magnesium 1.75 1.60 - 2.40 mg/dL     *Note: Due to a large number of results and/or encounters for the requested time period, some results have not been displayed. A complete set of results can be found in Results Review.     ECG 12 Lead    Result Date: 3/6/2025   Poor data quality, interpretation may be adversely affected Sinus bradycardia with 1st degree AV block Nonspecific T wave abnormality Prolonged QT Abnormal ECG Confirmed by Clyde Burkett ([id number]) on 3/6/2025 3:09:25 PM    ECG 12 lead    Result Date: 3/5/2025  Sinus tachycardia with frequent Premature ventricular complexes in a pattern of bigeminy Nonspecific T wave abnormality Abnormal ECG When compared with ECG of 01-MAR-2025 09:07, Significant changes have occurred Confirmed by Baron Sanders ([id number]) on 3/5/2025 8:34:27 AM    Bedside PICC Imaging    Result Date: 3/4/2025  These images are not reportable by radiology and will not be interpreted by  Radiologists.    XR knee right 1-2 views    Result Date: 2/28/2025  Interpreted By:  Juan Smith, STUDY: XR KNEE RIGHT 1-2 VIEWS; ;  2/28/2025 11:36 am   INDICATION: Signs/Symptoms:PACU.     COMPARISON: 01/16/2025.   ACCESSION NUMBER(S): [id number]   ORDERING CLINICIAN: DARSHAN LARSON    FINDINGS: Two views of the right knee   Postsurgical changes of right knee fusion with cement and productive changes within the joint space. The knee joint alignment is similar compared to prior. Interval removal of intramedullary nail with ghost tract. Similar-appearing remote fractures of the proximal fibula and proximal tibia. Osteopenic changes. Vascular calcifications. Soft tissue edema over the anterior knee with overlying wound VAC and subcutaneous air, likely postsurgical. No definitive new fracture.       1. Postsurgical changes of the right knee with fusion of the joint space and interval removal of the intramedullary nail. Overlying soft tissue edema and wound VAC.       MACRO: None   Signed by: Juan Smith 2/28/2025 12:56 PM Dictation workstation:   [id number]    FL fluoro images no charge    Result Date: 2/28/2025  These images are not reportable by radiology and will not be interpreted by  Radiologists.      Current Medications  Scheduled medications  acetaminophen, 975 mg, oral, q8h  enoxaparin, 40 mg, subcutaneous, Daily  lidocaine, 1 patch, transdermal, Daily  methocarbamol, 500 mg, oral, q8h FELIX  pantoprazole, 40 mg, oral, Daily before breakfast  polyethylene glycol, 17 g, oral, Daily  sennosides-docusate sodium, 2 tablet, oral, BID  vancomycin, 1,500 mg, intravenous, q24h      Continuous medications       PRN medications  PRN medications: albuterol, alteplase, calcium carbonate, diclofenac sodium, diphenhydramine-zinc acetate, lidocaine, melatonin, naloxone, oxyCODONE, oxyCODONE, trimethobenzamide, vancomycin     Assessment  Adriana Wood is a 69 y.o. female with a past medical history of bradycardia, cardiomyopathy, CAD, cardiac arrest due to electrolyte abnormalities and zofran use post-operatively, and traumatic brain injury. Patient had a total knee right knee done by Dr. Whelan in March 2024 which was complicated by E. Coli infection. Patient had right TKA wound dehiscence s/p  I&D right knee with polyswap and attempted re-closure of complex wound on 5/10/2024 by Dr. Whelan. She was referred to plastic surgery (Dr. Reyes) perioperatively given projected anticipated need for possible flap coverage of the wound/defect site. Patient re-admitted and returned to the OR with orthopedics on 2/28/2025 for right knee I&D, revision static spacer and application of incisional wound vac. Plastic surgery service consulted postoperatively to follow for flap recommendations/timing, now s/p R latissimus free flap to R knee on 3/14 with Dr. Lundberg.     Plan/Recommendations  S/p R latissimus free flap to R knee on 3/14 with Dr. Lundberg  - Continue serial flap assessments Q2hour per nursing with interval checks per plastic surgery team       ·  Assess Doppler pulse at marked site plus flap appearance (color, warmth, turgor)       ·  Nursing to notify plastic surgery team immediately if there are any acute changes          (Including decreased Doppler signal, pallor, temperature change, bleeding, etc.)  - Penrose removed 3/18 at bedside without complication  - Continue MASSIEL drain care per nursing (MASSIEL x 2)        ·  Strip drain tubing TID and PRN       ·  Monitor and record output [id number]        ·  Keep drain sites C/D/I with daily drain dressing changes        ·  Call plastics if drain output is >30cc in an hour or greater than 200cc over 8 hours  - Continue incisional wound vac therapy to R lat donor site x10-14 days post op (3/28)        ·  Settings: 125 mmHg low continuous suction        ·  Please keep dressing C/D/I, reinforce PRN        ·  Record vac output per nursing q8h       ·  Please alert plastics team with any concerns regarding vac        ·  Plan to transition to Prevena homegoing vac on day of discharge    - IV Vancomycin while in-patient, ID re-engaged  - Maintain Vioptix > 30%, notify plastics if < 30%   - Keep RLE carefully positioned elevated with pillows        ·  Avoid positioning that  would apply pressure to flap region or incisions   - NWB, patient to maintain strict bedrest    - Maintain Rosenbaum while on bedrest    - OK for regular diet       ·  Strict monitoring and recording of I&Os per nursing  - Continue use of Rashid hugger        ·  Settings: low heat, medium continuous fan        ·  5 hours on, one hour off x 5 days post op   - Daily local wound care/dressing changes per plastic surgery APPs (Xeroform over incision lines)       ·  Monitor surgical sites for S/S of bleeding, infection or dehiscence  - Maintain BP of 110/60 minimum to ensure adequate flap perfusion   - Low transfusion threshold to ensure adequate flap perfusion  - SQ Heparin transitioned to q24h lovenox on POD3  - Monitor VS/pulse ox [id number]   - Monitor AM CBC/RFP    # New onset Chest pain (3/16) now resolved  - R-sided CP overnight 3/16, has improved with addition of NC, will continue to monitor  - EKG, troponin, Mag ordered, labs stable  - Repeat EKG ordered 3/16 AM  - Medicine team consulted, appreciate further recs       ·  No additional cardiac workup, likely msk in nature       ·  Lidocaine patch to R rib       ·  continue to avoid any QTC prolonging medications    # Hx of septic arthritis of R knee   - Per ID note on 3/3 during previous admission: IV Vancomycin while in-patient, plan for 6 weeks of vancomycin from date of surgery 2/28/25 - until stop date 4/11/25  - Re-engaged ID 3/16, appreciate any updated recs       ·  Continue vanc until 4/11       ·  Weekly labs CBC with diff, chem, quantitative CRP, and vanc trough    # Acute postoperative pain  - Added PRN Lidocaine patch and Diclofenac gel for R anterior rib pain, will continue to monitor       ·  Scheduled Tylenol 975mg PO [id number]        ·  Scheduled Robaxin 500 mg IV [id number]       ·  Oxycodone 5/10mg PO [id number] PRN mod/severe pain   - Bowel regimen with Stephany-Colace while using narcotics       ·  Dulcolax suppository x1 given 3/18 for persistent constipation  -  IV Tigan PRN nausea due to narcotics (NO Zofran due to hx of cardiac arrest)  - Pain assessments [id number]     # L arm itching  - Benadryl cream ordered for itching 3/15, since improved  - L arm tourniquet was left on patients arm during OR procedure by anesthesia staff, continue to evaluate L arm sensation and distal pulses, consider ultrasound if worsening pain or surrounding skin changes    # QT Prolongation  - Avoid QT prolonging medications; NO Zofran (hx of cardiac arrest)  - Telemetry monitoring    # Post-Op Anemia   - HGB on AM labs at 11.1  - Presently no S/S of bleeding, considered likely due to intraoperative blood loss   - Keep T&S UTD, last obtained 3/14  - Monitor for S/S of bleeding or symptomatic anemia   - Low transfusion threshold to ensure adequate flap perfusion  - Transfuse for HGB <7 per protocol   - Monitor AM CBC     Prophylaxis:   - DVT: subcutaneous lovenox daily started on POD3, SCDs  - Encourage IS x10 every hour while awake   - Bowel regimen: Stephany-Colace, dulcolax suppository x1 (3/18)       Disposition: Continue care on RNF. Will remain inpatient for continued flap monitoring and vac/drain surveillance postoperatively. Follow up appointment with plastic surgery department to be scheduled closer to anticipated date of discharge.      Patient and plan discussed with ISABEL Hernandez-CNP   Plastic and Reconstructive Surgery   [place]  Pager #[phone]  Team phone: [phone]

## 2025-03-18 NOTE — CARE PLAN
The patient's goals for the shift include      The clinical goals for the shift include pt will remain HDS      Problem: Pain  Goal: Takes deep breaths with improved pain control throughout the shift  3/18/2025 1137 by Gilda Nunez RN  Outcome: Progressing  3/18/2025 1136 by Gilda Nunez RN  Outcome: Progressing  Goal: Turns in bed with improved pain control throughout the shift  3/18/2025 1137 by Gilda Nunez RN  Outcome: Progressing  3/18/2025 1136 by Gilda Nunez RN  Outcome: Progressing  Goal: Walks with improved pain control throughout the shift  3/18/2025 1137 by Gilda Nunez RN  Outcome: Progressing  3/18/2025 1136 by Gilda Nunez RN  Outcome: Progressing  Goal: Performs ADL's with improved pain control throughout shift  3/18/2025 1137 by Gilda Nunez RN  Outcome: Progressing  3/18/2025 1136 by Gilda Nunez RN  Outcome: Progressing  Goal: Participates in PT with improved pain control throughout the shift  3/18/2025 1137 by Gilda Nunez RN  Outcome: Progressing  3/18/2025 1136 by Gilda Nunez RN  Outcome: Progressing  Goal: Free from opioid side effects throughout the shift  3/18/2025 1137 by Gilda Nunez RN  Outcome: Progressing  3/18/2025 1136 by Gilda Nunez RN  Outcome: Progressing  Goal: Free from acute confusion related to pain meds throughout the shift  3/18/2025 1137 by Gilda Nunez RN  Outcome: Progressing  3/18/2025 1136 by Gilda Nunez RN  Outcome: Progressing     Problem: Skin  Goal: Decreased wound size/increased tissue granulation at next dressing change  3/18/2025 1137 by Gilda Nunez RN  Outcome: Progressing  Flowsheets (Taken 3/17/2025 0942 by Kristal Kumar, RN)  Decreased wound size/increased tissue granulation at next dressing change: Utilize specialty bed per algorithm  3/18/2025 1136 by Gilda Nunez RN  Outcome: Progressing  Flowsheets (Taken 3/17/2025 0942 by Kristal Kumar, RN)  Decreased wound size/increased tissue granulation at next dressing change: Utilize  specialty bed per algorithm  Goal: Participates in plan/prevention/treatment measures  3/18/2025 1137 by Gilda Nunez RN  Outcome: Progressing  Flowsheets (Taken 3/17/2025 0942 by Kristal Kumar RN)  Participates in plan/prevention/treatment measures: Discuss with provider PT/OT consult  3/18/2025 1136 by Gilda Nunez RN  Outcome: Progressing  Flowsheets (Taken 3/17/2025 0942 by Kristal Kumar RN)  Participates in plan/prevention/treatment measures: Discuss with provider PT/OT consult  Goal: Prevent/manage excess moisture  3/18/2025 1137 by Gilda Nunez RN  Outcome: Progressing  Flowsheets (Taken 3/17/2025 0942 by Kristal Kumar RN)  Prevent/manage excess moisture:   Moisturize dry skin   Cleanse incontinence/protect with barrier cream  3/18/2025 1136 by Gilda Nunez RN  Outcome: Progressing  Flowsheets (Taken 3/17/2025 0942 by Kristal Kumar RN)  Prevent/manage excess moisture:   Moisturize dry skin   Cleanse incontinence/protect with barrier cream  Goal: Prevent/minimize sheer/friction injuries  3/18/2025 1137 by Gilda Nunez RN  Outcome: Progressing  Flowsheets (Taken 3/17/2025 0942 by Kristal Kumar RN)  Prevent/minimize sheer/friction injuries:   Use pull sheet   Increase activity/out of bed for meals  3/18/2025 1136 by Gilda Nunez RN  Outcome: Progressing  Flowsheets (Taken 3/17/2025 0942 by Kristal Kumar RN)  Prevent/minimize sheer/friction injuries:   Use pull sheet   Increase activity/out of bed for meals  Goal: Promote/optimize nutrition  3/18/2025 1137 by Gilda Nunez RN  Outcome: Progressing  Flowsheets (Taken 3/17/2025 0942 by Kristal Kumar RN)  Promote/optimize nutrition:   Assist with feeding   Monitor/record intake including meals  3/18/2025 1136 by Gilda Nunez RN  Outcome: Progressing  Flowsheets (Taken 3/17/2025 0942 by Kristal Kumar RN)  Promote/optimize nutrition:   Assist with feeding   Monitor/record intake including meals  Goal: Promote skin healing  3/18/2025 1137  by Gilda Sasack, RN  Outcome: Progressing  Flowsheets (Taken 3/17/2025 0942 by Kristal Kumar, RN)  Promote skin healing:   Turn/reposition every 2 hours/use positioning/transfer devices   Protective dressings over bony prominences  3/18/2025 1136 by Gilda Nunez RN  Outcome: Progressing  Flowsheets (Taken 3/17/2025 0942 by Kristal Kumar, RN)  Promote skin healing:   Turn/reposition every 2 hours/use positioning/transfer devices   Protective dressings over bony prominences     Problem: Pain - Adult  Goal: Verbalizes/displays adequate comfort level or baseline comfort level  3/18/2025 1137 by Gilda Nunez RN  Outcome: Progressing  3/18/2025 1136 by Gilda Nunez RN  Outcome: Progressing     Problem: Safety - Adult  Goal: Free from fall injury  3/18/2025 1137 by Gilda Nunez RN  Outcome: Progressing  3/18/2025 1136 by Gilda Nunez RN  Outcome: Progressing     Problem: Discharge Planning  Goal: Discharge to home or other facility with appropriate resources  3/18/2025 1137 by Gilda Nunez RN  Outcome: Progressing  3/18/2025 1136 by Gilda Nunez RN  Outcome: Progressing     Problem: Chronic Conditions and Co-morbidities  Goal: Patient's chronic conditions and co-morbidity symptoms are monitored and maintained or improved  3/18/2025 1137 by Gilda Nunez RN  Outcome: Progressing  3/18/2025 1136 by Gilda Nunez RN  Outcome: Progressing     Problem: Nutrition  Goal: Nutrient intake appropriate for maintaining nutritional needs  3/18/2025 1137 by Gilda Nunez RN  Outcome: Progressing  3/18/2025 1136 by Gilda Nunez RN  Outcome: Progressing     Problem: Fall/Injury  Goal: Not fall by end of shift  3/18/2025 1137 by Gilda Nunez RN  Outcome: Progressing  3/18/2025 1136 by Gilda Nunez RN  Outcome: Progressing  Goal: Be free from injury by end of the shift  3/18/2025 1137 by Gilda Nunez RN  Outcome: Progressing  3/18/2025 1136 by Gilda Nunez RN  Outcome: Progressing  Goal: Verbalize understanding of personal  risk factors for fall in the hospital  3/18/2025 1137 by Gilda Nunez RN  Outcome: Progressing  3/18/2025 1136 by Gilda Nunez RN  Outcome: Progressing  Goal: Verbalize understanding of risk factor reduction measures to prevent injury from fall in the home  3/18/2025 1137 by Gilda Nunez RN  Outcome: Progressing  3/18/2025 1136 by Gilda Nunez RN  Outcome: Progressing  Goal: Use assistive devices by end of the shift  3/18/2025 1137 by Gilda Nunez RN  Outcome: Progressing  3/18/2025 1136 by Gilda Nunez RN  Outcome: Progressing  Goal: Pace activities to prevent fatigue by end of the shift  3/18/2025 1137 by Gilda Nunez RN  Outcome: Progressing  3/18/2025 1136 by Gilda Nunez RN  Outcome: Progressing

## 2025-03-18 NOTE — PROGRESS NOTES
Occupational Therapy                 Therapy Communication Note    Patient Name: Adriana Wood  MRN: [id number]  Department: [facility]  Room: 6030/6030-A  Today's Date: 3/18/2025     Discipline: Occupational Therapy    OT Missed Visit: Yes     Missed Visit Reason: Missed Visit Reason:  (Strict bed rest at this time, will follow up once appropriate.)    Missed Time: Attempt    Comment:   Xray Chest 1 View- PORTABLE-Urgent

## 2025-03-18 NOTE — PROGRESS NOTES
Vancomycin Dosing by Pharmacy- FOLLOW UP    Adriana Wood is a 69 y.o. year old female who Pharmacy has been consulted for vancomycin dosing for cellulitis, skin and soft tissue. Based on the patient's indication and renal status this patient is being dosed based on a goal AUC of 400-600.     Renal function is currently stable.    Current vancomycin dose: 1000 mg given every 24 hours    Estimated vancomycin AUC on current dose: 333 mg/L.hr     Visit Vitals  /70 (BP Location: Left leg, Patient Position: Lying)   Pulse 88   Temp 36.7 °C (98.1 °F) (Temporal)   Resp 18        Lab Results   Component Value Date    CREATININE 0.50 2025    CREATININE 0.53 2025    CREATININE 0.52 2025    CREATININE 0.41 (L) 03/15/2025        Patient weight is as follows:   Vitals:    25 0600   Weight: 69.1 kg (152 lb 5.4 oz)       Cultures:  No results found for the encounter in last 14 days.       I/O last 3 completed shifts:  In: 2407.2 (34.8 mL/kg) [P.O.:420; I.V.:1282.2 (18.6 mL/kg); Blood:305; IV Piggyback:400]  Out: 2343 (33.9 mL/kg) [Urine:2250 (0.9 mL/kg/hr); Drains:93]  Weight: 69.1 kg   I/O during current shift:  I/O this shift:  In: -   Out: 880 [Urine:850; Drains:30]    Temp (24hrs), Av.7 °C (98 °F), Min:36.5 °C (97.7 °F), Max:36.8 °C (98.2 °F)      Assessment/Plan    Above goal AUC. Orders placed for new vancomcyin regimen of 1500 every 24 hours to begin at 0800 on .    This dosing regimen is predicted by [employer] to result in the following pharmacokinetic parameters:  Loading dose: N/A  Regimen: 1500 mg IV every 24 hours.  Start time: 08:38 on 2025  Exposure target: AUC24 (range)400-600 mg/L.hr   [id number]: 454 mg/L.hr  AUC24,ss: 483 mg/L.hr  Probability of AUC24 > 400: 95 %  Ctrough,ss: 14.4 mg/L  Probability of Ctrough,ss > 20: 3 %    The next level will be obtained on  w/ AM labs d/t intermediate fit of model. May be obtained sooner if clinically indicated.   Will  continue to monitor renal function daily while on vancomycin and order serum creatinine at least every 48 hours if not already ordered.  Follow for continued vancomycin needs, clinical response, and signs/symptoms of toxicity.       Obdulio Osborne, PharmD

## 2025-03-18 NOTE — CARE PLAN
The patient's goals for the shift include      The clinical goals for the shift include pt will remain HDS      Problem: Pain  Goal: Takes deep breaths with improved pain control throughout the shift  Outcome: Progressing  Goal: Turns in bed with improved pain control throughout the shift  Outcome: Progressing  Goal: Walks with improved pain control throughout the shift  Outcome: Progressing  Goal: Performs ADL's with improved pain control throughout shift  Outcome: Progressing  Goal: Participates in PT with improved pain control throughout the shift  Outcome: Progressing  Goal: Free from opioid side effects throughout the shift  Outcome: Progressing  Goal: Free from acute confusion related to pain meds throughout the shift  Outcome: Progressing     Problem: Skin  Goal: Decreased wound size/increased tissue granulation at next dressing change  Outcome: Progressing  Flowsheets (Taken 3/17/2025 0942 by Kristal Kumar RN)  Decreased wound size/increased tissue granulation at next dressing change: Utilize specialty bed per algorithm  Goal: Participates in plan/prevention/treatment measures  Outcome: Progressing  Flowsheets (Taken 3/17/2025 0942 by Kristal Kumar RN)  Participates in plan/prevention/treatment measures: Discuss with provider PT/OT consult  Goal: Prevent/manage excess moisture  Outcome: Progressing  Flowsheets (Taken 3/17/2025 0942 by Kristal Kumar, RN)  Prevent/manage excess moisture:   Moisturize dry skin   Cleanse incontinence/protect with barrier cream  Goal: Prevent/minimize sheer/friction injuries  Outcome: Progressing  Flowsheets (Taken 3/17/2025 0942 by Kristal Kumar, RN)  Prevent/minimize sheer/friction injuries:   Use pull sheet   Increase activity/out of bed for meals  Goal: Promote/optimize nutrition  Outcome: Progressing  Flowsheets (Taken 3/17/2025 0942 by Kristal Kumar RN)  Promote/optimize nutrition:   Assist with feeding   Monitor/record intake including meals  Goal: Promote  skin healing  Outcome: Progressing  Flowsheets (Taken 3/17/2025 0942 by Kristal Kumar RN)  Promote skin healing:   Turn/reposition every 2 hours/use positioning/transfer devices   Protective dressings over bony prominences     Problem: Pain - Adult  Goal: Verbalizes/displays adequate comfort level or baseline comfort level  Outcome: Progressing     Problem: Safety - Adult  Goal: Free from fall injury  Outcome: Progressing     Problem: Discharge Planning  Goal: Discharge to home or other facility with appropriate resources  Outcome: Progressing     Problem: Chronic Conditions and Co-morbidities  Goal: Patient's chronic conditions and co-morbidity symptoms are monitored and maintained or improved  Outcome: Progressing     Problem: Nutrition  Goal: Nutrient intake appropriate for maintaining nutritional needs  Outcome: Progressing     Problem: Fall/Injury  Goal: Not fall by end of shift  Outcome: Progressing  Goal: Be free from injury by end of the shift  Outcome: Progressing  Goal: Verbalize understanding of personal risk factors for fall in the hospital  Outcome: Progressing  Goal: Verbalize understanding of risk factor reduction measures to prevent injury from fall in the home  Outcome: Progressing  Goal: Use assistive devices by end of the shift  Outcome: Progressing  Goal: Pace activities to prevent fatigue by end of the shift  Outcome: Progressing

## 2025-03-18 NOTE — PROGRESS NOTES
Physical Therapy                 Therapy Communication Note    Patient Name: Adriana Wood  MRN: [id number]  Department: [facility]  Room: 6030/6030-  Today's Date: 3/18/2025     Discipline: Physical Therapy    PT Missed Visit: Yes     Missed Visit Reason:  (Pt remains on bedrest at this time. Will continue to follow when appropriate for PT.)    Missed Time: Attempt

## 2025-03-18 NOTE — DISCHARGE INSTRUCTIONS
Plastic Surgery Post Discharge Instructions  You were admitted to [facility] for postoperative monitoring and control of pain following right latissimus dorsi free flap to right knee on 3/14/25 with Dr. Reyes of plastic surgery. Included below are post-discharge instructions and details regarding follow-up.     Thank you for allowing us to participate in your care and we wish you the best!    Best Regards,  [facility]  Department of Plastic and Reconstructive Surgery    Activity:  Activity as tolerated. No pushing, pulling or lifting objects greater than 5 pounds until follow up. Continue to maintain non-weight bearing status to Right leg. Ensure no compression is applied to the *** or free flap sites. Continue to elevate your *** to alleviate postoperative edema. Please do not apply ice or heat directly to free flap without barrier in place ***.     You may locally bathe following discharge. Avoid soaking or submerging surgical incisions/sites or wetting wound vac dressing or device ***.      Surgical Site/Wound Care:  Prevena/wound vac to right latissimus dorsi: Please allow Prevena incisional wound vac dressing to remain in place until output follow-up with plastic surgery. When showering, do not allow the wound vac dressing or device to become wet. When resting, please make sure to plug in the device with the supplied power cord to maintain the battery. The device has a power button at the center which is encircled by green lights. There will be one less light illuminated each day (every 24 hrs) which is to be expected, and signifies the count down of the duration of the vac device. If you experience any issues with your wound vac including alarms, malfunction or dressing issues please refer to the provided instruction manual or contact the plastic surgery office at [phone].     Local wound care instructions ***. Avoid application of creams, lotions or  ointments to surgical site, no soaking or scrubbing of surgical sites.     *** Continue to monitor flap for changes in general appearance, color, temperature and turgor. Please additionally monitor for any developing signs of infection which may include increased redness, swelling, fever/chills, green/yellow drainage, or foul odor from surgical sites or wounds. If any signs of infection or changes in flap appearance are to occur, please immediately contact the plastic surgery office.     Drain care:  You are being discharged with ***. To empty the drain, open the cap, tip into cup and squeeze to empty. Squeeze drain flat then replace the cap.  Please empty the drain and record its output 3 times a day and bring these numbers to your follow up appointment.  The drain output should decrease and the color of the drainage should become lighter (red to pink to yellow).  This drain is sutured into place.  Keep the area around the drain clean and dry.  You may use mild soap and water to cleanse around the drain.  It is ok to shower; do not soak in a tub. Change the gauze around the drain as needed.  Call the office if you notice drastic changes in drain output, bloody drain output, or redness/drainage around insertion site.    Nutrition:  You may resume a regular diet following surgery with increased protein. Ensure that you are drinking an adequate amount of fluids to maintain hydration as well as consuming a diet high in protein and low in sugar. You may consider increasing your fiber intake to avoid constipation.    Do not smoke, as smoking delays healing and increases the risk of complications. Also be sure you are not around people that smoke for at least 6 weeks after surgery.  Second hand smoke is just as harmful as if you were to smoke.    Medication Instructions:  You may resume use of your home prescribed mediations as previously directed following discharge from the hospital. If you were taking medications prior  to your surgery and they are not listed on your discharge homegoing instructions medications list, consult your MD before you resume these medications.    Some postoperative pain is not unusual. This is usually relieved by taking prescribed or over the counter Acetaminophen/Tylenol, Motrin/ibuprofen. In cases of severe pain, you may use prescribed *** as directed. Severe pain despite administration of pain medication must be reported to your physician.    Remember when taking Acetaminophen, do NOT exceed more than 1000 milligrams (mg) per dose or more than 4000 mg total per day. Taking too much Acetaminophen at one time can damage your liver. The maximum amount of ibuprofen in adults is 800 mg per dose or 3200 mg per day. Call your MD if you have any questions about your medications. To prevent constipation while taking narcotic pain medications, please utilize your prescribed bowel regimen, ensure that you drink plenty of water, eat fiber rich foods (a good source is fruit) and increase activity progressively.    DO NOT drive a car while utilizing narcotic pain medications and until cleared by MD at follow-up appointment. Driving or operating heavy machinery, lawnmowers or power tools while taking opiod/narcotic pain medications may impair your judgement.    Call Physician If:  Contact the plastic surgery office for any questions and/or concerns regarding the surgical incision/site.  1. [phone] if Monday-Friday (8 a.m. - 4:30 p.m.)  2. [phone] and ask for the Plastic Surgery team on call provider if after hours or on weekends  3. Email [email or web address] for any non-urgent concerns and when relaying weekly progress photos of flap sites. ***    Call your MD or seek immediate medical attention if you experience any of the following symptoms:  1. Fever of 101.5 (38.5 C) or greater  2. Pain not controlled with prescribed pain medications  3. Uncontrolled nausea and/or vomiting  4. Drainage or  swelling around your incisions and/or surgical sites   5. Separation of incisions, or tearing of the incision line  6. Large fluid collection under or around the incision or flap sites   7. Flap discoloration (including darkened appearance)  8. Difficulty breathing  9. Swelling, pain, heat and/or redness in your legs and/or calves  10. Inability to tolerate diet/fluid intake    Additional Notes:  ***    Follow-up/Post Discharge Appointments:  Follow-up care is a key part of your treatment and safety. It is very important that you maintain follow-up care as directed so that your surgical site heals properly and does not lead to problems. Always carry a current medication list with you and bring it to ALL healthcare Provider visits. Be sure to maintain follow up with plastic surgery at your scheduled appointment. If you are unable to keep your appointment, or need to reschedule please contact our office at [phone]. ***

## 2025-03-18 NOTE — SIGNIFICANT EVENT
"  Department of Plastic and Reconstructive Surgery  PM Flap Check    69 y.o. female with a past medical history of bradycardia, cardiomyopathy, CAD, cardiac arrest due to electrolyte abnormalities and zofran use post-operatively, and traumatic brain injury. Patient had a total knee right knee done by Dr. Whelan in March 2024 which was complicated by E. Coli infection. Patient had right TKA wound dehiscence s/p I&D right knee with polyswap and attempted re-closure of complex wound on 5/10/2024 by Dr. Whelan. She was referred to plastic surgery (Dr. Reyes) perioperatively given projected anticipated need for possible flap coverage of the wound/defect site. Patient re-admitted and returned to the OR with orthopedics on 2/28/2025 for right knee I&D, revision static spacer and application of incisional wound vac. Plastic surgery service consulted postoperatively to follow for flap recommendations/timing, now s/p R latissimus free flap to R knee on 3/14 with Dr. Lundberg.     Subjective: Resting in bed, c/o pain to back and right knee as a 4/10, as constant and achy, pain well controlled with oxycodone, Tolerating diet but with poor appetite, Denies any fever, chills, night sweats, CP, SOB, palpitations, nausea, vomiting, diarrhea, constipation, dysuria, hematuria, hematochezia, hematemesis, flank pain.     Objective:  /69 (BP Location: Left arm, Patient Position: Lying)   Pulse 95   Temp 36.8 °C (98.2 °F) (Temporal)   Resp 18   Ht 1.778 m (5' 10\")   Wt 69.1 kg (152 lb 5.4 oz)   SpO2 92%   BMI 21.86 kg/m²    PE:  Constitutional: A&Ox3, calm and cooperative, NAD  Eyes: EOMI, clear sclera   ENMT: Moist mucous membranes, no apparent injuries or lesions  Head/Neck: NCAT  Cardiovascular: Extremities WWP  Respiratory/Thorax: Unlabored respirations on RA  Gastrointestinal: Abdomen soft, NTND, +BS x4, no BM  Genitourinary: indwelling damon in place with clear yellow output  Extremities: RLE flap: warm to touch, " soft and compressible, strong biphasic signal at marked stitch, circumferential incisions around flap well approximated with scant amount of ss drainage, covered with xeroform, penrose exiting flap with ss output, vioptix in place with signal of 76% with 93% signal strength, MASSIEL drain to medial aspect of right knee with ss output  Right posterior lat donor site: prevena incisional wound vac in place, holding suction at -125 mmHg, no alarm or leak noted, MASSIEL drain x 1 to right flank with ss output  Neurological: A&Ox3  Psychological: Appropriate mood and behavior  Skin: Warm and dry. L upper extremity with ecchymosis circumferentially where tourniquet was placed during surgery, stable    S/P Right latissimus dorsi donor site FF to Right knee:  A/P:   - Flap check stable, no change from prior assessment, biphasic doppler signal, good strength, vioptix in place with 76%  - all other care per daily progress note  - Updated ISABEL Ramos-CNP  Plastic and Reconstructive Surgery   Available via [employer]  Pager #[phone]  Team phone: [phone]

## 2025-03-19 LAB
ANION GAP SERPL CALC-SCNC: 15 MMOL/L (ref 10–20)
BUN SERPL-MCNC: 9 MG/DL (ref 6–23)
CALCIUM SERPL-MCNC: 8.9 MG/DL (ref 8.6–10.6)
CHLORIDE SERPL-SCNC: 102 MMOL/L (ref 98–107)
CO2 SERPL-SCNC: 27 MMOL/L (ref 21–32)
CREAT SERPL-MCNC: 0.53 MG/DL (ref 0.5–1.05)
EGFRCR SERPLBLD CKD-EPI 2021: >90 ML/MIN/1.73M*2
ERYTHROCYTE [DISTWIDTH] IN BLOOD BY AUTOMATED COUNT: 13.9 % (ref 11.5–14.5)
GLUCOSE SERPL-MCNC: 100 MG/DL (ref 74–99)
HCT VFR BLD AUTO: 36.7 % (ref 36–46)
HGB BLD-MCNC: 11.3 G/DL (ref 12–16)
MAGNESIUM SERPL-MCNC: 2.17 MG/DL (ref 1.6–2.4)
MCH RBC QN AUTO: 28 PG (ref 26–34)
MCHC RBC AUTO-ENTMCNC: 30.8 G/DL (ref 32–36)
MCV RBC AUTO: 91 FL (ref 80–100)
NRBC BLD-RTO: 0 /100 WBCS (ref 0–0)
PLATELET # BLD AUTO: 310 X10*3/UL (ref 150–450)
POTASSIUM SERPL-SCNC: 4 MMOL/L (ref 3.5–5.3)
RBC # BLD AUTO: 4.03 X10*6/UL (ref 4–5.2)
SODIUM SERPL-SCNC: 140 MMOL/L (ref 136–145)
VANCOMYCIN SERPL-MCNC: 9.4 UG/ML (ref 5–20)
WBC # BLD AUTO: 4.9 X10*3/UL (ref 4.4–11.3)

## 2025-03-19 PROCEDURE — 1200000003 HC ONCOLOGY  ROOM WITH TELEMETRY DAILY

## 2025-03-19 PROCEDURE — 83735 ASSAY OF MAGNESIUM: CPT | Performed by: NURSE PRACTITIONER

## 2025-03-19 PROCEDURE — 85027 COMPLETE CBC AUTOMATED: CPT

## 2025-03-19 PROCEDURE — 2500000004 HC RX 250 GENERAL PHARMACY W/ HCPCS (ALT 636 FOR OP/ED)

## 2025-03-19 PROCEDURE — 82374 ASSAY BLOOD CARBON DIOXIDE: CPT

## 2025-03-19 PROCEDURE — 99238 HOSP IP/OBS DSCHRG MGMT 30/<: CPT | Performed by: PHYSICIAN ASSISTANT

## 2025-03-19 PROCEDURE — 2500000005 HC RX 250 GENERAL PHARMACY W/O HCPCS: Performed by: PHYSICIAN ASSISTANT

## 2025-03-19 PROCEDURE — 80202 ASSAY OF VANCOMYCIN: CPT

## 2025-03-19 PROCEDURE — 2500000001 HC RX 250 WO HCPCS SELF ADMINISTERED DRUGS (ALT 637 FOR MEDICARE OP)

## 2025-03-19 RX ORDER — PROCHLORPERAZINE MALEATE 10 MG
5 TABLET ORAL EVERY 8 HOURS PRN
Status: CANCELLED | OUTPATIENT
Start: 2025-03-19

## 2025-03-19 RX ORDER — VANCOMYCIN HYDROCHLORIDE 1 G/200ML
1000 INJECTION, SOLUTION INTRAVENOUS EVERY 12 HOURS
Status: DISCONTINUED | OUTPATIENT
Start: 2025-03-19 | End: 2025-03-24 | Stop reason: HOSPADM

## 2025-03-19 RX ADMIN — ACETAMINOPHEN 975 MG: 325 TABLET, FILM COATED ORAL at 09:16

## 2025-03-19 RX ADMIN — ACETAMINOPHEN 975 MG: 325 TABLET, FILM COATED ORAL at 17:27

## 2025-03-19 RX ADMIN — METHOCARBAMOL 500 MG: 500 TABLET ORAL at 04:32

## 2025-03-19 RX ADMIN — OXYCODONE HYDROCHLORIDE 10 MG: 10 TABLET ORAL at 09:16

## 2025-03-19 RX ADMIN — OXYCODONE HYDROCHLORIDE 10 MG: 10 TABLET ORAL at 13:11

## 2025-03-19 RX ADMIN — OXYCODONE HYDROCHLORIDE 10 MG: 10 TABLET ORAL at 04:32

## 2025-03-19 RX ADMIN — METHOCARBAMOL 500 MG: 500 TABLET ORAL at 21:26

## 2025-03-19 RX ADMIN — VANCOMYCIN HYDROCHLORIDE 1000 MG: 1 INJECTION, SOLUTION INTRAVENOUS at 21:25

## 2025-03-19 RX ADMIN — PANTOPRAZOLE SODIUM 40 MG: 40 TABLET, DELAYED RELEASE ORAL at 09:16

## 2025-03-19 RX ADMIN — ENOXAPARIN SODIUM 40 MG: 100 INJECTION SUBCUTANEOUS at 21:26

## 2025-03-19 RX ADMIN — LIDOCAINE 1 PATCH: 4 PATCH TOPICAL at 21:26

## 2025-03-19 RX ADMIN — METHOCARBAMOL 500 MG: 500 TABLET ORAL at 13:07

## 2025-03-19 RX ADMIN — VANCOMYCIN HYDROCHLORIDE 1000 MG: 1 INJECTION, SOLUTION INTRAVENOUS at 10:12

## 2025-03-19 RX ADMIN — OXYCODONE HYDROCHLORIDE 10 MG: 10 TABLET ORAL at 17:26

## 2025-03-19 RX ADMIN — OXYCODONE HYDROCHLORIDE 10 MG: 10 TABLET ORAL at 21:27

## 2025-03-19 ASSESSMENT — PAIN DESCRIPTION - ORIENTATION
ORIENTATION: RIGHT

## 2025-03-19 ASSESSMENT — COGNITIVE AND FUNCTIONAL STATUS - GENERAL
MOVING TO AND FROM BED TO CHAIR: TOTAL
TOILETING: A LOT
MOVING FROM LYING ON BACK TO SITTING ON SIDE OF FLAT BED WITH BEDRAILS: A LITTLE
MOVING FROM LYING ON BACK TO SITTING ON SIDE OF FLAT BED WITH BEDRAILS: A LITTLE
DRESSING REGULAR LOWER BODY CLOTHING: A LOT
DRESSING REGULAR UPPER BODY CLOTHING: A LOT
MOBILITY SCORE: 10
PERSONAL GROOMING: A LOT
DAILY ACTIVITIY SCORE: 14
TURNING FROM BACK TO SIDE WHILE IN FLAT BAD: A LITTLE
MOBILITY SCORE: 10
MOVING TO AND FROM BED TO CHAIR: TOTAL
TURNING FROM BACK TO SIDE WHILE IN FLAT BAD: A LITTLE
STANDING UP FROM CHAIR USING ARMS: TOTAL
WALKING IN HOSPITAL ROOM: TOTAL
DRESSING REGULAR UPPER BODY CLOTHING: A LOT
DRESSING REGULAR LOWER BODY CLOTHING: A LOT
DAILY ACTIVITIY SCORE: 14
TOILETING: A LOT
WALKING IN HOSPITAL ROOM: TOTAL
STANDING UP FROM CHAIR USING ARMS: TOTAL
HELP NEEDED FOR BATHING: A LOT
PERSONAL GROOMING: A LOT
HELP NEEDED FOR BATHING: A LOT
CLIMB 3 TO 5 STEPS WITH RAILING: TOTAL
CLIMB 3 TO 5 STEPS WITH RAILING: TOTAL

## 2025-03-19 ASSESSMENT — PAIN DESCRIPTION - LOCATION
LOCATION: KNEE
LOCATION: LEG
LOCATION: LEG

## 2025-03-19 ASSESSMENT — PAIN SCALES - WONG BAKER
WONGBAKER_NUMERICALRESPONSE: HURTS WHOLE LOT
WONGBAKER_NUMERICALRESPONSE: HURTS WHOLE LOT

## 2025-03-19 ASSESSMENT — PAIN - FUNCTIONAL ASSESSMENT
PAIN_FUNCTIONAL_ASSESSMENT: 0-10
PAIN_FUNCTIONAL_ASSESSMENT: UNABLE TO SELF-REPORT

## 2025-03-19 ASSESSMENT — PAIN SCALES - GENERAL
PAINLEVEL_OUTOF10: 7
PAINLEVEL_OUTOF10: 2
PAINLEVEL_OUTOF10: 9
PAINLEVEL_OUTOF10: 7
PAINLEVEL_OUTOF10: 7
PAINLEVEL_OUTOF10: 9
PAINLEVEL_OUTOF10: 5 - MODERATE PAIN
PAINLEVEL_OUTOF10: 8

## 2025-03-19 ASSESSMENT — PAIN DESCRIPTION - DESCRIPTORS: DESCRIPTORS: SHARP;SHOOTING

## 2025-03-19 NOTE — CARE PLAN
Problem: Pain  Goal: Takes deep breaths with improved pain control throughout the shift  Outcome: Progressing  Goal: Turns in bed with improved pain control throughout the shift  Outcome: Progressing  Goal: Walks with improved pain control throughout the shift  Outcome: Progressing  Goal: Performs ADL's with improved pain control throughout shift  Outcome: Progressing  Goal: Participates in PT with improved pain control throughout the shift  Outcome: Progressing  Goal: Free from opioid side effects throughout the shift  Outcome: Progressing  Goal: Free from acute confusion related to pain meds throughout the shift  Outcome: Progressing     Problem: Skin  Goal: Decreased wound size/increased tissue granulation at next dressing change  Outcome: Progressing  Flowsheets (Taken 3/18/2025 1148 by Gilda Nunez RN)  Decreased wound size/increased tissue granulation at next dressing change:   Promote sleep for wound healing   Protective dressings over bony prominences  Goal: Participates in plan/prevention/treatment measures  Outcome: Progressing  Flowsheets (Taken 3/18/2025 1148 by Gilda Nunez RN)  Participates in plan/prevention/treatment measures: Elevate heels  Goal: Prevent/manage excess moisture  Outcome: Progressing  Flowsheets (Taken 3/18/2025 1148 by Gilda Nunez, RN)  Prevent/manage excess moisture:   Use wicking fabric (obtain order)   Moisturize dry skin  Goal: Prevent/minimize sheer/friction injuries  Outcome: Progressing  Flowsheets (Taken 3/18/2025 1148 by Gilda Nunez, RN)  Prevent/minimize sheer/friction injuries:   Turn/reposition every 2 hours/use positioning/transfer devices   Complete micro-shifts as needed if patient unable. Adjust patient position to relieve pressure points, not a full turn  Goal: Promote/optimize nutrition  Outcome: Progressing  Flowsheets (Taken 3/18/2025 1148 by Gilda Nunez RN)  Promote/optimize nutrition: Offer water/supplements/favorite foods  Goal: Promote skin  healing  Outcome: Progressing  Flowsheets (Taken 3/18/2025 1148 by Gilda Nunez RN)  Promote skin healing:   Rotate device position/do not position patient on device   Ensure correct size (line/device) and apply per  instructions     Problem: Pain - Adult  Goal: Verbalizes/displays adequate comfort level or baseline comfort level  Outcome: Progressing     Problem: Safety - Adult  Goal: Free from fall injury  Outcome: Progressing     Problem: Discharge Planning  Goal: Discharge to home or other facility with appropriate resources  Outcome: Progressing     Problem: Chronic Conditions and Co-morbidities  Goal: Patient's chronic conditions and co-morbidity symptoms are monitored and maintained or improved  Outcome: Progressing     Problem: Nutrition  Goal: Nutrient intake appropriate for maintaining nutritional needs  Outcome: Progressing     Problem: Fall/Injury  Goal: Not fall by end of shift  Outcome: Progressing  Goal: Be free from injury by end of the shift  Outcome: Progressing  Goal: Verbalize understanding of personal risk factors for fall in the hospital  Outcome: Progressing  Goal: Verbalize understanding of risk factor reduction measures to prevent injury from fall in the home  Outcome: Progressing  Goal: Use assistive devices by end of the shift  Outcome: Progressing  Goal: Pace activities to prevent fatigue by end of the shift  Outcome: Progressing   The patient's goals for the shift include      The clinical goals for the shift include patient to remain hemodynamically stable

## 2025-03-19 NOTE — PROGRESS NOTES
Occupational Therapy                 Therapy Communication Note    Patient Name: Adriana Wood  MRN: [id number]  Department: [facility]  Room: 6030/6030-A  Today's Date: 3/19/2025     Discipline: Occupational Therapy    OT Missed Visit: Yes     Missed Visit Reason: Missed Visit Reason: Patient placed on medical hold (Pt remains on bed rest at this time. Will continue to follow up when appropriate.)    Missed Time: Attempt    Comment:

## 2025-03-19 NOTE — CARE PLAN
Problem: Pain  Goal: Takes deep breaths with improved pain control throughout the shift  Outcome: Progressing  Goal: Turns in bed with improved pain control throughout the shift  Outcome: Progressing  Goal: Walks with improved pain control throughout the shift  Outcome: Progressing  Goal: Performs ADL's with improved pain control throughout shift  Outcome: Progressing  Goal: Participates in PT with improved pain control throughout the shift  Outcome: Progressing  Goal: Free from opioid side effects throughout the shift  Outcome: Progressing  Goal: Free from acute confusion related to pain meds throughout the shift  Outcome: Progressing     Problem: Skin  Goal: Decreased wound size/increased tissue granulation at next dressing change  3/19/2025 1125 by Rula Mandujano RN  Outcome: Progressing  Flowsheets (Taken 3/18/2025 1148 by Gilda Nunez RN)  Decreased wound size/increased tissue granulation at next dressing change:   Promote sleep for wound healing   Protective dressings over bony prominences  3/19/2025 1124 by Rula Mandujano RN  Outcome: Progressing  Flowsheets (Taken 3/18/2025 1148 by Gilda Nunez RN)  Decreased wound size/increased tissue granulation at next dressing change:   Promote sleep for wound healing   Protective dressings over bony prominences  Goal: Participates in plan/prevention/treatment measures  3/19/2025 1125 by Rula Mandujano RN  Outcome: Progressing  Flowsheets (Taken 3/18/2025 1148 by Gilda Nunez RN)  Participates in plan/prevention/treatment measures: Elevate heels  3/19/2025 1124 by Rula Mandujano RN  Outcome: Progressing  Flowsheets (Taken 3/18/2025 1148 by Gilda Nunez RN)  Participates in plan/prevention/treatment measures: Elevate heels  Goal: Prevent/manage excess moisture  3/19/2025 1125 by Rula Mandujano RN  Outcome: Progressing  Flowsheets (Taken 3/18/2025 1148 by Gilda Nunez RN)  Prevent/manage excess moisture:   Use wicking fabric (obtain order)   Moisturize dry  skin  3/19/2025 1124 by Rula Mandujano RN  Outcome: Progressing  Flowsheets (Taken 3/18/2025 1148 by Gilda Nunez RN)  Prevent/manage excess moisture:   Use wicking fabric (obtain order)   Moisturize dry skin  Goal: Prevent/minimize sheer/friction injuries  3/19/2025 1125 by Rula Mandujano RN  Outcome: Progressing  Flowsheets (Taken 3/18/2025 1148 by Gilda Nunez RN)  Prevent/minimize sheer/friction injuries:   Turn/reposition every 2 hours/use positioning/transfer devices   Complete micro-shifts as needed if patient unable. Adjust patient position to relieve pressure points, not a full turn  3/19/2025 1124 by Rula Mandujano RN  Outcome: Progressing  Flowsheets (Taken 3/18/2025 1148 by Gilda Nunez RN)  Prevent/minimize sheer/friction injuries:   Turn/reposition every 2 hours/use positioning/transfer devices   Complete micro-shifts as needed if patient unable. Adjust patient position to relieve pressure points, not a full turn  Goal: Promote/optimize nutrition  3/19/2025 1125 by Rula Mandujano RN  Outcome: Progressing  Flowsheets (Taken 3/18/2025 1148 by Gilda Nunez RN)  Promote/optimize nutrition: Offer water/supplements/favorite foods  3/19/2025 1124 by Rula Mandujano RN  Outcome: Progressing  Flowsheets (Taken 3/18/2025 1148 by Gilda Nunez RN)  Promote/optimize nutrition: Offer water/supplements/favorite foods  Goal: Promote skin healing  3/19/2025 1125 by Rula Mandujano RN  Outcome: Progressing  Flowsheets (Taken 3/18/2025 1148 by Gilda Nunez RN)  Promote skin healing:   Rotate device position/do not position patient on device   Ensure correct size (line/device) and apply per  instructions  3/19/2025 1124 by Rula Mandujano RN  Outcome: Progressing  Flowsheets (Taken 3/18/2025 1148 by Gilda Nunez RN)  Promote skin healing:   Rotate device position/do not position patient on device   Ensure correct size (line/device) and apply per  instructions     Problem: Pain - Adult  Goal:  Verbalizes/displays adequate comfort level or baseline comfort level  Outcome: Progressing     Problem: Safety - Adult  Goal: Free from fall injury  Outcome: Progressing     Problem: Discharge Planning  Goal: Discharge to home or other facility with appropriate resources  Outcome: Progressing     Problem: Chronic Conditions and Co-morbidities  Goal: Patient's chronic conditions and co-morbidity symptoms are monitored and maintained or improved  Outcome: Progressing     Problem: Nutrition  Goal: Nutrient intake appropriate for maintaining nutritional needs  Outcome: Progressing     Problem: Fall/Injury  Goal: Not fall by end of shift  Outcome: Progressing  Goal: Be free from injury by end of the shift  Outcome: Progressing  Goal: Verbalize understanding of personal risk factors for fall in the hospital  Outcome: Progressing  Goal: Verbalize understanding of risk factor reduction measures to prevent injury from fall in the home  Outcome: Progressing  Goal: Use assistive devices by end of the shift  Outcome: Progressing  Goal: Pace activities to prevent fatigue by end of the shift  Outcome: Progressing   The patient's goals for the shift include      The clinical goals for the shift include patient to remain hemodynamically stable
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
## 2025-03-19 NOTE — SIGNIFICANT EVENT
"  Department of Plastic and Reconstructive Surgery  PM Flap Check    69 y.o. female with a past medical history of bradycardia, cardiomyopathy, CAD, cardiac arrest due to electrolyte abnormalities and zofran use post-operatively, and traumatic brain injury. Patient had a total knee right knee done by Dr. Whelan in March 2024 which was complicated by E. Coli infection. Patient had right TKA wound dehiscence s/p I&D right knee with polyswap and attempted re-closure of complex wound on 5/10/2024 by Dr. Whelan. She was referred to plastic surgery (Dr. Reyes) perioperatively given projected anticipated need for possible flap coverage of the wound/defect site. Patient re-admitted and returned to the OR with orthopedics on 2/28/2025 for right knee I&D, revision static spacer and application of incisional wound vac. Plastic surgery service consulted postoperatively to follow for flap recommendations/timing, now s/p R latissimus free flap to R knee on 3/14 with Dr. Lundberg.     Subjective: Resting in bed, pain better controlled with lidocaine patch and current pain regimen, appetite slowly improving, Tolerating diet, + flatus, no BM, declined suppository, Denies any fever, chills, night sweats, CP, SOB, palpitations, nausea, vomiting, diarrhea, constipation, dysuria, hematuria, hematochezia, hematemesis, flank pain.     Objective:  /72 (BP Location: Left leg, Patient Position: Lying)   Pulse 72   Temp 36.4 °C (97.5 °F) (Temporal)   Resp 17   Ht 1.778 m (5' 10\")   Wt 69.1 kg (152 lb 5.4 oz)   SpO2 94%   BMI 21.86 kg/m²    PE:  Constitutional: A&Ox3, calm and cooperative, NAD  Eyes: EOMI, clear sclera   ENMT: Moist mucous membranes, no apparent injuries or lesions  Head/Neck: NCAT  Cardiovascular: Extremities WWP  Respiratory/Thorax: Unlabored respirations on RA  Gastrointestinal: Abdomen soft, NTND, no peritoneal signs, +BS x4, +flatus, no BM, lidocaine patch to RUQ  Genitourinary: indwelling damon in place " with clear yellow output  Extremities: RLE flap: warm to touch, soft and compressible, strong biphasic signal at marked stitch, circumferential incisions around flap well approximated with scant amount of ss drainage, covered with xeroform, penrose removed during day shift, vioptix in place with signal of 76% with 94% signal strength, MASSIEL drain to medial aspect of right knee with ss output  Right posterior lat donor site: prevena incisional wound vac in place, holding suction at -125 mmHg, no alarm or leak noted, MASSIEL drain x 1 to right flank with ss output  Neurological: A&Ox3  Psychological: Appropriate mood and behavior  Skin: Warm and dry. L upper extremity with ecchymosis circumferentially where tourniquet was placed during surgery, stable    S/P Right latissimus dorsi donor site FF to Right knee:  A/P:   - Flap check stable, no change from prior assessment, biphasic doppler signal, good strength, continue flap check Q4 per nursing with interim checks per plastics, maintain Vioptix  - all other care per daily progress note  - Updated ISABEL Ramos-CNP  Plastic and Reconstructive Surgery   Available via [employer]  Pager #[phone]  Team phone: [phone]

## 2025-03-19 NOTE — PROGRESS NOTES
Vancomycin Dosing by Pharmacy- FOLLOW UP    Adriana Wood is a 69 y.o. year old female who Pharmacy has been consulted for vancomycin dosing for cellulitis, skin and soft tissue. Based on the patient's indication and renal status this patient is being dosed based on a goal AUC of 400-600.     Renal function is currently stable.    Current vancomycin dose: 1500 mg given every 24 hours    Estimated vancomycin AUC on current dose: 412 mg/L.hr  (probability of AUC> 400 mg/L.hr= 62%)    Visit Vitals  /77 (BP Location: Left leg, Patient Position: Lying)   Pulse 62   Temp 36.6 °C (97.9 °F) (Oral)   Resp 17        Lab Results   Component Value Date    CREATININE 0.53 2025    CREATININE 0.50 2025    CREATININE 0.53 2025    CREATININE 0.52 2025        Patient weight is as follows:   Vitals:    25 0600   Weight: 69.1 kg (152 lb 5.4 oz)       Cultures:  No results found for the encounter in last 14 days.       I/O last 3 completed shifts:  In: 1170 (16.9 mL/kg) [P.O.:640; IV Piggyback:530]  Out: 3056 (44.2 mL/kg) [Urine:2950 (1.2 mL/kg/hr); Drains:106]  Weight: 69.1 kg   I/O during current shift:  No intake/output data recorded.    Temp (24hrs), Av.3 °C (97.4 °F), Min:36.1 °C (97 °F), Max:36.6 °C (97.9 °F)      Assessment/Plan    Within goal AUC range, although relatively low probability of AUC > 400 (62%). Given renal function stability, will increase vancomycin dose to 1000mg Q12H to target higher probability of therapeutic AUC while maintaining relatively low risk for elevated troughs potentially leading to toxicity.     This dosing regimen is predicted by [employer] to result in the following pharmacokinetic parameters:  Loading dose: N/A  Regimen: 1000 mg IV every 12 hours.  Start time: 10:37 on 2025  Exposure target: AUC24 (range)400-600 mg/L.hr   [id number]: 497 mg/L.hr  AUC24,ss: 544 mg/L.hr  Probability of AUC24 > 400: 100 %  Ctrough,ss: 16.5 mg/L  Probability of  Ctrough,ss > 20: 2 %    The next level will be obtained on 03/21 w/ AM labs. May be obtained sooner if clinically indicated.   Will continue to monitor renal function daily while on vancomycin and order serum creatinine at least every 48 hours if not already ordered.  Follow for continued vancomycin needs, clinical response, and signs/symptoms of toxicity.       Obdulio Osborne, PharmD

## 2025-03-20 ENCOUNTER — APPOINTMENT (OUTPATIENT)
Dept: ORTHOPEDIC SURGERY | Facility: CLINIC | Age: 70
End: 2025-03-20
Payer: MEDICARE

## 2025-03-20 DIAGNOSIS — Z96.651 STATUS POST TOTAL RIGHT KNEE REPLACEMENT: ICD-10-CM

## 2025-03-20 LAB
ANION GAP SERPL CALC-SCNC: 12 MMOL/L (ref 10–20)
BUN SERPL-MCNC: 12 MG/DL (ref 6–23)
CALCIUM SERPL-MCNC: 8.3 MG/DL (ref 8.6–10.6)
CHLORIDE SERPL-SCNC: 105 MMOL/L (ref 98–107)
CO2 SERPL-SCNC: 27 MMOL/L (ref 21–32)
CREAT SERPL-MCNC: 0.44 MG/DL (ref 0.5–1.05)
EGFRCR SERPLBLD CKD-EPI 2021: >90 ML/MIN/1.73M*2
ERYTHROCYTE [DISTWIDTH] IN BLOOD BY AUTOMATED COUNT: 13.8 % (ref 11.5–14.5)
GLUCOSE SERPL-MCNC: 100 MG/DL (ref 74–99)
HCT VFR BLD AUTO: 35.6 % (ref 36–46)
HGB BLD-MCNC: 11.5 G/DL (ref 12–16)
MCH RBC QN AUTO: 28.8 PG (ref 26–34)
MCHC RBC AUTO-ENTMCNC: 32.3 G/DL (ref 32–36)
MCV RBC AUTO: 89 FL (ref 80–100)
NRBC BLD-RTO: 0 /100 WBCS (ref 0–0)
PLATELET # BLD AUTO: 281 X10*3/UL (ref 150–450)
POTASSIUM SERPL-SCNC: 4 MMOL/L (ref 3.5–5.3)
RBC # BLD AUTO: 4 X10*6/UL (ref 4–5.2)
SODIUM SERPL-SCNC: 140 MMOL/L (ref 136–145)
WBC # BLD AUTO: 5.1 X10*3/UL (ref 4.4–11.3)

## 2025-03-20 PROCEDURE — 1200000003 HC ONCOLOGY  ROOM WITH TELEMETRY DAILY

## 2025-03-20 PROCEDURE — 85027 COMPLETE CBC AUTOMATED: CPT

## 2025-03-20 PROCEDURE — 99232 SBSQ HOSP IP/OBS MODERATE 35: CPT | Performed by: PHYSICIAN ASSISTANT

## 2025-03-20 PROCEDURE — 2500000004 HC RX 250 GENERAL PHARMACY W/ HCPCS (ALT 636 FOR OP/ED)

## 2025-03-20 PROCEDURE — 2500000001 HC RX 250 WO HCPCS SELF ADMINISTERED DRUGS (ALT 637 FOR MEDICARE OP)

## 2025-03-20 PROCEDURE — 80048 BASIC METABOLIC PNL TOTAL CA: CPT

## 2025-03-20 PROCEDURE — 97162 PT EVAL MOD COMPLEX 30 MIN: CPT | Mod: GP

## 2025-03-20 RX ADMIN — ACETAMINOPHEN 975 MG: 325 TABLET, FILM COATED ORAL at 08:33

## 2025-03-20 RX ADMIN — METHOCARBAMOL 500 MG: 500 TABLET ORAL at 12:52

## 2025-03-20 RX ADMIN — ACETAMINOPHEN 975 MG: 325 TABLET, FILM COATED ORAL at 00:36

## 2025-03-20 RX ADMIN — POLYETHYLENE GLYCOL 3350 17 G: 17 POWDER, FOR SOLUTION ORAL at 08:33

## 2025-03-20 RX ADMIN — OXYCODONE HYDROCHLORIDE 10 MG: 10 TABLET ORAL at 12:52

## 2025-03-20 RX ADMIN — OXYCODONE HYDROCHLORIDE 10 MG: 10 TABLET ORAL at 19:16

## 2025-03-20 RX ADMIN — OXYCODONE HYDROCHLORIDE 10 MG: 10 TABLET ORAL at 05:17

## 2025-03-20 RX ADMIN — METHOCARBAMOL 500 MG: 500 TABLET ORAL at 05:17

## 2025-03-20 RX ADMIN — OXYCODONE HYDROCHLORIDE 10 MG: 10 TABLET ORAL at 23:49

## 2025-03-20 RX ADMIN — ENOXAPARIN SODIUM 40 MG: 100 INJECTION SUBCUTANEOUS at 22:16

## 2025-03-20 RX ADMIN — PANTOPRAZOLE SODIUM 40 MG: 40 TABLET, DELAYED RELEASE ORAL at 05:17

## 2025-03-20 RX ADMIN — VANCOMYCIN HYDROCHLORIDE 1000 MG: 1 INJECTION, SOLUTION INTRAVENOUS at 08:30

## 2025-03-20 RX ADMIN — VANCOMYCIN HYDROCHLORIDE 1000 MG: 1 INJECTION, SOLUTION INTRAVENOUS at 22:17

## 2025-03-20 RX ADMIN — METHOCARBAMOL 500 MG: 500 TABLET ORAL at 22:16

## 2025-03-20 RX ADMIN — ACETAMINOPHEN 975 MG: 325 TABLET, FILM COATED ORAL at 17:30

## 2025-03-20 ASSESSMENT — COGNITIVE AND FUNCTIONAL STATUS - GENERAL
DRESSING REGULAR UPPER BODY CLOTHING: A LOT
MOVING TO AND FROM BED TO CHAIR: TOTAL
CLIMB 3 TO 5 STEPS WITH RAILING: TOTAL
DAILY ACTIVITIY SCORE: 15
TOILETING: A LOT
TOILETING: A LOT
CLIMB 3 TO 5 STEPS WITH RAILING: TOTAL
TURNING FROM BACK TO SIDE WHILE IN FLAT BAD: A LITTLE
STANDING UP FROM CHAIR USING ARMS: TOTAL
PERSONAL GROOMING: A LITTLE
DAILY ACTIVITIY SCORE: 15
MOBILITY SCORE: 10
MOVING FROM LYING ON BACK TO SITTING ON SIDE OF FLAT BED WITH BEDRAILS: A LITTLE
MOVING FROM LYING ON BACK TO SITTING ON SIDE OF FLAT BED WITH BEDRAILS: A LITTLE
WALKING IN HOSPITAL ROOM: TOTAL
TURNING FROM BACK TO SIDE WHILE IN FLAT BAD: A LITTLE
MOVING TO AND FROM BED TO CHAIR: TOTAL
WALKING IN HOSPITAL ROOM: TOTAL
DRESSING REGULAR UPPER BODY CLOTHING: A LOT
STANDING UP FROM CHAIR USING ARMS: TOTAL
MOBILITY SCORE: 10
STANDING UP FROM CHAIR USING ARMS: TOTAL
PERSONAL GROOMING: A LITTLE
HELP NEEDED FOR BATHING: A LOT
DRESSING REGULAR LOWER BODY CLOTHING: A LOT
MOBILITY SCORE: 10
WALKING IN HOSPITAL ROOM: TOTAL
TURNING FROM BACK TO SIDE WHILE IN FLAT BAD: A LITTLE
MOVING TO AND FROM BED TO CHAIR: TOTAL
MOVING FROM LYING ON BACK TO SITTING ON SIDE OF FLAT BED WITH BEDRAILS: A LITTLE
CLIMB 3 TO 5 STEPS WITH RAILING: TOTAL
HELP NEEDED FOR BATHING: A LOT
DRESSING REGULAR LOWER BODY CLOTHING: A LOT

## 2025-03-20 ASSESSMENT — PAIN SCALES - GENERAL
PAINLEVEL_OUTOF10: 7
PAINLEVEL_OUTOF10: 6
PAINLEVEL_OUTOF10: 9
PAINLEVEL_OUTOF10: 0 - NO PAIN
PAINLEVEL_OUTOF10: 7
PAINLEVEL_OUTOF10: 0 - NO PAIN
PAINLEVEL_OUTOF10: 7
PAINLEVEL_OUTOF10: 6
PAINLEVEL_OUTOF10: 7
PAINLEVEL_OUTOF10: 9
PAINLEVEL_OUTOF10: 4

## 2025-03-20 ASSESSMENT — PAIN - FUNCTIONAL ASSESSMENT
PAIN_FUNCTIONAL_ASSESSMENT: 0-10

## 2025-03-20 ASSESSMENT — PAIN DESCRIPTION - ORIENTATION
ORIENTATION: RIGHT

## 2025-03-20 ASSESSMENT — ACTIVITIES OF DAILY LIVING (ADL): ADL_ASSISTANCE: NEEDS ASSISTANCE

## 2025-03-20 ASSESSMENT — PAIN DESCRIPTION - LOCATION
LOCATION: LEG

## 2025-03-20 NOTE — PROGRESS NOTES
Physical Therapy    Physical Therapy Evaluation    Patient Name: Adriana Wood  MRN: [id number]  Department: [facility]  [facility]  Today's Date: 3/20/2025   Time Calculation  Start Time: 1457  Stop Time: 1516  Time Calculation (min): 19 min    Assessment/Plan   PT Assessment  PT Assessment Results: Decreased strength, Decreased range of motion, Decreased endurance, Impaired balance, Decreased mobility, Pain, Orthopedic restrictions  Rehab Prognosis: Good  Barriers to Discharge Home: Caregiver assistance, Physical needs  Caregiver Assistance: Caregiver assistance needed per identified barriers - however, level of patient's required assistance exceeds assistance available at home  Physical Needs: 24hr mobility assistance needed, 24hr ADL assistance needed  Evaluation/Treatment Tolerance: Patient limited by fatigue  End of Session Communication: Bedside nurse  Assessment Comment: 69 y.o. F s/p extensive plastics procedure of R knee currently demonstrating impaired strength and function. Pt will benefit from continued PT in house and after discharge at MOD intensity to restore function.  End of Session Patient Position: Bed, 3 rail up, Alarm off, not on at start of session  IP OR SWING BED PT PLAN  Inpatient or Swing Bed: Inpatient  PT Plan  Treatment/Interventions: Bed mobility, Transfer training, Gait training, Balance training, Strengthening, Therapeutic exercise, Therapeutic activity  PT Plan: Ongoing PT  PT Frequency: 3 times per week  PT Discharge Recommendations: Moderate intensity level of continued care  PT Recommended Transfer Status: Contact guard  PT - OK to Discharge: Yes    Subjective   General Visit Information:  General  Reason for Referral: knee infection  Past Medical History Relevant to Rehab: 69 y.o. female with a past medical history of bradycardia, cardiomyopathy, CAD, cardiac arrest due to electrolyte abnormalities and zofran use post-operatively, and traumatic brain injury. Patient had a  total knee right knee done by Dr. Whelan in March 2024 which was complicated by E. Coli infection. Patient had right TKA wound dehiscence s/p I&D right knee with polyswap and attempted re-closure of complex wound on 5/10/2024 by Dr. Whelan. She was referred to plastic surgery (Dr. Reyes) perioperatively given projected anticipated need for possible flap coverage of the wound/defect site. Now s/p R latissimus free flap to R knee on 3/14 with Dr. Lundberg. On dangling protocol  Family/Caregiver Present: No  Prior to Session Communication: Bedside nurse  Patient Position Received: Bed, 3 rail up, Alarm off, not on at start of session  Preferred Learning Style: verbal, auditory  General Comment: Pt in bed upon entry. Pleasant and cooperative. Willing to participate with PT with dangling protocol.  Home Living:  Home Living  Type of Home: House  Lives With: Spouse, Adult children  Home Adaptive Equipment: Walker rolling or standard, Wheelchair-manual, Wheelchair-power, Reacher, Sock aid  Home Access: Ramped entrance  Bathroom Shower/Tub: Walk-in shower  Prior Level of Function:  Prior Function Per Pt/Caregiver Report  Level of [place]: Needs assistance with ADLs, Needs assistance with functional transfers, Needs assistance with homemaking  Receives Help From: Family  ADL Assistance: Needs assistance  Homemaking Assistance: Needs assistance  Ambulatory Assistance: Independent (prior to accident)  Prior Function Comments: Pt reports ambulating household distance with a wheeled walker and assist most recently prior to admit.  Precautions:  Precautions  Medical Precautions: Fall precautions  Precautions Comment: See nursing orders for detailed specifics from team for dangling protocol and advancement of duration and frequency. Will require updates on when patient can be allowed for further functional mobility in conjuction with dangling protocol.     Objective   Pain:  Pain Assessment  Pain Assessment: 0-10  0-10  (Numeric) Pain Score: 0 - No pain  Cognition:  Cognition  Overall Cognitive Status: Within Functional Limits  Orientation Level: Oriented X4  Attention: Within Functional Limits    General Assessments:     Activity Tolerance  Endurance: Decreased tolerance for upright activites    Sensation  Light Touch: No apparent deficits    Strength  Strength Comments: Grossly >4/5 throughout BLE. Not formally tested on RLE due to extensive plastics procedure  Strength  Strength Comments: Grossly >4/5 throughout BLE. Not formally tested on RLE due to extensive plastics procedure    Coordination  Movements are Fluid and Coordinated: Yes    Postural Control  Postural Control: Within Functional Limits    Static Sitting Balance  Static Sitting-Balance Support: Bilateral upper extremity supported  Static Sitting-Level of Assistance: Close supervision  Static Sitting-Comment/Number of Minutes: 5 minutes    Static Standing Balance  Static Standing-Comment/Number of Minutes: no  Functional Assessments:     Bed Mobility  Bed Mobility: Yes  Bed Mobility 1  Bed Mobility 1: Supine to sitting, Sitting to supine  Level of Assistance 1: Contact guard    Transfers  Transfer: No    Ambulation/Gait Training  Ambulation/Gait Training Performed: No    Outcome Measures:  [facility] Basic Mobility  Turning from your back to your side while in a flat bed without using bedrails: A little  Moving from lying on your back to sitting on the side of a flat bed without using bedrails: A little  Moving to and from bed to chair (including a wheelchair): Total  Standing up from a chair using your arms (e.g. wheelchair or bedside chair): Total  To walk in hospital room: Total  Climbing 3-5 steps with railing: Total  Basic Mobility - Total Score: 10    Encounter Problems       Encounter Problems (Active)       Mobility       STG - Patient will ambulate >10ft, wheeled walker, min assist NWB RLE       Start:  03/20/25    Expected End:  04/03/25               PT  Transfers       STG - Patient to transfer to and from sit to supine min assist       Start:  03/20/25    Expected End:  04/03/25            STG - Patient will transfer sit to and from stand min assist       Start:  03/20/25    Expected End:  04/03/25               Pain - Adult              Education Documentation  Precautions, taught by Olu Cedeno, PT at 3/20/2025  3:53 PM.  Learner: Patient  Readiness: Acceptance  Method: Explanation  Response: Verbalizes Understanding    Mobility Training, taught by Olu Cedeno, PT at 3/20/2025  3:53 PM.  Learner: Patient  Readiness: Acceptance  Method: Explanation  Response: Verbalizes Understanding    Education Comments  No comments found.

## 2025-03-20 NOTE — SIGNIFICANT EVENT
"  Department of Plastic and Reconstructive Surgery  PM Flap Check    69 y.o. female with a past medical history of bradycardia, cardiomyopathy, CAD, cardiac arrest due to electrolyte abnormalities and zofran use post-operatively, and traumatic brain injury. Patient had a total knee right knee done by Dr. Whelan in March 2024 which was complicated by E. Coli infection. Patient had right TKA wound dehiscence s/p I&D right knee with polyswap and attempted re-closure of complex wound on 5/10/2024 by Dr. Whelan. She was referred to plastic surgery (Dr. Reyes) perioperatively given projected anticipated need for possible flap coverage of the wound/defect site. Patient re-admitted and returned to the OR with orthopedics on 2/28/2025 for right knee I&D, revision static spacer and application of incisional wound vac. Plastic surgery service consulted postoperatively to follow for flap recommendations/timing, now s/p R latissimus free flap to R knee on 3/14 with Dr. Lundberg.     Subjective: Resting in bed, pain well controlled, tolerated dangling for 5 min BID during the day with stable vioptix, Tolerating diet with increased appetite, + BM, Denies any fever, chills, night sweats, CP, SOB, palpitations, nausea, vomiting, diarrhea, constipation, dysuria, hematuria, hematochezia, hematemesis, flank pain.     Objective:  /90 (BP Location: Left leg, Patient Position: Lying)   Pulse 56   Temp 36.9 °C (98.4 °F) (Temporal)   Resp 18   Ht 1.778 m (5' 10\")   Wt 69.1 kg (152 lb 5.4 oz)   SpO2 96%   BMI 21.86 kg/m²    PE:  Constitutional: A&Ox3, calm and cooperative, NAD  Eyes: EOMI, clear sclera   ENMT: Moist mucous membranes, no apparent injuries or lesions  Head/Neck: NCAT  Cardiovascular: Extremities WWP  Respiratory/Thorax: Unlabored respirations on RA  Gastrointestinal: Abdomen soft, NTND, no peritoneal signs, +BS x4, +flatus, no BM, lidocaine patch to RUQ  Genitourinary: indwelling damon in place with clear " yellow output  Extremities: RLE flap: warm to touch, soft and compressible, strong biphasic signal at marked stitch, circumferential incisions around flap well approximated with scant amount of ss drainage, covered with xeroform, penrose removed during day shift, vioptix in place with signal of 76% with 94% signal strength, MASSIEL drain to medial aspect of right knee with ss output  Right posterior lat donor site: prevena incisional wound vac in place, holding suction at -125 mmHg, no alarm or leak noted, MASSIEL drain x 1 to right flank with ss output  Neurological: A&Ox3  Psychological: Appropriate mood and behavior  Skin: Warm and dry. L upper extremity with resolving ecchymosis circumferentially     S/P Right latissimus dorsi donor site FF to Right knee:  A/P:   - Flap check stable, no change from prior assessment, biphasic doppler signal, good strength, continue flap check Q4 per nursing with interim checks per plastics, maintain Vioptix  - all other care per daily progress note  - Updated ISABEL Ramos-CNP  Plastic and Reconstructive Surgery   Available via [employer]  Pager #[phone]  Team phone: [phone]

## 2025-03-20 NOTE — CARE PLAN
The clinical goals for the shift include pt will remain aware of POC.        Problem: Pain  Goal: Takes deep breaths with improved pain control throughout the shift  Outcome: Progressing  Goal: Turns in bed with improved pain control throughout the shift  Outcome: Progressing  Goal: Walks with improved pain control throughout the shift  Outcome: Progressing  Goal: Performs ADL's with improved pain control throughout shift  Outcome: Progressing  Goal: Participates in PT with improved pain control throughout the shift  Outcome: Progressing  Goal: Free from opioid side effects throughout the shift  Outcome: Progressing  Goal: Free from acute confusion related to pain meds throughout the shift  Outcome: Progressing     Problem: Skin  Goal: Decreased wound size/increased tissue granulation at next dressing change  Outcome: Progressing  Flowsheets (Taken 3/20/2025 0805)  Decreased wound size/increased tissue granulation at next dressing change: Promote sleep for wound healing  Goal: Participates in plan/prevention/treatment measures  Outcome: Progressing  Flowsheets (Taken 3/20/2025 0805)  Participates in plan/prevention/treatment measures: Elevate heels  Goal: Prevent/manage excess moisture  Outcome: Progressing  Flowsheets (Taken 3/20/2025 0805)  Prevent/manage excess moisture: Cleanse incontinence/protect with barrier cream  Goal: Prevent/minimize sheer/friction injuries  Outcome: Progressing  Flowsheets (Taken 3/20/2025 0805)  Prevent/minimize sheer/friction injuries: Complete micro-shifts as needed if patient unable. Adjust patient position to relieve pressure points, not a full turn  Goal: Promote/optimize nutrition  Outcome: Progressing  Flowsheets (Taken 3/20/2025 0805)  Promote/optimize nutrition: Monitor/record intake including meals  Goal: Promote skin healing  Outcome: Progressing  Flowsheets (Taken 3/20/2025 0805)  Promote skin healing: Assess skin/pad under line(s)/device(s)     Problem: Pain -  Adult  Goal: Verbalizes/displays adequate comfort level or baseline comfort level  Outcome: Progressing     Problem: Safety - Adult  Goal: Free from fall injury  Outcome: Progressing     Problem: Discharge Planning  Goal: Discharge to home or other facility with appropriate resources  Outcome: Progressing     Problem: Chronic Conditions and Co-morbidities  Goal: Patient's chronic conditions and co-morbidity symptoms are monitored and maintained or improved  Outcome: Progressing     Problem: Nutrition  Goal: Nutrient intake appropriate for maintaining nutritional needs  Outcome: Progressing     Problem: Fall/Injury  Goal: Not fall by end of shift  Outcome: Progressing  Goal: Be free from injury by end of the shift  Outcome: Progressing  Goal: Verbalize understanding of personal risk factors for fall in the hospital  Outcome: Progressing  Goal: Verbalize understanding of risk factor reduction measures to prevent injury from fall in the home  Outcome: Progressing  Goal: Use assistive devices by end of the shift  Outcome: Progressing  Goal: Pace activities to prevent fatigue by end of the shift  Outcome: Progressing

## 2025-03-20 NOTE — PROGRESS NOTES
"  Department of Plastic and Reconstructive Surgery  Daily Progress Note    Patient Name: Adriana Wood  MRN: [id number]  Date:  03/20/25     Subjective  Patient resting in bed comfortably this AM. Had bowel movement yesterday. she denies nausea and is tolerating her diet well.  Otherwise denies any fever, chills, night sweats, CP, SOB, palpitations, nausea, vomiting, or abdominal pain/discomfort.    Objective  Vital Signs  BP (!) 169/99 (BP Location: Left arm, Patient Position: Lying)   Pulse 73   Temp 36.3 °C (97.4 °F) (Oral)   Resp 16   Ht 1.778 m (5' 10\")   Wt 69.1 kg (152 lb 5.4 oz)   SpO2 94%   BMI 21.86 kg/m²      Physical Exam   Constitutional: NAD  Eyes: EOMI, clear sclera   ENMT: Moist mucous membranes, no apparent injuries or lesions  Head/Neck: NCAT  Cardiovascular: Extremities WWP  Respiratory/Thorax: Unlabored respirations on RA  Gastrointestinal: Abdomen soft, non-distended, non-tender  Genitourinary: indwelling damon  Extremities: RLE flap: warm to touch, soft and compressible, strong biphasic signal at marked stitch, circumferential incisions around flap well approximated with scant amount of ss drainage, covered with xeroform, penrose exiting flap with ss output, vioptix in place with signal of 75% with 89% signal strength  Right posterior lat donor site: prevena incisional wound vac in place, holding suction at -125 mmHg, no alarm or leak noted, MASSIEL drain x 1 to right flank with ss output  Neurological: A&Ox3  Psychological: Appropriate mood and behavior  Skin: Warm and dry. L upper extremity with improving ecchymosis circumferentially where tourniquet was placed during surgery.    Diagnostics   Results for orders placed or performed during the hospital encounter of 03/14/25 (from the past 24 hours)   CBC   Result Value Ref Range    WBC 5.1 4.4 - 11.3 x10*3/uL    nRBC 0.0 0.0 - 0.0 /100 WBCs    RBC 4.00 4.00 - 5.20 x10*6/uL    Hemoglobin 11.5 (L) 12.0 - 16.0 g/dL    Hematocrit 35.6 (L) " 36.0 - 46.0 %    MCV 89 80 - 100 fL    MCH 28.8 26.0 - 34.0 pg    MCHC 32.3 32.0 - 36.0 g/dL    RDW 13.8 11.5 - 14.5 %    Platelets 281 150 - 450 x10*3/uL   Basic metabolic panel   Result Value Ref Range    Glucose 100 (H) 74 - 99 mg/dL    Sodium 140 136 - 145 mmol/L    Potassium 4.0 3.5 - 5.3 mmol/L    Chloride 105 98 - 107 mmol/L    Bicarbonate 27 21 - 32 mmol/L    Anion Gap 12 10 - 20 mmol/L    Urea Nitrogen 12 6 - 23 mg/dL    Creatinine 0.44 (L) 0.50 - 1.05 mg/dL    eGFR >90 >60 mL/min/1.73m*2    Calcium 8.3 (L) 8.6 - 10.6 mg/dL     *Note: Due to a large number of results and/or encounters for the requested time period, some results have not been displayed. A complete set of results can be found in Results Review.     ECG 12 Lead    Result Date: 3/6/2025   Poor data quality, interpretation may be adversely affected Sinus bradycardia with 1st degree AV block Nonspecific T wave abnormality Prolonged QT Abnormal ECG Confirmed by Clyde Burkett ([id number]) on 3/6/2025 3:09:25 PM    ECG 12 lead    Result Date: 3/5/2025  Sinus tachycardia with frequent Premature ventricular complexes in a pattern of bigeminy Nonspecific T wave abnormality Abnormal ECG When compared with ECG of 01-MAR-2025 09:07, Significant changes have occurred Confirmed by Baron Sanders ([id number]) on 3/5/2025 8:34:27 AM    Bedside PICC Imaging    Result Date: 3/4/2025  These images are not reportable by radiology and will not be interpreted by  Radiologists.    XR knee right 1-2 views    Result Date: 2/28/2025  Interpreted By:  Juan Smith, STUDY: XR KNEE RIGHT 1-2 VIEWS; ;  2/28/2025 11:36 am   INDICATION: Signs/Symptoms:PACU.     COMPARISON: 01/16/2025.   ACCESSION NUMBER(S): [id number]   ORDERING CLINICIAN: DARSHAN LARSON   FINDINGS: Two views of the right knee   Postsurgical changes of right knee fusion with cement and productive changes within the joint space. The knee joint alignment is similar compared to prior. Interval removal of  intramedullary nail with ghost tract. Similar-appearing remote fractures of the proximal fibula and proximal tibia. Osteopenic changes. Vascular calcifications. Soft tissue edema over the anterior knee with overlying wound VAC and subcutaneous air, likely postsurgical. No definitive new fracture.       1. Postsurgical changes of the right knee with fusion of the joint space and interval removal of the intramedullary nail. Overlying soft tissue edema and wound VAC.       MACRO: None   Signed by: Juan Smith 2/28/2025 12:56 PM Dictation workstation:   [id number]    FL fluoro images no charge    Result Date: 2/28/2025  These images are not reportable by radiology and will not be interpreted by  Radiologists.      Current Medications  Scheduled medications  acetaminophen, 975 mg, oral, q8h  bisacodyl, 10 mg, rectal, Once  enoxaparin, 40 mg, subcutaneous, Daily  lidocaine, 1 patch, transdermal, Daily  methocarbamol, 500 mg, oral, q8h FELIX  pantoprazole, 40 mg, oral, Daily before breakfast  polyethylene glycol, 17 g, oral, Daily  sennosides-docusate sodium, 2 tablet, oral, BID  vancomycin, 1,000 mg, intravenous, q12h      Continuous medications       PRN medications  PRN medications: albuterol, alteplase, bisacodyl, calcium carbonate, diclofenac sodium, diphenhydramine-zinc acetate, lidocaine, melatonin, naloxone, oxyCODONE, oxyCODONE, trimethobenzamide, vancomycin     Assessment  Adriana Wood is a 69 y.o. female with a past medical history of bradycardia, cardiomyopathy, CAD, cardiac arrest due to electrolyte abnormalities and zofran use post-operatively, and traumatic brain injury. Patient had a total knee right knee done by Dr. Whelan in March 2024 which was complicated by E. Coli infection. Patient had right TKA wound dehiscence s/p I&D right knee with polyswap and attempted re-closure of complex wound on 5/10/2024 by Dr. Whelan. She was referred to plastic surgery (Dr. Reyes) perioperatively given  projected anticipated need for possible flap coverage of the wound/defect site. Patient re-admitted and returned to the OR with orthopedics on 2/28/2025 for right knee I&D, revision static spacer and application of incisional wound vac. Plastic surgery service consulted postoperatively to follow for flap recommendations/timing, now s/p R latissimus free flap to R knee on 3/14 with Dr. Lundberg.     Plan/Recommendations  S/p R latissimus free flap to R knee on 3/14 with Dr. Lundberg  - Continue serial flap assessments Q4hour per nursing with interval checks per plastic surgery team       ·  Assess Doppler pulse at marked site plus flap appearance (color, warmth, turgor)       ·  Nursing to notify plastic surgery team immediately if there are any acute changes          (Including decreased Doppler signal, pallor, temperature change, bleeding, etc.)  - Penrose removed 3/18 at bedside without complication  - Continue MASSIEL drain care per nursing (MASSIEL x 2)        ·  Strip drain tubing TID and PRN       ·  Monitor and record output [id number]        ·  Keep drain sites C/D/I with daily drain dressing changes        ·  Call plastics if drain output is >30cc in an hour or greater than 200cc over 8 hours  - Continue incisional wound vac therapy to R lat donor site x10-14 days post op (3/28)        ·  Settings: 125 mmHg low continuous suction        ·  Please keep dressing C/D/I, reinforce PRN        ·  Record vac output per nursing q8h       ·  Please alert plastics team with any concerns regarding vac        ·  Plan to transition to Prevena homegoing vac on day of discharge    - IV Vancomycin while in-patient, ID re-engaged  - Maintain Vioptix > 30%, notify plastics if < 30%   - Keep RLE carefully positioned elevated with pillows        ·  Avoid positioning that would apply pressure to flap region or incisions   - NWB, patient to maintain strict bedrest    - Maintain Rosenbaum while on bedrest    - OK for regular diet       ·  Strict  monitoring and recording of I&Os per nursing  - Continue use of Rashid hugger as tolerated       ·  Settings: low heat, medium continuous fan        ·  5 hours on, one hour off x 5 days post op   - Daily local wound care/dressing changes per plastic surgery APPs (Xeroform over incision lines)       ·  Monitor surgical sites for S/S of bleeding, infection or dehiscence  - Maintain BP of 110/60 minimum to ensure adequate flap perfusion   - Low transfusion threshold to ensure adequate flap perfusion  - SQ Heparin transitioned to q24h lovenox on POD3  - Monitor VS/pulse ox [id number]   - Monitor AM CBC/RFP    # New onset Chest pain (3/16) now resolved  - R-sided CP overnight 3/16, has improved with addition of NC, will continue to monitor  - EKG, troponin, Mag ordered, labs stable  - Repeat EKG ordered 3/16 AM  - Medicine team consulted, appreciate further recs       ·  No additional cardiac workup, likely msk in nature       ·  Lidocaine patch to R rib       ·  continue to avoid any QTC prolonging medications    # Hx of septic arthritis of R knee   - Per ID note on 3/3 during previous admission: IV Vancomycin while in-patient, plan for 6 weeks of vancomycin from date of surgery 2/28/25 - until stop date 4/11/25  - Re-engaged ID 3/16, appreciate any updated recs       ·  Continue vanc until 4/11       ·  Weekly labs CBC with diff, chem, quantitative CRP, and vanc trough    # Acute postoperative pain  - Added PRN Lidocaine patch and Diclofenac gel for R anterior rib pain, will continue to monitor       ·  Scheduled Tylenol 975mg PO Q2mmumx        ·  Scheduled Robaxin 500 mg IV [id number]       ·  Oxycodone 5/10mg PO [id number] PRN mod/severe pain   - Bowel regimen with Stephany-Colace while using narcotics       ·  Dulcolax suppository x1 given 3/18 for persistent constipation  - IV Tigan PRN nausea due to narcotics (NO Zofran due to hx of cardiac arrest)  - Pain assessments [id number]     # L arm itching  - Benadryl cream ordered for  itching 3/15, since improved  - L arm tourniquet was left on patients arm during OR procedure by anesthesia staff, continue to evaluate L arm sensation and distal pulses, consider ultrasound if worsening pain or surrounding skin changes    # QT Prolongation  - Avoid QT prolonging medications; NO Zofran (hx of cardiac arrest)  - Telemetry monitoring    # Post-Op Anemia   - HGB on AM labs at 11.1  - Presently no S/S of bleeding, considered likely due to intraoperative blood loss   - Keep T&S UTD, last obtained 3/14  - Monitor for S/S of bleeding or symptomatic anemia   - Low transfusion threshold to ensure adequate flap perfusion  - Transfuse for HGB <7 per protocol   - Monitor AM CBC     Prophylaxis:   - DVT: subcutaneous lovenox daily started on POD3, SCDs  - Encourage IS x10 every hour while awake   - Bowel regimen: Stephany-Colace, dulcolax suppository x1 (3/18)       Disposition: Continue care on RNF. Will remain inpatient for continued flap monitoring and vac/drain surveillance postoperatively. Follow up appointment with plastic surgery department to be scheduled closer to anticipated date of discharge.      Patient and plan discussed with Dr. Toño Nelson PA-C   Plastic and Reconstructive Surgery   [facility]  Pager #[phone]  Team phone: [phone]

## 2025-03-21 ENCOUNTER — APPOINTMENT (OUTPATIENT)
Dept: RADIOLOGY | Facility: HOSPITAL | Age: 70
DRG: 908 | End: 2025-03-21
Payer: MEDICARE

## 2025-03-21 ENCOUNTER — OFFICE VISIT (OUTPATIENT)
Dept: SURGICAL ONCOLOGY | Facility: HOSPITAL | Age: 70
DRG: 908 | End: 2025-03-21
Payer: MEDICARE

## 2025-03-21 DIAGNOSIS — T84.50XD PROSTHETIC JOINT INFECTION, SUBSEQUENT ENCOUNTER: ICD-10-CM

## 2025-03-21 LAB — VANCOMYCIN SERPL-MCNC: 19.9 UG/ML (ref 5–20)

## 2025-03-21 PROCEDURE — 2500000005 HC RX 250 GENERAL PHARMACY W/O HCPCS

## 2025-03-21 PROCEDURE — 2500000001 HC RX 250 WO HCPCS SELF ADMINISTERED DRUGS (ALT 637 FOR MEDICARE OP)

## 2025-03-21 PROCEDURE — 2500000004 HC RX 250 GENERAL PHARMACY W/ HCPCS (ALT 636 FOR OP/ED)

## 2025-03-21 PROCEDURE — 71045 X-RAY EXAM CHEST 1 VIEW: CPT

## 2025-03-21 PROCEDURE — 99232 SBSQ HOSP IP/OBS MODERATE 35: CPT | Performed by: PHYSICIAN ASSISTANT

## 2025-03-21 PROCEDURE — 71045 X-RAY EXAM CHEST 1 VIEW: CPT | Performed by: RADIOLOGY

## 2025-03-21 PROCEDURE — 80202 ASSAY OF VANCOMYCIN: CPT

## 2025-03-21 PROCEDURE — 2500000002 HC RX 250 W HCPCS SELF ADMINISTERED DRUGS (ALT 637 FOR MEDICARE OP, ALT 636 FOR OP/ED)

## 2025-03-21 PROCEDURE — 93005 ELECTROCARDIOGRAM TRACING: CPT

## 2025-03-21 PROCEDURE — 99221 1ST HOSP IP/OBS SF/LOW 40: CPT

## 2025-03-21 PROCEDURE — 1200000003 HC ONCOLOGY  ROOM WITH TELEMETRY DAILY

## 2025-03-21 PROCEDURE — 93010 ELECTROCARDIOGRAM REPORT: CPT | Performed by: INTERNAL MEDICINE

## 2025-03-21 RX ORDER — OXYMETAZOLINE HCL 0.05 %
2 SPRAY, NON-AEROSOL (ML) NASAL EVERY 12 HOURS PRN
Status: ACTIVE | OUTPATIENT
Start: 2025-03-21 | End: 2025-03-24

## 2025-03-21 RX ORDER — ALBUTEROL SULFATE 90 UG/1
2 INHALANT RESPIRATORY (INHALATION) EVERY 6 HOURS PRN
Status: DISCONTINUED | OUTPATIENT
Start: 2025-03-21 | End: 2025-03-24 | Stop reason: HOSPADM

## 2025-03-21 RX ADMIN — ACETAMINOPHEN 975 MG: 325 TABLET, FILM COATED ORAL at 08:31

## 2025-03-21 RX ADMIN — METHOCARBAMOL 500 MG: 500 TABLET ORAL at 14:27

## 2025-03-21 RX ADMIN — VANCOMYCIN HYDROCHLORIDE 1000 MG: 1 INJECTION, SOLUTION INTRAVENOUS at 20:18

## 2025-03-21 RX ADMIN — ALBUTEROL SULFATE 2.5 MG: 2.5 SOLUTION RESPIRATORY (INHALATION) at 06:47

## 2025-03-21 RX ADMIN — METHOCARBAMOL 500 MG: 500 TABLET ORAL at 20:19

## 2025-03-21 RX ADMIN — ENOXAPARIN SODIUM 40 MG: 100 INJECTION SUBCUTANEOUS at 20:18

## 2025-03-21 RX ADMIN — ACETAMINOPHEN 975 MG: 325 TABLET, FILM COATED ORAL at 01:16

## 2025-03-21 RX ADMIN — ACETAMINOPHEN 975 MG: 325 TABLET, FILM COATED ORAL at 20:18

## 2025-03-21 RX ADMIN — POLYETHYLENE GLYCOL 3350 17 G: 17 POWDER, FOR SOLUTION ORAL at 08:27

## 2025-03-21 RX ADMIN — OXYCODONE HYDROCHLORIDE 10 MG: 10 TABLET ORAL at 08:28

## 2025-03-21 RX ADMIN — VANCOMYCIN HYDROCHLORIDE 1000 MG: 1 INJECTION, SOLUTION INTRAVENOUS at 08:27

## 2025-03-21 RX ADMIN — METHOCARBAMOL 500 MG: 500 TABLET ORAL at 05:38

## 2025-03-21 RX ADMIN — OXYCODONE HYDROCHLORIDE 10 MG: 10 TABLET ORAL at 15:38

## 2025-03-21 RX ADMIN — OXYCODONE HYDROCHLORIDE 10 MG: 10 TABLET ORAL at 04:00

## 2025-03-21 RX ADMIN — SENNOSIDES AND DOCUSATE SODIUM 2 TABLET: 50; 8.6 TABLET ORAL at 08:32

## 2025-03-21 RX ADMIN — ACETAMINOPHEN 975 MG: 325 TABLET, FILM COATED ORAL at 17:51

## 2025-03-21 RX ADMIN — Medication 3 MG: at 20:19

## 2025-03-21 RX ADMIN — SENNOSIDES AND DOCUSATE SODIUM 2 TABLET: 50; 8.6 TABLET ORAL at 20:19

## 2025-03-21 RX ADMIN — PANTOPRAZOLE SODIUM 40 MG: 40 TABLET, DELAYED RELEASE ORAL at 05:38

## 2025-03-21 RX ADMIN — OXYCODONE HYDROCHLORIDE 10 MG: 10 TABLET ORAL at 20:19

## 2025-03-21 ASSESSMENT — PAIN SCALES - GENERAL
PAINLEVEL_OUTOF10: 6
PAINLEVEL_OUTOF10: 8
PAINLEVEL_OUTOF10: 5 - MODERATE PAIN
PAINLEVEL_OUTOF10: 0 - NO PAIN
PAINLEVEL_OUTOF10: 8
PAINLEVEL_OUTOF10: 8
PAINLEVEL_OUTOF10: 6
PAINLEVEL_OUTOF10: 9
PAINLEVEL_OUTOF10: 8
PAINLEVEL_OUTOF10: 5 - MODERATE PAIN
PAINLEVEL_OUTOF10: 0 - NO PAIN

## 2025-03-21 ASSESSMENT — PAIN - FUNCTIONAL ASSESSMENT
PAIN_FUNCTIONAL_ASSESSMENT: 0-10
PAIN_FUNCTIONAL_ASSESSMENT: UNABLE TO SELF-REPORT

## 2025-03-21 ASSESSMENT — COGNITIVE AND FUNCTIONAL STATUS - GENERAL
CLIMB 3 TO 5 STEPS WITH RAILING: TOTAL
TOILETING: A LOT
DRESSING REGULAR LOWER BODY CLOTHING: A LOT
TURNING FROM BACK TO SIDE WHILE IN FLAT BAD: A LITTLE
PERSONAL GROOMING: A LOT
HELP NEEDED FOR BATHING: A LOT
DAILY ACTIVITIY SCORE: 13
MOBILITY SCORE: 12
MOVING TO AND FROM BED TO CHAIR: A LOT
WALKING IN HOSPITAL ROOM: TOTAL
STANDING UP FROM CHAIR USING ARMS: A LOT
EATING MEALS: A LITTLE
DRESSING REGULAR UPPER BODY CLOTHING: A LOT
MOVING FROM LYING ON BACK TO SITTING ON SIDE OF FLAT BED WITH BEDRAILS: A LITTLE

## 2025-03-21 ASSESSMENT — PAIN DESCRIPTION - ORIENTATION: ORIENTATION: RIGHT

## 2025-03-21 ASSESSMENT — PAIN DESCRIPTION - LOCATION: LOCATION: LEG

## 2025-03-21 NOTE — PROGRESS NOTES
"  Department of Plastic and Reconstructive Surgery  Daily Progress Note    Patient Name: Adriana Wood  MRN: [id number]  Date:  03/21/25     Subjective  Patient resting in bed comfortably this AM. Evaluated shortly after alerted by nursing of episode of shortness of breath and tachycardia with difficulty breathing. Patient stated \"asthma attack\" and patient was given albuterol treatment and CXR ordered. Medicine alerted about episode and asked to re-eval patient as they were consulted a few nights ago for an episode of chest pain. Educated patient on importance of dangling today for as long as tolerated by patient and vioptix monitoring.  Denies any fever, chills, night sweats, CP, SOB, palpitations, nausea, vomiting, or abdominal pain/discomfort.    Objective  Vital Signs  /63 (BP Location: Left arm, Patient Position: Lying)   Pulse 57   Temp 36.7 °C (98.1 °F) (Temporal)   Resp 18   Ht 1.778 m (5' 10\")   Wt 69.1 kg (152 lb 5.4 oz)   SpO2 94%   BMI 21.86 kg/m²      Physical Exam   Constitutional: NAD  Eyes: EOMI, clear sclera   ENMT: Moist mucous membranes, no apparent injuries or lesions  Head/Neck: NCAT  Cardiovascular: Extremities WWP  Respiratory/Thorax: Unlabored respirations on RA  Gastrointestinal: Abdomen soft, non-distended, non-tender  Genitourinary: indwelling damon  Extremities: RLE flap: warm to touch, soft and compressible, strong biphasic signal at marked stitch, circumferential incisions around flap well approximated covered with xeroform, vioptix in place with O2 sat of 74% with 88% signal strength  Right posterior lat donor site: prevena incisional wound vac in place, holding suction at -125 mmHg, no alarm or leak noted, MASSIEL drain x 1 to right flank with ss output  Neurological: A&Ox3  Psychological: Appropriate mood and behavior  Skin: Warm and dry.     Diagnostics   Results for orders placed or performed during the hospital encounter of 03/14/25 (from the past 24 hours) "   Vancomycin   Result Value Ref Range    Vancomycin 19.9 5.0 - 20.0 ug/mL     *Note: Due to a large number of results and/or encounters for the requested time period, some results have not been displayed. A complete set of results can be found in Results Review.     ECG 12 Lead    Result Date: 3/6/2025   Poor data quality, interpretation may be adversely affected Sinus bradycardia with 1st degree AV block Nonspecific T wave abnormality Prolonged QT Abnormal ECG Confirmed by Clyde Burkett ([id number]) on 3/6/2025 3:09:25 PM    ECG 12 lead    Result Date: 3/5/2025  Sinus tachycardia with frequent Premature ventricular complexes in a pattern of bigeminy Nonspecific T wave abnormality Abnormal ECG When compared with ECG of 01-MAR-2025 09:07, Significant changes have occurred Confirmed by Baron Sanders ([id number]) on 3/5/2025 8:34:27 AM    Bedside PICC Imaging    Result Date: 3/4/2025  These images are not reportable by radiology and will not be interpreted by  Radiologists.    XR knee right 1-2 views    Result Date: 2/28/2025  Interpreted By:  Juan Smith, STUDY: XR KNEE RIGHT 1-2 VIEWS; ;  2/28/2025 11:36 am   INDICATION: Signs/Symptoms:PACU.     COMPARISON: 01/16/2025.   ACCESSION NUMBER(S): [id number]   ORDERING CLINICIAN: DARSHAN LARSON   FINDINGS: Two views of the right knee   Postsurgical changes of right knee fusion with cement and productive changes within the joint space. The knee joint alignment is similar compared to prior. Interval removal of intramedullary nail with ghost tract. Similar-appearing remote fractures of the proximal fibula and proximal tibia. Osteopenic changes. Vascular calcifications. Soft tissue edema over the anterior knee with overlying wound VAC and subcutaneous air, likely postsurgical. No definitive new fracture.       1. Postsurgical changes of the right knee with fusion of the joint space and interval removal of the intramedullary nail. Overlying soft tissue edema and wound VAC.        MACRO: None   Signed by: Juan Smith 2/28/2025 12:56 PM Dictation workstation:   [id number]    FL fluoro images no charge    Result Date: 2/28/2025  These images are not reportable by radiology and will not be interpreted by  Radiologists.      Current Medications  Scheduled medications  acetaminophen, 975 mg, oral, q8h  bisacodyl, 10 mg, rectal, Once  enoxaparin, 40 mg, subcutaneous, Daily  lidocaine, 1 patch, transdermal, Daily  methocarbamol, 500 mg, oral, q8h FELIX  pantoprazole, 40 mg, oral, Daily before breakfast  polyethylene glycol, 17 g, oral, Daily  sennosides-docusate sodium, 2 tablet, oral, BID  vancomycin, 1,000 mg, intravenous, q12h      Continuous medications       PRN medications  PRN medications: albuterol, alteplase, bisacodyl, calcium carbonate, diclofenac sodium, diphenhydramine-zinc acetate, lidocaine, melatonin, naloxone, oxyCODONE, oxyCODONE, oxymetazoline, trimethobenzamide, vancomycin     Assessment  Adriana Wood is a 69 y.o. female with a past medical history of bradycardia, cardiomyopathy, CAD, cardiac arrest due to electrolyte abnormalities and zofran use post-operatively, and traumatic brain injury. Patient had a total knee right knee done by Dr. Whelan in March 2024 which was complicated by E. Coli infection. Patient had right TKA wound dehiscence s/p I&D right knee with polyswap and attempted re-closure of complex wound on 5/10/2024 by Dr. Whelan. She was referred to plastic surgery (Dr. Reyes) perioperatively given projected anticipated need for possible flap coverage of the wound/defect site. Patient re-admitted and returned to the OR with orthopedics on 2/28/2025 for right knee I&D, revision static spacer and application of incisional wound vac. Plastic surgery service consulted postoperatively to follow for flap recommendations/timing, now s/p R latissimus free flap to R knee on 3/14 with Dr. Lundberg.     Plan/Recommendations  S/p R latissimus free flap to R knee on  3/14 with Dr. Lundberg  - Continue serial flap assessments Q4hour per nursing with interval checks per plastic surgery team       ·  Assess Doppler pulse at marked site plus flap appearance (color, warmth, turgor)       ·  Nursing to notify plastic surgery team immediately if there are any acute changes          (Including decreased Doppler signal, pallor, temperature change, bleeding, etc.)  - Continue MASSIEL drain care per nursing (MASSIEL x 1)        .  R inner knee MASSIEL removed 3/21       ·  Strip drain tubing TID and PRN       ·  Monitor and record output Q2hiptp        ·  Keep drain sites C/D/I with daily drain dressing changes        ·  Call plastics if drain output is >30cc in an hour or greater than 200cc over 8 hours  - Continue incisional wound vac therapy to R lat donor site x10-14 days post op (3/28)        ·  Settings: 125 mmHg low continuous suction        ·  Please keep dressing C/D/I, reinforce PRN        ·  Record vac output per nursing q8h       ·  Please alert plastics team with any concerns regarding vac        ·  [facility]    - IV Vancomycin while in-patient, ID re-engaged  - Maintain Vioptix > 30%, notify plastics if < 30%   - Keep RLE carefully positioned elevated with pillows        ·  Avoid positioning that would apply pressure to flap region or incisions   - NWB, patient to dangle as tolerated monitoring vioptix, nurse to alert team how long she was able to dangle  - Rosenbaum to be removed today 3/21  - OK for regular diet       ·  Strict monitoring and recording of I&Os per nursing  - Daily local wound care/dressing changes per plastic surgery APPs (Xeroform over incision lines)       ·  Monitor surgical sites for S/S of bleeding, infection or dehiscence  - Maintain BP of 110/60 minimum to ensure adequate flap perfusion   - Low transfusion threshold to ensure adequate flap perfusion  - q24h lovenox  - Monitor VS/pulse ox [id number]   - Monitor AM  CBC/RFP    #asthma attack  - continue home inhaler  - as needed O2 supplementation  - nasal Afrin spray for congestion  - re-engage medicine    # New onset Chest pain (3/16) now resolved  - R-sided CP overnight 3/16, has improved with addition of NC, will continue to monitor  - EKG, troponin, Mag ordered, labs stable  - Repeat EKG ordered 3/16 AM  - Medicine team consulted, appreciate further recs       ·  No additional cardiac workup, likely msk in nature       ·  Lidocaine patch to R rib       ·  continue to avoid any QTC prolonging medications    # Hx of septic arthritis of R knee   - Per ID note on 3/3 during previous admission: IV Vancomycin while in-patient, plan for 6 weeks of vancomycin from date of surgery 2/28/25 - until stop date 4/11/25  - Re-engaged ID 3/16, appreciate any updated recs       ·  Continue vanc until 4/11       ·  Weekly labs CBC with diff, chem, quantitative CRP, and vanc trough    # Acute postoperative pain  - Added PRN Lidocaine patch and Diclofenac gel for R anterior rib pain, will continue to monitor       ·  Scheduled Tylenol 975mg PO [id number]        ·  Scheduled Robaxin 500 mg IV [id number]       ·  Oxycodone 5/10mg PO [id number] PRN mod/severe pain   - Bowel regimen with Stephany-Colace while using narcotics       ·  Dulcolax suppository x1 given 3/18 for persistent constipation  - IV Tigan PRN nausea due to narcotics (NO Zofran due to hx of cardiac arrest)  - Pain assessments [id number]     # L arm itching, resolved  - Benadryl cream ordered for itching 3/15, since improved  - L arm tourniquet was left on patients arm during OR procedure by anesthesia staff, continue to evaluate L arm sensation and distal pulses, consider ultrasound if worsening pain or surrounding skin changes    # history of QT Prolongation  - Avoid QT prolonging medications; NO Zofran (hx of cardiac arrest)  - Telemetry monitoring    # Post-Op Anemia, resolved   - HGB on AM labs yesterday 11.5  - monitor CBC every few  days    Prophylaxis:   - DVT: subcutaneous lovenox daily started on POD3, SCDs  - Encourage IS x10 every hour while awake   - Bowel regimen: Stephany-Colace, dulcolax suppository x1 (3/18)       Disposition: Continue care on RNF. Will remain inpatient for continued flap monitoring and vac/drain surveillance postoperatively. Follow up appointment with plastic surgery department to be scheduled closer to anticipated date of discharge. ADOD early next week 3/24-25    Patient and plan discussed with Dr. Toño Nelson PA-C   Plastic and Reconstructive Surgery   [place]  Pager #[phone]  Team phone: [phone]

## 2025-03-21 NOTE — CARE PLAN
Problem: Pain  Goal: Takes deep breaths with improved pain control throughout the shift  Outcome: Progressing  Goal: Turns in bed with improved pain control throughout the shift  Outcome: Progressing  Goal: Walks with improved pain control throughout the shift  Outcome: Progressing  Goal: Performs ADL's with improved pain control throughout shift  Outcome: Progressing  Goal: Participates in PT with improved pain control throughout the shift  Outcome: Progressing  Goal: Free from opioid side effects throughout the shift  Outcome: Progressing  Goal: Free from acute confusion related to pain meds throughout the shift  Outcome: Progressing     Problem: Skin  Goal: Decreased wound size/increased tissue granulation at next dressing change  Outcome: Progressing  Flowsheets (Taken 3/21/2025 1128)  Decreased wound size/increased tissue granulation at next dressing change: Promote sleep for wound healing  Goal: Participates in plan/prevention/treatment measures  Outcome: Progressing  Flowsheets (Taken 3/21/2025 1128)  Participates in plan/prevention/treatment measures:   Elevate heels   Discuss with provider PT/OT consult  Goal: Prevent/manage excess moisture  Outcome: Progressing  Flowsheets (Taken 3/21/2025 1128)  Prevent/manage excess moisture:   Cleanse incontinence/protect with barrier cream   Moisturize dry skin   Monitor for/manage infection if present  Goal: Prevent/minimize sheer/friction injuries  Outcome: Progressing  Flowsheets (Taken 3/21/2025 1128)  Prevent/minimize sheer/friction injuries:   Complete micro-shifts as needed if patient unable. Adjust patient position to relieve pressure points, not a full turn   Use pull sheet   HOB 30 degrees or less  Goal: Promote/optimize nutrition  Outcome: Progressing  Flowsheets (Taken 3/21/2025 1128)  Promote/optimize nutrition:   Assist with feeding   Offer water/supplements/favorite foods  Goal: Promote skin healing  Outcome: Progressing  Flowsheets (Taken 3/21/2025  1128)  Promote skin healing:   Assess skin/pad under line(s)/device(s)   Turn/reposition every 2 hours/use positioning/transfer devices     Problem: Pain - Adult  Goal: Verbalizes/displays adequate comfort level or baseline comfort level  Outcome: Progressing     Problem: Safety - Adult  Goal: Free from fall injury  Outcome: Progressing     Problem: Discharge Planning  Goal: Discharge to home or other facility with appropriate resources  Outcome: Progressing     Problem: Chronic Conditions and Co-morbidities  Goal: Patient's chronic conditions and co-morbidity symptoms are monitored and maintained or improved  Outcome: Progressing     Problem: Fall/Injury  Goal: Not fall by end of shift  Outcome: Progressing  Goal: Be free from injury by end of the shift  Outcome: Progressing  Goal: Verbalize understanding of personal risk factors for fall in the hospital  Outcome: Progressing  Goal: Verbalize understanding of risk factor reduction measures to prevent injury from fall in the home  Outcome: Progressing  Goal: Use assistive devices by end of the shift  Outcome: Progressing  Goal: Pace activities to prevent fatigue by end of the shift  Outcome: Progressing

## 2025-03-21 NOTE — PROGRESS NOTES
Vancomycin Dosing by Pharmacy- FOLLOW UP    Adriana Wood is a 69 y.o. year old female who Pharmacy has been consulted for vancomycin dosing for cellulitis, skin and soft tissue. Based on the patient's indication and renal status this patient is being dosed based on a goal AUC of 400-600.     Renal function is currently stable.    Current vancomycin dose: 1000 mg given every 12 hours    Estimated vancomycin AUC on current dose: 476 mg/L.hr     Visit Vitals  /88   Pulse 61   Temp 35.9 °C (96.6 °F) (Temporal)   Resp 20        Lab Results   Component Value Date    CREATININE 0.44 (L) 2025    CREATININE 0.53 2025    CREATININE 0.50 2025    CREATININE 0.53 2025        Patient weight is as follows:   Vitals:    25 0600   Weight: 69.1 kg (152 lb 5.4 oz)       Cultures:  No results found for the encounter in last 14 days.       I/O last 3 completed shifts:  In: 620 (9 mL/kg) [P.O.:220; IV Piggyback:400]  Out: 2979 (43.1 mL/kg) [Urine:2925 (1.2 mL/kg/hr); Drains:54]  Weight: 69.1 kg   I/O during current shift:  I/O this shift:  In: 50 [P.O.:50]  Out: 225 [Urine:225]    Temp (24hrs), Av.6 °C (97.9 °F), Min:35.9 °C (96.6 °F), Max:36.9 °C (98.4 °F)      Assessment/Plan    Within goal AUC range. Continue current vancomycin regimen.    This dosing regimen is predicted by [employer] to result in the following pharmacokinetic parameters:  Loading dose: N/A  Regimen: 1000 mg IV every 12 hours.  Start time: 10:17 on 2025  Exposure target: AUC24 (range)400-600 mg/L.hr   HQC83-33: 472 mg/L.hr  AUC24,ss: 476 mg/L.hr  Probability of AUC24 > 400: 98 %  Ctrough,ss: 13.8 mg/L  Probability of Ctrough,ss > 20: 0 %  The next level will be obtained on 3/24 at am labs. May be obtained sooner if clinically indicated.   Will continue to monitor renal function daily while on vancomycin and order serum creatinine at least every 48 hours if not already ordered.  Follow for continued vancomycin needs,  clinical response, and signs/symptoms of toxicity.       DIGNA BLANK
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
## 2025-03-21 NOTE — SIGNIFICANT EVENT
"  Department of Plastic and Reconstructive Surgery  PM Flap Check    69 y.o. female with a past medical history of bradycardia, cardiomyopathy, CAD, cardiac arrest due to electrolyte abnormalities and zofran use post-operatively, and traumatic brain injury. Patient had a total knee right knee done by Dr. Whelan in March 2024 which was complicated by E. Coli infection. Patient had right TKA wound dehiscence s/p I&D right knee with polyswap and attempted re-closure of complex wound on 5/10/2024 by Dr. Whelan. She was referred to plastic surgery (Dr. Reyes) perioperatively given projected anticipated need for possible flap coverage of the wound/defect site. Patient re-admitted and returned to the OR with orthopedics on 2/28/2025 for right knee I&D, revision static spacer and application of incisional wound vac. Plastic surgery service consulted postoperatively to follow for flap recommendations/timing, now s/p R latissimus free flap to R knee on 3/14 with Dr. Lundberg.     Subjective: Resting in bed, pain well controlled, tolerated dangling during the day with stable vioptix, Tolerating diet with increased appetite, + BM, Denies any fever, chills, night sweats, CP, SOB, palpitations, nausea, vomiting, diarrhea, constipation, dysuria, hematuria, hematochezia, hematemesis, flank pain.     Objective:  BP (!) 162/106 (BP Location: Left leg, Patient Position: Lying)   Pulse 50   Temp 36.8 °C (98.2 °F) (Temporal)   Resp 16   Ht 1.778 m (5' 10\")   Wt 69.1 kg (152 lb 5.4 oz)   SpO2 95%   BMI 21.86 kg/m²    PE:  Constitutional: A&Ox3, calm and cooperative, NAD  Eyes: EOMI, clear sclera   ENMT: Moist mucous membranes, no apparent injuries or lesions  Head/Neck: NCAT  Cardiovascular: Extremities WWP  Respiratory/Thorax: Unlabored respirations on RA  Gastrointestinal: Abdomen soft, NTND, no peritoneal signs, +BS x4, +flatus, + BM, lidocaine patch to RUQ  Genitourinary: indwelling damon in place with clear yellow " output  Extremities: RLE flap: warm to touch, soft and compressible, strong biphasic signal at marked stitch, circumferential incisions around flap well approximated with scant amount of ss drainage, covered with xeroform, penrose removed during day shift, vioptix in place with signal of 76% with 88% signal strength, MASSIEL drain to medial aspect of right knee with ss output  Right posterior lat donor site: prevena incisional wound vac in place, holding suction at -125 mmHg, no alarm or leak noted, MASSIEL drain x 1 to right flank with ss output  Neurological: A&Ox3  Psychological: Appropriate mood and behavior  Skin: Warm and dry. L upper extremity with resolving ecchymosis circumferentially     S/P Right latissimus dorsi donor site FF to Right knee:  A/P:   - Flap check stable, no change from prior assessment, biphasic doppler signal, good strength, continue flap check Q4 per nursing with interim checks per plastics, maintain Vioptix  - ok to use left arm for BP instead of leg, Leg BP:162/106, repeat BP on left arm 133/83  - all other care per daily progress note  - Updated ISABEL Ramos-CNP  Plastic and Reconstructive Surgery   Available via [employer]  Pager #[phone]  Team phone: [phone]

## 2025-03-22 PROCEDURE — 2500000005 HC RX 250 GENERAL PHARMACY W/O HCPCS: Performed by: PHYSICIAN ASSISTANT

## 2025-03-22 PROCEDURE — 2500000001 HC RX 250 WO HCPCS SELF ADMINISTERED DRUGS (ALT 637 FOR MEDICARE OP)

## 2025-03-22 PROCEDURE — 2500000004 HC RX 250 GENERAL PHARMACY W/ HCPCS (ALT 636 FOR OP/ED)

## 2025-03-22 PROCEDURE — 2500000005 HC RX 250 GENERAL PHARMACY W/O HCPCS

## 2025-03-22 PROCEDURE — 99232 SBSQ HOSP IP/OBS MODERATE 35: CPT

## 2025-03-22 PROCEDURE — 1200000003 HC ONCOLOGY  ROOM WITH TELEMETRY DAILY

## 2025-03-22 RX ADMIN — VANCOMYCIN HYDROCHLORIDE 1000 MG: 1 INJECTION, SOLUTION INTRAVENOUS at 09:18

## 2025-03-22 RX ADMIN — PANTOPRAZOLE SODIUM 40 MG: 40 TABLET, DELAYED RELEASE ORAL at 13:01

## 2025-03-22 RX ADMIN — OXYCODONE HYDROCHLORIDE 10 MG: 10 TABLET ORAL at 02:26

## 2025-03-22 RX ADMIN — ENOXAPARIN SODIUM 40 MG: 100 INJECTION SUBCUTANEOUS at 22:09

## 2025-03-22 RX ADMIN — OXYCODONE HYDROCHLORIDE 10 MG: 10 TABLET ORAL at 15:40

## 2025-03-22 RX ADMIN — OXYCODONE HYDROCHLORIDE 10 MG: 10 TABLET ORAL at 09:17

## 2025-03-22 RX ADMIN — ACETAMINOPHEN 975 MG: 325 TABLET, FILM COATED ORAL at 09:17

## 2025-03-22 RX ADMIN — ACETAMINOPHEN 975 MG: 325 TABLET, FILM COATED ORAL at 18:01

## 2025-03-22 RX ADMIN — LIDOCAINE 1 PATCH: 4 PATCH TOPICAL at 22:10

## 2025-03-22 RX ADMIN — METHOCARBAMOL 500 MG: 500 TABLET ORAL at 22:11

## 2025-03-22 RX ADMIN — POLYETHYLENE GLYCOL 3350 17 G: 17 POWDER, FOR SOLUTION ORAL at 09:17

## 2025-03-22 RX ADMIN — VANCOMYCIN HYDROCHLORIDE 1000 MG: 1 INJECTION, SOLUTION INTRAVENOUS at 20:43

## 2025-03-22 RX ADMIN — METHOCARBAMOL 500 MG: 500 TABLET ORAL at 13:01

## 2025-03-22 RX ADMIN — LIDOCAINE 1 PATCH: 4 PATCH TOPICAL at 15:50

## 2025-03-22 ASSESSMENT — COGNITIVE AND FUNCTIONAL STATUS - GENERAL
DRESSING REGULAR UPPER BODY CLOTHING: A LITTLE
MOBILITY SCORE: 14
MOBILITY SCORE: 14
DAILY ACTIVITIY SCORE: 18
STANDING UP FROM CHAIR USING ARMS: A LOT
PERSONAL GROOMING: A LITTLE
CLIMB 3 TO 5 STEPS WITH RAILING: A LOT
TOILETING: A LITTLE
MOVING FROM LYING ON BACK TO SITTING ON SIDE OF FLAT BED WITH BEDRAILS: A LITTLE
TOILETING: A LITTLE
HELP NEEDED FOR BATHING: A LITTLE
DRESSING REGULAR UPPER BODY CLOTHING: A LITTLE
DRESSING REGULAR LOWER BODY CLOTHING: A LITTLE
WALKING IN HOSPITAL ROOM: A LOT
EATING MEALS: A LITTLE
WALKING IN HOSPITAL ROOM: A LOT
PERSONAL GROOMING: A LITTLE
DAILY ACTIVITIY SCORE: 18
STANDING UP FROM CHAIR USING ARMS: A LOT
HELP NEEDED FOR BATHING: A LITTLE
TURNING FROM BACK TO SIDE WHILE IN FLAT BAD: A LITTLE
MOVING TO AND FROM BED TO CHAIR: A LOT
EATING MEALS: A LITTLE
MOVING TO AND FROM BED TO CHAIR: A LOT
TURNING FROM BACK TO SIDE WHILE IN FLAT BAD: A LITTLE
DRESSING REGULAR LOWER BODY CLOTHING: A LITTLE
CLIMB 3 TO 5 STEPS WITH RAILING: A LOT
MOVING FROM LYING ON BACK TO SITTING ON SIDE OF FLAT BED WITH BEDRAILS: A LITTLE

## 2025-03-22 ASSESSMENT — PAIN - FUNCTIONAL ASSESSMENT
PAIN_FUNCTIONAL_ASSESSMENT: 0-10

## 2025-03-22 ASSESSMENT — PAIN SCALES - GENERAL
PAINLEVEL_OUTOF10: 0 - NO PAIN
PAINLEVEL_OUTOF10: 9
PAINLEVEL_OUTOF10: 5 - MODERATE PAIN
PAINLEVEL_OUTOF10: 0 - NO PAIN
PAINLEVEL_OUTOF10: 5 - MODERATE PAIN
PAINLEVEL_OUTOF10: 8
PAINLEVEL_OUTOF10: 7

## 2025-03-22 ASSESSMENT — PAIN DESCRIPTION - ORIENTATION: ORIENTATION: RIGHT

## 2025-03-22 ASSESSMENT — PAIN DESCRIPTION - LOCATION: LOCATION: LEG

## 2025-03-22 ASSESSMENT — PAIN SCALES - WONG BAKER: WONGBAKER_NUMERICALRESPONSE: HURTS WHOLE LOT

## 2025-03-22 NOTE — CARE PLAN
The patient's goals for the shift include      The clinical goals for the shift include patient to remain hemodynamically stable      Problem: Pain  Goal: Takes deep breaths with improved pain control throughout the shift  3/22/2025 1156 by Rula Mandujano RN  Outcome: Progressing  3/22/2025 1156 by Rula Mandujano RN  Outcome: Progressing  Goal: Turns in bed with improved pain control throughout the shift  3/22/2025 1156 by Rula Mandujano RN  Outcome: Progressing  3/22/2025 1156 by Rula Mandujano RN  Outcome: Progressing  Goal: Walks with improved pain control throughout the shift  3/22/2025 1156 by Rula Mandujano RN  Outcome: Progressing  3/22/2025 1156 by Rula Mandujano RN  Outcome: Progressing  Goal: Performs ADL's with improved pain control throughout shift  3/22/2025 1156 by Rula Mandujano RN  Outcome: Progressing  3/22/2025 1156 by Rula Mandujano RN  Outcome: Progressing  Goal: Participates in PT with improved pain control throughout the shift  3/22/2025 1156 by Rula Mandujano RN  Outcome: Progressing  3/22/2025 1156 by Rula Mandujano RN  Outcome: Progressing  Goal: Free from opioid side effects throughout the shift  3/22/2025 1156 by Rula Mandujano RN  Outcome: Progressing  3/22/2025 1156 by Rula Mandujano RN  Outcome: Progressing  Goal: Free from acute confusion related to pain meds throughout the shift  3/22/2025 1156 by Rula Mandujano RN  Outcome: Progressing  3/22/2025 1156 by Rula Mandujano RN  Outcome: Progressing

## 2025-03-22 NOTE — PROGRESS NOTES
"  Department of Plastic and Reconstructive Surgery  Daily Progress Note    Patient Name: Adriana Wood  MRN: [id number]  Date:  03/22/25     Subjective  Found resting in bed comfortably this AM, anticipating breakfast soon. Denies any acute concerns, tolerating diet. Pain well controlled. Patient states she tolerated dangling yesterday 30 minutes x2. Denies any fever, chills, night sweats, CP, SOB, headache, extremity numbness/weakness, vision changes, palpitations, nausea, vomiting, or abdominal pain/discomfort.       Objective    Vital Signs  /72 (BP Location: Left arm, Patient Position: Lying)   Pulse 50   Temp 36.6 °C (97.9 °F) (Oral)   Resp 17   Ht 1.778 m (5' 10\")   Wt 69.1 kg (152 lb 5.4 oz)   SpO2 92%   BMI 21.86 kg/m²      Physical Exam   Constitutional: A&Ox3, calm and cooperative, NAD  Eyes: EOMI, clear sclera   ENMT: Moist mucous membranes, no apparent injuries or lesions  Head/Neck: NCAT  Cardiovascular: Extremities WWP  Respiratory/Thorax: Unlabored respirations on RA  Gastrointestinal: Abdomen soft, NTND, no peritoneal signs, +BS x4, +flatus, + BM, lidocaine patch to RUQ  Genitourinary: Voiding independently, no urine present on assessment, switched to purewick recently  Extremities: RLE flap: warm to touch, soft and compressible, strong biphasic signal at marked stitch, circumferential incisions around flap well approximated with scant amount of ss drainage, covered with xeroform, penrose removed during day shift, vioptix in place with signal of 73% with 86% signal strength, MASSIEL drain to medial aspect of right knee removed during the day   Right posterior lat donor site: prevena incisional wound vac in place, holding suction at -125 mmHg, no alarm or leak noted, MASSIEL drain x 1 to right flank with ss output  Neurological: A&Ox3  Psychological: Appropriate mood and behavior  Skin: Warm and dry. L upper extremity with resolving ecchymosis circumferentially     Diagnostics   No results " found. However, due to the size of the patient record, not all encounters were searched. Please check Results Review for a complete set of results.    Current Medications  Scheduled medications  acetaminophen, 975 mg, oral, q8h  bisacodyl, 10 mg, rectal, Once  enoxaparin, 40 mg, subcutaneous, Daily  lidocaine, 1 patch, transdermal, Daily  methocarbamol, 500 mg, oral, q8h FELIX  pantoprazole, 40 mg, oral, Daily before breakfast  polyethylene glycol, 17 g, oral, Daily  sennosides-docusate sodium, 2 tablet, oral, BID  vancomycin, 1,000 mg, intravenous, q12h      Continuous medications     PRN medications  PRN medications: albuterol, alteplase, bisacodyl, calcium carbonate, diclofenac sodium, diphenhydramine-zinc acetate, lidocaine, melatonin, naloxone, oxyCODONE, oxyCODONE, oxymetazoline, trimethobenzamide, vancomycin     Assessment  69 y.o. female with a past medical history of bradycardia, cardiomyopathy, CAD, cardiac arrest due to electrolyte abnormalities and zofran use post-operatively, and traumatic brain injury. Patient had a total knee right knee done by Dr. Whelan in March 2024 which was complicated by E. Coli infection. Patient had right TKA wound dehiscence s/p I&D right knee with polyswap and attempted re-closure of complex wound on 5/10/2024 by Dr. Whelan. She was referred to plastic surgery (Dr. Reyes) perioperatively given projected anticipated need for possible flap coverage of the wound/defect site. Patient re-admitted and returned to the OR with orthopedics on 2/28/2025 for right knee I&D, revision static spacer and application of incisional wound vac. Plastic surgery service consulted postoperatively to follow for flap recommendations/timing, now s/p R latissimus free flap to R knee on 3/14 with Dr. Lundberg.     Plan/Recommendations  S/p R latissimus free flap to R knee on 3/14 with Dr. Lundberg  - Continue serial flap assessments Q4hour per nursing with interval checks per plastic surgery team        ·  Assess Doppler pulse at marked site plus flap appearance (color, warmth, turgor)       ·  Nursing to notify plastic surgery team immediately if there are any acute changes          (Including decreased Doppler signal, pallor, temperature change, bleeding, etc.)  - Continue MASSIEL drain care per nursing (MASSIEL x 1)        .  R inner knee MASSIEL removed 3/21       ·  Strip drain tubing TID and PRN       ·  Monitor and record output [id number]        ·  Keep drain sites C/D/I with daily drain dressing changes        ·  Call plastics if drain output is >30cc in an hour or greater than 200cc over 8 hours  - Continue incisional wound vac therapy to R lat donor site x10-14 days post op (3/28)        ·  Settings: 125 mmHg low continuous suction        ·  Please keep dressing C/D/I, reinforce PRN        ·  Record vac output per nursing q8h       ·  Please alert plastics team with any concerns regarding vac        ·  Plan to transition to Prevena homegoing vac on day of discharge    - IV Vancomycin while in-patient, ID re-engaged  - Maintain Vioptix > 30%, notify plastics if < 30%   - Keep RLE carefully positioned elevated with pillows        ·  Avoid positioning that would apply pressure to flap region or incisions   - NWB, patient to dangle as tolerated monitoring vioptix, nurse to alert team how long she was able to dangle  - Rosenbaum to be removed today 3/21  - OK for regular diet       ·  Strict monitoring and recording of I&Os per nursing  - Daily local wound care/dressing changes per plastic surgery APPs (Xeroform over incision lines)       ·  Monitor surgical sites for S/S of bleeding, infection or dehiscence  - Maintain BP of 110/60 minimum to ensure adequate flap perfusion   - Low transfusion threshold to ensure adequate flap perfusion  - q24h lovenox  - Monitor VS/pulse ox [id number]   - Monitor AM CBC/RFP     #asthma  - continue home inhaler  - as needed O2 supplementation  - nasal Afrin spray for congestion  - re-engage  medicine        ·  rec albuterol PRN, now signed off    # New onset Chest pain (3/16) now resolved  - R-sided CP overnight 3/16  - EKG, troponin, Mag ordered unremarkable  - Repeat EKG  unremarkable  - Medicine team consulted, appreciate further recs       ·  No additional cardiac workup, likely msk in nature       ·  Lidocaine patch to R rib       ·  continue to avoid any QTC prolonging medications     # Hx of septic arthritis of R knee   - Per ID note on 3/3 during previous admission: IV Vancomycin while in-patient, plan for 6 weeks of vancomycin from date of surgery 2/28/25 - until stop date 4/11/25  - Re-engaged ID 3/16, appreciate any updated recs       ·  Continue vanc until 4/11       ·  Weekly labs CBC with diff, chem, quantitative CRP, and vanc trough     # Acute postoperative pain  - Added PRN Lidocaine patch and Diclofenac gel for R anterior rib pain, will continue to monitor       ·  Scheduled Tylenol 975mg PO [id number]        ·  Scheduled Robaxin 500 mg IV [id number]       ·  Oxycodone 5/10mg PO [id number] PRN mod/severe pain   - Bowel regimen with Stephany-Colace while using narcotics       ·  Dulcolax suppository x1 given 3/18 for persistent constipation  - IV Tigan PRN nausea due to narcotics (NO Zofran due to hx of cardiac arrest)  - Pain assessments [id number]      # L arm itching, resolved  - Benadryl cream ordered for itching 3/15, since improved  - L arm tourniquet was left on patients arm during OR procedure by anesthesia staff, continue to evaluate L arm sensation and distal pulses, consider ultrasound if worsening pain or surrounding skin changes     # history of QT Prolongation  - Avoid QT prolonging medications; NO Zofran (hx of cardiac arrest)  - Telemetry monitoring     # Post-Op Anemia, resolved   - HGB on AM labs yesterday 11.5  - monitor CBC every few days     Prophylaxis:   - DVT: subcutaneous lovenox daily started on POD3, SCDs  - Encourage IS x10 every hour while awake   - Bowel regimen:  Stephany-Colace, dulcolax suppository x1 (3/18)       Disposition: Continue care on RNF. Will remain inpatient for continued flap monitoring and vac/drain surveillance postoperatively. Follow up appointment with plastic surgery department to be scheduled closer to anticipated date of discharge. ADOD early next week 3/24-25     Patient and plan discussed with Dr. Toño Felix, APRN-CNP  Plastic and Reconstructive Surgery   Available via Epic chat, pager: [phone] or team phones: [phone]

## 2025-03-22 NOTE — CARE PLAN
Problem: Pain  Goal: Takes deep breaths with improved pain control throughout the shift  3/21/2025 2243 by Mal Jung RN  Outcome: Progressing  3/21/2025 2243 by Mal Jung RN  Outcome: Progressing  Goal: Turns in bed with improved pain control throughout the shift  3/21/2025 2243 by Mal Jung RN  Outcome: Progressing  3/21/2025 2243 by Mal Jung RN  Outcome: Progressing  Goal: Walks with improved pain control throughout the shift  3/21/2025 2243 by Mal Jung RN  Outcome: Progressing  3/21/2025 2243 by Mal Jung RN  Outcome: Progressing  Goal: Performs ADL's with improved pain control throughout shift  3/21/2025 2243 by Mal Jung RN  Outcome: Progressing  3/21/2025 2243 by Mal Jugn RN  Outcome: Progressing  Goal: Participates in PT with improved pain control throughout the shift  3/21/2025 2243 by Mal Jung RN  Outcome: Progressing  3/21/2025 2243 by Mal Jung RN  Outcome: Progressing  Goal: Free from opioid side effects throughout the shift  3/21/2025 2243 by Mal Jung RN  Outcome: Progressing  3/21/2025 2243 by Mal Jung RN  Outcome: Progressing  Goal: Free from acute confusion related to pain meds throughout the shift  3/21/2025 2243 by Mal Jung RN  Outcome: Progressing  3/21/2025 2243 by Mal Jung RN  Outcome: Progressing   The patient's goals for the shift include      The clinical goals for the shift include patient to remain hemodynamically stable

## 2025-03-22 NOTE — OP NOTE
CREATION, FREE FLAP, LOWER EXTREMITY (R) Operative Note     Date: 3/14/2025  OR Location: [facility] OR    Name: Adriana Wood, : 1955, Age: 69 y.o., MRN: [id number], Sex: female    Diagnosis  Pre-op Diagnosis      * Hardware complicating wound infection, subsequent encounter [T84.7XXD] Post-op Diagnosis     * Hardware complicating wound infection, subsequent encounter [T84.7XXD]     Procedures  11505 Free myocutaneous flap  Modifier 22 is indicated for the increased procedure service required with this prolonged procedure due to unforseen shock and need for resuscitation, and tedious dissection of recipient neurovascular bundle due to scaring and fibrosis, and small size of the anterior tibial artery.   42663: Assessment of flap perfusion intraoperatively with ICG   Surgeons      * Haydee Reyes - Primary    Resident/Fellow/Other Assistant:  Surgeons and Role:     * Zeinab Grimaldo MD - Resident - Assisting    Staff:   Circulator: Deidre Hopsonub Person: Ryan Hopsonub Person: Jakob Kennedy Circulator: Yin Kennedy Scrub: Tiffanie Kennedy Circulator: Acosta    Anesthesia Staff: Anesthesiologist: Mali Cabral MD; Carlos Blackwell MD  CRNA: ISABEL Treviño-CRNA  C-AA: HERMAN Paulino; HERMAN Townsend    Procedure Summary  Anesthesia: General  ASA: III  Estimated Blood Loss: 25mL  Intra-op Medications:   Administrations occurring from 0655 to 1455 on 25:   Medication Name Total Dose   papaverine injection 420 mg   lidocaine (Xylocaine) 20 mg/mL (2 %) injection 30 mL   heparin 25,000 Units in sodium chloride 0.9 % 250 mL irrigation 250 mL   albumin human 5 % 500 mL   dexAMETHasone (Decadron) injection 4 mg/mL 4 mg   ePHEDrine injection 50 mg   fentaNYL (Sublimaze) injection 50 mcg/mL 100 mcg   heparin 5,000 units/mL 5,000 Units   indocyanine green (IC-Green) injection 25 mg 15 mg   LR infusion Cannot be calculated   LR infusion Cannot be calculated   lidocaine (cardiac) injection  2% prefilled syringe 100 mg   midazolam PF (Versed) injection 1 mg/mL 2 mg   phenylephrine (Robert-Synephrine) 10 mg/250 mL NS (40 mcg/mL) infusion 3.42 mg   phenylephrine 40 mcg/mL syringe 10 mL 1,000 mcg   potassium chloride 20 mEq in 100 mL IV premix 20 mEq   propofol (Diprivan) injection 10 mg/mL 130 mg   rocuronium (ZeMuron) 50 mg/5 mL injection 100 mg   vancomycin 1 g 1 g              Anesthesia Record               Intraprocedure I/O Totals          Intake    Phenylephrine Drip 0.00 mL    The total shown is the total volume documented since Anesthesia Start was filed.    LR infusion 2300.00 mL    Total Intake 2300 mL       Output    Urine 1235 mL    Total Output 1235 mL       Net    Net Volume 1065 mL          Specimen: No specimens collected              Drains and/or Catheters:   Closed/Suction Drain 1 Lateral RUQ Bulb 19 Fr. (Active)   Site Description Healing 03/22/25 0900   Dressing Status Clean;Dry 03/22/25 0900   Drainage Appearance Serosanguineous 03/22/25 0900   Status To bulb suction 03/22/25 0900   Output (mL) 10 mL 03/22/25 1300       Open Drain  Right;Lateral Knee (Active)   Site Description Healing 03/22/25 0900   Dressing Status Clean 03/22/25 0900   Drainage Appearance None 03/20/25 2230   Status Unclamped 03/20/25 2230   Output (mL) 0 mL 03/21/25 2321       External Urinary Catheter Female (Active)   External Catheter Status In place 03/22/25 0931   Output (mL) 100 mL 03/22/25 1300       [REMOVED] Closed/Suction Drain 2 Inferior;Right Leg Bulb 10 Fr. (Removed)   Site Description Healing 03/20/25 2230   Dressing Status Clean;Dry;Occlusive 03/20/25 2230   Drainage Appearance Serosanguineous 03/20/25 2230   Status To bulb suction 03/20/25 2230   Output (mL) 0 mL 03/21/25 0719       [REMOVED] Urethral Catheter Non-latex 16 Fr. (Removed)   Site Assessment Clean;Skin intact 03/21/25 0026   Collection Container Standard drainage bag 03/19/25 2120   Securement Method Securing device (Describe) 03/19/25  2120   Reason for Continuing Urinary Catheterization surgical procedures: urological/gynecological, pelvic oncology, anal, prolonged surgical procedure 03/19/25 2120   Output (mL) 225 mL 03/21/25 0719       Tourniquet Times:         Implants:     Findings:     Indications: Adriana Wood is an 69 y.o. female who is having surgery for surgical wound dehiscence which complicated with infection requiring hardware removal and multiple debridement. The wound was surgically closed multiple times but never healed, and until today there is a draining sinus, but without signs of infection. She also is in need to proceed with knee joint replacement so she can ambulate, This cannot be achieved with non healing wound and unstable skin coverage. Therefore, she is indicated today for free flap coverage.    Informed Consent: The patient was seen in the preoperative area. The risks, benefits, complications, treatment options, non-operative alternatives, expected recovery and outcomes were discussed with the patient. The possibilities of reaction to medication, pulmonary aspiration, deep vein thrombosis, pulmonary embolism, injury to surrounding structures, bleeding and hematoma, seroma, partial or total flap necrosis, wound healing issues, recurrent infection, complex regional pain syndrome, sensory changes at the flap donor site or at the recipient leg, the need for additional procedures, failure to diagnose a condition, and creating a complication requiring transfusion or operation were discussed with the patient. The patient concurred with the proposed plan, giving informed consent.  The site of surgery was properly noted/marked if necessary per policy. The patient has been actively warmed in preoperative area. Preoperative antibiotics have been ordered and given within 1 hours of incision. Venous thrombosis prophylaxis have been ordered including unilateral sequential compression device    Procedure Details:   Standard  safety huddle was performed. All agreed to proceed. She was then transferred to operating bed. Placed initially supine. She was held secured by safety built. All bone prominences were well padded to avoid pressure sores. General endotracheal anaesthesia was performed by anesthesia personnel. Rosenbaum catheter was inserted. Al ine was inserted. Patient BP dropped to as low as 66 (systolic), fluid resuscitation was conducted. Surgery was put on hold until BP stabilized. Once adequate resuscitation as achieved. We proceeded with surgery,and she was changed into lateral decubitus position, and the surgical sites were prepped and draped in standard fashion. Sterile Bear hugger was then used to keep the upper body covered and warm while I started working on the right leg.   Time out was performed. She was given antibiotics before incision.    I proceeded with recipient vessels exploration. Based on the location of the prior surgical incision, the anterior tibial artery and veins were chosen as recipient vessels. 10 cm incision was made on the lateral aspect of the right leg, lateral to the anterior tibialis muscle. The incision was made with 15 blade scalpel and electrocautery for hemostasis down to and including the deep fascia. Extensive scarring was noted. The tibialis anterior was identified, and reflected medially. Next, the anterior tibial artery and veins with the deep peroneal nerve were identified. Due to scaring, dissection was tedious. The nerve was  carefully from the artery and vein, which I then dissected in preparation for microvascular repair. The anterior tibial artery was notably small in caliber, under 1 m.  Suitable segment was then chosen, and irrigated with lidocaine 2%. The trans knee incision was incised open again. Significant hematoma was drained. The surrounding knee skin was raised in preparation for free flap inset. The skin bridge between the recipient vessels and the distal end of the  knee wound was incised open to accommodate the free flap pedicle. Irrigation with saline was then performed. Surgical lap was used to cover the recipient wound and the recipient vessels.     Next, I proceeded with LD flap harvest. The skin paddle measured 19 cm x 8.5 cm, and it was elliptical in shape, and vertically oriented along the anterior border of the LD muscle. The skin was incised with 15 blade scalpel and electrocautery for hemostasis down tot he LD muscle fascia. Dissection then proceeded anterior to the anterior border of the skin paddle, and the anterior muscle border was identified. Dissection under the muscle was then performed to separate the muscle from the serratus anterior. Intercostal perforators were controled. Next, the muscle was incised with care to make sure the muscle component of the flap was slightly larger than the skin paddle. Next, the thoracodorsal artery and veins were identified in more proximal dissection, and side branches ere clipped to increase pedicle length, and after that a a muscular cuff to protect the pedicel was incised proximal to the pedicle entry point, and the thoracodorsal nerve was divided. The muscle was thus isolated on its vascular pedicle. Flap perfusion was assessed intraoperatively using ICG and handheld SPY camera.  The flap donor site was closed in layers with size 0 PDS for the superficial fascial layer repair, 2/0 PDS and 3/0 Monocryl for deep dermal repair and 3/0 Monocryl for subcuticular repair. 19 fr drain was left to drain the flap donor site. The donor site wound closure was competed except for a few centimeters at the axilla to allow for flap division. The flap was then divided and transferred to the recipient site while the wound was closed and incisional wound VAC was applied. The patient was then transitioned from lateral to relaxed lateral position.   I proceeded with flap inset, temporary inset with staples was performed. The flap blood supply  was then repaired under operating microscope to the anterior tibial artery and vein in end-to end fashion using 9/0 micro sutures. Ischemia time: 12:32-13:52 pm.   Flap inset was completed with 3/0 Monocryl for deep dermal repair, and 3/0 nylon for skin closure in simple interrupted fashion. I took care to use some of the extra muscular tissue to obliterate the draining sinus after it was excised. The surroundig skin was closed directly over the underlying LD muscle. One penrose was used to drain the anastomosis site, while 10 fr MASSIEL drain was used to drain he knee wound.   Flap incisions were dressed with xeroform strips.           Complications:  None; patient tolerated the procedure well.    Disposition: PACU - hemodynamically stable.  Condition: stable     Post OP Orders:     Attending Attestation: I performed the procedure.    Haydee Reyes  Phone Number: [phone]

## 2025-03-22 NOTE — SIGNIFICANT EVENT
"  Department of Plastic and Reconstructive Surgery  PM Flap Check    69 y.o. female with a past medical history of bradycardia, cardiomyopathy, CAD, cardiac arrest due to electrolyte abnormalities and zofran use post-operatively, and traumatic brain injury. Patient had a total knee right knee done by Dr. Whelan in March 2024 which was complicated by E. Coli infection. Patient had right TKA wound dehiscence s/p I&D right knee with polyswap and attempted re-closure of complex wound on 5/10/2024 by Dr. Whelan. She was referred to plastic surgery (Dr. Reyes) perioperatively given projected anticipated need for possible flap coverage of the wound/defect site. Patient re-admitted and returned to the OR with orthopedics on 2/28/2025 for right knee I&D, revision static spacer and application of incisional wound vac. Plastic surgery service consulted postoperatively to follow for flap recommendations/timing, now s/p R latissimus free flap to R knee on 3/14 with Dr. Lundberg.     Subjective: Resting in bed, pain well controlled, tolerated dangling during the day with stable vioptix, Tolerating diet with increased appetite, + BM, damon removed and voiding without difficulty. Denies any fever, chills, night sweats, CP, SOB, palpitations, nausea, vomiting, diarrhea, constipation, dysuria, hematuria, hematochezia, hematemesis, flank pain.     Objective:  /70 (BP Location: Left arm, Patient Position: Lying)   Pulse 107   Temp 36 °C (96.8 °F) (Temporal)   Resp 17   Ht 1.778 m (5' 10\")   Wt 69.1 kg (152 lb 5.4 oz)   SpO2 96%   BMI 21.86 kg/m²    PE:  Constitutional: A&Ox3, calm and cooperative, NAD  Eyes: EOMI, clear sclera   ENMT: Moist mucous membranes, no apparent injuries or lesions  Head/Neck: NCAT  Cardiovascular: Extremities WWP  Respiratory/Thorax: Unlabored respirations on RA  Gastrointestinal: Abdomen soft, NTND, no peritoneal signs, +BS x4, +flatus, + BM, lidocaine patch to RUQ  Genitourinary: indwelling " damon in place with clear yellow output  Extremities: RLE flap: warm to touch, soft and compressible, strong biphasic signal at marked stitch, circumferential incisions around flap well approximated with scant amount of ss drainage, covered with xeroform, penrose removed during day shift, vioptix in place with signal of 75% with 87% signal strength, MASSIEL drain to medial aspect of right knee removed during the day   Right posterior lat donor site: prevena incisional wound vac in place, holding suction at -125 mmHg, no alarm or leak noted, MASSIEL drain x 1 to right flank with ss output  Neurological: A&Ox3  Psychological: Appropriate mood and behavior  Skin: Warm and dry. L upper extremity with resolving ecchymosis circumferentially     S/P Right latissimus dorsi donor site FF to Right knee:  A/P:   - Flap check stable, no change from prior assessment, biphasic doppler signal, good strength, continue flap check Q4 per nursing with interim checks per plastics, maintain Vioptix  - all other care per daily progress note  - Updated ISABEL Ramos-CNP  Plastic and Reconstructive Surgery   Available via [employer]  Pager #[phone]  Team phone: [phone]

## 2025-03-23 VITALS
RESPIRATION RATE: 18 BRPM | OXYGEN SATURATION: 96 % | TEMPERATURE: 97.5 F | SYSTOLIC BLOOD PRESSURE: 139 MMHG | HEIGHT: 70 IN | WEIGHT: 152.34 LBS | BODY MASS INDEX: 21.81 KG/M2 | DIASTOLIC BLOOD PRESSURE: 74 MMHG | HEART RATE: 52 BPM

## 2025-03-23 LAB
ALBUMIN SERPL BCP-MCNC: 3.9 G/DL (ref 3.4–5)
ANION GAP SERPL CALC-SCNC: 13 MMOL/L (ref 10–20)
BUN SERPL-MCNC: 14 MG/DL (ref 6–23)
CALCIUM SERPL-MCNC: 8.9 MG/DL (ref 8.6–10.6)
CHLORIDE SERPL-SCNC: 103 MMOL/L (ref 98–107)
CO2 SERPL-SCNC: 28 MMOL/L (ref 21–32)
CREAT SERPL-MCNC: 0.54 MG/DL (ref 0.5–1.05)
EGFRCR SERPLBLD CKD-EPI 2021: >90 ML/MIN/1.73M*2
ERYTHROCYTE [DISTWIDTH] IN BLOOD BY AUTOMATED COUNT: 14 % (ref 11.5–14.5)
GLUCOSE SERPL-MCNC: 103 MG/DL (ref 74–99)
HCT VFR BLD AUTO: 39.3 % (ref 36–46)
HGB BLD-MCNC: 12.2 G/DL (ref 12–16)
MCH RBC QN AUTO: 28.4 PG (ref 26–34)
MCHC RBC AUTO-ENTMCNC: 31 G/DL (ref 32–36)
MCV RBC AUTO: 91 FL (ref 80–100)
NRBC BLD-RTO: 0 /100 WBCS (ref 0–0)
PHOSPHATE SERPL-MCNC: 3.5 MG/DL (ref 2.5–4.9)
PLATELET # BLD AUTO: 282 X10*3/UL (ref 150–450)
POTASSIUM SERPL-SCNC: 3.6 MMOL/L (ref 3.5–5.3)
RBC # BLD AUTO: 4.3 X10*6/UL (ref 4–5.2)
SODIUM SERPL-SCNC: 140 MMOL/L (ref 136–145)
WBC # BLD AUTO: 5.1 X10*3/UL (ref 4.4–11.3)

## 2025-03-23 PROCEDURE — 2500000004 HC RX 250 GENERAL PHARMACY W/ HCPCS (ALT 636 FOR OP/ED)

## 2025-03-23 PROCEDURE — 2500000001 HC RX 250 WO HCPCS SELF ADMINISTERED DRUGS (ALT 637 FOR MEDICARE OP)

## 2025-03-23 PROCEDURE — 80069 RENAL FUNCTION PANEL: CPT

## 2025-03-23 PROCEDURE — 85027 COMPLETE CBC AUTOMATED: CPT

## 2025-03-23 PROCEDURE — 99232 SBSQ HOSP IP/OBS MODERATE 35: CPT

## 2025-03-23 PROCEDURE — 1200000003 HC ONCOLOGY  ROOM WITH TELEMETRY DAILY

## 2025-03-23 RX ADMIN — PANTOPRAZOLE SODIUM 40 MG: 40 TABLET, DELAYED RELEASE ORAL at 07:49

## 2025-03-23 RX ADMIN — OXYCODONE HYDROCHLORIDE 10 MG: 10 TABLET ORAL at 00:41

## 2025-03-23 RX ADMIN — METHOCARBAMOL 500 MG: 500 TABLET ORAL at 21:18

## 2025-03-23 RX ADMIN — ACETAMINOPHEN 975 MG: 325 TABLET, FILM COATED ORAL at 21:00

## 2025-03-23 RX ADMIN — OXYCODONE HYDROCHLORIDE 10 MG: 10 TABLET ORAL at 17:43

## 2025-03-23 RX ADMIN — ENOXAPARIN SODIUM 40 MG: 100 INJECTION SUBCUTANEOUS at 21:18

## 2025-03-23 RX ADMIN — POLYETHYLENE GLYCOL 3350 17 G: 17 POWDER, FOR SOLUTION ORAL at 08:01

## 2025-03-23 RX ADMIN — OXYCODONE HYDROCHLORIDE 10 MG: 10 TABLET ORAL at 07:49

## 2025-03-23 RX ADMIN — METHOCARBAMOL 500 MG: 500 TABLET ORAL at 13:27

## 2025-03-23 RX ADMIN — VANCOMYCIN HYDROCHLORIDE 1000 MG: 1 INJECTION, SOLUTION INTRAVENOUS at 21:09

## 2025-03-23 RX ADMIN — OXYCODONE HYDROCHLORIDE 10 MG: 10 TABLET ORAL at 21:17

## 2025-03-23 RX ADMIN — VANCOMYCIN HYDROCHLORIDE 1000 MG: 1 INJECTION, SOLUTION INTRAVENOUS at 07:49

## 2025-03-23 RX ADMIN — METHOCARBAMOL 500 MG: 500 TABLET ORAL at 06:25

## 2025-03-23 RX ADMIN — ACETAMINOPHEN 975 MG: 325 TABLET, FILM COATED ORAL at 00:42

## 2025-03-23 RX ADMIN — OXYCODONE HYDROCHLORIDE 10 MG: 10 TABLET ORAL at 13:27

## 2025-03-23 RX ADMIN — ACETAMINOPHEN 975 MG: 325 TABLET, FILM COATED ORAL at 11:27

## 2025-03-23 ASSESSMENT — PAIN SCALES - GENERAL
PAINLEVEL_OUTOF10: 4
PAINLEVEL_OUTOF10: 5 - MODERATE PAIN
PAINLEVEL_OUTOF10: 7
PAINLEVEL_OUTOF10: 2
PAINLEVEL_OUTOF10: 0 - NO PAIN
PAINLEVEL_OUTOF10: 9
PAINLEVEL_OUTOF10: 6
PAINLEVEL_OUTOF10: 0 - NO PAIN
PAINLEVEL_OUTOF10: 8
PAINLEVEL_OUTOF10: 8
PAINLEVEL_OUTOF10: 7
PAINLEVEL_OUTOF10: 2
PAINLEVEL_OUTOF10: 8
PAINLEVEL_OUTOF10: 7
PAINLEVEL_OUTOF10: 5 - MODERATE PAIN

## 2025-03-23 ASSESSMENT — COGNITIVE AND FUNCTIONAL STATUS - GENERAL
DRESSING REGULAR LOWER BODY CLOTHING: A LITTLE
MOBILITY SCORE: 14
STANDING UP FROM CHAIR USING ARMS: A LOT
TOILETING: A LITTLE
DAILY ACTIVITIY SCORE: 18
TURNING FROM BACK TO SIDE WHILE IN FLAT BAD: A LITTLE
WALKING IN HOSPITAL ROOM: A LOT
PERSONAL GROOMING: A LITTLE
WALKING IN HOSPITAL ROOM: A LOT
TURNING FROM BACK TO SIDE WHILE IN FLAT BAD: A LITTLE
CLIMB 3 TO 5 STEPS WITH RAILING: A LOT
TOILETING: A LITTLE
DRESSING REGULAR UPPER BODY CLOTHING: A LITTLE
DRESSING REGULAR UPPER BODY CLOTHING: A LITTLE
HELP NEEDED FOR BATHING: A LITTLE
PERSONAL GROOMING: A LITTLE
HELP NEEDED FOR BATHING: A LITTLE
DRESSING REGULAR LOWER BODY CLOTHING: A LITTLE
MOVING TO AND FROM BED TO CHAIR: A LOT
STANDING UP FROM CHAIR USING ARMS: A LOT
MOVING TO AND FROM BED TO CHAIR: A LOT
CLIMB 3 TO 5 STEPS WITH RAILING: A LOT
EATING MEALS: A LITTLE
EATING MEALS: A LITTLE
MOVING FROM LYING ON BACK TO SITTING ON SIDE OF FLAT BED WITH BEDRAILS: A LITTLE
MOVING FROM LYING ON BACK TO SITTING ON SIDE OF FLAT BED WITH BEDRAILS: A LITTLE

## 2025-03-23 ASSESSMENT — PAIN - FUNCTIONAL ASSESSMENT
PAIN_FUNCTIONAL_ASSESSMENT: 0-10

## 2025-03-23 ASSESSMENT — PAIN DESCRIPTION - ORIENTATION
ORIENTATION: RIGHT

## 2025-03-23 ASSESSMENT — PAIN DESCRIPTION - LOCATION
LOCATION: FOOT
LOCATION: LEG
LOCATION: KNEE

## 2025-03-23 ASSESSMENT — PAIN SCALES - WONG BAKER
WONGBAKER_NUMERICALRESPONSE: HURTS LITTLE MORE
WONGBAKER_NUMERICALRESPONSE: HURTS WHOLE LOT
WONGBAKER_NUMERICALRESPONSE: HURTS LITTLE MORE

## 2025-03-23 ASSESSMENT — PAIN DESCRIPTION - DESCRIPTORS: DESCRIPTORS: SPASM;SHOOTING;SHARP

## 2025-03-23 NOTE — CARE PLAN
Problem: Pain  Goal: Takes deep breaths with improved pain control throughout the shift  Outcome: Progressing  Goal: Turns in bed with improved pain control throughout the shift  Outcome: Progressing  Goal: Walks with improved pain control throughout the shift  Outcome: Progressing  Goal: Performs ADL's with improved pain control throughout shift  Outcome: Progressing  Goal: Participates in PT with improved pain control throughout the shift  Outcome: Progressing  Goal: Free from opioid side effects throughout the shift  Outcome: Progressing  Goal: Free from acute confusion related to pain meds throughout the shift  Outcome: Progressing     Problem: Skin  Goal: Decreased wound size/increased tissue granulation at next dressing change  Outcome: Progressing  Goal: Participates in plan/prevention/treatment measures  Outcome: Progressing  Goal: Prevent/manage excess moisture  Outcome: Progressing  Goal: Prevent/minimize sheer/friction injuries  Outcome: Progressing  Goal: Promote/optimize nutrition  Outcome: Progressing  Goal: Promote skin healing  Outcome: Progressing  Flowsheets (Taken 3/23/2025 0406)  Promote skin healing:   Assess skin/pad under line(s)/device(s)   Protective dressings over bony prominences   Turn/reposition every 2 hours/use positioning/transfer devices   Ensure correct size (line/device) and apply per  instructions   Rotate device position/do not position patient on device     Problem: Fall/Injury  Goal: Not fall by end of shift  Outcome: Progressing  Goal: Be free from injury by end of the shift  Outcome: Progressing  Goal: Verbalize understanding of personal risk factors for fall in the hospital  Outcome: Progressing  Goal: Verbalize understanding of risk factor reduction measures to prevent injury from fall in the home  Outcome: Progressing  Goal: Use assistive devices by end of the shift  Outcome: Progressing  Goal: Pace activities to prevent fatigue by end of the shift  Outcome:  Progressing   The patient's goals for the shift include      The clinical goals for the shift include pt will remain HDS

## 2025-03-23 NOTE — CARE PLAN
The patient's goals for the shift include      The clinical goals for the shift include pt will remain HDS.    Problem: Pain  Goal: Takes deep breaths with improved pain control throughout the shift  Outcome: Progressing  Goal: Turns in bed with improved pain control throughout the shift  Outcome: Progressing  Goal: Walks with improved pain control throughout the shift  Outcome: Progressing  Goal: Performs ADL's with improved pain control throughout shift  Outcome: Progressing  Goal: Participates in PT with improved pain control throughout the shift  Outcome: Progressing  Goal: Free from opioid side effects throughout the shift  Outcome: Progressing  Goal: Free from acute confusion related to pain meds throughout the shift  Outcome: Progressing     Problem: Skin  Goal: Decreased wound size/increased tissue granulation at next dressing change  Outcome: Progressing  Flowsheets (Taken 3/23/2025 0945)  Decreased wound size/increased tissue granulation at next dressing change: Promote sleep for wound healing  Goal: Participates in plan/prevention/treatment measures  Outcome: Progressing  Flowsheets (Taken 3/23/2025 0945)  Participates in plan/prevention/treatment measures: Elevate heels  Goal: Prevent/manage excess moisture  Outcome: Progressing  Flowsheets (Taken 3/23/2025 0945)  Prevent/manage excess moisture:   Moisturize dry skin   Cleanse incontinence/protect with barrier cream  Goal: Prevent/minimize sheer/friction injuries  Outcome: Progressing  Flowsheets (Taken 3/23/2025 0945)  Prevent/minimize sheer/friction injuries: Use pull sheet  Goal: Promote/optimize nutrition  Outcome: Progressing  Flowsheets (Taken 3/23/2025 0945)  Promote/optimize nutrition:   Offer water/supplements/favorite foods   Monitor/record intake including meals  Goal: Promote skin healing  Outcome: Progressing  Flowsheets (Taken 3/23/2025 0945)  Promote skin healing: Assess skin/pad under line(s)/device(s)     Problem: Pain - Adult  Goal:  Verbalizes/displays adequate comfort level or baseline comfort level  Outcome: Progressing     Problem: Safety - Adult  Goal: Free from fall injury  Outcome: Progressing     Problem: Discharge Planning  Goal: Discharge to home or other facility with appropriate resources  Outcome: Progressing     Problem: Nutrition  Goal: Nutrient intake appropriate for maintaining nutritional needs  Outcome: Progressing     Problem: Fall/Injury  Goal: Not fall by end of shift  Outcome: Progressing  Goal: Be free from injury by end of the shift  Outcome: Progressing  Goal: Verbalize understanding of personal risk factors for fall in the hospital  Outcome: Progressing  Goal: Verbalize understanding of risk factor reduction measures to prevent injury from fall in the home  Outcome: Progressing  Goal: Use assistive devices by end of the shift  Outcome: Progressing  Goal: Pace activities to prevent fatigue by end of the shift  Outcome: Progressing

## 2025-03-23 NOTE — SIGNIFICANT EVENT
"  Department of Plastic and Reconstructive Surgery  PM Flap Check    69 y.o. female with a past medical history of bradycardia, cardiomyopathy, CAD, cardiac arrest due to electrolyte abnormalities and zofran use post-operatively, and traumatic brain injury. Patient had a total knee right knee done by Dr. Whelan in March 2024 which was complicated by E. Coli infection. Patient had right TKA wound dehiscence s/p I&D right knee with polyswap and attempted re-closure of complex wound on 5/10/2024 by Dr. Whelan. She was referred to plastic surgery (Dr. Reyes) perioperatively given projected anticipated need for possible flap coverage of the wound/defect site. Patient re-admitted and returned to the OR with orthopedics on 2/28/2025 for right knee I&D, revision static spacer and application of incisional wound vac. Plastic surgery service consulted postoperatively to follow for flap recommendations/timing, now s/p R latissimus free flap to R knee on 3/14 with Dr. Lundberg.     Subjective: Resting in bed, pain well controlled, tolerated dangling during the day for 30 minutes with stable vioptix, Tolerating diet with increased appetite, + BM today. Denies any fever, chills, night sweats, CP, SOB, palpitations, nausea, vomiting, diarrhea, constipation, dysuria, hematuria, hematochezia, hematemesis, flank pain.     Objective:  /54 (BP Location: Left arm, Patient Position: Lying)   Pulse 75   Temp 36.3 °C (97.4 °F) (Temporal)   Resp 20   Ht 1.778 m (5' 10\")   Wt 69.1 kg (152 lb 5.4 oz)   SpO2 96%   BMI 21.86 kg/m²    PE:  Constitutional: A&Ox3, calm and cooperative, NAD  Eyes: EOMI, clear sclera   ENMT: Moist mucous membranes, no apparent injuries or lesions  Head/Neck: NCAT  Cardiovascular: Extremities WWP  Respiratory/Thorax: Unlabored respirations on RA  Gastrointestinal: Abdomen soft, NTND, no peritoneal signs, +BS x4, +flatus, + BM, lidocaine patch to RUQ  Genitourinary: voiding independently   Extremities: " RLE flap: warm to touch, soft and compressible, strong biphasic signal at marked stitch, circumferential incisions around flap well approximated with scant amount of ss drainage, covered with xeroform, penrose removed during day shift, vioptix in place with signal of 74% with 86% signal strength, old MASSIEL drain site C/D/I  Right posterior lat donor site: prevena incisional wound vac in place, holding suction at -125 mmHg, no alarm or leak noted, MASSIEL drain x 1 to right flank with ss output  Neurological: A&Ox3  Psychological: Appropriate mood and behavior  Skin: Warm and dry. L upper extremity with resolving ecchymosis circumferentially     S/P Right latissimus dorsi donor site FF to Right knee:  A/P:   - Flap check stable, no change from prior assessment, biphasic doppler signal, good strength, continue flap check Q4 per nursing with interim checks per plastics, maintain Vioptix  - all other care per daily progress note    ISABEL Ritter-CNP  Plastic and Reconstructive Surgery   Available via [employer]  Pager #[phone]  Team phone: [phone]

## 2025-03-23 NOTE — PROGRESS NOTES
"  Department of Plastic and Reconstructive Surgery  Daily Progress Note    Patient Name: Adriana Wood  MRN: [id number]  Date:  03/23/25     Subjective  Found resting in bed comfortably this AM, just ate breakfast. Denies any acute concerns, tolerating diet. Pain well controlled, had some pain at right sided donor site last evening, relieved with lido patch. Patient states she tolerated dangling yesterday, 30 minutes and 60 minutes without issue. Denies any fever, chills, night sweats, CP, SOB, headache, extremity numbness/weakness, vision changes, palpitations, nausea, vomiting, or abdominal pain/discomfort.       Objective    Vital Signs  /76 (BP Location: Left arm, Patient Position: Lying)   Pulse 88   Temp 36.3 °C (97.4 °F) (Temporal)   Resp 18   Ht 1.778 m (5' 10\")   Wt 69.1 kg (152 lb 5.4 oz)   SpO2 95%   BMI 21.86 kg/m²      Physical Exam   Constitutional: A&Ox3, calm and cooperative, NAD  Eyes: EOMI, clear sclera   ENMT: Moist mucous membranes, no apparent injuries or lesions  Head/Neck: NCAT  Cardiovascular: Extremities WWP  Respiratory/Thorax: Unlabored respirations on RA  Gastrointestinal: Abdomen soft, NTND, no peritoneal signs, +BS x4, +flatus, + BM, lidocaine patch to RUQ  Genitourinary: Voiding independently, no urine present on assessment, switched to purewick recently  Extremities: RLE flap: warm to touch, soft and compressible, strong biphasic signal at marked stitch, circumferential incisions around flap well approximated with scant amount of ss drainage, covered with xeroform, penrose removed during day shift, vioptix in place with signal of 72% with 85% signal strength, MASSIEL drain to medial aspect of right knee removed during the day   Right posterior lat donor site: prevena incisional wound vac in place, holding suction at -125 mmHg, no alarm or leak noted, MASSIEL drain x 1 to right flank with ss output  Neurological: A&Ox3  Psychological: Appropriate mood and behavior  Skin: Warm and " dry. L upper extremity with resolving ecchymosis circumferentially     Diagnostics   Results for orders placed or performed during the hospital encounter of 03/14/25 (from the past 24 hours)   Renal Function Panel   Result Value Ref Range    Glucose 103 (H) 74 - 99 mg/dL    Sodium 140 136 - 145 mmol/L    Potassium 3.6 3.5 - 5.3 mmol/L    Chloride 103 98 - 107 mmol/L    Bicarbonate 28 21 - 32 mmol/L    Anion Gap 13 10 - 20 mmol/L    Urea Nitrogen 14 6 - 23 mg/dL    Creatinine 0.54 0.50 - 1.05 mg/dL    eGFR >90 >60 mL/min/1.73m*2    Calcium 8.9 8.6 - 10.6 mg/dL    Phosphorus 3.5 2.5 - 4.9 mg/dL    Albumin 3.9 3.4 - 5.0 g/dL   CBC   Result Value Ref Range    WBC 5.1 4.4 - 11.3 x10*3/uL    nRBC 0.0 0.0 - 0.0 /100 WBCs    RBC 4.30 4.00 - 5.20 x10*6/uL    Hemoglobin 12.2 12.0 - 16.0 g/dL    Hematocrit 39.3 36.0 - 46.0 %    MCV 91 80 - 100 fL    MCH 28.4 26.0 - 34.0 pg    MCHC 31.0 (L) 32.0 - 36.0 g/dL    RDW 14.0 11.5 - 14.5 %    Platelets 282 150 - 450 x10*3/uL     *Note: Due to a large number of results and/or encounters for the requested time period, some results have not been displayed. A complete set of results can be found in Results Review.       Current Medications  Scheduled medications  acetaminophen, 975 mg, oral, q8h  bisacodyl, 10 mg, rectal, Once  enoxaparin, 40 mg, subcutaneous, Daily  lidocaine, 1 patch, transdermal, Daily  methocarbamol, 500 mg, oral, q8h FELIX  pantoprazole, 40 mg, oral, Daily before breakfast  polyethylene glycol, 17 g, oral, Daily  sennosides-docusate sodium, 2 tablet, oral, BID  vancomycin, 1,000 mg, intravenous, q12h      Continuous medications     PRN medications  PRN medications: albuterol, alteplase, bisacodyl, calcium carbonate, diclofenac sodium, diphenhydramine-zinc acetate, lidocaine, melatonin, naloxone, oxyCODONE, oxyCODONE, oxymetazoline, trimethobenzamide, vancomycin     Assessment  69 y.o. female with a past medical history of bradycardia, cardiomyopathy, CAD, cardiac  arrest due to electrolyte abnormalities and zofran use post-operatively, and traumatic brain injury. Patient had a total knee right knee done by Dr. Whelan in March 2024 which was complicated by E. Coli infection. Patient had right TKA wound dehiscence s/p I&D right knee with polyswap and attempted re-closure of complex wound on 5/10/2024 by Dr. Whelan. She was referred to plastic surgery (Dr. Reyes) perioperatively given projected anticipated need for possible flap coverage of the wound/defect site. Patient re-admitted and returned to the OR with orthopedics on 2/28/2025 for right knee I&D, revision static spacer and application of incisional wound vac. Plastic surgery service consulted postoperatively to follow for flap recommendations/timing, now s/p R latissimus free flap to R knee on 3/14 with Dr. Lundberg.     Plan/Recommendations  S/p R latissimus free flap to R knee on 3/14 with Dr. Lundberg  - Continue serial flap assessments Q4hour per nursing with interval checks per plastic surgery team       ·  Assess Doppler pulse at marked site plus flap appearance (color, warmth, turgor)       ·  Nursing to notify plastic surgery team immediately if there are any acute changes          (Including decreased Doppler signal, pallor, temperature change, bleeding, etc.)  - Continue MASSIEL drain care per nursing (MASSIEL x 1)        .  R inner knee MASSIEL removed 3/21       ·  Strip drain tubing TID and PRN       ·  Monitor and record output [id number]        ·  Keep drain sites C/D/I with daily drain dressing changes        ·  Call plastics if drain output is >30cc in an hour or greater than 200cc over 8 hours  - Continue incisional wound vac therapy to R lat donor site x10-14 days post op (3/28)        ·  Settings: 125 mmHg low continuous suction        ·  Please keep dressing C/D/I, reinforce PRN        ·  Record vac output per nursing q8h       ·  Please alert plastics team with any concerns regarding vac        ·  Plan to  transition to Prevena homegoing vac on day of discharge    - IV Vancomycin while in-patient, ID re-engaged  - Maintain Vioptix > 30%, notify plastics if < 30%   - Keep RLE carefully positioned elevated with pillows        ·  Avoid positioning that would apply pressure to flap region or incisions   - NWB, patient to dangle as tolerated monitoring vioptix, nurse to alert team how long she was able to dangle  - OK for regular diet       ·  Strict monitoring and recording of I&Os per nursing  - Daily local wound care/dressing changes per plastic surgery APPs (Xeroform over incision lines)       ·  Monitor surgical sites for S/S of bleeding, infection or dehiscence  - Maintain BP of 110/60 minimum to ensure adequate flap perfusion   - Low transfusion threshold to ensure adequate flap perfusion  - q24h lovenox  - Monitor VS/pulse ox [id number]   - Monitor AM CBC/RFP     #asthma  - continue home inhaler  - as needed O2 supplementation  - nasal Afrin spray for congestion  - re-engage medicine        ·  rec albuterol PRN, now signed off    # New onset Chest pain (3/16) now resolved  - R-sided CP overnight 3/16  - EKG, troponin, Mag ordered unremarkable  - Repeat EKG  unremarkable  - Medicine team consulted, appreciate further recs       ·  No additional cardiac workup, likely msk in nature       ·  Lidocaine patch to R rib       ·  continue to avoid any QTC prolonging medications     # Hx of septic arthritis of R knee   - Per ID note on 3/3 during previous admission: IV Vancomycin while in-patient, plan for 6 weeks of vancomycin from date of surgery 2/28/25 - until stop date 4/11/25  - Re-engaged ID 3/16, appreciate any updated recs       ·  Continue vanc until 4/11       ·  Weekly labs CBC with diff, chem, quantitative CRP, and vanc trough     # Acute postoperative pain  - Added PRN Lidocaine patch and Diclofenac gel for R anterior rib pain, will continue to monitor       ·  Scheduled Tylenol 975mg PO [id number]        ·   Scheduled Robaxin 500 mg IV [id number]       ·  Oxycodone 5/10mg PO [id number] PRN mod/severe pain   - Bowel regimen with Stephany-Colace while using narcotics       ·  Dulcolax suppository x1 given 3/18 for persistent constipation  - IV Tigan PRN nausea due to narcotics (NO Zofran due to hx of cardiac arrest)  - Pain assessments [id number]      # L arm itching, resolved  - Benadryl cream ordered for itching 3/15, since improved  - L arm tourniquet was left on patients arm during OR procedure by anesthesia staff, continue to evaluate L arm sensation and distal pulses, consider ultrasound if worsening pain or surrounding skin changes     # history of QT Prolongation  - Avoid QT prolonging medications; NO Zofran (hx of cardiac arrest)  - Telemetry monitoring     # Post-Op Anemia, resolved   - HGB on AM labs yesterday 11.5  - monitor CBC every few days     Prophylaxis:   - DVT: subcutaneous lovenox daily started on POD3, SCDs  - Encourage IS x10 every hour while awake   - Bowel regimen: Stephany-Colace, dulcolax suppository x1 (3/18)       Disposition: Continue care on RNF. Will remain inpatient for continued flap monitoring and vac/drain surveillance postoperatively. Follow up appointment with plastic surgery department to be scheduled closer to anticipated date of discharge. ADOD early next week 3/24-25     Patient and plan discussed with Dr. Toño Felix, APRN-CNP  Plastic and Reconstructive Surgery   Available via Epic chat, pager: [phone] or team phones: [phone]

## 2025-03-24 VITALS
DIASTOLIC BLOOD PRESSURE: 63 MMHG | HEIGHT: 70 IN | OXYGEN SATURATION: 96 % | WEIGHT: 152.34 LBS | TEMPERATURE: 98.4 F | RESPIRATION RATE: 16 BRPM | BODY MASS INDEX: 21.81 KG/M2 | SYSTOLIC BLOOD PRESSURE: 119 MMHG | HEART RATE: 72 BPM

## 2025-03-24 DIAGNOSIS — B95.62 METHICILLIN RESISTANT STAPHYLOCOCCUS AUREUS INFECTION AS THE CAUSE OF DISEASES CLASSIFIED ELSEWHERE: ICD-10-CM

## 2025-03-24 DIAGNOSIS — Z98.890 POST-OPERATIVE STATE: ICD-10-CM

## 2025-03-24 LAB — VANCOMYCIN SERPL-MCNC: 21 UG/ML (ref 5–20)

## 2025-03-24 PROCEDURE — 80202 ASSAY OF VANCOMYCIN: CPT

## 2025-03-24 PROCEDURE — 2500000004 HC RX 250 GENERAL PHARMACY W/ HCPCS (ALT 636 FOR OP/ED)

## 2025-03-24 PROCEDURE — 2500000001 HC RX 250 WO HCPCS SELF ADMINISTERED DRUGS (ALT 637 FOR MEDICARE OP)

## 2025-03-24 RX ORDER — OXYCODONE HYDROCHLORIDE 5 MG/1
5 TABLET ORAL EVERY 6 HOURS PRN
Status: ON HOLD
Start: 2025-03-24 | End: 2025-03-27 | Stop reason: ENTERED-IN-ERROR

## 2025-03-24 RX ORDER — VANCOMYCIN HYDROCHLORIDE 1 G/200ML
1 INJECTION, SOLUTION INTRAVENOUS EVERY 12 HOURS
Status: ON HOLD
Start: 2025-03-24

## 2025-03-24 RX ORDER — DOCUSATE SODIUM 100 MG/1
100 CAPSULE, LIQUID FILLED ORAL 2 TIMES DAILY PRN
Status: ON HOLD
Start: 2025-03-24

## 2025-03-24 RX ORDER — METHOCARBAMOL 500 MG/1
500 TABLET, FILM COATED ORAL EVERY 8 HOURS SCHEDULED
Status: ON HOLD
Start: 2025-03-24 | End: 2025-04-07

## 2025-03-24 RX ORDER — ACETAMINOPHEN 325 MG/1
975 TABLET ORAL EVERY 8 HOURS
Status: ON HOLD
Start: 2025-03-24 | End: 2025-04-07

## 2025-03-24 RX ORDER — PANTOPRAZOLE SODIUM 40 MG/1
40 TABLET, DELAYED RELEASE ORAL
Status: ON HOLD
Start: 2025-03-25 | End: 2025-03-27 | Stop reason: ENTERED-IN-ERROR

## 2025-03-24 RX ORDER — LIDOCAINE 560 MG/1
1 PATCH PERCUTANEOUS; TOPICAL; TRANSDERMAL DAILY PRN
Status: ON HOLD
Start: 2025-03-24 | End: 2025-03-27 | Stop reason: ENTERED-IN-ERROR

## 2025-03-24 RX ADMIN — METHOCARBAMOL 500 MG: 500 TABLET ORAL at 13:57

## 2025-03-24 RX ADMIN — POLYETHYLENE GLYCOL 3350 17 G: 17 POWDER, FOR SOLUTION ORAL at 09:46

## 2025-03-24 RX ADMIN — ACETAMINOPHEN 975 MG: 325 TABLET, FILM COATED ORAL at 05:42

## 2025-03-24 RX ADMIN — OXYCODONE HYDROCHLORIDE 10 MG: 10 TABLET ORAL at 07:12

## 2025-03-24 RX ADMIN — VANCOMYCIN HYDROCHLORIDE 1000 MG: 1 INJECTION, SOLUTION INTRAVENOUS at 09:49

## 2025-03-24 RX ADMIN — ACETAMINOPHEN 975 MG: 325 TABLET, FILM COATED ORAL at 13:57

## 2025-03-24 RX ADMIN — OXYCODONE HYDROCHLORIDE 10 MG: 10 TABLET ORAL at 16:35

## 2025-03-24 RX ADMIN — PANTOPRAZOLE SODIUM 40 MG: 40 TABLET, DELAYED RELEASE ORAL at 09:44

## 2025-03-24 RX ADMIN — OXYCODONE HYDROCHLORIDE 10 MG: 10 TABLET ORAL at 12:39

## 2025-03-24 RX ADMIN — METHOCARBAMOL 500 MG: 500 TABLET ORAL at 05:42

## 2025-03-24 ASSESSMENT — COGNITIVE AND FUNCTIONAL STATUS - GENERAL
MOVING TO AND FROM BED TO CHAIR: A LITTLE
DRESSING REGULAR UPPER BODY CLOTHING: A LITTLE
DAILY ACTIVITIY SCORE: 18
PERSONAL GROOMING: A LITTLE
WALKING IN HOSPITAL ROOM: A LITTLE
HELP NEEDED FOR BATHING: A LITTLE
DRESSING REGULAR LOWER BODY CLOTHING: A LITTLE
MOVING FROM LYING ON BACK TO SITTING ON SIDE OF FLAT BED WITH BEDRAILS: A LITTLE
STANDING UP FROM CHAIR USING ARMS: A LITTLE
MOBILITY SCORE: 17
TURNING FROM BACK TO SIDE WHILE IN FLAT BAD: A LITTLE
CLIMB 3 TO 5 STEPS WITH RAILING: A LOT
EATING MEALS: A LITTLE
TOILETING: A LITTLE

## 2025-03-24 ASSESSMENT — PAIN - FUNCTIONAL ASSESSMENT
PAIN_FUNCTIONAL_ASSESSMENT: 0-10

## 2025-03-24 ASSESSMENT — PAIN SCALES - GENERAL
PAINLEVEL_OUTOF10: 0 - NO PAIN
PAINLEVEL_OUTOF10: 5 - MODERATE PAIN
PAINLEVEL_OUTOF10: 9
PAINLEVEL_OUTOF10: 7
PAINLEVEL_OUTOF10: 0 - NO PAIN

## 2025-03-24 ASSESSMENT — PAIN DESCRIPTION - LOCATION: LOCATION: GENERALIZED

## 2025-03-24 NOTE — PROGRESS NOTES
Occupational Therapy                 Therapy Communication Note    Patient Name: Adriana Wood  MRN: [id number]  Department: [facility]  Room: 6030/6030-A  Today's Date: 3/24/2025     Discipline: Occupational Therapy    OT Missed Visit: Yes     Missed Visit Reason: Missed Visit Reason:  (Patient in process of being discharged.)    Missed Time: Attempt

## 2025-03-24 NOTE — PROGRESS NOTES
03/24/25 1100   Discharge Planning   Living Arrangements Other (Comment)  (Pt was skilled at [facility])   Support Systems Spouse/significant other   Type of Residence Skilled nursing facility   Home or Post Acute Services Post acute facilities (Rehab/SNF/etc)   Type of Post Acute Facility Services Skilled nursing   Expected Discharge Disposition SNF   Does the patient need discharge transport arranged? Yes   RoundTrip coordination needed? Yes   Has discharge transport been arranged? Yes   What day is the transport expected? 03/24/25   What time is the transport expected? 1630     Discharge Transfer to Facility  Pt will discharge today to:  SNF  Facility name:  [facility] phone number:  [phone]  Unit secretary aware:  Yes  Bedside nurse (Keenan) is aware to call report:  Yes  Phone number for report:  [phone]  Ask for [address]  Ambulance transport has been arranged:  Yes  Ambulance company:  [employer]  Ambulance phone number:  [phone]   time:  4:30pm  Pt is aware.  JOHN Perez

## 2025-03-24 NOTE — PROGRESS NOTES
Vancomycin Dosing by Pharmacy- FOLLOW UP    Adriana Wood is a 69 y.o. year old female who Pharmacy has been consulted for vancomycin dosing for cellulitis, skin and soft tissue. Based on the patient's indication and renal status this patient is being dosed based on a goal AUC of 400-600.     Renal function is currently stable.    Current vancomycin dose: 1000 mg given every 12 hours    Estimated vancomycin AUC on current dose: 557 mg/L.hr     Visit Vitals  /63 (BP Location: Left arm, Patient Position: Lying)   Pulse 69   Temp 36.2 °C (97.1 °F) (Temporal)   Resp 20        Lab Results   Component Value Date    CREATININE 0.54 2025    CREATININE 0.44 (L) 2025    CREATININE 0.53 2025    CREATININE 0.50 2025        Patient weight is as follows:   Vitals:    25 0600   Weight: 69.1 kg (152 lb 5.4 oz)       Cultures:  No results found for the encounter in last 14 days.       I/O last 3 completed shifts:  In: 500 (7.2 mL/kg) [P.O.:500]  Out: 1867 (27 mL/kg) [Urine:1800 (0.7 mL/kg/hr); Drains:67]  Weight: 69.1 kg   I/O during current shift:  No intake/output data recorded.    Temp (24hrs), Av.2 °C (97.2 °F), Min:36.1 °C (97 °F), Max:36.4 °C (97.5 °F)      Assessment/Plan    Within goal AUC range. Continue current vancomycin regimen.    This dosing regimen is predicted by [employer] to result in the following pharmacokinetic parameters:  Loading dose: N/A  Regimen: 1000 mg IV every 12 hours.  Start time: 09:09 on 2025  Exposure target: AUC24 (range)400-600 mg/L.hr   [id number]: 552 mg/L.hr  AUC24,ss: 557 mg/L.hr  Probability of AUC24 > 400: 100 %  Ctrough,ss: 17.4 mg/L  Probability of Ctrough,ss > 20: 10 %    The next level will be obtained on  w/ AM labs. May be obtained sooner if clinically indicated.   Will continue to monitor renal function daily while on vancomycin and order serum creatinine at least every 48 hours if not already ordered.  Follow for continued  vancomycin needs, clinical response, and signs/symptoms of toxicity.       Obdulio Osborne, PharmD

## 2025-03-24 NOTE — SIGNIFICANT EVENT
"  Department of Plastic and Reconstructive Surgery  PM Flap Check    69 y.o. female with a past medical history of bradycardia, cardiomyopathy, CAD, cardiac arrest due to electrolyte abnormalities and zofran use post-operatively, and traumatic brain injury. Patient had a total knee right knee done by Dr. Whelan in March 2024 which was complicated by E. Coli infection. Patient had right TKA wound dehiscence s/p I&D right knee with polyswap and attempted re-closure of complex wound on 5/10/2024 by Dr. Whelan. She was referred to plastic surgery (Dr. Reyes) perioperatively given projected anticipated need for possible flap coverage of the wound/defect site. Patient re-admitted and returned to the OR with orthopedics on 2/28/2025 for right knee I&D, revision static spacer and application of incisional wound vac. Plastic surgery service consulted postoperatively to follow for flap recommendations/timing, now s/p R latissimus free flap to R knee on 3/14 with Dr. Lundberg.     Subjective: Resting in bed, pain well controlled, tolerated dangling during the day for long periods with stable vioptix, Tolerating diet with increased appetite, + BM today. Denies any fever, chills, night sweats, CP, SOB, palpitations, nausea, vomiting, diarrhea, constipation, dysuria, hematuria, hematochezia, hematemesis, flank pain.     Objective:  /58 (BP Location: Left arm, Patient Position: Lying)   Pulse 70   Temp 36.4 °C (97.5 °F) (Temporal)   Resp 20   Ht 1.778 m (5' 10\")   Wt 69.1 kg (152 lb 5.4 oz)   SpO2 96%   BMI 21.86 kg/m²    PE:  Constitutional: A&Ox3, calm and cooperative, NAD  Eyes: EOMI, clear sclera   ENMT: Moist mucous membranes, no apparent injuries or lesions  Head/Neck: NCAT  Cardiovascular: Extremities WWP  Respiratory/Thorax: Unlabored respirations on RA  Gastrointestinal: Abdomen soft, NTND, no peritoneal signs, +BS x4, +flatus, + BM, lidocaine patch to RUQ  Genitourinary: voiding independently "   Extremities: RLE flap: warm to touch, soft and compressible, strong biphasic signal at marked stitch, circumferential incisions around flap well approximated with scant amount of ss drainage, covered with xeroform, penrose removed during day shift, vioptix in place with signal of 73% with 84% signal strength, old MASSIEL drain site C/D/I  Right posterior lat donor site: prevena incisional wound vac in place, holding suction at -125 mmHg, no alarm or leak noted, MASSIEL drain x 1 to right flank with ss output  Neurological: A&Ox3  Psychological: Appropriate mood and behavior  Skin: Warm and dry. L upper extremity with resolving ecchymosis circumferentially     S/P Right latissimus dorsi donor site FF to Right knee:  A/P:   - Flap check stable, no change from prior assessment, biphasic doppler signal, good strength, continue flap check Q4 per nursing with interim checks per plastics, maintain Vioptix  - all other care per daily progress note    ISABEL Ritter-CNP  Plastic and Reconstructive Surgery   Available via Haiku  Pager #[phone]  Team phone: [phone]

## 2025-03-24 NOTE — DISCHARGE SUMMARY
Discharge Diagnosis  Hardware complicating wound infection (CMS-HCC)    Issues Requiring Follow-Up  Prevena wound vac  MASSIEL drain x 1  Incisional care to RLE flap with Xeroform daily  Follow-up with Infectious Disease  Follow-up with Plastic Surgery    Test Results Pending At Discharge  Pending Labs       No current pending labs.            Hospital Course  BRIEF HISTORY:    Adriana Wood is a 69 y.o. female with a past medical history of bradycardia, cardiomyopathy, CAD, cardiac arrest due to electrolyte abnormalities and zofran use post-operatively, and traumatic brain injury. Patient had a total knee right knee done by Dr. Whelan in March 2024 which was complicated by E. Coli infection. Patient had right TKA wound dehiscence s/p I&D right knee with polyswap and attempted re-closure of complex wound on 5/10/2024 by Dr. Whelan. She was referred to plastic surgery (Dr. Reyes) perioperatively given projected anticipated need for possible flap coverage of the wound/defect site. Patient re-admitted and returned to the OR with orthopedics on 2/28/2025 for right knee I&D, revision static spacer and application of incisional wound vac. Plastic surgery service consulted postoperatively to follow for flap recommendations/timing, now s/p R latissimus free flap to R knee on 3/14 with Dr. Lundberg.      HOSPITAL COURSE:    Admitted post operatively for flap monitoring, bedrest, and pain control. Doing well post operatively but did have episode of chest pain on 3/16, normal labs, EKG normal which since resolved. Tolerated dangling without issues and flap remained stable while dangling.     CONSULTATIONS:  Medicine consulted for acute onset of chest pain and elected to monitor, no additional cardiac workup, likely musculoskeletal in nature, lidocaine patches to right rib. PT/OT consulted and recommended moderate intensity level of continued care.     DAY OF DISCHARGE:    On the day of discharge, the patient was seen and  "evaluated by the Plastic Surgery team and deemed suitable for discharge to SNF.  There were no significant events overnight. Vitals were reviewed and within normal limits. Labs were stable at discharge. On day of discharge the patient was tolerating a diet, pain was controlled on PO pain medication, was ambulating well and voiding spontaneously. The patient was given detailed discharge instructions and were scheduled to follow up as an outpatient.      Pertinent Physical Exam At Time of Discharge  Physical Exam  /63 (BP Location: Left arm, Patient Position: Lying)   Pulse 72   Temp 36.9 °C (98.4 °F) (Temporal)   Resp 16   Ht 1.778 m (5' 10\")   Wt 69.1 kg (152 lb 5.4 oz)   SpO2 96%   BMI 21.86 kg/m²     Constitutional: A&Ox3, calm and cooperative, NAD  Eyes: EOMI, clear sclera   ENMT: Moist mucous membranes, no apparent injuries or lesions  Head/Neck: NCAT  Cardiovascular: Extremities WWP  Respiratory/Thorax: Unlabored respirations on RA  Gastrointestinal: Abdomen soft, NTND  Genitourinary: Voiding independently, no urine present on assessment, switched to purewick recently  Extremities: RLE flap: warm to touch, soft and compressible, strong biphasic signal at marked stitch, circumferential incisions around flap well approximated with scant amount of ss drainage, covered with xeroform, vioptix in place with signal of 72% with 85% signal strength, previous MASSIEL drain site covered with Mepilex dressing  Right posterior lat donor site: prevena incisional wound vac in place, holding suction at -125 mmHg, no alarm or leak noted, MASSIEL drain x 1 to right flank with ss output  Neurological: A&Ox3  Psychological: Appropriate mood and behavior  Skin: Warm and dry. L upper extremity with resolving ecchymosis circumferentially     Home Medications     Medication List      START taking these medications     alteplase 2 mg injection; Commonly known as: Cathflo Activase; 1 mL (1   mg) by intra-catheter route if needed " (occlusion).   apixaban 2.5 mg tablet; Commonly known as: Eliquis; Take 1 tablet (2.5   mg) by mouth 2 times a day.   lidocaine 4 % patch; Place 1 patch over 12 hours on the skin once daily   as needed for mild pain (1 - 3) or moderate pain (4 - 6). Remove & discard   patch within 12 hours or as directed by MD.   methocarbamol 500 mg tablet; Commonly known as: Robaxin; Take 1 tablet   (500 mg) by mouth every 8 hours for 14 days.   pantoprazole 40 mg EC tablet; Commonly known as: ProtoNix; Take 1 tablet   (40 mg) by mouth once daily in the morning. Take before meals for 7 days.   Do not crush, chew, or split.; Start taking on: March 25, 2025   vancomycin IVPB; Commonly known as: Vancocin; Infuse 200 mL (1 g) at 200   mL/hr over 60 minutes into a venous catheter every 12 hours.     CHANGE how you take these medications     acetaminophen 325 mg tablet; Commonly known as: Tylenol; Take 3 tablets   (975 mg) by mouth every 8 hours for 14 days.; What changed: how much to   take, when to take this, reasons to take this   oxyCODONE 5 mg immediate release tablet; Commonly known as: Roxicodone;   Take 1 tablet (5 mg) by mouth every 6 hours if needed for severe pain (7 -   10) for up to 5 days.; What changed: when to take this, reasons to take   this     CONTINUE taking these medications     albuterol 90 mcg/actuation inhaler; Inhale 2 puffs every 6 hours if   needed for shortness of breath.   docusate sodium 100 mg capsule; Commonly known as: Colace; Take 1   capsule (100 mg) by mouth 2 times a day as needed for constipation.     STOP taking these medications     chlorhexidine 4 % external liquid; Commonly known as: Hibiclens   cyclobenzaprine 5 mg tablet; Commonly known as: Flexeril   naloxone 0.4 mg/mL injection; Commonly known as: Narcan   polyethylene glycol 17 gram packet; Commonly known as: Glycolax, Miralax   prochlorperazine 10 mg tablet; Commonly known as: Compazine   sennosides-docusate sodium 8.6-50 mg tablet;  Commonly known as:   Stephany-Colace   vancomycin (Vancocin) 1250 mg/250 mL piggyback IV; Commonly known as:   Vancocin       Outpatient Follow-Up  Future Appointments   Date Time Provider Department Center   3/31/2025  1:30 PM PLASTIC SURG [id number] [facility] [id number] [facility]   4/3/2025  1:20 PM Clyde Tolliver MD MPH [id number] [facility]       Key Ruiz PA-C

## 2025-03-24 NOTE — PROGRESS NOTES
"  Department of Plastic and Reconstructive Surgery  Daily Progress Note    Patient Name: Adriana Wood  MRN: [id number]  Date:  03/24/25     Subjective  Found resting in bed comfortably this AM. Denies any acute concerns, tolerating diet. Pain well controlled this AM. Denies any fever, chills, night sweats, CP, SOB, headache, extremity numbness/weakness, vision changes, nausea, vomiting, or abdominal pain/discomfort.       Objective    Vital Signs  /67 (BP Location: Left arm, Patient Position: Lying)   Pulse 74   Temp 37 °C (98.6 °F) (Temporal)   Resp 16   Ht 1.778 m (5' 10\")   Wt 69.1 kg (152 lb 5.4 oz)   SpO2 95%   BMI 21.86 kg/m²      Physical Exam   Constitutional: A&Ox3, calm and cooperative, NAD  Eyes: EOMI, clear sclera   ENMT: Moist mucous membranes, no apparent injuries or lesions  Head/Neck: NCAT  Cardiovascular: Extremities WWP  Respiratory/Thorax: Unlabored respirations on RA  Gastrointestinal: Abdomen soft, NTND, no peritoneal signs, +BS x4, +flatus, + BM, lidocaine patch to RUQ  Genitourinary: Voiding independently, no urine present on assessment, switched to purewick recently  Extremities: RLE flap: warm to touch, soft and compressible, strong biphasic signal at marked stitch, circumferential incisions around flap well approximated with scant amount of ss drainage, covered with xeroform, vioptix in place with signal of 72% with 85% signal strength, previous MASSIEL drain site covered with Mepilex dressing  Right posterior lat donor site: prevena incisional wound vac in place, holding suction at -125 mmHg, no alarm or leak noted, MASSIEL drain x 1 to right flank with ss output  Neurological: A&Ox3  Psychological: Appropriate mood and behavior  Skin: Warm and dry. L upper extremity with resolving ecchymosis circumferentially     Diagnostics   Results for orders placed or performed during the hospital encounter of 03/14/25 (from the past 24 hours)   Vancomycin   Result Value Ref Range    Vancomycin " 21.0 (H) 5.0 - 20.0 ug/mL     *Note: Due to a large number of results and/or encounters for the requested time period, some results have not been displayed. A complete set of results can be found in Results Review.       Current Medications  Scheduled medications  acetaminophen, 975 mg, oral, q8h  bisacodyl, 10 mg, rectal, Once  enoxaparin, 40 mg, subcutaneous, Daily  lidocaine, 1 patch, transdermal, Daily  methocarbamol, 500 mg, oral, q8h FELIX  pantoprazole, 40 mg, oral, Daily before breakfast  polyethylene glycol, 17 g, oral, Daily  sennosides-docusate sodium, 2 tablet, oral, BID  vancomycin, 1,000 mg, intravenous, q12h      Continuous medications     PRN medications  PRN medications: albuterol, alteplase, bisacodyl, calcium carbonate, diclofenac sodium, diphenhydramine-zinc acetate, lidocaine, melatonin, naloxone, oxyCODONE, oxyCODONE, trimethobenzamide, vancomycin     Assessment  69 y.o. female with a past medical history of bradycardia, cardiomyopathy, CAD, cardiac arrest due to electrolyte abnormalities and zofran use post-operatively, and traumatic brain injury. Patient had a total knee right knee done by Dr. Whelan in March 2024 which was complicated by E. Coli infection. Patient had right TKA wound dehiscence s/p I&D right knee with polyswap and attempted re-closure of complex wound on 5/10/2024 by Dr. Whelan. She was referred to plastic surgery (Dr. Reyes) perioperatively given projected anticipated need for possible flap coverage of the wound/defect site. Patient re-admitted and returned to the OR with orthopedics on 2/28/2025 for right knee I&D, revision static spacer and application of incisional wound vac. Plastic surgery service consulted postoperatively to follow for flap recommendations/timing, now s/p R latissimus free flap to R knee on 3/14 with Dr. Lundberg.     Plan/Recommendations  S/p R latissimus free flap to R knee on 3/14 with Dr. Lundberg  - Continue serial flap assessments Q4hour per  Alert-The patient is alert, awake and responds to voice. The patient is oriented to time, place, and person. The triage nurse is able to obtain subjective information. nursing with interval checks per plastic surgery team       ·  Assess Doppler pulse at marked site plus flap appearance (color, warmth, turgor)       ·  Nursing to notify plastic surgery team immediately if there are any acute changes          (Including decreased Doppler signal, pallor, temperature change, bleeding, etc.)  - Continue MASSIEL drain care per nursing (MASSIEL x 1)        .  R inner knee MASSIEL removed 3/21       ·  Strip drain tubing TID and PRN       ·  Monitor and record output [id number]        ·  Keep drain sites C/D/I with daily drain dressing changes        ·  Call plastics if drain output is >30cc in an hour or greater than 200cc over 8 hours  - Continue incisional wound vac therapy to R lat donor site x10-14 days post op (3/28)        ·  Settings: 125 mmHg low continuous suction        ·  Please keep dressing C/D/I, reinforce PRN        ·  Record vac output per nursing q8h       ·  Please alert plastics team with any concerns regarding vac        ·  Plan to transition to Prevena homegoing vac on day of discharge    - IV Vancomycin while in-patient, ID re-engaged  - Maintain Vioptix > 30%, notify plastics if < 30%   - Keep RLE carefully positioned elevated with pillows        ·  Avoid positioning that would apply pressure to flap region or incisions   - NWB, patient to dangle as tolerated monitoring vioptix, nurse to alert team how long she was able to dangle  - OK for regular diet       ·  Strict monitoring and recording of I&Os per nursing  - Daily local wound care/dressing changes per plastic surgery APPs (Xeroform over incision lines)       ·  Monitor surgical sites for S/S of bleeding, infection or dehiscence  - Maintain BP of 110/60 minimum to ensure adequate flap perfusion   - Low transfusion threshold to ensure adequate flap perfusion  - q24h lovenox  - Monitor VS/pulse ox [id number]   - Monitor AM CBC/RFP     #Asthma  - continue home inhaler  - as needed O2 supplementation  - nasal Afrin spray for  congestion  - re-engaged medicine        ·  rec albuterol PRN, now signed off    # New onset Chest pain (3/16) now resolved  - R-sided CP overnight 3/16  - EKG, troponin, Mag ordered unremarkable  - Repeat EKG  unremarkable  - Medicine team consulted, appreciate further recs       ·  No additional cardiac workup, likely msk in nature       ·  Lidocaine patch to R rib       ·  continue to avoid any QTC prolonging medications     # Hx of septic arthritis of R knee   - Per ID note on 3/3 during previous admission: IV Vancomycin while in-patient, plan for 6 weeks of vancomycin from date of surgery 2/28/25 - until stop date 4/11/25  - Re-engaged ID 3/16, appreciate any updated recs       ·  Continue vanc until 4/11       ·  Weekly labs CBC with diff, chem, quantitative CRP, and vanc trough     # Acute postoperative pain  - Added PRN Lidocaine patch and Diclofenac gel for R anterior rib pain, will continue to monitor       ·  Scheduled Tylenol 975mg PO [id number]        ·  Scheduled Robaxin 500 mg IV [id number]       ·  Oxycodone 5/10mg PO [id number] PRN mod/severe pain   - Bowel regimen with Stephany-Colace while using narcotics       ·  Dulcolax suppository x1 given 3/18 for persistent constipation  - IV Tigan PRN nausea due to narcotics (NO Zofran due to hx of cardiac arrest)  - Pain assessments [id number]      # L arm itching, resolved  - Benadryl cream ordered for itching 3/15, since improved  - L arm tourniquet was left on patients arm during OR procedure by anesthesia staff, continue to evaluate L arm sensation and distal pulses, consider ultrasound if worsening pain or surrounding skin changes     # history of QT Prolongation  - Avoid QT prolonging medications; NO Zofran (hx of cardiac arrest)  - Telemetry monitoring     # Post-Op Anemia, resolved   - HGB on AM labs yesterday 12.2  - monitor CBC every few days     Prophylaxis:   - DVT: subcutaneous lovenox daily started on POD3, SCDs  - Encourage IS x10 every hour while awake   -  Bowel regimen: Stephany-Colace, dulcolax suppository x1 (3/18)       Disposition: Continue care on RNF. Will remain inpatient for continued flap monitoring and vac/drain surveillance postoperatively. Follow up appointment with plastic surgery department to be scheduled closer to anticipated date of discharge. ADOD early this week 3/24-25     Patient and plan discussed with Dr. Toño Ruiz, PAVenessaC  Plastic and Reconstructive Surgery   Available via Epic chat, pager: [phone] or team phones: [phone]

## 2025-03-25 ENCOUNTER — NURSING HOME VISIT (OUTPATIENT)
Dept: POST ACUTE CARE | Facility: EXTERNAL LOCATION | Age: 70
End: 2025-03-25
Payer: MEDICARE

## 2025-03-25 DIAGNOSIS — T81.30XA WOUND DEHISCENCE: ICD-10-CM

## 2025-03-25 DIAGNOSIS — K59.00 CONSTIPATION, UNSPECIFIED CONSTIPATION TYPE: ICD-10-CM

## 2025-03-25 DIAGNOSIS — M62.838 MUSCLE SPASM: ICD-10-CM

## 2025-03-25 DIAGNOSIS — R11.0 NAUSEA: ICD-10-CM

## 2025-03-25 DIAGNOSIS — T84.50XD PROSTHETIC JOINT INFECTION, SUBSEQUENT ENCOUNTER: ICD-10-CM

## 2025-03-25 DIAGNOSIS — M17.31 POST-TRAUMATIC ARTHRITIS OF LOWER LEG, RIGHT: Primary | ICD-10-CM

## 2025-03-25 DIAGNOSIS — J98.01 BRONCHOSPASM: ICD-10-CM

## 2025-03-25 PROCEDURE — 99310 SBSQ NF CARE HIGH MDM 45: CPT | Performed by: NURSE PRACTITIONER

## 2025-03-26 ENCOUNTER — LAB REQUISITION (OUTPATIENT)
Dept: LAB | Facility: HOSPITAL | Age: 70
End: 2025-03-26
Payer: MEDICARE

## 2025-03-26 ENCOUNTER — HOSPITAL ENCOUNTER (INPATIENT)
Facility: HOSPITAL | Age: 70
LOS: 1 days | Discharge: ANOTHER HOSPITAL | DRG: 920 | End: 2025-03-28
Attending: STUDENT IN AN ORGANIZED HEALTH CARE EDUCATION/TRAINING PROGRAM | Admitting: INTERNAL MEDICINE
Payer: MEDICARE

## 2025-03-26 ENCOUNTER — APPOINTMENT (OUTPATIENT)
Dept: RADIOLOGY | Facility: HOSPITAL | Age: 70
DRG: 920 | End: 2025-03-26
Payer: MEDICARE

## 2025-03-26 ENCOUNTER — ANESTHESIA (OUTPATIENT)
Dept: EMERGENCY MEDICINE | Facility: HOSPITAL | Age: 70
End: 2025-03-26
Payer: MEDICARE

## 2025-03-26 ENCOUNTER — ANESTHESIA EVENT (OUTPATIENT)
Dept: EMERGENCY MEDICINE | Facility: HOSPITAL | Age: 70
End: 2025-03-26
Payer: MEDICARE

## 2025-03-26 DIAGNOSIS — I50.9 HEART FAILURE, UNSPECIFIED: ICD-10-CM

## 2025-03-26 DIAGNOSIS — T14.8XXA HEMATOMA: Primary | ICD-10-CM

## 2025-03-26 LAB
ABO GROUP (TYPE) IN BLOOD: NORMAL
ALBUMIN SERPL BCP-MCNC: 3.9 G/DL (ref 3.4–5)
ALBUMIN SERPL BCP-MCNC: 4.1 G/DL (ref 3.4–5)
ALP SERPL-CCNC: 108 U/L (ref 33–136)
ALP SERPL-CCNC: 122 U/L (ref 33–136)
ALT SERPL W P-5'-P-CCNC: 21 U/L (ref 7–45)
ALT SERPL W P-5'-P-CCNC: 22 U/L (ref 7–45)
ANION GAP SERPL CALC-SCNC: 11 MMOL/L (ref 10–20)
ANION GAP SERPL CALC-SCNC: 12 MMOL/L (ref 10–20)
ANTIBODY SCREEN: NORMAL
APTT PPP: 34 SECONDS (ref 26–36)
AST SERPL W P-5'-P-CCNC: 22 U/L (ref 9–39)
AST SERPL W P-5'-P-CCNC: 23 U/L (ref 9–39)
BASOPHILS # BLD AUTO: 0.04 X10*3/UL (ref 0–0.1)
BASOPHILS # BLD AUTO: 0.05 X10*3/UL (ref 0–0.1)
BASOPHILS # BLD AUTO: 0.07 X10*3/UL (ref 0–0.1)
BASOPHILS NFR BLD AUTO: 0.6 %
BASOPHILS NFR BLD AUTO: 0.7 %
BASOPHILS NFR BLD AUTO: 1.2 %
BILIRUB SERPL-MCNC: 0.4 MG/DL (ref 0–1.2)
BILIRUB SERPL-MCNC: 0.4 MG/DL (ref 0–1.2)
BUN SERPL-MCNC: 10 MG/DL (ref 6–23)
BUN SERPL-MCNC: 10 MG/DL (ref 6–23)
CALCIUM SERPL-MCNC: 8.5 MG/DL (ref 8.6–10.3)
CALCIUM SERPL-MCNC: 8.8 MG/DL (ref 8.6–10.3)
CHLORIDE SERPL-SCNC: 103 MMOL/L (ref 98–107)
CHLORIDE SERPL-SCNC: 103 MMOL/L (ref 98–107)
CO2 SERPL-SCNC: 27 MMOL/L (ref 21–32)
CO2 SERPL-SCNC: 28 MMOL/L (ref 21–32)
CREAT SERPL-MCNC: 0.49 MG/DL (ref 0.5–1.05)
CREAT SERPL-MCNC: 0.53 MG/DL (ref 0.5–1.05)
CRP SERPL-MCNC: 0.37 MG/DL
EGFRCR SERPLBLD CKD-EPI 2021: >90 ML/MIN/1.73M*2
EGFRCR SERPLBLD CKD-EPI 2021: >90 ML/MIN/1.73M*2
EOSINOPHIL # BLD AUTO: 0.1 X10*3/UL (ref 0–0.7)
EOSINOPHIL # BLD AUTO: 0.14 X10*3/UL (ref 0–0.7)
EOSINOPHIL # BLD AUTO: 0.18 X10*3/UL (ref 0–0.7)
EOSINOPHIL NFR BLD AUTO: 1.6 %
EOSINOPHIL NFR BLD AUTO: 1.8 %
EOSINOPHIL NFR BLD AUTO: 3.2 %
ERYTHROCYTE [DISTWIDTH] IN BLOOD BY AUTOMATED COUNT: 14 % (ref 11.5–14.5)
GLUCOSE SERPL-MCNC: 95 MG/DL (ref 74–99)
GLUCOSE SERPL-MCNC: 99 MG/DL (ref 74–99)
HCT VFR BLD AUTO: 36.1 % (ref 36–46)
HCT VFR BLD AUTO: 36.6 % (ref 36–46)
HCT VFR BLD AUTO: 37 % (ref 36–46)
HGB BLD-MCNC: 11.9 G/DL (ref 12–16)
HGB BLD-MCNC: 11.9 G/DL (ref 12–16)
HGB BLD-MCNC: 12.2 G/DL (ref 12–16)
HOLD SPECIMEN: NORMAL
HOLD SPECIMEN: NORMAL
IMM GRANULOCYTES # BLD AUTO: 0.02 X10*3/UL (ref 0–0.7)
IMM GRANULOCYTES # BLD AUTO: 0.02 X10*3/UL (ref 0–0.7)
IMM GRANULOCYTES # BLD AUTO: 0.04 X10*3/UL (ref 0–0.7)
IMM GRANULOCYTES NFR BLD AUTO: 0.3 % (ref 0–0.9)
IMM GRANULOCYTES NFR BLD AUTO: 0.4 % (ref 0–0.9)
IMM GRANULOCYTES NFR BLD AUTO: 0.5 % (ref 0–0.9)
INR PPP: 1.1 (ref 0.9–1.1)
INR PPP: 1.1 (ref 0.9–1.1)
LYMPHOCYTES # BLD AUTO: 1.29 X10*3/UL (ref 1.2–4.8)
LYMPHOCYTES # BLD AUTO: 1.46 X10*3/UL (ref 1.2–4.8)
LYMPHOCYTES # BLD AUTO: 1.61 X10*3/UL (ref 1.2–4.8)
LYMPHOCYTES NFR BLD AUTO: 16.2 %
LYMPHOCYTES NFR BLD AUTO: 23.9 %
LYMPHOCYTES NFR BLD AUTO: 28.5 %
MCH RBC QN AUTO: 29.2 PG (ref 26–34)
MCH RBC QN AUTO: 29.7 PG (ref 26–34)
MCH RBC QN AUTO: 29.8 PG (ref 26–34)
MCHC RBC AUTO-ENTMCNC: 32.5 G/DL (ref 32–36)
MCHC RBC AUTO-ENTMCNC: 33 G/DL (ref 32–36)
MCHC RBC AUTO-ENTMCNC: 33 G/DL (ref 32–36)
MCV RBC AUTO: 90 FL (ref 80–100)
MCV RBC AUTO: 90 FL (ref 80–100)
MCV RBC AUTO: 91 FL (ref 80–100)
MONOCYTES # BLD AUTO: 0.45 X10*3/UL (ref 0.1–1)
MONOCYTES # BLD AUTO: 0.55 X10*3/UL (ref 0.1–1)
MONOCYTES # BLD AUTO: 0.57 X10*3/UL (ref 0.1–1)
MONOCYTES NFR BLD AUTO: 10.1 %
MONOCYTES NFR BLD AUTO: 6.9 %
MONOCYTES NFR BLD AUTO: 7.4 %
NEUTROPHILS # BLD AUTO: 3.19 X10*3/UL (ref 1.2–7.7)
NEUTROPHILS # BLD AUTO: 4.05 X10*3/UL (ref 1.2–7.7)
NEUTROPHILS # BLD AUTO: 5.87 X10*3/UL (ref 1.2–7.7)
NEUTROPHILS NFR BLD AUTO: 56.6 %
NEUTROPHILS NFR BLD AUTO: 66.1 %
NEUTROPHILS NFR BLD AUTO: 74 %
NRBC BLD-RTO: 0 /100 WBCS (ref 0–0)
PLATELET # BLD AUTO: 254 X10*3/UL (ref 150–450)
PLATELET # BLD AUTO: 258 X10*3/UL (ref 150–450)
PLATELET # BLD AUTO: 261 X10*3/UL (ref 150–450)
POTASSIUM SERPL-SCNC: 3.9 MMOL/L (ref 3.5–5.3)
POTASSIUM SERPL-SCNC: 4.1 MMOL/L (ref 3.5–5.3)
PROT SERPL-MCNC: 6.1 G/DL (ref 6.4–8.2)
PROT SERPL-MCNC: 6.7 G/DL (ref 6.4–8.2)
PROTHROMBIN TIME: 12.7 SECONDS (ref 9.8–12.4)
PROTHROMBIN TIME: 12.7 SECONDS (ref 9.8–12.4)
RBC # BLD AUTO: 3.99 X10*6/UL (ref 4–5.2)
RBC # BLD AUTO: 4.08 X10*6/UL (ref 4–5.2)
RBC # BLD AUTO: 4.11 X10*6/UL (ref 4–5.2)
RH FACTOR (ANTIGEN D): NORMAL
SODIUM SERPL-SCNC: 137 MMOL/L (ref 136–145)
SODIUM SERPL-SCNC: 139 MMOL/L (ref 136–145)
VANCOMYCIN TROUGH SERPL-MCNC: 18.9 UG/ML (ref 5–20)
WBC # BLD AUTO: 5.6 X10*3/UL (ref 4.4–11.3)
WBC # BLD AUTO: 6.1 X10*3/UL (ref 4.4–11.3)
WBC # BLD AUTO: 7.9 X10*3/UL (ref 4.4–11.3)

## 2025-03-26 PROCEDURE — 74176 CT ABD & PELVIS W/O CONTRAST: CPT | Performed by: RADIOLOGY

## 2025-03-26 PROCEDURE — 2500000001 HC RX 250 WO HCPCS SELF ADMINISTERED DRUGS (ALT 637 FOR MEDICARE OP): Performed by: STUDENT IN AN ORGANIZED HEALTH CARE EDUCATION/TRAINING PROGRAM

## 2025-03-26 PROCEDURE — 73560 X-RAY EXAM OF KNEE 1 OR 2: CPT | Mod: RT

## 2025-03-26 PROCEDURE — 99285 EMERGENCY DEPT VISIT HI MDM: CPT | Mod: 25 | Performed by: STUDENT IN AN ORGANIZED HEALTH CARE EDUCATION/TRAINING PROGRAM

## 2025-03-26 PROCEDURE — 86140 C-REACTIVE PROTEIN: CPT | Mod: OUT | Performed by: FAMILY MEDICINE

## 2025-03-26 PROCEDURE — 71250 CT THORAX DX C-: CPT | Performed by: RADIOLOGY

## 2025-03-26 PROCEDURE — 73560 X-RAY EXAM OF KNEE 1 OR 2: CPT | Mod: RIGHT SIDE | Performed by: RADIOLOGY

## 2025-03-26 PROCEDURE — 85025 COMPLETE CBC W/AUTO DIFF WBC: CPT | Mod: OUT | Performed by: FAMILY MEDICINE

## 2025-03-26 PROCEDURE — 85610 PROTHROMBIN TIME: CPT | Performed by: STUDENT IN AN ORGANIZED HEALTH CARE EDUCATION/TRAINING PROGRAM

## 2025-03-26 PROCEDURE — 85025 COMPLETE CBC W/AUTO DIFF WBC: CPT | Performed by: STUDENT IN AN ORGANIZED HEALTH CARE EDUCATION/TRAINING PROGRAM

## 2025-03-26 PROCEDURE — 80053 COMPREHEN METABOLIC PANEL: CPT | Performed by: STUDENT IN AN ORGANIZED HEALTH CARE EDUCATION/TRAINING PROGRAM

## 2025-03-26 PROCEDURE — 80202 ASSAY OF VANCOMYCIN: CPT | Mod: OUT | Performed by: FAMILY MEDICINE

## 2025-03-26 PROCEDURE — 80053 COMPREHEN METABOLIC PANEL: CPT | Mod: OUT | Performed by: FAMILY MEDICINE

## 2025-03-26 PROCEDURE — 74176 CT ABD & PELVIS W/O CONTRAST: CPT

## 2025-03-26 PROCEDURE — 86901 BLOOD TYPING SEROLOGIC RH(D): CPT | Performed by: STUDENT IN AN ORGANIZED HEALTH CARE EDUCATION/TRAINING PROGRAM

## 2025-03-26 RX ORDER — OXYCODONE HYDROCHLORIDE 5 MG/1
10 TABLET ORAL ONCE
Status: COMPLETED | OUTPATIENT
Start: 2025-03-26 | End: 2025-03-26

## 2025-03-26 RX ORDER — ACETAMINOPHEN 160 MG/5ML
650 SOLUTION ORAL EVERY 4 HOURS PRN
Status: DISCONTINUED | OUTPATIENT
Start: 2025-03-26 | End: 2025-03-27

## 2025-03-26 RX ORDER — LIDOCAINE 560 MG/1
1 PATCH PERCUTANEOUS; TOPICAL; TRANSDERMAL DAILY
Status: DISCONTINUED | OUTPATIENT
Start: 2025-03-27 | End: 2025-03-28 | Stop reason: HOSPADM

## 2025-03-26 RX ORDER — VANCOMYCIN HYDROCHLORIDE 1 G/20ML
INJECTION, POWDER, LYOPHILIZED, FOR SOLUTION INTRAVENOUS DAILY PRN
Status: DISCONTINUED | OUTPATIENT
Start: 2025-03-26 | End: 2025-03-28 | Stop reason: HOSPADM

## 2025-03-26 RX ORDER — OXYCODONE HYDROCHLORIDE 5 MG/1
10 TABLET ORAL EVERY 6 HOURS PRN
Status: DISCONTINUED | OUTPATIENT
Start: 2025-03-26 | End: 2025-03-27

## 2025-03-26 RX ORDER — OXYCODONE HYDROCHLORIDE 5 MG/1
5 TABLET ORAL EVERY 6 HOURS PRN
Status: DISCONTINUED | OUTPATIENT
Start: 2025-03-26 | End: 2025-03-27

## 2025-03-26 RX ORDER — PANTOPRAZOLE SODIUM 40 MG/1
40 TABLET, DELAYED RELEASE ORAL
Status: DISCONTINUED | OUTPATIENT
Start: 2025-03-27 | End: 2025-03-28 | Stop reason: HOSPADM

## 2025-03-26 RX ORDER — VANCOMYCIN HYDROCHLORIDE 1 G/200ML
1000 INJECTION, SOLUTION INTRAVENOUS EVERY 12 HOURS
Status: DISCONTINUED | OUTPATIENT
Start: 2025-03-27 | End: 2025-03-28

## 2025-03-26 RX ORDER — ACETAMINOPHEN 650 MG/1
650 SUPPOSITORY RECTAL EVERY 4 HOURS PRN
Status: DISCONTINUED | OUTPATIENT
Start: 2025-03-26 | End: 2025-03-27

## 2025-03-26 RX ORDER — DOCUSATE SODIUM 100 MG/1
100 CAPSULE, LIQUID FILLED ORAL 2 TIMES DAILY PRN
Status: DISCONTINUED | OUTPATIENT
Start: 2025-03-26 | End: 2025-03-28 | Stop reason: HOSPADM

## 2025-03-26 RX ORDER — ACETAMINOPHEN 325 MG/1
650 TABLET ORAL EVERY 4 HOURS PRN
Status: DISCONTINUED | OUTPATIENT
Start: 2025-03-26 | End: 2025-03-27

## 2025-03-26 RX ORDER — METHOCARBAMOL 500 MG/1
500 TABLET, FILM COATED ORAL EVERY 8 HOURS SCHEDULED
Status: DISCONTINUED | OUTPATIENT
Start: 2025-03-26 | End: 2025-03-28 | Stop reason: HOSPADM

## 2025-03-26 RX ORDER — ALBUTEROL SULFATE 90 UG/1
2 INHALANT RESPIRATORY (INHALATION) EVERY 6 HOURS PRN
Status: DISCONTINUED | OUTPATIENT
Start: 2025-03-26 | End: 2025-03-28 | Stop reason: HOSPADM

## 2025-03-26 RX ADMIN — ACETAMINOPHEN 650 MG: 325 TABLET, FILM COATED ORAL at 23:04

## 2025-03-26 RX ADMIN — OXYCODONE 10 MG: 5 TABLET ORAL at 13:50

## 2025-03-26 RX ADMIN — METHOCARBAMOL TABLETS 500 MG: 500 TABLET, COATED ORAL at 23:04

## 2025-03-26 RX ADMIN — OXYCODONE 10 MG: 5 TABLET ORAL at 21:07

## 2025-03-26 ASSESSMENT — LIFESTYLE VARIABLES
HAVE YOU EVER FELT YOU SHOULD CUT DOWN ON YOUR DRINKING: NO
TOTAL SCORE: 0
EVER FELT BAD OR GUILTY ABOUT YOUR DRINKING: NO
EVER HAD A DRINK FIRST THING IN THE MORNING TO STEADY YOUR NERVES TO GET RID OF A HANGOVER: NO
HAVE PEOPLE ANNOYED YOU BY CRITICIZING YOUR DRINKING: NO

## 2025-03-26 ASSESSMENT — PAIN - FUNCTIONAL ASSESSMENT: PAIN_FUNCTIONAL_ASSESSMENT: 0-10
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
## 2025-03-26 NOTE — DOCUMENTATION CLARIFICATION NOTE
"    PATIENT:               ALVARO GOSS  ACCT #:                  [id number]  MRN:                       [id number]  :                       1955  ADMIT DATE:       3/14/2025 5:10 AM  DISCH DATE:        3/24/2025 4:54 PM  RESPONDING PROVIDER #:        [id number]          PROVIDER RESPONSE TEXT:    Acute blood loss anemia likely related to surgery    CDI QUERY TEXT:    Clarification    Instruction:    Based on your assessment of the patient and the clinical information, please provide the requested documentation by clicking on the appropriate radio button and enter any additional information if prompted.    Question: Please further clarify the diagnosis of anemia as    When answering this query, please exercise your independent professional judgment. The fact that a question is being asked, does not imply that any particular answer is desired or expected.    The patient's clinical indicators include:  Clinical Information:  3/20/25 Plastic Surgery Progress Note:  \"Plastic surgery service consulted postoperatively to follow for flap recommendations/timing, now s/p R latissimus free flap to R knee on 3/14 with Dr. Lundberg.\"    Clinical Indicators:  3/20/25 Plastic Surgery Progress Note:  \"Post-Op Anemia  - HGB on AM labs at 11.1  - Presently no S/S of bleeding, considered likely due to intraoperative blood loss\"    3/14/25 surgical EBL: 25mL    Labs:  3/10/25: RBC-4.06, H/H-11.7/36.7  3/14/25: RBC-3.32, H/H-9.5/28.5  3/16/25: RBC-3.12, H/H-8.8/27.6    Treatment: Monitor vital signs/labs;    Risk Factors: S/p latissimus free flap to R knee; former tobacco use, no alcohol/drug use per the 3/1/25 H&P  Options provided:  -- Acute blood loss anemia likely related to surgery  -- Iron deficiency anemia  -- Anemia due to chronic disease, Please specify chronic disease below  -- Other - I will add my own diagnosis  -- Refer to Clinical Documentation Reviewer    Query created by: Orquidea Cohen on 3/21/2025 10:25 " AM      Electronically signed by:  TAMAR OVALLE PA-C 3/26/2025 7:19 PM

## 2025-03-26 NOTE — PROGRESS NOTES
Department of Plastic and Reconstructive Surgery  Progress Note     Patient Name: Adriana Wood  MRN: [id number]  Date:  03/31/25      HPI:  Adriana Wood is a 70 yo female had a total right knee done by Dr. Whelan in 03/2024 that was c/b E. Coli infection.   5/10/24: TKA wound dehiscence s/p I&D with re-closure of complex wound (Dr. Whelan)        -Referred to plastics in anticipation for possible flap coverage  2/28/25: OR (Dr. Whelan) Right knee I&D, revision static spacer and wound vac  3/14/25: s/p R Latissimus free flap to R knee (Dr. Reyes)      Assessment:  Vital Signs:  ***    Focused Exam:  RLE flap: warm to touch, soft and compressible, strong biphasic signal at marked stitch, circumferential incisions around flap well approximated with scant amount of ss drainage, covered with xeroform, vioptix in place with signal of 72% with 85% signal strength, previous MASSIEL drain site covered with Mepilex dressing  Right posterior lat donor site: prevena incisional wound vac in place, holding suction at -125 mmHg, no alarm or leak noted, MASSIEL drain x 1 to right flank with ss output    PmHx:  bradycardia, cardiomyopathy, CAD, cardiac arrest due to electrolyte abnormalities and zofran use post-operatively, and traumatic brain injury     Plan/Recommendations:  -Prevena wound vac  -MASSIEL drain x 1  -Incisional care to RLE flap with Xeroform daily  - Follow-up with Plastic Surgery

## 2025-03-27 ENCOUNTER — APPOINTMENT (OUTPATIENT)
Dept: RADIOLOGY | Facility: HOSPITAL | Age: 70
DRG: 920 | End: 2025-03-27
Payer: MEDICARE

## 2025-03-27 PROBLEM — T14.8XXA HEMATOMA: Status: ACTIVE | Noted: 2025-03-27

## 2025-03-27 LAB
ALBUMIN SERPL BCP-MCNC: 3.8 G/DL (ref 3.4–5)
ANION GAP SERPL CALC-SCNC: 12 MMOL/L (ref 10–20)
ATRIAL RATE: 82 BPM
BUN SERPL-MCNC: 14 MG/DL (ref 6–23)
CALCIUM SERPL-MCNC: 8.8 MG/DL (ref 8.6–10.3)
CHLORIDE SERPL-SCNC: 101 MMOL/L (ref 98–107)
CO2 SERPL-SCNC: 27 MMOL/L (ref 21–32)
CREAT SERPL-MCNC: 0.52 MG/DL (ref 0.5–1.05)
EGFRCR SERPLBLD CKD-EPI 2021: >90 ML/MIN/1.73M*2
ERYTHROCYTE [DISTWIDTH] IN BLOOD BY AUTOMATED COUNT: 14 % (ref 11.5–14.5)
GLUCOSE SERPL-MCNC: 90 MG/DL (ref 74–99)
HCT VFR BLD AUTO: 33.4 % (ref 36–46)
HGB BLD-MCNC: 11 G/DL (ref 12–16)
MCH RBC QN AUTO: 29.3 PG (ref 26–34)
MCHC RBC AUTO-ENTMCNC: 32.9 G/DL (ref 32–36)
MCV RBC AUTO: 89 FL (ref 80–100)
NRBC BLD-RTO: 0 /100 WBCS (ref 0–0)
P AXIS: 53 DEGREES
P OFFSET: 175 MS
P ONSET: 117 MS
PHOSPHATE SERPL-MCNC: 3.9 MG/DL (ref 2.5–4.9)
PLATELET # BLD AUTO: 238 X10*3/UL (ref 150–450)
POTASSIUM SERPL-SCNC: 3.9 MMOL/L (ref 3.5–5.3)
PR INTERVAL: 196 MS
Q ONSET: 215 MS
QRS COUNT: 13 BEATS
QRS DURATION: 72 MS
QT INTERVAL: 400 MS
QTC CALCULATION(BAZETT): 467 MS
QTC FREDERICIA: 444 MS
R AXIS: 30 DEGREES
RBC # BLD AUTO: 3.75 X10*6/UL (ref 4–5.2)
SODIUM SERPL-SCNC: 136 MMOL/L (ref 136–145)
T AXIS: 62 DEGREES
T OFFSET: 415 MS
VANCOMYCIN SERPL-MCNC: 12.6 UG/ML (ref 5–20)
VENTRICULAR RATE: 82 BPM
WBC # BLD AUTO: 5.1 X10*3/UL (ref 4.4–11.3)

## 2025-03-27 PROCEDURE — 2500000001 HC RX 250 WO HCPCS SELF ADMINISTERED DRUGS (ALT 637 FOR MEDICARE OP): Performed by: STUDENT IN AN ORGANIZED HEALTH CARE EDUCATION/TRAINING PROGRAM

## 2025-03-27 PROCEDURE — 99222 1ST HOSP IP/OBS MODERATE 55: CPT | Performed by: PHYSICIAN ASSISTANT

## 2025-03-27 PROCEDURE — 96365 THER/PROPH/DIAG IV INF INIT: CPT

## 2025-03-27 PROCEDURE — 2500000001 HC RX 250 WO HCPCS SELF ADMINISTERED DRUGS (ALT 637 FOR MEDICARE OP)

## 2025-03-27 PROCEDURE — 85027 COMPLETE CBC AUTOMATED: CPT

## 2025-03-27 PROCEDURE — 80202 ASSAY OF VANCOMYCIN: CPT | Performed by: STUDENT IN AN ORGANIZED HEALTH CARE EDUCATION/TRAINING PROGRAM

## 2025-03-27 PROCEDURE — 94760 N-INVAS EAR/PLS OXIMETRY 1: CPT

## 2025-03-27 PROCEDURE — 99222 1ST HOSP IP/OBS MODERATE 55: CPT

## 2025-03-27 PROCEDURE — 76705 ECHO EXAM OF ABDOMEN: CPT | Mod: FOREIGN READ | Performed by: RADIOLOGY

## 2025-03-27 PROCEDURE — 1200000002 HC GENERAL ROOM WITH TELEMETRY DAILY

## 2025-03-27 PROCEDURE — 76705 ECHO EXAM OF ABDOMEN: CPT

## 2025-03-27 PROCEDURE — 2500000005 HC RX 250 GENERAL PHARMACY W/O HCPCS: Performed by: STUDENT IN AN ORGANIZED HEALTH CARE EDUCATION/TRAINING PROGRAM

## 2025-03-27 PROCEDURE — 2500000004 HC RX 250 GENERAL PHARMACY W/ HCPCS (ALT 636 FOR OP/ED)

## 2025-03-27 PROCEDURE — 84100 ASSAY OF PHOSPHORUS: CPT

## 2025-03-27 PROCEDURE — 96366 THER/PROPH/DIAG IV INF ADDON: CPT

## 2025-03-27 PROCEDURE — 2500000004 HC RX 250 GENERAL PHARMACY W/ HCPCS (ALT 636 FOR OP/ED): Performed by: STUDENT IN AN ORGANIZED HEALTH CARE EDUCATION/TRAINING PROGRAM

## 2025-03-27 RX ORDER — POLYETHYLENE GLYCOL 3350 17 G/17G
17 POWDER, FOR SOLUTION ORAL DAILY
Status: DISCONTINUED | OUTPATIENT
Start: 2025-03-27 | End: 2025-03-28 | Stop reason: HOSPADM

## 2025-03-27 RX ORDER — SODIUM CHLORIDE 0.9 % (FLUSH) 0.9 %
10 SYRINGE (ML) INJECTION EVERY 8 HOURS
Status: ON HOLD | COMMUNITY

## 2025-03-27 RX ORDER — HEPARIN SODIUM,PORCINE 10 UNIT/ML
5 VIAL (ML) INTRAVENOUS AS NEEDED
Status: ON HOLD | COMMUNITY

## 2025-03-27 RX ORDER — OXYCODONE HYDROCHLORIDE 5 MG/1
5 TABLET ORAL EVERY 4 HOURS PRN
Status: ON HOLD | COMMUNITY

## 2025-03-27 RX ORDER — OXYCODONE HYDROCHLORIDE 5 MG/1
10 TABLET ORAL EVERY 4 HOURS PRN
Status: ON HOLD | COMMUNITY

## 2025-03-27 RX ORDER — ACETAMINOPHEN 325 MG/1
975 TABLET ORAL EVERY 8 HOURS
Status: DISCONTINUED | OUTPATIENT
Start: 2025-03-27 | End: 2025-03-28 | Stop reason: HOSPADM

## 2025-03-27 RX ORDER — PANTOPRAZOLE SODIUM 40 MG/1
40 TABLET, DELAYED RELEASE ORAL
Status: ON HOLD | COMMUNITY

## 2025-03-27 RX ORDER — OXYCODONE HYDROCHLORIDE 5 MG/1
5 TABLET ORAL EVERY 6 HOURS PRN
Status: DISCONTINUED | OUTPATIENT
Start: 2025-03-27 | End: 2025-03-28

## 2025-03-27 RX ORDER — SODIUM CHLORIDE 0.9 % (FLUSH) 0.9 %
10 SYRINGE (ML) INJECTION 2 TIMES DAILY
Status: ON HOLD | COMMUNITY
End: 2025-03-27 | Stop reason: ENTERED-IN-ERROR

## 2025-03-27 RX ORDER — OXYCODONE HYDROCHLORIDE 5 MG/1
10 TABLET ORAL EVERY 6 HOURS PRN
Status: DISCONTINUED | OUTPATIENT
Start: 2025-03-27 | End: 2025-03-28

## 2025-03-27 RX ADMIN — PANTOPRAZOLE SODIUM 40 MG: 40 TABLET, DELAYED RELEASE ORAL at 07:21

## 2025-03-27 RX ADMIN — METHOCARBAMOL TABLETS 500 MG: 500 TABLET, COATED ORAL at 07:21

## 2025-03-27 RX ADMIN — ACETAMINOPHEN 975 MG: 325 TABLET, FILM COATED ORAL at 17:20

## 2025-03-27 RX ADMIN — OXYCODONE HYDROCHLORIDE 10 MG: 5 TABLET ORAL at 18:03

## 2025-03-27 RX ADMIN — OXYCODONE 10 MG: 5 TABLET ORAL at 03:51

## 2025-03-27 RX ADMIN — VANCOMYCIN HYDROCHLORIDE 1000 MG: 1 INJECTION, SOLUTION INTRAVENOUS at 02:25

## 2025-03-27 RX ADMIN — METHOCARBAMOL TABLETS 500 MG: 500 TABLET, COATED ORAL at 13:54

## 2025-03-27 RX ADMIN — OXYCODONE HYDROCHLORIDE 10 MG: 5 TABLET ORAL at 11:30

## 2025-03-27 RX ADMIN — METHOCARBAMOL TABLETS 500 MG: 500 TABLET, COATED ORAL at 20:25

## 2025-03-27 RX ADMIN — ACETAMINOPHEN 975 MG: 325 TABLET, FILM COATED ORAL at 20:26

## 2025-03-27 RX ADMIN — ACETAMINOPHEN 975 MG: 325 TABLET, FILM COATED ORAL at 07:21

## 2025-03-27 RX ADMIN — VANCOMYCIN HYDROCHLORIDE 1000 MG: 1 INJECTION, SOLUTION INTRAVENOUS at 13:54

## 2025-03-27 SDOH — SOCIAL STABILITY: SOCIAL INSECURITY: WITHIN THE LAST YEAR, HAVE YOU BEEN AFRAID OF YOUR PARTNER OR EX-PARTNER?: NO

## 2025-03-27 SDOH — HEALTH STABILITY: MENTAL HEALTH: HOW OFTEN DO YOU HAVE SIX OR MORE DRINKS ON ONE OCCASION?: NEVER

## 2025-03-27 SDOH — SOCIAL STABILITY: SOCIAL INSECURITY: HAVE YOU HAD ANY THOUGHTS OF HARMING ANYONE ELSE?: NO

## 2025-03-27 SDOH — SOCIAL STABILITY: SOCIAL INSECURITY: WITHIN THE LAST YEAR, HAVE YOU BEEN HUMILIATED OR EMOTIONALLY ABUSED IN OTHER WAYS BY YOUR PARTNER OR EX-PARTNER?: NO

## 2025-03-27 SDOH — ECONOMIC STABILITY: HOUSING INSECURITY: AT ANY TIME IN THE PAST 12 MONTHS, WERE YOU HOMELESS OR LIVING IN A SHELTER (INCLUDING NOW)?: NO

## 2025-03-27 SDOH — SOCIAL STABILITY: SOCIAL INSECURITY: DOES ANYONE TRY TO KEEP YOU FROM HAVING/CONTACTING OTHER FRIENDS OR DOING THINGS OUTSIDE YOUR HOME?: NO

## 2025-03-27 SDOH — ECONOMIC STABILITY: HOUSING INSECURITY: IN THE LAST 12 MONTHS, WAS THERE A TIME WHEN YOU WERE NOT ABLE TO PAY THE MORTGAGE OR RENT ON TIME?: NO

## 2025-03-27 SDOH — SOCIAL STABILITY: SOCIAL INSECURITY: DO YOU FEEL UNSAFE GOING BACK TO THE PLACE WHERE YOU ARE LIVING?: NO

## 2025-03-27 SDOH — ECONOMIC STABILITY: INCOME INSECURITY: IN THE PAST 12 MONTHS HAS THE ELECTRIC, GAS, OIL, OR WATER COMPANY THREATENED TO SHUT OFF SERVICES IN YOUR HOME?: NO

## 2025-03-27 SDOH — SOCIAL STABILITY: SOCIAL INSECURITY: ABUSE: ADULT

## 2025-03-27 SDOH — ECONOMIC STABILITY: TRANSPORTATION INSECURITY: IN THE PAST 12 MONTHS, HAS LACK OF TRANSPORTATION KEPT YOU FROM MEDICAL APPOINTMENTS OR FROM GETTING MEDICATIONS?: NO

## 2025-03-27 SDOH — SOCIAL STABILITY: SOCIAL INSECURITY: WERE YOU ABLE TO COMPLETE ALL THE BEHAVIORAL HEALTH SCREENINGS?: YES

## 2025-03-27 SDOH — SOCIAL STABILITY: SOCIAL INSECURITY: HAS ANYONE EVER THREATENED TO HURT YOUR FAMILY OR YOUR PETS?: NO

## 2025-03-27 SDOH — ECONOMIC STABILITY: FOOD INSECURITY: WITHIN THE PAST 12 MONTHS, THE FOOD YOU BOUGHT JUST DIDN'T LAST AND YOU DIDN'T HAVE MONEY TO GET MORE.: NEVER TRUE

## 2025-03-27 SDOH — SOCIAL STABILITY: SOCIAL INSECURITY: ARE THERE ANY APPARENT SIGNS OF INJURIES/BEHAVIORS THAT COULD BE RELATED TO ABUSE/NEGLECT?: NO

## 2025-03-27 SDOH — ECONOMIC STABILITY: FOOD INSECURITY: WITHIN THE PAST 12 MONTHS, YOU WORRIED THAT YOUR FOOD WOULD RUN OUT BEFORE YOU GOT THE MONEY TO BUY MORE.: NEVER TRUE

## 2025-03-27 SDOH — SOCIAL STABILITY: SOCIAL INSECURITY: DO YOU FEEL ANYONE HAS EXPLOITED OR TAKEN ADVANTAGE OF YOU FINANCIALLY OR OF YOUR PERSONAL PROPERTY?: NO

## 2025-03-27 SDOH — SOCIAL STABILITY: SOCIAL INSECURITY: HAVE YOU HAD THOUGHTS OF HARMING ANYONE ELSE?: NO

## 2025-03-27 SDOH — HEALTH STABILITY: MENTAL HEALTH: HOW OFTEN DO YOU HAVE A DRINK CONTAINING ALCOHOL?: NEVER

## 2025-03-27 SDOH — ECONOMIC STABILITY: HOUSING INSECURITY: IN THE PAST 12 MONTHS, HOW MANY TIMES HAVE YOU MOVED WHERE YOU WERE LIVING?: 0

## 2025-03-27 SDOH — HEALTH STABILITY: MENTAL HEALTH: HOW MANY DRINKS CONTAINING ALCOHOL DO YOU HAVE ON A TYPICAL DAY WHEN YOU ARE DRINKING?: PATIENT DOES NOT DRINK

## 2025-03-27 SDOH — ECONOMIC STABILITY: FOOD INSECURITY: HOW HARD IS IT FOR YOU TO PAY FOR THE VERY BASICS LIKE FOOD, HOUSING, MEDICAL CARE, AND HEATING?: NOT HARD AT ALL

## 2025-03-27 SDOH — SOCIAL STABILITY: SOCIAL INSECURITY: ARE YOU OR HAVE YOU BEEN THREATENED OR ABUSED PHYSICALLY, EMOTIONALLY, OR SEXUALLY BY ANYONE?: NO

## 2025-03-27 ASSESSMENT — ACTIVITIES OF DAILY LIVING (ADL)
WALKS IN HOME: NEEDS ASSISTANCE
JUDGMENT_ADEQUATE_SAFELY_COMPLETE_DAILY_ACTIVITIES: YES
ADEQUATE_TO_COMPLETE_ADL: YES
PATIENT'S MEMORY ADEQUATE TO SAFELY COMPLETE DAILY ACTIVITIES?: YES
DRESSING YOURSELF: NEEDS ASSISTANCE
BATHING: NEEDS ASSISTANCE
HEARING - LEFT EAR: FUNCTIONAL
LACK_OF_TRANSPORTATION: NO
GROOMING: NEEDS ASSISTANCE
LACK_OF_TRANSPORTATION: NO
FEEDING YOURSELF: INDEPENDENT
TOILETING: NEEDS ASSISTANCE
ASSISTIVE_DEVICE: WHEELCHAIR
HEARING - RIGHT EAR: FUNCTIONAL

## 2025-03-27 ASSESSMENT — COGNITIVE AND FUNCTIONAL STATUS - GENERAL
DRESSING REGULAR LOWER BODY CLOTHING: A LOT
MOBILITY SCORE: 13
MOVING FROM LYING ON BACK TO SITTING ON SIDE OF FLAT BED WITH BEDRAILS: A LITTLE
WALKING IN HOSPITAL ROOM: A LOT
STANDING UP FROM CHAIR USING ARMS: A LOT
DAILY ACTIVITIY SCORE: 17
CLIMB 3 TO 5 STEPS WITH RAILING: TOTAL
DRESSING REGULAR LOWER BODY CLOTHING: A LOT
MOVING TO AND FROM BED TO CHAIR: A LOT
WALKING IN HOSPITAL ROOM: A LOT
TOILETING: A LOT
DRESSING REGULAR UPPER BODY CLOTHING: A LITTLE
CLIMB 3 TO 5 STEPS WITH RAILING: TOTAL
MOVING FROM LYING ON BACK TO SITTING ON SIDE OF FLAT BED WITH BEDRAILS: A LITTLE
MOVING FROM LYING ON BACK TO SITTING ON SIDE OF FLAT BED WITH BEDRAILS: A LITTLE
HELP NEEDED FOR BATHING: A LOT
STANDING UP FROM CHAIR USING ARMS: A LOT
DRESSING REGULAR UPPER BODY CLOTHING: A LITTLE
MOBILITY SCORE: 14
TOILETING: A LOT
MOVING TO AND FROM BED TO CHAIR: A LOT
TURNING FROM BACK TO SIDE WHILE IN FLAT BAD: A LITTLE
TOILETING: A LOT
CLIMB 3 TO 5 STEPS WITH RAILING: TOTAL
HELP NEEDED FOR BATHING: A LOT
TURNING FROM BACK TO SIDE WHILE IN FLAT BAD: A LITTLE
TURNING FROM BACK TO SIDE WHILE IN FLAT BAD: A LITTLE
STANDING UP FROM CHAIR USING ARMS: A LOT
PATIENT BASELINE BEDBOUND: NO
DAILY ACTIVITIY SCORE: 17
DAILY ACTIVITIY SCORE: 17
WALKING IN HOSPITAL ROOM: A LOT
HELP NEEDED FOR BATHING: A LOT
MOBILITY SCORE: 13
MOVING TO AND FROM BED TO CHAIR: A LITTLE
DRESSING REGULAR LOWER BODY CLOTHING: A LOT
DRESSING REGULAR UPPER BODY CLOTHING: A LITTLE

## 2025-03-27 ASSESSMENT — ENCOUNTER SYMPTOMS
ABDOMINAL PAIN: 0
CONSTIPATION: 0
FREQUENCY: 0
CHILLS: 0
COUGH: 0
NAUSEA: 0
SHORTNESS OF BREATH: 0
DYSURIA: 0
VOMITING: 0
DIZZINESS: 0
LIGHT-HEADEDNESS: 0
HEMATURIA: 0
DIARRHEA: 0
FEVER: 0

## 2025-03-27 ASSESSMENT — PAIN DESCRIPTION - LOCATION
LOCATION: KNEE
LOCATION: KNEE

## 2025-03-27 ASSESSMENT — PAIN DESCRIPTION - ORIENTATION
ORIENTATION: RIGHT
ORIENTATION: RIGHT

## 2025-03-27 ASSESSMENT — LIFESTYLE VARIABLES
AUDIT-C TOTAL SCORE: 0
SKIP TO QUESTIONS 9-10: 1

## 2025-03-27 ASSESSMENT — PAIN SCALES - GENERAL
PAINLEVEL_OUTOF10: 0 - NO PAIN
PAINLEVEL_OUTOF10: 8

## 2025-03-27 ASSESSMENT — PAIN - FUNCTIONAL ASSESSMENT
PAIN_FUNCTIONAL_ASSESSMENT: UNABLE TO SELF-REPORT
PAIN_FUNCTIONAL_ASSESSMENT: 0-10

## 2025-03-27 NOTE — PROGRESS NOTES
Adriana Wood is a 69 y.o. female on day 0 of admission presenting with Hematoma.      Subjective   Patient was seen and examined at bedside. No acute events overnight.  Continues to endorse pain from MASSIEL site.   Denies chest pain, shortness of breath, palpitations.    Objective     Last Recorded Vitals  BP (!) 111/42   Pulse 76   Temp 36.4 °C (97.5 °F)   Resp 11   Wt 68.9 kg (152 lb)   SpO2 96%   Intake/Output last 3 Shifts:    Intake/Output Summary (Last 24 hours) at 3/27/2025 0745  Last data filed at 3/26/2025 1200  Gross per 24 hour   Intake --   Output 125 ml   Net -125 ml       Admission Weight  Weight: 68.9 kg (152 lb) (03/26/25 1027)    Daily Weight  03/26/25 : 68.9 kg (152 lb)    Image Results  XR knee right 1-2 views  Narrative: Interpreted By:  Tim Gardner,   STUDY:  XR KNEE RIGHT 1-2 VIEWS  3/26/2025 12:14 pm      INDICATION:  Signs/Symptoms:Right knee pain      COMPARISON:  02/28/2025      ACCESSION NUMBER(S):  [id number]      ORDERING CLINICIAN:  PARVIZ SAUCEDA      TECHNIQUE:  2 views of the right knee including AP and lateral projections were  obtained.      FINDINGS:  Extensive postoperative changes are seen in the right knee, with a  nonmobile see med spacer now seen across the joint space. There is no  evidence of acute fracture or dislocation identified. No  suprapatellar joint effusion is present.      Impression: 1. Postoperative changes, as above.  2. No acute fracture or dislocation.      MACRO:  None.      Signed by: Tim Gardner 3/26/2025 12:18 PM  Dictation workstation:   [id number]  CT chest abdomen pelvis wo IV contrast  Narrative: Interpreted By:  Schoenberger, Joseph,   STUDY:  CT CHEST ABDOMEN PELVIS WO CONTRAST;  3/26/2025 11:20 am      INDICATION:  Signs/Symptoms:Bleeding from wound vac.          COMPARISON:  Abdomen pelvis CT 08/17/2022      ACCESSION NUMBER(S):  [id number]      ORDERING CLINICIAN:  PARVIZ SAUCEDA      TECHNIQUE:  CT of the chest, abdomen and  pelvis was performed. Contiguous axial  images were obtained at 3 mm slice thickness through the chest,  abdomen and pelvis. Coronal and sagittal reconstructions at 3 mm  slice thickness were performed.  No intravenous or oral contrast  agents were administered.      FINDINGS:  Please note that the study is limited without intravenous contrast.      CHEST:          LUNG/PLEURA/LARGE AIRWAYS:  There are moderate findings of centrilobular emphysema. There is no  pleural effusion or pneumothorax. There are a few bandlike opacities  in the lung bases likely areas of postinflammatory scarring. The  large airways are intact.      VESSELS:  Aorta and pulmonary arteries are normal caliber.  Mild  atherosclerotic changes are noted of the aorta and branching vessels.  Moderate coronary artery calcifications are present.      HEART:  Normal size.  No pericardial effusion      MEDIASTINUM AND DAYSI:  No mediastinal, hilar or axillary lymph nodes are present.  No  supraclavicular or axillary adenopathy.      CHEST WALL AND LOWER NECK:  There is a drainage catheter. It enters along the right flank  traveling through the inferolateral aspect of the latissimus dorsi.  It then travels cranially to enter the latissimus dorsi superolateral  aspect where there is a prominent area of heterogeneous attenuation  some of which has high attenuation consistent with hematoma. This  hematoma extends inferiorly in the more lateral margin of the  latissimus musculature. The findings are compatible with a hematoma  at this site. Extending caudally from this there is increased  attenuation soft tissue enlargement anterior to the distribution of  the catheter. The thyroid appears numeral. No supraclavicular or  axillary adenopathy.      ABDOMEN:      LIVER:  Liver: Within normal limits.      BILE DUCTS:  Bile ducts: Normal caliber.      GALLBLADDER:  The gallbladder is considerably distended. However no calcified  stones, wall thickening, or  pericholecystic inflammatory findings are  noted.      PANCREAS:  The pancreas appears unremarkable.      SPLEEN:  Within normal limits.      ADRENAL GLANDS:  Within normal limits.      KIDNEYS AND URETERS:  There is a nonobstructing lower pole right renal stone measuring  approximately 8 mm similar to prior exam. No renal cortical lesions  are seen. No hydroureteronephrosis or nephroureterolithiasis is  present.      PELVIS:      BLADDER:  Within normal limits.      REPRODUCTIVE ORGANS:  Lobular anterior margins of the uterus with calcifications consistent  with fibroid changes and some dystrophic fibroids.      BOWEL:  The stomach is unremarkable.. The small and large bowel are normal in  caliber and demonstrate no wall thickening.  Normal appendix.      VESSELS:  The aorta and IVC are within normal limits.      PERITONEUM/RETROPERITONEUM/LYMPH NODES:  No ascites or free air, no fluid collection.  The retroperitoneum  appears unremarkable.  There is no pathologic enlargement of  abdominal or pelvic lymph nodes.      ABDOMINAL WALL:  The abdominal wall soft tissues appear normal.      BONES:  No suspicious osseous lesions are present. Degenerative discogenic  disease is noted in the lower thoracic and lumbar spine.      Impression: Chest  1.  There is a tunneled drainage catheter extending from the right  upper abdominal posterolateral flying superiorly to the superolateral  aspect of the latissimus dorsi. At this site there is a hematoma  likely explaining the clinical findings. See detailed discussion  above.      Abdomen-Pelvis  1.  Hydropic gallbladder with no other abnormal findings such as  calcified stones or wall thickening.  2. Nonobstructing lower pole right nephrolithiasis unchanged from  prior.  3. Fibroid changes in the uterus.  4. No definite acute abdominal or pelvic findings          MACRO:  None      Signed by: Joseph Schoenberger 3/26/2025 12:02 PM  Dictation workstation:    [id number]    Physical Exam  Constitutional:       General: She is not in acute distress.     Appearance: She is not ill-appearing.   Cardiovascular:      Rate and Rhythm: Normal rate and regular rhythm.   Pulmonary:      Effort: No respiratory distress.      Breath sounds: Normal breath sounds.   Abdominal:      General: Bowel sounds are normal. There is no distension.      Tenderness: There is no abdominal tenderness.   Musculoskeletal:      Comments: MASSIEL drain with bright blood output,   R lateral chest wall.  Right knee: No signs of purulence or infection    Relevant Results  Results for orders placed or performed during the hospital encounter of 03/26/25 (from the past 24 hours)   Vancomycin   Result Value Ref Range    Vancomycin 12.6 5.0 - 20.0 ug/mL   CBC   Result Value Ref Range    WBC 5.1 4.4 - 11.3 x10*3/uL    nRBC 0.0 0.0 - 0.0 /100 WBCs    RBC 3.75 (L) 4.00 - 5.20 x10*6/uL    Hemoglobin 11.0 (L) 12.0 - 16.0 g/dL    Hematocrit 33.4 (L) 36.0 - 46.0 %    MCV 89 80 - 100 fL    MCH 29.3 26.0 - 34.0 pg    MCHC 32.9 32.0 - 36.0 g/dL    RDW 14.0 11.5 - 14.5 %    Platelets 238 150 - 450 x10*3/uL   Renal Function Panel   Result Value Ref Range    Glucose 90 74 - 99 mg/dL    Sodium 136 136 - 145 mmol/L    Potassium 3.9 3.5 - 5.3 mmol/L    Chloride 101 98 - 107 mmol/L    Bicarbonate 27 21 - 32 mmol/L    Anion Gap 12 10 - 20 mmol/L    Urea Nitrogen 14 6 - 23 mg/dL    Creatinine 0.52 0.50 - 1.05 mg/dL    eGFR >90 >60 mL/min/1.73m*2    Calcium 8.8 8.6 - 10.3 mg/dL    Phosphorus 3.9 2.5 - 4.9 mg/dL    Albumin 3.8 3.4 - 5.0 g/dL     *Note: Due to a large number of results and/or encounters for the requested time period, some results have not been displayed. A complete set of results can be found in Results Review.         Assessment/Plan   Assessment & Plan  Hematoma  Adriana Wood is a 69 y.o. female with PMH of bradycardia, cardiomyopathy, CAD, GERD, cardiac arrest due to electrolyte abnormalities and zofran use  post-operatively, and traumatic brain injury presenting with  increased sanguineous output from wound vac drain for latissimus free flap to R knee (3/14/25).     # Bloody MASSIEL output (from skin graft site)  #S/p latissimus free flap to R knee (3/14/25)  #Hematoma (near latissimus dorsi)  - CTAP with hematoma near the superolateral aspect of catheter entering the latissimus dorsi. Hematoma extends inferiorly to lateral margin of latissimus muscle.   - XR right knee without acute fracture or dislocation  PLAN:  - pending transfer to [facility] [place] to plastic surgery pending bed availability  - hold eliquis 2.5  - continue vancomycin  - wound care consulted  - warm compress ordered  - pain: lidocaine patch, tylenol 975 mg Q8hrs, oxycodone 5 mg Q6hrs prn for moderate, oxycodone 10mg Q6hrs prn for severe  - bowel: miralax daily, docusate 100 mg BID prn    # Hepatic Steatosis  # Hydropic gallbladder  CTCAP: Hydropic gallbladder no calcified stones or wall thickening.  RUQ US: Diffuse hepatic steatosis. Dilatation of the common bile duct measuring 0.8 cm. No cholelithiasis or gallbladder wall thickening is appreciated.   Nonobstructing right renal calculus  PLAN:  -Monitor for symptoms.    Chronic medical issues:  #GERD: continue pantoprazole 40 mg daily     F: PO intake & IVF PRN   E: Replete as needed   N: Regular diet  GI ppx: Protonix 40 mg daily  DVT ppx: hold eliquis   Antibiotics: Vanc  Tubes/Lines/Drains: wound vac drain R lateral chest     Code Status: Full code  Emergency Contact: Extended Emergency Contact Information  Primary Emergency Contact: SUBHASH GOSS  Address: [address] [place] [address] [facility]  Home Phone: [phone]  Mobile Phone: [phone]  Relation: Spouse  Secondary Emergency Contact: GALILEA WHITAKER  Address: [address] [place] [address] [facility]  Home Phone: [phone]  Work Phone: [phone]  Mobile  Phone: [phone]  Relation: Daughter  Preferred language: English      Codie Vieira DO, PGY-1  Internal Medicine   3/27/25 at 7:45 AM.
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
## 2025-03-27 NOTE — PROGRESS NOTES
This patient was signed out to me at 2200 pending transfer to [facility] where she was to be admitted to plastic surgery, unfortunately after extended period of time boarding in the ED waiting for transfer, there is still no beds available at [facility].  I did restart her home medications, including her vancomycin.      Due to prolonged boarding in the ED, I discussed the patient with the hospitalist group who accepted the patient as an admission to [facility] while awaiting a [facility] bed.

## 2025-03-27 NOTE — CARE PLAN
The patient's goals for the shift include      The clinical goals for the shift include Pt will remain hemodynamically stable during this shift.    Problem: Pain - Adult  Goal: Verbalizes/displays adequate comfort level or baseline comfort level  Outcome: Progressing     Problem: Safety - Adult  Goal: Free from fall injury  Outcome: Progressing     Problem: Discharge Planning  Goal: Discharge to home or other facility with appropriate resources  Outcome: Progressing     Problem: Chronic Conditions and Co-morbidities  Goal: Patient's chronic conditions and co-morbidity symptoms are monitored and maintained or improved  Outcome: Progressing     Problem: Nutrition  Goal: Nutrient intake appropriate for maintaining nutritional needs  Outcome: Progressing

## 2025-03-27 NOTE — CONSULTS
"General/Trauma Surgery History and Physical      History Of Present Illness  Adriana Wood is a 69 y.o. female with recent history of right latissimus free flap to right knee on 3/14 with Dr. Lundberg, plastic surgery, at [facility]. She was discharged with a Prevena wound vac over her latissimus incision and a MASSIEL drain. At discharge, she notes the MASSIEL drainage was almost purely \"straw colored\" serous fluid. Patient then noticed the MASSIEL output turn sanguinous. She woke up this morning to her bed sheet soaked with blood and noted the MASSIEL leaking from the skin site. The nursing facility she is currently at noted 100 ml/hr of bloody output and therefore referred to the ED. In the ED, she had a CT that reveals a hematoma surrounding the MASSIEL and into the latissimus. She has been accepted to [facility] to be evaluated by her known surgical team, although admitted currently to [facility] until a bed becomes available. On exam, she complains of right knee pain and spasming across the abdomen and right flank. The sanguinous fluid is contained to the MASSIEL bulb and there is some mild clotting within the drain. It strips easily. She denies fevers, chills, n/v at her facility.        Past Medical History  bradycardia, cardiomyopathy, CAD, GERD, cardiac arrest due to electrolyte abnormalities and zofran use post-operatively, and traumatic brain injury     Surgical History  Right TKA 3/2024  Right TKA revision with polyswap 5/2024  Right knee I&D revision with spacer, wound vac 2/28/2025  Right latissimus free flap to right knee 3/14/25      Social History   reports that she quit smoking about 8 years ago. Her smoking use included cigarettes. She has been exposed to tobacco smoke. She has never used smokeless tobacco. She reports that she does not drink alcohol and does not use drugs. Currently in acute rehab following surgery.  at bedside with patient.    Family History  family history includes Bone cancer in her mother's sister; Colon cancer " "in her father; Heart disease in her maternal grandmother, mother, and sister; Hypertension in her maternal grandmother; Lung cancer in her mother; TBI in her father; brain tumor in her maternal grandfather.     Allergies  Iodinated contrast media, Naproxen, Ondansetron hcl, Penicillins, Tramadol, Doxycycline calcium, Codeine, Augmentin [amoxicillin-pot clavulanate], Doxycycline hyclate, and Duloxetine    Meds  Current Outpatient Medications   Medication Instructions    acetaminophen (TYLENOL) 975 mg, oral, Every 8 hours    albuterol 90 mcg/actuation inhaler 2 puffs, inhalation, Every 6 hours PRN    apixaban (ELIQUIS) 2.5 mg, oral, 2 times daily    docusate sodium (COLACE) 100 mg, oral, 2 times daily PRN    heparin lock flush, porcine, 10 unit/mL injection 5 mL, intravenous, As needed    lidocaine HCL 4 % adhesive patch,medicated 1 patch, topical (top), Daily, Apply to RLE    methocarbamol (ROBAXIN) 500 mg, oral, Every 8 hours scheduled    oxyCODONE (ROXICODONE) 5 mg, oral, Every 4 hours PRN    oxyCODONE (ROXICODONE) 10 mg, oral, Every 4 hours PRN    pantoprazole (PROTONIX) 40 mg, oral, Daily before breakfast, Do not crush, chew, or split.    sodium chloride 0.9% (Normal Saline Flush) flush 10 mL, intravenous, Every 8 hours    vancomycin (Vancocin) IVPB 1 g, intravenous, Every 12 hours        Review of Systems   A complete 10 point review of systems was performed and is negative except as noted in the history of present illness.     Last Recorded Vitals  Blood pressure (!) 147/93, pulse (!) 113, temperature 36.4 °C (97.5 °F), temperature source Temporal, resp. rate 12, height 1.778 m (5' 10\"), weight 68.9 kg (152 lb), SpO2 98%.    0-10 (Numeric) Pain Score: 0 - No pain     Physical Exam  Constitutional: No acute distress. Sitting up in bed.  Neuro: Alert, oriented. Follows commands.   Eyes: Extraocular motions intact. No scleral icterus. Conjunctiva pink.   EENT: Mucous membranes moist. Normal dentition. Hears normal " speaking voice.  Neck: Supple. No masses.  Cardiovascular: Regular rate. Palpable pulses bilaterally. No pitting edema.   Respiratory: No increased work of breathing or audible wheeze. Equal expansion.  Abdomen: Soft, non obese abdomen. Nondistended.   MSK: R knee incision with no sign of infection. Right latissimus incision with overlying Provena, MASSIEL with sanguinous output, some clotting within bulb, strips easily, no leaking from skin site. No surrounding induration.   Lymphatic: No palpable lymph nodes. No lymphedema.   Skin: Warm, dry, intact. No rashes or lesions.   Psychological: Appropriate mood and behavior.           Relevant Results  Laboratory Results:  CBC:   Lab Results   Component Value Date    WBC 4.8 03/28/2025    RBC 3.66 (L) 03/28/2025    HGB 10.6 (L) 03/28/2025    HCT 33.1 (L) 03/28/2025     03/28/2025       RFP:   Lab Results   Component Value Date     03/28/2025    K 3.9 03/28/2025     03/28/2025    CO2 25 03/28/2025    BUN 13 03/28/2025    CREATININE 0.53 03/28/2025    CALCIUM 8.5 (L) 03/28/2025    MG 1.74 03/28/2025    PHOS 4.1 03/28/2025        LFTs:   Lab Results   Component Value Date    PROT 6.7 03/26/2025    ALBUMIN 3.8 03/28/2025    BILITOT 0.4 03/26/2025    ALKPHOS 122 03/26/2025    AST 23 03/26/2025    ALT 22 03/26/2025         Imaging:  CT chest abdomen pelvis wo IV contrast 03/26/2025    Narrative  Interpreted By:  Schoenberger, Joseph,  STUDY:  CT CHEST ABDOMEN PELVIS WO CONTRAST;  3/26/2025 11:20 am    INDICATION:  Signs/Symptoms:Bleeding from wound vac.      COMPARISON:  Abdomen pelvis CT 08/17/2022    ACCESSION NUMBER(S):  [id number]    ORDERING CLINICIAN:  PARVIZ SAUCEDA    TECHNIQUE:  CT of the chest, abdomen and pelvis was performed. Contiguous axial  images were obtained at 3 mm slice thickness through the chest,  abdomen and pelvis. Coronal and sagittal reconstructions at 3 mm  slice thickness were performed.  No intravenous or oral contrast  agents  were administered.    FINDINGS:  Please note that the study is limited without intravenous contrast.    CHEST:      LUNG/PLEURA/LARGE AIRWAYS:  There are moderate findings of centrilobular emphysema. There is no  pleural effusion or pneumothorax. There are a few bandlike opacities  in the lung bases likely areas of postinflammatory scarring. The  large airways are intact.    VESSELS:  Aorta and pulmonary arteries are normal caliber.  Mild  atherosclerotic changes are noted of the aorta and branching vessels.  Moderate coronary artery calcifications are present.    HEART:  Normal size.  No pericardial effusion    MEDIASTINUM AND DAYSI:  No mediastinal, hilar or axillary lymph nodes are present.  No  supraclavicular or axillary adenopathy.    CHEST WALL AND LOWER NECK:  There is a drainage catheter. It enters along the right flank  traveling through the inferolateral aspect of the latissimus dorsi.  It then travels cranially to enter the latissimus dorsi superolateral  aspect where there is a prominent area of heterogeneous attenuation  some of which has high attenuation consistent with hematoma. This  hematoma extends inferiorly in the more lateral margin of the  latissimus musculature. The findings are compatible with a hematoma  at this site. Extending caudally from this there is increased  attenuation soft tissue enlargement anterior to the distribution of  the catheter. The thyroid appears numeral. No supraclavicular or  axillary adenopathy.    ABDOMEN:    LIVER:  Liver: Within normal limits.    BILE DUCTS:  Bile ducts: Normal caliber.    GALLBLADDER:  The gallbladder is considerably distended. However no calcified  stones, wall thickening, or pericholecystic inflammatory findings are  noted.    PANCREAS:  The pancreas appears unremarkable.    SPLEEN:  Within normal limits.    ADRENAL GLANDS:  Within normal limits.    KIDNEYS AND URETERS:  There is a nonobstructing lower pole right renal stone  measuring  approximately 8 mm similar to prior exam. No renal cortical lesions  are seen. No hydroureteronephrosis or nephroureterolithiasis is  present.    PELVIS:    BLADDER:  Within normal limits.    REPRODUCTIVE ORGANS:  Lobular anterior margins of the uterus with calcifications consistent  with fibroid changes and some dystrophic fibroids.    BOWEL:  The stomach is unremarkable.. The small and large bowel are normal in  caliber and demonstrate no wall thickening.  Normal appendix.    VESSELS:  The aorta and IVC are within normal limits.    PERITONEUM/RETROPERITONEUM/LYMPH NODES:  No ascites or free air, no fluid collection.  The retroperitoneum  appears unremarkable.  There is no pathologic enlargement of  abdominal or pelvic lymph nodes.    ABDOMINAL WALL:  The abdominal wall soft tissues appear normal.    BONES:  No suspicious osseous lesions are present. Degenerative discogenic  disease is noted in the lower thoracic and lumbar spine.    Impression  Chest  1.  There is a tunneled drainage catheter extending from the right  upper abdominal posterolateral flying superiorly to the superolateral  aspect of the latissimus dorsi. At this site there is a hematoma  likely explaining the clinical findings. See detailed discussion  above.    Abdomen-Pelvis  1.  Hydropic gallbladder with no other abnormal findings such as  calcified stones or wall thickening.  2. Nonobstructing lower pole right nephrolithiasis unchanged from  prior.  3. Fibroid changes in the uterus.  4. No definite acute abdominal or pelvic findings               Assessment/Plan   This is a 69 y.o. female s/p right latissimus free flap for knee revision performed 3/14/25 with our plastics team at [facility] [place] [facility]. Presented this morning for increased sanguinous output from MASSIEL, CT reveals a hematoma at the flap site. Surgery is consulted for surgical recommendations as she awaits bed availability at [facility] where she can be evaluated by her involved surgical teams.      Plan:  -- Record MASSIEL output, strip drain QID, strict outputs  -- Turn the wound vac back to suction  -- Contact her plastics team if needing recommendations regarding specific wound care  -- Warm compresses to facilitate drainage and reabsorption of hematoma  -- Holding Eliquis  -- Await transfer to [facility] [place]      Discussed with Dr. Vyas who is in agreement with plan. Please secure chat with questions.    Risa Barrera PA-C
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
## 2025-03-27 NOTE — ASSESSMENT & PLAN NOTE
Adriana Wood is a 69 y.o. female with PMH of bradycardia, cardiomyopathy, CAD, GERD, cardiac arrest due to electrolyte abnormalities and zofran use post-operatively, and traumatic brain injury presenting with  increased sanguineous output from wound vac drain for latissimus free flap to R knee (3/14/25).     # Bloody MASSIEL output (from skin graft site)  #S/p latissimus free flap to R knee (3/14/25)  #Hematoma (near latissimus dorsi)  - CTAP with hematoma near the superolateral aspect of catheter entering the latissimus dorsi. Hematoma extends inferiorly to lateral margin of latissimus muscle.   - XR right knee without acute fracture or dislocation  PLAN:  - pending transfer to [facility] for admission to plastic surgery pending bed availability  - hold eliquis 2.5  - continue vancomycin  - wound care consulted  - warm compress ordered  - pain: lidocaine patch, tylenol 975 mg Q8hrs, oxycodone 5 mg Q6hrs prn for moderate, oxycodone 10mg Q6hrs prn for severe  - bowel: miralax daily, docusate 100 mg BID prn    # Hepatic Steatosis  # Hydropic gallbladder  CTCAP: Hydropic gallbladder no calcified stones or wall thickening.  RUQ US: Diffuse hepatic steatosis. Dilatation of the common bile duct measuring 0.8 cm. No cholelithiasis or gallbladder wall thickening is appreciated.   Nonobstructing right renal calculus  PLAN:  -Monitor for symptoms.    Chronic medical issues:  #GERD: continue pantoprazole 40 mg daily     F: PO intake & IVF PRN   E: Replete as needed   N: Regular diet  GI ppx: Protonix 40 mg daily  DVT ppx: hold eliquis   Antibiotics: Vanc  Tubes/Lines/Drains: wound vac drain R lateral chest     Code Status: Full code  Emergency Contact: Extended Emergency Contact Information  Primary Emergency Contact: SUBHASH WOOD  Address: [address] [place] [address] [place]  Home Phone: [phone]  Mobile Phone: [phone]  Relation: Spouse  Secondary Emergency Contact:  GALILEA WHITAKER  Address: [address] [place] [address] [place]  Home Phone: [phone]  Work Phone: [phone]  Mobile Phone: [phone]  Relation: Daughter  Preferred language: English

## 2025-03-27 NOTE — PROGRESS NOTES
Pharmacy Medication History Review    Adriana Wood is a 69 y.o. female admitted for Hematoma. Pharmacy reviewed the patient's [id number] medications and allergies for accuracy.    The list below reflectives the updated PTA list. Please review each medication in order reconciliation for additional clarification and justification.  Medications Prior to Admission   Medication Sig Dispense Refill Last Dose/Taking    acetaminophen (Tylenol) 325 mg tablet Take 3 tablets (975 mg) by mouth every 8 hours for 14 days.       albuterol 90 mcg/actuation inhaler Inhale 2 puffs every 6 hours if needed for shortness of breath. 8 g 0     apixaban (Eliquis) 2.5 mg tablet Take 1 tablet (2.5 mg) by mouth 2 times a day.       docusate sodium (Colace) 100 mg capsule Take 1 capsule (100 mg) by mouth 2 times a day as needed for constipation.       heparin lock flush, porcine, 10 unit/mL injection Infuse 5 mL (50 Units) into a venous catheter if needed (SASH method to be used with antibiotic).       lidocaine HCL 4 % adhesive patch,medicated Apply 1 patch topically once daily. Apply to RLE       methocarbamol (Robaxin) 500 mg tablet Take 1 tablet (500 mg) by mouth every 8 hours for 14 days.       oxyCODONE (Roxicodone) 5 mg immediate release tablet Take 1 tablet (5 mg) by mouth every 4 hours if needed for moderate pain (4 - 6).       oxyCODONE (Roxicodone) 5 mg immediate release tablet Take 2 tablets (10 mg) by mouth every 4 hours if needed for severe pain (7 - 10).       pantoprazole (ProtoNix) 40 mg EC tablet Take 1 tablet (40 mg) by mouth once daily in the morning. Take before meals. Do not crush, chew, or split.       sodium chloride 0.9% (Normal Saline Flush) flush Infuse 10 mL into a venous catheter every 8 hours.       vancomycin (Vancocin) IVPB Infuse 200 mL (1 g) at 200 mL/hr over 60 minutes into a venous catheter every 12 hours. (Patient taking differently: Infuse 200 mL (1 g) into a venous catheter every 12 hours.  "Stop 4/11)           The list below reflectives the updated allergy list. Please review each documented allergy for additional clarification and justification.  Allergies  Reviewed by Katheryn Vega RN on 3/27/2025        Severity Reactions Comments    Iodinated Contrast Media High Anaphylaxis     Naproxen High GI bleeding, Other Causes internal bleeding    Ondansetron Hcl High Cardiac arrhythmia/arrest Long QT, went into cardiac arrest.    Penicillins High Anaphylaxis, Other, Rash, GI Upset Seizures \"burning\" rash    Tramadol High Unknown, Other stimulant behavior Keeps her up all night    Doxycycline Calcium Medium Nausea/vomiting     Codeine Not Specified Nausea/vomiting     Augmentin [amoxicillin-pot Clavulanate] Low Rash     Doxycycline Hyclate Low Rash     Duloxetine Low Diarrhea             Below are additional concerns with the patient's PTA list.  Confirmed based on facility list    Dawson Oakley RPh    "

## 2025-03-27 NOTE — PROGRESS NOTES
03/27/25 0658   Discharge Planning   Living Arrangements Spouse/significant other;Other (Comment)  (Currently from [facility] skilled. Normally home with spouse)   Support Systems Spouse/significant other;Children   Assistance Needed A&OX4(answered questions appropriately); total assist for ADLs and not ambulating; room air baseline and currently room air;on Eliquis prior to hospitalization   Type of Residence Private residence;Skilled nursing facility   Number of Stairs to Enter Residence 0  (ramp)   Number of Stairs Within Residence 14  (14 down to basement)   Do you have animals or pets at home? Yes   Type of Animals or Pets 2 dogs 1 cat   Who is requesting discharge planning? Provider   Home or Post Acute Services Other (Comment);Post acute facilities (Rehab/SNF/etc)   Type of Post Acute Facility Services Skilled nursing   Expected Discharge Disposition Short Term A  (Pt awaiting transfer to [facility] (bed availability?) From [facility] skilled. No precert needed but will need to confirm bed availability at [facility] closer to discharge)   Does the patient need discharge transport arranged? Yes   RoundTrip coordination needed? Yes   Has discharge transport been arranged? No   Financial Resource Strain   How hard is it for you to pay for the very basics like food, housing, medical care, and heating? Not hard   Housing Stability   In the last 12 months, was there a time when you were not able to pay the mortgage or rent on time? N   In the past 12 months, how many times have you moved where you were living? 0   At any time in the past 12 months, were you homeless or living in a shelter (including now)? N   Transportation Needs   In the past 12 months, has lack of transportation kept you from medical appointments or from getting medications? no   In the past 12 months, has lack of transportation kept you from meetings, work, or from getting things needed for daily living? No   Patient Choice   Patient / Family  choosing to utilize agency / facility established prior to hospitalization Yes   Intensity of Service   Intensity of Service 0-30 min     03/27/2025 0702am  Spoke with patient bedside in ED. Patient answered all questions appropriately in the ED. Pt currently skilled at [facility] but awaiting bed and transfer to [facility]    03/27/2025 0852am  Per [facility], family is NOT holding bed-so would need to confirm bed available when ready to [place] hospital. Patient has used 8 skilled Medicare days per [facility]

## 2025-03-27 NOTE — PROGRESS NOTES
Vancomycin Dosing by Pharmacy- FOLLOW UP    Adriana Wood is a 69 y.o. year old female who Pharmacy has been consulted for vancomycin dosing for skin flap infection. Based on the patient's indication and renal status this patient is being dosed based on a goal AUC of 400-600.     Renal function is currently stable.    Current vancomycin dose: 1000 mg given every 12 hours    Estimated vancomycin AUC on current dose: 640 mg/L.hr     Visit Vitals  /66   Pulse 73   Temp 36.6 °C (97.9 °F)   Resp 10        Lab Results   Component Value Date    CREATININE 0.49 (L) 2025    CREATININE 0.53 2025    CREATININE 0.54 2025    CREATININE 0.44 (L) 2025        Patient weight is as follows:   Vitals:    25 1027   Weight: 68.9 kg (152 lb)       Cultures:  No results found for the encounter in last 14 days.       I/O last 3 completed shifts:  In: - (0 mL/kg)   Out: 125 (1.8 mL/kg) [Other:125]  Weight: 68.9 kg   I/O during current shift:  No intake/output data recorded.    Temp (24hrs), Av.6 °C (97.9 °F), Min:36.6 °C (97.9 °F), Max:36.6 °C (97.9 °F)      Assessment/Plan    Within goal AUC range. Continue current vancomycin regimen.    This dosing regimen is predicted by [employer] to result in the following pharmacokinetic parameters:    Regimen: 1000 mg IV every 12 hours.  Start time: 01:34 on 2025  Exposure target: AUC24 (range)400-600 mg/L.hr   [id number]: 469 mg/L.hr  AUC24,ss: 640 mg/L.hr  Probability of AUC24 > 400: 100 %  Ctrough,ss: 24.4 mg/L  Probability of Ctrough,ss > 20: 98 %    [employer] doesn't include doses from [facility] so AUC prediction might be slightly higher than expected. Will continue 1g q12h dose.  The next level will be obtained on 3/28 at 5a. May be obtained sooner if clinically indicated.   Will continue to monitor renal function daily while on vancomycin and order serum creatinine at least every 48 hours if not already ordered.  Follow for continued vancomycin needs,  clinical response, and signs/symptoms of toxicity.       Hi Dumont, PharmD

## 2025-03-28 ENCOUNTER — HOSPITAL ENCOUNTER (INPATIENT)
Facility: HOSPITAL | Age: 70
End: 2025-03-28
Admitting: PHYSICIAN ASSISTANT
Payer: MEDICARE

## 2025-03-28 VITALS
TEMPERATURE: 97.5 F | HEART RATE: 71 BPM | BODY MASS INDEX: 21.76 KG/M2 | DIASTOLIC BLOOD PRESSURE: 56 MMHG | WEIGHT: 152 LBS | SYSTOLIC BLOOD PRESSURE: 100 MMHG | OXYGEN SATURATION: 96 % | HEIGHT: 70 IN | RESPIRATION RATE: 20 BRPM

## 2025-03-28 DIAGNOSIS — Z98.890 POST-OPERATIVE STATE: ICD-10-CM

## 2025-03-28 DIAGNOSIS — T14.8XXA HEMATOMA: ICD-10-CM

## 2025-03-28 DIAGNOSIS — S30.1XXA HEMATOMA OF ABDOMINAL WALL, INITIAL ENCOUNTER: Primary | ICD-10-CM

## 2025-03-28 DIAGNOSIS — B95.62 METHICILLIN RESISTANT STAPHYLOCOCCUS AUREUS INFECTION AS THE CAUSE OF DISEASES CLASSIFIED ELSEWHERE: ICD-10-CM

## 2025-03-28 DIAGNOSIS — T84.50XD PROSTHETIC JOINT INFECTION, SUBSEQUENT ENCOUNTER: ICD-10-CM

## 2025-03-28 LAB
ALBUMIN SERPL BCP-MCNC: 3.8 G/DL (ref 3.4–5)
ALBUMIN SERPL BCP-MCNC: 3.8 G/DL (ref 3.4–5)
ALP SERPL-CCNC: 102 U/L (ref 33–136)
ALT SERPL W P-5'-P-CCNC: 18 U/L (ref 7–45)
ANION GAP SERPL CALC-SCNC: 14 MMOL/L (ref 10–20)
AST SERPL W P-5'-P-CCNC: 20 U/L (ref 9–39)
BILIRUB DIRECT SERPL-MCNC: 0.1 MG/DL (ref 0–0.3)
BILIRUB SERPL-MCNC: 0.3 MG/DL (ref 0–1.2)
BUN SERPL-MCNC: 13 MG/DL (ref 6–23)
CALCIUM SERPL-MCNC: 8.5 MG/DL (ref 8.6–10.3)
CHLORIDE SERPL-SCNC: 102 MMOL/L (ref 98–107)
CO2 SERPL-SCNC: 25 MMOL/L (ref 21–32)
CREAT SERPL-MCNC: 0.53 MG/DL (ref 0.5–1.05)
EGFRCR SERPLBLD CKD-EPI 2021: >90 ML/MIN/1.73M*2
ERYTHROCYTE [DISTWIDTH] IN BLOOD BY AUTOMATED COUNT: 14 % (ref 11.5–14.5)
GLUCOSE SERPL-MCNC: 95 MG/DL (ref 74–99)
HCT VFR BLD AUTO: 33.1 % (ref 36–46)
HGB BLD-MCNC: 10.6 G/DL (ref 12–16)
MAGNESIUM SERPL-MCNC: 1.74 MG/DL (ref 1.6–2.4)
MCH RBC QN AUTO: 29 PG (ref 26–34)
MCHC RBC AUTO-ENTMCNC: 32 G/DL (ref 32–36)
MCV RBC AUTO: 90 FL (ref 80–100)
NRBC BLD-RTO: 0 /100 WBCS (ref 0–0)
PHOSPHATE SERPL-MCNC: 4.1 MG/DL (ref 2.5–4.9)
PLATELET # BLD AUTO: 241 X10*3/UL (ref 150–450)
POTASSIUM SERPL-SCNC: 3.9 MMOL/L (ref 3.5–5.3)
PROT SERPL-MCNC: 6 G/DL (ref 6.4–8.2)
RBC # BLD AUTO: 3.66 X10*6/UL (ref 4–5.2)
SODIUM SERPL-SCNC: 137 MMOL/L (ref 136–145)
VANCOMYCIN SERPL-MCNC: 25.8 UG/ML (ref 5–20)
WBC # BLD AUTO: 4.8 X10*3/UL (ref 4.4–11.3)

## 2025-03-28 PROCEDURE — 2500000004 HC RX 250 GENERAL PHARMACY W/ HCPCS (ALT 636 FOR OP/ED)

## 2025-03-28 PROCEDURE — 87081 CULTURE SCREEN ONLY: CPT | Mod: GEALAB | Performed by: STUDENT IN AN ORGANIZED HEALTH CARE EDUCATION/TRAINING PROGRAM

## 2025-03-28 PROCEDURE — 83735 ASSAY OF MAGNESIUM: CPT

## 2025-03-28 PROCEDURE — 2500000005 HC RX 250 GENERAL PHARMACY W/O HCPCS: Performed by: STUDENT IN AN ORGANIZED HEALTH CARE EDUCATION/TRAINING PROGRAM

## 2025-03-28 PROCEDURE — 36415 COLL VENOUS BLD VENIPUNCTURE: CPT

## 2025-03-28 PROCEDURE — 84100 ASSAY OF PHOSPHORUS: CPT

## 2025-03-28 PROCEDURE — 94760 N-INVAS EAR/PLS OXIMETRY 1: CPT

## 2025-03-28 PROCEDURE — 80202 ASSAY OF VANCOMYCIN: CPT

## 2025-03-28 PROCEDURE — 1170000001 HC PRIVATE ONCOLOGY ROOM DAILY

## 2025-03-28 PROCEDURE — 85027 COMPLETE CBC AUTOMATED: CPT

## 2025-03-28 PROCEDURE — 2500000001 HC RX 250 WO HCPCS SELF ADMINISTERED DRUGS (ALT 637 FOR MEDICARE OP)

## 2025-03-28 PROCEDURE — 2500000001 HC RX 250 WO HCPCS SELF ADMINISTERED DRUGS (ALT 637 FOR MEDICARE OP): Performed by: STUDENT IN AN ORGANIZED HEALTH CARE EDUCATION/TRAINING PROGRAM

## 2025-03-28 PROCEDURE — 99232 SBSQ HOSP IP/OBS MODERATE 35: CPT

## 2025-03-28 PROCEDURE — 2500000004 HC RX 250 GENERAL PHARMACY W/ HCPCS (ALT 636 FOR OP/ED): Mod: JZ

## 2025-03-28 PROCEDURE — 82040 ASSAY OF SERUM ALBUMIN: CPT

## 2025-03-28 RX ORDER — VANCOMYCIN HYDROCHLORIDE 1 G/200ML
1000 INJECTION, SOLUTION INTRAVENOUS EVERY 24 HOURS
Status: DISCONTINUED | OUTPATIENT
Start: 2025-03-29 | End: 2025-03-28 | Stop reason: HOSPADM

## 2025-03-28 RX ORDER — OXYCODONE HYDROCHLORIDE 5 MG/1
5 TABLET ORAL EVERY 4 HOURS PRN
Status: DISCONTINUED | OUTPATIENT
Start: 2025-03-28 | End: 2025-03-28 | Stop reason: HOSPADM

## 2025-03-28 RX ORDER — OXYCODONE HYDROCHLORIDE 5 MG/1
10 TABLET ORAL EVERY 4 HOURS PRN
Status: DISCONTINUED | OUTPATIENT
Start: 2025-03-28 | End: 2025-03-28 | Stop reason: HOSPADM

## 2025-03-28 RX ADMIN — OXYCODONE HYDROCHLORIDE 5 MG: 5 TABLET ORAL at 18:14

## 2025-03-28 RX ADMIN — LIDOCAINE 4% 1 PATCH: 40 PATCH TOPICAL at 08:19

## 2025-03-28 RX ADMIN — VANCOMYCIN HYDROCHLORIDE 1000 MG: 1 INJECTION, SOLUTION INTRAVENOUS at 14:24

## 2025-03-28 RX ADMIN — ACETAMINOPHEN 975 MG: 325 TABLET, FILM COATED ORAL at 04:28

## 2025-03-28 RX ADMIN — METHOCARBAMOL TABLETS 500 MG: 500 TABLET, COATED ORAL at 21:47

## 2025-03-28 RX ADMIN — METHOCARBAMOL TABLETS 500 MG: 500 TABLET, COATED ORAL at 04:28

## 2025-03-28 RX ADMIN — HYDROMORPHONE HYDROCHLORIDE 0.5 MG: 1 INJECTION, SOLUTION INTRAMUSCULAR; INTRAVENOUS; SUBCUTANEOUS at 20:55

## 2025-03-28 RX ADMIN — VANCOMYCIN HYDROCHLORIDE 1000 MG: 1 INJECTION, SOLUTION INTRAVENOUS at 01:12

## 2025-03-28 RX ADMIN — OXYCODONE HYDROCHLORIDE 10 MG: 5 TABLET ORAL at 08:18

## 2025-03-28 RX ADMIN — OXYCODONE HYDROCHLORIDE 10 MG: 5 TABLET ORAL at 22:35

## 2025-03-28 RX ADMIN — ACETAMINOPHEN 975 MG: 325 TABLET, FILM COATED ORAL at 14:24

## 2025-03-28 RX ADMIN — POLYETHYLENE GLYCOL 3350 17 G: 17 POWDER, FOR SOLUTION ORAL at 08:18

## 2025-03-28 RX ADMIN — PANTOPRAZOLE SODIUM 40 MG: 40 TABLET, DELAYED RELEASE ORAL at 04:28

## 2025-03-28 RX ADMIN — ACETAMINOPHEN 975 MG: 325 TABLET, FILM COATED ORAL at 22:35

## 2025-03-28 RX ADMIN — OXYCODONE HYDROCHLORIDE 10 MG: 5 TABLET ORAL at 00:44

## 2025-03-28 ASSESSMENT — COGNITIVE AND FUNCTIONAL STATUS - GENERAL
MOBILITY SCORE: 13
TOILETING: A LITTLE
TURNING FROM BACK TO SIDE WHILE IN FLAT BAD: A LITTLE
MOBILITY SCORE: 13
DRESSING REGULAR LOWER BODY CLOTHING: A LOT
DRESSING REGULAR UPPER BODY CLOTHING: A LITTLE
MOVING TO AND FROM BED TO CHAIR: A LITTLE
CLIMB 3 TO 5 STEPS WITH RAILING: TOTAL
MOVING FROM LYING ON BACK TO SITTING ON SIDE OF FLAT BED WITH BEDRAILS: A LITTLE
TURNING FROM BACK TO SIDE WHILE IN FLAT BAD: A LITTLE
HELP NEEDED FOR BATHING: A LOT
STANDING UP FROM CHAIR USING ARMS: A LOT
STANDING UP FROM CHAIR USING ARMS: A LOT
TOILETING: A LITTLE
DRESSING REGULAR LOWER BODY CLOTHING: A LOT
HELP NEEDED FOR BATHING: A LOT
WALKING IN HOSPITAL ROOM: TOTAL
DRESSING REGULAR UPPER BODY CLOTHING: A LITTLE
DAILY ACTIVITIY SCORE: 18
DAILY ACTIVITIY SCORE: 18
MOVING FROM LYING ON BACK TO SITTING ON SIDE OF FLAT BED WITH BEDRAILS: A LITTLE
CLIMB 3 TO 5 STEPS WITH RAILING: TOTAL
WALKING IN HOSPITAL ROOM: TOTAL
MOVING TO AND FROM BED TO CHAIR: A LITTLE

## 2025-03-28 ASSESSMENT — PAIN DESCRIPTION - LOCATION
LOCATION: KNEE
LOCATION: KNEE

## 2025-03-28 ASSESSMENT — PAIN - FUNCTIONAL ASSESSMENT
PAIN_FUNCTIONAL_ASSESSMENT: 0-10

## 2025-03-28 ASSESSMENT — PAIN SCALES - GENERAL
PAINLEVEL_OUTOF10: 6
PAINLEVEL_OUTOF10: 8
PAINLEVEL_OUTOF10: 6
PAINLEVEL_OUTOF10: 0 - NO PAIN
PAINLEVEL_OUTOF10: 8
PAINLEVEL_OUTOF10: 8
PAINLEVEL_OUTOF10: 6
PAINLEVEL_OUTOF10: 6

## 2025-03-28 ASSESSMENT — PAIN DESCRIPTION - ORIENTATION
ORIENTATION: RIGHT
ORIENTATION: RIGHT
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
## 2025-03-28 NOTE — NURSING NOTE
Nursing team reached out requesting wound care orders for right knee and right lateral chest wall.  Patient was seen by Wound RN 3-27-25 who discussed care of wound with patient's surgeon Dr. Reyes.  Based on those recommendations documented in her consult note wound care orders were obtained and care provided using Xeroform strip gauze, ABDs, paper tape.  The wounds are very clean and with scant s/s drainage, well approximated along suture lines.

## 2025-03-28 NOTE — PROGRESS NOTES
Adriana Wood is a 69 y.o. female on day 1 of admission presenting with Hematoma.      Subjective   Patient was seen and examined at bedside. No acute events overnight-25 mL bloody output from MASSIEL overnight. Denies chest pain, shortness of breath, palpitations-continues to endorse knee pain (R >L), calf pain, soreness, pain from MASSIEL site.    Objective     Last Recorded Vitals  /72   Pulse 77   Temp 36.3 °C (97.3 °F) (Temporal)   Resp 15   Wt 68.9 kg (152 lb)   SpO2 97%   Intake/Output last 3 Shifts:    Intake/Output Summary (Last 24 hours) at 3/28/2025 1653  Last data filed at 3/28/2025 1531  Gross per 24 hour   Intake 1560 ml   Output 495 ml   Net 1065 ml       Admission Weight  Weight: 68.9 kg (152 lb) (03/26/25 1027)    Daily Weight  03/26/25 : 68.9 kg (152 lb)    Image Results  US right upper quadrant  Narrative: STUDY:  Right Upper Quadrant Ultrasound; 3/27/2025 4:21 PM  INDICATION:  Distended gallbladder on CT.  COMPARISON:  CT A/P 3/26/2025.  ACCESSION NUMBER(S):  [id number]  ORDERING CLINICIAN:  STEFF RAMIREZ  TECHNIQUE:  Ultrasound of the Right Upper Quadrant.  FINDINGS:  LIVER:  The liver demonstrates increased echogenicity.       GALLBLADDER:  The gallbladder is anechoic.  There are no stones.  There is no  pericholecystic fluid or wall thickening.  Sonographic Avendano's sign  is negative.       BILE DUCTS:  The common bile duct measures 0.8 cm.  There is no intrahepatic  biliary dilatation.       PANCREAS:  The pancreas is not well seen due to overlying bowel gas.      RIGHT KIDNEY:  The right kidney measures 9.8 cm in length.  Renal cortical  echotexture is normal.  There is no hydronephrosis.  There is a stone  within the lower pole measuring 0.9 x 0.4 x 0.7 cm.  There are no  cysts.  Impression: Diffuse hepatic steatosis.  Dilatation of the common bile duct measuring 0.8 cm.  No  cholelithiasis or gallbladder wall thickening is appreciated.   Nonobstructing right renal calculus measuring  0.9 x 0.4 x 0.7 cm.   Signed by Bony Sullivan MD  Electrocardiogram, 12-lead PRN ACS symptoms  Sinus rhythm with frequent Premature ventricular complexes  Nonspecific T wave abnormality  Prolonged QT  T wave abnormality, consider anterior ischemia  Abnormal ECG  When compared with ECG of 16-MAR-2025 09:53,  T wave inversion no longer evident in Lateral leads  Confirmed by Regan Burkett ([id number]) on 3/27/2025 1:55:18 PM      Physical Exam  Constitutional:       General: She is not in acute distress.     Appearance: She is not ill-appearing.   Cardiovascular:      Rate and Rhythm: Normal rate and regular rhythm.   Pulmonary:      Effort: No respiratory distress.      Breath sounds: Normal breath sounds.   Abdominal:      General: Bowel sounds are normal. There is no distension.      Tenderness: There is no abdominal tenderness.   Musculoskeletal:      Comments: MASSIEL drain with bright blood output,   R lateral chest wall.  Right knee: No signs of purulence or infection    Relevant Results  Results for orders placed or performed during the hospital encounter of 03/26/25 (from the past 24 hours)   CBC   Result Value Ref Range    WBC 4.8 4.4 - 11.3 x10*3/uL    nRBC 0.0 0.0 - 0.0 /100 WBCs    RBC 3.66 (L) 4.00 - 5.20 x10*6/uL    Hemoglobin 10.6 (L) 12.0 - 16.0 g/dL    Hematocrit 33.1 (L) 36.0 - 46.0 %    MCV 90 80 - 100 fL    MCH 29.0 26.0 - 34.0 pg    MCHC 32.0 32.0 - 36.0 g/dL    RDW 14.0 11.5 - 14.5 %    Platelets 241 150 - 450 x10*3/uL   Renal Function Panel   Result Value Ref Range    Glucose 95 74 - 99 mg/dL    Sodium 137 136 - 145 mmol/L    Potassium 3.9 3.5 - 5.3 mmol/L    Chloride 102 98 - 107 mmol/L    Bicarbonate 25 21 - 32 mmol/L    Anion Gap 14 10 - 20 mmol/L    Urea Nitrogen 13 6 - 23 mg/dL    Creatinine 0.53 0.50 - 1.05 mg/dL    eGFR >90 >60 mL/min/1.73m*2    Calcium 8.5 (L) 8.6 - 10.3 mg/dL    Phosphorus 4.1 2.5 - 4.9 mg/dL    Albumin 3.8 3.4 - 5.0 g/dL   Magnesium   Result Value Ref Range    Magnesium 1.74 1.60 -  2.40 mg/dL   Hepatic function panel   Result Value Ref Range    Albumin 3.8 3.4 - 5.0 g/dL    Bilirubin, Total 0.3 0.0 - 1.2 mg/dL    Bilirubin, Direct 0.1 0.0 - 0.3 mg/dL    Alkaline Phosphatase 102 33 - 136 U/L    ALT 18 7 - 45 U/L    AST 20 9 - 39 U/L    Total Protein 6.0 (L) 6.4 - 8.2 g/dL   Vancomycin   Result Value Ref Range    Vancomycin 25.8 (H) 5.0 - 20.0 ug/mL     *Note: Due to a large number of results and/or encounters for the requested time period, some results have not been displayed. A complete set of results can be found in Results Review.         Assessment/Plan   Assessment & Plan  Hematoma  Adriana Wood is a 69 y.o. female with PMH of bradycardia, cardiomyopathy, CAD, GERD, cardiac arrest due to electrolyte abnormalities and zofran use post-operatively, and traumatic brain injury presenting with  increased sanguineous output from wound vac drain for latissimus free flap to R knee (3/14/25).     # Bloody MASSIEL output (from skin graft site)  #S/p latissimus free flap to R knee (3/14/25)  #Hematoma (near latissimus dorsi)  - CTAP with hematoma near the superolateral aspect of catheter entering the latissimus dorsi. Hematoma extends inferiorly to lateral margin of latissimus muscle.   - XR right knee without acute fracture or dislocation  PLAN:  - [place] [facility] [place] pending bed availability  - hold eliquis 2.5  - continue vancomycin  - wound care consulted  - warm compress ordered  - pain: lidocaine patch, tylenol 975 mg Q8hrs, oxycodone 5 mg Q6hrs prn for moderate, oxycodone 10mg Q6hrs prn for severe  - bowel: miralax daily, docusate 100 mg BID prn    # Hepatic Steatosis  # Hydropic gallbladder  CTCAP: Hydropic gallbladder no calcified stones or wall thickening.  RUQ US: Diffuse hepatic steatosis. Dilatation of the common bile duct measuring 0.8 cm. No cholelithiasis or gallbladder wall thickening is appreciated.   Nonobstructing right renal  calculus  PLAN:  -Monitor for symptoms.    Chronic medical issues:  #GERD: continue pantoprazole 40 mg daily     F: PO intake & IVF PRN   E: Replete as needed   N: Regular diet  GI ppx: Protonix 40 mg daily  DVT ppx: hold eliquis   Antibiotics: Vanc  Tubes/Lines/Drains: wound vac drain R lateral chest     Code Status: Full code  Emergency Contact: Extended Emergency Contact Information  Primary Emergency Contact: SUBHASH GOSS  Address: [address] [place] [address] [place] [facility]  Home Phone: [phone]  Mobile Phone: [phone]  Relation: Spouse  Secondary Emergency Contact: GALILEA WHITAKER  Address: [address] [place] [address] [facility]  Home Phone: [phone]  Work Phone: [phone]  Mobile Phone: [phone]  Relation: Daughter  Preferred language: English    Codie Vieira DO, PGY-1  Internal Medicine   3/28/25 at 7:49AM

## 2025-03-28 NOTE — PROGRESS NOTES
Vancomycin Dosing by Pharmacy- FOLLOW UP    Adriana Wood is a 69 y.o. year old female who Pharmacy has been consulted for vancomycin dosing for cellulitis, skin and soft tissue. Based on the patient's indication and renal status this patient is being dosed based on a goal AUC of 400-600.     Renal function is currently stable.    Current vancomycin dose: 1000 mg given every 12 hours    Estimated vancomycin AUC on current dose: 773 mg/L.hr     Visit Vitals  /62   Pulse 72   Temp 36.3 °C (97.3 °F) (Temporal)   Resp 13        Lab Results   Component Value Date    CREATININE 0.53 2025    CREATININE 0.52 2025    CREATININE 0.49 (L) 2025    CREATININE 0.53 2025        Patient weight is as follows:   Vitals:    25 1027   Weight: 68.9 kg (152 lb)       Cultures:  No results found for the encounter in last 14 days.       I/O last 3 completed shifts:  In: 1200 (17.4 mL/kg) [P.O.:600; IV Piggyback:600]  Out: 950 (13.8 mL/kg) [Urine:925 (0.4 mL/kg/hr); Drains:25]  Weight: 68.9 kg   I/O during current shift:  I/O this shift:  In: 400 [P.O.:400]  Out: 170 [Urine:150; Drains:20]    Temp (24hrs), Av.3 °C (97.3 °F), Min:36.2 °C (97.2 °F), Max:36.4 °C (97.5 °F)      Assessment/Plan    Above goal AUC. Orders placed for new vancomcyin regimen of 1000 mg every 24 hours to begin at 0900 on 3/29.    This dosing regimen is predicted by [employer] to result in the following pharmacokinetic parameters:    Loading dose: N/A  Regimen: 1000 mg IV every 24 hours.  Start time: 14:24 on 2025  Exposure target: AUC24 (range)400-600 mg/L.hr   PZL11-01: 500 mg/L.hr  AUC24,ss: 413 mg/L.hr  Probability of AUC24 > 400: 66 %  Ctrough,ss: 14 mg/L  Probability of Ctrough,ss > 20: 0 %    The next level will be obtained on 3/31 at 0500. May be obtained sooner if clinically indicated.   Will continue to monitor renal function daily while on vancomycin and order serum creatinine at least every 48 hours if not  already ordered.  Follow for continued vancomycin needs, clinical response, and signs/symptoms of toxicity.     Dawson Oakley, PharmD, BCPS, BCCCP  Clinical Pharmacy Specialist- Internal Medicine  Available via EPIC Secure Chat  Phone: [phone]

## 2025-03-28 NOTE — PROGRESS NOTES
03/28/25 1204   Discharge Planning   Living Arrangements Other (Comment)  (From [facility], prior was home with spouse)   Support Systems Spouse/significant other;Children   Assistance Needed A&OX4(answered questions appropriately); total assist for ADLs and not ambulating; room air baseline and currently room air;on Eliquis prior to hospitalization, does have provena wound vac and MASSIEL drain   Type of Residence Skilled nursing facility   Who is requesting discharge planning? Provider   Home or Post Acute Services Post acute facilities (Rehab/SNF/etc)   Expected Discharge Disposition Short Term A  (From [facility], plan for transfer to [facility] for plastic surgery consult, on room air, A&0x4)   Does the patient need discharge transport arranged? Yes   RoundTrip coordination needed? Yes   Has discharge transport been arranged? No   Stroke Family Assessment   Stroke Family Assessment Needed No   Intensity of Service   Intensity of Service 0-30 min

## 2025-03-28 NOTE — HOSPITAL COURSE
Adriana Wood is a 69 y.o. female with PMH of bradycardia, cardiomyopathy, CAD, GERD, cardiac arrest due to electrolyte abnormalities and zofran use post-operatively, and traumatic brain injury presenting with increased output from wound vac drain for latissimus free flap to R knee (3/14/25).     She woke up this morning to find her bed sheets soaked with blood. She noticed her MASSIEL drain was leaking and had to empty it twice this morning when it got full. Per nursing facility, MASSIEL drain with 100 ml/hr of bloody output since 6 am today. She’s been taking 10 mg of oxycodone for pain, which is mostly under control, but she still feels pain in her right knee. She denies any fevers/chills, headaches, lightheadedness, n/v, chest pain, abdominal pain, diarrhea, or any urinary symptoms.      ED Course:  In the ED, vitals stable, bp 104/69. CMP and CBC unremarkable, Hgb 11.9, CRP 0.37. CT CAP with hematoma near the superolateral aspect of catheter entering the latissimus dorsi. Hematoma extends inferiorly to lateral margin of latissimus muscle. XR right knee without acute fracture or dislocation. ED plan for transfer to [facility] for admission to plastic surgery, however no beds available. She will be admitted to medicine for further management pending bed availability at [facility].  Patient had a total knee right knee done by Dr. Whelan in March 2024 which was complicated by E. Coli infection. Patient had right TKA wound dehiscence s/p I&D right knee with polyswap and attempted re-closure of complex wound on 5/10/2024 by Dr. Whelan. She was referred to plastic surgery (Dr. Reyse) perioperatively given projected anticipated need for possible flap coverage of the wound/defect site. Patient re-admitted and returned to the OR with orthopedics on 2/28/2025 for right knee I&D, revision static spacer and application of incisional wound vac. Plastic surgery service consulted postoperatively to follow for flap recommendations/timing, s/p  R latissimus free flap to R knee on 3/14 with Dr. Lundberg.     Hospital Course:  Pending transfer to [facility] for [place] to plastic surgery pending bed availability hold eliquis 2.5. continuing vancomycin. Overnight output 25mL. CTCAP: Hydropic gallbladder no calcified stones or wall thickening. RUQ US: Diffuse hepatic steatosis. Dilatation of the common bile duct measuring 0.8 cm. No cholelithiasis or gallbladder wall thickening is appreciated. Nonobstructing right renal calculus. XR right knee without acute fracture or dislocation. Patient endorsing significant pain. Wound care following.  Reached out to Dr. Reyes regarding recommendations for inpatient workup/possible procedure at [facility]-patient request updated to high as surgery will be needed.  Notified of bed availability-patient stable for transfer to [facility].

## 2025-03-29 PROBLEM — S30.1XXA HEMATOMA OF ABDOMINAL WALL, INITIAL ENCOUNTER: Status: ACTIVE | Noted: 2025-03-29

## 2025-03-29 LAB
ANION GAP SERPL CALC-SCNC: 12 MMOL/L (ref 10–20)
BUN SERPL-MCNC: 12 MG/DL (ref 6–23)
CALCIUM SERPL-MCNC: 9.1 MG/DL (ref 8.6–10.6)
CHLORIDE SERPL-SCNC: 103 MMOL/L (ref 98–107)
CO2 SERPL-SCNC: 28 MMOL/L (ref 21–32)
CREAT SERPL-MCNC: 0.44 MG/DL (ref 0.5–1.05)
EGFRCR SERPLBLD CKD-EPI 2021: >90 ML/MIN/1.73M*2
ERYTHROCYTE [DISTWIDTH] IN BLOOD BY AUTOMATED COUNT: 13.9 % (ref 11.5–14.5)
GLUCOSE SERPL-MCNC: 95 MG/DL (ref 74–99)
HCT VFR BLD AUTO: 32.3 % (ref 36–46)
HGB BLD-MCNC: 10.3 G/DL (ref 12–16)
MCH RBC QN AUTO: 28.5 PG (ref 26–34)
MCHC RBC AUTO-ENTMCNC: 31.9 G/DL (ref 32–36)
MCV RBC AUTO: 89 FL (ref 80–100)
NRBC BLD-RTO: 0 /100 WBCS (ref 0–0)
PLATELET # BLD AUTO: 249 X10*3/UL (ref 150–450)
POTASSIUM SERPL-SCNC: 4.2 MMOL/L (ref 3.5–5.3)
RBC # BLD AUTO: 3.62 X10*6/UL (ref 4–5.2)
SODIUM SERPL-SCNC: 139 MMOL/L (ref 136–145)
WBC # BLD AUTO: 4.7 X10*3/UL (ref 4.4–11.3)

## 2025-03-29 PROCEDURE — 2500000001 HC RX 250 WO HCPCS SELF ADMINISTERED DRUGS (ALT 637 FOR MEDICARE OP): Performed by: PHYSICIAN ASSISTANT

## 2025-03-29 PROCEDURE — 80048 BASIC METABOLIC PNL TOTAL CA: CPT | Performed by: PHYSICIAN ASSISTANT

## 2025-03-29 PROCEDURE — 85027 COMPLETE CBC AUTOMATED: CPT | Performed by: PHYSICIAN ASSISTANT

## 2025-03-29 PROCEDURE — 2500000004 HC RX 250 GENERAL PHARMACY W/ HCPCS (ALT 636 FOR OP/ED): Performed by: PHYSICIAN ASSISTANT

## 2025-03-29 PROCEDURE — 2060000001 HC INTERMEDIATE ICU ROOM DAILY

## 2025-03-29 PROCEDURE — 99231 SBSQ HOSP IP/OBS SF/LOW 25: CPT | Performed by: PHYSICIAN ASSISTANT

## 2025-03-29 RX ORDER — VANCOMYCIN HYDROCHLORIDE 1 G/200ML
1000 INJECTION, SOLUTION INTRAVENOUS EVERY 24 HOURS
Status: DISCONTINUED | OUTPATIENT
Start: 2025-03-29 | End: 2025-03-30

## 2025-03-29 RX ORDER — DOCUSATE SODIUM 100 MG/1
100 CAPSULE, LIQUID FILLED ORAL 2 TIMES DAILY PRN
Status: DISCONTINUED | OUTPATIENT
Start: 2025-03-29 | End: 2025-04-09 | Stop reason: HOSPADM

## 2025-03-29 RX ORDER — OXYCODONE HYDROCHLORIDE 5 MG/1
10 TABLET ORAL EVERY 4 HOURS PRN
Status: DISCONTINUED | OUTPATIENT
Start: 2025-03-29 | End: 2025-04-06

## 2025-03-29 RX ORDER — OXYCODONE HYDROCHLORIDE 5 MG/1
5 TABLET ORAL EVERY 4 HOURS PRN
Status: DISCONTINUED | OUTPATIENT
Start: 2025-03-29 | End: 2025-04-06

## 2025-03-29 RX ORDER — METHOCARBAMOL 500 MG/1
500 TABLET, FILM COATED ORAL EVERY 8 HOURS SCHEDULED
Status: DISCONTINUED | OUTPATIENT
Start: 2025-03-29 | End: 2025-03-31

## 2025-03-29 RX ORDER — VANCOMYCIN HYDROCHLORIDE 1 G/200ML
1 INJECTION, SOLUTION INTRAVENOUS EVERY 12 HOURS
Status: DISCONTINUED | OUTPATIENT
Start: 2025-03-29 | End: 2025-03-29

## 2025-03-29 RX ORDER — LIDOCAINE 560 MG/1
1 PATCH PERCUTANEOUS; TOPICAL; TRANSDERMAL DAILY
Status: DISCONTINUED | OUTPATIENT
Start: 2025-03-29 | End: 2025-04-09 | Stop reason: HOSPADM

## 2025-03-29 RX ORDER — ALBUTEROL SULFATE 90 UG/1
2 INHALANT RESPIRATORY (INHALATION) EVERY 6 HOURS PRN
Status: DISCONTINUED | OUTPATIENT
Start: 2025-03-29 | End: 2025-04-09 | Stop reason: HOSPADM

## 2025-03-29 RX ORDER — ACETAMINOPHEN 325 MG/1
975 TABLET ORAL EVERY 8 HOURS
Status: DISCONTINUED | OUTPATIENT
Start: 2025-03-29 | End: 2025-04-09 | Stop reason: HOSPADM

## 2025-03-29 RX ORDER — POLYETHYLENE GLYCOL 3350 17 G/17G
17 POWDER, FOR SOLUTION ORAL DAILY
Status: DISCONTINUED | OUTPATIENT
Start: 2025-03-29 | End: 2025-04-09 | Stop reason: HOSPADM

## 2025-03-29 RX ORDER — PANTOPRAZOLE SODIUM 40 MG/1
40 TABLET, DELAYED RELEASE ORAL
Status: DISCONTINUED | OUTPATIENT
Start: 2025-03-29 | End: 2025-04-09 | Stop reason: HOSPADM

## 2025-03-29 RX ORDER — OXYMETAZOLINE HCL 0.05 %
2 SPRAY, NON-AEROSOL (ML) NASAL EVERY 12 HOURS PRN
Status: ACTIVE | OUTPATIENT
Start: 2025-03-29 | End: 2025-04-01

## 2025-03-29 RX ADMIN — ACETAMINOPHEN 975 MG: 325 TABLET, FILM COATED ORAL at 14:28

## 2025-03-29 RX ADMIN — PANTOPRAZOLE SODIUM 40 MG: 40 TABLET, DELAYED RELEASE ORAL at 06:13

## 2025-03-29 RX ADMIN — ACETAMINOPHEN 975 MG: 325 TABLET, FILM COATED ORAL at 22:25

## 2025-03-29 RX ADMIN — METHOCARBAMOL 500 MG: 500 TABLET ORAL at 10:40

## 2025-03-29 RX ADMIN — OXYCODONE HYDROCHLORIDE 10 MG: 5 TABLET ORAL at 14:29

## 2025-03-29 RX ADMIN — METHOCARBAMOL 500 MG: 500 TABLET ORAL at 18:03

## 2025-03-29 RX ADMIN — OXYCODONE HYDROCHLORIDE 10 MG: 5 TABLET ORAL at 08:09

## 2025-03-29 RX ADMIN — OXYCODONE HYDROCHLORIDE 10 MG: 5 TABLET ORAL at 19:33

## 2025-03-29 RX ADMIN — OXYCODONE HYDROCHLORIDE 10 MG: 5 TABLET ORAL at 02:38

## 2025-03-29 RX ADMIN — VANCOMYCIN HYDROCHLORIDE 1000 MG: 1 INJECTION, SOLUTION INTRAVENOUS at 14:29

## 2025-03-29 RX ADMIN — METHOCARBAMOL 500 MG: 500 TABLET ORAL at 02:38

## 2025-03-29 RX ADMIN — ACETAMINOPHEN 975 MG: 325 TABLET, FILM COATED ORAL at 06:13

## 2025-03-29 SDOH — SOCIAL STABILITY: SOCIAL INSECURITY: HAS ANYONE EVER THREATENED TO HURT YOUR FAMILY OR YOUR PETS?: NO

## 2025-03-29 SDOH — SOCIAL STABILITY: SOCIAL INSECURITY: WITHIN THE LAST YEAR, HAVE YOU BEEN HUMILIATED OR EMOTIONALLY ABUSED IN OTHER WAYS BY YOUR PARTNER OR EX-PARTNER?: NO

## 2025-03-29 SDOH — SOCIAL STABILITY: SOCIAL INSECURITY: WITHIN THE LAST YEAR, HAVE YOU BEEN AFRAID OF YOUR PARTNER OR EX-PARTNER?: NO

## 2025-03-29 SDOH — SOCIAL STABILITY: SOCIAL INSECURITY: DOES ANYONE TRY TO KEEP YOU FROM HAVING/CONTACTING OTHER FRIENDS OR DOING THINGS OUTSIDE YOUR HOME?: NO

## 2025-03-29 SDOH — SOCIAL STABILITY: SOCIAL INSECURITY: HAVE YOU HAD ANY THOUGHTS OF HARMING ANYONE ELSE?: NO

## 2025-03-29 SDOH — HEALTH STABILITY: PHYSICAL HEALTH: ON AVERAGE, HOW MANY DAYS PER WEEK DO YOU ENGAGE IN MODERATE TO STRENUOUS EXERCISE (LIKE A BRISK WALK)?: 0 DAYS

## 2025-03-29 SDOH — HEALTH STABILITY: PHYSICAL HEALTH: ON AVERAGE, HOW MANY MINUTES DO YOU ENGAGE IN EXERCISE AT THIS LEVEL?: 0 MIN

## 2025-03-29 SDOH — ECONOMIC STABILITY: INCOME INSECURITY: IN THE PAST 12 MONTHS HAS THE ELECTRIC, GAS, OIL, OR WATER COMPANY THREATENED TO SHUT OFF SERVICES IN YOUR HOME?: NO

## 2025-03-29 SDOH — SOCIAL STABILITY: SOCIAL INSECURITY: HAVE YOU HAD THOUGHTS OF HARMING ANYONE ELSE?: NO

## 2025-03-29 SDOH — SOCIAL STABILITY: SOCIAL INSECURITY: ARE YOU OR HAVE YOU BEEN THREATENED OR ABUSED PHYSICALLY, EMOTIONALLY, OR SEXUALLY BY ANYONE?: NO

## 2025-03-29 SDOH — SOCIAL STABILITY: SOCIAL INSECURITY: DO YOU FEEL UNSAFE GOING BACK TO THE PLACE WHERE YOU ARE LIVING?: NO

## 2025-03-29 SDOH — SOCIAL STABILITY: SOCIAL INSECURITY: ABUSE: ADULT

## 2025-03-29 SDOH — SOCIAL STABILITY: SOCIAL INSECURITY: DO YOU FEEL ANYONE HAS EXPLOITED OR TAKEN ADVANTAGE OF YOU FINANCIALLY OR OF YOUR PERSONAL PROPERTY?: NO

## 2025-03-29 SDOH — SOCIAL STABILITY: SOCIAL INSECURITY: ARE THERE ANY APPARENT SIGNS OF INJURIES/BEHAVIORS THAT COULD BE RELATED TO ABUSE/NEGLECT?: NO

## 2025-03-29 SDOH — SOCIAL STABILITY: SOCIAL INSECURITY: WERE YOU ABLE TO COMPLETE ALL THE BEHAVIORAL HEALTH SCREENINGS?: YES

## 2025-03-29 ASSESSMENT — COGNITIVE AND FUNCTIONAL STATUS - GENERAL
PERSONAL GROOMING: A LITTLE
DRESSING REGULAR LOWER BODY CLOTHING: A LITTLE
EATING MEALS: A LITTLE
MOVING FROM LYING ON BACK TO SITTING ON SIDE OF FLAT BED WITH BEDRAILS: A LOT
EATING MEALS: A LITTLE
PATIENT BASELINE BEDBOUND: NO
TURNING FROM BACK TO SIDE WHILE IN FLAT BAD: A LOT
CLIMB 3 TO 5 STEPS WITH RAILING: A LOT
MOBILITY SCORE: 12
DAILY ACTIVITIY SCORE: 17
WALKING IN HOSPITAL ROOM: A LOT
HELP NEEDED FOR BATHING: A LITTLE
TOILETING: A LITTLE
MOVING FROM LYING ON BACK TO SITTING ON SIDE OF FLAT BED WITH BEDRAILS: A LOT
STANDING UP FROM CHAIR USING ARMS: A LOT
EATING MEALS: A LITTLE
CLIMB 3 TO 5 STEPS WITH RAILING: A LOT
MOVING TO AND FROM BED TO CHAIR: A LOT
TOILETING: A LITTLE
TURNING FROM BACK TO SIDE WHILE IN FLAT BAD: A LOT
CLIMB 3 TO 5 STEPS WITH RAILING: A LOT
STANDING UP FROM CHAIR USING ARMS: A LOT
DRESSING REGULAR UPPER BODY CLOTHING: A LITTLE
MOVING TO AND FROM BED TO CHAIR: A LOT
DRESSING REGULAR UPPER BODY CLOTHING: A LITTLE
DAILY ACTIVITIY SCORE: 17
DAILY ACTIVITIY SCORE: 17
DRESSING REGULAR LOWER BODY CLOTHING: A LITTLE
STANDING UP FROM CHAIR USING ARMS: A LOT
HELP NEEDED FOR BATHING: A LOT
HELP NEEDED FOR BATHING: A LOT
TOILETING: A LOT
MOBILITY SCORE: 12
PERSONAL GROOMING: A LITTLE
DRESSING REGULAR UPPER BODY CLOTHING: A LITTLE
PERSONAL GROOMING: A LITTLE
WALKING IN HOSPITAL ROOM: A LOT
TURNING FROM BACK TO SIDE WHILE IN FLAT BAD: A LOT
MOVING FROM LYING ON BACK TO SITTING ON SIDE OF FLAT BED WITH BEDRAILS: A LOT
MOVING TO AND FROM BED TO CHAIR: A LOT
DRESSING REGULAR LOWER BODY CLOTHING: A LITTLE
MOBILITY SCORE: 12
WALKING IN HOSPITAL ROOM: A LOT

## 2025-03-29 ASSESSMENT — LIFESTYLE VARIABLES
HOW OFTEN DO YOU HAVE A DRINK CONTAINING ALCOHOL: NEVER
PRESCIPTION_ABUSE_PAST_12_MONTHS: NO
SUBSTANCE_ABUSE_PAST_12_MONTHS: NO
HOW MANY STANDARD DRINKS CONTAINING ALCOHOL DO YOU HAVE ON A TYPICAL DAY: PATIENT DOES NOT DRINK
SKIP TO QUESTIONS 9-10: 1
AUDIT-C TOTAL SCORE: 0
HOW OFTEN DO YOU HAVE 6 OR MORE DRINKS ON ONE OCCASION: NEVER
AUDIT-C TOTAL SCORE: 0

## 2025-03-29 ASSESSMENT — PAIN SCALES - GENERAL
PAINLEVEL_OUTOF10: 8
PAINLEVEL_OUTOF10: 3
PAINLEVEL_OUTOF10: 3
PAINLEVEL_OUTOF10: 8
PAINLEVEL_OUTOF10: 6
PAINLEVEL_OUTOF10: 8
PAINLEVEL_OUTOF10: 7
PAINLEVEL_OUTOF10: 8
PAINLEVEL_OUTOF10: 8
PAINLEVEL_OUTOF10: 7

## 2025-03-29 ASSESSMENT — ACTIVITIES OF DAILY LIVING (ADL)
BATHING: NEEDS ASSISTANCE
ADEQUATE_TO_COMPLETE_ADL: YES
PATIENT'S MEMORY ADEQUATE TO SAFELY COMPLETE DAILY ACTIVITIES?: YES
DRESSING YOURSELF: NEEDS ASSISTANCE
HEARING - LEFT EAR: FUNCTIONAL
FEEDING YOURSELF: INDEPENDENT
WALKS IN HOME: NEEDS ASSISTANCE
GROOMING: NEEDS ASSISTANCE
JUDGMENT_ADEQUATE_SAFELY_COMPLETE_DAILY_ACTIVITIES: YES
TOILETING: NEEDS ASSISTANCE
HEARING - RIGHT EAR: FUNCTIONAL

## 2025-03-29 ASSESSMENT — PAIN - FUNCTIONAL ASSESSMENT
PAIN_FUNCTIONAL_ASSESSMENT: 0-10

## 2025-03-29 ASSESSMENT — PAIN DESCRIPTION - LOCATION: LOCATION: LEG

## 2025-03-29 ASSESSMENT — PAIN DESCRIPTION - ORIENTATION: ORIENTATION: RIGHT

## 2025-03-29 NOTE — CARE PLAN
The clinical goals for the shift include Pt's pain will be managed    Over the shift, the patient made progress toward the following goals.     Problem: Pain - Adult  Goal: Verbalizes/displays adequate comfort level or baseline comfort level  Outcome: Progressing  Flowsheets (Taken 3/29/2025 0650)  Verbalizes/displays adequate comfort level or baseline comfort level:   Encourage patient to monitor pain and request assistance   Assess pain using appropriate pain scale   Administer analgesics based on type and severity of pain and evaluate response   Implement non-pharmacological measures as appropriate and evaluate response   Consider cultural and social influences on pain and pain management     Problem: Safety - Adult  Goal: Free from fall injury  Outcome: Progressing  Flowsheets (Taken 3/29/2025 0650)  Free from fall injury: Instruct family/caregiver on patient safety     Problem: Fall/Injury  Goal: Not fall by end of shift  Outcome: Progressing  Goal: Be free from injury by end of the shift  Outcome: Progressing  Goal: Verbalize understanding of personal risk factors for fall in the hospital  Outcome: Progressing

## 2025-03-29 NOTE — CARE PLAN
The patient's goals for the shift include      The clinical goals for the shift include Patient pain will be control, safety maintained [id number].    Goal met.   Problem: Pain - Adult  Goal: Verbalizes/displays adequate comfort level or baseline comfort level  Outcome: Progressing     Problem: Safety - Adult  Goal: Free from fall injury  Outcome: Progressing     Problem: Discharge Planning  Goal: Discharge to home or other facility with appropriate resources  Outcome: Progressing     Problem: Chronic Conditions and Co-morbidities  Goal: Patient's chronic conditions and co-morbidity symptoms are monitored and maintained or improved  Outcome: Progressing     Problem: Nutrition  Goal: Nutrient intake appropriate for maintaining nutritional needs  Outcome: Progressing     Problem: Fall/Injury  Goal: Not fall by end of shift  Outcome: Progressing  Goal: Be free from injury by end of the shift  Outcome: Progressing  Goal: Verbalize understanding of personal risk factors for fall in the hospital  Outcome: Progressing  Goal: Verbalize understanding of risk factor reduction measures to prevent injury from fall in the home  Outcome: Progressing  Goal: Use assistive devices by end of the shift  Outcome: Progressing  Goal: Pace activities to prevent fatigue by end of the shift  Outcome: Progressing     Problem: Pain  Goal: Takes deep breaths with improved pain control throughout the shift  Outcome: Progressing  Goal: Turns in bed with improved pain control throughout the shift  Outcome: Progressing  Goal: Walks with improved pain control throughout the shift  Outcome: Progressing  Goal: Performs ADL's with improved pain control throughout shift  Outcome: Progressing  Goal: Participates in PT with improved pain control throughout the shift  Outcome: Progressing  Goal: Free from opioid side effects throughout the shift  Outcome: Progressing  Goal: Free from acute confusion related to pain meds throughout the shift  Outcome:  Progressing     Problem: Skin  Goal: Decreased wound size/increased tissue granulation at next dressing change  Outcome: Progressing  Goal: Participates in plan/prevention/treatment measures  Outcome: Progressing  Goal: Prevent/manage excess moisture  Outcome: Progressing  Goal: Prevent/minimize sheer/friction injuries  Outcome: Progressing  Goal: Promote/optimize nutrition  Outcome: Progressing  Goal: Promote skin healing  Outcome: Progressing

## 2025-03-29 NOTE — PROGRESS NOTES
"  Department of Plastic and Reconstructive Surgery  Daily Progress Note    Patient Name: Adriana Wood  MRN: [id number]  Date:  03/29/25     Subjective  Patient found resting in bed this AM.  Doppler signal is strong and flap is propped on pillows to minimize pressure to incisions and flap. Of note, she describes a pain as a deep ache predominately on her right lateral chest wall at the apex of her incision. Otherwise she has no other concerns. Denies any fever, chills, night sweats, CP, SOB, palpitations, nausea, vomiting, diarrhea, constipation, dysuria, hematuria, hematochezia or  hematemesis,       Overnight Events  No acute events overnight    Objective    Vital Signs  /66 (BP Location: Left arm, Patient Position: Lying)   Pulse (!) 40   Temp 36.3 °C (97.3 °F) (Oral)   Resp 16   Ht 1.778 m (5' 10\")   Wt 69.2 kg (152 lb 8.9 oz)   SpO2 95%   BMI 21.89 kg/m²      Physical Exam   Constitutional: A&Ox3, calm and cooperative, NAD  Eyes: EOMI, clear sclera   ENMT: Moist mucous membranes, no apparent injuries or lesions  Head/Neck: NCAT  Cardiovascular: Extremities WWP  Respiratory/Thorax: Unlabored respirations on RA  Gastrointestinal: Abdomen soft, NTND  Genitourinary: Voiding independently  Extremities: RLE flap: warm to touch, soft and compressible, strong biphasic signal at marked stitch, circumferential incisions around flap well approximated with some scabbing  Right posterior lat donor site: incision healed and intact without signs of dehiscence, leave JESUS. MASSIEL drain x 1 to right lower flank with ss and bloody output  Neurological: A&Ox3  Psychological: Appropriate mood and behavior  Skin: Warm and dry.     Diagnostics   Results for orders placed or performed during the hospital encounter of 03/28/25 (from the past 24 hours)   CBC   Result Value Ref Range    WBC 4.7 4.4 - 11.3 x10*3/uL    nRBC 0.0 0.0 - 0.0 /100 WBCs    RBC 3.62 (L) 4.00 - 5.20 x10*6/uL    Hemoglobin 10.3 (L) 12.0 - 16.0 g/dL    " Hematocrit 32.3 (L) 36.0 - 46.0 %    MCV 89 80 - 100 fL    MCH 28.5 26.0 - 34.0 pg    MCHC 31.9 (L) 32.0 - 36.0 g/dL    RDW 13.9 11.5 - 14.5 %    Platelets 249 150 - 450 x10*3/uL   Basic metabolic panel   Result Value Ref Range    Glucose 95 74 - 99 mg/dL    Sodium 139 136 - 145 mmol/L    Potassium 4.2 3.5 - 5.3 mmol/L    Chloride 103 98 - 107 mmol/L    Bicarbonate 28 21 - 32 mmol/L    Anion Gap 12 10 - 20 mmol/L    Urea Nitrogen 12 6 - 23 mg/dL    Creatinine 0.44 (L) 0.50 - 1.05 mg/dL    eGFR >90 >60 mL/min/1.73m*2    Calcium 9.1 8.6 - 10.6 mg/dL     *Note: Due to a large number of results and/or encounters for the requested time period, some results have not been displayed. A complete set of results can be found in Results Review.     US right upper quadrant    Result Date: 3/27/2025  STUDY: Right Upper Quadrant Ultrasound; 3/27/2025 4:21 PM INDICATION: Distended gallbladder on CT. COMPARISON: CT A/P 3/26/2025. ACCESSION NUMBER(S): [id number] ORDERING CLINICIAN: STEFF RAMIREZ TECHNIQUE: Ultrasound of the Right Upper Quadrant. FINDINGS: LIVER: The liver demonstrates increased echogenicity.   GALLBLADDER: The gallbladder is anechoic.  There are no stones.  There is no pericholecystic fluid or wall thickening.  Sonographic Avendano's sign is negative.   BILE DUCTS: The common bile duct measures 0.8 cm.  There is no intrahepatic biliary dilatation.   PANCREAS: The pancreas is not well seen due to overlying bowel gas.  RIGHT KIDNEY: The right kidney measures 9.8 cm in length.  Renal cortical echotexture is normal.  There is no hydronephrosis.  There is a stone within the lower pole measuring 0.9 x 0.4 x 0.7 cm.  There are no cysts.    Diffuse hepatic steatosis. Dilatation of the common bile duct measuring 0.8 cm.  No cholelithiasis or gallbladder wall thickening is appreciated. Nonobstructing right renal calculus measuring 0.9 x 0.4 x 0.7 cm. Signed by Bony Sullivan MD    Electrocardiogram, 12-lead PRN ACS  symptoms    Result Date: 3/27/2025  Sinus rhythm with frequent Premature ventricular complexes Nonspecific T wave abnormality Prolonged QT T wave abnormality, consider anterior ischemia Abnormal ECG When compared with ECG of 16-MAR-2025 09:53, T wave inversion no longer evident in Lateral leads Confirmed by Regan Burkett ([id number]) on 3/27/2025 1:55:18 PM    XR knee right 1-2 views    Result Date: 3/26/2025  Interpreted By:  Tim Gardner, STUDY: XR KNEE RIGHT 1-2 VIEWS  3/26/2025 12:14 pm   INDICATION: Signs/Symptoms:Right knee pain   COMPARISON: 02/28/2025   ACCESSION NUMBER(S): [id number]   ORDERING CLINICIAN: PARVIZ SAUCEDA   TECHNIQUE: 2 views of the right knee including AP and lateral projections were obtained.   FINDINGS: Extensive postoperative changes are seen in the right knee, with a nonmobile see med spacer now seen across the joint space. There is no evidence of acute fracture or dislocation identified. No suprapatellar joint effusion is present.       1. Postoperative changes, as above. 2. No acute fracture or dislocation.   MACRO: None.   Signed by: Tim Gardner 3/26/2025 12:18 PM Dictation workstation:   [id number]    CT chest abdomen pelvis wo IV contrast    Result Date: 3/26/2025  Interpreted By:  Schoenberger, Joseph, STUDY: CT CHEST ABDOMEN PELVIS WO CONTRAST;  3/26/2025 11:20 am   INDICATION: Signs/Symptoms:Bleeding from wound vac.     COMPARISON: Abdomen pelvis CT 08/17/2022   ACCESSION NUMBER(S): [id number]   ORDERING CLINICIAN: PARVIZ SAUCEDA   TECHNIQUE: CT of the chest, abdomen and pelvis was performed. Contiguous axial images were obtained at 3 mm slice thickness through the chest, abdomen and pelvis. Coronal and sagittal reconstructions at 3 mm slice thickness were performed.  No intravenous or oral contrast agents were administered.   FINDINGS: Please note that the study is limited without intravenous contrast.   CHEST:     LUNG/PLEURA/LARGE AIRWAYS: There are moderate findings  of centrilobular emphysema. There is no pleural effusion or pneumothorax. There are a few bandlike opacities in the lung bases likely areas of postinflammatory scarring. The large airways are intact.   VESSELS: Aorta and pulmonary arteries are normal caliber.  Mild atherosclerotic changes are noted of the aorta and branching vessels. Moderate coronary artery calcifications are present.   HEART: Normal size.  No pericardial effusion   MEDIASTINUM AND DAYSI: No mediastinal, hilar or axillary lymph nodes are present.  No supraclavicular or axillary adenopathy.   CHEST WALL AND LOWER NECK: There is a drainage catheter. It enters along the right flank traveling through the inferolateral aspect of the latissimus dorsi. It then travels cranially to enter the latissimus dorsi superolateral aspect where there is a prominent area of heterogeneous attenuation some of which has high attenuation consistent with hematoma. This hematoma extends inferiorly in the more lateral margin of the latissimus musculature. The findings are compatible with a hematoma at this site. Extending caudally from this there is increased attenuation soft tissue enlargement anterior to the distribution of the catheter. The thyroid appears numeral. No supraclavicular or axillary adenopathy.   ABDOMEN:   LIVER: Liver: Within normal limits.   BILE DUCTS: Bile ducts: Normal caliber.   GALLBLADDER: The gallbladder is considerably distended. However no calcified stones, wall thickening, or pericholecystic inflammatory findings are noted.   PANCREAS: The pancreas appears unremarkable.   SPLEEN: Within normal limits.   ADRENAL GLANDS: Within normal limits.   KIDNEYS AND URETERS: There is a nonobstructing lower pole right renal stone measuring approximately 8 mm similar to prior exam. No renal cortical lesions are seen. No hydroureteronephrosis or nephroureterolithiasis is present.   PELVIS:   BLADDER: Within normal limits.   REPRODUCTIVE ORGANS: Lobular anterior  margins of the uterus with calcifications consistent with fibroid changes and some dystrophic fibroids.   BOWEL: The stomach is unremarkable.. The small and large bowel are normal in caliber and demonstrate no wall thickening.  Normal appendix.   VESSELS: The aorta and IVC are within normal limits.   PERITONEUM/RETROPERITONEUM/LYMPH NODES: No ascites or free air, no fluid collection.  The retroperitoneum appears unremarkable.  There is no pathologic enlargement of abdominal or pelvic lymph nodes.   ABDOMINAL WALL: The abdominal wall soft tissues appear normal.   BONES: No suspicious osseous lesions are present. Degenerative discogenic disease is noted in the lower thoracic and lumbar spine.       Chest 1.  There is a tunneled drainage catheter extending from the right upper abdominal posterolateral flying superiorly to the superolateral aspect of the latissimus dorsi. At this site there is a hematoma likely explaining the clinical findings. See detailed discussion above.   Abdomen-Pelvis 1.  Hydropic gallbladder with no other abnormal findings such as calcified stones or wall thickening. 2. Nonobstructing lower pole right nephrolithiasis unchanged from prior. 3. Fibroid changes in the uterus. 4. No definite acute abdominal or pelvic findings     MACRO: None   Signed by: Joseph Schoenberger 3/26/2025 12:02 PM Dictation workstation:   [id number]    XR chest 1 view    Result Date: 3/21/2025  Interpreted By:  Andrzej Gomes and Stevens Alex STUDY: XR CHEST 1 VIEW;  3/21/2025 8:57 am   INDICATION: Signs/Symptoms:SOB.     COMPARISON: XR chest 08/07/2024   ACCESSION NUMBER(S): [id number]   ORDERING CLINICIAN: TAMAR OVALLE   FINDINGS: AP radiograph of the chest was provided.   MEDICAL DEVICES Right upper extremity PICC line projects over the expected location of the cavoatrial junction.   CARDIOMEDIASTINAL SILHOUETTE: Cardiomediastinal silhouette is unchanged in size and configuration.   LUNGS: The lungs are  hyperexpanded with prominent interstitial markings, reflective of chronic change. There is minimal linear subsegmental atelectasis visible in the left base above the diaphragm. No new focal consolidation. No pleural effusion. No pneumothorax.   ABDOMEN: No remarkable upper abdominal findings.   BONES: No acute osseous changes.       1.  Minimal linear basal atelectasis on the left. 2. Emphysematous changes which are stable when compared with prior.   I personally reviewed the images/study and I agree with the findings as stated by Sam Echols DO PGY-2. This study was interpreted at [place], [place].   MACRO: None   Signed by: Andrzej Gomes 3/21/2025 9:50 AM Dictation workstation:   [id number]    ECG 12 lead    Result Date: 3/17/2025  Sinus rhythm with frequent Premature ventricular complexes in a pattern of bigeminy Nonspecific T wave abnormality Abnormal ECG When compared with ECG of 16-MAR-2025 00:42, (unconfirmed) Nonspecific T wave abnormality now evident in Inferior leads QT has shortened Confirmed by Regan Burkett ([id number]) on 3/17/2025 12:11:01 PM    Electrocardiogram, 12-lead PRN ACS symptoms    Result Date: 3/17/2025  Sinus rhythm with frequent Premature ventricular complexes in a pattern of bigeminy Nonspecific T wave abnormality Prolonged QT Abnormal ECG When compared with ECG of 16-MAR-2025 00:40, (unconfirmed) QT has shortened Confirmed by Regan Burkett ([id number]) on 3/17/2025 12:09:10 PM    ECG 12 Lead    Result Date: 3/6/2025   Poor data quality, interpretation may be adversely affected Sinus bradycardia with 1st degree AV block Nonspecific T wave abnormality Prolonged QT Abnormal ECG Confirmed by Clyde Burkett ([id number]) on 3/6/2025 3:09:25 PM    ECG 12 lead    Result Date: 3/5/2025  Sinus tachycardia with frequent Premature ventricular complexes in a pattern of bigeminy Nonspecific T wave abnormality Abnormal ECG When compared with ECG of 01-MAR-2025 09:07,  Significant changes have occurred Confirmed by Baron Sanders ([id number]) on 3/5/2025 8:34:27 AM    Bedside PICC Imaging    Result Date: 3/4/2025  These images are not reportable by radiology and will not be interpreted by  Radiologists.    XR knee right 1-2 views    Result Date: 2/28/2025  Interpreted By:  Juan Smith, STUDY: XR KNEE RIGHT 1-2 VIEWS; ;  2/28/2025 11:36 am   INDICATION: Signs/Symptoms:PACU.     COMPARISON: 01/16/2025.   ACCESSION NUMBER(S): [id number]   ORDERING CLINICIAN: DARSHAN LARSON   FINDINGS: Two views of the right knee   Postsurgical changes of right knee fusion with cement and productive changes within the joint space. The knee joint alignment is similar compared to prior. Interval removal of intramedullary nail with ghost tract. Similar-appearing remote fractures of the proximal fibula and proximal tibia. Osteopenic changes. Vascular calcifications. Soft tissue edema over the anterior knee with overlying wound VAC and subcutaneous air, likely postsurgical. No definitive new fracture.       1. Postsurgical changes of the right knee with fusion of the joint space and interval removal of the intramedullary nail. Overlying soft tissue edema and wound VAC.       MACRO: None   Signed by: Juan Smith 2/28/2025 12:56 PM Dictation workstation:   [id number]    FL fluoro images no charge    Result Date: 2/28/2025  These images are not reportable by radiology and will not be interpreted by  Radiologists.      Current Medications  Scheduled medications  acetaminophen, 975 mg, oral, q8h  [Held by provider] apixaban, 2.5 mg, oral, BID  lidocaine, 1 patch, transdermal, Daily  methocarbamol, 500 mg, oral, q8h FELIX  pantoprazole, 40 mg, oral, Daily before breakfast  polyethylene glycol, 17 g, oral, Daily  vancomycin, 1,000 mg, intravenous, q24h      Continuous medications     PRN medications  PRN medications: albuterol, docusate sodium, oxyCODONE, oxyCODONE, oxygen, oxymetazoline      Assessment  Adriana Wood is a 69 y.o. female with a past medical history of bradycardia, cardiomyopathy, CAD, cardiac arrest due to electrolyte abnormalities and zofran use post-operatively, and traumatic brain injury. Patient had a total knee right knee done by Dr. Whelan in March 2024 which was complicated by E. Coli infection. Patient had right TKA wound dehiscence s/p I&D right knee with polyswap and attempted re-closure of complex wound on 5/10/2024 by Dr. Whelan. She was referred to plastic surgery (Dr. Reyes) perioperatively given projected anticipated need for possible flap coverage of the wound/defect site. Patient re-admitted and returned to the OR with orthopedics on 2/28/2025 for right knee I&D, revision static spacer and application of incisional wound vac. Plastic surgery service consulted postoperatively to follow for flap recommendations/timing, now s/p R latissimus free flap to R knee on 3/14 with Dr. Lundberg.     Plan/Recommendations  S/p R latissimus free flap to R knee on 3/14 with Dr. Lundberg   -Transferred from [facility] 3/29/25 due to R lat dorsi flap area hematoma  -Doing well and following appropriate course  -Will continue to monitor hematoma while inpatient   - Hold home Eliquis for now  - Continue flap assessments per nursing with interval checks per plastic surgery team       ·  Assess Doppler pulse at marked site q shift plus flap appearance (color, warmth, turgor)       ·  Nursing to notify plastic surgery team immediately if there are any acute changes  - Continue MASSIEL drain care per nursing (MASSIEL x 1)        ·  Strip drain tubing TID and PRN       ·  Monitor and record output [id number]        ·  Keep drain sites C/D/I with daily drain dressing changes        ·  Call plastics if drain output is >30cc in an hour or greater than 200cc over 8 hours  - IV Vancomycin as recommended by ID during last admission (stop date 4/11/25)  - Avoid positioning that would apply pressure to flap  region or incisions   - NWB, patient to dangle as tolerated   - Regular diet  - Maintain BP of 110/60 minimum to ensure adequate flap perfusion   - Monitor VS/pulse ox Q8  - Monitor AM CBC/RFP    #Asthma  - continue home inhaler  - as needed O2 supplementation  - nasal Afrin spray for congestion  - discussed need to be tested for YANETH as outpatient     #post-op pain  Scheduled Tylenol  Lidocaine patch  Scheduled Robaxin  PRN Oxy IR    # history of QT Prolongation  - Avoid QT prolonging medications; NO Zofran (hx of cardiac arrest)    Disposition: Admit to RNF for hematoma monitoring      Prophylaxis:   - DVT: hold Eliquis for now due to hematoma, SCDs  - Encourage IS x10 every hour while awake   - Bowel regimen: Miralax and Colace      Patient and plan discussed with Kris Shaikh PA-C    Plastic and Reconstructive Surgery   [place]  Pager #[phone]  Team phone: [phone]

## 2025-03-29 NOTE — CONSULTS
Vancomycin Dosing by Pharmacy- INITIAL    Adriana Wood is a 69 y.o. year old female who Pharmacy has been consulted for vancomycin dosing for cellulitis, skin and soft tissue. Based on the patient's indication and renal status this patient will be dosed based on a goal AUC of 400-600.     Renal function is currently stable.    Visit Vitals  /66 (BP Location: Left arm, Patient Position: Lying)   Pulse 55   Temp 36.2 °C (97.2 °F) (Temporal) Comment: Patient arrived on SCC6   Resp 17        Lab Results   Component Value Date    CREATININE 0.53 2025    CREATININE 0.52 2025    CREATININE 0.49 (L) 2025    CREATININE 0.53 2025        Patient weight is as follows: There were no vitals filed for this visit.    Cultures:  No results found for the encounter in last 14 days.        No intake/output data recorded.  I/O during current shift:  No intake/output data recorded.    Temp (24hrs), Av.3 °C (97.4 °F), Min:36.2 °C (97.2 °F), Max:36.4 °C (97.5 °F)         Assessment/Plan     Patient will not be given a loading dose.  Will initiate vancomycin maintenance,  1000 mg every 24 hours.    This dosing regimen is predicted by [employer] to result in the following pharmacokinetic parameters:  Loading dose: N/A  Regimen: 1000 mg IV every 24 hours.  Start time: 14:24 on 2025  Exposure target: AUC24 (range)400-600 mg/L.hr   OSG61-21: 500 mg/L.hr  AUC24,ss: 413 mg/L.hr  Probability of AUC24 > 400: 66 %  Ctrough,ss: 14 mg/L  Probability of Ctrough,ss > 20: 0 %      Follow-up level will be ordered on 3/30 at 1000 unless clinically indicated sooner.  Will continue to monitor renal function daily while on vancomycin and order serum creatinine at least every 48 hours if not already ordered.  Follow for continued vancomycin needs, clinical response, and signs/symptoms of toxicity.       TONY BERKOWITZ, PharmD
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
## 2025-03-30 ENCOUNTER — OFFICE VISIT (OUTPATIENT)
Dept: SURGICAL ONCOLOGY | Facility: HOSPITAL | Age: 70
End: 2025-03-30
Payer: MEDICARE

## 2025-03-30 VITALS
TEMPERATURE: 97.2 F | RESPIRATION RATE: 16 BRPM | OXYGEN SATURATION: 98 % | SYSTOLIC BLOOD PRESSURE: 122 MMHG | DIASTOLIC BLOOD PRESSURE: 61 MMHG | HEIGHT: 70 IN | HEART RATE: 84 BPM | BODY MASS INDEX: 21.84 KG/M2 | WEIGHT: 152.56 LBS

## 2025-03-30 LAB
ANION GAP SERPL CALC-SCNC: 11 MMOL/L (ref 10–20)
BUN SERPL-MCNC: 12 MG/DL (ref 6–23)
CALCIUM SERPL-MCNC: 8.9 MG/DL (ref 8.6–10.6)
CHLORIDE SERPL-SCNC: 103 MMOL/L (ref 98–107)
CO2 SERPL-SCNC: 29 MMOL/L (ref 21–32)
CREAT SERPL-MCNC: 0.5 MG/DL (ref 0.5–1.05)
EGFRCR SERPLBLD CKD-EPI 2021: >90 ML/MIN/1.73M*2
ERYTHROCYTE [DISTWIDTH] IN BLOOD BY AUTOMATED COUNT: 14 % (ref 11.5–14.5)
GLUCOSE SERPL-MCNC: 106 MG/DL (ref 74–99)
HCT VFR BLD AUTO: 32.3 % (ref 36–46)
HGB BLD-MCNC: 10.1 G/DL (ref 12–16)
MAGNESIUM SERPL-MCNC: 1.99 MG/DL (ref 1.6–2.4)
MCH RBC QN AUTO: 28.5 PG (ref 26–34)
MCHC RBC AUTO-ENTMCNC: 31.3 G/DL (ref 32–36)
MCV RBC AUTO: 91 FL (ref 80–100)
NRBC BLD-RTO: 0 /100 WBCS (ref 0–0)
PLATELET # BLD AUTO: 262 X10*3/UL (ref 150–450)
POTASSIUM SERPL-SCNC: 4.3 MMOL/L (ref 3.5–5.3)
RBC # BLD AUTO: 3.55 X10*6/UL (ref 4–5.2)
SODIUM SERPL-SCNC: 139 MMOL/L (ref 136–145)
STAPHYLOCOCCUS SPEC CULT: NORMAL
VANCOMYCIN SERPL-MCNC: 9.5 UG/ML (ref 5–20)
WBC # BLD AUTO: 5.3 X10*3/UL (ref 4.4–11.3)

## 2025-03-30 PROCEDURE — 2060000001 HC INTERMEDIATE ICU ROOM DAILY

## 2025-03-30 PROCEDURE — 93005 ELECTROCARDIOGRAM TRACING: CPT

## 2025-03-30 PROCEDURE — 99222 1ST HOSP IP/OBS MODERATE 55: CPT

## 2025-03-30 PROCEDURE — 2500000001 HC RX 250 WO HCPCS SELF ADMINISTERED DRUGS (ALT 637 FOR MEDICARE OP): Performed by: PHYSICIAN ASSISTANT

## 2025-03-30 PROCEDURE — 99222 1ST HOSP IP/OBS MODERATE 55: CPT | Performed by: PHYSICIAN ASSISTANT

## 2025-03-30 PROCEDURE — 80048 BASIC METABOLIC PNL TOTAL CA: CPT | Performed by: PHYSICIAN ASSISTANT

## 2025-03-30 PROCEDURE — 80202 ASSAY OF VANCOMYCIN: CPT | Performed by: PHYSICIAN ASSISTANT

## 2025-03-30 PROCEDURE — 93010 ELECTROCARDIOGRAM REPORT: CPT | Performed by: INTERNAL MEDICINE

## 2025-03-30 PROCEDURE — 83735 ASSAY OF MAGNESIUM: CPT | Performed by: PHYSICIAN ASSISTANT

## 2025-03-30 PROCEDURE — 2500000004 HC RX 250 GENERAL PHARMACY W/ HCPCS (ALT 636 FOR OP/ED): Performed by: PHYSICIAN ASSISTANT

## 2025-03-30 PROCEDURE — 2500000005 HC RX 250 GENERAL PHARMACY W/O HCPCS: Performed by: PHYSICIAN ASSISTANT

## 2025-03-30 PROCEDURE — 85027 COMPLETE CBC AUTOMATED: CPT | Performed by: PHYSICIAN ASSISTANT

## 2025-03-30 RX ORDER — VANCOMYCIN HYDROCHLORIDE 1 G/20ML
INJECTION, POWDER, LYOPHILIZED, FOR SOLUTION INTRAVENOUS DAILY PRN
Status: DISCONTINUED | OUTPATIENT
Start: 2025-03-30 | End: 2025-04-09 | Stop reason: HOSPADM

## 2025-03-30 RX ADMIN — VANCOMYCIN HYDROCHLORIDE 1250 MG: 5 INJECTION, POWDER, LYOPHILIZED, FOR SOLUTION INTRAVENOUS at 16:15

## 2025-03-30 RX ADMIN — ACETAMINOPHEN 975 MG: 325 TABLET, FILM COATED ORAL at 13:11

## 2025-03-30 RX ADMIN — OXYCODONE HYDROCHLORIDE 10 MG: 5 TABLET ORAL at 00:01

## 2025-03-30 RX ADMIN — OXYCODONE HYDROCHLORIDE 10 MG: 5 TABLET ORAL at 13:11

## 2025-03-30 RX ADMIN — OXYCODONE HYDROCHLORIDE 10 MG: 5 TABLET ORAL at 21:24

## 2025-03-30 RX ADMIN — METHOCARBAMOL 500 MG: 500 TABLET ORAL at 04:24

## 2025-03-30 RX ADMIN — ACETAMINOPHEN 975 MG: 325 TABLET, FILM COATED ORAL at 06:35

## 2025-03-30 RX ADMIN — PANTOPRAZOLE SODIUM 40 MG: 40 TABLET, DELAYED RELEASE ORAL at 06:35

## 2025-03-30 RX ADMIN — OXYCODONE HYDROCHLORIDE 10 MG: 5 TABLET ORAL at 04:23

## 2025-03-30 ASSESSMENT — COGNITIVE AND FUNCTIONAL STATUS - GENERAL
MOVING TO AND FROM BED TO CHAIR: A LOT
PERSONAL GROOMING: A LITTLE
WALKING IN HOSPITAL ROOM: A LOT
MOBILITY SCORE: 12
DRESSING REGULAR UPPER BODY CLOTHING: A LITTLE
DRESSING REGULAR LOWER BODY CLOTHING: A LITTLE
MOVING FROM LYING ON BACK TO SITTING ON SIDE OF FLAT BED WITH BEDRAILS: A LOT
CLIMB 3 TO 5 STEPS WITH RAILING: A LOT
EATING MEALS: A LITTLE
HELP NEEDED FOR BATHING: A LITTLE
TOILETING: A LOT
DAILY ACTIVITIY SCORE: 17
STANDING UP FROM CHAIR USING ARMS: A LOT
TURNING FROM BACK TO SIDE WHILE IN FLAT BAD: A LOT

## 2025-03-30 ASSESSMENT — PAIN - FUNCTIONAL ASSESSMENT
PAIN_FUNCTIONAL_ASSESSMENT: 0-10
PAIN_FUNCTIONAL_ASSESSMENT: 0-10

## 2025-03-30 ASSESSMENT — PAIN SCALES - GENERAL
PAINLEVEL_OUTOF10: 5 - MODERATE PAIN
PAINLEVEL_OUTOF10: 8

## 2025-03-30 ASSESSMENT — PAIN DESCRIPTION - ORIENTATION
ORIENTATION: RIGHT
ORIENTATION: RIGHT

## 2025-03-30 ASSESSMENT — ACTIVITIES OF DAILY LIVING (ADL): LACK_OF_TRANSPORTATION: NO

## 2025-03-30 ASSESSMENT — PAIN DESCRIPTION - DESCRIPTORS: DESCRIPTORS: ACHING;SPASM

## 2025-03-30 ASSESSMENT — PAIN DESCRIPTION - LOCATION
LOCATION: LEG
LOCATION: LEG

## 2025-03-30 NOTE — PROGRESS NOTES
Pharmacy Medication History Review    Adriana Wood is a 69 y.o. female admitted for Hematoma of abdominal wall, initial encounter. Pharmacy reviewed the patient's [id number] medications and allergies for accuracy.    The list below reflects the updated PTA list.   Prior to Admission Medications   Prescriptions Last Dose Informant   acetaminophen (Tylenol) 325 mg tablet  Self, Other   Sig: Take 3 tablets (975 mg) by mouth every 8 hours for 14 days.   albuterol 90 mcg/actuation inhaler  Self, Other   Sig: Inhale 2 puffs every 6 hours if needed for shortness of breath.   apixaban (Eliquis) 2.5 mg tablet  Self, Other   Sig: Take 1 tablet (2.5 mg) by mouth 2 times a day.   docusate sodium (Colace) 100 mg capsule  Self, Other   Sig: Take 1 capsule (100 mg) by mouth 2 times a day as needed for constipation.   heparin lock flush, porcine, 10 unit/mL injection  Self, Other   Sig: Infuse 5 mL (50 Units) into a venous catheter if needed (SASH method to be used with antibiotic).   lidocaine HCL 4 % adhesive patch,medicated  Self, Other   Sig: Apply 1 patch topically once daily. Apply to RLE   methocarbamol (Robaxin) 500 mg tablet  Self, Other   Sig: Take 1 tablet (500 mg) by mouth every 8 hours for 14 days.   oxyCODONE (Roxicodone) 5 mg immediate release tablet  Self, Other   Sig: Take 1 tablet (5 mg) by mouth every 4 hours if needed for moderate pain (4 - 6).   oxyCODONE (Roxicodone) 5 mg immediate release tablet  Self, Other   Sig: Take 2 tablets (10 mg) by mouth every 4 hours if needed for severe pain (7 - 10).   pantoprazole (ProtoNix) 40 mg EC tablet  Self, Other   Sig: Take 1 tablet (40 mg) by mouth once daily in the morning. Take before meals. Do not crush, chew, or split.   sodium chloride 0.9% (Normal Saline Flush) flush  Self, Other   Sig: Infuse 10 mL into a venous catheter every 8 hours.   vancomycin (Vancocin) IVPB  Self, Other   Sig: Infuse 200 mL (1 g) at 200 mL/hr over 60 minutes into a venous  "catheter every 12 hours.   Patient taking differently: Infuse 200 mL (1 g) into a venous catheter every 12 hours. Stop 4/11      Facility-Administered Medications: None        The list below reflects the updated allergy list. Please review each documented allergy for additional clarification and justification.  Allergies  Reviewed by Michael Wolf RN on 3/29/2025        Severity Reactions Comments    Iodinated Contrast Media High Anaphylaxis     Naproxen High GI bleeding, Other Causes internal bleeding    Ondansetron Hcl High Cardiac arrhythmia/arrest Long QT, went into cardiac arrest.    Penicillins High Anaphylaxis, Other, Rash, GI Upset Seizures \"burning\" rash    Tramadol High Unknown, Other stimulant behavior Keeps her up all night    Doxycycline Calcium Medium Nausea/vomiting     Codeine Not Specified Nausea/vomiting     Augmentin [amoxicillin-pot Clavulanate] Low Rash     Doxycycline Hyclate Low Rash     Duloxetine Low Diarrhea             Patient was unable to be assessed for M2B at discharge. Pharmacy has been updated to [facility].    Sources used to complete the med history include:    [facility]  Pharmacy dispense history  Patient Interview Good historian  Chart Review  Care Everywhere  SNF [facility]      Below are additional concerns with the patient's PTA list.  SNF was contacted and sent complete list via fax. PTA list reflects all medications patient was taking prior to being hospitalized.     Medications ADDED:  None   Medications CHANGED:  None  Medications REMOVED:   None     Juan Boone [facility].   Transitions of Care Pharmacist    Please reach out via Secure Chat for questions, or if no response call [phone] or vocera [facility]    "

## 2025-03-30 NOTE — PROGRESS NOTES
Vancomycin Dosing by Pharmacy- FOLLOW UP    Adriana Wood is a 69 y.o. year old female who Pharmacy has been consulted for vancomycin dosing for cellulitis, skin and soft tissue. Based on the patient's indication and renal status this patient is being dosed based on a goal AUC of 400-600.     Renal function is currently stable.    Current vancomycin dose: 1000 mg given every 24 hours    Most recent random level: 9.5 mcg/mL    Visit Vitals  /72 (BP Location: Left arm, Patient Position: Sitting)   Pulse (!) 43   Temp 37 °C (98.6 °F) (Temporal)   Resp 16        Lab Results   Component Value Date    CREATININE 0.50 2025    CREATININE 0.44 (L) 2025    CREATININE 0.53 2025    CREATININE 0.52 2025        Patient weight is as follows:   Vitals:    25 0006   Weight: 69.2 kg (152 lb 8.9 oz)       Cultures:  No results found for the encounter in last 14 days.       I/O last 3 completed shifts:  In: 320 (4.6 mL/kg) [P.O.:120; IV Piggyback:200]  Out: 1145.5 (16.6 mL/kg) [Urine:1125 (0.5 mL/kg/hr); Drains:20.5]  Weight: 69.2 kg   I/O during current shift:  I/O this shift:  In: -   Out: 255 [Urine:250; Drains:5]    Temp (24hrs), Av.7 °C (98 °F), Min:36.4 °C (97.6 °F), Max:37 °C (98.6 °F)      Assessment/Plan    Below goal AUC. Orders placed for new vancomcyin regimen of 1250 mg every 24 hours to begin at 1530.     This dosing regimen is predicted by [facility] [employer] to result in the following pharmacokinetic parameters:  Loading dose: N/A  Regimen: 1250 mg IV every 24 hours.  Start time: 14:29 on 2025  Exposure target: AUC24 (range)400-600 mg/L.hr   FOU25-83: 442 mg/L.hr  AUC24,ss: 449 mg/L.hr  Probability of AUC24 > 400: 91 %    The next level will be obtained on 3/31 at 0500. May be obtained sooner if clinically indicated.   Will continue to monitor renal function daily while on vancomycin and order serum creatinine at least every 48 hours if not already ordered.  Follow for continued  vancomycin needs, clinical response, and signs/symptoms of toxicity.       PETRA MANZO, PharmD

## 2025-03-30 NOTE — CARE PLAN
The clinical goals for the shift include pt will remain aware of POC.      Problem: Pain - Adult  Goal: Verbalizes/displays adequate comfort level or baseline comfort level  3/30/2025 1913 by Erin Barrientos RN  Outcome: Progressing  3/30/2025 1913 by Erin Barrientos RN  Outcome: Progressing     Problem: Safety - Adult  Goal: Free from fall injury  3/30/2025 1913 by Erin Barrientos RN  Outcome: Progressing  3/30/2025 1913 by Erin Barrientos RN  Outcome: Progressing     Problem: Discharge Planning  Goal: Discharge to home or other facility with appropriate resources  3/30/2025 1913 by Erin Barrientos RN  Outcome: Progressing  3/30/2025 1913 by Erin Barrientos RN  Outcome: Progressing     Problem: Chronic Conditions and Co-morbidities  Goal: Patient's chronic conditions and co-morbidity symptoms are monitored and maintained or improved  3/30/2025 1913 by Erin Barrientos RN  Outcome: Progressing  3/30/2025 1913 by Erin Barrientos RN  Outcome: Progressing     Problem: Nutrition  Goal: Nutrient intake appropriate for maintaining nutritional needs  3/30/2025 1913 by Erin Barrientos RN  Outcome: Progressing  3/30/2025 1913 by Erin Barrientos RN  Outcome: Progressing     Problem: Fall/Injury  Goal: Not fall by end of shift  3/30/2025 1913 by Erin Barrientos RN  Outcome: Progressing  3/30/2025 1913 by Erin Barrientos RN  Outcome: Progressing  Goal: Be free from injury by end of the shift  3/30/2025 1913 by Erin Barrientos RN  Outcome: Progressing  3/30/2025 1913 by Erin Barrientos RN  Outcome: Progressing  Goal: Verbalize understanding of personal risk factors for fall in the hospital  3/30/2025 1913 by Erin Barrientos RN  Outcome: Progressing  3/30/2025 1913 by Erin Barrientos RN  Outcome: Progressing  Goal: Verbalize understanding of risk factor reduction measures to prevent injury from fall in the home  3/30/2025 1913 by Erin Barrientos RN  Outcome: Progressing  3/30/2025 1913 by  Erin Barrientos RN  Outcome: Progressing  Goal: Use assistive devices by end of the shift  3/30/2025 1913 by Erin Barrientos RN  Outcome: Progressing  3/30/2025 1913 by Erin Barrientos RN  Outcome: Progressing  Goal: Pace activities to prevent fatigue by end of the shift  3/30/2025 1913 by Erin Barrientos RN  Outcome: Progressing  3/30/2025 1913 by Erin Barrientos RN  Outcome: Progressing     Problem: Pain  Goal: Takes deep breaths with improved pain control throughout the shift  3/30/2025 1913 by Erin Barrientos RN  Outcome: Progressing  3/30/2025 1913 by Erin Barrientos RN  Outcome: Progressing  Goal: Turns in bed with improved pain control throughout the shift  3/30/2025 1913 by Erin Barrientos RN  Outcome: Progressing  3/30/2025 1913 by Erin Barrientos RN  Outcome: Progressing  Goal: Walks with improved pain control throughout the shift  3/30/2025 1913 by Erin Barrientos RN  Outcome: Progressing  3/30/2025 1913 by Erin Barrientos RN  Outcome: Progressing  Goal: Performs ADL's with improved pain control throughout shift  3/30/2025 1913 by Erin Barrientos RN  Outcome: Progressing  3/30/2025 1913 by Erin Barrientos RN  Outcome: Progressing  Goal: Participates in PT with improved pain control throughout the shift  3/30/2025 1913 by Erin Barrientos RN  Outcome: Progressing  3/30/2025 1913 by Erin Barrientos RN  Outcome: Progressing  Goal: Free from opioid side effects throughout the shift  3/30/2025 1913 by Erin Barrientos RN  Outcome: Progressing  3/30/2025 1913 by Erin Barrientos RN  Outcome: Progressing  Goal: Free from acute confusion related to pain meds throughout the shift  3/30/2025 1913 by Erin Barrientos RN  Outcome: Progressing  3/30/2025 1913 by Erin Barrientos RN  Outcome: Progressing     Problem: Skin  Goal: Decreased wound size/increased tissue granulation at next dressing change  3/30/2025 1913 by Erin Barrientos, RN  Outcome: Progressing  Flowsheets (Taken  3/30/2025 1913)  Decreased wound size/increased tissue granulation at next dressing change: Promote sleep for wound healing  3/30/2025 1913 by Erin Barrientos RN  Outcome: Progressing  Flowsheets (Taken 3/30/2025 1913)  Decreased wound size/increased tissue granulation at next dressing change: Promote sleep for wound healing  Goal: Participates in plan/prevention/treatment measures  3/30/2025 1913 by Erin Barrientos RN  Outcome: Progressing  Flowsheets (Taken 3/30/2025 1913)  Participates in plan/prevention/treatment measures: Discuss with provider PT/OT consult  3/30/2025 1913 by Erin Barrientos RN  Outcome: Progressing  Flowsheets (Taken 3/30/2025 1913)  Participates in plan/prevention/treatment measures: Discuss with provider PT/OT consult  Goal: Prevent/manage excess moisture  3/30/2025 1913 by Erin Barrientos RN  Outcome: Progressing  Flowsheets (Taken 3/30/2025 1913)  Prevent/manage excess moisture: Cleanse incontinence/protect with barrier cream  3/30/2025 1913 by Erin Barrientos RN  Outcome: Progressing  Flowsheets (Taken 3/30/2025 1913)  Prevent/manage excess moisture: Cleanse incontinence/protect with barrier cream  Goal: Prevent/minimize sheer/friction injuries  3/30/2025 1913 by Erin Barrientos RN  Outcome: Progressing  Flowsheets (Taken 3/30/2025 1913)  Prevent/minimize sheer/friction injuries: Complete micro-shifts as needed if patient unable. Adjust patient position to relieve pressure points, not a full turn  3/30/2025 1913 by Erin Barrientos RN  Outcome: Progressing  Flowsheets (Taken 3/30/2025 1913)  Prevent/minimize sheer/friction injuries: Complete micro-shifts as needed if patient unable. Adjust patient position to relieve pressure points, not a full turn  Goal: Promote/optimize nutrition  3/30/2025 1913 by Erin Barrientos RN  Outcome: Progressing  Flowsheets (Taken 3/30/2025 1913)  Promote/optimize nutrition: Monitor/record intake including meals  3/30/2025 1913 by Erin  LAURIE Barrientos  Outcome: Progressing  Flowsheets (Taken 3/30/2025 1913)  Promote/optimize nutrition: Monitor/record intake including meals  Goal: Promote skin healing  3/30/2025 1913 by Erin Barrientos RN  Outcome: Progressing  Flowsheets (Taken 3/30/2025 1913)  Promote skin healing: Assess skin/pad under line(s)/device(s)  3/30/2025 1913 by Erin Barrientos RN  Outcome: Progressing  Flowsheets (Taken 3/30/2025 1913)  Promote skin healing: Assess skin/pad under line(s)/device(s)
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
## 2025-03-30 NOTE — PROGRESS NOTES
"  Department of Plastic and Reconstructive Surgery  Daily Progress Note    Patient Name: Adriana Wood  MRN: [id number]  Date:  03/30/25     Subjective  Patient found resting in bed this AM, sleepy but arousable to commands.  Doppler signal is strong and flap is propped on pillows to minimize pressure to incisions and flap. Of note, she reports the pain of her right lateral chest wall at the apex of her incision has improved compared to yesterday. Of note, she had documented consistent  asymptomatic bradycardia averaging mid 40's via pulse ox starting on 3/29 PM into 3/30 AM. At 9:30 AM HR decreased to 34 BPM. At this time, patient was asymptomatic however, a rapid response was called. EKG showed Bradycardia, PVC's and Bigeminy which were documented in previous EKG's. Of note, she was seen by cardiology in 1/2025 in an outpatient setting. At this time she was instructed to ovoid medications that prolong QT intervals. No interventions at this time.     Overnight Events  Bradycardia    Objective    Vital Signs  /57 (Patient Position: Other (Comment)) Comment (Patient Position): sitting at edge of bed- pt alert and oriented; awake  Pulse (!) 44   Temp 36.4 °C (97.6 °F) (Temporal)   Resp 18   Ht 1.778 m (5' 10\")   Wt 69.2 kg (152 lb 8.9 oz)   SpO2 96%   BMI 21.89 kg/m²      Physical Exam   Constitutional: A&Ox3, calm and cooperative, NAD  Eyes: EOMI, clear sclera   ENMT: Moist mucous membranes, no apparent injuries or lesions  Head/Neck: NCAT  Cardiovascular: Extremities WWP  Respiratory/Thorax: Unlabored respirations on RA  Gastrointestinal: Abdomen soft, NTND  Genitourinary: Voiding independently  Extremities: RLE flap: warm to touch, soft and compressible, strong biphasic signal at marked stitch, circumferential incisions around flap well approximated with some scabbing  Right posterior lat donor site: incision healed and intact without signs of dehiscence, leave JESUS. MASSIEL drain x 1 to right lower flank " with ss and bloody output  Neurological: A&Ox3  Psychological: Appropriate mood and behavior  Skin: Warm and dry.     Diagnostics   Results for orders placed or performed during the hospital encounter of 03/28/25 (from the past 24 hours)   CBC   Result Value Ref Range    WBC 5.3 4.4 - 11.3 x10*3/uL    nRBC 0.0 0.0 - 0.0 /100 WBCs    RBC 3.55 (L) 4.00 - 5.20 x10*6/uL    Hemoglobin 10.1 (L) 12.0 - 16.0 g/dL    Hematocrit 32.3 (L) 36.0 - 46.0 %    MCV 91 80 - 100 fL    MCH 28.5 26.0 - 34.0 pg    MCHC 31.3 (L) 32.0 - 36.0 g/dL    RDW 14.0 11.5 - 14.5 %    Platelets 262 150 - 450 x10*3/uL   Basic metabolic panel   Result Value Ref Range    Glucose 106 (H) 74 - 99 mg/dL    Sodium 139 136 - 145 mmol/L    Potassium 4.3 3.5 - 5.3 mmol/L    Chloride 103 98 - 107 mmol/L    Bicarbonate 29 21 - 32 mmol/L    Anion Gap 11 10 - 20 mmol/L    Urea Nitrogen 12 6 - 23 mg/dL    Creatinine 0.50 0.50 - 1.05 mg/dL    eGFR >90 >60 mL/min/1.73m*2    Calcium 8.9 8.6 - 10.6 mg/dL     *Note: Due to a large number of results and/or encounters for the requested time period, some results have not been displayed. A complete set of results can be found in Results Review.     US right upper quadrant    Result Date: 3/27/2025  STUDY: Right Upper Quadrant Ultrasound; 3/27/2025 4:21 PM INDICATION: Distended gallbladder on CT. COMPARISON: CT A/P 3/26/2025. ACCESSION NUMBER(S): [id number] ORDERING CLINICIAN: STEFF RAMIREZ TECHNIQUE: Ultrasound of the Right Upper Quadrant. FINDINGS: LIVER: The liver demonstrates increased echogenicity.   GALLBLADDER: The gallbladder is anechoic.  There are no stones.  There is no pericholecystic fluid or wall thickening.  Sonographic Avendano's sign is negative.   BILE DUCTS: The common bile duct measures 0.8 cm.  There is no intrahepatic biliary dilatation.   PANCREAS: The pancreas is not well seen due to overlying bowel gas.  RIGHT KIDNEY: The right kidney measures 9.8 cm in length.  Renal cortical echotexture is normal.   There is no hydronephrosis.  There is a stone within the lower pole measuring 0.9 x 0.4 x 0.7 cm.  There are no cysts.    Diffuse hepatic steatosis. Dilatation of the common bile duct measuring 0.8 cm.  No cholelithiasis or gallbladder wall thickening is appreciated. Nonobstructing right renal calculus measuring 0.9 x 0.4 x 0.7 cm. Signed by Bony Sullivan MD    Electrocardiogram, 12-lead PRN ACS symptoms    Result Date: 3/27/2025  Sinus rhythm with frequent Premature ventricular complexes Nonspecific T wave abnormality Prolonged QT T wave abnormality, consider anterior ischemia Abnormal ECG When compared with ECG of 16-MAR-2025 09:53, T wave inversion no longer evident in Lateral leads Confirmed by Regan Burkett ([id number]) on 3/27/2025 1:55:18 PM    XR knee right 1-2 views    Result Date: 3/26/2025  Interpreted By:  Tim Gardner, STUDY: XR KNEE RIGHT 1-2 VIEWS  3/26/2025 12:14 pm   INDICATION: Signs/Symptoms:Right knee pain   COMPARISON: 02/28/2025   ACCESSION NUMBER(S): [id number]   ORDERING CLINICIAN: PARVIZ SAUCEDA   TECHNIQUE: 2 views of the right knee including AP and lateral projections were obtained.   FINDINGS: Extensive postoperative changes are seen in the right knee, with a nonmobile see med spacer now seen across the joint space. There is no evidence of acute fracture or dislocation identified. No suprapatellar joint effusion is present.       1. Postoperative changes, as above. 2. No acute fracture or dislocation.   MACRO: None.   Signed by: Tim Gardner 3/26/2025 12:18 PM Dictation workstation:   [id number]    CT chest abdomen pelvis wo IV contrast    Result Date: 3/26/2025  Interpreted By:  Schoenberger, Joseph, STUDY: CT CHEST ABDOMEN PELVIS WO CONTRAST;  3/26/2025 11:20 am   INDICATION: Signs/Symptoms:Bleeding from wound vac.     COMPARISON: Abdomen pelvis CT 08/17/2022   ACCESSION NUMBER(S): [id number]   ORDERING CLINICIAN: PARVIZ SAUCEDA   TECHNIQUE: CT of the chest, abdomen and pelvis was  performed. Contiguous axial images were obtained at 3 mm slice thickness through the chest, abdomen and pelvis. Coronal and sagittal reconstructions at 3 mm slice thickness were performed.  No intravenous or oral contrast agents were administered.   FINDINGS: Please note that the study is limited without intravenous contrast.   CHEST:     LUNG/PLEURA/LARGE AIRWAYS: There are moderate findings of centrilobular emphysema. There is no pleural effusion or pneumothorax. There are a few bandlike opacities in the lung bases likely areas of postinflammatory scarring. The large airways are intact.   VESSELS: Aorta and pulmonary arteries are normal caliber.  Mild atherosclerotic changes are noted of the aorta and branching vessels. Moderate coronary artery calcifications are present.   HEART: Normal size.  No pericardial effusion   MEDIASTINUM AND DAYSI: No mediastinal, hilar or axillary lymph nodes are present.  No supraclavicular or axillary adenopathy.   CHEST WALL AND LOWER NECK: There is a drainage catheter. It enters along the right flank traveling through the inferolateral aspect of the latissimus dorsi. It then travels cranially to enter the latissimus dorsi superolateral aspect where there is a prominent area of heterogeneous attenuation some of which has high attenuation consistent with hematoma. This hematoma extends inferiorly in the more lateral margin of the latissimus musculature. The findings are compatible with a hematoma at this site. Extending caudally from this there is increased attenuation soft tissue enlargement anterior to the distribution of the catheter. The thyroid appears numeral. No supraclavicular or axillary adenopathy.   ABDOMEN:   LIVER: Liver: Within normal limits.   BILE DUCTS: Bile ducts: Normal caliber.   GALLBLADDER: The gallbladder is considerably distended. However no calcified stones, wall thickening, or pericholecystic inflammatory findings are noted.   PANCREAS: The pancreas appears  unremarkable.   SPLEEN: Within normal limits.   ADRENAL GLANDS: Within normal limits.   KIDNEYS AND URETERS: There is a nonobstructing lower pole right renal stone measuring approximately 8 mm similar to prior exam. No renal cortical lesions are seen. No hydroureteronephrosis or nephroureterolithiasis is present.   PELVIS:   BLADDER: Within normal limits.   REPRODUCTIVE ORGANS: Lobular anterior margins of the uterus with calcifications consistent with fibroid changes and some dystrophic fibroids.   BOWEL: The stomach is unremarkable.. The small and large bowel are normal in caliber and demonstrate no wall thickening.  Normal appendix.   VESSELS: The aorta and IVC are within normal limits.   PERITONEUM/RETROPERITONEUM/LYMPH NODES: No ascites or free air, no fluid collection.  The retroperitoneum appears unremarkable.  There is no pathologic enlargement of abdominal or pelvic lymph nodes.   ABDOMINAL WALL: The abdominal wall soft tissues appear normal.   BONES: No suspicious osseous lesions are present. Degenerative discogenic disease is noted in the lower thoracic and lumbar spine.       Chest 1.  There is a tunneled drainage catheter extending from the right upper abdominal posterolateral flying superiorly to the superolateral aspect of the latissimus dorsi. At this site there is a hematoma likely explaining the clinical findings. See detailed discussion above.   Abdomen-Pelvis 1.  Hydropic gallbladder with no other abnormal findings such as calcified stones or wall thickening. 2. Nonobstructing lower pole right nephrolithiasis unchanged from prior. 3. Fibroid changes in the uterus. 4. No definite acute abdominal or pelvic findings     MACRO: None   Signed by: Joseph Schoenberger 3/26/2025 12:02 PM Dictation workstation:   [id number]    XR chest 1 view    Result Date: 3/21/2025  Interpreted By:  Andrzej Gomes and Stevens Alex STUDY: XR CHEST 1 VIEW;  3/21/2025 8:57 am   INDICATION: Signs/Symptoms:SOB.      COMPARISON: XR chest 08/07/2024   ACCESSION NUMBER(S): [id number]   ORDERING CLINICIAN: TAMAR OVALLE   FINDINGS: AP radiograph of the chest was provided.   MEDICAL DEVICES Right upper extremity PICC line projects over the expected location of the cavoatrial junction.   CARDIOMEDIASTINAL SILHOUETTE: Cardiomediastinal silhouette is unchanged in size and configuration.   LUNGS: The lungs are hyperexpanded with prominent interstitial markings, reflective of chronic change. There is minimal linear subsegmental atelectasis visible in the left base above the diaphragm. No new focal consolidation. No pleural effusion. No pneumothorax.   ABDOMEN: No remarkable upper abdominal findings.   BONES: No acute osseous changes.       1.  Minimal linear basal atelectasis on the left. 2. Emphysematous changes which are stable when compared with prior.   I personally reviewed the images/study and I agree with the findings as stated by Sam Echols DO PGY-2. This study was interpreted at [place], [place].   MACRO: None   Signed by: Andrzej Gomes 3/21/2025 9:50 AM Dictation workstation:   [id number]    ECG 12 lead    Result Date: 3/17/2025  Sinus rhythm with frequent Premature ventricular complexes in a pattern of bigeminy Nonspecific T wave abnormality Abnormal ECG When compared with ECG of 16-MAR-2025 00:42, (unconfirmed) Nonspecific T wave abnormality now evident in Inferior leads QT has shortened Confirmed by Regan Burkett ([id number]) on 3/17/2025 12:11:01 PM    Electrocardiogram, 12-lead PRN ACS symptoms    Result Date: 3/17/2025  Sinus rhythm with frequent Premature ventricular complexes in a pattern of bigeminy Nonspecific T wave abnormality Prolonged QT Abnormal ECG When compared with ECG of 16-MAR-2025 00:40, (unconfirmed) QT has shortened Confirmed by Regan Burkett ([id number]) on 3/17/2025 12:09:10 PM    ECG 12 Lead    Result Date: 3/6/2025   Poor data quality, interpretation may be  adversely affected Sinus bradycardia with 1st degree AV block Nonspecific T wave abnormality Prolonged QT Abnormal ECG Confirmed by Clyde Burkett ([id number]) on 3/6/2025 3:09:25 PM    ECG 12 lead    Result Date: 3/5/2025  Sinus tachycardia with frequent Premature ventricular complexes in a pattern of bigeminy Nonspecific T wave abnormality Abnormal ECG When compared with ECG of 01-MAR-2025 09:07, Significant changes have occurred Confirmed by Baron Sanders ([id number]) on 3/5/2025 8:34:27 AM    Bedside PICC Imaging    Result Date: 3/4/2025  These images are not reportable by radiology and will not be interpreted by  Radiologists.    XR knee right 1-2 views    Result Date: 2/28/2025  Interpreted By:  Juan Smith, STUDY: XR KNEE RIGHT 1-2 VIEWS; ;  2/28/2025 11:36 am   INDICATION: Signs/Symptoms:PACU.     COMPARISON: 01/16/2025.   ACCESSION NUMBER(S): [id number]   ORDERING CLINICIAN: DARSHAN LARSON   FINDINGS: Two views of the right knee   Postsurgical changes of right knee fusion with cement and productive changes within the joint space. The knee joint alignment is similar compared to prior. Interval removal of intramedullary nail with ghost tract. Similar-appearing remote fractures of the proximal fibula and proximal tibia. Osteopenic changes. Vascular calcifications. Soft tissue edema over the anterior knee with overlying wound VAC and subcutaneous air, likely postsurgical. No definitive new fracture.       1. Postsurgical changes of the right knee with fusion of the joint space and interval removal of the intramedullary nail. Overlying soft tissue edema and wound VAC.       MACRO: None   Signed by: Juan Smith 2/28/2025 12:56 PM Dictation workstation:   [id number]    FL fluoro images no charge    Result Date: 2/28/2025  These images are not reportable by radiology and will not be interpreted by  Radiologists.      Current Medications  Scheduled medications  acetaminophen, 975 mg, oral, q8h  [Held by provider]  apixaban, 2.5 mg, oral, BID  lidocaine, 1 patch, transdermal, Daily  methocarbamol, 500 mg, oral, q8h FELIX  pantoprazole, 40 mg, oral, Daily before breakfast  polyethylene glycol, 17 g, oral, Daily  vancomycin, 1,000 mg, intravenous, q24h      Continuous medications     PRN medications  PRN medications: albuterol, docusate sodium, oxyCODONE, oxyCODONE, oxygen, oxymetazoline     Assessment  Adriana Wood is a 69 y.o. female with a past medical history of bradycardia, cardiomyopathy, CAD, cardiac arrest due to electrolyte abnormalities and zofran use post-operatively, and traumatic brain injury. Patient had a total knee right knee done by Dr. Whelan in March 2024 which was complicated by E. Coli infection. Patient had right TKA wound dehiscence s/p I&D right knee with polyswap and attempted re-closure of complex wound on 5/10/2024 by Dr. Whelan. She was referred to plastic surgery (Dr. Reyes) perioperatively given projected anticipated need for possible flap coverage of the wound/defect site. Patient re-admitted and returned to the OR with orthopedics on 2/28/2025 for right knee I&D, revision static spacer and application of incisional wound vac. Plastic surgery service consulted postoperatively to follow for flap recommendations/timing, now s/p R latissimus free flap to R knee on 3/14 with Dr. Lundberg.     Plan/Recommendations  S/p R latissimus free flap to R knee on 3/14 with Dr. Lundberg   -Transferred from [facility] 3/29/25 due to R lat dorsi flap area hematoma  -Doing well and following appropriate course  -Will continue to monitor hematoma while inpatient   - Hold home Eliquis for now  - Continue flap assessments per nursing with interval checks per plastic surgery team       ·  Assess Doppler pulse at marked site q shift plus flap appearance (color, warmth, turgor)       ·  Nursing to notify plastic surgery team immediately if there are any acute changes  - Continue MASSIEL drain care per nursing (MASSIEL x 1)         ·  Strip drain tubing TID and PRN       ·  Monitor and record output [id number]        ·  Keep drain sites C/D/I with daily drain dressing changes        ·  Call plastics if drain output is >30cc in an hour or greater than 200cc over 8 hours  - IV Vancomycin as recommended by ID during last admission (stop date 4/11/25)  - Avoid positioning that would apply pressure to flap region or incisions   - NWB, patient to dangle as tolerated   - Regular diet  - Maintain BP of 110/60 minimum to ensure adequate flap perfusion   - Monitor VS/pulse ox Q8  - Monitor AM CBC/RFP    #Asthma  - continue home inhaler  - as needed O2 supplementation  - nasal Afrin spray for congestion  - discussed need to be tested for YANETH as outpatient     #Post-op pain  Scheduled Tylenol  Lidocaine patch  Scheduled Robaxin  PRN Oxy IR    # History of QT Prolongation  - Avoid QT prolonging medications; NO Zofran (hx of cardiac arrest)    #History of Bradycardia, PVC's and Bigeminy  - consistent bradycardia starting evening of 3/29 around low 40s  -   Asymptomatic HR of 34 on 3/30 AM with rapid response called. EKG noted same findings from cardiology appointment in early 2025: Bradycardia, PVC and bigeminy.   - Rec obtaining HR with pulse check vs pulse ox since inaccurate readings of irregular heart rates on pulse ox are more common in cases of Bigeminy   - New recs of mag, telemetry and cardiology/internal medicine consults placed  - Cardiology does not recommend intervention/treatment for asymptomatic bradycardia  - Internal Medicine consult placed for potential transfer of care  - Internal Medicine to co manage bradycardia with PRS.   - Per Internal medicine: The bradycardia is an artifact due to frequent PVCs; will investigate further with EKG and provide ongoing recommendations.   - will continue to monitor    Disposition: Continued hematoma monitoring on RNF      Prophylaxis:   - DVT: hold Eliquis for now due to hematoma, SCDs  - Encourage IS x10  every hour while awake   - Bowel regimen: Miralax and Colace      Patient and plan discussed with Kris Shaikh PA-C    Plastic and Reconstructive Surgery   [place]  Pager #[phone]  Team phone: [phone]

## 2025-03-30 NOTE — PROGRESS NOTES
03/30/25 1212   Discharge Planning   Living Arrangements Spouse/significant other;Other (Comment)  (Currently from [facility])   Support Systems Children;Spouse/significant other   Assistance Needed From SNF, unable to walk due to acute needs   Type of Residence Skilled nursing facility   Home or Post Acute Services Post acute facilities (Rehab/SNF/etc)   Type of Post Acute Facility Services Skilled nursing   Expected Discharge Disposition SNF   Does the patient need discharge transport arranged? Yes   Financial Resource Strain   How hard is it for you to pay for the very basics like food, housing, medical care, and heating? Not hard   Housing Stability   In the last 12 months, was there a time when you were not able to pay the mortgage or rent on time? N   At any time in the past 12 months, were you homeless or living in a shelter (including now)? N   Transportation Needs   In the past 12 months, has lack of transportation kept you from medical appointments or from getting medications? no   In the past 12 months, has lack of transportation kept you from meetings, work, or from getting things needed for daily living? No   Patient Choice   Provider Choice list and CMS website (https://medicare.gov/care-compare#search) for post-acute Quality and Resource Measure Data were provided and reviewed with: Family   Patient / Family choosing to utilize agency / facility established prior to hospitalization Yes  (Unsure)         Tx from  [place], patient is from [facility] where she was admitted on 3/24 with readmit 3/26 to ER for increased wound vac drainage/bleeding. Patient is not sure she wants to return to the current SNF, as shew as not happy with the care during this bleeding episode. This writer listened and suggested that regardless of decisions to return or not should have her family and herself talk to the SNF about experience. She really likes that location as it is closer to her home and  recognizes there are not many places near her.     Her goal is to return home however she is not able to ambulate right now due to her leg. choco POLLARD.     Explained that TCC team will follow, she did not want a new list of SNF just yet, more focused on medical ; explained the TCC processes and follow up during time here.

## 2025-03-30 NOTE — SIGNIFICANT EVENT
Rapid Response Nurse Note: Rapid Response    Pager time: [phone]  Arrival time: 940  Event end time:   Location: [place] [address]  [] Triage by phone or secure messaging    Rapid response initiated by:  [] Rapid response RN [] Family [] Nursing Supervisor [] Physician   [] RADAR auto page [] Sepsis auto-page [x] RN [] RT   [] NP/PA [] Other:     Primary reason for call:   [] BAT [] New CPAP/BiPAP [] Bleeding [] Change in mental status   [] Chest pain [] Code blue [] FiO2 >/= 50% [x] HR </= 40 bpm   [] HR >/= 130 bpm [] Hyperglycemia [] Hypoglycemia [] RADAR    [] RR </= 8 bpm [] RR >/= 30 bpm [] SBP </= 90 mmHg [] SpO2 < 90%   [] Seizure [] Sepsis [] Shortness of breath  [] Staff concern: see comments     Initial VS and/or RADAR VS: T 36.4 °C; HR 34; RR 18; /54; SPO2 96%.    Providers present at bedside (if applicable): Primary RN, Rapid Response RN, Melissa Shaikh PA - Plastic Surgery    Name of ICU Provider contacted (if applicable):     Interventions:  [] None [] ABG/VBG [] Assist w/ICU transfer [] BAT paged    [] Bag mask [] Blood [] Cardioversion [] Code Blue   [] Code blue for intubation [] Code status changed [] Chest x-ray [x] EKG   [] IV fluid/bolus [] KUB x-ray [] Labs/cultures [] Medication   [] Nebulizer treatment [] NIPPV (CPAP/BiPAP) [] Oxygen [] Oral airway   [] Peripheral IV [] Palliative care consult [] CT/MRI [] Sepsis protocol    [] Suctioned [] Other:     Outcome:  [] Coded and  [] Code blue for intubation [] Coded and transferred to ICU []  on division   [] Remained on division (no change) [] Remained on division + additional monitoring [] Remained in ED [] Transferred to ED   [] Transferred to ICU [] Transferred to inpatient status [] Transferred for interventions (procedure) [] Transferred to ICU stepdown    [] Transferred to surgery [x] Transferred to telemetry [] Sepsis protocol [] STEMI protocol   [] Stroke protocol [x] Bedside nurse instructed to page rapid response for any  concerns or acute change in condition/VS     Additional Comments:     I responded to Rapid Response called for bradycardia to the 30s.  Aysmptomatic.    Upon examination at the bedside, she is mentating with absence of chest pain, SOB, fatigue or lightheadedness.    EKG: Sinus Bradycardia with bigeminy    She states that she sees cardiology outpatient.  Recommended cardiology consult.  Serum magnesium level to be added to AM labs.  Order placed for continuous telemetry.      RN to contact Rapid Response with any future concerns or signs of clinical decompensation.

## 2025-03-30 NOTE — DISCHARGE SUMMARY
Discharge/Transfer Diagnosis  Hematoma  Bloody MASSIEL output  Hepatic steatosis  Hydropic gallbladder  Right knee pain s/p latissimus free flap to R knee graft (3/14/2025)      -Accepted & transferred to [facility] plastic surgery for surgical evacuation of hematoma.    Discharge Meds     Medication List      ASK your doctor about these medications     acetaminophen 325 mg tablet; Commonly known as: Tylenol; Take 3 tablets   (975 mg) by mouth every 8 hours for 14 days.   albuterol 90 mcg/actuation inhaler; Inhale 2 puffs every 6 hours if   needed for shortness of breath.   apixaban 2.5 mg tablet; Commonly known as: Eliquis; Take 1 tablet (2.5   mg) by mouth 2 times a day.   docusate sodium 100 mg capsule; Commonly known as: Colace; Take 1   capsule (100 mg) by mouth 2 times a day as needed for constipation.   heparin lock flush (porcine) 10 unit/mL injection   lidocaine HCL 4 % adhesive patch,medicated   methocarbamol 500 mg tablet; Commonly known as: Robaxin; Take 1 tablet   (500 mg) by mouth every 8 hours for 14 days.   Normal Saline Flush flush; Generic drug: sodium chloride 0.9%; Ask   about: Which instructions should I use?   * oxyCODONE 5 mg immediate release tablet; Commonly known as:   Roxicodone; Ask about: Which instructions should I use?   * oxyCODONE 5 mg immediate release tablet; Commonly known as:   Roxicodone; Ask about: Which instructions should I use?   pantoprazole 40 mg EC tablet; Commonly known as: ProtoNix; Ask about:   Which instructions should I use?   vancomycin IVPB; Commonly known as: Vancocin; Infuse 200 mL (1 g) at 200   mL/hr over 60 minutes into a venous catheter every 12 hours.  * This list has 2 medication(s) that are the same as other medications   prescribed for you. Read the directions carefully, and ask your doctor or   other care provider to review them with you.       Test Results Pending At Discharge  Pending Labs       No current pending labs.            Hospital Course  Adriana TINEO  [place] is a 69 y.o. female with PMH of bradycardia, cardiomyopathy, CAD, GERD, cardiac arrest due to electrolyte abnormalities and zofran use post-operatively, and traumatic brain injury presenting with increased output from wound vac drain for latissimus free flap to R knee (3/14/25).     She woke up this morning to find her bed sheets soaked with blood. She noticed her MASSIEL drain was leaking and had to empty it twice this morning when it got full. Per nursing facility, MASSIEL drain with 100 ml/hr of bloody output since 6 am today. She’s been taking 10 mg of oxycodone for pain, which is mostly under control, but she still feels pain in her right knee. She denies any fevers/chills, headaches, lightheadedness, n/v, chest pain, abdominal pain, diarrhea, or any urinary symptoms.      ED Course:  In the ED, vitals stable, bp 104/69. CMP and CBC unremarkable, Hgb 11.9, CRP 0.37. CT CAP with hematoma near the superolateral aspect of catheter entering the latissimus dorsi. Hematoma extends inferiorly to lateral margin of latissimus muscle. XR right knee without acute fracture or dislocation. ED plan for transfer to [facility] for admission to plastic surgery, however no beds available. She will be admitted to medicine for further management pending bed availability at [facility].  Patient had a total knee right knee done by Dr. Whelan in March 2024 which was complicated by E. Coli infection. Patient had right TKA wound dehiscence s/p I&D right knee with polyswap and attempted re-closure of complex wound on 5/10/2024 by Dr. Whelan. She was referred to plastic surgery (Dr. Reyes) perioperatively given projected anticipated need for possible flap coverage of the wound/defect site. Patient re-admitted and returned to the OR with orthopedics on 2/28/2025 for right knee I&D, revision static spacer and application of incisional wound vac. Plastic surgery service consulted postoperatively to follow for flap recommendations/timing, s/p R  latissimus free flap to R knee on 3/14 with Dr. Lundberg.     Hospital Course:  Pending transfer to [facility] for admission to plastic surgery pending bed availability hold eliquis 2.5. continuing vancomycin. Overnight output 25mL. CTCAP: Hydropic gallbladder no calcified stones or wall thickening. RUQ US: Diffuse hepatic steatosis. Dilatation of the common bile duct measuring 0.8 cm. No cholelithiasis or gallbladder wall thickening is appreciated. Nonobstructing right renal calculus. XR right knee without acute fracture or dislocation. Patient endorsing significant pain. Wound care following.  Reached out to Dr. Reyes regarding recommendations for inpatient workup/possible procedure at [facility]-patient request updated to high as surgery will be needed.  Notified of bed availability-patient stable for transfer to [facility].    Pertinent Physical Exam At Time of Discharge  Physical Exam  Constitutional:       General: She is not in acute distress.     Appearance: She is not ill-appearing.   Cardiovascular:      Rate and Rhythm: Normal rate and regular rhythm.   Pulmonary:      Effort: No respiratory distress.      Breath sounds: Normal breath sounds.   Abdominal:      General: Bowel sounds are normal. There is no distension.      Tenderness: There is no abdominal tenderness.   Musculoskeletal:      Comments: MASSIEL drain at R lateral chest wall.    Right knee: No evidence of purulence/infection. Tender to palpation.    Outpatient Follow-Up  Future Appointments   Date Time Provider Department Center   3/31/2025  1:30 PM PLASTIC SURG [id number] [id number]   4/3/2025  1:20 PM Clyde Tolliver MD MPH [id number] [facility]         Codie Vieira DO

## 2025-03-31 ENCOUNTER — APPOINTMENT (OUTPATIENT)
Dept: PLASTIC SURGERY | Facility: CLINIC | Age: 70
End: 2025-03-31
Payer: MEDICARE

## 2025-03-31 LAB
ANION GAP SERPL CALC-SCNC: 11 MMOL/L (ref 10–20)
ATRIAL RATE: 75 BPM
BUN SERPL-MCNC: 12 MG/DL (ref 6–23)
CALCIUM SERPL-MCNC: 8.9 MG/DL (ref 8.6–10.6)
CHLORIDE SERPL-SCNC: 101 MMOL/L (ref 98–107)
CO2 SERPL-SCNC: 30 MMOL/L (ref 21–32)
CREAT SERPL-MCNC: 0.43 MG/DL (ref 0.5–1.05)
EGFRCR SERPLBLD CKD-EPI 2021: >90 ML/MIN/1.73M*2
ERYTHROCYTE [DISTWIDTH] IN BLOOD BY AUTOMATED COUNT: 14.1 % (ref 11.5–14.5)
GLUCOSE SERPL-MCNC: 89 MG/DL (ref 74–99)
HCT VFR BLD AUTO: 31.2 % (ref 36–46)
HGB BLD-MCNC: 9.9 G/DL (ref 12–16)
MAGNESIUM SERPL-MCNC: 2.01 MG/DL (ref 1.6–2.4)
MCH RBC QN AUTO: 28.3 PG (ref 26–34)
MCHC RBC AUTO-ENTMCNC: 31.7 G/DL (ref 32–36)
MCV RBC AUTO: 89 FL (ref 80–100)
NRBC BLD-RTO: 0 /100 WBCS (ref 0–0)
P AXIS: 58 DEGREES
P OFFSET: 183 MS
P ONSET: 124 MS
PLATELET # BLD AUTO: 258 X10*3/UL (ref 150–450)
POTASSIUM SERPL-SCNC: 4.2 MMOL/L (ref 3.5–5.3)
PR INTERVAL: 202 MS
Q ONSET: 225 MS
QRS COUNT: 13 BEATS
QRS DURATION: 80 MS
QT INTERVAL: 404 MS
QTC CALCULATION(BAZETT): 451 MS
QTC FREDERICIA: 434 MS
R AXIS: 8 DEGREES
RBC # BLD AUTO: 3.5 X10*6/UL (ref 4–5.2)
SODIUM SERPL-SCNC: 138 MMOL/L (ref 136–145)
T AXIS: 76 DEGREES
T OFFSET: 427 MS
VANCOMYCIN SERPL-MCNC: 12.2 UG/ML (ref 5–20)
VENTRICULAR RATE: 75 BPM
WBC # BLD AUTO: 5.4 X10*3/UL (ref 4.4–11.3)

## 2025-03-31 PROCEDURE — 2500000001 HC RX 250 WO HCPCS SELF ADMINISTERED DRUGS (ALT 637 FOR MEDICARE OP)

## 2025-03-31 PROCEDURE — 97530 THERAPEUTIC ACTIVITIES: CPT | Mod: GP

## 2025-03-31 PROCEDURE — 82374 ASSAY BLOOD CARBON DIOXIDE: CPT | Performed by: PHYSICIAN ASSISTANT

## 2025-03-31 PROCEDURE — 2500000001 HC RX 250 WO HCPCS SELF ADMINISTERED DRUGS (ALT 637 FOR MEDICARE OP): Performed by: PHYSICIAN ASSISTANT

## 2025-03-31 PROCEDURE — 2060000001 HC INTERMEDIATE ICU ROOM DAILY

## 2025-03-31 PROCEDURE — 99232 SBSQ HOSP IP/OBS MODERATE 35: CPT

## 2025-03-31 PROCEDURE — 80202 ASSAY OF VANCOMYCIN: CPT | Performed by: PHYSICIAN ASSISTANT

## 2025-03-31 PROCEDURE — 97162 PT EVAL MOD COMPLEX 30 MIN: CPT | Mod: GP

## 2025-03-31 PROCEDURE — 83735 ASSAY OF MAGNESIUM: CPT

## 2025-03-31 PROCEDURE — 85027 COMPLETE CBC AUTOMATED: CPT | Performed by: PHYSICIAN ASSISTANT

## 2025-03-31 PROCEDURE — 2500000005 HC RX 250 GENERAL PHARMACY W/O HCPCS: Performed by: PHYSICIAN ASSISTANT

## 2025-03-31 PROCEDURE — 2500000004 HC RX 250 GENERAL PHARMACY W/ HCPCS (ALT 636 FOR OP/ED): Performed by: PHYSICIAN ASSISTANT

## 2025-03-31 RX ORDER — METHOCARBAMOL 500 MG/1
250 TABLET, FILM COATED ORAL EVERY 8 HOURS SCHEDULED
Status: DISCONTINUED | OUTPATIENT
Start: 2025-03-31 | End: 2025-04-09 | Stop reason: HOSPADM

## 2025-03-31 RX ADMIN — METHOCARBAMOL TABLETS 250 MG: 500 TABLET, COATED ORAL at 16:36

## 2025-03-31 RX ADMIN — VANCOMYCIN HYDROCHLORIDE 1500 MG: 5 INJECTION, POWDER, LYOPHILIZED, FOR SOLUTION INTRAVENOUS at 16:36

## 2025-03-31 RX ADMIN — PANTOPRAZOLE SODIUM 40 MG: 40 TABLET, DELAYED RELEASE ORAL at 06:07

## 2025-03-31 RX ADMIN — ACETAMINOPHEN 975 MG: 325 TABLET, FILM COATED ORAL at 06:07

## 2025-03-31 RX ADMIN — OXYCODONE HYDROCHLORIDE 10 MG: 5 TABLET ORAL at 01:24

## 2025-03-31 RX ADMIN — OXYCODONE HYDROCHLORIDE 10 MG: 5 TABLET ORAL at 20:16

## 2025-03-31 RX ADMIN — POLYETHYLENE GLYCOL 3350 17 G: 17 POWDER, FOR SOLUTION ORAL at 08:32

## 2025-03-31 RX ADMIN — OXYCODONE HYDROCHLORIDE 10 MG: 5 TABLET ORAL at 08:32

## 2025-03-31 RX ADMIN — OXYCODONE HYDROCHLORIDE 10 MG: 5 TABLET ORAL at 12:42

## 2025-03-31 ASSESSMENT — COGNITIVE AND FUNCTIONAL STATUS - GENERAL
HELP NEEDED FOR BATHING: A LOT
WALKING IN HOSPITAL ROOM: A LOT
MOVING FROM LYING ON BACK TO SITTING ON SIDE OF FLAT BED WITH BEDRAILS: A LITTLE
MOVING TO AND FROM BED TO CHAIR: A LOT
STANDING UP FROM CHAIR USING ARMS: A LOT
CLIMB 3 TO 5 STEPS WITH RAILING: TOTAL
DRESSING REGULAR UPPER BODY CLOTHING: A LITTLE
TOILETING: A LITTLE
CLIMB 3 TO 5 STEPS WITH RAILING: TOTAL
DRESSING REGULAR LOWER BODY CLOTHING: A LOT
MOBILITY SCORE: 11
WALKING IN HOSPITAL ROOM: A LOT
STANDING UP FROM CHAIR USING ARMS: A LOT
MOVING FROM LYING ON BACK TO SITTING ON SIDE OF FLAT BED WITH BEDRAILS: A LOT
MOBILITY SCORE: 14
MOVING TO AND FROM BED TO CHAIR: A LITTLE
DAILY ACTIVITIY SCORE: 18
TURNING FROM BACK TO SIDE WHILE IN FLAT BAD: A LITTLE
TURNING FROM BACK TO SIDE WHILE IN FLAT BAD: A LOT

## 2025-03-31 ASSESSMENT — PAIN - FUNCTIONAL ASSESSMENT
PAIN_FUNCTIONAL_ASSESSMENT: 0-10

## 2025-03-31 ASSESSMENT — PAIN SCALES - GENERAL
PAINLEVEL_OUTOF10: 8
PAINLEVEL_OUTOF10: 6
PAINLEVEL_OUTOF10: 8
PAINLEVEL_OUTOF10: 6
PAINLEVEL_OUTOF10: 8

## 2025-03-31 ASSESSMENT — PAIN DESCRIPTION - ORIENTATION
ORIENTATION: RIGHT
ORIENTATION: RIGHT

## 2025-03-31 ASSESSMENT — PAIN DESCRIPTION - LOCATION
LOCATION: KNEE
LOCATION: LEG
LOCATION: LEG

## 2025-03-31 ASSESSMENT — ACTIVITIES OF DAILY LIVING (ADL): ADL_ASSISTANCE: NEEDS ASSISTANCE

## 2025-03-31 NOTE — CARE PLAN
The patient's goals for the shift include      Problem: Pain - Adult  Goal: Verbalizes/displays adequate comfort level or baseline comfort level  Outcome: Progressing     Problem: Safety - Adult  Goal: Free from fall injury  Outcome: Progressing     Problem: Discharge Planning  Goal: Discharge to home or other facility with appropriate resources  Outcome: Progressing     Problem: Chronic Conditions and Co-morbidities  Goal: Patient's chronic conditions and co-morbidity symptoms are monitored and maintained or improved  Outcome: Progressing     Problem: Nutrition  Goal: Nutrient intake appropriate for maintaining nutritional needs  Outcome: Progressing     Problem: Fall/Injury  Goal: Not fall by end of shift  Outcome: Progressing  Goal: Be free from injury by end of the shift  Outcome: Progressing  Goal: Verbalize understanding of personal risk factors for fall in the hospital  Outcome: Progressing  Goal: Verbalize understanding of risk factor reduction measures to prevent injury from fall in the home  Outcome: Progressing  Goal: Use assistive devices by end of the shift  Outcome: Progressing  Goal: Pace activities to prevent fatigue by end of the shift  Outcome: Progressing     Problem: Pain  Goal: Takes deep breaths with improved pain control throughout the shift  Outcome: Progressing  Goal: Turns in bed with improved pain control throughout the shift  Outcome: Progressing  Goal: Walks with improved pain control throughout the shift  Outcome: Progressing  Goal: Performs ADL's with improved pain control throughout shift  Outcome: Progressing  Goal: Participates in PT with improved pain control throughout the shift  Outcome: Progressing  Goal: Free from opioid side effects throughout the shift  Outcome: Progressing  Goal: Free from acute confusion related to pain meds throughout the shift  Outcome: Progressing     Problem: Skin  Goal: Decreased wound size/increased tissue granulation at next dressing change  Outcome:  Progressing  Flowsheets (Taken 3/31/2025 1150)  Decreased wound size/increased tissue granulation at next dressing change:   Protective dressings over bony prominences   Utilize specialty bed per algorithm  Goal: Participates in plan/prevention/treatment measures  Outcome: Progressing  Flowsheets (Taken 3/31/2025 1150)  Participates in plan/prevention/treatment measures:   Elevate heels   Increase activity/out of bed for meals  Goal: Prevent/manage excess moisture  Outcome: Progressing  Flowsheets (Taken 3/31/2025 1150)  Prevent/manage excess moisture:   Follow provider orders for dressing changes   Moisturize dry skin   Cleanse incontinence/protect with barrier cream  Goal: Prevent/minimize sheer/friction injuries  Outcome: Progressing  Flowsheets (Taken 3/31/2025 1150)  Prevent/minimize sheer/friction injuries:   Use pull sheet   Increase activity/out of bed for meals   HOB 30 degrees or less   Turn/reposition every 2 hours/use positioning/transfer devices  Goal: Promote/optimize nutrition  Outcome: Progressing  Flowsheets (Taken 3/31/2025 1150)  Promote/optimize nutrition:   Monitor/record intake including meals   Offer water/supplements/favorite foods   Consume > 50% meals/supplements  Goal: Promote skin healing  Outcome: Progressing  Flowsheets (Taken 3/31/2025 1150)  Promote skin healing:   Assess skin/pad under line(s)/device(s)   Turn/reposition every 2 hours/use positioning/transfer devices   Ensure correct size (line/device) and apply per  instructions
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
## 2025-03-31 NOTE — SIGNIFICANT EVENT
I saw patient this PM to check on her and see how she was doing. She states she has been feeling fine with some discomfort in her right lower leg medial and lateral to her flap site where the skin is pulled tighter. I mentioned that we can have her massage those areas due to likely scar tissue. Her vitals have been stable since being placed on tele today due to concern of bradycardia being monitored by O2 sat which most likely was false readings. HR has been avg 80's on tele. Cardiology and medicine have been consulted for recommendations. She stated she doesn't want the robaxin anymore as she feels that makes her feel very tired and worn out. She sat in bedside chair for awhile today. We will check with Dr. Lundberg how long she is to be NWB on R leg as she is hoping maybe she can just go home after hospitalization. I explained PT/OT needs to assess her and she needs to be discharged to safe environment until she is stronger.     Ivy Nelson PA-C  Plastic and Reconstructive Surgery  [place]  Pager #[phone]  Team phone: [phone]

## 2025-03-31 NOTE — PROGRESS NOTES
Vancomycin Dosing by Pharmacy- FOLLOW UP    Adriana Wood is a 69 y.o. year old female who Pharmacy has been consulted for vancomycin dosing for cellulitis, skin and soft tissue. Based on the patient's indication and renal status this patient is being dosed based on a goal AUC of 400-600.     Renal function is currently stable.    Current vancomycin dose: 1250 mg given every 24 hours    Most recent random level: 12.2 mcg/mL    Visit Vitals  /55 (BP Location: Left arm, Patient Position: Lying)   Pulse 75   Temp 36.3 °C (97.3 °F) (Temporal)   Resp 16        Lab Results   Component Value Date    CREATININE 0.43 (L) 2025    CREATININE 0.50 2025    CREATININE 0.44 (L) 2025    CREATININE 0.53 2025        Patient weight is as follows:   Vitals:    25 0006   Weight: 69.2 kg (152 lb 8.9 oz)       Cultures:  No results found for the encounter in last 14 days.       I/O last 3 completed shifts:  In: 595 (8.6 mL/kg) [P.O.:120; IV Piggyback:475]  Out: 1655.5 (23.9 mL/kg) [Urine:1625 (0.7 mL/kg/hr); Drains:30.5]  Weight: 69.2 kg   I/O during current shift:  I/O this shift:  In: -   Out: 315 [Urine:300; Drains:15]    Temp (24hrs), Av.4 °C (97.6 °F), Min:36.2 °C (97.2 °F), Max:37 °C (98.6 °F)      Assessment/Plan    Below goal AUC. Orders placed for new vancomcyin regimen of 1500 every 24 hours to begin at 1600 3/31.     This dosing regimen is predicted by [employer] to result in the following pharmacokinetic parameters:  Loading dose: N/A  Regimen: 1500 mg IV every 24 hours.  Start time: 16:15 on 2025  Exposure target: AUC24 (range)400-600 mg/L.hr   [id number]: 427 mg/L.hr  AUC24,ss: 443 mg/L.hr  Probability of AUC24 > 400: 88 %  Ctrough,ss: 13.8 mg/L  Probability of Ctrough,ss > 20: 0 %      The next level will be obtained on  at 0500. May be obtained sooner if clinically indicated.   Will continue to monitor renal function daily while on vancomycin and order serum creatinine at  least every 48 hours if not already ordered.  Follow for continued vancomycin needs, clinical response, and signs/symptoms of toxicity.       Evangelista Forbes, PharmD
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
## 2025-03-31 NOTE — PROGRESS NOTES
03/31/25 1400   Discharge Planning   Living Arrangements Spouse/significant other   Support Systems Spouse/significant other;Children   Type of Residence Private residence   Number of Stairs to Enter Residence 0   Number of Stairs Within Residence 14   Do you have animals or pets at home? Yes   Type of Animals or Pets 2 dogs 1 cat   Home or Post Acute Services Post acute facilities (Rehab/SNF/etc)   Type of Post Acute Facility Services Skilled nursing   Expected Discharge Disposition SNF   Does the patient need discharge transport arranged? Yes   RoundTrip coordination needed? Yes   Has discharge transport been arranged? No     SW met with pt.  Pt wishes to [facility] to [facility].  SW sent updates to [facility] via [employer].  Will follow.  JOHN Perez

## 2025-03-31 NOTE — PROGRESS NOTES
"  Department of Plastic and Reconstructive Surgery  Daily Progress Note    Patient Name: Adriana Wood  MRN: [id number]  Date:  03/31/25     Subjective  Patient found resting in bed this AM.  Doppler signal is strong and flap is propped on pillows to minimize pressure to incisions and flap. She reports the pain of her right lateral chest wall at the apex of her incision is mild this morning, reports improvement with use of lidocaine patch. Of note, she had documented consistent asymptomatic bradycardia averaging mid 40s via pulse ox starting on 3/29 PM into 3/30 AM. At 9:30 AM  on 3/30 HR decreased to 34 BPM. At this time, patient was asymptomatic however, a rapid response was called. EKG showed Bradycardia, PVCs and Bigeminy which were documented in previous EKGs. Internal medicine and cardiology consulted. Since placed on telemetry, HR has been within 70s-80s. She denies any fever, chills, night sweats, CP, SOB, nausea, vomiting, diarrhea, constipation, dysuria, hematuria, hematochezia, hematemesis, flank pain.    Objective    Vital Signs  /72 (BP Location: Left arm, Patient Position: Lying)   Pulse 83   Temp 36.3 °C (97.3 °F) (Temporal)   Resp 16   Ht 1.778 m (5' 10\")   Wt 69.2 kg (152 lb 8.9 oz)   SpO2 97%   BMI 21.89 kg/m²      Physical Exam   Constitutional: A&Ox3, calm and cooperative, NAD  Eyes: EOMI, clear sclera   ENMT: Moist mucous membranes, no apparent injuries or lesions  Head/Neck: NCAT  Cardiovascular: Extremities WWP  Respiratory/Thorax: Unlabored respirations on RA  Gastrointestinal: Abdomen soft, NTND  Genitourinary: Voiding independently  Extremities: RLE flap: warm to touch, soft and compressible, strong biphasic signal at marked stitch, circumferential incisions around flap well approximated with some scabbing  Right posterior lat donor site: incision healed and intact without signs of dehiscence, leave JESUS. MASSIEL drain x 1 to right lower flank with ss and bloody " output  Neurological: A&Ox3  Psychological: Appropriate mood and behavior  Skin: Warm and dry.     Diagnostics   Results for orders placed or performed during the hospital encounter of 03/28/25 (from the past 24 hours)   ECG 12 Lead   Result Value Ref Range    Ventricular Rate 75 BPM    Atrial Rate 75 BPM    RI Interval 202 ms    QRS Duration 80 ms    QT Interval 404 ms    QTC Calculation(Bazett) 451 ms    P Axis 58 degrees    R Axis 8 degrees    T Axis 76 degrees    QRS Count 13 beats    Q Onset 225 ms    P Onset 124 ms    P Offset 183 ms    T Offset 427 ms    QTC Fredericia 434 ms   CBC   Result Value Ref Range    WBC 5.4 4.4 - 11.3 x10*3/uL    nRBC 0.0 0.0 - 0.0 /100 WBCs    RBC 3.50 (L) 4.00 - 5.20 x10*6/uL    Hemoglobin 9.9 (L) 12.0 - 16.0 g/dL    Hematocrit 31.2 (L) 36.0 - 46.0 %    MCV 89 80 - 100 fL    MCH 28.3 26.0 - 34.0 pg    MCHC 31.7 (L) 32.0 - 36.0 g/dL    RDW 14.1 11.5 - 14.5 %    Platelets 258 150 - 450 x10*3/uL   Basic metabolic panel   Result Value Ref Range    Glucose 89 74 - 99 mg/dL    Sodium 138 136 - 145 mmol/L    Potassium 4.2 3.5 - 5.3 mmol/L    Chloride 101 98 - 107 mmol/L    Bicarbonate 30 21 - 32 mmol/L    Anion Gap 11 10 - 20 mmol/L    Urea Nitrogen 12 6 - 23 mg/dL    Creatinine 0.43 (L) 0.50 - 1.05 mg/dL    eGFR >90 >60 mL/min/1.73m*2    Calcium 8.9 8.6 - 10.6 mg/dL   Vancomycin   Result Value Ref Range    Vancomycin 12.2 5.0 - 20.0 ug/mL   Magnesium   Result Value Ref Range    Magnesium 2.01 1.60 - 2.40 mg/dL     *Note: Due to a large number of results and/or encounters for the requested time period, some results have not been displayed. A complete set of results can be found in Results Review.     US right upper quadrant    Result Date: 3/27/2025  STUDY: Right Upper Quadrant Ultrasound; 3/27/2025 4:21 PM INDICATION: Distended gallbladder on CT. COMPARISON: CT A/P 3/26/2025. ACCESSION NUMBER(S): [id number] ORDERING CLINICIAN: STEFF RAMIREZ TECHNIQUE: Ultrasound of the Right Upper  Quadrant. FINDINGS: LIVER: The liver demonstrates increased echogenicity.   GALLBLADDER: The gallbladder is anechoic.  There are no stones.  There is no pericholecystic fluid or wall thickening.  Sonographic Avendano's sign is negative.   BILE DUCTS: The common bile duct measures 0.8 cm.  There is no intrahepatic biliary dilatation.   PANCREAS: The pancreas is not well seen due to overlying bowel gas.  RIGHT KIDNEY: The right kidney measures 9.8 cm in length.  Renal cortical echotexture is normal.  There is no hydronephrosis.  There is a stone within the lower pole measuring 0.9 x 0.4 x 0.7 cm.  There are no cysts.    Diffuse hepatic steatosis. Dilatation of the common bile duct measuring 0.8 cm.  No cholelithiasis or gallbladder wall thickening is appreciated. Nonobstructing right renal calculus measuring 0.9 x 0.4 x 0.7 cm. Signed by Bony Sullivan MD    Electrocardiogram, 12-lead PRN ACS symptoms    Result Date: 3/27/2025  Sinus rhythm with frequent Premature ventricular complexes Nonspecific T wave abnormality Prolonged QT T wave abnormality, consider anterior ischemia Abnormal ECG When compared with ECG of 16-MAR-2025 09:53, T wave inversion no longer evident in Lateral leads Confirmed by Regan Burkett ([id number]) on 3/27/2025 1:55:18 PM    XR knee right 1-2 views    Result Date: 3/26/2025  Interpreted By:  Tim Gardner, STUDY: XR KNEE RIGHT 1-2 VIEWS  3/26/2025 12:14 pm   INDICATION: Signs/Symptoms:Right knee pain   COMPARISON: 02/28/2025   ACCESSION NUMBER(S): [id number]   ORDERING CLINICIAN: PARVIZ SAUCEDA   TECHNIQUE: 2 views of the right knee including AP and lateral projections were obtained.   FINDINGS: Extensive postoperative changes are seen in the right knee, with a nonmobile see med spacer now seen across the joint space. There is no evidence of acute fracture or dislocation identified. No suprapatellar joint effusion is present.       1. Postoperative changes, as above. 2. No acute fracture or  dislocation.   MACRO: None.   Signed by: Tim Gardner 3/26/2025 12:18 PM Dictation workstation:   [id number]    CT chest abdomen pelvis wo IV contrast    Result Date: 3/26/2025  Interpreted By:  Schoenberger, Joseph, STUDY: CT CHEST ABDOMEN PELVIS WO CONTRAST;  3/26/2025 11:20 am   INDICATION: Signs/Symptoms:Bleeding from wound vac.     COMPARISON: Abdomen pelvis CT 08/17/2022   ACCESSION NUMBER(S): [id number]   ORDERING CLINICIAN: PARVIZ SAUCEDA   TECHNIQUE: CT of the chest, abdomen and pelvis was performed. Contiguous axial images were obtained at 3 mm slice thickness through the chest, abdomen and pelvis. Coronal and sagittal reconstructions at 3 mm slice thickness were performed.  No intravenous or oral contrast agents were administered.   FINDINGS: Please note that the study is limited without intravenous contrast.   CHEST:     LUNG/PLEURA/LARGE AIRWAYS: There are moderate findings of centrilobular emphysema. There is no pleural effusion or pneumothorax. There are a few bandlike opacities in the lung bases likely areas of postinflammatory scarring. The large airways are intact.   VESSELS: Aorta and pulmonary arteries are normal caliber.  Mild atherosclerotic changes are noted of the aorta and branching vessels. Moderate coronary artery calcifications are present.   HEART: Normal size.  No pericardial effusion   MEDIASTINUM AND DAYSI: No mediastinal, hilar or axillary lymph nodes are present.  No supraclavicular or axillary adenopathy.   CHEST WALL AND LOWER NECK: There is a drainage catheter. It enters along the right flank traveling through the inferolateral aspect of the latissimus dorsi. It then travels cranially to enter the latissimus dorsi superolateral aspect where there is a prominent area of heterogeneous attenuation some of which has high attenuation consistent with hematoma. This hematoma extends inferiorly in the more lateral margin of the latissimus musculature. The findings are compatible  with a hematoma at this site. Extending caudally from this there is increased attenuation soft tissue enlargement anterior to the distribution of the catheter. The thyroid appears numeral. No supraclavicular or axillary adenopathy.   ABDOMEN:   LIVER: Liver: Within normal limits.   BILE DUCTS: Bile ducts: Normal caliber.   GALLBLADDER: The gallbladder is considerably distended. However no calcified stones, wall thickening, or pericholecystic inflammatory findings are noted.   PANCREAS: The pancreas appears unremarkable.   SPLEEN: Within normal limits.   ADRENAL GLANDS: Within normal limits.   KIDNEYS AND URETERS: There is a nonobstructing lower pole right renal stone measuring approximately 8 mm similar to prior exam. No renal cortical lesions are seen. No hydroureteronephrosis or nephroureterolithiasis is present.   PELVIS:   BLADDER: Within normal limits.   REPRODUCTIVE ORGANS: Lobular anterior margins of the uterus with calcifications consistent with fibroid changes and some dystrophic fibroids.   BOWEL: The stomach is unremarkable.. The small and large bowel are normal in caliber and demonstrate no wall thickening.  Normal appendix.   VESSELS: The aorta and IVC are within normal limits.   PERITONEUM/RETROPERITONEUM/LYMPH NODES: No ascites or free air, no fluid collection.  The retroperitoneum appears unremarkable.  There is no pathologic enlargement of abdominal or pelvic lymph nodes.   ABDOMINAL WALL: The abdominal wall soft tissues appear normal.   BONES: No suspicious osseous lesions are present. Degenerative discogenic disease is noted in the lower thoracic and lumbar spine.       Chest 1.  There is a tunneled drainage catheter extending from the right upper abdominal posterolateral flying superiorly to the superolateral aspect of the latissimus dorsi. At this site there is a hematoma likely explaining the clinical findings. See detailed discussion above.   Abdomen-Pelvis 1.  Hydropic gallbladder with no  other abnormal findings such as calcified stones or wall thickening. 2. Nonobstructing lower pole right nephrolithiasis unchanged from prior. 3. Fibroid changes in the uterus. 4. No definite acute abdominal or pelvic findings     MACRO: None   Signed by: Joseph Schoenberger 3/26/2025 12:02 PM Dictation workstation:   [id number]    XR chest 1 view    Result Date: 3/21/2025  Interpreted By:  Andrzej Gomes and Stevens Alex STUDY: XR CHEST 1 VIEW;  3/21/2025 8:57 am   INDICATION: Signs/Symptoms:SOB.     COMPARISON: XR chest 08/07/2024   ACCESSION NUMBER(S): [id number]   ORDERING CLINICIAN: TAMAR OVALLE   FINDINGS: AP radiograph of the chest was provided.   MEDICAL DEVICES Right upper extremity PICC line projects over the expected location of the cavoatrial junction.   CARDIOMEDIASTINAL SILHOUETTE: Cardiomediastinal silhouette is unchanged in size and configuration.   LUNGS: The lungs are hyperexpanded with prominent interstitial markings, reflective of chronic change. There is minimal linear subsegmental atelectasis visible in the left base above the diaphragm. No new focal consolidation. No pleural effusion. No pneumothorax.   ABDOMEN: No remarkable upper abdominal findings.   BONES: No acute osseous changes.       1.  Minimal linear basal atelectasis on the left. 2. Emphysematous changes which are stable when compared with prior.   I personally reviewed the images/study and I agree with the findings as stated by Sam Echols DO PGY-2. This study was interpreted at [place], [place].   MACRO: None   Signed by: Andrzej Gomes 3/21/2025 9:50 AM Dictation workstation:   [id number]    ECG 12 lead    Result Date: 3/17/2025  Sinus rhythm with frequent Premature ventricular complexes in a pattern of bigeminy Nonspecific T wave abnormality Abnormal ECG When compared with ECG of 16-MAR-2025 00:42, (unconfirmed) Nonspecific T wave abnormality now evident in Inferior leads QT has  shortened Confirmed by Regan Burkett ([id number]) on 3/17/2025 12:11:01 PM    Electrocardiogram, 12-lead PRN ACS symptoms    Result Date: 3/17/2025  Sinus rhythm with frequent Premature ventricular complexes in a pattern of bigeminy Nonspecific T wave abnormality Prolonged QT Abnormal ECG When compared with ECG of 16-MAR-2025 00:40, (unconfirmed) QT has shortened Confirmed by Regan Burkett ([id number]) on 3/17/2025 12:09:10 PM    ECG 12 Lead    Result Date: 3/6/2025   Poor data quality, interpretation may be adversely affected Sinus bradycardia with 1st degree AV block Nonspecific T wave abnormality Prolonged QT Abnormal ECG Confirmed by Clyde Burkett ([id number]) on 3/6/2025 3:09:25 PM    ECG 12 lead    Result Date: 3/5/2025  Sinus tachycardia with frequent Premature ventricular complexes in a pattern of bigeminy Nonspecific T wave abnormality Abnormal ECG When compared with ECG of 01-MAR-2025 09:07, Significant changes have occurred Confirmed by Baron Sanders ([id number]) on 3/5/2025 8:34:27 AM    Bedside PICC Imaging    Result Date: 3/4/2025  These images are not reportable by radiology and will not be interpreted by  Radiologists.    XR knee right 1-2 views    Result Date: 2/28/2025  Interpreted By:  Juan Smith, STUDY: XR KNEE RIGHT 1-2 VIEWS; ;  2/28/2025 11:36 am   INDICATION: Signs/Symptoms:PACU.     COMPARISON: 01/16/2025.   ACCESSION NUMBER(S): [id number]   ORDERING CLINICIAN: DARSHAN LARSON   FINDINGS: Two views of the right knee   Postsurgical changes of right knee fusion with cement and productive changes within the joint space. The knee joint alignment is similar compared to prior. Interval removal of intramedullary nail with ghost tract. Similar-appearing remote fractures of the proximal fibula and proximal tibia. Osteopenic changes. Vascular calcifications. Soft tissue edema over the anterior knee with overlying wound VAC and subcutaneous air, likely postsurgical. No definitive new fracture.       1. Postsurgical  changes of the right knee with fusion of the joint space and interval removal of the intramedullary nail. Overlying soft tissue edema and wound VAC.       MACRO: None   Signed by: Juan Smith 2/28/2025 12:56 PM Dictation workstation:   [id number]    FL fluoro images no charge    Result Date: 2/28/2025  These images are not reportable by radiology and will not be interpreted by  Radiologists.      Current Medications  Scheduled medications  acetaminophen, 975 mg, oral, q8h  [Held by provider] apixaban, 2.5 mg, oral, BID  lidocaine, 1 patch, transdermal, Daily  methocarbamol, 250 mg, oral, q8h FELIX  pantoprazole, 40 mg, oral, Daily before breakfast  polyethylene glycol, 17 g, oral, Daily  vancomycin, 1,500 mg, intravenous, q24h      Continuous medications     PRN medications  PRN medications: albuterol, docusate sodium, oxyCODONE, oxyCODONE, oxygen, oxymetazoline, vancomycin     Assessment  Adriana Wood is a 69 y.o. female with a past medical history of bradycardia, cardiomyopathy, CAD, cardiac arrest due to electrolyte abnormalities and zofran use post-operatively, and traumatic brain injury. Patient had a total knee right knee done by Dr. Whelan in March 2024 which was complicated by E. Coli infection. Patient had right TKA wound dehiscence s/p I&D right knee with polyswap and attempted re-closure of complex wound on 5/10/2024 by Dr. Whelan. She was referred to plastic surgery (Dr. Reyes) perioperatively given projected anticipated need for possible flap coverage of the wound/defect site. Patient re-admitted and returned to the OR with orthopedics on 2/28/2025 for right knee I&D, revision static spacer and application of incisional wound vac. Plastic surgery service consulted postoperatively to follow for flap recommendations/timing, now s/p R latissimus free flap to R knee on 3/14 with Dr. Lundberg.     Plan/Recommendations  S/p R latissimus free flap to R knee on 3/14 with Dr. Lundberg    -Transferred from [facility] 3/29/25 due to R lat dorsi flap area hematoma  -Will continue to monitor hematoma while inpatient   - Hold home Eliquis for now   - Continue flap assessments per nursing with interval checks per plastic surgery team       ·  Assess Doppler pulse at marked site q shift plus flap appearance (color, warmth, turgor)       ·  Nursing to notify plastic surgery team immediately if there are any acute changes  - Continue MASSIEL drain care per nursing (MASSIEL x 1)        ·  Strip drain tubing TID and PRN       ·  Monitor and record output [id number]        ·  Keep drain sites C/D/I with daily drain dressing changes        ·  Call plastics if drain output is >30cc in an hour or greater than 200cc over 8 hours  - IV Vancomycin as recommended by ID during last admission (stop date 4/11/25)  - Avoid positioning that would apply pressure to flap region or incisions   - NWB to RLE, patient to dangle as tolerated   - Regular diet  - Maintain BP of 110/60 minimum to ensure adequate flap perfusion   - Monitor VS/pulse ox Q8  - Monitor AM CBC/RFP    #Asthma  - continue home inhaler  - as needed O2 supplementation  - nasal Afrin spray for congestion  - discussed need to be tested for YANETH as outpatient     #Post-op pain  - Scheduled Tylenol  - Lidocaine patch  - Scheduled Robaxin  - PRN Oxy IR    # History of QT Prolongation  - Avoid QT prolonging medications; NO Zofran (hx of cardiac arrest)    #History of Bradycardia, PVCs and Bigeminy  - consistent bradycardia starting evening of 3/29 around low 40s  -   Asymptomatic HR of 34 on 3/30 AM with rapid response called. EKG noted same findings from cardiology appointment in early 2025: Bradycardia, PVC and bigeminy.   - Rec obtaining HR with pulse check vs pulse ox since inaccurate readings of irregular heart rates on pulse ox are more common in cases of Bigeminy   - New recs of mag, telemetry and cardiology/internal medicine consults placed  - Cardiology does not recommend  intervention/treatment for asymptomatic bradycardia  - Internal Medicine to co-manage bradycardia with PRS.   - Per Internal medicine: The bradycardia is an artifact due to frequent PVCs; will investigate further with EKG and provide ongoing recommendations.     Disposition: Continued hematoma monitoring on RNF      Prophylaxis:   - DVT: hold Eliquis for now due to hematoma, SCDs  - Encourage IS x10 every hour while awake   - Bowel regimen: Miralax and Colace    Patient and plan discussed with Kris Whatley      Disposition: Patient to remain in-patient for flap monitoring, MASSIEL drain monitoring in setting of hematoma, and cardiac monitoring given recent bradycardic episodes. PT/OT to evaluate patient on 3/31.    Key Ruiz PA-C  Plastic and Reconstructive Surgery   [place]  Pager #[phone]  Team phone: [phone]

## 2025-04-01 ENCOUNTER — APPOINTMENT (OUTPATIENT)
Dept: RADIOLOGY | Facility: HOSPITAL | Age: 70
End: 2025-04-01
Payer: MEDICARE

## 2025-04-01 LAB
ANION GAP SERPL CALC-SCNC: 12 MMOL/L (ref 10–20)
BUN SERPL-MCNC: 12 MG/DL (ref 6–23)
CALCIUM SERPL-MCNC: 8.9 MG/DL (ref 8.6–10.6)
CHLORIDE SERPL-SCNC: 103 MMOL/L (ref 98–107)
CO2 SERPL-SCNC: 31 MMOL/L (ref 21–32)
CREAT SERPL-MCNC: 0.56 MG/DL (ref 0.5–1.05)
EGFRCR SERPLBLD CKD-EPI 2021: >90 ML/MIN/1.73M*2
ERYTHROCYTE [DISTWIDTH] IN BLOOD BY AUTOMATED COUNT: 14.2 % (ref 11.5–14.5)
GLUCOSE SERPL-MCNC: 88 MG/DL (ref 74–99)
HCT VFR BLD AUTO: 31.7 % (ref 36–46)
HGB BLD-MCNC: 10.1 G/DL (ref 12–16)
MCH RBC QN AUTO: 28.7 PG (ref 26–34)
MCHC RBC AUTO-ENTMCNC: 31.9 G/DL (ref 32–36)
MCV RBC AUTO: 90 FL (ref 80–100)
NRBC BLD-RTO: 0 /100 WBCS (ref 0–0)
PLATELET # BLD AUTO: 269 X10*3/UL (ref 150–450)
POTASSIUM SERPL-SCNC: 4.4 MMOL/L (ref 3.5–5.3)
RBC # BLD AUTO: 3.52 X10*6/UL (ref 4–5.2)
SODIUM SERPL-SCNC: 142 MMOL/L (ref 136–145)
VANCOMYCIN SERPL-MCNC: 15.5 UG/ML (ref 5–20)
WBC # BLD AUTO: 5.2 X10*3/UL (ref 4.4–11.3)

## 2025-04-01 PROCEDURE — 2500000005 HC RX 250 GENERAL PHARMACY W/O HCPCS: Performed by: PHYSICIAN ASSISTANT

## 2025-04-01 PROCEDURE — 2500000004 HC RX 250 GENERAL PHARMACY W/ HCPCS (ALT 636 FOR OP/ED): Performed by: PHYSICIAN ASSISTANT

## 2025-04-01 PROCEDURE — 2060000001 HC INTERMEDIATE ICU ROOM DAILY

## 2025-04-01 PROCEDURE — 80048 BASIC METABOLIC PNL TOTAL CA: CPT | Performed by: PHYSICIAN ASSISTANT

## 2025-04-01 PROCEDURE — 2500000001 HC RX 250 WO HCPCS SELF ADMINISTERED DRUGS (ALT 637 FOR MEDICARE OP): Performed by: PHYSICIAN ASSISTANT

## 2025-04-01 PROCEDURE — 99232 SBSQ HOSP IP/OBS MODERATE 35: CPT

## 2025-04-01 PROCEDURE — 97530 THERAPEUTIC ACTIVITIES: CPT | Mod: GO

## 2025-04-01 PROCEDURE — 76604 US EXAM CHEST: CPT | Performed by: RADIOLOGY

## 2025-04-01 PROCEDURE — 85027 COMPLETE CBC AUTOMATED: CPT | Performed by: PHYSICIAN ASSISTANT

## 2025-04-01 PROCEDURE — 80202 ASSAY OF VANCOMYCIN: CPT | Performed by: PHYSICIAN ASSISTANT

## 2025-04-01 PROCEDURE — 2500000001 HC RX 250 WO HCPCS SELF ADMINISTERED DRUGS (ALT 637 FOR MEDICARE OP)

## 2025-04-01 PROCEDURE — 76604 US EXAM CHEST: CPT

## 2025-04-01 PROCEDURE — 97165 OT EVAL LOW COMPLEX 30 MIN: CPT | Mod: GO

## 2025-04-01 PROCEDURE — 2500000004 HC RX 250 GENERAL PHARMACY W/ HCPCS (ALT 636 FOR OP/ED)

## 2025-04-01 RX ORDER — ENOXAPARIN SODIUM 100 MG/ML
40 INJECTION SUBCUTANEOUS DAILY
Status: DISCONTINUED | OUTPATIENT
Start: 2025-04-01 | End: 2025-04-03

## 2025-04-01 RX ADMIN — METHOCARBAMOL TABLETS 250 MG: 500 TABLET, COATED ORAL at 23:47

## 2025-04-01 RX ADMIN — OXYCODONE HYDROCHLORIDE 10 MG: 5 TABLET ORAL at 02:26

## 2025-04-01 RX ADMIN — OXYCODONE HYDROCHLORIDE 10 MG: 5 TABLET ORAL at 20:14

## 2025-04-01 RX ADMIN — LIDOCAINE 4% 1 PATCH: 40 PATCH TOPICAL at 20:19

## 2025-04-01 RX ADMIN — ACETAMINOPHEN 975 MG: 325 TABLET, FILM COATED ORAL at 14:24

## 2025-04-01 RX ADMIN — PANTOPRAZOLE SODIUM 40 MG: 40 TABLET, DELAYED RELEASE ORAL at 06:32

## 2025-04-01 RX ADMIN — METHOCARBAMOL TABLETS 250 MG: 500 TABLET, COATED ORAL at 15:43

## 2025-04-01 RX ADMIN — OXYCODONE HYDROCHLORIDE 10 MG: 5 TABLET ORAL at 14:33

## 2025-04-01 RX ADMIN — VANCOMYCIN HYDROCHLORIDE 1500 MG: 5 INJECTION, POWDER, LYOPHILIZED, FOR SOLUTION INTRAVENOUS at 15:43

## 2025-04-01 RX ADMIN — ACETAMINOPHEN 975 MG: 325 TABLET, FILM COATED ORAL at 22:45

## 2025-04-01 RX ADMIN — OXYCODONE HYDROCHLORIDE 10 MG: 5 TABLET ORAL at 08:44

## 2025-04-01 RX ADMIN — METHOCARBAMOL TABLETS 250 MG: 500 TABLET, COATED ORAL at 01:30

## 2025-04-01 RX ADMIN — ENOXAPARIN SODIUM 40 MG: 100 INJECTION SUBCUTANEOUS at 11:57

## 2025-04-01 RX ADMIN — METHOCARBAMOL TABLETS 250 MG: 500 TABLET, COATED ORAL at 08:40

## 2025-04-01 RX ADMIN — ACETAMINOPHEN 975 MG: 325 TABLET, FILM COATED ORAL at 06:32

## 2025-04-01 ASSESSMENT — PAIN - FUNCTIONAL ASSESSMENT
PAIN_FUNCTIONAL_ASSESSMENT: 0-10

## 2025-04-01 ASSESSMENT — COGNITIVE AND FUNCTIONAL STATUS - GENERAL
DRESSING REGULAR LOWER BODY CLOTHING: A LITTLE
TOILETING: A LITTLE
TURNING FROM BACK TO SIDE WHILE IN FLAT BAD: A LITTLE
DRESSING REGULAR UPPER BODY CLOTHING: A LITTLE
WALKING IN HOSPITAL ROOM: A LITTLE
TURNING FROM BACK TO SIDE WHILE IN FLAT BAD: A LITTLE
EATING MEALS: A LITTLE
DRESSING REGULAR LOWER BODY CLOTHING: A LOT
DAILY ACTIVITIY SCORE: 15
STANDING UP FROM CHAIR USING ARMS: A LOT
WALKING IN HOSPITAL ROOM: A LITTLE
DRESSING REGULAR UPPER BODY CLOTHING: A LITTLE
TURNING FROM BACK TO SIDE WHILE IN FLAT BAD: A LITTLE
MOVING TO AND FROM BED TO CHAIR: A LITTLE
TOILETING: A LITTLE
MOVING FROM LYING ON BACK TO SITTING ON SIDE OF FLAT BED WITH BEDRAILS: A LITTLE
CLIMB 3 TO 5 STEPS WITH RAILING: A LOT
MOVING TO AND FROM BED TO CHAIR: A LITTLE
CLIMB 3 TO 5 STEPS WITH RAILING: TOTAL
MOBILITY SCORE: 17
DRESSING REGULAR UPPER BODY CLOTHING: A LITTLE
HELP NEEDED FOR BATHING: A LITTLE
HELP NEEDED FOR BATHING: A LITTLE
MOVING FROM LYING ON BACK TO SITTING ON SIDE OF FLAT BED WITH BEDRAILS: A LITTLE
DAILY ACTIVITIY SCORE: 20
STANDING UP FROM CHAIR USING ARMS: A LITTLE
DAILY ACTIVITIY SCORE: 20
MOVING TO AND FROM BED TO CHAIR: A LITTLE
WALKING IN HOSPITAL ROOM: A LOT
MOBILITY SCORE: 17
MOBILITY SCORE: 14
MOVING FROM LYING ON BACK TO SITTING ON SIDE OF FLAT BED WITH BEDRAILS: A LITTLE
PERSONAL GROOMING: A LITTLE
DRESSING REGULAR LOWER BODY CLOTHING: A LITTLE
STANDING UP FROM CHAIR USING ARMS: A LITTLE
TOILETING: A LOT
HELP NEEDED FOR BATHING: A LOT
CLIMB 3 TO 5 STEPS WITH RAILING: A LOT

## 2025-04-01 ASSESSMENT — PAIN DESCRIPTION - ORIENTATION
ORIENTATION: RIGHT
ORIENTATION: RIGHT

## 2025-04-01 ASSESSMENT — PAIN DESCRIPTION - LOCATION
LOCATION: LEG

## 2025-04-01 ASSESSMENT — PAIN SCALES - GENERAL
PAINLEVEL_OUTOF10: 3
PAINLEVEL_OUTOF10: 8
PAINLEVEL_OUTOF10: 3
PAINLEVEL_OUTOF10: 8
PAINLEVEL_OUTOF10: 8
PAINLEVEL_OUTOF10: 6
PAINLEVEL_OUTOF10: 8
PAINLEVEL_OUTOF10: 6
PAINLEVEL_OUTOF10: 8
PAINLEVEL_OUTOF10: 8

## 2025-04-01 ASSESSMENT — ACTIVITIES OF DAILY LIVING (ADL)
ADL_ASSISTANCE: NEEDS ASSISTANCE
BATHING_ASSISTANCE: MODERATE;ASSISTIVE DEVICE
BATHING_ASSISTANCE: MODERATE

## 2025-04-01 ASSESSMENT — PAIN SCALES - PAIN ASSESSMENT IN ADVANCED DEMENTIA (PAINAD): TOTALSCORE: MEDICATION (SEE MAR)

## 2025-04-01 NOTE — PROGRESS NOTES
Occupational Therapy    Evaluation and Treatment    Patient Name: Adriana Wood  MRN: [id number]  Today's Date: 4/1/2025  Room: [address] [place], [place] [address]  Time Calculation  Start Time: 1123  Stop Time: 1157  Time Calculation (min): 34 min    Assessment  IP OT Assessment  OT Assessment: Pt presents with decreased ADL/IADL and functional mobility independence d/t decreased balance and endurance, weakness, and pain. Pt would benefit from MOD intensity OT.  Prognosis: Good  Barriers to Discharge Home: Caregiver assistance, Physical needs  Caregiver Assistance: Caregiver assistance needed per identified barriers - however, level of patient's required assistance exceeds assistance available at home  Physical Needs: Ambulating household distances limited by function/safety, 24hr mobility assistance needed, Intermittent ADL assistance needed, High falls risk due to function or environment  Evaluation/Treatment Tolerance: Patient tolerated treatment well  Medical Staff Made Aware: Yes  End of Session Communication: Bedside nurse  End of Session Patient Position:  (sitting EOB, RN in room with pt)    Plan:  Inpatient Plan  Treatment Interventions: ADL retraining, Functional transfer training, Endurance training, Patient/family training, Equipment evaluation/education, Compensatory technique education  OT Frequency: 3 times per week  OT Discharge Recommendations: Moderate intensity level of continued care  Equipment Recommended upon Discharge:  (tbd)  OT Recommended Transfer Status: Assist of 1  OT - OK to Discharge:  (OT eval complete and d/c recs made)  OT Assessment  OT Assessment Results: Decreased ADL status, Decreased endurance, Decreased functional mobility, Decreased IADLs  Prognosis: Good  Evaluation/Treatment Tolerance: Patient tolerated treatment well  Medical Staff Made Aware: Yes  Strengths: Ability to acquire knowledge, Attitude of self, Capable of completing ADLs semi/independent    Subjective   Current Problem:  1.  Hematoma of abdominal wall, initial encounter          General:  Reason for Referral: hematoma  Past Medical History Relevant to Rehab: 69 y.o. female with a past medical history of bradycardia, cardiomyopathy, CAD, cardiac arrest, and traumatic brain injury. Patient had a total knee right knee in March 2024 which was complicated by E. Coli infection. Patient had right TKA wound dehiscence s/p I&D right knee with polyswap and attempted re-closure of complex wound on 5/10/2024 .She was referred to plastic surgery with orthopedics on 2/28/2025 for right knee I&D, revision static spacer and application of incisional wound vac. Plastic surgery service consulted postoperatively to follow for flap recommendations/timing, now s/p R latissimus free flap to R knee on 3/14. DC to SNF now readmitted w hematoma from R lat flap.  Prior to Session Communication: Bedside nurse  Patient Position Received: Bed, 3 rail up, Alarm off, not on at start of session  Family/Caregiver Present: No  General Comment: Pt supine in bed and agreeable to therapy. Completed sit<>stand transfer this date. Pt would benefit from MOD intensity OT.     Precautions:  LE Weight Bearing Status: Right Non-Weight Bearing  Medical Precautions: Fall precautions  Post-Surgical Precautions:  (recent flap from R lat)    Vital Signs:     04/01/25 1123   Vital Signs   Heart Rate 85   Heart Rate Source Monitor     Pain:  Pain Assessment  Pain Assessment: 0-10  0-10 (Numeric) Pain Score: 8  Pain Type: Acute pain  Pain Location: Leg  Pain Orientation: Right    Objective   Cognition:  Overall Cognitive Status: Within Functional Limits  Arousal/Alertness: Appropriate responses to stimuli  Orientation Level: Oriented X4  Following Commands: Follows all commands and directions without difficulty  Safety Judgment: Good awareness of safety precautions  Problem Solving: Able to problem solve independently  Safety/Judgement: Within Functional Limits  Insight: Within function  limits  Impulsive: Within functional limits  Processing Speed: Within funtional limits     Home Living:  Type of Home: House  Lives With: Spouse, Adult children  Home Adaptive Equipment: Walker rolling or standard, Wheelchair-manual, Wheelchair-power, Long-handled sponge, Reacher, Sock aid (dog recently chewed up reacher)  Home Layout: Able to live on main level with bedroom/bathroom, Laundry main level  Home Access: Ramped entrance  Bathroom Shower/Tub:  (zero entry/walk in)  Bathroom Toilet: Standard  Bathroom Equipment: Grab bars in shower, Shower chair with back, Hand-held shower hose, Long handled sponge  Bathroom Accessibility: recent modifications to bathroom to make more acccessible     Prior Function:  Level of [place]: Needs assistance with ADLs, Needs assistance with homemaking, Needs assistance with functional transfers  Receives Help From: Family  ADL Assistance: Needs assistance  Bath: Moderate, Assistive device  Dressing: Moderate, Assistive device  Homemaking Assistance: Needs assistance  Ambulatory Assistance:  (uses wheelchair and walker, endorses Conner for transfers)  Vocational:  (does not work)  Leisure: drawing    IADL History:  IADL Comments:  takes her places    ADL:  Eating Assistance: Stand by (anticipate w set up)  Grooming Assistance: Stand by (anticipate w set up)  Bathing Assistance: Moderate (anticipate)  UE Dressing Assistance: Stand by (anticipate w set up)  LE Dressing Assistance: Maximal (anticipate)  Toileting Assistance with Device: Moderate (anticipate)    Activity Tolerance:  Endurance: Tolerates less than 10 min exercise with changes in vital signs    Balance:  Dynamic Sitting Balance  Dynamic Sitting-Level of Assistance: Close supervision  Static Sitting Balance  Static Sitting-Level of Assistance: Distant supervision  Static Sitting-Comment/Number of Minutes: sat EOB ~20 min  Static Standing Balance  Static Standing-Level of Assistance: Minimum assistance  (+walker)    Bed Mobility/Transfers: Bed Mobility/Transfers: Bed Mobility  Bed Mobility: Yes  Bed Mobility 1  Bed Mobility 1: Supine to sitting  Level of Assistance 1: Contact guard, Minimal verbal cues  Bed Mobility Comments 1: VCs for using log roll technique  Functional Mobility  Functional Mobility Performed: No   and Transfers  Transfer: Yes (declined fx mobility this date d/t pain)  Transfer 1  Transfer From 1: Bed to, Stand to  Transfer to 1: Stand, Bed  Technique 1: Sit to stand, Stand to sit  Transfer Device 1: Walker  Transfer Level of Assistance 1: Minimum assistance, Minimal verbal cues    IADL's:   IADL Comments:  takes her places    Sensation:  Light Touch:  (no apparent deficits in UE)    Coordination:  Movements are Fluid and Coordinated: Yes     Hand Function:  Hand Function  Gross Grasp: Functional  Coordination: Functional    Extremities:   RUE   RUE :  (at least 3+/5, did not formally assess d/t recent procedure), LUE   LUE:  (at least 3+/5, did not formally assess d/t recent procedure),  , and      Outcome Measures: [facility] Daily Activity  Putting on and taking off regular lower body clothing: A lot  Bathing (including washing, rinsing, drying): A lot  Putting on and taking off regular upper body clothing: A little  Toileting, which includes using toilet, bedpan or urinal: A lot  Taking care of personal grooming such as brushing teeth: A little  Eating Meals: A little  Daily Activity - Total Score: 15  Brief Confusion Assessment Method (bCAM)  CAM Result: CAM -    Education Documentation  Body Mechanics, taught by Jazzmine Dior OT at 4/1/2025  1:39 PM.  Learner: Patient  Readiness: Eager  Method: Explanation, Demonstration  Response: Verbalizes Understanding, Demonstrated Understanding    Precautions, taught by Jazzmine Dior OT at 4/1/2025  1:39 PM.  Learner: Patient  Readiness: Eager  Method: Explanation, Demonstration  Response: Verbalizes Understanding, Demonstrated  Understanding    ADL Training, taught by Jazzmine Dior OT at 4/1/2025  1:39 PM.  Learner: Patient  Readiness: Eager  Method: Explanation, Demonstration  Response: Verbalizes Understanding, Demonstrated Understanding    Education Comments  No comments found.      Goals:   Encounter Problems       Encounter Problems (Active)       ADLs       Pt will complete UB /LB bathing tasks with minimum assistance while seated and AE as needed.  (Progressing)       Start:  04/01/25    Expected End:  04/15/25            Pt will complete LB dressing with minimum level of assistance while seated and/or standing and AE as needed. (Progressing)       Start:  04/01/25    Expected End:  04/15/25            Pt will complete toilet hygiene while seated /standing with minimum level of assistance and AE as needed. (Progressing)       Start:  04/01/25    Expected End:  04/15/25               BALANCE       Patient will tolerate standing for 10 minutes to stand by assist level of assistance with least restrictive device in order to improve functional activity tolerance for ADL tasks. (Progressing)       Start:  04/01/25    Expected End:  04/15/25               MOBILITY       Patient will perform Functional mobility mod  Household distances/Community Distances with minimal assist  level of assistance and least restrictive device in order to improve safety and functional mobility. (Progressing)       Start:  04/01/25    Expected End:  04/15/25               TRANSFERS       Patient will perform bed mobility supervision level of assistance and bed rails in order to improve safety and independence with mobility (Progressing)       Start:  04/01/25    Expected End:  04/15/25            Patient will complete sit to stand transfer with stand by assist level of assistance and least restrictive device in order to improve safety and prepare for out of bed mobility. (Progressing)       Start:  04/01/25    Expected End:  04/15/25                    Treatment Completed on Evaluation    Therapy/Activity:     Therapeutic Activity  Therapeutic Activity Performed: Yes  Therapeutic Activity 1: Pt challenging seated balance and endurance in preparation for setaed ADL/IADL tasks - sat ~20 min  Therapeutic Activity 2: VCs/review for body mechanics to protect recent abdominal incisions      04/01/25 at 1:40 PM   MOISES Moscoso/ZACH  Rehab Office: [phone]

## 2025-04-01 NOTE — CARE PLAN
Problem: Pain - Adult  Goal: Verbalizes/displays adequate comfort level or baseline comfort level  Outcome: Progressing     Problem: Safety - Adult  Goal: Free from fall injury  Outcome: Progressing     Problem: Discharge Planning  Goal: Discharge to home or other facility with appropriate resources  Outcome: Progressing     Problem: Chronic Conditions and Co-morbidities  Goal: Patient's chronic conditions and co-morbidity symptoms are monitored and maintained or improved  Outcome: Progressing     Problem: Nutrition  Goal: Nutrient intake appropriate for maintaining nutritional needs  Outcome: Progressing     Problem: Fall/Injury  Goal: Not fall by end of shift  Outcome: Progressing  Goal: Be free from injury by end of the shift  Outcome: Progressing  Goal: Verbalize understanding of personal risk factors for fall in the hospital  Outcome: Progressing  Goal: Verbalize understanding of risk factor reduction measures to prevent injury from fall in the home  Outcome: Progressing  Goal: Use assistive devices by end of the shift  Outcome: Progressing  Goal: Pace activities to prevent fatigue by end of the shift  Outcome: Progressing     Problem: Pain  Goal: Takes deep breaths with improved pain control throughout the shift  Outcome: Progressing  Goal: Turns in bed with improved pain control throughout the shift  Outcome: Progressing  Goal: Walks with improved pain control throughout the shift  Outcome: Progressing  Goal: Performs ADL's with improved pain control throughout shift  Outcome: Progressing  Goal: Participates in PT with improved pain control throughout the shift  Outcome: Progressing  Goal: Free from opioid side effects throughout the shift  Outcome: Progressing  Goal: Free from acute confusion related to pain meds throughout the shift  Outcome: Progressing     Problem: Skin  Goal: Decreased wound size/increased tissue granulation at next dressing change  Outcome: Progressing  Flowsheets (Taken 4/1/2025  0924)  Decreased wound size/increased tissue granulation at next dressing change: Promote sleep for wound healing  Goal: Participates in plan/prevention/treatment measures  Outcome: Progressing  Flowsheets (Taken 4/1/2025 0924)  Participates in plan/prevention/treatment measures: Elevate heels  Goal: Prevent/manage excess moisture  Outcome: Progressing  Flowsheets (Taken 4/1/2025 0924)  Prevent/manage excess moisture: Monitor for/manage infection if present  Goal: Prevent/minimize sheer/friction injuries  Outcome: Progressing  Flowsheets (Taken 4/1/2025 0924)  Prevent/minimize sheer/friction injuries: Use pull sheet  Goal: Promote/optimize nutrition  Outcome: Progressing  Flowsheets (Taken 4/1/2025 0924)  Promote/optimize nutrition: Monitor/record intake including meals  Goal: Promote skin healing  Outcome: Progressing  Flowsheets (Taken 4/1/2025 0924)  Promote skin healing: Protective dressings over bony prominences

## 2025-04-01 NOTE — PROGRESS NOTES
Vancomycin Dosing by Pharmacy- FOLLOW UP    Adriana Wood is a 69 y.o. year old female who Pharmacy has been consulted for vancomycin dosing for cellulitis, skin and soft tissue. Based on the patient's indication and renal status this patient is being dosed based on a goal AUC of 400-600.     Renal function is currently stable.    Current vancomycin dose: 1500 mg given every 24 hours    Estimated vancomycin AUC on current dose: 511 mg/L.hr     Visit Vitals  /60 (BP Location: Left arm, Patient Position: Lying)   Pulse (!) 40   Temp 36.6 °C (97.9 °F) (Temporal)   Resp 18        Lab Results   Component Value Date    CREATININE 0.56 2025    CREATININE 0.43 (L) 2025    CREATININE 0.50 2025    CREATININE 0.44 (L) 2025        Patient weight is as follows:   Vitals:    25 0006   Weight: 69.2 kg (152 lb 8.9 oz)       Cultures:  No results found for the encounter in last 14 days.       I/O last 3 completed shifts:  In: 1060 (15.3 mL/kg) [P.O.:530; IV Piggyback:530]  Out: 1442.5 (20.8 mL/kg) [Urine:1400 (0.6 mL/kg/hr); Drains:42.5]  Weight: 69.2 kg   I/O during current shift:  I/O this shift:  In: -   Out: 300 [Urine:300]    Temp (24hrs), Av.4 °C (97.5 °F), Min:36.3 °C (97.3 °F), Max:36.6 °C (97.9 °F)      Assessment/Plan    Within goal AUC range. Continue current vancomycin regimen.    This dosing regimen is predicted by [employer] to result in the following pharmacokinetic parameters:  Regimen: 1500 mg IV every 24 hours.  Start time: 16:36 on 2025  Exposure target: AUC24 (range)400-600 mg/L.hr   [id number]: 472 mg/L.hr  AUC24,ss: 511 mg/L.hr  Probability of AUC24 > 400: 100 %  Ctrough,ss: 16.4 mg/L  Probability of Ctrough,ss > 20: 1 %    The next level will be obtained on 4/3 with AM labs. May be obtained sooner if clinically indicated.   Will continue to monitor renal function daily while on vancomycin and order serum creatinine at least every 48 hours if not already  ordered.  Follow for continued vancomycin needs, clinical response, and signs/symptoms of toxicity.       Jerome Kennedy, PharmD, BCPS

## 2025-04-01 NOTE — PROGRESS NOTES
"  Department of Plastic and Reconstructive Surgery  Daily Progress Note    Patient Name: Adriana Wood  MRN: [id number]  Date:  04/01/25     Subjective  Patient found resting in bed this AM.  Doppler signal is strong and flap is propped on pillows to minimize pressure to incisions and flap. She reports pain associated with right lateral chest wall donor site has improved with lidocaine patches. She states today she is discomfort at the medial aspect of RLE knee. She denies any skin changes to the area. She denies any fever, chills, night sweats, CP, SOB, nausea, vomiting, diarrhea, constipation, dysuria, hematuria, hematochezia, hematemesis, flank pain.    Objective    Vital Signs  /73 (BP Location: Left arm, Patient Position: Sitting)   Pulse 78   Temp 36.6 °C (97.9 °F) (Temporal)   Resp 16   Ht 1.778 m (5' 10\")   Wt 69.2 kg (152 lb 8.9 oz)   SpO2 95%   BMI 21.89 kg/m²      Physical Exam   Constitutional: A&Ox3, calm and cooperative, NAD  Eyes: EOMI, clear sclera   ENMT: Moist mucous membranes, no apparent injuries or lesions  Head/Neck: NCAT  Cardiovascular: Extremities WWP  Respiratory/Thorax: Unlabored respirations on RA  Gastrointestinal: Abdomen soft, NTND  Genitourinary: Voiding independently  Extremities: RLE flap: warm to touch, soft and compressible, strong biphasic signal at marked stitch, circumferential incisions around flap well approximated with some scabbing  Right posterior lat donor site: incision healed and intact without signs of dehiscence, leave JESUS. MASSIEL drain x 1 to right lower flank with ss and bloody output  Neurological: A&Ox3  Psychological: Appropriate mood and behavior  Skin: Warm and dry.     Diagnostics   Results for orders placed or performed during the hospital encounter of 03/28/25 (from the past 24 hours)   Vancomycin   Result Value Ref Range    Vancomycin 15.5 5.0 - 20.0 ug/mL   CBC   Result Value Ref Range    WBC 5.2 4.4 - 11.3 x10*3/uL    nRBC 0.0 0.0 - 0.0 /100 " WBCs    RBC 3.52 (L) 4.00 - 5.20 x10*6/uL    Hemoglobin 10.1 (L) 12.0 - 16.0 g/dL    Hematocrit 31.7 (L) 36.0 - 46.0 %    MCV 90 80 - 100 fL    MCH 28.7 26.0 - 34.0 pg    MCHC 31.9 (L) 32.0 - 36.0 g/dL    RDW 14.2 11.5 - 14.5 %    Platelets 269 150 - 450 x10*3/uL   Basic metabolic panel   Result Value Ref Range    Glucose 88 74 - 99 mg/dL    Sodium 142 136 - 145 mmol/L    Potassium 4.4 3.5 - 5.3 mmol/L    Chloride 103 98 - 107 mmol/L    Bicarbonate 31 21 - 32 mmol/L    Anion Gap 12 10 - 20 mmol/L    Urea Nitrogen 12 6 - 23 mg/dL    Creatinine 0.56 0.50 - 1.05 mg/dL    eGFR >90 >60 mL/min/1.73m*2    Calcium 8.9 8.6 - 10.6 mg/dL     *Note: Due to a large number of results and/or encounters for the requested time period, some results have not been displayed. A complete set of results can be found in Results Review.     Current Medications  Scheduled medications  acetaminophen, 975 mg, oral, q8h  [Held by provider] apixaban, 2.5 mg, oral, BID  enoxaparin, 40 mg, subcutaneous, Daily  lidocaine, 1 patch, transdermal, Daily  methocarbamol, 250 mg, oral, q8h FELIX  pantoprazole, 40 mg, oral, Daily before breakfast  polyethylene glycol, 17 g, oral, Daily  vancomycin, 1,500 mg, intravenous, q24h      Continuous medications     PRN medications  PRN medications: albuterol, docusate sodium, lubricating eye drops, oxyCODONE, oxyCODONE, oxygen, vancomycin     Assessment  Adriana Wood is a 69 y.o. female with a past medical history of bradycardia, cardiomyopathy, CAD, cardiac arrest due to electrolyte abnormalities and zofran use post-operatively, and traumatic brain injury. Patient had a total knee right knee done by Dr. Whelan in March 2024 which was complicated by E. Coli infection. Patient had right TKA wound dehiscence s/p I&D right knee with polyswap and attempted re-closure of complex wound on 5/10/2024 by Dr. Whelan. She was referred to plastic surgery (Dr. Reyes) perioperatively given projected anticipated need for  possible flap coverage of the wound/defect site. Patient re-admitted and returned to the OR with orthopedics on 2/28/2025 for right knee I&D, revision static spacer and application of incisional wound vac. Plastic surgery service consulted postoperatively to follow for flap recommendations/timing, now s/p R latissimus free flap to R knee on 3/14 with Dr. Lundberg.     Plan/Recommendations  S/p R latissimus free flap to R knee on 3/14 with Dr. Lundberg   -Transferred from [facility] 3/29/25 due to R lat dorsi flap area hematoma  -Will continue to monitor hematoma while inpatient   - Continue to hold continue to home Eliquis for now   - Lovenox for DVT prophylaxis initiated 4/1  - Repeat US of chest wall ordered 4/1 for evaluation of hematoma, follow for read  - Continue flap assessments per nursing with interval checks per plastic surgery team       ·  Assess Doppler pulse at marked site q shift plus flap appearance (color, warmth, turgor)       ·  Nursing to notify plastic surgery team immediately if there are any acute changes  - Continue MASSIEL drain care per nursing (MASSIEL x 1)        ·  Strip drain tubing TID and PRN       ·  Monitor and record output [id number]        ·  Keep drain sites C/D/I with daily drain dressing changes        ·  Call plastics if drain output is >30cc in an hour or greater than 200cc over 8 hours  - IV Vancomycin as recommended by ID during last admission (stop date 4/11/25)  - Avoid positioning that would apply pressure to flap region or incisions   - NWB to RLE, patient to dangle as tolerated   - Regular diet  - Maintain BP of 110/60 minimum to ensure adequate flap perfusion   - Monitor VS/pulse ox Q8  - Monitor AM CBC/RFP    #Asthma  - continue home inhaler  - as needed O2 supplementation  - nasal Afrin spray for congestion  - discussed need to be tested for YANETH as outpatient     #Post-op pain  - Scheduled Tylenol  - Lidocaine patch  - Scheduled Robaxin  - PRN Oxy IR    # History of QT  Prolongation  - Avoid QT prolonging medications; NO Zofran (hx of cardiac arrest)    #History of Bradycardia, PVCs and Bigeminy  - consistent bradycardia starting evening of 3/29 around low 40s  -   Asymptomatic HR of 34 on 3/30 AM with rapid response called. EKG noted same findings from cardiology appointment in early 2025: Bradycardia, PVC and bigeminy.   - Rec obtaining HR with pulse check vs pulse ox since inaccurate readings of irregular heart rates on pulse ox are more common in cases of Bigeminy   - New recs of mag, telemetry and cardiology/internal medicine consults placed  - Cardiology does not recommend intervention/treatment for asymptomatic bradycardia  - Internal Medicine to co-manage bradycardia with PRS.   - Per Internal medicine: The bradycardia is an artifact due to frequent PVCs, patient stable, will follow peripherally    Disposition: Continued hematoma monitoring on RNF      Prophylaxis:   - DVT: hold Eliquis for now due to hematoma, subcutaneous lovenox started 4/1, SCDs  - Encourage IS x10 every hour while awake   - Bowel regimen: Miralax and Colace    Patient and plan discussed with Kris Whatley      Disposition: Patient to remain in-patient for flap monitoring, MASSIEL drain monitoring in setting of hematoma, and cardiac monitoring given recent bradycardic episodes. PT/OT rec mod intensity continued therapy    Discussed with Dr. Quinn, APRN-CNP  Plastic and Reconstructive Surgery   [place]  Pager #[phone]  Team phone: [phone]
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
## 2025-04-02 LAB
ANION GAP SERPL CALC-SCNC: 11 MMOL/L (ref 10–20)
BUN SERPL-MCNC: 15 MG/DL (ref 6–23)
CALCIUM SERPL-MCNC: 9.1 MG/DL (ref 8.6–10.6)
CHLORIDE SERPL-SCNC: 103 MMOL/L (ref 98–107)
CO2 SERPL-SCNC: 30 MMOL/L (ref 21–32)
CREAT SERPL-MCNC: 0.54 MG/DL (ref 0.5–1.05)
EGFRCR SERPLBLD CKD-EPI 2021: >90 ML/MIN/1.73M*2
ERYTHROCYTE [DISTWIDTH] IN BLOOD BY AUTOMATED COUNT: 14.3 % (ref 11.5–14.5)
GLUCOSE SERPL-MCNC: 88 MG/DL (ref 74–99)
HCT VFR BLD AUTO: 31.5 % (ref 36–46)
HGB BLD-MCNC: 9.8 G/DL (ref 12–16)
MAGNESIUM SERPL-MCNC: 1.94 MG/DL (ref 1.6–2.4)
MCH RBC QN AUTO: 28.3 PG (ref 26–34)
MCHC RBC AUTO-ENTMCNC: 31.1 G/DL (ref 32–36)
MCV RBC AUTO: 91 FL (ref 80–100)
NRBC BLD-RTO: 0 /100 WBCS (ref 0–0)
PLATELET # BLD AUTO: 268 X10*3/UL (ref 150–450)
POTASSIUM SERPL-SCNC: 4.4 MMOL/L (ref 3.5–5.3)
RBC # BLD AUTO: 3.46 X10*6/UL (ref 4–5.2)
SODIUM SERPL-SCNC: 140 MMOL/L (ref 136–145)
WBC # BLD AUTO: 4.2 X10*3/UL (ref 4.4–11.3)

## 2025-04-02 PROCEDURE — 2500000001 HC RX 250 WO HCPCS SELF ADMINISTERED DRUGS (ALT 637 FOR MEDICARE OP): Performed by: NURSE PRACTITIONER

## 2025-04-02 PROCEDURE — 2500000001 HC RX 250 WO HCPCS SELF ADMINISTERED DRUGS (ALT 637 FOR MEDICARE OP): Performed by: PHYSICIAN ASSISTANT

## 2025-04-02 PROCEDURE — 2500000001 HC RX 250 WO HCPCS SELF ADMINISTERED DRUGS (ALT 637 FOR MEDICARE OP)

## 2025-04-02 PROCEDURE — 2500000005 HC RX 250 GENERAL PHARMACY W/O HCPCS: Performed by: PHYSICIAN ASSISTANT

## 2025-04-02 PROCEDURE — 2500000004 HC RX 250 GENERAL PHARMACY W/ HCPCS (ALT 636 FOR OP/ED)

## 2025-04-02 PROCEDURE — 83735 ASSAY OF MAGNESIUM: CPT

## 2025-04-02 PROCEDURE — 80048 BASIC METABOLIC PNL TOTAL CA: CPT

## 2025-04-02 PROCEDURE — 85027 COMPLETE CBC AUTOMATED: CPT

## 2025-04-02 PROCEDURE — 2500000004 HC RX 250 GENERAL PHARMACY W/ HCPCS (ALT 636 FOR OP/ED): Performed by: PHYSICIAN ASSISTANT

## 2025-04-02 PROCEDURE — 99232 SBSQ HOSP IP/OBS MODERATE 35: CPT | Performed by: PHYSICIAN ASSISTANT

## 2025-04-02 PROCEDURE — 2060000001 HC INTERMEDIATE ICU ROOM DAILY

## 2025-04-02 RX ORDER — METHOCARBAMOL 500 MG/1
250 TABLET, FILM COATED ORAL ONCE
Status: COMPLETED | OUTPATIENT
Start: 2025-04-02 | End: 2025-04-02

## 2025-04-02 RX ADMIN — METHOCARBAMOL TABLETS 250 MG: 500 TABLET, COATED ORAL at 08:04

## 2025-04-02 RX ADMIN — ACETAMINOPHEN 975 MG: 325 TABLET, FILM COATED ORAL at 06:13

## 2025-04-02 RX ADMIN — METHOCARBAMOL TABLETS 250 MG: 500 TABLET, COATED ORAL at 15:32

## 2025-04-02 RX ADMIN — OXYCODONE HYDROCHLORIDE 10 MG: 5 TABLET ORAL at 15:32

## 2025-04-02 RX ADMIN — OXYCODONE HYDROCHLORIDE 10 MG: 5 TABLET ORAL at 03:15

## 2025-04-02 RX ADMIN — POLYETHYLENE GLYCOL 3350 17 G: 17 POWDER, FOR SOLUTION ORAL at 08:04

## 2025-04-02 RX ADMIN — METHOCARBAMOL 250 MG: 500 TABLET ORAL at 22:56

## 2025-04-02 RX ADMIN — OXYCODONE HYDROCHLORIDE 10 MG: 5 TABLET ORAL at 10:19

## 2025-04-02 RX ADMIN — PANTOPRAZOLE SODIUM 40 MG: 40 TABLET, DELAYED RELEASE ORAL at 08:09

## 2025-04-02 RX ADMIN — LIDOCAINE 4% 1 PATCH: 40 PATCH TOPICAL at 22:15

## 2025-04-02 RX ADMIN — ENOXAPARIN SODIUM 40 MG: 100 INJECTION SUBCUTANEOUS at 08:04

## 2025-04-02 RX ADMIN — OXYCODONE HYDROCHLORIDE 10 MG: 5 TABLET ORAL at 20:56

## 2025-04-02 RX ADMIN — ACETAMINOPHEN 975 MG: 325 TABLET, FILM COATED ORAL at 22:56

## 2025-04-02 RX ADMIN — ACETAMINOPHEN 975 MG: 325 TABLET, FILM COATED ORAL at 15:32

## 2025-04-02 RX ADMIN — VANCOMYCIN HYDROCHLORIDE 1500 MG: 5 INJECTION, POWDER, LYOPHILIZED, FOR SOLUTION INTRAVENOUS at 15:34

## 2025-04-02 ASSESSMENT — PAIN DESCRIPTION - ORIENTATION
ORIENTATION: RIGHT
ORIENTATION: RIGHT

## 2025-04-02 ASSESSMENT — COGNITIVE AND FUNCTIONAL STATUS - GENERAL
DAILY ACTIVITIY SCORE: 20
CLIMB 3 TO 5 STEPS WITH RAILING: A LOT
MOBILITY SCORE: 17
HELP NEEDED FOR BATHING: A LITTLE
DRESSING REGULAR LOWER BODY CLOTHING: A LITTLE
MOVING FROM LYING ON BACK TO SITTING ON SIDE OF FLAT BED WITH BEDRAILS: A LITTLE
MOVING TO AND FROM BED TO CHAIR: A LITTLE
DRESSING REGULAR UPPER BODY CLOTHING: A LITTLE
MOVING FROM LYING ON BACK TO SITTING ON SIDE OF FLAT BED WITH BEDRAILS: A LITTLE
TOILETING: A LITTLE
TOILETING: A LITTLE
WALKING IN HOSPITAL ROOM: A LITTLE
HELP NEEDED FOR BATHING: A LITTLE
DRESSING REGULAR LOWER BODY CLOTHING: A LITTLE
MOVING TO AND FROM BED TO CHAIR: A LITTLE
CLIMB 3 TO 5 STEPS WITH RAILING: A LOT
WALKING IN HOSPITAL ROOM: A LITTLE
DAILY ACTIVITIY SCORE: 20
STANDING UP FROM CHAIR USING ARMS: A LITTLE
STANDING UP FROM CHAIR USING ARMS: A LITTLE
DRESSING REGULAR UPPER BODY CLOTHING: A LITTLE
TURNING FROM BACK TO SIDE WHILE IN FLAT BAD: A LITTLE
MOBILITY SCORE: 17
TURNING FROM BACK TO SIDE WHILE IN FLAT BAD: A LITTLE

## 2025-04-02 ASSESSMENT — PAIN DESCRIPTION - LOCATION
LOCATION: LEG
LOCATION: LEG
LOCATION: BACK

## 2025-04-02 ASSESSMENT — PAIN SCALES - GENERAL
PAINLEVEL_OUTOF10: 8
PAINLEVEL_OUTOF10: 3
PAINLEVEL_OUTOF10: 3
PAINLEVEL_OUTOF10: 8
PAINLEVEL_OUTOF10: 0 - NO PAIN
PAINLEVEL_OUTOF10: 7
PAINLEVEL_OUTOF10: 8
PAINLEVEL_OUTOF10: 3
PAINLEVEL_OUTOF10: 6
PAINLEVEL_OUTOF10: 8

## 2025-04-02 ASSESSMENT — PAIN - FUNCTIONAL ASSESSMENT
PAIN_FUNCTIONAL_ASSESSMENT: 0-10

## 2025-04-02 ASSESSMENT — PAIN SCALES - WONG BAKER: WONGBAKER_NUMERICALRESPONSE: HURTS WHOLE LOT

## 2025-04-02 NOTE — HOSPITAL COURSE
BRIEF HISTORY:    Adriana Wood is a 69 y.o. female with a past medical history of bradycardia, cardiomyopathy, CAD, cardiac arrest due to electrolyte abnormalities and zofran use post-operatively, and traumatic brain injury. Patient had a total knee right knee done by Dr. Whelan in March 2024 which was complicated by E. Coli infection. Patient had right TKA wound dehiscence s/p I&D right knee with polyswap and attempted re-closure of complex wound on 5/10/2024 by Dr. Whelan. She was referred to plastic surgery (Dr. Reyes) perioperatively given projected anticipated need for possible flap coverage of the wound/defect site. Patient re-admitted and returned to the OR with orthopedics on 2/28/2025 for right knee I&D, revision static spacer and application of incisional wound vac. Plastic surgery service consulted postoperatively to follow for flap recommendations/timing, now s/p R latissimus free flap to R knee on 3/14 with Dr. Ludnberg. Discharged on 3/24/25 to SNF, taken to [facility] ED on 3/26/25 for concerns for right latissimus dorsi hematoma. Transferred to [facility] on 3/28/25 for monitoring of right latissimus dorsi flap hematoma.    HOSPITAL COURSE:    Admitted on 3/28/25 for monitoring of right latissimus dorsi flap hematoma. Had episode of bradycardia, HR in the 30's on 3/30, asymptomatic, medicine consulted. Repeat US of chest on 4/1 indicates large right lateral chest wall hematoma with a drain in place. Patient requiring return to OR on 4/3 for right chest wall hematoma evacuation.     CONSULTATIONS:  Internal medicine consulted for bradycardia and elected to continue monitoring, obtain new EKG, keep Mg >2, K > 4, monitor on telemetry, avoid any QT prolonging agents. ID consulted last admission, continue IV Vancomycin during this admission.    DAY OF DISCHARGE:    On the day of discharge, the patient was seen and evaluated by the Plastic Surgery team and deemed suitable for discharge to ***.  There were no  significant events overnight. Vitals were reviewed and within normal limits. Labs were stable at discharge. On day of discharge the patient was tolerating a diet, pain was controlled on PO pain medication, was ambulating well and voiding spontaneously. The patient was given detailed discharge instructions and were scheduled to follow up as an outpatient.

## 2025-04-02 NOTE — DISCHARGE INSTRUCTIONS
Plastic Surgery Post Discharge Instructions  You were readmitted to [facility] for postoperative right latissimus dorsi hematoma following R latissimus free flap to R knee on 3/14 with Dr. Reyes of plastic surgery.  You returned to the OR on for right chest hematoma evacuation. Included below are post-discharge instructions and details regarding follow-up.     Thank you for allowing us to participate in your care and we wish you the best!    Best Regards,  [facility]  Department of Plastic and Reconstructive Surgery    Activity:  Activity as tolerated ***. No pushing, pulling or lifting objects greater than 5 pounds ***. Continue to maintain non-weight bearing status to ***. Ensure no compression is applied to the *** or free flap sites. Continue to elevate your *** to alleviate postoperative edema. Please do not apply ice or heat directly to free flap without barrier in place ***.     You may locally bathe following discharge. Avoid soaking or submerging surgical incisions/sites or wetting wound vac dressing or device ***.      Surgical Site/Wound Care:  Prevena/wound vac ***    Local wound care instructions ***. Avoid application of creams, lotions or ointments to surgical site, no soaking or scrubbing of surgical sites.     *** Continue to monitor flap for changes in general appearance, color, temperature and turgor. Please additionally monitor for any developing signs of infection which may include increased redness, swelling, fever/chills, green/yellow drainage, or foul odor from surgical sites or wounds. If any signs of infection or changes in flap appearance are to occur, please immediately contact the plastic surgery office.     Drain care:  You are being discharged with ***. To empty the drain, open the cap, tip into cup and squeeze to empty. Squeeze drain flat then replace the cap.  Please empty the drain and record its output 3 times a day and bring  these numbers to your follow up appointment.  The drain output should decrease and the color of the drainage should become lighter (red to pink to yellow).  This drain is sutured into place.  Keep the area around the drain clean and dry.  You may use mild soap and water to cleanse around the drain.  It is ok to shower; do not soak in a tub. Change the gauze around the drain as needed.  Call the office if you notice drastic changes in drain output, bloody drain output, or redness/drainage around insertion site.    Nutrition:  You may resume a regular diet following surgery with increased protein. Ensure that you are drinking an adequate amount of fluids to maintain hydration as well as consuming a diet high in protein and low in sugar. You may consider increasing your fiber intake to avoid constipation.    Do not smoke, as smoking delays healing and increases the risk of complications. Also be sure you are not around people that smoke for at least 6 weeks after surgery.  Second hand smoke is just as harmful as if you were to smoke.    Medication Instructions:  You may resume use of your home prescribed mediations as previously directed following discharge from the hospital. If you were taking medications prior to your surgery and they are not listed on your discharge homegoing instructions medications list, consult your MD before you resume these medications.    Some postoperative pain is not unusual. This is usually relieved by taking prescribed or over the counter Acetaminophen/Tylenol, Motrin/ibuprofen. In cases of severe pain, you may use prescribed *** as directed. Severe pain despite administration of pain medication must be reported to your physician.    Remember when taking Acetaminophen, do NOT exceed more than 1000 milligrams (mg) per dose or more than 4000 mg total per day. Taking too much Acetaminophen at one time can damage your liver. The maximum amount of ibuprofen in adults is 800 mg per dose or 3200  mg per day. Call your MD if you have any questions about your medications. To prevent constipation while taking narcotic pain medications, please utilize your prescribed bowel regimen, ensure that you drink plenty of water, eat fiber rich foods (a good source is fruit) and increase activity progressively.    DO NOT drive a car while utilizing narcotic pain medications and until cleared by MD at follow-up appointment. Driving or operating heavy machinery, lawnmowers or power tools while taking opiod/narcotic pain medications may impair your judgement.    Call Physician If:  Contact the plastic surgery office for any questions and/or concerns regarding the surgical incision/site.  1. [phone] if Monday-Friday (8 a.m. - 4:30 p.m.)  2. [phone] and ask for the Plastic Surgery team on call provider if after hours or on weekends  3. Email [email or web address] for any non-urgent concerns and when relaying weekly progress photos of flap sites. ***    Call your MD or seek immediate medical attention if you experience any of the following symptoms:  1. Fever of 101.5 (38.5 C) or greater  2. Pain not controlled with prescribed pain medications  3. Uncontrolled nausea and/or vomiting  4. Drainage or swelling around your incisions and/or surgical sites   5. Separation of incisions, or tearing of the incision line  6. Large fluid collection under or around the incision or flap sites   7. Flap discoloration (including darkened appearance)  8. Difficulty breathing  9. Swelling, pain, heat and/or redness in your legs and/or calves  10. Inability to tolerate diet/fluid intake    Additional Notes:  ***    Follow-up/Post Discharge Appointments:  Follow-up care is a key part of your treatment and safety. It is very important that you maintain follow-up care as directed so that your surgical site heals properly and does not lead to problems. Always carry a current medication list with you and bring it to ALL  healthcare Provider visits. Be sure to maintain follow up with plastic surgery at your scheduled appointment. If you are unable to keep your appointment, or need to reschedule please contact our office at [phone]. ***

## 2025-04-02 NOTE — CARE PLAN
Problem: Fall/Injury  Goal: Not fall by end of shift  Outcome: Progressing  Goal: Be free from injury by end of the shift  Outcome: Progressing  Goal: Verbalize understanding of personal risk factors for fall in the hospital  Outcome: Progressing  Goal: Verbalize understanding of risk factor reduction measures to prevent injury from fall in the home  Outcome: Progressing  Goal: Use assistive devices by end of the shift  Outcome: Progressing  Goal: Pace activities to prevent fatigue by end of the shift  Outcome: Progressing     Problem: Pain  Goal: Takes deep breaths with improved pain control throughout the shift  Outcome: Progressing  Goal: Turns in bed with improved pain control throughout the shift  Outcome: Progressing  Goal: Walks with improved pain control throughout the shift  Outcome: Progressing  Goal: Performs ADL's with improved pain control throughout shift  Outcome: Progressing  Goal: Participates in PT with improved pain control throughout the shift  Outcome: Progressing  Goal: Free from opioid side effects throughout the shift  Outcome: Progressing  Goal: Free from acute confusion related to pain meds throughout the shift  Outcome: Progressing     Problem: Skin  Goal: Decreased wound size/increased tissue granulation at next dressing change  Outcome: Progressing  Flowsheets (Taken 4/2/2025 0330)  Decreased wound size/increased tissue granulation at next dressing change:   Protective dressings over bony prominences   Promote sleep for wound healing  Goal: Participates in plan/prevention/treatment measures  Outcome: Progressing  Goal: Prevent/manage excess moisture  Outcome: Progressing  Goal: Prevent/minimize sheer/friction injuries  Outcome: Progressing  Goal: Promote/optimize nutrition  Outcome: Progressing  Goal: Promote skin healing  Outcome: Progressing  Flowsheets (Taken 4/2/2025 0330)  Promote skin healing:   Protective dressings over bony prominences   Assess skin/pad under  line(s)/device(s)   Turn/reposition every 2 hours/use positioning/transfer devices   Ensure correct size (line/device) and apply per  instructions   Rotate device position/do not position patient on device   The patient's goals for the shift include      The clinical goals for the shift include remain HDS

## 2025-04-02 NOTE — PROGRESS NOTES
"  Division of Plastic and Reconstructive Surgery  Progress Note    Patient Name: Adriana Wood  MRN: [id number]  Date:  04/02/25     Subjective   Endorses persistent discomfort to her right lateral chest wall hematoma site which alleviated with scheduled pain medications, worsened with application of pressure and repositioning. Ultrasound results and likely need for hematoma washout discussed. She is tolerating a diet. Denies fever, chills, headache, dizziness, chest pain, palpitations, dyspnea, abdominal pain, nausea or vomiting.    Objective   Vital Signs  /60 (BP Location: Left arm, Patient Position: Lying)   Pulse 68   Temp 36.2 °C (97.2 °F) (Temporal)   Resp 16   Ht 1.778 m (5' 10\")   Wt 69.2 kg (152 lb 8.9 oz)   SpO2 95%   BMI 21.89 kg/m²      Physical Exam   Constitutional: A&Ox3, calm and cooperative, NAD.  Eyes: EOMI, clear sclera.   ENMT: Moist mucous membranes.  Head/Neck: NC/AT.  Cardiovascular: Regular rate.   Respiratory/Thorax: Unlabored respirations on RA.  Gastrointestinal: Abdomen soft, NTND.  Genitourinary: Voiding independently.  Extremities: RLE flap: warm to touch, soft and compressible, strong biphasic signal at marked stitch, circumferential incisions around flap well approximated with some scabbing  Right posterior lat donor site: incision healed and intact without signs of dehiscence. Tender induration at soft tissue superior to closed donor incision c/w hx of hematoma. MASSIEL drain x 1 to right lower flank with dark bloody output.  Neurological: A&Ox3.  Psychological: Appropriate mood and behavior.  Skin: Warm and dry.     Diagnostics   Results for orders placed or performed during the hospital encounter of 03/28/25 (from the past 24 hours)   CBC   Result Value Ref Range    WBC 4.2 (L) 4.4 - 11.3 x10*3/uL    nRBC 0.0 0.0 - 0.0 /100 WBCs    RBC 3.46 (L) 4.00 - 5.20 x10*6/uL    Hemoglobin 9.8 (L) 12.0 - 16.0 g/dL    Hematocrit 31.5 (L) 36.0 - 46.0 %    MCV 91 80 - 100 fL    MCH " 28.3 26.0 - 34.0 pg    MCHC 31.1 (L) 32.0 - 36.0 g/dL    RDW 14.3 11.5 - 14.5 %    Platelets 268 150 - 450 x10*3/uL   Basic metabolic panel   Result Value Ref Range    Glucose 88 74 - 99 mg/dL    Sodium 140 136 - 145 mmol/L    Potassium 4.4 3.5 - 5.3 mmol/L    Chloride 103 98 - 107 mmol/L    Bicarbonate 30 21 - 32 mmol/L    Anion Gap 11 10 - 20 mmol/L    Urea Nitrogen 15 6 - 23 mg/dL    Creatinine 0.54 0.50 - 1.05 mg/dL    eGFR >90 >60 mL/min/1.73m*2    Calcium 9.1 8.6 - 10.6 mg/dL   Magnesium   Result Value Ref Range    Magnesium 1.94 1.60 - 2.40 mg/dL     *Note: Due to a large number of results and/or encounters for the requested time period, some results have not been displayed. A complete set of results can be found in Results Review.     Current Medications  Scheduled medications  acetaminophen, 975 mg, oral, q8h  [Held by provider] apixaban, 2.5 mg, oral, BID  enoxaparin, 40 mg, subcutaneous, Daily  lidocaine, 1 patch, transdermal, Daily  methocarbamol, 250 mg, oral, q8h FELIX  pantoprazole, 40 mg, oral, Daily before breakfast  polyethylene glycol, 17 g, oral, Daily  vancomycin, 1,500 mg, intravenous, q24h      Continuous medications     PRN medications  PRN medications: albuterol, docusate sodium, lubricating eye drops, oxyCODONE, oxyCODONE, oxygen, vancomycin     Assessment   Adriana Wood is a 69 y.o. female with a past medical history of bradycardia, cardiomyopathy, CAD, cardiac arrest due to electrolyte abnormalities and zofran use post-operatively, and traumatic brain injury. Patient had a total knee right knee done by Dr. Whelan in March 2024 which was complicated by E. Coli infection. Patient had right TKA wound dehiscence s/p I&D right knee with polyswap and attempted re-closure of complex wound on 5/10/2024 by Dr. Whelan. She was referred to plastic surgery (Dr. Reyes) perioperatively given projected anticipated need for possible flap coverage of the wound/defect site. Patient re-admitted and  returned to the OR with orthopedics on 2/28/2025 for right knee I&D, revision static spacer and application of incisional wound vac. She returned to the OR with plastic surgery on 3/14 for R latissimus free flap to R knee. Patient unfortunately developed a hematoma at her latissimus flap donor site requiring readmission to the plastic surgery service on 3/29/25.    Plan:  # Postoperative hematoma at R latissimus free flap donor site   - Repeat US of chest wall ordered 4/1 for evaluation of hematoma, revealed a large right lateral chest wall hematoma with a drain in place, measurements difficult to obtain due to the large size of the hematoma  - Feel that patient will likely require RTOR for hematoma evacuation and washout, case request submitted, OR timing TBD   - Continue to hold continue to home Eliquis for now   - OK for Lovenox for DVT prophylaxis, initiated 4/1  - Continue drain care to x1 MASSIEL drains present at R lat donor site (nursing orders placed)  Please ensure that drains are compressed flat and plugged to maintain self suction at all times aside from when emptying   Nursing to strip drain tubing at least q4h and PRN to avoid drain/tubing obstruction   Nursing to please also empty and record output quantities at least TID and PRN if drains are full   Please notify the plastics team if drain outputs exceed >30 ml in 1 hr or >200 in 24 hrs  - Monitor VS/pulse ox Q8  - Monitor periodic AM CBC/RFP    # Hx R knee wound reconstruction via R latissimus free flap   - Continue q shift flap assessments per nursing with interval checks per plastic surgery team during daily rounding (Assess Doppler pulse at marked site q shift plus flap appearance - color, warmth, turgor)  - Nursing to notify plastic surgery team immediately if there are any acute changes  - Avoid positioning that would apply pressure to flap region or incisions   - NWB to RLE, patient to dangle RLE as tolerated     # Hx R total knee replacement c/b  infection   - Continue IV Vancomycin as recommended per ID during last admission (stop date 4/11/25)  - Check periodic labs while on IV ABX    # Acute right chest wall pain   - Scheduled Tylenol  - Lidocaine patch  - Scheduled Robaxin  - PRN Oxy IR    # Hx Asthma  - continue home inhaler  - as needed O2 supplementation  - nasal Afrin spray for congestion  - discussed need to be tested for YANETH as outpatient    # History of QT Prolongation  - Avoid QT prolonging medications; NO Zofran (hx of cardiac arrest)    # History of Bradycardia, PVCs and Bigeminy  - Consistent bradycardia starting evening of 3/29 around low 40s  - Asymptomatic HR of 34 on 3/30 AM with rapid response called. EKG noted same findings from cardiology appointment in early 2025: Bradycardia, PVC and bigeminy.   - Nursing to obtain HR with pulse check vs pulse ox since inaccurate readings of irregular heart rates on pulse ox are more common in cases of Bigeminy   - Continue telemetry   - Cardiology consulted initially who do not recommend intervention/treatment for asymptomatic bradycardia  - Internal Medicine consulted, felt that bradycardia is an artifact due to frequent PVCs, patient stable, plan to sign off     FEN/GI:   - Encourage adequate PO fluid intake   - Lytes stable, replete PRN  - Regular diet   - GI ppx with PO Protonix   - Bowel regimen with schedule miralax     DVT ppx:  - Holding home Eliquis for now   - subcutaneous Lovenox   - SCDs while in bed   - Activity as tolerated     Disposition: Patient to remain in-patient pending OR for hematoma evacuation and washout, timing TBD. PT/OT rec mod intensity continued therapy, anticipate eventual DC back to SNF.     Plan discussed with Dr. Reyes.    ARGENIS SinghC  Plastic and Reconstructive Surgery   [place]  Pager #[phone]  Team phone: [phone]

## 2025-04-03 ENCOUNTER — ANESTHESIA (OUTPATIENT)
Dept: OPERATING ROOM | Facility: HOSPITAL | Age: 70
End: 2025-04-03
Payer: MEDICARE

## 2025-04-03 ENCOUNTER — APPOINTMENT (OUTPATIENT)
Dept: INFECTIOUS DISEASES | Facility: CLINIC | Age: 70
End: 2025-04-03
Payer: MEDICARE

## 2025-04-03 ENCOUNTER — ANESTHESIA EVENT (OUTPATIENT)
Dept: OPERATING ROOM | Facility: HOSPITAL | Age: 70
End: 2025-04-03
Payer: MEDICARE

## 2025-04-03 LAB
ABO GROUP (TYPE) IN BLOOD: NORMAL
ANION GAP SERPL CALC-SCNC: 11 MMOL/L (ref 10–20)
ANTIBODY SCREEN: NORMAL
BUN SERPL-MCNC: 14 MG/DL (ref 6–23)
CALCIUM SERPL-MCNC: 9.2 MG/DL (ref 8.6–10.6)
CHLORIDE SERPL-SCNC: 103 MMOL/L (ref 98–107)
CO2 SERPL-SCNC: 30 MMOL/L (ref 21–32)
CREAT SERPL-MCNC: 0.41 MG/DL (ref 0.5–1.05)
EGFRCR SERPLBLD CKD-EPI 2021: >90 ML/MIN/1.73M*2
ERYTHROCYTE [DISTWIDTH] IN BLOOD BY AUTOMATED COUNT: 14.1 % (ref 11.5–14.5)
GLUCOSE SERPL-MCNC: 93 MG/DL (ref 74–99)
HCT VFR BLD AUTO: 32.3 % (ref 36–46)
HGB BLD-MCNC: 10.2 G/DL (ref 12–16)
MAGNESIUM SERPL-MCNC: 1.97 MG/DL (ref 1.6–2.4)
MCH RBC QN AUTO: 28.7 PG (ref 26–34)
MCHC RBC AUTO-ENTMCNC: 31.6 G/DL (ref 32–36)
MCV RBC AUTO: 91 FL (ref 80–100)
NRBC BLD-RTO: 0 /100 WBCS (ref 0–0)
PLATELET # BLD AUTO: 267 X10*3/UL (ref 150–450)
POTASSIUM SERPL-SCNC: 4.4 MMOL/L (ref 3.5–5.3)
RBC # BLD AUTO: 3.56 X10*6/UL (ref 4–5.2)
RH FACTOR (ANTIGEN D): NORMAL
SODIUM SERPL-SCNC: 140 MMOL/L (ref 136–145)
VANCOMYCIN SERPL-MCNC: 12.9 UG/ML (ref 5–20)
WBC # BLD AUTO: 4.3 X10*3/UL (ref 4.4–11.3)

## 2025-04-03 PROCEDURE — 7100000002 HC RECOVERY ROOM TIME - EACH INCREMENTAL 1 MINUTE

## 2025-04-03 PROCEDURE — 80202 ASSAY OF VANCOMYCIN: CPT

## 2025-04-03 PROCEDURE — 21501 I&D DP ABSC/HMTMA SFT TS NCK: CPT

## 2025-04-03 PROCEDURE — 2060000001 HC INTERMEDIATE ICU ROOM DAILY

## 2025-04-03 PROCEDURE — 2720000007 HC OR 272 NO HCPCS

## 2025-04-03 PROCEDURE — 2500000004 HC RX 250 GENERAL PHARMACY W/ HCPCS (ALT 636 FOR OP/ED): Performed by: PHYSICIAN ASSISTANT

## 2025-04-03 PROCEDURE — 2500000004 HC RX 250 GENERAL PHARMACY W/ HCPCS (ALT 636 FOR OP/ED): Performed by: NURSE ANESTHETIST, CERTIFIED REGISTERED

## 2025-04-03 PROCEDURE — 3700000002 HC GENERAL ANESTHESIA TIME - EACH INCREMENTAL 1 MINUTE

## 2025-04-03 PROCEDURE — 7100000001 HC RECOVERY ROOM TIME - INITIAL BASE CHARGE

## 2025-04-03 PROCEDURE — 80048 BASIC METABOLIC PNL TOTAL CA: CPT | Performed by: NURSE PRACTITIONER

## 2025-04-03 PROCEDURE — 3600000008 HC OR TIME - EACH INCREMENTAL 1 MINUTE - PROCEDURE LEVEL THREE

## 2025-04-03 PROCEDURE — 85027 COMPLETE CBC AUTOMATED: CPT | Performed by: NURSE PRACTITIONER

## 2025-04-03 PROCEDURE — 83735 ASSAY OF MAGNESIUM: CPT | Performed by: NURSE PRACTITIONER

## 2025-04-03 PROCEDURE — 2500000005 HC RX 250 GENERAL PHARMACY W/O HCPCS

## 2025-04-03 PROCEDURE — 2500000004 HC RX 250 GENERAL PHARMACY W/ HCPCS (ALT 636 FOR OP/ED): Mod: JZ,TB | Performed by: ANESTHESIOLOGY

## 2025-04-03 PROCEDURE — 0KCF0ZZ EXTIRPATION OF MATTER FROM RIGHT TRUNK MUSCLE, OPEN APPROACH: ICD-10-PCS

## 2025-04-03 PROCEDURE — 99232 SBSQ HOSP IP/OBS MODERATE 35: CPT | Performed by: PHYSICIAN ASSISTANT

## 2025-04-03 PROCEDURE — 3600000003 HC OR TIME - INITIAL BASE CHARGE - PROCEDURE LEVEL THREE

## 2025-04-03 PROCEDURE — 2500000001 HC RX 250 WO HCPCS SELF ADMINISTERED DRUGS (ALT 637 FOR MEDICARE OP): Performed by: NURSE PRACTITIONER

## 2025-04-03 PROCEDURE — 86901 BLOOD TYPING SEROLOGIC RH(D): CPT | Performed by: NURSE PRACTITIONER

## 2025-04-03 PROCEDURE — 2500000005 HC RX 250 GENERAL PHARMACY W/O HCPCS: Performed by: PHYSICIAN ASSISTANT

## 2025-04-03 PROCEDURE — 3700000001 HC GENERAL ANESTHESIA TIME - INITIAL BASE CHARGE

## 2025-04-03 PROCEDURE — 2500000001 HC RX 250 WO HCPCS SELF ADMINISTERED DRUGS (ALT 637 FOR MEDICARE OP): Performed by: PHYSICIAN ASSISTANT

## 2025-04-03 PROCEDURE — 2500000001 HC RX 250 WO HCPCS SELF ADMINISTERED DRUGS (ALT 637 FOR MEDICARE OP)

## 2025-04-03 RX ORDER — SODIUM CHLORIDE 0.9 G/100ML
INJECTION, SOLUTION IRRIGATION AS NEEDED
Status: DISCONTINUED | OUTPATIENT
Start: 2025-04-03 | End: 2025-04-03 | Stop reason: HOSPADM

## 2025-04-03 RX ORDER — PROPOFOL 10 MG/ML
INJECTION, EMULSION INTRAVENOUS AS NEEDED
Status: DISCONTINUED | OUTPATIENT
Start: 2025-04-03 | End: 2025-04-03

## 2025-04-03 RX ORDER — ACETAMINOPHEN 325 MG/1
650 TABLET ORAL EVERY 4 HOURS PRN
Status: DISCONTINUED | OUTPATIENT
Start: 2025-04-03 | End: 2025-04-03 | Stop reason: HOSPADM

## 2025-04-03 RX ORDER — HYDROMORPHONE HYDROCHLORIDE 0.2 MG/ML
0.2 INJECTION INTRAMUSCULAR; INTRAVENOUS; SUBCUTANEOUS EVERY 4 HOURS PRN
Status: DISCONTINUED | OUTPATIENT
Start: 2025-04-03 | End: 2025-04-07

## 2025-04-03 RX ORDER — SODIUM CHLORIDE, SODIUM LACTATE, POTASSIUM CHLORIDE, CALCIUM CHLORIDE 600; 310; 30; 20 MG/100ML; MG/100ML; MG/100ML; MG/100ML
100 INJECTION, SOLUTION INTRAVENOUS CONTINUOUS
Status: DISCONTINUED | OUTPATIENT
Start: 2025-04-03 | End: 2025-04-03 | Stop reason: HOSPADM

## 2025-04-03 RX ORDER — LIDOCAINE HYDROCHLORIDE 20 MG/ML
INJECTION, SOLUTION INFILTRATION; PERINEURAL AS NEEDED
Status: DISCONTINUED | OUTPATIENT
Start: 2025-04-03 | End: 2025-04-03

## 2025-04-03 RX ORDER — VANCOMYCIN HYDROCHLORIDE 1 G/200ML
1000 INJECTION, SOLUTION INTRAVENOUS EVERY 12 HOURS
Status: DISCONTINUED | OUTPATIENT
Start: 2025-04-03 | End: 2025-04-08

## 2025-04-03 RX ORDER — LABETALOL HYDROCHLORIDE 5 MG/ML
5 INJECTION, SOLUTION INTRAVENOUS ONCE AS NEEDED
Status: DISCONTINUED | OUTPATIENT
Start: 2025-04-03 | End: 2025-04-03 | Stop reason: HOSPADM

## 2025-04-03 RX ORDER — ENOXAPARIN SODIUM 100 MG/ML
40 INJECTION SUBCUTANEOUS DAILY
Status: DISCONTINUED | OUTPATIENT
Start: 2025-04-04 | End: 2025-04-09 | Stop reason: HOSPADM

## 2025-04-03 RX ORDER — FENTANYL CITRATE 50 UG/ML
INJECTION, SOLUTION INTRAMUSCULAR; INTRAVENOUS AS NEEDED
Status: DISCONTINUED | OUTPATIENT
Start: 2025-04-03 | End: 2025-04-03

## 2025-04-03 RX ORDER — PHENYLEPHRINE HCL IN 0.9% NACL 0.4MG/10ML
SYRINGE (ML) INTRAVENOUS AS NEEDED
Status: DISCONTINUED | OUTPATIENT
Start: 2025-04-03 | End: 2025-04-03

## 2025-04-03 RX ORDER — CLINDAMYCIN PHOSPHATE 600 MG/50ML
INJECTION, SOLUTION INTRAVENOUS AS NEEDED
Status: DISCONTINUED | OUTPATIENT
Start: 2025-04-03 | End: 2025-04-03

## 2025-04-03 RX ORDER — LIDOCAINE HYDROCHLORIDE 10 MG/ML
0.1 INJECTION, SOLUTION INFILTRATION; PERINEURAL ONCE
Status: DISCONTINUED | OUTPATIENT
Start: 2025-04-03 | End: 2025-04-03 | Stop reason: HOSPADM

## 2025-04-03 RX ADMIN — FENTANYL CITRATE 25 MCG: 50 INJECTION, SOLUTION INTRAMUSCULAR; INTRAVENOUS at 18:47

## 2025-04-03 RX ADMIN — PROPOFOL 100 MG: 10 INJECTION, EMULSION INTRAVENOUS at 18:47

## 2025-04-03 RX ADMIN — Medication 80 MCG: at 19:14

## 2025-04-03 RX ADMIN — METHOCARBAMOL TABLETS 250 MG: 500 TABLET, COATED ORAL at 00:44

## 2025-04-03 RX ADMIN — Medication 80 MCG: at 19:10

## 2025-04-03 RX ADMIN — OXYCODONE HYDROCHLORIDE 10 MG: 5 TABLET ORAL at 14:19

## 2025-04-03 RX ADMIN — OXYCODONE HYDROCHLORIDE 10 MG: 5 TABLET ORAL at 09:33

## 2025-04-03 RX ADMIN — HYDROMORPHONE HYDROCHLORIDE 0.5 MG: 0.5 INJECTION, SOLUTION INTRAMUSCULAR; INTRAVENOUS; SUBCUTANEOUS at 19:56

## 2025-04-03 RX ADMIN — SODIUM CHLORIDE, SODIUM LACTATE, POTASSIUM CHLORIDE, AND CALCIUM CHLORIDE: 600; 310; 30; 20 INJECTION, SOLUTION INTRAVENOUS at 18:45

## 2025-04-03 RX ADMIN — OXYCODONE HYDROCHLORIDE 10 MG: 5 TABLET ORAL at 22:16

## 2025-04-03 RX ADMIN — Medication 80 MCG: at 18:57

## 2025-04-03 RX ADMIN — HYDROMORPHONE HYDROCHLORIDE 0.5 MG: 0.5 INJECTION, SOLUTION INTRAMUSCULAR; INTRAVENOUS; SUBCUTANEOUS at 20:37

## 2025-04-03 RX ADMIN — Medication 6 L/MIN: at 19:41

## 2025-04-03 RX ADMIN — CLINDAMYCIN IN 5 PERCENT DEXTROSE 900 MG: 12 INJECTION, SOLUTION INTRAVENOUS at 18:54

## 2025-04-03 RX ADMIN — ACETAMINOPHEN 975 MG: 325 TABLET, FILM COATED ORAL at 05:50

## 2025-04-03 RX ADMIN — LIDOCAINE HYDROCHLORIDE 100 MG: 20 INJECTION, SOLUTION INFILTRATION; PERINEURAL at 18:47

## 2025-04-03 RX ADMIN — HYDROMORPHONE HYDROCHLORIDE 0.5 MG: 0.5 INJECTION, SOLUTION INTRAMUSCULAR; INTRAVENOUS; SUBCUTANEOUS at 20:07

## 2025-04-03 RX ADMIN — PROPOFOL 10 MG: 10 INJECTION, EMULSION INTRAVENOUS at 19:24

## 2025-04-03 RX ADMIN — ACETAMINOPHEN 975 MG: 325 TABLET, FILM COATED ORAL at 22:16

## 2025-04-03 RX ADMIN — Medication 80 MCG: at 19:01

## 2025-04-03 RX ADMIN — FENTANYL CITRATE 25 MCG: 50 INJECTION, SOLUTION INTRAMUSCULAR; INTRAVENOUS at 19:24

## 2025-04-03 RX ADMIN — VANCOMYCIN HYDROCHLORIDE 1000 MG: 1 INJECTION, SOLUTION INTRAVENOUS at 09:28

## 2025-04-03 RX ADMIN — Medication 80 MCG: at 19:06

## 2025-04-03 SDOH — HEALTH STABILITY: MENTAL HEALTH: CURRENT SMOKER: 0

## 2025-04-03 ASSESSMENT — PAIN - FUNCTIONAL ASSESSMENT
PAIN_FUNCTIONAL_ASSESSMENT: 0-10
PAIN_FUNCTIONAL_ASSESSMENT: 0-10
PAIN_FUNCTIONAL_ASSESSMENT: UNABLE TO SELF-REPORT
PAIN_FUNCTIONAL_ASSESSMENT: 0-10

## 2025-04-03 ASSESSMENT — PAIN SCALES - GENERAL
PAINLEVEL_OUTOF10: 6
PAINLEVEL_OUTOF10: 5 - MODERATE PAIN
PAINLEVEL_OUTOF10: 7
PAINLEVEL_OUTOF10: 8
PAINLEVEL_OUTOF10: 9
PAINLEVEL_OUTOF10: 8
PAINLEVEL_OUTOF10: 9
PAINLEVEL_OUTOF10: 6
PAINLEVEL_OUTOF10: 0 - NO PAIN
PAIN_LEVEL: 0
PAINLEVEL_OUTOF10: 7
PAINLEVEL_OUTOF10: 3
PAINLEVEL_OUTOF10: 8
PAINLEVEL_OUTOF10: 2

## 2025-04-03 ASSESSMENT — PAIN SCALES - PAIN ASSESSMENT IN ADVANCED DEMENTIA (PAINAD): TOTALSCORE: MEDICATION (SEE MAR)

## 2025-04-03 ASSESSMENT — COGNITIVE AND FUNCTIONAL STATUS - GENERAL
MOVING FROM LYING ON BACK TO SITTING ON SIDE OF FLAT BED WITH BEDRAILS: A LITTLE
MOBILITY SCORE: 12
CLIMB 3 TO 5 STEPS WITH RAILING: TOTAL
DAILY ACTIVITIY SCORE: 14
STANDING UP FROM CHAIR USING ARMS: A LOT
EATING MEALS: A LITTLE
PERSONAL GROOMING: A LITTLE
DRESSING REGULAR UPPER BODY CLOTHING: A LOT
TOILETING: A LOT
DRESSING REGULAR LOWER BODY CLOTHING: A LOT
HELP NEEDED FOR BATHING: A LOT
MOVING TO AND FROM BED TO CHAIR: A LOT
WALKING IN HOSPITAL ROOM: TOTAL
TURNING FROM BACK TO SIDE WHILE IN FLAT BAD: A LITTLE

## 2025-04-03 ASSESSMENT — PAIN SCALES - WONG BAKER
WONGBAKER_NUMERICALRESPONSE: HURTS WHOLE LOT
WONGBAKER_NUMERICALRESPONSE: HURTS WHOLE LOT

## 2025-04-03 ASSESSMENT — PAIN DESCRIPTION - ORIENTATION
ORIENTATION: RIGHT

## 2025-04-03 ASSESSMENT — PAIN DESCRIPTION - LOCATION
LOCATION: CHEST
LOCATION: LEG
LOCATION: OTHER (COMMENT)
LOCATION: LEG

## 2025-04-03 NOTE — CARE PLAN
Problem: Fall/Injury  Goal: Not fall by end of shift  Outcome: Progressing  Goal: Be free from injury by end of the shift  Outcome: Progressing  Goal: Verbalize understanding of personal risk factors for fall in the hospital  Outcome: Progressing  Goal: Verbalize understanding of risk factor reduction measures to prevent injury from fall in the home  Outcome: Progressing  Goal: Use assistive devices by end of the shift  Outcome: Progressing  Goal: Pace activities to prevent fatigue by end of the shift  Outcome: Progressing     Problem: Pain  Goal: Takes deep breaths with improved pain control throughout the shift  Outcome: Progressing  Goal: Turns in bed with improved pain control throughout the shift  Outcome: Progressing  Goal: Walks with improved pain control throughout the shift  Outcome: Progressing  Goal: Performs ADL's with improved pain control throughout shift  Outcome: Progressing  Goal: Participates in PT with improved pain control throughout the shift  Outcome: Progressing  Goal: Free from opioid side effects throughout the shift  Outcome: Progressing  Goal: Free from acute confusion related to pain meds throughout the shift  Outcome: Progressing     Problem: Skin  Goal: Decreased wound size/increased tissue granulation at next dressing change  Outcome: Progressing  Goal: Participates in plan/prevention/treatment measures  Outcome: Progressing  Goal: Prevent/manage excess moisture  Outcome: Progressing  Goal: Prevent/minimize sheer/friction injuries  Outcome: Progressing  Goal: Promote/optimize nutrition  Outcome: Progressing  Goal: Promote skin healing  Outcome: Progressing  Flowsheets (Taken 4/3/2025 0210)  Promote skin healing:   Assess skin/pad under line(s)/device(s)   Protective dressings over bony prominences   Turn/reposition every 2 hours/use positioning/transfer devices   Ensure correct size (line/device) and apply per  instructions   Rotate device position/do not position patient  on device   The patient's goals for the shift include      The clinical goals for the shift include pt will remain HDS

## 2025-04-03 NOTE — PROGRESS NOTES
Vancomycin Dosing by Pharmacy- FOLLOW UP    Adriana Wood is a 69 y.o. year old female who Pharmacy has been consulted for vancomycin dosing for cellulitis, skin and soft tissue. Based on the patient's indication and renal status this patient is being dosed based on a goal AUC of 400-600.     Renal function is currently stable.    Current vancomycin dose: 1500 mg given every 24 hours    Estimated vancomycin AUC on current dose: 367 mg/L.hr     Visit Vitals  /63 (BP Location: Left arm, Patient Position: Lying)   Pulse 52   Temp 36.5 °C (97.7 °F) (Oral)   Resp 18        Lab Results   Component Value Date    CREATININE 0.41 (L) 2025    CREATININE 0.54 2025    CREATININE 0.56 2025    CREATININE 0.43 (L) 2025        Patient weight is as follows:   Vitals:    25 0006   Weight: 69.2 kg (152 lb 8.9 oz)       Cultures:  No results found for the encounter in last 14 days.       I/O last 3 completed shifts:  In: 460 (6.6 mL/kg) [P.O.:460]  Out: 1332 (19.2 mL/kg) [Urine:1300 (0.5 mL/kg/hr); Drains:20; Other:12]  Weight: 69.2 kg   I/O during current shift:  No intake/output data recorded.    Temp (24hrs), Av.5 °C (97.7 °F), Min:36.2 °C (97.2 °F), Max:36.8 °C (98.2 °F)      Assessment/Plan    Below goal AUC. Orders placed for new vancomcyin regimen of 1000 every 12 hours to begin at 0800 on .     This dosing regimen is predicted by [employer] to result in the following pharmacokinetic parameters:  Loading dose: N/A  Regimen: 1000 mg IV every 12 hours.  Start time: 08:00 on 2025  Exposure target: AUC24 (range)400-600 mg/L.hr   UBM40-10: 460 mg/L.hr  AUC24,ss: 485 mg/L.hr  Probability of AUC24 > 400: 98 %  Ctrough,ss: 14.2 mg/L  Probability of Ctrough,ss > 20: 0 %    The next level will be obtained on  w/ AM labs. May be obtained sooner if clinically indicated.   Will continue to monitor renal function daily while on vancomycin and order serum creatinine at least every 48  hours if not already ordered.  Follow for continued vancomycin needs, clinical response, and signs/symptoms of toxicity.       Obdulio Osborne, PharmD

## 2025-04-03 NOTE — ANESTHESIA PROCEDURE NOTES
Airway  Date/Time: 4/3/2025 6:49 PM  Urgency: elective    Airway not difficult    Staffing  Performed: CRNA   Authorized by: Argelia Nguyen MD    Performed by: ISABEL Myers-GÓMEZ  Patient location during procedure: OR    Indications and Patient Condition  Indications for airway management: anesthesia  Spontaneous Ventilation: absent  Sedation level: deep  Preoxygenated: yes  Mask difficulty assessment: 1 - vent by mask    Final Airway Details  Final airway type: supraglottic airway      Successful airway: Size 4     Number of attempts at approach: 1

## 2025-04-03 NOTE — CARE PLAN
The patient's goals for the shift include      The clinical goals for the shift include pt will remain HDS throughout shift    Problem: Pain - Adult  Goal: Verbalizes/displays adequate comfort level or baseline comfort level  4/3/2025 1324 by Rula Mandujano RN  Outcome: Progressing  4/3/2025 1324 by Rula Mandujano RN  Outcome: Progressing     Problem: Safety - Adult  Goal: Free from fall injury  4/3/2025 1324 by Rula Mandujano RN  Outcome: Progressing  4/3/2025 1324 by Rula Mandujano RN  Outcome: Progressing     Problem: Discharge Planning  Goal: Discharge to home or other facility with appropriate resources  4/3/2025 1324 by Rula Mandujano RN  Outcome: Progressing  4/3/2025 1324 by Rula Mandujano RN  Outcome: Progressing     Problem: Chronic Conditions and Co-morbidities  Goal: Patient's chronic conditions and co-morbidity symptoms are monitored and maintained or improved  4/3/2025 1324 by Rula Mandujano RN  Outcome: Progressing  4/3/2025 1324 by Rula Mandujano RN  Outcome: Progressing     Problem: Nutrition  Goal: Nutrient intake appropriate for maintaining nutritional needs  4/3/2025 1324 by Rula Mandujano RN  Outcome: Progressing  4/3/2025 1324 by Rula Mandujano RN  Outcome: Progressing     Problem: Fall/Injury  Goal: Not fall by end of shift  4/3/2025 1324 by Rula Mandujano RN  Outcome: Progressing  4/3/2025 1324 by Rula Mandujano RN  Outcome: Progressing  Goal: Be free from injury by end of the shift  4/3/2025 1324 by Rula Mandujano RN  Outcome: Progressing  4/3/2025 1324 by Rula Mandujano RN  Outcome: Progressing  Goal: Verbalize understanding of personal risk factors for fall in the hospital  4/3/2025 1324 by uRla Mandujano RN  Outcome: Progressing  4/3/2025 1324 by Rula Mandujano RN  Outcome: Progressing  Goal: Verbalize understanding of risk factor reduction measures to prevent injury from fall in the home  4/3/2025 1324 by Rula Mandujano RN  Outcome: Progressing  4/3/2025 1324 by Rula Mandujano  RN  Outcome: Progressing  Goal: Use assistive devices by end of the shift  4/3/2025 1324 by Rula Mandujano RN  Outcome: Progressing  4/3/2025 1324 by Rula Mandujano RN  Outcome: Progressing  Goal: Pace activities to prevent fatigue by end of the shift  4/3/2025 1324 by Rula Mandujano RN  Outcome: Progressing  4/3/2025 1324 by Rula Mandujano RN  Outcome: Progressing     Problem: Pain  Goal: Takes deep breaths with improved pain control throughout the shift  4/3/2025 1324 by Rula Mandujano RN  Outcome: Progressing  4/3/2025 1324 by Rula Mandujano RN  Outcome: Progressing  Goal: Turns in bed with improved pain control throughout the shift  4/3/2025 1324 by Rula Mandujano RN  Outcome: Progressing  4/3/2025 1324 by Rula Mandujano RN  Outcome: Progressing  Goal: Walks with improved pain control throughout the shift  4/3/2025 1324 by Rula Mandujano RN  Outcome: Progressing  4/3/2025 1324 by Rula Mandujano RN  Outcome: Progressing  Goal: Performs ADL's with improved pain control throughout shift  4/3/2025 1324 by Rula Mandujano RN  Outcome: Progressing  4/3/2025 1324 by Rula Mandujano RN  Outcome: Progressing  Goal: Participates in PT with improved pain control throughout the shift  4/3/2025 1324 by Rula Mandujano RN  Outcome: Progressing  4/3/2025 1324 by Rula Mandujano RN  Outcome: Progressing  Goal: Free from opioid side effects throughout the shift  4/3/2025 1324 by Rula Mandujano RN  Outcome: Progressing  4/3/2025 1324 by Rula Mandujano RN  Outcome: Progressing  Goal: Free from acute confusion related to pain meds throughout the shift  4/3/2025 1324 by Rula Mandujano RN  Outcome: Progressing  4/3/2025 1324 by Rula Mandujano RN  Outcome: Progressing     Problem: Skin  Goal: Decreased wound size/increased tissue granulation at next dressing change  4/3/2025 1324 by Rula Mandujano RN  Outcome: Progressing  4/3/2025 1324 by Rula Boozer, RN  Outcome: Progressing  Flowsheets (Taken 4/3/2025 1324)  Decreased wound  size/increased tissue granulation at next dressing change: Promote sleep for wound healing  Goal: Participates in plan/prevention/treatment measures  4/3/2025 1324 by Rula Mandujano RN  Outcome: Progressing  Flowsheets (Taken 4/3/2025 1324)  Participates in plan/prevention/treatment measures: Elevate heels  4/3/2025 1324 by Rula Mandujano RN  Outcome: Progressing  Flowsheets (Taken 4/3/2025 1324)  Participates in plan/prevention/treatment measures: Elevate heels  Goal: Prevent/manage excess moisture  4/3/2025 1324 by Rula Mandujano RN  Outcome: Progressing  Flowsheets (Taken 4/3/2025 1324)  Prevent/manage excess moisture: Moisturize dry skin  4/3/2025 1324 by Rula Mandujano RN  Outcome: Progressing  Flowsheets (Taken 4/3/2025 1324)  Prevent/manage excess moisture: Moisturize dry skin  Goal: Prevent/minimize sheer/friction injuries  4/3/2025 1324 by Rula Mandujano RN  Outcome: Progressing  Flowsheets (Taken 4/3/2025 1324)  Prevent/minimize sheer/friction injuries: Use pull sheet  4/3/2025 1324 by Rula Mandujano RN  Outcome: Progressing  Flowsheets (Taken 4/3/2025 1324)  Prevent/minimize sheer/friction injuries: Use pull sheet  Goal: Promote/optimize nutrition  4/3/2025 1324 by Rula Mandujano RN  Outcome: Progressing  Flowsheets (Taken 4/3/2025 1324)  Promote/optimize nutrition: Discuss with provider if NPO > 2 days  4/3/2025 1324 by Rula Mandujano RN  Outcome: Progressing  Flowsheets (Taken 4/3/2025 1324)  Promote/optimize nutrition: Discuss with provider if NPO > 2 days  Goal: Promote skin healing  4/3/2025 1324 by Rula Mandujano RN  Outcome: Progressing  Flowsheets (Taken 4/3/2025 1324)  Promote skin healing: Assess skin/pad under line(s)/device(s)  4/3/2025 1324 by Rula Mandujano RN  Outcome: Progressing  Flowsheets (Taken 4/3/2025 1324)  Promote skin healing: Assess skin/pad under line(s)/device(s)

## 2025-04-03 NOTE — ANESTHESIA PREPROCEDURE EVALUATION
Patient: Adriana Wood    Procedure Information       Date/Time: 04/03/25 1900    Procedure: EVACUATION, HEMATOMA, CHEST (Right)    Location: [facility] OR 12 / [facility] OR    Surgeons: Haydee Reyes MD            Relevant Problems   Anesthesia (within normal limits)      Cardiac   (+) Bigeminy   (+) Cardiac arrest due to underlying cardiac condition   (+) Long QT interval   (+) PVC (premature ventricular contraction)   (+) Torsades de pointes (Multi)      Neuro   (+) Depression, acute   (+) Occipital neuralgia of right side   (+) Post-traumatic stress disorder, chronic      GI   (+) Chronic diarrhea   (+) GERD (gastroesophageal reflux disease)      /Renal   (+) Nephrolithiasis      Musculoskeletal   (+) Osteoarthritis of knee      ID   (+) Hardware complicating wound infection   (+) Osteomyelitis of right tibia (Multi)   (+) Other acute osteomyelitis, right tibia and fibula (Multi)   (+) Prosthetic joint infection, subsequent encounter   (+) Wound infection       Clinical information reviewed:   Tobacco  Allergies  Meds   Med Hx  Surg Hx  OB Status  Fam Hx  Soc   Hx        NPO Detail:  No data recorded     Physical Exam    Airway  Mallampati: II  TM distance: >3 FB  Neck ROM: full     Cardiovascular - normal exam     Dental   (+) upper dentures, lower dentures     Pulmonary - normal exam     Abdominal - normal exam             Anesthesia Plan    History of general anesthesia?: yes  History of complications of general anesthesia?: no    ASA 3     general     The patient is not a current smoker.    Postoperative administration of opioids is intended.  Anesthetic plan and risks discussed with patient.    Plan discussed with CRNA.

## 2025-04-03 NOTE — ANESTHESIA POSTPROCEDURE EVALUATION
Patient: Adriana Wood    Procedure Summary       Date: 04/03/25 Room / Location: [facility] OR 12 / [facility] OR    Anesthesia Start: 1842 Anesthesia Stop: 1944    Procedure: EVACUATION, HEMATOMA, CHEST (Right: Chest) Diagnosis:       Hematoma of abdominal wall, initial encounter      Hematoma      (Hematoma of abdominal wall, initial encounter [S30.1XXA])      (Hematoma [T14.8XXA])    Surgeons: Haydee Reyes MD Responsible Provider: Argelia Nguyen MD    Anesthesia Type: general ASA Status: 3            Anesthesia Type: general    Vitals Value Taken Time   /72 04/03/25 1940   Temp See nurse's notes 04/03/25 1945   Pulse 66 04/03/25 1942   Resp 14 04/03/25 1942   SpO2 100 % 04/03/25 1942   Vitals shown include unfiled device data.    Anesthesia Post Evaluation    Patient location during evaluation: PACU  Patient participation: complete - patient participated  Level of consciousness: awake and alert  Pain score: 0  Pain management: adequate  Airway patency: patent  Cardiovascular status: acceptable and hemodynamically stable  Respiratory status: acceptable and face mask  Hydration status: acceptable  Postoperative Nausea and Vomiting: none        There were no known notable events for this encounter.

## 2025-04-03 NOTE — PROGRESS NOTES
"  Division of Plastic and Reconstructive Surgery  Progress Note    Patient Name: Adriana Wood  MRN: [id number]  Date:  04/03/25     Subjective   Endorses persistent discomfort to her right lateral chest wall hematoma site, provided with Robaxin overnight which helped alleviate her pain to some degree so that she was able to rest. She notes some nausea this AM, remains NPO for anticipated OR as add on today for chest wall hematoma evacuation. Denies fever, chills, headache, dizziness, chest pain, palpitations, dyspnea, abdominal pain, nausea or vomiting.    Objective   Vital Signs  /80 (BP Location: Left arm, Patient Position: Lying)   Pulse 73   Temp 36.4 °C (97.5 °F) (Oral)   Resp 17   Ht 1.778 m (5' 10\")   Wt 69.2 kg (152 lb 8.9 oz)   SpO2 95%   BMI 21.89 kg/m²      Physical Exam   Constitutional: A&Ox3, calm and cooperative, NAD.  Eyes: EOMI, clear sclera.   ENMT: Moist mucous membranes.  Head/Neck: NC/AT.  Cardiovascular: Regular rate.   Respiratory/Thorax: Unlabored respirations on RA.  Gastrointestinal: Abdomen soft, NTND.  Genitourinary: Voiding independently.  Extremities: RLE flap: warm to touch, soft and compressible, strong biphasic signal at marked stitch, circumferential incisions around flap well approximated with some scabbing  Right posterior lat donor site: incision healed and intact without signs of dehiscence. Tender induration at soft tissue superior to closed donor incision c/w hx of hematoma. MASSIEL drain x 1 to right lower flank with dark bloody output.  Neurological: A&Ox3.  Psychological: Appropriate mood and behavior.  Skin: Warm and dry.     Diagnostics   Results for orders placed or performed during the hospital encounter of 03/28/25 (from the past 24 hours)   Vancomycin   Result Value Ref Range    Vancomycin 12.9 5.0 - 20.0 ug/mL   CBC   Result Value Ref Range    WBC 4.3 (L) 4.4 - 11.3 x10*3/uL    nRBC 0.0 0.0 - 0.0 /100 WBCs    RBC 3.56 (L) 4.00 - 5.20 x10*6/uL    " Hemoglobin 10.2 (L) 12.0 - 16.0 g/dL    Hematocrit 32.3 (L) 36.0 - 46.0 %    MCV 91 80 - 100 fL    MCH 28.7 26.0 - 34.0 pg    MCHC 31.6 (L) 32.0 - 36.0 g/dL    RDW 14.1 11.5 - 14.5 %    Platelets 267 150 - 450 x10*3/uL   Basic metabolic panel   Result Value Ref Range    Glucose 93 74 - 99 mg/dL    Sodium 140 136 - 145 mmol/L    Potassium 4.4 3.5 - 5.3 mmol/L    Chloride 103 98 - 107 mmol/L    Bicarbonate 30 21 - 32 mmol/L    Anion Gap 11 10 - 20 mmol/L    Urea Nitrogen 14 6 - 23 mg/dL    Creatinine 0.41 (L) 0.50 - 1.05 mg/dL    eGFR >90 >60 mL/min/1.73m*2    Calcium 9.2 8.6 - 10.6 mg/dL   Magnesium   Result Value Ref Range    Magnesium 1.97 1.60 - 2.40 mg/dL   Type and Screen   Result Value Ref Range    ABO TYPE O     Rh TYPE POS     ANTIBODY SCREEN NEG      *Note: Due to a large number of results and/or encounters for the requested time period, some results have not been displayed. A complete set of results can be found in Results Review.     Current Medications  Scheduled medications  acetaminophen, 975 mg, oral, q8h  [Held by provider] apixaban, 2.5 mg, oral, BID  enoxaparin, 40 mg, subcutaneous, Daily  lidocaine, 1 patch, transdermal, Daily  methocarbamol, 250 mg, oral, q8h FELIX  pantoprazole, 40 mg, oral, Daily before breakfast  polyethylene glycol, 17 g, oral, Daily  vancomycin, 1,000 mg, intravenous, q12h      Continuous medications     PRN medications  PRN medications: albuterol, docusate sodium, lubricating eye drops, oxyCODONE, oxyCODONE, oxygen, vancomycin     Assessment   Adriana Wood is a 69 y.o. female with a past medical history of bradycardia, cardiomyopathy, CAD, cardiac arrest due to electrolyte abnormalities and zofran use post-operatively, and traumatic brain injury. Patient had a total knee right knee done by Dr. Whelan in March 2024 which was complicated by E. Coli infection. Patient had right TKA wound dehiscence s/p I&D right knee with polyswap and attempted re-closure of complex wound  on 5/10/2024 by Dr. Whelan. She was referred to plastic surgery (Dr. Reyes) perioperatively given projected anticipated need for possible flap coverage of the wound/defect site. Patient re-admitted and returned to the OR with orthopedics on 2/28/2025 for right knee I&D, revision static spacer and application of incisional wound vac. She returned to the OR with plastic surgery on 3/14 for R latissimus free flap to R knee. Patient unfortunately developed a hematoma at her latissimus flap donor site requiring readmission to the plastic surgery service on 3/29/25.    Plan:  # Postoperative hematoma at R latissimus free flap donor site   - Repeat US of chest wall ordered 4/1 for evaluation of hematoma, revealed a large right lateral chest wall hematoma with a drain in place, measurements difficult to obtain due to the large size of the hematoma  - Anticipated for RTOR with plastic surgery as add on today for right chest wall hematoma evacuation   - Continue to hold continue to home Eliquis for now   - OK for Lovenox for DVT prophylaxis, initiated 4/1  - Continue drain care to x1 MASSIEL drains present at R lat donor site (nursing orders placed)  Nursing to strip drain tubing at least q4h and PRN to avoid drain/tubing obstruction   Please ensure that drains are compressed flat and plugged to maintain self suction at all times aside from when emptying   Nursing to please also empty and record output quantities at least TID and PRN if drains are full   Please notify the plastics team if drain outputs exceed >30 ml in 1 hr or >200 in 24 hrs  - Monitor VS/pulse ox Q8  - Monitor periodic AM CBC/RFP    # Hx R knee wound reconstruction via R latissimus free flap   - Continue q shift flap assessments per nursing with interval checks per plastic surgery team during daily rounding (Assess Doppler pulse at marked site q shift plus flap appearance - color, warmth, turgor)  - Nursing to notify plastic surgery team immediately if there  are any acute changes  - Avoid positioning that would apply pressure to flap region or incisions   - NWB to RLE, patient to dangle RLE as tolerated     # Hx R total knee replacement c/b infection   - Continue IV Vancomycin as recommended per ID during last admission (stop date 4/11/25)  - Check periodic labs while on IV ABX    # Acute right chest wall pain   - Scheduled Tylenol  - Lidocaine patch  - Scheduled Robaxin  - PRN Oxy IR    # Hx Asthma  - continue home inhaler  - as needed O2 supplementation  - nasal Afrin spray for congestion  - discussed need to be tested for YANETH as outpatient    # History of QT Prolongation  - Avoid QT prolonging medications; NO Zofran (hx of cardiac arrest)    # History of Bradycardia, PVCs and Bigeminy  - Consistent bradycardia starting evening of 3/29 around low 40s  - Asymptomatic HR of 34 on 3/30 AM with rapid response called. EKG noted same findings from cardiology appointment in early 2025: Bradycardia, PVC and bigeminy.   - Nursing to obtain HR with pulse check vs pulse ox since inaccurate readings of irregular heart rates on pulse ox are more common in cases of Bigeminy   - Continue telemetry   - Cardiology consulted initially who do not recommend intervention/treatment for asymptomatic bradycardia  - Internal Medicine consulted, felt that bradycardia is an artifact due to frequent PVCs, patient stable, now signed off     FEN/GI:   - Encourage adequate PO fluid intake   - Lytes stable, replete PRN  - Regular diet   - GI ppx with PO Protonix   - Bowel regimen with schedule miralax     DVT ppx:  - Holding home Eliquis for now   - subcutaneous Lovenox   - SCDs while in bed   - Activity as tolerated     Disposition: Patient to remain inpatient pending OR for hematoma evacuation and washout, timing TBD. PT/OT rec mod intensity continued therapy, anticipate eventual DC back to SNF.     Plan discussed with Dr. Reyes.    Eileen Escalera, PA-C  Plastic and Reconstructive Surgery    [place]  Pager #[phone]  Team phone: [phone]

## 2025-04-03 NOTE — PROGRESS NOTES
04/03/25 1300   Discharge Planning   Living Arrangements Spouse/significant other   Support Systems Spouse/significant other;Children   Type of Residence Private residence   Number of Stairs to Enter Residence 0   Number of Stairs Within Residence 14   Do you have animals or pets at home? Yes   Type of Animals or Pets 2 dogs 1 cat   Home or Post Acute Services Post acute facilities (Rehab/SNF/etc)   Type of Post Acute Facility Services Skilled nursing   Expected Discharge Disposition SNF   Does the patient need discharge transport arranged? Yes   RoundTrip coordination needed? Yes   Has discharge transport been arranged? No     SW sent updated clinicals to [facility].  Will assist with transfer back to SNF when medically stable.  Will follow.  JOHN Perez

## 2025-04-04 DIAGNOSIS — Z98.890 POST-OPERATIVE STATE: ICD-10-CM

## 2025-04-04 DIAGNOSIS — T84.50XD PROSTHETIC JOINT INFECTION, SUBSEQUENT ENCOUNTER: ICD-10-CM

## 2025-04-04 DIAGNOSIS — B95.62 METHICILLIN RESISTANT STAPHYLOCOCCUS AUREUS INFECTION AS THE CAUSE OF DISEASES CLASSIFIED ELSEWHERE: ICD-10-CM

## 2025-04-04 LAB
ANION GAP SERPL CALC-SCNC: 13 MMOL/L (ref 10–20)
BUN SERPL-MCNC: 12 MG/DL (ref 6–23)
CALCIUM SERPL-MCNC: 9.2 MG/DL (ref 8.6–10.6)
CHLORIDE SERPL-SCNC: 101 MMOL/L (ref 98–107)
CO2 SERPL-SCNC: 29 MMOL/L (ref 21–32)
CREAT SERPL-MCNC: 0.5 MG/DL (ref 0.5–1.05)
EGFRCR SERPLBLD CKD-EPI 2021: >90 ML/MIN/1.73M*2
ERYTHROCYTE [DISTWIDTH] IN BLOOD BY AUTOMATED COUNT: 13.9 % (ref 11.5–14.5)
GLUCOSE SERPL-MCNC: 105 MG/DL (ref 74–99)
HCT VFR BLD AUTO: 32.7 % (ref 36–46)
HGB BLD-MCNC: 10.4 G/DL (ref 12–16)
MAGNESIUM SERPL-MCNC: 1.92 MG/DL (ref 1.6–2.4)
MCH RBC QN AUTO: 28.3 PG (ref 26–34)
MCHC RBC AUTO-ENTMCNC: 31.8 G/DL (ref 32–36)
MCV RBC AUTO: 89 FL (ref 80–100)
NRBC BLD-RTO: 0 /100 WBCS (ref 0–0)
PLATELET # BLD AUTO: 246 X10*3/UL (ref 150–450)
POTASSIUM SERPL-SCNC: 4.1 MMOL/L (ref 3.5–5.3)
RBC # BLD AUTO: 3.68 X10*6/UL (ref 4–5.2)
SODIUM SERPL-SCNC: 139 MMOL/L (ref 136–145)
WBC # BLD AUTO: 5.3 X10*3/UL (ref 4.4–11.3)

## 2025-04-04 PROCEDURE — 83735 ASSAY OF MAGNESIUM: CPT | Performed by: NURSE PRACTITIONER

## 2025-04-04 PROCEDURE — 2500000001 HC RX 250 WO HCPCS SELF ADMINISTERED DRUGS (ALT 637 FOR MEDICARE OP): Performed by: NURSE PRACTITIONER

## 2025-04-04 PROCEDURE — 85027 COMPLETE CBC AUTOMATED: CPT | Performed by: NURSE PRACTITIONER

## 2025-04-04 PROCEDURE — 2500000004 HC RX 250 GENERAL PHARMACY W/ HCPCS (ALT 636 FOR OP/ED): Performed by: NURSE PRACTITIONER

## 2025-04-04 PROCEDURE — 2500000005 HC RX 250 GENERAL PHARMACY W/O HCPCS: Performed by: NURSE PRACTITIONER

## 2025-04-04 PROCEDURE — 82374 ASSAY BLOOD CARBON DIOXIDE: CPT | Performed by: NURSE PRACTITIONER

## 2025-04-04 PROCEDURE — 2500000004 HC RX 250 GENERAL PHARMACY W/ HCPCS (ALT 636 FOR OP/ED)

## 2025-04-04 PROCEDURE — 97530 THERAPEUTIC ACTIVITIES: CPT | Mod: GP

## 2025-04-04 PROCEDURE — 2500000004 HC RX 250 GENERAL PHARMACY W/ HCPCS (ALT 636 FOR OP/ED): Mod: JZ,TB | Performed by: NURSE PRACTITIONER

## 2025-04-04 PROCEDURE — 2060000001 HC INTERMEDIATE ICU ROOM DAILY

## 2025-04-04 PROCEDURE — 99232 SBSQ HOSP IP/OBS MODERATE 35: CPT

## 2025-04-04 RX ORDER — LANOLIN ALCOHOL/MO/W.PET/CERES
400 CREAM (GRAM) TOPICAL DAILY
Status: DISCONTINUED | OUTPATIENT
Start: 2025-04-04 | End: 2025-04-09 | Stop reason: HOSPADM

## 2025-04-04 RX ADMIN — HYDROMORPHONE HYDROCHLORIDE 0.2 MG: 0.2 INJECTION, SOLUTION INTRAMUSCULAR; INTRAVENOUS; SUBCUTANEOUS at 02:14

## 2025-04-04 RX ADMIN — ACETAMINOPHEN 975 MG: 325 TABLET, FILM COATED ORAL at 16:10

## 2025-04-04 RX ADMIN — VANCOMYCIN HYDROCHLORIDE 1000 MG: 1 INJECTION, SOLUTION INTRAVENOUS at 08:46

## 2025-04-04 RX ADMIN — MAGNESIUM OXIDE TAB 400 MG (241.3 MG ELEMENTAL MG) 400 MG: 400 (241.3 MG) TAB at 08:46

## 2025-04-04 RX ADMIN — METHOCARBAMOL TABLETS 250 MG: 500 TABLET, COATED ORAL at 16:08

## 2025-04-04 RX ADMIN — METHOCARBAMOL TABLETS 250 MG: 500 TABLET, COATED ORAL at 00:13

## 2025-04-04 RX ADMIN — OXYCODONE HYDROCHLORIDE 10 MG: 5 TABLET ORAL at 08:46

## 2025-04-04 RX ADMIN — METHOCARBAMOL TABLETS 250 MG: 500 TABLET, COATED ORAL at 08:46

## 2025-04-04 RX ADMIN — ACETAMINOPHEN 975 MG: 325 TABLET, FILM COATED ORAL at 08:46

## 2025-04-04 RX ADMIN — VANCOMYCIN HYDROCHLORIDE 1000 MG: 1 INJECTION, SOLUTION INTRAVENOUS at 20:32

## 2025-04-04 RX ADMIN — OXYCODONE HYDROCHLORIDE 10 MG: 5 TABLET ORAL at 15:15

## 2025-04-04 RX ADMIN — LIDOCAINE 4% 1 PATCH: 40 PATCH TOPICAL at 08:46

## 2025-04-04 RX ADMIN — ACETAMINOPHEN 975 MG: 325 TABLET, FILM COATED ORAL at 23:21

## 2025-04-04 RX ADMIN — POLYETHYLENE GLYCOL 3350 17 G: 17 POWDER, FOR SOLUTION ORAL at 08:46

## 2025-04-04 RX ADMIN — OXYCODONE HYDROCHLORIDE 10 MG: 5 TABLET ORAL at 04:16

## 2025-04-04 RX ADMIN — OXYCODONE HYDROCHLORIDE 10 MG: 5 TABLET ORAL at 20:32

## 2025-04-04 RX ADMIN — PANTOPRAZOLE SODIUM 40 MG: 40 TABLET, DELAYED RELEASE ORAL at 08:46

## 2025-04-04 RX ADMIN — METHOCARBAMOL TABLETS 250 MG: 500 TABLET, COATED ORAL at 23:22

## 2025-04-04 ASSESSMENT — COGNITIVE AND FUNCTIONAL STATUS - GENERAL
WALKING IN HOSPITAL ROOM: TOTAL
CLIMB 3 TO 5 STEPS WITH RAILING: TOTAL
MOVING TO AND FROM BED TO CHAIR: A LOT
DRESSING REGULAR LOWER BODY CLOTHING: A LOT
EATING MEALS: A LITTLE
MOVING TO AND FROM BED TO CHAIR: A LOT
EATING MEALS: A LITTLE
DAILY ACTIVITIY SCORE: 14
PERSONAL GROOMING: A LITTLE
CLIMB 3 TO 5 STEPS WITH RAILING: TOTAL
MOVING TO AND FROM BED TO CHAIR: A LOT
TOILETING: A LOT
WALKING IN HOSPITAL ROOM: TOTAL
MOBILITY SCORE: 12
TURNING FROM BACK TO SIDE WHILE IN FLAT BAD: A LITTLE
PERSONAL GROOMING: A LITTLE
STANDING UP FROM CHAIR USING ARMS: A LOT
DRESSING REGULAR LOWER BODY CLOTHING: A LOT
TURNING FROM BACK TO SIDE WHILE IN FLAT BAD: A LITTLE
STANDING UP FROM CHAIR USING ARMS: A LOT
TOILETING: A LOT
DRESSING REGULAR UPPER BODY CLOTHING: A LOT
MOVING FROM LYING ON BACK TO SITTING ON SIDE OF FLAT BED WITH BEDRAILS: A LITTLE
HELP NEEDED FOR BATHING: A LOT
STANDING UP FROM CHAIR USING ARMS: A LOT
MOVING FROM LYING ON BACK TO SITTING ON SIDE OF FLAT BED WITH BEDRAILS: A LITTLE
MOBILITY SCORE: 12
TURNING FROM BACK TO SIDE WHILE IN FLAT BAD: A LITTLE
DAILY ACTIVITIY SCORE: 14
CLIMB 3 TO 5 STEPS WITH RAILING: TOTAL
MOVING FROM LYING ON BACK TO SITTING ON SIDE OF FLAT BED WITH BEDRAILS: A LITTLE
HELP NEEDED FOR BATHING: A LOT
MOBILITY SCORE: 12
DRESSING REGULAR UPPER BODY CLOTHING: A LOT
WALKING IN HOSPITAL ROOM: TOTAL

## 2025-04-04 ASSESSMENT — PAIN SCALES - GENERAL
PAINLEVEL_OUTOF10: 9
PAINLEVEL_OUTOF10: 8
PAINLEVEL_OUTOF10: 6
PAINLEVEL_OUTOF10: 8
PAINLEVEL_OUTOF10: 9
PAINLEVEL_OUTOF10: 6
PAINLEVEL_OUTOF10: 8

## 2025-04-04 ASSESSMENT — PAIN - FUNCTIONAL ASSESSMENT
PAIN_FUNCTIONAL_ASSESSMENT: 0-10

## 2025-04-04 ASSESSMENT — PAIN DESCRIPTION - ORIENTATION
ORIENTATION: RIGHT
ORIENTATION: RIGHT
ORIENTATION: MID

## 2025-04-04 ASSESSMENT — PAIN SCALES - PAIN ASSESSMENT IN ADVANCED DEMENTIA (PAINAD): TOTALSCORE: MEDICATION (SEE MAR)

## 2025-04-04 ASSESSMENT — PAIN DESCRIPTION - LOCATION: LOCATION: BACK

## 2025-04-04 ASSESSMENT — PAIN SCALES - WONG BAKER: WONGBAKER_NUMERICALRESPONSE: HURTS WHOLE LOT

## 2025-04-04 NOTE — BRIEF OP NOTE
Date: 4/3/2025  OR Location: [facility] OR    Name: Adriana Wood, : 1955, Age: 69 y.o., MRN: [id number], Sex: female    Diagnosis  Pre-op Diagnosis      * Hematoma of abdominal wall, initial encounter [S30.1XXA]     * Hematoma [T14.8XXA] Post-op Diagnosis     * Hematoma of abdominal wall, initial encounter [S30.1XXA]     * Hematoma [T14.8XXA]     Procedures  EVACUATION, HEMATOMA, CHEST  31781 - MN I&D DEEP ABSC/HMTMA SOFT TISSUE NECK/THORAX      Surgeons      * Haydee Reyes - Primary    Resident/Fellow/Other Assistant:  Surgeons and Role:     * ISABEL Mcgarry-CNP - Resident - Assisting     * Zeinab Grimaldo MD - Resident - Assisting    Staff:   Circulator: Deyanira Posadas Person: Tiffanie  Circulator: Jaja    Anesthesia Staff: Anesthesiologist: Argelia Nguyen MD  CRNA: ISABEL Myers-GÓMEZ    Procedure Summary  Anesthesia: General  ASA: III  Estimated Blood Loss: 0 mL  Intra-op Medications:   Administrations occurring from  to  on 25:   Medication Name Total Dose   sodium chloride 0.9 % irrigation solution 1,000 mL   acetaminophen (Tylenol) tablet 975 mg Cannot be calculated   albuterol 90 mcg/actuation inhaler 2 puff Cannot be calculated   apixaban (Eliquis) tablet 2.5 mg Cannot be calculated   docusate sodium (Colace) capsule 100 mg Cannot be calculated   lidocaine 4 % patch 1 patch Cannot be calculated   lubricating eye drops ophthalmic solution 1 drop Cannot be calculated   methocarbamol (Robaxin) tablet 250 mg Cannot be calculated   oxyCODONE (Roxicodone) immediate release tablet 10 mg Cannot be calculated   oxyCODONE (Roxicodone) immediate release tablet 5 mg Cannot be calculated   oxygen (O2) therapy 188 L   pantoprazole (ProtoNix) EC tablet 40 mg Cannot be calculated   polyethylene glycol (Glycolax, Miralax) packet 17 g Cannot be calculated   vancomycin (Vancocin) 1,000 mg in dextrose 5%  mL Cannot be calculated   vancomycin (Vancocin) pharmacy to dose -  pharmacy monitoring Cannot be calculated   fentaNYL (Sublimaze) injection 50 mcg/mL 25 mcg   HYDROmorphone (Dilaudid) injection 0.5 mg 1.5 mg   LR bolus Cannot be calculated   lactated Ringer's infusion Cannot be calculated   phenylephrine 40 mcg/mL syringe 10 mL 320 mcg   propofol (Diprivan) injection 10 mg/mL 10 mg              Anesthesia Record               Intraprocedure I/O Totals          Intake    LR bolus 300.00 mL    Total Intake 300 mL       Output    Est. Blood Loss 0 mL    Total Output 0 mL       Net    Net Volume 300 mL          Specimen: No specimens collected               Findings: Clots were encountered and were drained from the wound.  No active bleeding was identified, irrigation with saline was performed. New MASSIEL drain was placed.     Complications:  None; patient tolerated the procedure well.     Disposition: PACU - hemodynamically stable.  Condition: stable  Specimens Collected: No specimens collected  Attending Attestation: A qualified resident physician was not available.    Haydee Reyes  Phone Number: [phone]

## 2025-04-04 NOTE — PROGRESS NOTES
04/04/25 1300   Discharge Planning   Living Arrangements Spouse/significant other   Support Systems Spouse/significant other;Children   Type of Residence Private residence   Number of Stairs to Enter Residence 0   Number of Stairs Within Residence 14   Do you have animals or pets at home? Yes   Type of Animals or Pets 2 dogs 1 cat   Home or Post Acute Services Post acute facilities (Rehab/SNF/etc)   Type of Post Acute Facility Services Skilled nursing   Expected Discharge Disposition SNF   Does the patient need discharge transport arranged? Yes   RoundTrip coordination needed? Yes   Has discharge transport been arranged? No     Care team reports pt may be stable for transfer over the weekend.  [facility] is able to receive pt over the weekend.  Pt placed in will call with RoundTrip.  JOHN Perez

## 2025-04-04 NOTE — SIGNIFICANT EVENT
"  Department of Plastic and Reconstructive Surgery  Post Op Check    69 y.o. female with a past medical history of bradycardia, cardiomyopathy, CAD, GERD, cardiac arrest due to electrolyte abnormalities and zofran use post-operatively, and traumatic brain injury presented to [facility] ED from facility with increased output from MASSIEL drain s/p latissimus free flap to R knee on 3/14/25 with Dr. Lundberg. CT at [facility] on 3/27 showed hematoma near the superolateral aspect of catheter entering the latissimus dorsi. Hematoma extends inferiorly to lateral margin of latissimus muscle now POD#0 for right latissimus dorsi hematoma evacuation, washout, I&D with Dr. Reyes.     Subjective: Resting in PACU, c/o pain to right latissimus incision as a 9/10 as sharp, stabbing and continuous, relief with pain medication down to a 5/10. Denies any fever, chills, night sweats, CP, SOB, palpitations, nausea, vomiting, diarrhea, constipation, dysuria, hematuria, hematochezia, hematemesis, flank pain.     Objective:  /57   Pulse 55   Temp 36 °C (96.8 °F) (Temporal)   Resp 13   Ht 1.778 m (5' 10\")   Wt 69.2 kg (152 lb 8.9 oz)   SpO2 99%   BMI 21.89 kg/m²    PE:  Constitutional: A&Ox3, calm and cooperative, NAD  Eyes: PERRL, clear sclera   ENMT: moist mucous membranes, no apparent injuries or lesions  Head/Neck: Neck supple, no JVD  Cardiovascular: Bradycardia, Normal rate and regular rhythm. No murmurs, rubs, or gallops. 2+ equal pulses of the distal extremities.  Respiratory/Thorax: CTAB, No rales, rhonchi, or wheezes. Good symmetric chest expansion.   Gastrointestinal: abdomen soft, NTND, +BS x 4  Genitourinary: voiding wojciech urine via urinal  Musculoskeletal: Decreased ROM to Right leg  Extremities: Extremities: RLE flap: warm to touch, soft and compressible, strong biphasic signal at marked stitch, circumferential incisions around flap well approximated with some scabbing  Right posterior lat donor site: incision healed to " distal end, incision well approximated with sutures post op at proximal end, covered with island dressing, dressing c/d/I, TTP to right lat incision, MASSIEL drain x 1 to right lower flank with sanguinous output  Neurological: A&Ox3, cranial nerves II-XII grossly intact. Sensation grossly intact  Psychological: Appropriate mood and behavior  Skin: see extremities    S/P Right latissimus dorsi hematoma evacuation:  A/P:   - POD #0  - pain well controlled, added prn Dilaudid 0.2 mg Q4 for breakthrough pain  - encourage PO pain medication  - Positioning: avoid pressure to right side  - Regular diet  - as needed antiemetics  - monitor labs  - dressing to be changed by plastics on POD#1  - maintain MASSIEL drain to right flank, record output, strip drain Q4  - DVT prophylaxis: SCD/resume Lovenox on POD#1  - Encourage IS  - continue IV Vancomycin for infection prevention  - all other care per daily progress note  - Discussed with ISABEL Ramos-CNP  Plastic and Reconstructive Surgery   Available via [employer]  Pager #[phone]  Team phone: [phone]

## 2025-04-04 NOTE — PROGRESS NOTES
Physical Therapy    Physical Therapy Treatment    Patient Name: Adriana Wood  MRN: [id number]  Department: [facility]  Room: [facility]6013-A  Today's Date: 4/4/2025  Time Calculation  Start Time: 1439  Stop Time: 1505  Time Calculation (min): 26 min       Assessment/Plan   PT Assessment  PT Assessment Results: Decreased strength, Decreased range of motion, Decreased endurance, Impaired balance, Decreased mobility, Decreased cognition  Rehab Prognosis: Excellent  Barriers to Discharge Home: Caregiver assistance, Physical needs  Caregiver Assistance: Caregiver assistance needed per identified barriers - however, level of patient's required assistance exceeds assistance available at home  Physical Needs: Ambulating household distances limited by function/safety, Intermittent mobility assistance needed  End of Session Communication: Bedside nurse  Assessment Comment: Continues to demonstrate impaired strength and function. Pt will benefit from continued PT in house and after discharge at MOD intensity to restore function.  End of Session Patient Position: Bed, 3 rail up, Alarm off, not on at start of session  PT Plan  Inpatient/Swing Bed or Outpatient: Inpatient  PT Plan  Treatment/Interventions: Bed mobility, Transfer training, Balance training, Strengthening, Endurance training, Therapeutic exercise, Therapeutic activity  PT Plan: Ongoing PT  PT Eval Only Reason: At baseline function  PT Frequency: 3 times per week  PT Discharge Recommendations: Moderate intensity level of continued care  PT Recommended Transfer Status: Assist x1  PT - OK to Discharge: Yes    General Visit Information:   PT  Visit  PT Received On: 04/04/25  General  Family/Caregiver Present: No  Prior to Session Communication: Bedside nurse  Patient Position Received: Bed, 3 rail up, Alarm off, not on at start of session  Preferred Learning Style: verbal, auditory  General Comment: Pt resting in bed upon entry. Pleasant and cooperative though noting R  flank pain from hematoma cleanout yesterday.    Subjective   Precautions:  Precautions  Medical Precautions: Fall precautions    Objective   Pain:  Pain Assessment  Pain Assessment: 0-10  0-10 (Numeric) Pain Score: 8  Pain Type: Acute pain, Surgical pain  Pain Location:  (flank)  Pain Orientation: Right  Cognition:  Cognition  Overall Cognitive Status: Within Functional Limits  Arousal/Alertness: Appropriate responses to stimuli  Insight: Within function limits  Coordination:  Movements are Fluid and Coordinated: Yes  Postural Control:  Postural Control  Postural Control: Within Functional Limits  Static Sitting Balance  Static Sitting-Balance Support: Bilateral upper extremity supported  Static Sitting-Level of Assistance: Close supervision  Static Sitting-Comment/Number of Minutes: 20 minutes  Static Standing Balance  Static Standing-Comment/Number of Minutes: Pt declining attempts to stand due to R flank surgical pain.    Activity Tolerance:  Activity Tolerance  Endurance: Tolerates 10 - 20 min exercise with multiple rests  Activity Tolerance Comments: Limited on this date as she's POD 1  Treatments:     Therapeutic Activity  Therapeutic Activity Performed: Yes  Therapeutic Activity 1: bed mobility, transfers, static sitting    Bed Mobility  Bed Mobility: Yes  Bed Mobility 1  Bed Mobility 1: Supine to sitting, Sitting to supine  Level of Assistance 1: Contact guard    Ambulation/Gait Training  Ambulation/Gait Training Performed: No  Transfers  Transfer: No    Outcome Measures:  [facility] Basic Mobility  Turning from your back to your side while in a flat bed without using bedrails: A little  Moving from lying on your back to sitting on the side of a flat bed without using bedrails: A little  Moving to and from bed to chair (including a wheelchair): A lot  Standing up from a chair using your arms (e.g. wheelchair or bedside chair): A lot  To walk in hospital room: Total  Climbing 3-5 steps with railing: Total  Basic  Mobility - Total Score: 12    Education Documentation  No documentation found.  Education Comments  No comments found.        Encounter Problems       Encounter Problems (Active)       Mobility       STG - Patient will ambulate >5ft, wheeled walker, min assist (Progressing)       Start:  03/31/25    Expected End:  04/14/25               PT Transfers       STG - Patient to transfer to and from sit to supine CGA (Progressing)       Start:  03/31/25    Expected End:  04/14/25            STG - Patient will transfer sit to and from stand min assist (Progressing)       Start:  03/31/25    Expected End:  04/14/25               Pain - Adult

## 2025-04-04 NOTE — OP NOTE
EVACUATION, HEMATOMA, CHEST (R) Operative Note     Date: 4/3/2025  OR Location: [facility] OR    Name: Adriana Wood, : 1955, Age: 69 y.o., MRN: [id number], Sex: female    Diagnosis  Pre-op Diagnosis      * Hematoma of abdominal wall, initial encounter [S30.1XXA]     * Hematoma [T14.8XXA] Post-op Diagnosis     * Hematoma of abdominal wall, initial encounter [S30.1XXA]     * Hematoma [T14.8XXA]     Procedures  EVACUATION, HEMATOMA, CHEST  82028 - IA I&D DEEP ABSC/HMTMA SOFT TISSUE NECK/THORAX    82928 intermediate repair.   Surgeons      * Haydee Reyes - Primary    Resident/Fellow/Other Assistant:  Surgeons and Role:     * ISABEL Mcgarry-CNP - Resident - Assisting     * Zeinab Grimaldo MD - Resident - Assisting    Staff:   Circulator: Deyanira Posadas Person: Tiffanie  Circulator: Jaja    Anesthesia Staff: Anesthesiologist: Argelia Nguyen MD  CRNA: ISABEL Myres-GÓMEZ    Procedure Summary  Anesthesia: General  ASA: III  Estimated Blood Loss: 5mL  Intra-op Medications:   Administrations occurring from  to  on 25:   Medication Name Total Dose   sodium chloride 0.9 % irrigation solution 1,000 mL   acetaminophen (Tylenol) tablet 975 mg Cannot be calculated   albuterol 90 mcg/actuation inhaler 2 puff Cannot be calculated   apixaban (Eliquis) tablet 2.5 mg Cannot be calculated   docusate sodium (Colace) capsule 100 mg Cannot be calculated   HYDROmorphone (Dilaudid) injection 0.5 mg 1 mg   lidocaine 4 % patch 1 patch Cannot be calculated   lubricating eye drops ophthalmic solution 1 drop Cannot be calculated   methocarbamol (Robaxin) tablet 250 mg Cannot be calculated   oxyCODONE (Roxicodone) immediate release tablet 10 mg Cannot be calculated   oxyCODONE (Roxicodone) immediate release tablet 5 mg Cannot be calculated   oxygen (O2) therapy 274 L   pantoprazole (ProtoNix) EC tablet 40 mg Cannot be calculated   polyethylene glycol (Glycolax, Miralax) packet 17 g Cannot be calculated    vancomycin (Vancocin) 1,000 mg in dextrose 5%  mL Cannot be calculated   vancomycin (Vancocin) pharmacy to dose - pharmacy monitoring Cannot be calculated   fentaNYL (Sublimaze) injection 50 mcg/mL 25 mcg   LR bolus Cannot be calculated   phenylephrine 40 mcg/mL syringe 10 mL 320 mcg   propofol (Diprivan) injection 10 mg/mL 10 mg              Anesthesia Record               Intraprocedure I/O Totals          Intake    LR bolus 300.00 mL    Total Intake 300 mL       Output    Est. Blood Loss 0 mL    Total Output 0 mL       Net    Net Volume 300 mL          Specimen: No specimens collected              Drains and/or Catheters:   Closed/Suction Drain 1 Lateral RUQ Bulb 19 Fr. (Active)   Site Description Unable to view 04/03/25 1945   Dressing Status Clean;Dry 04/03/25 1945   Drainage Appearance None 04/03/25 1945   Status To bulb suction 04/03/25 1945   Output (mL) 0 mL 04/03/25 1146       Closed/Suction Drain 1 Inferior;Lateral;Right Back Bulb 19 Fr. (Active)       External Urinary Catheter Female (Active)   Wall Suction (mmHg) 50 04/03/25 1945   External Catheter Status In place 04/03/25 1945   Securement Method Adhesive device 04/03/25 1945   Output (mL) 200 mL 04/03/25 1400       Tourniquet Times:         Implants: None    Findings: 150 ml of clots were removed. No identifiable source of bleeding.     Indications: Adriana Wood is an 69 y.o. female who is having surgery for   Hematoma [T14.8XXA].     The patient was seen in the preoperative area. The risks, benefits, complications, treatment options, non-operative alternatives, expected recovery and outcomes were discussed with the patient. The possibilities of reaction to medication, pulmonary aspiration, injury to surrounding structures, bleeding, recurrent infection, the need for additional procedures, failure to diagnose a condition, and creating a complication requiring transfusion or operation were discussed with the patient. The patient  concurred with the proposed plan, giving informed consent.  The site of surgery was properly noted/marked if necessary per policy. The patient has been actively warmed in preoperative area. Preoperative antibiotics have been ordered and given within 1 hours of incision. Venous thrombosis prophylaxis have been ordered including bilateral sequential compression devices    Procedure Details:   Standard safety huddle was performed as soon as the patient entered the operating theater.  The patient was identified by 3 identifiers.  Procedure, its laterality, need for antibiotics, allergies, estimated blood loss, out length of the procedure were all reviewed.  All agreed to proceed with the procedure.  The patient was then transferred to operating bed, placed supine, held secured by safety belt, SCDs were applied to the contralateral leg.  General esthesia was ministered by anesthesia personnel.  The patient was then repositioned into relaxed lateral decubitus position.  The surgical site was then prepped with Betadine paint and draped in standard sterile fashion.  Timeout was then performed.  All agreed to proceed.  She was given antibiotics before incision.    5 cm incision was made in the central section of the previous surgical incision at the right lateral chest.  The incision was made with 15 blade scalpel and electrocautery for hemostasis down to the fascia.  Clots were encountered and were drained from the wound.  No active bleeding was identified copious irrigation with saline was performed. new drain was placed.  The surgical incision was closed in layers with 2-0 PDS for the superficial fascial layer repair, 3-0 Monocryl for the deep dermal layer repair, 3-0 nylon for skin repair in simple interrupted fashion.  Dressing was applied to the wound.  Complications:  None; patient tolerated the procedure well.    Disposition: PACU - hemodynamically stable.  Condition: stable       Post OP Orders:     Attending  Attestation: I performed the procedure.    Haydee Reyes  Phone Number: [phone]

## 2025-04-04 NOTE — CARE PLAN
The patient's goals for the shift include      The clinical goals for the shift include pt will remain HDS through end of shift 4/4 0700    Problem: Pain - Adult  Goal: Verbalizes/displays adequate comfort level or baseline comfort level  Outcome: Progressing     Problem: Safety - Adult  Goal: Free from fall injury  Outcome: Progressing     Problem: Discharge Planning  Goal: Discharge to home or other facility with appropriate resources  Outcome: Progressing     Problem: Chronic Conditions and Co-morbidities  Goal: Patient's chronic conditions and co-morbidity symptoms are monitored and maintained or improved  Outcome: Progressing     Problem: Nutrition  Goal: Nutrient intake appropriate for maintaining nutritional needs  Outcome: Progressing     Problem: Fall/Injury  Goal: Not fall by end of shift  Outcome: Progressing  Goal: Be free from injury by end of the shift  Outcome: Progressing  Goal: Verbalize understanding of personal risk factors for fall in the hospital  Outcome: Progressing  Goal: Verbalize understanding of risk factor reduction measures to prevent injury from fall in the home  Outcome: Progressing  Goal: Use assistive devices by end of the shift  Outcome: Progressing  Goal: Pace activities to prevent fatigue by end of the shift  Outcome: Progressing     Problem: Pain  Goal: Takes deep breaths with improved pain control throughout the shift  Outcome: Progressing  Goal: Turns in bed with improved pain control throughout the shift  Outcome: Progressing  Goal: Walks with improved pain control throughout the shift  Outcome: Progressing  Goal: Performs ADL's with improved pain control throughout shift  Outcome: Progressing  Goal: Participates in PT with improved pain control throughout the shift  Outcome: Progressing  Goal: Free from opioid side effects throughout the shift  Outcome: Progressing  Goal: Free from acute confusion related to pain meds throughout the shift  Outcome: Progressing     Problem:  Skin  Goal: Decreased wound size/increased tissue granulation at next dressing change  Outcome: Progressing  Flowsheets (Taken 4/4/2025 0754)  Decreased wound size/increased tissue granulation at next dressing change: Promote sleep for wound healing  Goal: Participates in plan/prevention/treatment measures  Outcome: Progressing  Flowsheets (Taken 4/4/2025 0754)  Participates in plan/prevention/treatment measures: Elevate heels  Goal: Prevent/manage excess moisture  Outcome: Progressing  Flowsheets (Taken 4/4/2025 0754)  Prevent/manage excess moisture: Cleanse incontinence/protect with barrier cream  Goal: Prevent/minimize sheer/friction injuries  Outcome: Progressing  Flowsheets (Taken 4/4/2025 0754)  Prevent/minimize sheer/friction injuries: Use pull sheet  Goal: Promote/optimize nutrition  Outcome: Progressing  Flowsheets (Taken 4/4/2025 0754)  Promote/optimize nutrition: Monitor/record intake including meals  Goal: Promote skin healing  Outcome: Progressing  Flowsheets (Taken 4/4/2025 0754)  Promote skin healing: Assess skin/pad under line(s)/device(s)

## 2025-04-04 NOTE — PROGRESS NOTES
"  Division of Plastic and Reconstructive Surgery  Progress Note    Patient Name: Adriana Wood  MRN: [id number]  Date:  04/04/25     Subjective   Endorses sharp pain to R lateral chest wall surgical site, particularly to distal portion of incision near drain site, which is consistent from last night. Discussed use of lidocaine patch surrounding site. She reports being able to sleep relatively well overnight. Otherwise denies fever, chills, headache, dizziness, chest pain, palpitations, dyspnea, abdominal pain, nausea or vomiting.    Objective   Vital Signs  /78 (BP Location: Left arm, Patient Position: Lying)   Pulse 65   Temp 36.7 °C (98 °F) (Temporal)   Resp 17   Ht 1.778 m (5' 10\")   Wt 69.2 kg (152 lb 8.9 oz)   SpO2 95%   BMI 21.89 kg/m²      Physical Exam   Constitutional: A&Ox3, calm and cooperative, NAD.  Eyes: EOMI, clear sclera.   ENMT: Moist mucous membranes.  Head/Neck: NC/AT.  Cardiovascular: Regular rate.   Respiratory/Thorax: Unlabored respirations on RA.  Gastrointestinal: Abdomen soft, NTND.  Genitourinary: Voiding independently.  Extremities: RLE flap: warm to touch, soft and compressible, strong biphasic signal at marked stitch, circumferential incisions around flap well approximated with some scabbing  Right posterior lat donor site: incision healed to distal end, incision well approximated with sutures post op at proximal end, covered with island dressing, dressing c/d/I, TTP to right lat incision, MASSIEL drain x 1 to right lower flank with sanguinous output   Neurological: A&Ox3.  Psychological: Appropriate mood and behavior.  Skin: Warm and dry.     Diagnostics   Results for orders placed or performed during the hospital encounter of 03/28/25 (from the past 24 hours)   CBC   Result Value Ref Range    WBC 5.3 4.4 - 11.3 x10*3/uL    nRBC 0.0 0.0 - 0.0 /100 WBCs    RBC 3.68 (L) 4.00 - 5.20 x10*6/uL    Hemoglobin 10.4 (L) 12.0 - 16.0 g/dL    Hematocrit 32.7 (L) 36.0 - 46.0 %    MCV 89 " 80 - 100 fL    MCH 28.3 26.0 - 34.0 pg    MCHC 31.8 (L) 32.0 - 36.0 g/dL    RDW 13.9 11.5 - 14.5 %    Platelets 246 150 - 450 x10*3/uL   Basic metabolic panel   Result Value Ref Range    Glucose 105 (H) 74 - 99 mg/dL    Sodium 139 136 - 145 mmol/L    Potassium 4.1 3.5 - 5.3 mmol/L    Chloride 101 98 - 107 mmol/L    Bicarbonate 29 21 - 32 mmol/L    Anion Gap 13 10 - 20 mmol/L    Urea Nitrogen 12 6 - 23 mg/dL    Creatinine 0.50 0.50 - 1.05 mg/dL    eGFR >90 >60 mL/min/1.73m*2    Calcium 9.2 8.6 - 10.6 mg/dL   Magnesium   Result Value Ref Range    Magnesium 1.92 1.60 - 2.40 mg/dL     *Note: Due to a large number of results and/or encounters for the requested time period, some results have not been displayed. A complete set of results can be found in Results Review.     Current Medications  Scheduled medications  acetaminophen, 975 mg, oral, q8h  [Held by provider] apixaban, 2.5 mg, oral, BID  enoxaparin, 40 mg, subcutaneous, Daily  lidocaine, 1 patch, transdermal, Daily  magnesium oxide, 400 mg, oral, Daily  methocarbamol, 250 mg, oral, q8h FELIX  pantoprazole, 40 mg, oral, Daily before breakfast  polyethylene glycol, 17 g, oral, Daily  vancomycin, 1,000 mg, intravenous, q12h      Continuous medications     PRN medications  PRN medications: albuterol, docusate sodium, HYDROmorphone, lubricating eye drops, oxyCODONE, oxyCODONE, oxygen, vancomycin     Assessment   Adriana Wood is a 69 y.o. female with a past medical history of bradycardia, cardiomyopathy, CAD, cardiac arrest due to electrolyte abnormalities and zofran use post-operatively, and traumatic brain injury. Patient had a total knee right knee done by Dr. Whelan in March 2024 which was complicated by E. Coli infection. Patient had right TKA wound dehiscence s/p I&D right knee with polyswap and attempted re-closure of complex wound on 5/10/2024 by Dr. Whelan. She was referred to plastic surgery (Dr. Reyes) perioperatively given projected anticipated need  for possible flap coverage of the wound/defect site. Patient re-admitted and returned to the OR with orthopedics on 2/28/2025 for right knee I&D, revision static spacer and application of incisional wound vac. She returned to the OR with plastic surgery on 3/14 for R latissimus free flap to R knee. Patient unfortunately developed a hematoma at her latissimus flap donor site requiring readmission to the plastic surgery service on 3/29/25.    Plan:  #S/P Right latissimus dorsi hematoma evacuation 4/3  - Dressing changed POD#1 per plastics  - Continue to hold continue to home Eliquis for now   - OK for Lovenox for DVT prophylaxis on POD#1, initiated 4/4  - Continue drain care to x1 MASSIEL drains present at R lat donor site (nursing orders placed)  Nursing to strip drain tubing at least q4h and PRN to avoid drain/tubing obstruction   Please ensure that drains are compressed flat and plugged to maintain self suction at all times aside from when emptying   Nursing to please also empty and record output quantities at least TID and PRN if drains are full   Please notify the plastics team if drain outputs exceed >30 ml in 1 hr or >200 in 24 hrs  - Monitor VS/pulse ox Q8  - Monitor periodic AM CBC/RFP  - OK for regular diet  - Positioning: avoid pressure to R side    # Acute post-op pain  - Scheduled Tylenol  - Lidocaine patch  - Scheduled Robaxin  - PRN Oxy IR  - PRN Dilaudid 0.2 mg Q4 for breakthrough pain added 4/3 immediately post-op    # Hx R knee wound reconstruction via R latissimus free flap   - Continue q shift flap assessments per nursing with interval checks per plastic surgery team during daily rounding (Assess Doppler pulse at marked site q shift plus flap appearance - color, warmth, turgor)  - Nursing to notify plastic surgery team immediately if there are any acute changes  - Avoid positioning that would apply pressure to flap region or incisions   - NWB to RLE, patient to dangle RLE as tolerated     # Hx R total  knee replacement c/b infection   - Continue IV Vancomycin as recommended per ID during last admission (stop date 4/11/25)  - Check periodic labs while on IV ABX    # Acute right chest wall pain   - Scheduled Tylenol  - Lidocaine patch  - Scheduled Robaxin  - PRN Oxy IR    # Hx Asthma  - continue home inhaler  - as needed O2 supplementation  - nasal Afrin spray for congestion  - discussed need to be tested for YANETH as outpatient    # History of QT Prolongation  - Avoid QT prolonging medications; NO Zofran (hx of cardiac arrest)    # History of Bradycardia, PVCs and Bigeminy  - Consistent bradycardia starting evening of 3/29 around low 40s  - Asymptomatic HR of 34 on 3/30 AM with rapid response called. EKG noted same findings from cardiology appointment in early 2025: Bradycardia, PVC and bigeminy.   - Nursing to obtain HR with pulse check vs pulse ox since inaccurate readings of irregular heart rates on pulse ox are more common in cases of Bigeminy   - Continue telemetry   - Cardiology consulted initially who do not recommend intervention/treatment for asymptomatic bradycardia  - Internal Medicine consulted, felt that bradycardia is an artifact due to frequent PVCs, patient stable, now signed off     FEN/GI:   - Encourage adequate PO fluid intake   - Lytes stable, replete PRN  - Regular diet   - GI ppx with PO Protonix   - Bowel regimen with schedule miralax     DVT ppx:  - Holding home Eliquis for now   - subcutaneous Lovenox   - SCDs while in bed   - Activity as tolerated     Disposition: Patient to remain inpatient for post-operative pain control and monitoring of transition onto home Eliquis, timing TBD. PT/OT rec mod intensity continued therapy, anticipate eventual DC back to SNF.     Plan discussed with Dr. Reyes.    ARGENIS MatamorosC  Plastic and Reconstructive Surgery   [place]  Pager #[phone]  Team phone: [phone]

## 2025-04-05 LAB
ANION GAP SERPL CALC-SCNC: 14 MMOL/L (ref 10–20)
BUN SERPL-MCNC: 16 MG/DL (ref 6–23)
CALCIUM SERPL-MCNC: 8.9 MG/DL (ref 8.6–10.6)
CHLORIDE SERPL-SCNC: 100 MMOL/L (ref 98–107)
CO2 SERPL-SCNC: 28 MMOL/L (ref 21–32)
CREAT SERPL-MCNC: 0.52 MG/DL (ref 0.5–1.05)
EGFRCR SERPLBLD CKD-EPI 2021: >90 ML/MIN/1.73M*2
ERYTHROCYTE [DISTWIDTH] IN BLOOD BY AUTOMATED COUNT: 13.8 % (ref 11.5–14.5)
GLUCOSE SERPL-MCNC: 95 MG/DL (ref 74–99)
HCT VFR BLD AUTO: 33.3 % (ref 36–46)
HGB BLD-MCNC: 10.5 G/DL (ref 12–16)
MAGNESIUM SERPL-MCNC: 2.03 MG/DL (ref 1.6–2.4)
MCH RBC QN AUTO: 28.7 PG (ref 26–34)
MCHC RBC AUTO-ENTMCNC: 31.5 G/DL (ref 32–36)
MCV RBC AUTO: 91 FL (ref 80–100)
NRBC BLD-RTO: 0 /100 WBCS (ref 0–0)
PLATELET # BLD AUTO: 264 X10*3/UL (ref 150–450)
POTASSIUM SERPL-SCNC: 4.3 MMOL/L (ref 3.5–5.3)
RBC # BLD AUTO: 3.66 X10*6/UL (ref 4–5.2)
SODIUM SERPL-SCNC: 138 MMOL/L (ref 136–145)
VANCOMYCIN SERPL-MCNC: 17.6 UG/ML (ref 5–20)
WBC # BLD AUTO: 5.2 X10*3/UL (ref 4.4–11.3)

## 2025-04-05 PROCEDURE — 2500000004 HC RX 250 GENERAL PHARMACY W/ HCPCS (ALT 636 FOR OP/ED): Performed by: NURSE PRACTITIONER

## 2025-04-05 PROCEDURE — 2500000001 HC RX 250 WO HCPCS SELF ADMINISTERED DRUGS (ALT 637 FOR MEDICARE OP): Performed by: NURSE PRACTITIONER

## 2025-04-05 PROCEDURE — 83735 ASSAY OF MAGNESIUM: CPT

## 2025-04-05 PROCEDURE — 2500000005 HC RX 250 GENERAL PHARMACY W/O HCPCS: Performed by: NURSE PRACTITIONER

## 2025-04-05 PROCEDURE — 2060000001 HC INTERMEDIATE ICU ROOM DAILY

## 2025-04-05 PROCEDURE — 85027 COMPLETE CBC AUTOMATED: CPT

## 2025-04-05 PROCEDURE — 80202 ASSAY OF VANCOMYCIN: CPT | Performed by: NURSE PRACTITIONER

## 2025-04-05 PROCEDURE — 2500000004 HC RX 250 GENERAL PHARMACY W/ HCPCS (ALT 636 FOR OP/ED): Mod: JZ,TB | Performed by: NURSE PRACTITIONER

## 2025-04-05 PROCEDURE — 2500000004 HC RX 250 GENERAL PHARMACY W/ HCPCS (ALT 636 FOR OP/ED)

## 2025-04-05 PROCEDURE — 99232 SBSQ HOSP IP/OBS MODERATE 35: CPT

## 2025-04-05 PROCEDURE — 80048 BASIC METABOLIC PNL TOTAL CA: CPT

## 2025-04-05 RX ADMIN — OXYCODONE HYDROCHLORIDE 10 MG: 5 TABLET ORAL at 02:05

## 2025-04-05 RX ADMIN — LIDOCAINE 4% 1 PATCH: 40 PATCH TOPICAL at 09:00

## 2025-04-05 RX ADMIN — ACETAMINOPHEN 975 MG: 325 TABLET, FILM COATED ORAL at 23:43

## 2025-04-05 RX ADMIN — METHOCARBAMOL TABLETS 250 MG: 500 TABLET, COATED ORAL at 09:00

## 2025-04-05 RX ADMIN — VANCOMYCIN HYDROCHLORIDE 1000 MG: 1 INJECTION, SOLUTION INTRAVENOUS at 09:00

## 2025-04-05 RX ADMIN — OXYCODONE HYDROCHLORIDE 10 MG: 5 TABLET ORAL at 16:14

## 2025-04-05 RX ADMIN — ACETAMINOPHEN 975 MG: 325 TABLET, FILM COATED ORAL at 06:02

## 2025-04-05 RX ADMIN — OXYCODONE HYDROCHLORIDE 10 MG: 5 TABLET ORAL at 09:00

## 2025-04-05 RX ADMIN — HYDROMORPHONE HYDROCHLORIDE 0.2 MG: 0.2 INJECTION, SOLUTION INTRAMUSCULAR; INTRAVENOUS; SUBCUTANEOUS at 05:01

## 2025-04-05 RX ADMIN — VANCOMYCIN HYDROCHLORIDE 1000 MG: 1 INJECTION, SOLUTION INTRAVENOUS at 21:16

## 2025-04-05 RX ADMIN — ACETAMINOPHEN 975 MG: 325 TABLET, FILM COATED ORAL at 16:07

## 2025-04-05 RX ADMIN — MAGNESIUM OXIDE TAB 400 MG (241.3 MG ELEMENTAL MG) 400 MG: 400 (241.3 MG) TAB at 09:00

## 2025-04-05 RX ADMIN — OXYCODONE HYDROCHLORIDE 10 MG: 5 TABLET ORAL at 21:16

## 2025-04-05 RX ADMIN — METHOCARBAMOL TABLETS 250 MG: 500 TABLET, COATED ORAL at 16:07

## 2025-04-05 RX ADMIN — POLYETHYLENE GLYCOL 3350 17 G: 17 POWDER, FOR SOLUTION ORAL at 09:00

## 2025-04-05 RX ADMIN — OXYCODONE HYDROCHLORIDE 5 MG: 5 TABLET ORAL at 23:44

## 2025-04-05 RX ADMIN — METHOCARBAMOL TABLETS 250 MG: 500 TABLET, COATED ORAL at 23:43

## 2025-04-05 RX ADMIN — ENOXAPARIN SODIUM 40 MG: 100 INJECTION SUBCUTANEOUS at 09:00

## 2025-04-05 RX ADMIN — PANTOPRAZOLE SODIUM 40 MG: 40 TABLET, DELAYED RELEASE ORAL at 06:02

## 2025-04-05 SDOH — SOCIAL STABILITY: SOCIAL INSECURITY: WITHIN THE LAST YEAR, HAVE YOU BEEN AFRAID OF YOUR PARTNER OR EX-PARTNER?: NO

## 2025-04-05 SDOH — SOCIAL STABILITY: SOCIAL INSECURITY: WITHIN THE LAST YEAR, HAVE YOU BEEN HUMILIATED OR EMOTIONALLY ABUSED IN OTHER WAYS BY YOUR PARTNER OR EX-PARTNER?: NO

## 2025-04-05 SDOH — ECONOMIC STABILITY: TRANSPORTATION INSECURITY: IN THE PAST 12 MONTHS, HAS LACK OF TRANSPORTATION KEPT YOU FROM MEDICAL APPOINTMENTS OR FROM GETTING MEDICATIONS?: NO

## 2025-04-05 SDOH — ECONOMIC STABILITY: FOOD INSECURITY: WITHIN THE PAST 12 MONTHS, THE FOOD YOU BOUGHT JUST DIDN'T LAST AND YOU DIDN'T HAVE MONEY TO GET MORE.: NEVER TRUE

## 2025-04-05 SDOH — ECONOMIC STABILITY: FOOD INSECURITY: HOW HARD IS IT FOR YOU TO PAY FOR THE VERY BASICS LIKE FOOD, HOUSING, MEDICAL CARE, AND HEATING?: NOT VERY HARD

## 2025-04-05 SDOH — ECONOMIC STABILITY: INCOME INSECURITY: IN THE PAST 12 MONTHS HAS THE ELECTRIC, GAS, OIL, OR WATER COMPANY THREATENED TO SHUT OFF SERVICES IN YOUR HOME?: NO

## 2025-04-05 SDOH — ECONOMIC STABILITY: HOUSING INSECURITY: IN THE PAST 12 MONTHS, HOW MANY TIMES HAVE YOU MOVED WHERE YOU WERE LIVING?: 0

## 2025-04-05 SDOH — ECONOMIC STABILITY: HOUSING INSECURITY: AT ANY TIME IN THE PAST 12 MONTHS, WERE YOU HOMELESS OR LIVING IN A SHELTER (INCLUDING NOW)?: NO

## 2025-04-05 SDOH — ECONOMIC STABILITY: HOUSING INSECURITY: IN THE LAST 12 MONTHS, WAS THERE A TIME WHEN YOU WERE NOT ABLE TO PAY THE MORTGAGE OR RENT ON TIME?: NO

## 2025-04-05 SDOH — ECONOMIC STABILITY: FOOD INSECURITY: WITHIN THE PAST 12 MONTHS, YOU WORRIED THAT YOUR FOOD WOULD RUN OUT BEFORE YOU GOT THE MONEY TO BUY MORE.: NEVER TRUE

## 2025-04-05 ASSESSMENT — COGNITIVE AND FUNCTIONAL STATUS - GENERAL
PERSONAL GROOMING: A LITTLE
CLIMB 3 TO 5 STEPS WITH RAILING: A LOT
HELP NEEDED FOR BATHING: A LITTLE
DAILY ACTIVITIY SCORE: 14
DRESSING REGULAR LOWER BODY CLOTHING: A LOT
WALKING IN HOSPITAL ROOM: A LOT
MOVING FROM LYING ON BACK TO SITTING ON SIDE OF FLAT BED WITH BEDRAILS: A LOT
DRESSING REGULAR LOWER BODY CLOTHING: A LOT
DRESSING REGULAR UPPER BODY CLOTHING: A LOT
TOILETING: A LOT
EATING MEALS: A LITTLE
TURNING FROM BACK TO SIDE WHILE IN FLAT BAD: A LOT
DRESSING REGULAR UPPER BODY CLOTHING: A LOT
MOVING TO AND FROM BED TO CHAIR: A LOT
MOVING FROM LYING ON BACK TO SITTING ON SIDE OF FLAT BED WITH BEDRAILS: A LITTLE
STANDING UP FROM CHAIR USING ARMS: A LOT
EATING MEALS: A LITTLE
MOBILITY SCORE: 12
CLIMB 3 TO 5 STEPS WITH RAILING: A LOT
STANDING UP FROM CHAIR USING ARMS: A LOT
TOILETING: A LOT
PERSONAL GROOMING: A LITTLE
DAILY ACTIVITIY SCORE: 15
MOVING TO AND FROM BED TO CHAIR: A LOT
TURNING FROM BACK TO SIDE WHILE IN FLAT BAD: A LOT
HELP NEEDED FOR BATHING: A LOT
MOBILITY SCORE: 13
WALKING IN HOSPITAL ROOM: A LOT

## 2025-04-05 ASSESSMENT — PAIN SCALES - GENERAL
PAINLEVEL_OUTOF10: 4
PAINLEVEL_OUTOF10: 9
PAINLEVEL_OUTOF10: 8
PAINLEVEL_OUTOF10: 8
PAINLEVEL_OUTOF10: 0 - NO PAIN
PAINLEVEL_OUTOF10: 8
PAINLEVEL_OUTOF10: 8
PAINLEVEL_OUTOF10: 9
PAINLEVEL_OUTOF10: 3
PAINLEVEL_OUTOF10: 9

## 2025-04-05 ASSESSMENT — PAIN - FUNCTIONAL ASSESSMENT
PAIN_FUNCTIONAL_ASSESSMENT: 0-10

## 2025-04-05 ASSESSMENT — ACTIVITIES OF DAILY LIVING (ADL): LACK_OF_TRANSPORTATION: NO

## 2025-04-05 NOTE — CARE PLAN
Problem: Pain - Adult  Goal: Verbalizes/displays adequate comfort level or baseline comfort level  Outcome: Progressing     Problem: Safety - Adult  Goal: Free from fall injury  Outcome: Progressing     Problem: Discharge Planning  Goal: Discharge to home or other facility with appropriate resources  Outcome: Progressing     Problem: Skin  Goal: Decreased wound size/increased tissue granulation at next dressing change  Outcome: Progressing  Flowsheets (Taken 4/4/2025 2105)  Decreased wound size/increased tissue granulation at next dressing change:   Promote sleep for wound healing   Protective dressings over bony prominences  Goal: Participates in plan/prevention/treatment measures  Outcome: Progressing  Flowsheets (Taken 4/4/2025 2105)  Participates in plan/prevention/treatment measures:   Elevate heels   Increase activity/out of bed for meals  Goal: Prevent/manage excess moisture  Outcome: Progressing  Flowsheets (Taken 4/4/2025 2105)  Prevent/manage excess moisture:   Moisturize dry skin   Monitor for/manage infection if present  Goal: Prevent/minimize sheer/friction injuries  Outcome: Progressing  Goal: Promote/optimize nutrition  Outcome: Progressing  Goal: Promote skin healing  Outcome: Progressing   The patient's goals for the shift include      The clinical goals for the shift include Pt to remain free of falls thru shift, bed alarm engaged with frequent rounding.

## 2025-04-05 NOTE — PROGRESS NOTES
Vancomycin Dosing by Pharmacy- FOLLOW UP    Adriana Wood is a 69 y.o. year old female who Pharmacy has been consulted for vancomycin dosing for cellulitis, skin and soft tissue. Based on the patient's indication and renal status this patient is being dosed based on a goal AUC of 400-600.     Renal function is currently stable.    Current vancomycin dose: 1000 mg given every 12 hours    Estimated vancomycin AUC on current dose: 542 mg/L.hr     Visit Vitals  /65 (BP Location: Left arm, Patient Position: Lying)   Pulse 80   Temp 36.2 °C (97.2 °F) (Temporal)   Resp 18        Lab Results   Component Value Date    CREATININE 0.52 2025    CREATININE 0.50 2025    CREATININE 0.41 (L) 2025    CREATININE 0.54 2025        Patient weight is as follows:   Vitals:    25 0006   Weight: 69.2 kg (152 lb 8.9 oz)       Cultures:  No results found for the encounter in last 14 days.       I/O last 3 completed shifts:  In: 1416.7 (20.5 mL/kg) [P.O.:785; I.V.:131.7 (1.9 mL/kg); IV Piggyback:500]  Out: 2483 (35.9 mL/kg) [Urine:2375 (1 mL/kg/hr); Drains:108]  Weight: 69.2 kg   I/O during current shift:  No intake/output data recorded.    Temp (24hrs), Av.5 °C (97.7 °F), Min:36.2 °C (97.1 °F), Max:37 °C (98.6 °F)      Assessment/Plan    Within goal AUC range. Continue current vancomycin regimen.    This dosing regimen is predicted by [employer] to result in the following pharmacokinetic parameters:  Loading dose: N/A  Regimen: 1000 mg IV every 12 hours.  Start time: 08:32 on 2025  Exposure target: AUC24 (range)400-600 mg/L.hr   [id number]: 542 mg/L.hr  AUC24,ss: 567 mg/L.hr  Probability of AUC24 > 400: 100 %  Ctrough,ss: 18 mg/L  Probability of Ctrough,ss > 20: 11 %      The next level will be obtained on  with AM labs (0500). May be obtained sooner if clinically indicated.   Will continue to monitor renal function daily while on vancomycin and order serum creatinine at least every 48 hours  if not already ordered.  Follow for continued vancomycin needs, clinical response, and signs/symptoms of toxicity.       Kenzie Mcmillan, PharmD

## 2025-04-05 NOTE — CARE PLAN
The patient's goals for the shift include  comfort    The clinical goals for the shift include pt will remain HDS  Over the shift, the patient did   Problem: Pain - Adult  Goal: Verbalizes/displays adequate comfort level or baseline comfort level  Outcome: Progressing     Problem: Safety - Adult  Goal: Free from fall injury  Outcome: Progressing     Problem: Discharge Planning  Goal: Discharge to home or other facility with appropriate resources  Outcome: Progressing     Problem: Chronic Conditions and Co-morbidities  Goal: Patient's chronic conditions and co-morbidity symptoms are monitored and maintained or improved  Outcome: Progressing     Problem: Nutrition  Goal: Nutrient intake appropriate for maintaining nutritional needs  Outcome: Progressing   make progress toward the following goals.

## 2025-04-05 NOTE — PROGRESS NOTES
"  Division of Plastic and Reconstructive Surgery  Progress Note    Patient Name: Adriana Wood  MRN: [id number]  Date:  04/05/25     Subjective   Found resting in bed comfortably this AM. Pain well controlled with lidocaine patch to R lat donor site area. She reports being able to sleep relatively well overnight. Otherwise denies fever, chills, headache, dizziness, chest pain, palpitations, dyspnea, abdominal pain, nausea or vomiting.    Objective   Vital Signs  /69 (BP Location: Left arm, Patient Position: Lying)   Pulse 73   Temp 36.8 °C (98.2 °F) (Temporal)   Resp 18   Ht 1.778 m (5' 10\")   Wt 69.2 kg (152 lb 8.9 oz)   SpO2 96%   BMI 21.89 kg/m²      Physical Exam   Constitutional: A&Ox3, calm and cooperative, NAD.  Eyes: EOMI, clear sclera.   ENMT: Moist mucous membranes.  Head/Neck: NC/AT.  Cardiovascular: Regular rate.   Respiratory/Thorax: Unlabored respirations on RA.  Gastrointestinal: Abdomen soft, NTND.  Genitourinary: Voiding independently.  Extremities: RLE flap: warm to touch, soft and compressible, strong biphasic signal at marked stitch, circumferential incisions around flap well approximated with some scabbing  Right posterior lat donor site: incision healed to distal end, incision well approximated with sutures post op at proximal end, covered with island dressing, dressing c/d/I, TTP to right lat incision, MASSIEL drain x 1 to right lower flank with serous output   Neurological: A&Ox3.  Psychological: Appropriate mood and behavior.  Skin: Warm and dry.     Diagnostics   Results for orders placed or performed during the hospital encounter of 03/28/25 (from the past 24 hours)   Vancomycin   Result Value Ref Range    Vancomycin 17.6 5.0 - 20.0 ug/mL   CBC   Result Value Ref Range    WBC 5.2 4.4 - 11.3 x10*3/uL    nRBC 0.0 0.0 - 0.0 /100 WBCs    RBC 3.66 (L) 4.00 - 5.20 x10*6/uL    Hemoglobin 10.5 (L) 12.0 - 16.0 g/dL    Hematocrit 33.3 (L) 36.0 - 46.0 %    MCV 91 80 - 100 fL    MCH 28.7 " 26.0 - 34.0 pg    MCHC 31.5 (L) 32.0 - 36.0 g/dL    RDW 13.8 11.5 - 14.5 %    Platelets 264 150 - 450 x10*3/uL   Basic metabolic panel   Result Value Ref Range    Glucose 95 74 - 99 mg/dL    Sodium 138 136 - 145 mmol/L    Potassium 4.3 3.5 - 5.3 mmol/L    Chloride 100 98 - 107 mmol/L    Bicarbonate 28 21 - 32 mmol/L    Anion Gap 14 10 - 20 mmol/L    Urea Nitrogen 16 6 - 23 mg/dL    Creatinine 0.52 0.50 - 1.05 mg/dL    eGFR >90 >60 mL/min/1.73m*2    Calcium 8.9 8.6 - 10.6 mg/dL   Magnesium   Result Value Ref Range    Magnesium 2.03 1.60 - 2.40 mg/dL     *Note: Due to a large number of results and/or encounters for the requested time period, some results have not been displayed. A complete set of results can be found in Results Review.     Current Medications  Scheduled medications  acetaminophen, 975 mg, oral, q8h  [Held by provider] apixaban, 2.5 mg, oral, BID  enoxaparin, 40 mg, subcutaneous, Daily  lidocaine, 1 patch, transdermal, Daily  magnesium oxide, 400 mg, oral, Daily  methocarbamol, 250 mg, oral, q8h FELIX  pantoprazole, 40 mg, oral, Daily before breakfast  polyethylene glycol, 17 g, oral, Daily  vancomycin, 1,000 mg, intravenous, q12h      Continuous medications     PRN medications  PRN medications: albuterol, docusate sodium, HYDROmorphone, lubricating eye drops, oxyCODONE, oxyCODONE, oxygen, vancomycin     Assessment   Adriana Wood is a 69 y.o. female with a past medical history of bradycardia, cardiomyopathy, CAD, cardiac arrest due to electrolyte abnormalities and zofran use post-operatively, and traumatic brain injury. Patient had a total knee right knee done by Dr. Whelan in March 2024 which was complicated by E. Coli infection. Patient had right TKA wound dehiscence s/p I&D right knee with polyswap and attempted re-closure of complex wound on 5/10/2024 by Dr. Whelan. She was referred to plastic surgery (Dr. Reyes) perioperatively given projected anticipated need for possible flap coverage of  the wound/defect site. Patient re-admitted and returned to the OR with orthopedics on 2/28/2025 for right knee I&D, revision static spacer and application of incisional wound vac. She returned to the OR with plastic surgery on 3/14 for R latissimus free flap to R knee. Patient unfortunately developed a hematoma at her latissimus flap donor site requiring readmission to the plastic surgery service on 3/29/25.    Plan:  #S/P Right latissimus dorsi hematoma evacuation 4/3  - Dressing changed POD#1 per plastics  - Continue to hold continue to home Eliquis for now   - OK for Lovenox for DVT prophylaxis on POD#1, initiated 4/4  - Continue drain care to x1 MASSIEL drains present at R lat donor site (nursing orders placed)  Nursing to strip drain tubing at least q4h and PRN to avoid drain/tubing obstruction   Please ensure that drains are compressed flat and plugged to maintain self suction at all times aside from when emptying   Nursing to please also empty and record output quantities at least TID and PRN if drains are full   Please notify the plastics team if drain outputs exceed >30 ml in 1 hr or >200 in 24 hrs  - Monitor VS/pulse ox Q8  - Monitor periodic AM CBC/RFP  - OK for regular diet  - Positioning: avoid pressure to R side    # Acute post-op pain  - Scheduled Tylenol  - Lidocaine patch  - Scheduled Robaxin  - PRN Oxy IR  - PRN Dilaudid 0.2 mg Q4 for breakthrough pain added 4/3 immediately post-op    # Hx R knee wound reconstruction via R latissimus free flap   - Continue q shift flap assessments per nursing with interval checks per plastic surgery team during daily rounding (Assess Doppler pulse at marked site q shift plus flap appearance - color, warmth, turgor)  - Nursing to notify plastic surgery team immediately if there are any acute changes  - Avoid positioning that would apply pressure to flap region or incisions   - NWB to RLE, patient to dangle RLE as tolerated     # Hx R total knee replacement c/b infection    - Continue IV Vancomycin as recommended per ID during last admission (stop date 4/11/25)  - Check periodic labs while on IV ABX    # Acute right chest wall pain   - Scheduled Tylenol  - Lidocaine patch  - Scheduled Robaxin  - PRN Oxy IR    # Hx Asthma  - continue home inhaler  - as needed O2 supplementation  - nasal Afrin spray for congestion  - discussed need to be tested for YANETH as outpatient    # History of QT Prolongation  - Avoid QT prolonging medications; NO Zofran (hx of cardiac arrest)    # History of Bradycardia, PVCs and Bigeminy  - Consistent bradycardia starting evening of 3/29 around low 40s  - Asymptomatic HR of 34 on 3/30 AM with rapid response called. EKG noted same findings from cardiology appointment in early 2025: Bradycardia, PVC and bigeminy.   - Nursing to obtain HR with pulse check vs pulse ox since inaccurate readings of irregular heart rates on pulse ox are more common in cases of Bigeminy   - Continue telemetry   - Cardiology consulted initially who do not recommend intervention/treatment for asymptomatic bradycardia  - Internal Medicine consulted, felt that bradycardia is an artifact due to frequent PVCs, patient stable, now signed off     FEN/GI:   - Encourage adequate PO fluid intake   - Lytes stable, replete PRN  - Regular diet   - GI ppx with PO Protonix   - Bowel regimen with schedule miralax     DVT ppx:  - Holding home Eliquis for now   - subcutaneous Lovenox   - SCDs while in bed   - Activity as tolerated     Disposition: Patient to remain inpatient for post-operative pain control and monitoring of transition onto home Eliquis, timing TBD. PT/OT rec mod intensity continued therapy, anticipate eventual DC back to SNF.     Plan discussed with Dr. Reyes.    ISABEL Sanchez-CNP  Plastic and Reconstructive Surgery   [place]  Pager #[phone]  Team phone: [phone]

## 2025-04-06 VITALS
BODY MASS INDEX: 21.84 KG/M2 | WEIGHT: 152.56 LBS | HEART RATE: 78 BPM | HEIGHT: 70 IN | OXYGEN SATURATION: 95 % | TEMPERATURE: 97.3 F | DIASTOLIC BLOOD PRESSURE: 76 MMHG | RESPIRATION RATE: 16 BRPM | SYSTOLIC BLOOD PRESSURE: 136 MMHG

## 2025-04-06 LAB
ANION GAP SERPL CALC-SCNC: 12 MMOL/L (ref 10–20)
BUN SERPL-MCNC: 14 MG/DL (ref 6–23)
CALCIUM SERPL-MCNC: 9.5 MG/DL (ref 8.6–10.6)
CHLORIDE SERPL-SCNC: 101 MMOL/L (ref 98–107)
CO2 SERPL-SCNC: 30 MMOL/L (ref 21–32)
CREAT SERPL-MCNC: 0.45 MG/DL (ref 0.5–1.05)
EGFRCR SERPLBLD CKD-EPI 2021: >90 ML/MIN/1.73M*2
ERYTHROCYTE [DISTWIDTH] IN BLOOD BY AUTOMATED COUNT: 13.8 % (ref 11.5–14.5)
GLUCOSE SERPL-MCNC: 88 MG/DL (ref 74–99)
HCT VFR BLD AUTO: 32 % (ref 36–46)
HGB BLD-MCNC: 10.3 G/DL (ref 12–16)
MAGNESIUM SERPL-MCNC: 1.92 MG/DL (ref 1.6–2.4)
MCH RBC QN AUTO: 29.2 PG (ref 26–34)
MCHC RBC AUTO-ENTMCNC: 32.2 G/DL (ref 32–36)
MCV RBC AUTO: 91 FL (ref 80–100)
NRBC BLD-RTO: 0 /100 WBCS (ref 0–0)
PLATELET # BLD AUTO: 256 X10*3/UL (ref 150–450)
POTASSIUM SERPL-SCNC: 3.9 MMOL/L (ref 3.5–5.3)
RBC # BLD AUTO: 3.53 X10*6/UL (ref 4–5.2)
SODIUM SERPL-SCNC: 139 MMOL/L (ref 136–145)
WBC # BLD AUTO: 4.1 X10*3/UL (ref 4.4–11.3)

## 2025-04-06 PROCEDURE — 83735 ASSAY OF MAGNESIUM: CPT

## 2025-04-06 PROCEDURE — 2500000005 HC RX 250 GENERAL PHARMACY W/O HCPCS: Performed by: NURSE PRACTITIONER

## 2025-04-06 PROCEDURE — 82374 ASSAY BLOOD CARBON DIOXIDE: CPT

## 2025-04-06 PROCEDURE — 2500000001 HC RX 250 WO HCPCS SELF ADMINISTERED DRUGS (ALT 637 FOR MEDICARE OP): Performed by: NURSE PRACTITIONER

## 2025-04-06 PROCEDURE — 2500000004 HC RX 250 GENERAL PHARMACY W/ HCPCS (ALT 636 FOR OP/ED)

## 2025-04-06 PROCEDURE — 99232 SBSQ HOSP IP/OBS MODERATE 35: CPT

## 2025-04-06 PROCEDURE — 2500000004 HC RX 250 GENERAL PHARMACY W/ HCPCS (ALT 636 FOR OP/ED): Performed by: NURSE PRACTITIONER

## 2025-04-06 PROCEDURE — 2500000001 HC RX 250 WO HCPCS SELF ADMINISTERED DRUGS (ALT 637 FOR MEDICARE OP)

## 2025-04-06 PROCEDURE — 2060000001 HC INTERMEDIATE ICU ROOM DAILY

## 2025-04-06 PROCEDURE — 85027 COMPLETE CBC AUTOMATED: CPT

## 2025-04-06 RX ORDER — OXYCODONE HYDROCHLORIDE 5 MG/1
5 TABLET ORAL EVERY 4 HOURS PRN
Status: DISCONTINUED | OUTPATIENT
Start: 2025-04-06 | End: 2025-04-09 | Stop reason: HOSPADM

## 2025-04-06 RX ORDER — OXYCODONE HYDROCHLORIDE 5 MG/1
10 TABLET ORAL EVERY 4 HOURS PRN
Status: DISCONTINUED | OUTPATIENT
Start: 2025-04-06 | End: 2025-04-09 | Stop reason: HOSPADM

## 2025-04-06 RX ADMIN — VANCOMYCIN HYDROCHLORIDE 1000 MG: 1 INJECTION, SOLUTION INTRAVENOUS at 20:06

## 2025-04-06 RX ADMIN — MAGNESIUM OXIDE TAB 400 MG (241.3 MG ELEMENTAL MG) 400 MG: 400 (241.3 MG) TAB at 08:08

## 2025-04-06 RX ADMIN — METHOCARBAMOL TABLETS 250 MG: 500 TABLET, COATED ORAL at 15:31

## 2025-04-06 RX ADMIN — ACETAMINOPHEN 975 MG: 325 TABLET, FILM COATED ORAL at 15:31

## 2025-04-06 RX ADMIN — ACETAMINOPHEN 975 MG: 325 TABLET, FILM COATED ORAL at 08:08

## 2025-04-06 RX ADMIN — METHOCARBAMOL TABLETS 250 MG: 500 TABLET, COATED ORAL at 08:08

## 2025-04-06 RX ADMIN — ACETAMINOPHEN 975 MG: 325 TABLET, FILM COATED ORAL at 23:46

## 2025-04-06 RX ADMIN — POLYETHYLENE GLYCOL 3350 17 G: 17 POWDER, FOR SOLUTION ORAL at 08:08

## 2025-04-06 RX ADMIN — METHOCARBAMOL TABLETS 250 MG: 500 TABLET, COATED ORAL at 23:46

## 2025-04-06 RX ADMIN — OXYCODONE HYDROCHLORIDE 10 MG: 5 TABLET ORAL at 15:31

## 2025-04-06 RX ADMIN — ENOXAPARIN SODIUM 40 MG: 100 INJECTION SUBCUTANEOUS at 08:08

## 2025-04-06 RX ADMIN — VANCOMYCIN HYDROCHLORIDE 1000 MG: 1 INJECTION, SOLUTION INTRAVENOUS at 08:08

## 2025-04-06 RX ADMIN — OXYCODONE HYDROCHLORIDE 10 MG: 5 TABLET ORAL at 09:33

## 2025-04-06 RX ADMIN — OXYCODONE HYDROCHLORIDE 5 MG: 5 TABLET ORAL at 07:52

## 2025-04-06 RX ADMIN — LIDOCAINE 4% 1 PATCH: 40 PATCH TOPICAL at 08:08

## 2025-04-06 RX ADMIN — PANTOPRAZOLE SODIUM 40 MG: 40 TABLET, DELAYED RELEASE ORAL at 09:33

## 2025-04-06 RX ADMIN — OXYCODONE HYDROCHLORIDE 10 MG: 5 TABLET ORAL at 20:06

## 2025-04-06 ASSESSMENT — COGNITIVE AND FUNCTIONAL STATUS - GENERAL
TOILETING: A LITTLE
DRESSING REGULAR UPPER BODY CLOTHING: A LITTLE
MOVING FROM LYING ON BACK TO SITTING ON SIDE OF FLAT BED WITH BEDRAILS: A LITTLE
TOILETING: A LITTLE
DRESSING REGULAR UPPER BODY CLOTHING: A LITTLE
MOVING FROM LYING ON BACK TO SITTING ON SIDE OF FLAT BED WITH BEDRAILS: A LITTLE
DAILY ACTIVITIY SCORE: 20
DRESSING REGULAR LOWER BODY CLOTHING: A LITTLE
WALKING IN HOSPITAL ROOM: A LOT
DAILY ACTIVITIY SCORE: 19
CLIMB 3 TO 5 STEPS WITH RAILING: A LOT
HELP NEEDED FOR BATHING: A LITTLE
DRESSING REGULAR LOWER BODY CLOTHING: A LITTLE
MOVING TO AND FROM BED TO CHAIR: A LOT
WALKING IN HOSPITAL ROOM: A LOT
HELP NEEDED FOR BATHING: A LITTLE
STANDING UP FROM CHAIR USING ARMS: A LOT
STANDING UP FROM CHAIR USING ARMS: A LOT
TURNING FROM BACK TO SIDE WHILE IN FLAT BAD: A LITTLE
TURNING FROM BACK TO SIDE WHILE IN FLAT BAD: A LOT
CLIMB 3 TO 5 STEPS WITH RAILING: TOTAL
MOBILITY SCORE: 14
MOVING TO AND FROM BED TO CHAIR: A LITTLE
MOBILITY SCORE: 13
PERSONAL GROOMING: A LITTLE

## 2025-04-06 ASSESSMENT — PAIN SCALES - GENERAL
PAINLEVEL_OUTOF10: 3
PAINLEVEL_OUTOF10: 8
PAINLEVEL_OUTOF10: 10 - WORST POSSIBLE PAIN
PAINLEVEL_OUTOF10: 0 - NO PAIN
PAINLEVEL_OUTOF10: 8
PAINLEVEL_OUTOF10: 8
PAINLEVEL_OUTOF10: 3
PAINLEVEL_OUTOF10: 8
PAINLEVEL_OUTOF10: 2

## 2025-04-06 NOTE — PROGRESS NOTES
"  Division of Plastic and Reconstructive Surgery  Progress Note    Patient Name: Adriana Wood  MRN: [id number]  Date:  04/06/25     Subjective   Found resting in bed comfortably this AM. Complains of mild right knee pain, right ankle pain, and right lat donor site discomfort, all well controlled on current regimen. She reports being able to sleep relatively well overnight. Was able to sit up and dangle for 2 hours yesterday. No concerns. Otherwise denies fever, chills, headache, dizziness, chest pain, palpitations, dyspnea, abdominal pain, nausea or vomiting.    Objective   Vital Signs  /68 (BP Location: Left arm, Patient Position: Lying)   Pulse 96   Temp 37.8 °C (100 °F) (Temporal)   Resp 17   Ht 1.778 m (5' 10\")   Wt 69.2 kg (152 lb 8.9 oz)   SpO2 93%   BMI 21.89 kg/m²      Physical Exam   Constitutional: A&Ox3, calm and cooperative, NAD.  Eyes: EOMI, clear sclera.   ENMT: Moist mucous membranes.  Head/Neck: NC/AT.  Cardiovascular: Regular rate.   Respiratory/Thorax: Unlabored respirations on RA.  Gastrointestinal: Abdomen soft, NTND.  Genitourinary: Voiding independently.  Extremities: RLE flap: warm to touch, soft and compressible, strong biphasic signal at marked stitch, circumferential incisions around flap well approximated with some scabbing  Right posterior lat donor site: incision healed to distal end, incision well approximated with sutures post op at proximal end, covered with ABD, dressing c/d/I, TTP to right lat incision, MASSIEL drain x 1 to right lower flank with serous output   Neurological: A&Ox3.  Psychological: Appropriate mood and behavior.  Skin: Warm and dry.     Diagnostics   Results for orders placed or performed during the hospital encounter of 03/28/25 (from the past 24 hours)   Basic metabolic panel   Result Value Ref Range    Glucose 88 74 - 99 mg/dL    Sodium 139 136 - 145 mmol/L    Potassium 3.9 3.5 - 5.3 mmol/L    Chloride 101 98 - 107 mmol/L    Bicarbonate 30 21 - 32 " mmol/L    Anion Gap 12 10 - 20 mmol/L    Urea Nitrogen 14 6 - 23 mg/dL    Creatinine 0.45 (L) 0.50 - 1.05 mg/dL    eGFR >90 >60 mL/min/1.73m*2    Calcium 9.5 8.6 - 10.6 mg/dL   CBC   Result Value Ref Range    WBC 4.1 (L) 4.4 - 11.3 x10*3/uL    nRBC 0.0 0.0 - 0.0 /100 WBCs    RBC 3.53 (L) 4.00 - 5.20 x10*6/uL    Hemoglobin 10.3 (L) 12.0 - 16.0 g/dL    Hematocrit 32.0 (L) 36.0 - 46.0 %    MCV 91 80 - 100 fL    MCH 29.2 26.0 - 34.0 pg    MCHC 32.2 32.0 - 36.0 g/dL    RDW 13.8 11.5 - 14.5 %    Platelets 256 150 - 450 x10*3/uL   Magnesium   Result Value Ref Range    Magnesium 1.92 1.60 - 2.40 mg/dL     *Note: Due to a large number of results and/or encounters for the requested time period, some results have not been displayed. A complete set of results can be found in Results Review.     Current Medications  Scheduled medications  acetaminophen, 975 mg, oral, q8h  [Held by provider] apixaban, 2.5 mg, oral, BID  enoxaparin, 40 mg, subcutaneous, Daily  lidocaine, 1 patch, transdermal, Daily  magnesium oxide, 400 mg, oral, Daily  methocarbamol, 250 mg, oral, q8h FELIX  pantoprazole, 40 mg, oral, Daily before breakfast  polyethylene glycol, 17 g, oral, Daily  vancomycin, 1,000 mg, intravenous, q12h      Continuous medications     PRN medications  PRN medications: albuterol, docusate sodium, HYDROmorphone, lubricating eye drops, oxyCODONE, oxyCODONE, oxygen, vancomycin     Assessment   Adriana Wood is a 69 y.o. female with a past medical history of bradycardia, cardiomyopathy, CAD, cardiac arrest due to electrolyte abnormalities and zofran use post-operatively, and traumatic brain injury. Patient had a total knee right knee done by Dr. Whelan in March 2024 which was complicated by E. Coli infection. Patient had right TKA wound dehiscence s/p I&D right knee with polyswap and attempted re-closure of complex wound on 5/10/2024 by Dr. Whelan. She was referred to plastic surgery (Dr. Reyes) perioperatively given projected  anticipated need for possible flap coverage of the wound/defect site. Patient re-admitted and returned to the OR with orthopedics on 2/28/2025 for right knee I&D, revision static spacer and application of incisional wound vac. She returned to the OR with plastic surgery on 3/14 for R latissimus free flap to R knee. Patient unfortunately developed a hematoma at her latissimus flap donor site requiring readmission to the plastic surgery service on 3/29/25.    Plan:  #S/P Right latissimus dorsi hematoma evacuation 4/3  - May cover site with ABD  - OK for Lovenox for DVT prophylaxis on POD#1, initiated 4/4       ·  Continue to hold eliquis, will plan on DC on lovenox given bleed/hematoma on eliquis  - Continue drain care to x1 MASSIEL drains present at R lat donor site (nursing orders placed)  Nursing to strip drain tubing at least q4h and PRN to avoid drain/tubing obstruction   Please ensure that drains are compressed flat and plugged to maintain self suction at all times aside from when emptying   Nursing to please also empty and record output quantities at least TID and PRN if drains are full   Please notify the plastics team if drain outputs exceed >30 ml in 1 hr or >200 in 24 hrs  - Monitor VS/pulse ox Q8  - Monitor periodic AM CBC/RFP  - OK for regular diet  - Positioning: avoid pressure to R side    # Acute post-op pain  - Scheduled Tylenol  - Lidocaine patch  - Scheduled Robaxin  - PRN Oxy IR  - PRN Dilaudid 0.2 mg Q4 for breakthrough pain added 4/3 immediately post-op    # Hx R knee wound reconstruction via R latissimus free flap   - Continue q shift flap assessments per nursing with interval checks per plastic surgery team during daily rounding (Assess Doppler pulse at marked site q shift plus flap appearance - color, warmth, turgor)  - Nursing to notify plastic surgery team immediately if there are any acute changes  - Avoid positioning that would apply pressure to flap region or incisions   - NWB to RLE, patient  to choco RLE as tolerated     # Hx R total knee replacement c/b infection   - Continue IV Vancomycin as recommended per ID during last admission (stop date 4/11/25)  - Check periodic labs while on IV ABX    # Hx Asthma  - continue home inhaler  - as needed O2 supplementation  - nasal Afrin spray for congestion  - discussed need to be tested for YANETH as outpatient    # History of QT Prolongation  - Avoid QT prolonging medications; NO Zofran (hx of cardiac arrest)    # History of Bradycardia, PVCs and Bigeminy  - Consistent bradycardia starting evening of 3/29 around low 40s  - Asymptomatic HR of 34 on 3/30 AM with rapid response called. EKG noted same findings from cardiology appointment in early 2025: Bradycardia, PVC and bigeminy.   - Nursing to obtain HR with pulse check vs pulse ox since inaccurate readings of irregular heart rates on pulse ox are more common in cases of Bigeminy   - Continue telemetry   - Cardiology consulted initially who do not recommend intervention/treatment for asymptomatic bradycardia  - Internal Medicine consulted, felt that bradycardia is an artifact due to frequent PVCs, patient stable, now signed off     FEN/GI:   - Encourage adequate PO fluid intake   - Lytes stable, replete PRN  - Regular diet   - GI ppx with PO Protonix   - Bowel regimen with schedule miralax     DVT ppx:  - subcutaneous Lovenox   - SCDs while in bed   - Activity as tolerated     Disposition: Patient to remain inpatient for post-operative pain control and monitoring will likely DC back to facility on subcutaneous lovenox given bleed/hematoma with eliquis. Likely DC back to facility early this upcoming week    Plan discussed with Dr. Reyes.    Ronny Felix, ISABEL-CNP  Plastic and Reconstructive Surgery   [place]  Pager #[phone]  Team phone: [phone]

## 2025-04-06 NOTE — CARE PLAN
The patient's goals for the shift include      The clinical goals for the shift include patient's pain will be <5/10 throughout shift.      Problem: Skin  Goal: Decreased wound size/increased tissue granulation at next dressing change  Flowsheets (Taken 4/6/2025 0303)  Decreased wound size/increased tissue granulation at next dressing change:   Promote sleep for wound healing   Protective dressings over bony prominences  Goal: Participates in plan/prevention/treatment measures  Flowsheets (Taken 4/6/2025 0303)  Participates in plan/prevention/treatment measures:   Discuss with provider PT/OT consult   Elevate heels  Goal: Prevent/manage excess moisture  Flowsheets (Taken 4/6/2025 0303)  Prevent/manage excess moisture:   Monitor for/manage infection if present   Follow provider orders for dressing changes  Goal: Prevent/minimize sheer/friction injuries  Flowsheets (Taken 4/6/2025 0303)  Prevent/minimize sheer/friction injuries:   Complete micro-shifts as needed if patient unable. Adjust patient position to relieve pressure points, not a full turn   Use pull sheet   Turn/reposition every 2 hours/use positioning/transfer devices  Goal: Promote/optimize nutrition  Flowsheets (Taken 4/6/2025 0303)  Promote/optimize nutrition:   Monitor/record intake including meals   Consume > 50% meals/supplements   Offer water/supplements/favorite foods  Goal: Promote skin healing  Flowsheets (Taken 4/6/2025 0303)  Promote skin healing:   Assess skin/pad under line(s)/device(s)   Protective dressings over bony prominences   Turn/reposition every 2 hours/use positioning/transfer devices   Ensure correct size (line/device) and apply per  instructions   Rotate device position/do not position patient on device

## 2025-04-06 NOTE — CARE PLAN
The patient's goals for the shift include  pain management     The clinical goals for the shift include patient will remain HDS    Over the shift, the patient did  Problem: Pain - Adult  Goal: Verbalizes/displays adequate comfort level or baseline comfort level  Outcome: Progressing     Problem: Safety - Adult  Goal: Free from fall injury  Outcome: Progressing     Problem: Discharge Planning  Goal: Discharge to home or other facility with appropriate resources  Outcome: Progressing     Problem: Chronic Conditions and Co-morbidities  Goal: Patient's chronic conditions and co-morbidity symptoms are monitored and maintained or improved  Outcome: Progressing     Problem: Nutrition  Goal: Nutrient intake appropriate for maintaining nutritional needs  Outcome: Progressing    make progress toward the following goals.

## 2025-04-07 LAB
ANION GAP SERPL CALC-SCNC: 12 MMOL/L (ref 10–20)
BUN SERPL-MCNC: 16 MG/DL (ref 6–23)
CALCIUM SERPL-MCNC: 8.9 MG/DL (ref 8.6–10.6)
CHLORIDE SERPL-SCNC: 102 MMOL/L (ref 98–107)
CO2 SERPL-SCNC: 30 MMOL/L (ref 21–32)
CREAT SERPL-MCNC: 0.49 MG/DL (ref 0.5–1.05)
EGFRCR SERPLBLD CKD-EPI 2021: >90 ML/MIN/1.73M*2
ERYTHROCYTE [DISTWIDTH] IN BLOOD BY AUTOMATED COUNT: 13.8 % (ref 11.5–14.5)
GLUCOSE SERPL-MCNC: 78 MG/DL (ref 74–99)
HCT VFR BLD AUTO: 30.9 % (ref 36–46)
HGB BLD-MCNC: 9.7 G/DL (ref 12–16)
MAGNESIUM SERPL-MCNC: 1.95 MG/DL (ref 1.6–2.4)
MCH RBC QN AUTO: 28.4 PG (ref 26–34)
MCHC RBC AUTO-ENTMCNC: 31.4 G/DL (ref 32–36)
MCV RBC AUTO: 91 FL (ref 80–100)
NRBC BLD-RTO: 0 /100 WBCS (ref 0–0)
PLATELET # BLD AUTO: 242 X10*3/UL (ref 150–450)
POTASSIUM SERPL-SCNC: 4 MMOL/L (ref 3.5–5.3)
RBC # BLD AUTO: 3.41 X10*6/UL (ref 4–5.2)
SODIUM SERPL-SCNC: 140 MMOL/L (ref 136–145)
WBC # BLD AUTO: 3.9 X10*3/UL (ref 4.4–11.3)

## 2025-04-07 PROCEDURE — 2500000004 HC RX 250 GENERAL PHARMACY W/ HCPCS (ALT 636 FOR OP/ED): Performed by: NURSE PRACTITIONER

## 2025-04-07 PROCEDURE — 97530 THERAPEUTIC ACTIVITIES: CPT | Mod: GO

## 2025-04-07 PROCEDURE — 2500000001 HC RX 250 WO HCPCS SELF ADMINISTERED DRUGS (ALT 637 FOR MEDICARE OP): Performed by: NURSE PRACTITIONER

## 2025-04-07 PROCEDURE — 82374 ASSAY BLOOD CARBON DIOXIDE: CPT

## 2025-04-07 PROCEDURE — 99232 SBSQ HOSP IP/OBS MODERATE 35: CPT | Performed by: PHYSICIAN ASSISTANT

## 2025-04-07 PROCEDURE — 2060000001 HC INTERMEDIATE ICU ROOM DAILY

## 2025-04-07 PROCEDURE — 2500000001 HC RX 250 WO HCPCS SELF ADMINISTERED DRUGS (ALT 637 FOR MEDICARE OP)

## 2025-04-07 PROCEDURE — 97530 THERAPEUTIC ACTIVITIES: CPT | Mod: GP,CQ

## 2025-04-07 PROCEDURE — 2500000004 HC RX 250 GENERAL PHARMACY W/ HCPCS (ALT 636 FOR OP/ED)

## 2025-04-07 PROCEDURE — 2500000005 HC RX 250 GENERAL PHARMACY W/O HCPCS: Performed by: NURSE PRACTITIONER

## 2025-04-07 PROCEDURE — 97535 SELF CARE MNGMENT TRAINING: CPT | Mod: GO

## 2025-04-07 PROCEDURE — 85027 COMPLETE CBC AUTOMATED: CPT

## 2025-04-07 PROCEDURE — 83735 ASSAY OF MAGNESIUM: CPT

## 2025-04-07 RX ADMIN — METHOCARBAMOL TABLETS 250 MG: 500 TABLET, COATED ORAL at 09:01

## 2025-04-07 RX ADMIN — ACETAMINOPHEN 975 MG: 325 TABLET, FILM COATED ORAL at 09:01

## 2025-04-07 RX ADMIN — LIDOCAINE 4% 1 PATCH: 40 PATCH TOPICAL at 09:01

## 2025-04-07 RX ADMIN — MAGNESIUM OXIDE TAB 400 MG (241.3 MG ELEMENTAL MG) 400 MG: 400 (241.3 MG) TAB at 09:01

## 2025-04-07 RX ADMIN — VANCOMYCIN HYDROCHLORIDE 1000 MG: 1 INJECTION, SOLUTION INTRAVENOUS at 09:05

## 2025-04-07 RX ADMIN — PANTOPRAZOLE SODIUM 40 MG: 40 TABLET, DELAYED RELEASE ORAL at 09:05

## 2025-04-07 RX ADMIN — ACETAMINOPHEN 975 MG: 325 TABLET, FILM COATED ORAL at 16:05

## 2025-04-07 RX ADMIN — POLYETHYLENE GLYCOL 3350 17 G: 17 POWDER, FOR SOLUTION ORAL at 09:01

## 2025-04-07 RX ADMIN — METHOCARBAMOL TABLETS 250 MG: 500 TABLET, COATED ORAL at 16:05

## 2025-04-07 RX ADMIN — OXYCODONE HYDROCHLORIDE 10 MG: 5 TABLET ORAL at 19:36

## 2025-04-07 RX ADMIN — ENOXAPARIN SODIUM 40 MG: 100 INJECTION SUBCUTANEOUS at 09:01

## 2025-04-07 RX ADMIN — METHOCARBAMOL TABLETS 250 MG: 500 TABLET, COATED ORAL at 23:41

## 2025-04-07 RX ADMIN — OXYCODONE HYDROCHLORIDE 10 MG: 5 TABLET ORAL at 09:06

## 2025-04-07 RX ADMIN — OXYCODONE HYDROCHLORIDE 10 MG: 5 TABLET ORAL at 01:10

## 2025-04-07 RX ADMIN — VANCOMYCIN HYDROCHLORIDE 1000 MG: 1 INJECTION, SOLUTION INTRAVENOUS at 20:13

## 2025-04-07 RX ADMIN — OXYCODONE HYDROCHLORIDE 10 MG: 5 TABLET ORAL at 14:43

## 2025-04-07 RX ADMIN — ACETAMINOPHEN 975 MG: 325 TABLET, FILM COATED ORAL at 23:41

## 2025-04-07 ASSESSMENT — COGNITIVE AND FUNCTIONAL STATUS - GENERAL
HELP NEEDED FOR BATHING: A LOT
STANDING UP FROM CHAIR USING ARMS: A LOT
PERSONAL GROOMING: A LITTLE
DRESSING REGULAR LOWER BODY CLOTHING: A LOT
MOBILITY SCORE: 16
WALKING IN HOSPITAL ROOM: A LITTLE
STANDING UP FROM CHAIR USING ARMS: A LOT
CLIMB 3 TO 5 STEPS WITH RAILING: TOTAL
DRESSING REGULAR UPPER BODY CLOTHING: A LOT
PERSONAL GROOMING: A LITTLE
TOILETING: A LOT
DAILY ACTIVITIY SCORE: 15
DRESSING REGULAR LOWER BODY CLOTHING: A LOT
DRESSING REGULAR UPPER BODY CLOTHING: A LOT
MOBILITY SCORE: 18
MOVING TO AND FROM BED TO CHAIR: A LITTLE
CLIMB 3 TO 5 STEPS WITH RAILING: A LOT
HELP NEEDED FOR BATHING: A LOT
MOVING FROM LYING ON BACK TO SITTING ON SIDE OF FLAT BED WITH BEDRAILS: A LITTLE
WALKING IN HOSPITAL ROOM: A LOT
DAILY ACTIVITIY SCORE: 15
TURNING FROM BACK TO SIDE WHILE IN FLAT BAD: A LITTLE
PERSONAL GROOMING: A LITTLE
DRESSING REGULAR UPPER BODY CLOTHING: A LITTLE
DAILY ACTIVITIY SCORE: 16
DRESSING REGULAR LOWER BODY CLOTHING: A LOT
STANDING UP FROM CHAIR USING ARMS: A LITTLE
HELP NEEDED FOR BATHING: A LOT
TOILETING: A LOT
MOVING TO AND FROM BED TO CHAIR: A LITTLE
MOVING TO AND FROM BED TO CHAIR: A LOT
WALKING IN HOSPITAL ROOM: A LOT
MOBILITY SCORE: 15
CLIMB 3 TO 5 STEPS WITH RAILING: A LOT
TOILETING: A LOT

## 2025-04-07 ASSESSMENT — PAIN - FUNCTIONAL ASSESSMENT
PAIN_FUNCTIONAL_ASSESSMENT: 0-10

## 2025-04-07 ASSESSMENT — PAIN SCALES - GENERAL
PAINLEVEL_OUTOF10: 3
PAINLEVEL_OUTOF10: 7
PAINLEVEL_OUTOF10: 3
PAINLEVEL_OUTOF10: 9
PAINLEVEL_OUTOF10: 7
PAINLEVEL_OUTOF10: 3
PAINLEVEL_OUTOF10: 0 - NO PAIN
PAINLEVEL_OUTOF10: 4
PAINLEVEL_OUTOF10: 8
PAINLEVEL_OUTOF10: 10 - WORST POSSIBLE PAIN

## 2025-04-07 ASSESSMENT — ACTIVITIES OF DAILY LIVING (ADL): HOME_MANAGEMENT_TIME_ENTRY: 23

## 2025-04-07 NOTE — PROGRESS NOTES
Physical Therapy Treatment    Patient Name: Adriana Wood  MRN: [id number]  Today's Date: 4/7/2025  Room: [address] [place], [place] [address]  Time Calculation  Start Time: 0956  Stop Time: 1023  Time Calculation (min): 27 min       Assessment/Plan   PT Assessment  PT Assessment Results: Decreased strength, Decreased range of motion, Decreased endurance, Impaired balance, Decreased mobility, Decreased cognition  Rehab Prognosis: Good  Barriers to Discharge Home: Caregiver assistance, Physical needs  Caregiver Assistance: Caregiver assistance needed per identified barriers - however, level of patient's required assistance exceeds assistance available at home  Physical Needs: Ambulating household distances limited by function/safety, Intermittent mobility assistance needed, Intermittent ADL assistance needed, High falls risk due to function or environment, Returning to long-term care/other facility  Evaluation/Treatment Tolerance: Patient limited by pain, Patient limited by fatigue  Strengths: Attitude of self  Barriers to Participation: Comorbidities  End of Session Communication: Bedside nurse  End of Session Patient Position: Up in chair w/ RLE elevated  PT Plan  Inpatient/Swing Bed or Outpatient: Inpatient  PT Plan  Treatment/Interventions: Bed mobility, Transfer training, Balance training, Strengthening, Endurance training, Therapeutic exercise, Therapeutic activity  PT Plan: Ongoing PT  PT Eval Only Reason: At baseline function  PT Frequency: 3 times per week  PT Discharge Recommendations: Moderate intensity level of continued care  PT Recommended Transfer Status: Assist x1  PT - OK to Discharge: Yes  Assessment: Patient is progressing Well with therapy this date. Highly anxious but making good functional progress. Able to participate with coordination of pain meds. Would continue to benefit from continued skilled PT to address all mobility deficits; Patient remains appropriate for MOD intensity therapy when medically ready for  discharge from acute stay.  Will continue to follow.     General Visit Information:   PT  Visit  PT Received On: 04/07/25  Co-Treatment: OT  Co-Treatment Reason: low AMPAC and to maximize pt safety and therapeutic potential  Prior to Session Communication: Bedside nurse  Patient Position Received: Bed, 3 rail up, Alarm off, not on at start of session     Subjective   Subjective: Pt pleasant and agreeable to therapy upon approach    Precautions:  Precautions  LE Weight Bearing Status: Right Toe-Touch Weight Bearing (updated to TTWB 4/7 by plastics)  Medical Precautions: Fall precautions  Vital Signs:   Date/Time Vitals Session Patient Position Pulse Resp SpO2 BP MAP (mmHg)    04/07/25 1202 --  --  71  18  98 %  154/62  88             Objective   Pain:  Pain Assessment  Pain Assessment: 0-10  0-10 (Numeric) Pain Score: 3  Pain Type: Acute pain  Pain Location: Back  Cognition:  Cognition  Overall Cognitive Status: Within Functional Limits  Arousal/Alertness: Appropriate responses to stimuli  Orientation Level: Oriented X4  Following Commands: Follows one step commands consistently  Safety Judgment: Good awareness of safety precautions  Cognition Comments: anxious by redirectable  Insight: Within function limits  Impulsive: Within functional limits    Lines/Tubes/Drains:  PICC - Adult 03/04/25 Single lumen Right Brachial vein (Active)   Number of days: 33       Closed/Suction Drain 1 Inferior;Lateral;Right Back Bulb 19 Fr. (Active)   Number of days: 3       External Urinary Catheter Female (Active)   Number of days: 17     PT Treatments:           Bed Mobility 1  Bed Mobility 1: Supine to sitting  Level of Assistance 1: Close supervision  Ambulation/Gait Training  Ambulation/Gait Training Performed: Yes  Ambulation/Gait Training 1  Surface 1: Level tile  Device 1: Rolling walker  Assistance 1: Contact guard, Minimal verbal cues  Quality of Gait 1: Shuffling gait, Forward flexed posture (3point  hopping)  Comments/Distance (ft) 1: 5' hopping fwd/retro vc for sequencing with walker and proper technique  Transfer 1  Transfer From 1: Sit to  Transfer to 1: Stand  Technique 1: Sit to stand, Stand to sit  Transfer Device 1: Walker  Transfer Level of Assistance 1: Minimum assistance, +2, Minimal verbal cues  Trials/Comments 1: vc for body mechanics  Transfers 2  Transfer From 2: Stand to  Transfer to 2: Chair with arms  Technique 2: Stand pivot  Transfer Device 2: Walker  Transfer Level of Assistance 2: Minimum assistance, Minimal verbal cues  Trials/Comments 2: vc for sequencing with walker, pt twisting foot unable to hop and turn             Activity tolerance:  Activity Tolerance  Endurance: Tolerates 10 - 20 min exercise with multiple rests    Outcome Measures:  [facility] Basic Mobility  Turning from your back to your side while in a flat bed without using bedrails: A little  Moving from lying on your back to sitting on the side of a flat bed without using bedrails: A little  Moving to and from bed to chair (including a wheelchair): A little  Standing up from a chair using your arms (e.g. wheelchair or bedside chair): A lot  To walk in hospital room: A little  Climbing 3-5 steps with railing: Total  Basic Mobility - Total Score: 15       Education Documentation  Body Mechanics, taught by Mari Salazar PTA at 4/7/2025 12:29 PM.  Learner: Patient  Readiness: Acceptance  Method: Explanation  Response: Verbalizes Understanding    Precautions, taught by Mari Salazar PTA at 4/7/2025 12:29 PM.  Learner: Patient  Readiness: Acceptance  Method: Explanation  Response: Verbalizes Understanding    ADL Training, taught by Mari Salazar PTA at 4/7/2025 12:29 PM.  Learner: Patient  Readiness: Acceptance  Method: Explanation  Response: Verbalizes Understanding    Precautions, taught by Mari Salazar PTA at 4/7/2025 12:29 PM.  Learner: Patient  Readiness: Acceptance  Method: Explanation  Response: Verbalizes  Understanding    Mobility Training, taught by Mari Salazar PTA at 4/7/2025 12:29 PM.  Learner: Patient  Readiness: Acceptance  Method: Explanation  Response: Verbalizes Understanding    Education Comments  No comments found.          OP EDUCATION:       Encounter Problems       Encounter Problems (Active)       Mobility       STG - Patient will ambulate >5ft, wheeled walker, min assist (Progressing)       Start:  03/31/25    Expected End:  04/14/25               PT Transfers       STG - Patient to transfer to and from sit to supine CGA (Progressing)       Start:  03/31/25    Expected End:  04/14/25            STG - Patient will transfer sit to and from stand min assist (Progressing)       Start:  03/31/25    Expected End:  04/14/25               Pain - Adult

## 2025-04-07 NOTE — PROGRESS NOTES
"  Division of Plastic and Reconstructive Surgery  Progress Note    Patient Name: Adriana Wood  MRN: [id number]  Date:  04/07/25     Subjective   Experiencing some persistent mild pain to right latissimus surgical site since return to OR for hematoma evacuation, improves to some degree with scheduled pain medications. She is tolerating a diet. Discussed advancing to TTWB to the RLE and working with PT today. Denies fever, chills, headache, dizziness, chest pain, palpitations, dyspnea, abdominal pain, nausea or vomiting.    Objective   Vital Signs  /56 (BP Location: Left arm, Patient Position: Lying)   Pulse 67   Temp 36.3 °C (97.3 °F) (Temporal)   Resp 16   Ht 1.778 m (5' 10\")   Wt 69.2 kg (152 lb 8.9 oz)   SpO2 96%   BMI 21.89 kg/m²      Physical Exam   Constitutional: A&Ox3, calm and cooperative, NAD.  Eyes: EOMI, clear sclera.   ENMT: Moist mucous membranes.  Head/Neck: NC/AT.  Cardiovascular: Regular rate.   Respiratory/Thorax: Unlabored respirations on RA.   Gastrointestinal: Abdomen soft, NTND.  Genitourinary: Voiding independently.  Extremities: RLE flap: warm to touch, soft and compressible, strong biphasic signal at marked stitch, circumferential incisions around flap well approximated with some scabbing  Right posterior lat donor site: incision healed to distal end, incision well approximated with sutures post op at proximal end, covered with ABD, dressing c/d/I, TTP to right lat incision, MASSIEL drain x 1 to right lower flank with serous output.  Neurological: A&Ox3.  Psychological: Appropriate mood and behavior.  Skin: Warm and dry.     Diagnostics   Results for orders placed or performed during the hospital encounter of 03/28/25 (from the past 24 hours)   Basic metabolic panel   Result Value Ref Range    Glucose 78 74 - 99 mg/dL    Sodium 140 136 - 145 mmol/L    Potassium 4.0 3.5 - 5.3 mmol/L    Chloride 102 98 - 107 mmol/L    Bicarbonate 30 21 - 32 mmol/L    Anion Gap 12 10 - 20 mmol/L    " Urea Nitrogen 16 6 - 23 mg/dL    Creatinine 0.49 (L) 0.50 - 1.05 mg/dL    eGFR >90 >60 mL/min/1.73m*2    Calcium 8.9 8.6 - 10.6 mg/dL   CBC   Result Value Ref Range    WBC 3.9 (L) 4.4 - 11.3 x10*3/uL    nRBC 0.0 0.0 - 0.0 /100 WBCs    RBC 3.41 (L) 4.00 - 5.20 x10*6/uL    Hemoglobin 9.7 (L) 12.0 - 16.0 g/dL    Hematocrit 30.9 (L) 36.0 - 46.0 %    MCV 91 80 - 100 fL    MCH 28.4 26.0 - 34.0 pg    MCHC 31.4 (L) 32.0 - 36.0 g/dL    RDW 13.8 11.5 - 14.5 %    Platelets 242 150 - 450 x10*3/uL   Magnesium   Result Value Ref Range    Magnesium 1.95 1.60 - 2.40 mg/dL     *Note: Due to a large number of results and/or encounters for the requested time period, some results have not been displayed. A complete set of results can be found in Results Review.     Current Medications  Scheduled medications  acetaminophen, 975 mg, oral, q8h  [Held by provider] apixaban, 2.5 mg, oral, BID  enoxaparin, 40 mg, subcutaneous, Daily  lidocaine, 1 patch, transdermal, Daily  magnesium oxide, 400 mg, oral, Daily  methocarbamol, 250 mg, oral, q8h FELIX  pantoprazole, 40 mg, oral, Daily before breakfast  polyethylene glycol, 17 g, oral, Daily  vancomycin, 1,000 mg, intravenous, q12h      Continuous medications     PRN medications  PRN medications: albuterol, docusate sodium, lubricating eye drops, oxyCODONE, oxyCODONE, oxygen, vancomycin     Assessment   Adriana Wood is a 69 y.o. female with a past medical history of bradycardia, cardiomyopathy, CAD, cardiac arrest due to electrolyte abnormalities and zofran use post-operatively, and traumatic brain injury. Patient had a total knee right knee done by Dr. Whelan in March 2024 which was complicated by E. Coli infection. Patient had right TKA wound dehiscence s/p I&D right knee with polyswap and attempted re-closure of complex wound on 5/10/2024 by Dr. Whelan. She was referred to plastic surgery (Dr. Reyes) perioperatively given projected anticipated need for possible flap coverage of the  wound/defect site. Patient re-admitted and returned to the OR with orthopedics on 2/28/2025 for right knee I&D, revision static spacer and application of incisional wound vac. She returned to the OR with plastic surgery on 3/14 for R latissimus free flap to R knee. Patient unfortunately developed a hematoma at her latissimus flap donor site requiring readmission to the plastic surgery service on 3/29/25. She returned to the OR with plastic surgery on 4/3 for hematoma evacuation/washout and complex closure.     Plan:  # S/P Right latissimus dorsi hematoma evacuation 4/3  - May cover site with ABD PRN  - OK for Lovenox for DVT prophylaxis on POD#1, initiated 4/4       ·  Continue to hold eliquis, will plan on DC on lovenox given bleed/hematoma on eliquis  - Continue drain care to x1 MASSIEL drains present at R lat donor site (nursing orders placed)  Nursing to strip drain tubing at least q4h and PRN to avoid drain/tubing obstruction   Please ensure that drains are compressed flat and plugged to maintain self suction at all times aside from when emptying   Nursing to please also empty and record output quantities at least TID and PRN if drains are full   Please notify the plastics team if drain outputs exceed >30 ml in 1 hr or >200 in 24 hrs  - Monitor VS/pulse ox Q8  - Monitor periodic AM CBC/RFP  - OK for regular diet  - Positioning: avoid pressure to R side    # Acute post-op pain  - Scheduled Tylenol  - Lidocaine patch  - Scheduled Robaxin  - PRN Oxy IR  - PRN Dilaudid 0.2 mg Q4 for breakthrough pain added 4/3 immediately post-op, DC 4/7 in attempt to wean narcotics in prep for inpatient disposition     # Hx R knee wound reconstruction via R latissimus free flap   - Continue q shift flap assessments per nursing with interval checks per plastic surgery team during daily rounding (Assess Doppler pulse at marked site q shift plus flap appearance - color, warmth, turgor)  - Nursing to notify plastic surgery team immediately  if there are any acute changes  - Avoid positioning that would apply pressure to flap region or incisions   - Ok to advance to TTWB to RLE, patient to dangle RLE as tolerated   - PT/OT eval and treat     # Hx R total knee replacement c/b infection   - Continue IV Vancomycin as recommended per ID during last admission (stop date 4/11/25)  - Check periodic labs while on IV ABX    # Hx Asthma  - continue home inhaler  - as needed O2 supplementation  - nasal Afrin spray for congestion  - discussed need to be tested for YANETH as outpatient    # History of QT Prolongation  - Avoid QT prolonging medications; NO Zofran (hx of cardiac arrest)    # History of Bradycardia, PVCs and Bigeminy  - Consistent bradycardia starting evening of 3/29 around low 40s  - Asymptomatic HR of 34 on 3/30 AM with rapid response called. EKG noted same findings from cardiology appointment in early 2025: Bradycardia, PVC and bigeminy.   - Nursing to obtain HR with pulse check vs pulse ox since inaccurate readings of irregular heart rates on pulse ox are more common in cases of Bigeminy   - Continue telemetry   - Cardiology consulted initially who do not recommend intervention/treatment for asymptomatic bradycardia  - Internal Medicine consulted, felt that bradycardia is an artifact due to frequent PVCs, patient stable, now signed off     FEN/GI:   - Encourage adequate PO fluid intake   - Lytes stable, replete PRN  - Regular diet   - GI ppx with PO Protonix   - Bowel regimen with schedule miralax     DVT ppx:  - subcutaneous Lovenox   - SCDs while in bed   - Activity as tolerated     Disposition: Plan for DC back to facility on subcutaneous lovenox x30 days postoperatively from hematoma evacuation given bleed/hematoma with resume of home eliquis. Anticipate DC Tuesday 4/8 pending confirmation of facility replacement.     Plan discussed with Dr. Reyes.    Eileen Escalera PA-C  Plastic and Reconstructive Surgery   [place]  Pager #[phone]  Team  phone: [phone]

## 2025-04-07 NOTE — CARE PLAN
The patient's goals for the shift include  pain management    The clinical goals for the shift include pt will remain HDS    Over the shift, the patient did   Problem: Pain - Adult  Goal: Verbalizes/displays adequate comfort level or baseline comfort level  Outcome: Progressing     Problem: Safety - Adult  Goal: Free from fall injury  Outcome: Progressing     Problem: Discharge Planning  Goal: Discharge to home or other facility with appropriate resources  Outcome: Progressing     Problem: Chronic Conditions and Co-morbidities  Goal: Patient's chronic conditions and co-morbidity symptoms are monitored and maintained or improved  Outcome: Progressing   make progress toward the following goals.

## 2025-04-07 NOTE — PROGRESS NOTES
Occupational Therapy    OT Treatment    Patient Name: Adriana Wood  MRN: [id number]  Department: [facility]  Room: 6013/6013-A  Today's Date: 4/7/2025  Time Calculation  Start Time: 0943  Stop Time: 1021  Time Calculation (min): 38 min        Assessment:  OT Assessment: Pt progressing well towards goals. continues to present with deficits in balance and endurance with R LE WB restrictions. Would benefit from mod intensity OT for return to PLOF.  Barriers to Discharge Home: Caregiver assistance, Physical needs  Caregiver Assistance: Caregiver assistance needed per identified barriers - however, level of patient's required assistance exceeds assistance available at home  Physical Needs: Ambulating household distances limited by function/safety, 24hr mobility assistance needed, Intermittent ADL assistance needed, High falls risk due to function or environment  Evaluation/Treatment Tolerance: Patient tolerated treatment well  Medical Staff Made Aware: Yes  End of Session Communication: Bedside nurse  End of Session Patient Position: Bed, 3 rail up, Alarm off, not on at start of session  Evaluation/Treatment Tolerance: Patient tolerated treatment well  Medical Staff Made Aware: Yes  Plan:  Treatment Interventions: ADL retraining, Functional transfer training, Endurance training, Patient/family training, Equipment evaluation/education, Compensatory technique education  OT Frequency: 3 times per week  OT Discharge Recommendations: Moderate intensity level of continued care  Equipment Recommended upon Discharge:  (tbd)  OT Recommended Transfer Status: Assist of 1  OT - OK to Discharge:  (OT eval complete and d/c recs made)  Treatment Interventions: ADL retraining, Functional transfer training, Endurance training, Patient/family training, Equipment evaluation/education, Compensatory technique education    Subjective   Previous Visit Info:  OT Last Visit  OT Received On: 04/07/25  General:  General  Family/Caregiver Present:  "No  Co-Treatment: PT  Co-Treatment Reason: partial co-treat with PT to maximize functional [place] 2/2 anxiety and LE WB restriction  Prior to Session Communication: Bedside nurse  Patient Position Received: Bed, 3 rail up, Alarm off, not on at start of session (plastics present)  General Comment: Pt agreeable to OT. Anxious but redirectable  Precautions:  LE Weight Bearing Status: Right Toe-Touch Weight Bearing (updated by plastics 4/7)  Medical Precautions: Fall precautions  Precautions Comment: Avoid pressure to R side at flap site. Danlge R LE as tolerated      Pain:  Pain Assessment  Pain Assessment: 0-10  0-10 (Numeric) Pain Score: 7  Pain Type: Acute pain  Pain Location: Back    Objective    Cognition:  Cognition  Overall Cognitive Status: Within Functional Limits  Arousal/Alertness: Appropriate responses to stimuli  Orientation Level: Oriented X4  Following Commands: Follows all commands and directions without difficulty  Insight: Mild     Activities of Daily Living: Feeding  Feeding Level of Assistance: Independent  Feeding Where Assessed: Bed level, Edge of bed, Chair    UE Dressing  UE Dressing Level of Assistance: Close supervision  UE Dressing Where Assessed: Edge of bed  UE Dressing Comments: don gown in reverse as \"jacket\"    Bed Mobility/Transfers: Bed Mobility  Bed Mobility: Yes  Bed Mobility 1  Bed Mobility 1: Supine to sitting  Level of Assistance 1: Close supervision  Bed Mobility Comments 1: min cues for technique    Transfers  Transfer: Yes  Transfer 1  Transfer From 1: Bed to, Sit to  Transfer to 1: Stand  Technique 1: Sit to stand  Transfer Device 1: Walker  Transfer Level of Assistance 1: Minimum assistance, +2  Trials/Comments 1: cues for safe hand placement and L LE loading  Transfers 2  Transfer From 2: Bed to  Transfer to 2: Chair with arms  Technique 2: Stand pivot  Transfer Device 2: Walker  Transfer Level of Assistance 2: Minimum assistance, +2  Trials/Comments 2: cues for " sequencing and walker management navigating turn to chair      Functional Mobility:  Functional Mobility  Functional Mobility Performed: Yes  Functional Mobility 1  Surface 1: Level tile  Device 1: Rolling walker  Assistance 1: Contact guard  Comments 1: forward/backward bout at bedside with cues to sequence walker management, UE loading and R LE positioning  Sitting Balance:  Static Sitting Balance  Static Sitting-Level of Assistance: Distant supervision  Static Sitting-Comment/Number of Minutes: 10    Therapy/Activity: Therapeutic Activity  Therapeutic Activity Performed: Yes  Therapeutic Activity 1: reviewed coping strategies during acute stay, redirecting anxious thoughts. pt able to ID two strategies including drawing/art, photos  of her dogs.      Outcome Measures:[facility] Daily Activity  Putting on and taking off regular lower body clothing: A lot  Bathing (including washing, rinsing, drying): A lot  Putting on and taking off regular upper body clothing: A little  Toileting, which includes using toilet, bedpan or urinal: A lot  Taking care of personal grooming such as brushing teeth: A little  Eating Meals: None  Daily Activity - Total Score: 16        Education Documentation  Body Mechanics, taught by Sonya Lira OT at 4/7/2025  1:35 PM.  Learner: Patient  Readiness: Acceptance  Method: Explanation  Response: Verbalizes Understanding, Needs Reinforcement    Precautions, taught by Sonya Lira OT at 4/7/2025  1:35 PM.  Learner: Patient  Readiness: Acceptance  Method: Explanation  Response: Verbalizes Understanding, Needs Reinforcement    ADL Training, taught by Sonya Lira OT at 4/7/2025  1:35 PM.  Learner: Patient  Readiness: Acceptance  Method: Explanation  Response: Verbalizes Understanding, Needs Reinforcement    Education Comments  No comments found.        Goals:  Encounter Problems       Encounter Problems (Active)       ADLs       Pt will complete UB /LB bathing tasks with minimum  assistance while seated and AE as needed.  (Progressing)       Start:  04/01/25    Expected End:  04/15/25            Pt will complete LB dressing with minimum level of assistance while seated and/or standing and AE as needed. (Progressing)       Start:  04/01/25    Expected End:  04/15/25            Pt will complete toilet hygiene while seated /standing with minimum level of assistance and AE as needed. (Progressing)       Start:  04/01/25    Expected End:  04/15/25               BALANCE       Patient will tolerate standing for 10 minutes to stand by assist level of assistance with least restrictive device in order to improve functional activity tolerance for ADL tasks. (Progressing)       Start:  04/01/25    Expected End:  04/15/25               MOBILITY       Patient will perform Functional mobility mod  Household distances/Community Distances with minimal assist  level of assistance and least restrictive device in order to improve safety and functional mobility. (Progressing)       Start:  04/01/25    Expected End:  04/15/25               TRANSFERS       Patient will perform bed mobility supervision level of assistance and bed rails in order to improve safety and independence with mobility (Progressing)       Start:  04/01/25    Expected End:  04/15/25            Patient will complete sit to stand transfer with stand by assist level of assistance and least restrictive device in order to improve safety and prepare for out of bed mobility. (Progressing)       Start:  04/01/25    Expected End:  04/15/25                     MOISES MCFADDEN/ZACH (PRN)

## 2025-04-07 NOTE — CARE PLAN
The patient's goals for the shift include      The clinical goals for the shift include patient's pain will be <5/10 throughout shift.      Problem: Skin  Goal: Decreased wound size/increased tissue granulation at next dressing change  Outcome: Progressing  Flowsheets (Taken 4/7/2025 0434)  Decreased wound size/increased tissue granulation at next dressing change:   Promote sleep for wound healing   Protective dressings over bony prominences   Utilize specialty bed per algorithm  Goal: Participates in plan/prevention/treatment measures  Outcome: Progressing  Flowsheets (Taken 4/7/2025 0434)  Participates in plan/prevention/treatment measures:   Discuss with provider PT/OT consult   Elevate heels  Goal: Prevent/manage excess moisture  Outcome: Progressing  Flowsheets (Taken 4/7/2025 0434)  Prevent/manage excess moisture:   Cleanse incontinence/protect with barrier cream   Monitor for/manage infection if present   Follow provider orders for dressing changes  Goal: Prevent/minimize sheer/friction injuries  Outcome: Progressing  Flowsheets (Taken 4/7/2025 0434)  Prevent/minimize sheer/friction injuries:   Complete micro-shifts as needed if patient unable. Adjust patient position to relieve pressure points, not a full turn   Use pull sheet   Turn/reposition every 2 hours/use positioning/transfer devices   Utilize specialty bed per algorithm  Goal: Promote/optimize nutrition  Outcome: Progressing  Flowsheets (Taken 4/7/2025 0434)  Promote/optimize nutrition:   Assist with feeding   Monitor/record intake including meals   Offer water/supplements/favorite foods   Consume > 50% meals/supplements  Goal: Promote skin healing  Outcome: Progressing  Flowsheets (Taken 4/7/2025 0434)  Promote skin healing:   Assess skin/pad under line(s)/device(s)   Protective dressings over bony prominences   Turn/reposition every 2 hours/use positioning/transfer devices   Ensure correct size (line/device) and apply per   instructions   Rotate device position/do not position patient on device     Problem: Pain  Goal: Takes deep breaths with improved pain control throughout the shift  Outcome: Progressing  Goal: Turns in bed with improved pain control throughout the shift  Outcome: Progressing  Goal: Walks with improved pain control throughout the shift  Outcome: Progressing  Goal: Performs ADL's with improved pain control throughout shift  Outcome: Progressing  Goal: Participates in PT with improved pain control throughout the shift  Outcome: Progressing  Goal: Free from opioid side effects throughout the shift  Outcome: Progressing  Goal: Free from acute confusion related to pain meds throughout the shift  Outcome: Progressing     Problem: Fall/Injury  Goal: Not fall by end of shift  Outcome: Progressing  Goal: Be free from injury by end of the shift  Outcome: Progressing  Goal: Verbalize understanding of personal risk factors for fall in the hospital  Outcome: Progressing  Goal: Verbalize understanding of risk factor reduction measures to prevent injury from fall in the home  Outcome: Progressing  Goal: Use assistive devices by end of the shift  Outcome: Progressing  Goal: Pace activities to prevent fatigue by end of the shift  Outcome: Progressing     Problem: Nutrition  Goal: Nutrient intake appropriate for maintaining nutritional needs  Outcome: Progressing     Problem: Chronic Conditions and Co-morbidities  Goal: Patient's chronic conditions and co-morbidity symptoms are monitored and maintained or improved  Outcome: Progressing     Problem: Discharge Planning  Goal: Discharge to home or other facility with appropriate resources  Outcome: Progressing     Problem: Safety - Adult  Goal: Free from fall injury  Outcome: Progressing

## 2025-04-08 LAB
ALBUMIN SERPL BCP-MCNC: 3.7 G/DL (ref 3.4–5)
ANION GAP SERPL CALC-SCNC: 16 MMOL/L (ref 10–20)
BUN SERPL-MCNC: 13 MG/DL (ref 6–23)
CALCIUM SERPL-MCNC: 9.1 MG/DL (ref 8.6–10.6)
CHLORIDE SERPL-SCNC: 102 MMOL/L (ref 98–107)
CO2 SERPL-SCNC: 27 MMOL/L (ref 21–32)
CREAT SERPL-MCNC: 0.52 MG/DL (ref 0.5–1.05)
EGFRCR SERPLBLD CKD-EPI 2021: >90 ML/MIN/1.73M*2
GLUCOSE SERPL-MCNC: 87 MG/DL (ref 74–99)
PHOSPHATE SERPL-MCNC: 4.5 MG/DL (ref 2.5–4.9)
POTASSIUM SERPL-SCNC: 4.2 MMOL/L (ref 3.5–5.3)
SODIUM SERPL-SCNC: 141 MMOL/L (ref 136–145)
VANCOMYCIN SERPL-MCNC: 24.9 UG/ML (ref 5–20)

## 2025-04-08 PROCEDURE — 2500000004 HC RX 250 GENERAL PHARMACY W/ HCPCS (ALT 636 FOR OP/ED): Performed by: NURSE PRACTITIONER

## 2025-04-08 PROCEDURE — 2500000001 HC RX 250 WO HCPCS SELF ADMINISTERED DRUGS (ALT 637 FOR MEDICARE OP)

## 2025-04-08 PROCEDURE — 2500000001 HC RX 250 WO HCPCS SELF ADMINISTERED DRUGS (ALT 637 FOR MEDICARE OP): Performed by: NURSE PRACTITIONER

## 2025-04-08 PROCEDURE — 2500000001 HC RX 250 WO HCPCS SELF ADMINISTERED DRUGS (ALT 637 FOR MEDICARE OP): Performed by: PHYSICIAN ASSISTANT

## 2025-04-08 PROCEDURE — 84100 ASSAY OF PHOSPHORUS: CPT | Performed by: NURSE PRACTITIONER

## 2025-04-08 PROCEDURE — 2500000004 HC RX 250 GENERAL PHARMACY W/ HCPCS (ALT 636 FOR OP/ED)

## 2025-04-08 PROCEDURE — 99239 HOSP IP/OBS DSCHRG MGMT >30: CPT | Performed by: PHYSICIAN ASSISTANT

## 2025-04-08 PROCEDURE — 2060000001 HC INTERMEDIATE ICU ROOM DAILY

## 2025-04-08 PROCEDURE — 80202 ASSAY OF VANCOMYCIN: CPT | Performed by: PHYSICIAN ASSISTANT

## 2025-04-08 RX ORDER — VANCOMYCIN HYDROCHLORIDE 750 MG/150ML
750 INJECTION, SOLUTION INTRAVENOUS EVERY 12 HOURS
Status: DISCONTINUED | OUTPATIENT
Start: 2025-04-08 | End: 2025-04-09 | Stop reason: HOSPADM

## 2025-04-08 RX ORDER — VANCOMYCIN HYDROCHLORIDE 750 MG/150ML
1 INJECTION, SOLUTION INTRAVENOUS EVERY 12 HOURS
Qty: 1800 ML | Refills: 0 | Status: SHIPPED | OUTPATIENT
Start: 2025-04-08 | End: 2025-04-11

## 2025-04-08 RX ORDER — ACETAMINOPHEN 325 MG/1
975 TABLET ORAL EVERY 8 HOURS
Qty: 63 TABLET | Refills: 0 | Status: SHIPPED | OUTPATIENT
Start: 2025-04-08 | End: 2025-04-15

## 2025-04-08 RX ORDER — OXYCODONE HYDROCHLORIDE 5 MG/1
5 TABLET ORAL ONCE
Status: COMPLETED | OUTPATIENT
Start: 2025-04-08 | End: 2025-04-08

## 2025-04-08 RX ORDER — ENOXAPARIN SODIUM 100 MG/ML
40 INJECTION SUBCUTANEOUS DAILY
Qty: 30 EACH | Refills: 0 | Status: SHIPPED | OUTPATIENT
Start: 2025-04-08 | End: 2025-05-08

## 2025-04-08 RX ADMIN — METHOCARBAMOL TABLETS 250 MG: 500 TABLET, COATED ORAL at 09:24

## 2025-04-08 RX ADMIN — ENOXAPARIN SODIUM 40 MG: 100 INJECTION SUBCUTANEOUS at 09:24

## 2025-04-08 RX ADMIN — ACETAMINOPHEN 975 MG: 325 TABLET, FILM COATED ORAL at 17:50

## 2025-04-08 RX ADMIN — OXYCODONE HYDROCHLORIDE 5 MG: 5 TABLET ORAL at 12:47

## 2025-04-08 RX ADMIN — VANCOMYCIN HYDROCHLORIDE 750 MG: 750 INJECTION, SOLUTION INTRAVENOUS at 10:51

## 2025-04-08 RX ADMIN — OXYCODONE HYDROCHLORIDE 10 MG: 5 TABLET ORAL at 20:30

## 2025-04-08 RX ADMIN — PANTOPRAZOLE SODIUM 40 MG: 40 TABLET, DELAYED RELEASE ORAL at 09:24

## 2025-04-08 RX ADMIN — METHOCARBAMOL TABLETS 250 MG: 500 TABLET, COATED ORAL at 17:50

## 2025-04-08 RX ADMIN — VANCOMYCIN HYDROCHLORIDE 750 MG: 750 INJECTION, SOLUTION INTRAVENOUS at 20:30

## 2025-04-08 RX ADMIN — POLYETHYLENE GLYCOL 3350 17 G: 17 POWDER, FOR SOLUTION ORAL at 09:23

## 2025-04-08 RX ADMIN — OXYCODONE HYDROCHLORIDE 5 MG: 5 TABLET ORAL at 16:13

## 2025-04-08 RX ADMIN — METHOCARBAMOL TABLETS 250 MG: 500 TABLET, COATED ORAL at 23:30

## 2025-04-08 RX ADMIN — ACETAMINOPHEN 975 MG: 325 TABLET, FILM COATED ORAL at 23:51

## 2025-04-08 RX ADMIN — ACETAMINOPHEN 975 MG: 325 TABLET, FILM COATED ORAL at 09:24

## 2025-04-08 RX ADMIN — OXYCODONE HYDROCHLORIDE 10 MG: 5 TABLET ORAL at 02:14

## 2025-04-08 ASSESSMENT — PAIN - FUNCTIONAL ASSESSMENT
PAIN_FUNCTIONAL_ASSESSMENT: 0-10

## 2025-04-08 ASSESSMENT — PAIN DESCRIPTION - ORIENTATION: ORIENTATION: RIGHT

## 2025-04-08 ASSESSMENT — PAIN SCALES - GENERAL
PAINLEVEL_OUTOF10: 4
PAINLEVEL_OUTOF10: 5 - MODERATE PAIN
PAINLEVEL_OUTOF10: 9
PAINLEVEL_OUTOF10: 10 - WORST POSSIBLE PAIN
PAINLEVEL_OUTOF10: 2
PAINLEVEL_OUTOF10: 0 - NO PAIN
PAINLEVEL_OUTOF10: 4
PAINLEVEL_OUTOF10: 9
PAINLEVEL_OUTOF10: 4

## 2025-04-08 ASSESSMENT — COGNITIVE AND FUNCTIONAL STATUS - GENERAL
DRESSING REGULAR UPPER BODY CLOTHING: A LOT
MOBILITY SCORE: 18
CLIMB 3 TO 5 STEPS WITH RAILING: A LOT
STANDING UP FROM CHAIR USING ARMS: A LITTLE
TOILETING: A LOT
WALKING IN HOSPITAL ROOM: A LOT
WALKING IN HOSPITAL ROOM: A LOT
MOVING TO AND FROM BED TO CHAIR: A LITTLE
DRESSING REGULAR UPPER BODY CLOTHING: A LOT
TOILETING: A LOT
DAILY ACTIVITIY SCORE: 17
MOBILITY SCORE: 18
DRESSING REGULAR LOWER BODY CLOTHING: A LITTLE
PERSONAL GROOMING: A LITTLE
STANDING UP FROM CHAIR USING ARMS: A LITTLE
MOVING TO AND FROM BED TO CHAIR: A LITTLE
HELP NEEDED FOR BATHING: A LITTLE
PERSONAL GROOMING: A LITTLE
CLIMB 3 TO 5 STEPS WITH RAILING: A LOT
DAILY ACTIVITIY SCORE: 17
DRESSING REGULAR LOWER BODY CLOTHING: A LITTLE
HELP NEEDED FOR BATHING: A LITTLE

## 2025-04-08 ASSESSMENT — PAIN DESCRIPTION - LOCATION
LOCATION: BACK
LOCATION: LEG
LOCATION: LEG

## 2025-04-08 ASSESSMENT — PAIN DESCRIPTION - DESCRIPTORS: DESCRIPTORS: SHARP

## 2025-04-08 NOTE — PROGRESS NOTES
"  Division of Plastic and Reconstructive Surgery  Progress Note    Patient Name: Adriana Wood  MRN: [id number]  Date:  04/08/25     Subjective   Patient resting in bed this morning. Denies any concerns. Pain controlled on current regimen. She is tolerating a diet. Worked with PT yesterday with toe touch as tolerated with RLE. Denies fever, chills, headache, dizziness, chest pain, palpitations, dyspnea, abdominal pain, nausea or vomiting.    Objective   Vital Signs  /69 (BP Location: Left arm, Patient Position: Lying)   Pulse 78   Temp 36.6 °C (97.9 °F) (Temporal)   Resp 17   Ht 1.778 m (5' 10\")   Wt 69.2 kg (152 lb 8.9 oz)   SpO2 94%   BMI 21.89 kg/m²      Physical Exam   Constitutional: A&Ox3, calm and cooperative, NAD.  Eyes: EOMI, clear sclera.   ENMT: Moist mucous membranes.  Head/Neck: NC/AT.  Cardiovascular: Regular rate.   Respiratory/Thorax: Unlabored respirations on RA.   Gastrointestinal: Abdomen soft, NTND.  Genitourinary: Voiding independently.  Extremities: RLE flap: warm to touch, soft and compressible, strong biphasic signal at marked stitch, circumferential incisions around flap well approximated with some scabbing  Right posterior lat donor site: incision healed to distal end, incision well approximated with sutures post op at proximal end, covered with ABD, dressing c/d/I, TTP to right lat incision, MASSIEL drain x 1 to right lower flank with serous output.  Neurological: A&Ox3.  Psychological: Appropriate mood and behavior.  Skin: Warm and dry.     Diagnostics   Results for orders placed or performed during the hospital encounter of 03/28/25 (from the past 24 hours)   Vancomycin   Result Value Ref Range    Vancomycin 24.9 (H) 5.0 - 20.0 ug/mL   Renal function panel   Result Value Ref Range    Glucose 87 74 - 99 mg/dL    Sodium 141 136 - 145 mmol/L    Potassium 4.2 3.5 - 5.3 mmol/L    Chloride 102 98 - 107 mmol/L    Bicarbonate 27 21 - 32 mmol/L    Anion Gap 16 10 - 20 mmol/L    Urea " Nitrogen 13 6 - 23 mg/dL    Creatinine 0.52 0.50 - 1.05 mg/dL    eGFR >90 >60 mL/min/1.73m*2    Calcium 9.1 8.6 - 10.6 mg/dL    Phosphorus 4.5 2.5 - 4.9 mg/dL    Albumin 3.7 3.4 - 5.0 g/dL     *Note: Due to a large number of results and/or encounters for the requested time period, some results have not been displayed. A complete set of results can be found in Results Review.     Current Medications  Scheduled medications  acetaminophen, 975 mg, oral, q8h  [Held by provider] apixaban, 2.5 mg, oral, BID  enoxaparin, 40 mg, subcutaneous, Daily  lidocaine, 1 patch, transdermal, Daily  magnesium oxide, 400 mg, oral, Daily  methocarbamol, 250 mg, oral, q8h FELIX  pantoprazole, 40 mg, oral, Daily before breakfast  polyethylene glycol, 17 g, oral, Daily  vancomycin, 750 mg, intravenous, q12h      Continuous medications     PRN medications  PRN medications: albuterol, docusate sodium, lubricating eye drops, oxyCODONE, oxyCODONE, oxygen, vancomycin     Assessment   Adriana Wood is a 69 y.o. female with a past medical history of bradycardia, cardiomyopathy, CAD, cardiac arrest due to electrolyte abnormalities and zofran use post-operatively, and traumatic brain injury. Patient had a total knee right knee done by Dr. Whelan in March 2024 which was complicated by E. Coli infection. Patient had right TKA wound dehiscence s/p I&D right knee with polyswap and attempted re-closure of complex wound on 5/10/2024 by Dr. Whelan. She was referred to plastic surgery (Dr. Reyes) perioperatively given projected anticipated need for possible flap coverage of the wound/defect site. Patient re-admitted and returned to the OR with orthopedics on 2/28/2025 for right knee I&D, revision static spacer and application of incisional wound vac. She returned to the OR with plastic surgery on 3/14 for R latissimus free flap to R knee. Patient unfortunately developed a hematoma at her latissimus flap donor site requiring readmission to the  plastic surgery service on 3/29/25. She returned to the OR with plastic surgery on 4/3 for hematoma evacuation/washout and complex closure.     Plan:  # S/P Right latissimus dorsi hematoma evacuation 4/3  - May cover site with ABD PRN  - OK for Lovenox for DVT prophylaxis on POD#1, initiated 4/4       ·  Continue to hold eliquis, will plan on DC on lovenox given bleed/hematoma on eliquis  - Continue drain care to x1 MASSIEL drains present at R lat donor site (nursing orders placed)  Nursing to strip drain tubing at least q4h and PRN to avoid drain/tubing obstruction   Please ensure that drains are compressed flat and plugged to maintain self suction at all times aside from when emptying   Nursing to please also empty and record output quantities at least TID and PRN if drains are full   Please notify the plastics team if drain outputs exceed >30 ml in 1 hr or >200 in 24 hrs  - Monitor VS/pulse ox Q8  - Monitor periodic AM CBC/RFP  - OK for regular diet  - Positioning: avoid pressure to R side    # Acute post-op pain  - Scheduled Tylenol  - Lidocaine patch  - Scheduled Robaxin  - PRN Oxy IR  - PRN Dilaudid 0.2 mg Q4 for breakthrough pain added 4/3 immediately post-op, DC 4/7 in attempt to wean narcotics in prep for inpatient disposition     # Hx R knee wound reconstruction via R latissimus free flap   - Continue q shift flap assessments per nursing with interval checks per plastic surgery team during daily rounding (Assess Doppler pulse at marked site q shift plus flap appearance - color, warmth, turgor)  - Nursing to notify plastic surgery team immediately if there are any acute changes  - Avoid positioning that would apply pressure to flap region or incisions   - Ok to advance to TTWB to RLE, patient to dangle RLE as tolerated   - PT/OT eval and treat     # Hx R total knee replacement c/b infection   - Continue IV Vancomycin as recommended per ID during last admission (stop date 4/11/25)  - Check periodic labs while on  IV ABX    # Hx Asthma  - continue home inhaler  - as needed O2 supplementation  - nasal Afrin spray for congestion  - discussed need to be tested for YANETH as outpatient    # History of QT Prolongation  - Avoid QT prolonging medications; NO Zofran (hx of cardiac arrest)    # History of Bradycardia, PVCs and Bigeminy  - Consistent bradycardia starting evening of 3/29 around low 40s  - Asymptomatic HR of 34 on 3/30 AM with rapid response called. EKG noted same findings from cardiology appointment in early 2025: Bradycardia, PVC and bigeminy.   - Nursing to obtain HR with pulse check vs pulse ox since inaccurate readings of irregular heart rates on pulse ox are more common in cases of Bigeminy   - Continue telemetry   - Cardiology consulted initially who do not recommend intervention/treatment for asymptomatic bradycardia  - Internal Medicine consulted, felt that bradycardia is an artifact due to frequent PVCs, patient stable, now signed off     FEN/GI:   - Encourage adequate PO fluid intake   - Lytes stable, replete PRN  - Regular diet   - GI ppx with PO Protonix   - Bowel regimen with schedule miralax     DVT ppx:  - subcutaneous Lovenox   - SCDs while in bed   - Activity as tolerated     Disposition: Plan for DC back to facility on subcutaneous lovenox x30 days postoperatively from hematoma evacuation given bleed/hematoma with resume of home eliquis. Anticipate DC Tuesday 4/8 pending confirmation of facility replacement.     Plan discussed with Dr. Reyes.    ARGENIS MilelrC  Plastic and Reconstructive Surgery   [facility]  Pager #[phone]  Team phone: [phone]

## 2025-04-08 NOTE — SIGNIFICANT EVENT
Division of Plastic and Reconstructive Surgery  Significant Event      Patient Name: Adriana Wood  MRN: [id number]  Date:  04/07/25     Subjective     Continues to experience persistent aching pain to  right latissimus surgical site; specifically at the superior portion of the incision that improves to some degree with scheduled pain medications. She is tolerating a diet, voiding spontaneously  Discussed advancing to TTWB to the RLE and working with PT today. Denies fever, chills, headache, dizziness, chest pain, palpitations, dyspnea, abdominal pain, nausea or vomiting.     Objective   Physical Exam   Constitutional: A&Ox3, calm and cooperative, NAD.  Eyes: EOMI, clear sclera.   ENMT: Moist mucous membranes.  Head/Neck: NC/AT.  Cardiovascular: Regular rate.   Respiratory/Thorax: Unlabored respirations on RA.   Gastrointestinal: Abdomen soft, NTND.  Genitourinary: Voiding independently.  Extremities: RLE flap: warm to touch, soft and compressible, strong biphasic signal at marked stitch, circumferential incisions around flap well approximated with some scabbing  Right posterior lat donor site: incision healed to distal end, incision well approximated with sutures post op at proximal end, dressing c/d/I, TTP to right lat incision, MASSIEL drain x 1 to right lower flank with serous output.  Neurological: A&Ox3.  Psychological: Appropriate mood and behavior.  Skin: Warm and dry.     Plan:  # S/P Right latissimus dorsi hematoma evacuation 4/3  -Doing well overnight. No concerns  -All care and details please see daily progress note.     Melissa Shaikh PA-C  Plastic and Reconstructive Surgery   [facility]  Pager #[phone]  Team phone: [phone]

## 2025-04-08 NOTE — PROGRESS NOTES
04/08/25 1100   Discharge Planning   Living Arrangements Spouse/significant other   Support Systems Spouse/significant other   Type of Residence Private residence   Home or Post Acute Services Post acute facilities (Rehab/SNF/etc)   Type of Post Acute Facility Services Skilled nursing   Expected Discharge Disposition SNF   Does the patient need discharge transport arranged? Yes   RoundTrip coordination needed? Yes     SW following to assist with discharge planning.  Pt medically ready for discharge and return to [facility].  SW messaged [facility] and advised.  Transport requested via Roundtrip.   SW will advise of transport time when transport accepted.    Elizabeth Gunsalus LISW-S  Care Transitions Supervisor    Addendum:  Transport scheduled for 2pm via [facility].  Med team, pt and  advised.  Elizabeth Gunsalus LISW-S  Care Transitions Supervisor    Addendum:  Please call nursing report to:  [phone] [address] [facility].  Elizabeth Gunsalus LISW-S  Care Transitions Supervisor    Addendum:  [facility] did not present to transport pt at 2pm.  RN called and they advised that there was a glitch and pt not on their list.  SW reviewed in Roundtrip and it lists that ride cancelled by rider.  SW resubmitted ride request and [facility] accepted for 4/9/25 at 9:15 am.  RN and SNF advised.  Elizabeth Gunsalus LISW-S  Care Transitions Supervisor

## 2025-04-08 NOTE — PROGRESS NOTES
Vancomycin Dosing by Pharmacy- FOLLOW UP    Adriana Wood is a 69 y.o. year old female who Pharmacy has been consulted for vancomycin dosing for cellulitis, skin and soft tissue. Based on the patient's indication and renal status this patient is being dosed based on a goal AUC of 400-600.     Renal function is currently stable.    Current vancomycin dose: 1000 mg given every 12 hours    Estimated vancomycin AUC on current dose: 603 mg/L.hr     Visit Vitals  /73 (BP Location: Left arm, Patient Position: Lying)   Pulse 55   Temp 36.5 °C (97.7 °F) (Temporal)   Resp 18        Lab Results   Component Value Date    CREATININE 0.52 2025    CREATININE 0.49 (L) 2025    CREATININE 0.45 (L) 2025    CREATININE 0.52 2025        Patient weight is as follows:   Vitals:    25 0006   Weight: 69.2 kg (152 lb 8.9 oz)       Cultures:  No results found for the encounter in last 14 days.       I/O last 3 completed shifts:  In: 2240 (32.4 mL/kg) [P.O.:1840; IV Piggyback:400]  Out: 1330 (19.2 mL/kg) [Urine:1300 (0.5 mL/kg/hr); Drains:30]  Weight: 69.2 kg   I/O during current shift:  No intake/output data recorded.    Temp (24hrs), Av.7 °C (98.1 °F), Min:36.5 °C (97.7 °F), Max:36.9 °C (98.4 °F)      Assessment/Plan    Above goal AUC. Orders placed for new vancomcyin regimen of 750 every 12 hours to begin at 0915.    This dosing regimen is predicted by [employer] to result in the following pharmacokinetic parameters:  Loading dose: N/A  Regimen: 750 mg IV every 12 hours.  Start time: 08:13 on 2025  Exposure target: AUC24 (range)400-600 mg/L.hr   IXA87-14: 484 mg/L.hr  AUC24,ss: 458 mg/L.hr  Probability of AUC24 > 400: 93 %  Ctrough,ss: 14.7 mg/L  Probability of Ctrough,ss > 20: 1 %  The next level will be obtained on  at am labs. May be obtained sooner if clinically indicated.   Will continue to monitor renal function daily while on vancomycin and order serum creatinine at least every 48  hours if not already ordered.  Follow for continued vancomycin needs, clinical response, and signs/symptoms of toxicity.       DIGNA BLANK

## 2025-04-08 NOTE — DISCHARGE SUMMARY
Discharge Diagnosis  Hematoma of abdominal wall, initial encounter    Issues Requiring Follow-Up  MASSIEL drain, incision check    Test Results Pending At Discharge  Pending Labs       No current pending labs.            Hospital Course  BRIEF HISTORY:    Adriana Wood is a 69 y.o. female with a past medical history of bradycardia, cardiomyopathy, CAD, cardiac arrest due to electrolyte abnormalities and zofran use post-operatively, and traumatic brain injury. Patient had a total knee right knee done by Dr. Whelan in March 2024 which was complicated by E. Coli infection. Patient had right TKA wound dehiscence s/p I&D right knee with polyswap and attempted re-closure of complex wound on 5/10/2024 by Dr. Whelan. She was referred to plastic surgery (Dr. Reyes) perioperatively given projected anticipated need for possible flap coverage of the wound/defect site. Patient re-admitted and returned to the OR with orthopedics on 2/28/2025 for right knee I&D, revision static spacer and application of incisional wound vac. Plastic surgery service consulted postoperatively to follow for flap recommendations/timing, now s/p R latissimus free flap to R knee on 3/14 with Dr. Lundberg. Discharged on 3/24/25 to SNF, taken to [facility] ED on 3/26/25 for concerns for right latissimus dorsi hematoma. Transferred to [facility] on 3/28/25 for monitoring of right latissimus dorsi flap hematoma.    HOSPITAL COURSE:    Admitted on 3/28/25 for monitoring of right latissimus dorsi flap hematoma. Had episode of bradycardia, HR in the 30's on 3/30, asymptomatic, medicine consulted. Repeat US of chest on 4/1 indicates large right lateral chest wall hematoma with a drain in place. Patient requiring return to OR on 4/3 for right chest wall hematoma evacuation. Resumed Lovenox on POD#1 and will continue Lovenox at discharge, continue to hold home Eliquis until follow up.      CONSULTATIONS:  Internal medicine consulted for bradycardia and elected to  continue monitoring, obtain new EKG, keep Mg >2, K > 4, monitor on telemetry, avoid any QT prolonging agents. ID consulted last admission, continue IV Vancomycin during this admission with stop date of 4/11/25.    DAY OF DISCHARGE:    On the day of discharge, the patient was seen and evaluated by the Plastic Surgery team and deemed suitable for discharge to SNF previously at.  There were no significant events overnight. Vitals were reviewed and within normal limits. Labs were stable at discharge. On day of discharge the patient was tolerating a diet, pain was controlled on PO pain medication, was ambulating well and voiding spontaneously. The patient was given detailed discharge instructions and were scheduled to follow up as an outpatient.      Pertinent Physical Exam At Time of Discharge  Physical Exam  Constitutional: A&Ox3, calm and cooperative, NAD.  Eyes: EOMI, clear sclera.   ENMT: Moist mucous membranes.  Head/Neck: NC/AT.  Cardiovascular: Regular rate.   Respiratory/Thorax: Unlabored respirations on RA.   Gastrointestinal: Abdomen soft, NTND.  Genitourinary: Voiding independently.  Extremities: RLE flap: warm to touch, soft and compressible, strong biphasic signal at marked stitch, circumferential incisions around flap well approximated with some scabbing  Right posterior lat donor site: incision healed to distal end, incision well approximated with sutures post op at proximal end, covered with ABD, dressing c/d/I, TTP to right lat incision, MASSIEL drain x 1 to right lower flank with serous output.  Neurological: A&Ox3.  Psychological: Appropriate mood and behavior.  Skin: Warm and dry.      Home Medications     Medication List      START taking these medications     enoxaparin 40 mg/0.4 mL syringe; Commonly known as: Lovenox; Inject 0.4   mL (40 mg) under the skin once daily.   vancomycin IVPB; Commonly known as: Vancocin; Infuse 200 mL (1 g) at   266.7 mL/hr over 45 minutes into a venous catheter every 12  hours for 3   days.; Replaces: vancomycin IVPB     CONTINUE taking these medications     albuterol 90 mcg/actuation inhaler; Inhale 2 puffs every 6 hours if   needed for shortness of breath.   docusate sodium 100 mg capsule; Commonly known as: Colace; Take 1   capsule (100 mg) by mouth 2 times a day as needed for constipation.   lidocaine HCL 4 % adhesive patch,medicated   methocarbamol 500 mg tablet; Commonly known as: Robaxin; Take 1 tablet   (500 mg) by mouth every 8 hours for 14 days.   * oxyCODONE 5 mg immediate release tablet; Commonly known as: Roxicodone   * oxyCODONE 5 mg immediate release tablet; Commonly known as: Roxicodone   pantoprazole 40 mg EC tablet; Commonly known as: ProtoNix  * This list has 2 medication(s) that are the same as other medications   prescribed for you. Read the directions carefully, and ask your doctor or   other care provider to review them with you.     STOP taking these medications     apixaban 2.5 mg tablet; Commonly known as: Eliquis   heparin lock flush (porcine) 10 unit/mL injection   Normal Saline Flush flush; Generic drug: sodium chloride 0.9%   vancomycin IVPB; Commonly known as: Vancocin; Replaced by: vancomycin   IVPB     ASK your doctor about these medications     acetaminophen 325 mg tablet; Commonly known as: Tylenol; Take 3 tablets   (975 mg) by mouth every 8 hours for 14 days.; Ask about: Should I take   this medication?       Outpatient Follow-Up  Future Appointments   Date Time Provider Department Center   4/14/2025  9:15 AM Haydee Reyes MD [id number]   4/21/2025  1:40 PM Clyde Tolliver MD [facility] [id number] [facility]       Ivy Nelson PA-C

## 2025-04-08 NOTE — CARE PLAN
Problem: Pain - Adult  Goal: Verbalizes/displays adequate comfort level or baseline comfort level  Outcome: Progressing     Problem: Safety - Adult  Goal: Free from fall injury  Outcome: Progressing     Problem: Discharge Planning  Goal: Discharge to home or other facility with appropriate resources  Outcome: Progressing     Problem: Chronic Conditions and Co-morbidities  Goal: Patient's chronic conditions and co-morbidity symptoms are monitored and maintained or improved  Outcome: Progressing     Problem: Nutrition  Goal: Nutrient intake appropriate for maintaining nutritional needs  Outcome: Progressing     Problem: Fall/Injury  Goal: Not fall by end of shift  Outcome: Progressing  Goal: Be free from injury by end of the shift  Outcome: Progressing  Goal: Verbalize understanding of personal risk factors for fall in the hospital  Outcome: Progressing  Goal: Verbalize understanding of risk factor reduction measures to prevent injury from fall in the home  Outcome: Progressing  Goal: Use assistive devices by end of the shift  Outcome: Progressing  Goal: Pace activities to prevent fatigue by end of the shift  Outcome: Progressing     Problem: Pain  Goal: Takes deep breaths with improved pain control throughout the shift  Outcome: Progressing  Goal: Turns in bed with improved pain control throughout the shift  Outcome: Progressing  Goal: Walks with improved pain control throughout the shift  Outcome: Progressing  Goal: Performs ADL's with improved pain control throughout shift  Outcome: Progressing  Goal: Participates in PT with improved pain control throughout the shift  Outcome: Progressing  Goal: Free from opioid side effects throughout the shift  Outcome: Progressing  Goal: Free from acute confusion related to pain meds throughout the shift  Outcome: Progressing     Problem: Skin  Goal: Decreased wound size/increased tissue granulation at next dressing change  4/7/2025 2129 by Emilia Copeland RN  Flowsheets  (Taken 4/7/2025 2129)  Decreased wound size/increased tissue granulation at next dressing change: Promote sleep for wound healing  4/7/2025 2129 by Emilia Copeland RN  Outcome: Progressing  Flowsheets (Taken 4/7/2025 2129)  Decreased wound size/increased tissue granulation at next dressing change: Promote sleep for wound healing  Goal: Participates in plan/prevention/treatment measures  4/7/2025 2129 by Emilia Copeland RN  Flowsheets (Taken 4/7/2025 2129)  Participates in plan/prevention/treatment measures: Discuss with provider PT/OT consult  4/7/2025 2129 by Emilia Copeland RN  Outcome: Progressing  Flowsheets (Taken 4/7/2025 2129)  Participates in plan/prevention/treatment measures: Discuss with provider PT/OT consult  Goal: Prevent/manage excess moisture  4/7/2025 2129 by Emilia Copeland RN  Flowsheets (Taken 4/7/2025 2129)  Prevent/manage excess moisture:   Cleanse incontinence/protect with barrier cream   Moisturize dry skin  4/7/2025 2129 by Emilia Copeland RN  Outcome: Progressing  Flowsheets (Taken 4/7/2025 2129)  Prevent/manage excess moisture:   Cleanse incontinence/protect with barrier cream   Moisturize dry skin  Goal: Prevent/minimize sheer/friction injuries  4/7/2025 2129 by Emilia Copeland RN  Flowsheets (Taken 4/7/2025 2129)  Prevent/minimize sheer/friction injuries: Use pull sheet  4/7/2025 2129 by Emilia Copeland RN  Outcome: Progressing  Flowsheets (Taken 4/7/2025 2129)  Prevent/minimize sheer/friction injuries: Use pull sheet  Goal: Promote/optimize nutrition  4/7/2025 2129 by Emilia Copeland RN  Flowsheets (Taken 4/7/2025 2129)  Promote/optimize nutrition: Monitor/record intake including meals  4/7/2025 2129 by Emilia Copeland RN  Outcome: Progressing  Flowsheets (Taken 4/7/2025 2129)  Promote/optimize nutrition: Monitor/record intake including meals  Goal: Promote skin healing  4/7/2025 2129 by Emilia Copeland RN  Flowsheets (Taken 4/7/2025 2129)  Promote skin healing: Turn/reposition every 2  hours/use positioning/transfer devices  4/7/2025 2129 by Emilia Copeland RN  Outcome: Progressing  Flowsheets (Taken 4/7/2025 2129)  Promote skin healing: Turn/reposition every 2 hours/use positioning/transfer devices

## 2025-04-08 NOTE — NURSING NOTE
Adriana Wood discharged at 2:21 PM  and 04/08/25   Patient discharged back to SNF via [facility] .  SNF report called to LAURIE Angel at [facility].  Discharge education and teaching completed with Adriana Wood.  RN signature: Lucio Figueroa RN

## 2025-04-08 NOTE — CARE PLAN
The clinical goals for the shift include pt will remain HDS throughout shft    Problem: Pain - Adult  Goal: Verbalizes/displays adequate comfort level or baseline comfort level  Outcome: Progressing     Problem: Safety - Adult  Goal: Free from fall injury  Outcome: Progressing     Problem: Discharge Planning  Goal: Discharge to home or other facility with appropriate resources  Outcome: Progressing     Problem: Chronic Conditions and Co-morbidities  Goal: Patient's chronic conditions and co-morbidity symptoms are monitored and maintained or improved  Outcome: Progressing     Problem: Nutrition  Goal: Nutrient intake appropriate for maintaining nutritional needs  Outcome: Progressing     Problem: Fall/Injury  Goal: Not fall by end of shift  Outcome: Progressing  Goal: Be free from injury by end of the shift  Outcome: Progressing  Goal: Verbalize understanding of personal risk factors for fall in the hospital  Outcome: Progressing  Goal: Verbalize understanding of risk factor reduction measures to prevent injury from fall in the home  Outcome: Progressing  Goal: Use assistive devices by end of the shift  Outcome: Progressing  Goal: Pace activities to prevent fatigue by end of the shift  Outcome: Progressing     Problem: Pain  Goal: Takes deep breaths with improved pain control throughout the shift  Outcome: Progressing  Goal: Turns in bed with improved pain control throughout the shift  Outcome: Progressing  Goal: Walks with improved pain control throughout the shift  Outcome: Progressing  Goal: Performs ADL's with improved pain control throughout shift  Outcome: Progressing  Goal: Participates in PT with improved pain control throughout the shift  Outcome: Progressing  Goal: Free from opioid side effects throughout the shift  Outcome: Progressing  Goal: Free from acute confusion related to pain meds throughout the shift  Outcome: Progressing     Problem: Skin  Goal: Decreased wound size/increased tissue granulation  at next dressing change  Outcome: Progressing  Goal: Participates in plan/prevention/treatment measures  Outcome: Progressing  Goal: Prevent/manage excess moisture  Outcome: Progressing  Goal: Prevent/minimize sheer/friction injuries  Outcome: Progressing  Goal: Promote/optimize nutrition  Outcome: Progressing  Goal: Promote skin healing  Outcome: Progressing

## 2025-04-09 ENCOUNTER — NURSING HOME VISIT (OUTPATIENT)
Dept: POST ACUTE CARE | Facility: EXTERNAL LOCATION | Age: 70
End: 2025-04-09
Payer: MEDICARE

## 2025-04-09 VITALS
RESPIRATION RATE: 16 BRPM | DIASTOLIC BLOOD PRESSURE: 53 MMHG | BODY MASS INDEX: 21.84 KG/M2 | WEIGHT: 152.56 LBS | HEIGHT: 70 IN | SYSTOLIC BLOOD PRESSURE: 109 MMHG | TEMPERATURE: 97.5 F | HEART RATE: 75 BPM | OXYGEN SATURATION: 97 %

## 2025-04-09 DIAGNOSIS — M17.31 POST-TRAUMATIC ARTHRITIS OF LOWER LEG, RIGHT: Primary | ICD-10-CM

## 2025-04-09 DIAGNOSIS — K59.00 CONSTIPATION, UNSPECIFIED CONSTIPATION TYPE: ICD-10-CM

## 2025-04-09 DIAGNOSIS — J98.01 BRONCHOSPASM: ICD-10-CM

## 2025-04-09 DIAGNOSIS — K21.9 GASTROESOPHAGEAL REFLUX DISEASE, UNSPECIFIED WHETHER ESOPHAGITIS PRESENT: ICD-10-CM

## 2025-04-09 DIAGNOSIS — T84.50XD PROSTHETIC JOINT INFECTION, SUBSEQUENT ENCOUNTER: ICD-10-CM

## 2025-04-09 DIAGNOSIS — S20.211S: ICD-10-CM

## 2025-04-09 DIAGNOSIS — M62.838 MUSCLE SPASM: ICD-10-CM

## 2025-04-09 LAB — VANCOMYCIN SERPL-MCNC: 22.5 UG/ML (ref 5–20)

## 2025-04-09 PROCEDURE — 2500000001 HC RX 250 WO HCPCS SELF ADMINISTERED DRUGS (ALT 637 FOR MEDICARE OP)

## 2025-04-09 PROCEDURE — 2500000001 HC RX 250 WO HCPCS SELF ADMINISTERED DRUGS (ALT 637 FOR MEDICARE OP): Performed by: NURSE PRACTITIONER

## 2025-04-09 PROCEDURE — 2500000004 HC RX 250 GENERAL PHARMACY W/ HCPCS (ALT 636 FOR OP/ED): Performed by: NURSE PRACTITIONER

## 2025-04-09 PROCEDURE — 2500000004 HC RX 250 GENERAL PHARMACY W/ HCPCS (ALT 636 FOR OP/ED)

## 2025-04-09 PROCEDURE — 80202 ASSAY OF VANCOMYCIN: CPT | Performed by: NURSE PRACTITIONER

## 2025-04-09 PROCEDURE — 99310 SBSQ NF CARE HIGH MDM 45: CPT | Performed by: NURSE PRACTITIONER

## 2025-04-09 RX ADMIN — ACETAMINOPHEN 975 MG: 325 TABLET, FILM COATED ORAL at 08:44

## 2025-04-09 RX ADMIN — ENOXAPARIN SODIUM 40 MG: 100 INJECTION SUBCUTANEOUS at 08:43

## 2025-04-09 RX ADMIN — OXYCODONE HYDROCHLORIDE 5 MG: 5 TABLET ORAL at 09:45

## 2025-04-09 RX ADMIN — METHOCARBAMOL TABLETS 250 MG: 500 TABLET, COATED ORAL at 08:44

## 2025-04-09 RX ADMIN — POLYETHYLENE GLYCOL 3350 17 G: 17 POWDER, FOR SOLUTION ORAL at 08:44

## 2025-04-09 ASSESSMENT — COGNITIVE AND FUNCTIONAL STATUS - GENERAL
WALKING IN HOSPITAL ROOM: A LOT
TOILETING: A LOT
STANDING UP FROM CHAIR USING ARMS: A LITTLE
DRESSING REGULAR UPPER BODY CLOTHING: A LITTLE
DRESSING REGULAR LOWER BODY CLOTHING: A LITTLE
MOBILITY SCORE: 18
PERSONAL GROOMING: A LITTLE
MOVING TO AND FROM BED TO CHAIR: A LITTLE
DAILY ACTIVITIY SCORE: 18
HELP NEEDED FOR BATHING: A LITTLE
CLIMB 3 TO 5 STEPS WITH RAILING: A LOT

## 2025-04-09 ASSESSMENT — PAIN SCALES - GENERAL
PAINLEVEL_OUTOF10: 6
PAINLEVEL_OUTOF10: 7

## 2025-04-09 ASSESSMENT — PAIN DESCRIPTION - ORIENTATION: ORIENTATION: RIGHT

## 2025-04-09 ASSESSMENT — PAIN DESCRIPTION - LOCATION: LOCATION: LEG

## 2025-04-09 ASSESSMENT — PAIN DESCRIPTION - DESCRIPTORS: DESCRIPTORS: SHARP;SHOOTING

## 2025-04-09 ASSESSMENT — PAIN - FUNCTIONAL ASSESSMENT: PAIN_FUNCTIONAL_ASSESSMENT: 0-10

## 2025-04-09 NOTE — CARE PLAN
Problem: Pain - Adult  Goal: Verbalizes/displays adequate comfort level or baseline comfort level  Outcome: Progressing     Problem: Safety - Adult  Goal: Free from fall injury  Outcome: Progressing     Problem: Discharge Planning  Goal: Discharge to home or other facility with appropriate resources  Outcome: Progressing     Problem: Chronic Conditions and Co-morbidities  Goal: Patient's chronic conditions and co-morbidity symptoms are monitored and maintained or improved  Outcome: Progressing     Problem: Nutrition  Goal: Nutrient intake appropriate for maintaining nutritional needs  Outcome: Progressing     Problem: Fall/Injury  Goal: Not fall by end of shift  Outcome: Progressing  Goal: Be free from injury by end of the shift  Outcome: Progressing  Goal: Verbalize understanding of personal risk factors for fall in the hospital  Outcome: Progressing  Goal: Verbalize understanding of risk factor reduction measures to prevent injury from fall in the home  Outcome: Progressing  Goal: Use assistive devices by end of the shift  Outcome: Progressing  Goal: Pace activities to prevent fatigue by end of the shift  Outcome: Progressing     Problem: Pain  Goal: Takes deep breaths with improved pain control throughout the shift  Outcome: Progressing  Goal: Turns in bed with improved pain control throughout the shift  Outcome: Progressing  Goal: Walks with improved pain control throughout the shift  Outcome: Progressing  Goal: Performs ADL's with improved pain control throughout shift  Outcome: Progressing  Goal: Participates in PT with improved pain control throughout the shift  Outcome: Progressing  Goal: Free from opioid side effects throughout the shift  Outcome: Progressing  Goal: Free from acute confusion related to pain meds throughout the shift  Outcome: Progressing     Problem: Skin  Goal: Decreased wound size/increased tissue granulation at next dressing change  Outcome: Progressing  Goal: Participates in  plan/prevention/treatment measures  Outcome: Progressing  Goal: Prevent/manage excess moisture  Outcome: Progressing  Goal: Prevent/minimize sheer/friction injuries  Outcome: Progressing  Goal: Promote/optimize nutrition  Outcome: Progressing  Goal: Promote skin healing  Outcome: Progressing

## 2025-04-09 NOTE — PROGRESS NOTES
Vancomycin Dosing by Pharmacy  FOLLOW UP    Adriana Wood is a 69 y.o. female who Pharmacy is consulted to dose vancomycin for cellulitis/skin and soft tissue infection.     Based on the patient's indication and renal status, this patient is being dosed based on a goal vancomycin AUC of 400-600.     Current vancomycin dose: 750 mg every 12 hours    Estimated vancomycin AUC on current dose: 477 mg/L.hr    Renal function is currently stable.    Estimated Creatinine Clearance: 113.5 mL/min (by C-G formula based on SCr of 0.52 mg/dL).    Results from last 7 days   Lab Units 04/08/25  0417 04/07/25  0446 04/06/25  0529 04/05/25  0504 04/04/25  0427   CREATININE mg/dL 0.52 0.49* 0.45* 0.52 0.50   BUN mg/dL 13 16 14 16 12   WBC AUTO x10*3/uL  --  3.9* 4.1* 5.2 5.3        Visit Vitals  /56 (BP Location: Left arm, Patient Position: Lying)   Pulse 50   Temp 37 °C (98.6 °F) (Temporal)   Resp 18       Staph/MRSA Screen Culture   Date/Time Value Ref Range Status   03/28/2025 10:42 AM No Staphylococcus aureus isolated  Final     Gram Stain   Date/Time Value Ref Range Status   02/28/2025 08:45 AM (1+) Rare Polymorphonuclear leukocytes  Final   02/28/2025 08:45 AM No organisms seen  Final     Urine Culture   Date/Time Value Ref Range Status   03/27/2024 05:12 PM No significant growth  Final     Blood Culture   Date/Time Value Ref Range Status   08/08/2024 12:40 AM No growth at 4 days -  FINAL REPORT  Final   08/08/2024 12:40 AM No growth at 4 days -  FINAL REPORT  Final     Tissue/Wound Culture/Smear   Date/Time Value Ref Range Status   02/28/2025 08:45 AM No growth aerobically and anaerobically  Final        No results found for the last 90 days.      Assessment/Plan    AUC is within goal range. Continue current vancomycin regimen. This dosing regimen is predicted by [employer] to result in the following pharmacokinetic parameters:   Loading dose: N/A  Regimen: 750 mg IV every 12 hours.  Start time: 08:30 on  04/09/2025  Exposure target: AUC24 (range)400-600 mg/L.hr   DED56-69: 498 mg/L.hr  AUC24,ss: 477 mg/L.hr  Probability of AUC24 > 400: 98 %  Ctrough,ss: 15.7 mg/L  Probability of Ctrough,ss > 20: 1 %    The next vancomycin level will be ordered for 04/11 at AM labs, unless clinically indicated sooner.  Will continue to monitor renal function daily while on vancomycin and order serum creatinine at least every 48 hours if not already ordered.  Will follow for continued vancomycin needs, clinical response, and signs/symptoms of toxicity.       Bobby Coats, PharmD

## 2025-04-09 NOTE — DISCHARGE SUMMARY
Discharge Diagnosis  Hematoma of abdominal wall, initial encounter    Issues Requiring Follow-Up  Post-op hematoma evacuation    Test Results Pending At Discharge  Pending Labs       No current pending labs.            Hospital Course  BRIEF HISTORY:    Adriana Wood is a 69 y.o. female with a past medical history of bradycardia, cardiomyopathy, CAD, cardiac arrest due to electrolyte abnormalities and zofran use post-operatively, and traumatic brain injury. Patient had a total knee right knee done by Dr. Whelan in March 2024 which was complicated by E. Coli infection. Patient had right TKA wound dehiscence s/p I&D right knee with polyswap and attempted re-closure of complex wound on 5/10/2024 by Dr. Whelan. She was referred to plastic surgery (Dr. Reyes) perioperatively given projected anticipated need for possible flap coverage of the wound/defect site. Patient re-admitted and returned to the OR with orthopedics on 2/28/2025 for right knee I&D, revision static spacer and application of incisional wound vac. Plastic surgery service consulted postoperatively to follow for flap recommendations/timing, now s/p R latissimus free flap to R knee on 3/14 with Dr. Lundberg. Discharged on 3/24/25 to SNF, taken to [facility] ED on 3/26/25 for concerns for right latissimus dorsi hematoma. Transferred to [facility] on 3/28/25 for monitoring of right latissimus dorsi flap hematoma.    HOSPITAL COURSE:    Admitted on 3/28/25 for monitoring of right latissimus dorsi flap hematoma. Had episode of bradycardia, HR in the 30's on 3/30, asymptomatic, medicine consulted. Repeat US of chest on 4/1 indicates large right lateral chest wall hematoma with a drain in place. Patient requiring return to OR on 4/3 for right chest wall hematoma evacuation. Resumed Lovenox on POD#1 and will continue Lovenox at discharge, continue to hold home Eliquis until follow up.      CONSULTATIONS:  Internal medicine consulted for bradycardia and elected to  continue monitoring, obtain new EKG, keep Mg >2, K > 4, monitor on telemetry, avoid any QT prolonging agents. ID consulted last admission, continue IV Vancomycin during this admission with stop date of 4/11/25.    DAY OF DISCHARGE:    On the day of discharge, the patient was seen and evaluated by the Plastic Surgery team and deemed suitable for discharge to SNF previously at.  There were no significant events overnight. Vitals were reviewed and within normal limits. Labs were stable at discharge. On day of discharge the patient was tolerating a diet, pain was controlled on PO pain medication, was ambulating well and voiding spontaneously. The patient was given detailed discharge instructions and were scheduled to follow up as an outpatient.  Patient ready for discharge 4/8/25 however miscommunication with transport to facility now scheduled for today at 9am. No changes overnight    Pertinent Physical Exam At Time of Discharge  Physical Exam  Constitutional: A&Ox3, calm and cooperative, NAD.  Eyes: EOMI, clear sclera.   ENMT: Moist mucous membranes.  Head/Neck: NC/AT.  Cardiovascular: Regular rate.   Respiratory/Thorax: Unlabored respirations on RA.   Gastrointestinal: Abdomen soft, NTND.  Genitourinary: Voiding independently.  Extremities: RLE flap: warm to touch, soft and compressible, strong biphasic signal at marked stitch, circumferential incisions around flap well approximated with some scabbing  Right posterior lat donor site: incision healed to distal end, incision well approximated with sutures post op at proximal end, covered with ABD, dressing c/d/I, TTP to right lat incision  Neurological: A&Ox3.  Psychological: Appropriate mood and behavior.  Skin: Warm and dry.   Home Medications     Medication List      START taking these medications     enoxaparin 40 mg/0.4 mL syringe; Commonly known as: Lovenox; Inject 0.4   mL (40 mg) under the skin once daily.   vancomycin IVPB; Commonly known as: Vancocin; Infuse  200 mL (1 g) at   266.7 mL/hr over 45 minutes into a venous catheter every 12 hours for 3   days.; Replaces: vancomycin IVPB     CONTINUE taking these medications     acetaminophen 325 mg tablet; Commonly known as: Tylenol; Take 3 tablets   (975 mg) by mouth every 8 hours for 7 days.   albuterol 90 mcg/actuation inhaler; Inhale 2 puffs every 6 hours if   needed for shortness of breath.   docusate sodium 100 mg capsule; Commonly known as: Colace; Take 1   capsule (100 mg) by mouth 2 times a day as needed for constipation.   lidocaine HCL 4 % adhesive patch,medicated   methocarbamol 500 mg tablet; Commonly known as: Robaxin; Take 1 tablet   (500 mg) by mouth every 8 hours for 14 days.   * oxyCODONE 5 mg immediate release tablet; Commonly known as: Roxicodone   * oxyCODONE 5 mg immediate release tablet; Commonly known as: Roxicodone   pantoprazole 40 mg EC tablet; Commonly known as: ProtoNix  * This list has 2 medication(s) that are the same as other medications   prescribed for you. Read the directions carefully, and ask your doctor or   other care provider to review them with you.     STOP taking these medications     apixaban 2.5 mg tablet; Commonly known as: Eliquis   heparin lock flush (porcine) 10 unit/mL injection   Normal Saline Flush flush; Generic drug: sodium chloride 0.9%   vancomycin IVPB; Commonly known as: Vancocin; Replaced by: vancomycin   IVPB       Outpatient Follow-Up  Future Appointments   Date Time Provider Department [place]   4/14/2025 10:30 AM Haydee Reyes MD [id number] [facility]   4/21/2025  1:40 PM Clyde Tolliver MD MPH [id number] [facility]       Ivy Nelson PA-C

## 2025-04-09 NOTE — PROGRESS NOTES
Subjective   Patient ID: Adriana Wood is a 69 y.o. female presenting for post-operative visit     [facility] [place] Adriana Wood is a 69 y.o. female with a past medical history of bradycardia, cardiomyopathy, CAD, cardiac arrest due to electrolyte abnormalities and zofran use post-operatively, and traumatic brain injury. Patient had a total knee right knee done by Dr. Whelan in March 2024 which was complicated by E. Coli infection. Patient had right TKA wound dehiscence s/p I&D right knee with polyswap and attempted re-closure of complex wound on 5/10/2024 by Dr. Whelan. She was referred to plastic surgery (Dr. Reyes) perioperatively given projected anticipated need for possible flap coverage of the wound/defect site. Patient re-admitted and returned to the OR with orthopedics on 2/28/2025 for right knee I&D, revision static spacer and application of incisional wound vac. Plastic surgery service consulted postoperatively to follow for flap recommendations/timing. She is s/p R latissimus free flap to R knee on 3/14 with Dr. Lundberg. Discharged on 3/24/25 to SNF, and re-admitted on 3/26/25 for concerns for right latissimus dorsi hematoma. Hospital coarse was complicated by episode of bradycardia, HR in the 30's on 3/30, asymptomatic, medicine consulted. Repeat US of chest on 4/1 indicates large right lateral chest wall hematoma with a drain in place. Patient requiring return to OR on 4/3 for right chest wall hematoma evacuation. Resumed Lovenox on POD#1 and will continue Lovenox at discharge, continue to hold home Eliquis until follow up.       Review of Systems  All 10 systems were reviewed and are unremarkable except for those mentioned in HPI.     Objective   Physical Exam  Physical Exam  Vitals and nursing note reviewed. Exam conducted with a chaperone present.     Constitutional:       General: She is not in acute distress.     Appearance: She is not ill-appearing.   Eyes:      Extraocular Movements:  Extraocular movements intact.      Conjunctiva/sclera: Conjunctivae normal.      Pupils: Pupils are equal, round, and reactive to light.   Cardiovascular:      Rate and Rhythm: Normal rate and regular rhythm.      Pulses: Normal pulses.   Pulmonary:      Effort: Pulmonary effort is normal.      Breath sounds: Normal breath sounds.   Abdominal:      Palpations: Abdomen is soft. There is no mass.      Tenderness: There is no abdominal tenderness.      Hernia: No hernia is present.   Musculoskeletal:         General: No swelling or tenderness.      Cervical back: Normal range of motion and neck supple.   Skin:     Capillary Refill: Capillary refill takes less than 2 seconds.      Coloration: Skin is not jaundiced.      Findings: No bruising or rash.   Neurological:      General: No focal deficit present.      Mental Status: She is oriented to person, place, and time.   Psychiatric:         Mood and Affect: Mood normal.         Behavior: Behavior normal.         Thought Content: Thought content normal.         Judgment: Judgment normal.     RLE flap: warm to touch, soft and compressible, strong biphasic signal at marked stitch, circumferential incisions around flap well approximated with some scabbing  Right posterior lat donor site: incision healed to distal end, incision well approximated with sutures post op at proximal end, covered with ABD, dressing c/d/I, TTP to right lat incision, MASSIEL drain x 1 to right lower flank with serous output.    MASSIEL drain to right lower flank in place. No erythema or edema surrounding the drain site. There is serous output from the drain. Patient recorded output of the drains showed 2 consecutive days of less than 30cc output at time of removal. Patient was educated on purpose of surgical drains and informed of risk for seroma post drain removal.  instructed her on possibility of seroma formation, and signs and symptoms of this.   MASSIEL drains removed at this visit:    Assessment/Plan   There  are no diagnoses linked to this encounter.    Activity:  - Non weight bearing to RLE.   - Continue to maintain non-weight bearing status to RLE.   - Ensure no compression is applied to the flap site.   - Continue to elevate your leg to alleviate postoperative edema.   - Please do not apply ice or heat directly to flap without barrier in place .  - You may locally bathe following discharge.   - Avoid soaking or submerging surgical incisions/sites     Surgical Site/Wound Care:   - Local wound care instructions. Avoid application of creams, lotions or ointments to surgical site, no soaking or scrubbing of surgical sites.   - Continue to monitor flap for changes in general appearance, color, temperature and turgor. Please additionally monitor for any developing signs of infection which may include increased redness, swelling, fever/chills, green/yellow drainage, or foul odor from surgical sites or wounds. If any signs of infection or changes in flap appearance are to occur, please immediately contact the plastic surgery office.   - Drain care: 1 MASSIEL drain. To empty the drain, open the cap, tip into cup and squeeze to empty. Squeeze drain flat then replace the cap. Please empty the drain and record its output 3 times a day and bring these numbers to your follow up appointment. The drain output should decrease and the color of the drainage should become lighter (red to pink to yellow). This drain is sutured into place. Keep the area around the drain clean and dry. You may use mild soap and water to cleanse around the drain. It is ok to shower; do not soak in a tub. Change the gauze around the drain as needed. Call the office if you notice drastic changes in drain output, bloody drain output, or redness/drainage around insertion site.     Medications  - Continue IV Vancomycin as recommended per ID (stop date 4/11/25)   - Continue lovenox?   signs of infection or changes in flap appearance are to occur, please immediately contact the plastic surgery office.     Medications  - IV Vancomycin as recommended per ID -- completed  - Continue lovenox until 5/8/25   - 7.5mg percocet prescribed in clinic today to wean oxycodone requirements  - Follow up in [facility] clinic in 4 weeks.  - Please follow up with Dr. Whelan team for joint replacement scheduling.     Seen and discussed with Dr. Torres.    Caleb Yanes MD  PGY-2 General Surgery    I saw and evaluated the patient.  I personally obtained the key and critical portions of the history and physical exam. I reviewed the resident's documentation and discussed the patient with the resident. I agree with the resident's medical decision making as documented in the note.  Haydee Torres M.D.

## 2025-04-10 ENCOUNTER — NURSING HOME VISIT (OUTPATIENT)
Dept: POST ACUTE CARE | Facility: EXTERNAL LOCATION | Age: 70
End: 2025-04-10
Payer: MEDICARE

## 2025-04-10 DIAGNOSIS — M17.31 POST-TRAUMATIC ARTHRITIS OF LOWER LEG, RIGHT: Primary | ICD-10-CM

## 2025-04-10 DIAGNOSIS — T84.50XD PROSTHETIC JOINT INFECTION, SUBSEQUENT ENCOUNTER: ICD-10-CM

## 2025-04-10 DIAGNOSIS — F43.10 PTSD (POST-TRAUMATIC STRESS DISORDER): ICD-10-CM

## 2025-04-10 PROCEDURE — 99305 1ST NF CARE MODERATE MDM 35: CPT | Performed by: FAMILY MEDICINE
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
## 2025-04-11 ENCOUNTER — LAB REQUISITION (OUTPATIENT)
Dept: LAB | Facility: HOSPITAL | Age: 70
End: 2025-04-11
Payer: MEDICARE

## 2025-04-11 DIAGNOSIS — T84.53XD INFECTION AND INFLAMMATORY REACTION DUE TO INTERNAL RIGHT KNEE PROSTHESIS, SUBSEQUENT ENCOUNTER: ICD-10-CM

## 2025-04-11 DIAGNOSIS — S30.1XXA HEMATOMA OF ABDOMINAL WALL, INITIAL ENCOUNTER: ICD-10-CM

## 2025-04-11 LAB
ALBUMIN SERPL BCP-MCNC: 3.9 G/DL (ref 3.4–5)
ALP SERPL-CCNC: 89 U/L (ref 33–136)
ALT SERPL W P-5'-P-CCNC: 20 U/L (ref 7–45)
ANION GAP SERPL CALC-SCNC: 11 MMOL/L (ref 10–20)
AST SERPL W P-5'-P-CCNC: 24 U/L (ref 9–39)
BASOPHILS # BLD AUTO: 0.04 X10*3/UL (ref 0–0.1)
BASOPHILS NFR BLD AUTO: 0.9 %
BILIRUB SERPL-MCNC: 0.3 MG/DL (ref 0–1.2)
BUN SERPL-MCNC: 11 MG/DL (ref 6–23)
CALCIUM SERPL-MCNC: 8.7 MG/DL (ref 8.6–10.3)
CHLORIDE SERPL-SCNC: 103 MMOL/L (ref 98–107)
CO2 SERPL-SCNC: 29 MMOL/L (ref 21–32)
CREAT SERPL-MCNC: 0.47 MG/DL (ref 0.5–1.05)
EGFRCR SERPLBLD CKD-EPI 2021: >90 ML/MIN/1.73M*2
EOSINOPHIL # BLD AUTO: 0.16 X10*3/UL (ref 0–0.7)
EOSINOPHIL NFR BLD AUTO: 3.4 %
ERYTHROCYTE [DISTWIDTH] IN BLOOD BY AUTOMATED COUNT: 13.5 % (ref 11.5–14.5)
GLUCOSE SERPL-MCNC: 76 MG/DL (ref 74–99)
HCT VFR BLD AUTO: 34.6 % (ref 36–46)
HGB BLD-MCNC: 11.1 G/DL (ref 12–16)
IMM GRANULOCYTES # BLD AUTO: 0.02 X10*3/UL (ref 0–0.7)
IMM GRANULOCYTES NFR BLD AUTO: 0.4 % (ref 0–0.9)
LYMPHOCYTES # BLD AUTO: 1.55 X10*3/UL (ref 1.2–4.8)
LYMPHOCYTES NFR BLD AUTO: 33 %
MCH RBC QN AUTO: 28.9 PG (ref 26–34)
MCHC RBC AUTO-ENTMCNC: 32.1 G/DL (ref 32–36)
MCV RBC AUTO: 90 FL (ref 80–100)
MONOCYTES # BLD AUTO: 0.54 X10*3/UL (ref 0.1–1)
MONOCYTES NFR BLD AUTO: 11.5 %
NEUTROPHILS # BLD AUTO: 2.38 X10*3/UL (ref 1.2–7.7)
NEUTROPHILS NFR BLD AUTO: 50.8 %
NRBC BLD-RTO: 0 /100 WBCS (ref 0–0)
PLATELET # BLD AUTO: 241 X10*3/UL (ref 150–450)
POTASSIUM SERPL-SCNC: 4.1 MMOL/L (ref 3.5–5.3)
PROT SERPL-MCNC: 6.1 G/DL (ref 6.4–8.2)
RBC # BLD AUTO: 3.84 X10*6/UL (ref 4–5.2)
SODIUM SERPL-SCNC: 139 MMOL/L (ref 136–145)
WBC # BLD AUTO: 4.7 X10*3/UL (ref 4.4–11.3)

## 2025-04-11 PROCEDURE — 36415 COLL VENOUS BLD VENIPUNCTURE: CPT | Mod: OUT | Performed by: FAMILY MEDICINE

## 2025-04-11 PROCEDURE — 84075 ASSAY ALKALINE PHOSPHATASE: CPT | Mod: OUT | Performed by: FAMILY MEDICINE

## 2025-04-11 PROCEDURE — 85025 COMPLETE CBC W/AUTO DIFF WBC: CPT | Mod: OUT | Performed by: FAMILY MEDICINE

## 2025-04-13 ASSESSMENT — ENCOUNTER SYMPTOMS
RHINORRHEA: 0
SORE THROAT: 0
BLOOD IN STOOL: 0
ADENOPATHY: 0
ABDOMINAL PAIN: 0
HEADACHES: 0
NECK STIFFNESS: 0
HEMATURIA: 0
DYSURIA: 0
ARTHRALGIAS: 1
EYE PAIN: 0
BACK PAIN: 0
FATIGUE: 0
POLYDIPSIA: 0
PALPITATIONS: 0
HALLUCINATIONS: 0
COUGH: 0
WOUND: 1
SHORTNESS OF BREATH: 0
CHILLS: 0
BRUISES/BLEEDS EASILY: 0
EYE REDNESS: 0
WEAKNESS: 1
FEVER: 0
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
## 2025-04-14 ENCOUNTER — LAB REQUISITION (OUTPATIENT)
Dept: LAB | Facility: HOSPITAL | Age: 70
End: 2025-04-14
Payer: MEDICARE

## 2025-04-14 ENCOUNTER — APPOINTMENT (OUTPATIENT)
Dept: PLASTIC SURGERY | Facility: CLINIC | Age: 70
End: 2025-04-14
Payer: MEDICARE

## 2025-04-14 ENCOUNTER — NURSING HOME VISIT (OUTPATIENT)
Dept: POST ACUTE CARE | Facility: EXTERNAL LOCATION | Age: 70
End: 2025-04-14

## 2025-04-14 VITALS — TEMPERATURE: 98.2 F | HEART RATE: 36 BPM | SYSTOLIC BLOOD PRESSURE: 122 MMHG | DIASTOLIC BLOOD PRESSURE: 81 MMHG

## 2025-04-14 DIAGNOSIS — G47.00 INSOMNIA, UNSPECIFIED TYPE: ICD-10-CM

## 2025-04-14 DIAGNOSIS — F43.10 PTSD (POST-TRAUMATIC STRESS DISORDER): ICD-10-CM

## 2025-04-14 DIAGNOSIS — Z48.02 ENCOUNTER FOR REMOVAL OF SUTURES: ICD-10-CM

## 2025-04-14 DIAGNOSIS — M17.9 OSTEOARTHRITIS OF KNEE, UNSPECIFIED LATERALITY, UNSPECIFIED OSTEOARTHRITIS TYPE: ICD-10-CM

## 2025-04-14 DIAGNOSIS — A41.89 OTHER SPECIFIED SEPSIS: ICD-10-CM

## 2025-04-14 DIAGNOSIS — Z98.890 POSTOPERATIVE STATE: ICD-10-CM

## 2025-04-14 DIAGNOSIS — K21.9 GASTROESOPHAGEAL REFLUX DISEASE, UNSPECIFIED WHETHER ESOPHAGITIS PRESENT: Primary | ICD-10-CM

## 2025-04-14 DIAGNOSIS — G89.18 POSTOPERATIVE PAIN: Primary | ICD-10-CM

## 2025-04-14 PROCEDURE — 1159F MED LIST DOCD IN RCRD: CPT

## 2025-04-14 PROCEDURE — 1157F ADVNC CARE PLAN IN RCRD: CPT

## 2025-04-14 PROCEDURE — 99309 SBSQ NF CARE MODERATE MDM 30: CPT | Performed by: FAMILY MEDICINE

## 2025-04-14 PROCEDURE — 1111F DSCHRG MED/CURRENT MED MERGE: CPT

## 2025-04-14 PROCEDURE — 99024 POSTOP FOLLOW-UP VISIT: CPT

## 2025-04-14 RX ORDER — OXYCODONE AND ACETAMINOPHEN 7.5; 325 MG/1; MG/1
1 TABLET ORAL EVERY 6 HOURS PRN
Start: 2025-04-14 | End: 2025-04-19
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
## 2025-04-16 ENCOUNTER — NURSING HOME VISIT (OUTPATIENT)
Dept: POST ACUTE CARE | Facility: EXTERNAL LOCATION | Age: 70
End: 2025-04-16
Payer: MEDICARE

## 2025-04-16 DIAGNOSIS — S20.211S: ICD-10-CM

## 2025-04-16 DIAGNOSIS — K59.00 CONSTIPATION, UNSPECIFIED CONSTIPATION TYPE: ICD-10-CM

## 2025-04-16 DIAGNOSIS — M17.31 POST-TRAUMATIC ARTHRITIS OF LOWER LEG, RIGHT: Primary | ICD-10-CM

## 2025-04-16 DIAGNOSIS — K21.9 GASTROESOPHAGEAL REFLUX DISEASE, UNSPECIFIED WHETHER ESOPHAGITIS PRESENT: ICD-10-CM

## 2025-04-16 DIAGNOSIS — M62.838 MUSCLE SPASM: ICD-10-CM

## 2025-04-16 DIAGNOSIS — T84.50XD PROSTHETIC JOINT INFECTION, SUBSEQUENT ENCOUNTER: ICD-10-CM

## 2025-04-16 DIAGNOSIS — J98.01 BRONCHOSPASM: ICD-10-CM

## 2025-04-16 PROCEDURE — 99309 SBSQ NF CARE MODERATE MDM 30: CPT | Performed by: NURSE PRACTITIONER

## 2025-04-16 NOTE — DOCUMENTATION CLARIFICATION NOTE
"    PATIENT:               ALVARO GOSS  ACCT #:                  [id number]  MRN:                       [id number]  :                       1955  ADMIT DATE:       3/28/2025 11:56 PM  DISCH DATE:        2025 9:55 AM  RESPONDING PROVIDER #:        [id number]          PROVIDER RESPONSE TEXT:    The hematoma was in the LD flap donor site after flap divsion and transfer to the leg. The incion was made through the surgical scar, and it was down to intercostal muscles and overlyign fascia, and serratous anterior and overlying fascia.    CDI QUERY TEXT:    Clarification    Instruction:    Based on your assessment of the patient and the clinical information, please provide the requested documentation by clicking on the appropriate radio button and enter any additional information if prompted.    Question: Please further clarify the depth of the 4/3/25 I&D procedure as    When answering this query, please exercise your independent professional judgment. The fact that a question is being asked, does not imply that any particular answer is desired or expected.    The patient's clinical indicators include:  Clinical Information: This query refers to the procedure performed on 4/3/25 and documented as \"down to the fascia\".    4/3/25 Operative Report:  \"5 cm incision was made in the central section of the previous surgical incision at the right lateral chest. The incision was made with 15 blade scalpel and electrocautery for hemostasis down to the fascia.\"    4/3/25 Significant Event Note by Jennifer Gabriel CNP:  \"S/P Right latissimus dorsi hematoma evacuation\"    25 Discharge Summary:  \"Admitted on 3/28/25 for monitoring of right latissimus dorsi flap hematoma\"  Options provided:  -- I&D down to and including subcutaneous tissue and fascia/right chest  -- I&D down to and including muscle/right latissimus dorsi  -- Other - I will add my own diagnosis  -- Refer to Clinical Documentation Reviewer    Query created by: " Orquidea Cohen on 4/15/2025 3:33 PM      Electronically signed by:  TOYIN NEVAREZ MD 4/15/2025 9:58 PM

## 2025-04-17 ENCOUNTER — TELEPHONE (OUTPATIENT)
Dept: PLASTIC SURGERY | Facility: CLINIC | Age: 70
End: 2025-04-17
Payer: MEDICARE

## 2025-04-17 ENCOUNTER — NURSING HOME VISIT (OUTPATIENT)
Dept: POST ACUTE CARE | Facility: EXTERNAL LOCATION | Age: 70
End: 2025-04-17
Payer: MEDICARE

## 2025-04-17 DIAGNOSIS — M17.31 POST-TRAUMATIC ARTHRITIS OF LOWER LEG, RIGHT: ICD-10-CM

## 2025-04-17 DIAGNOSIS — M17.9 OSTEOARTHRITIS OF KNEE, UNSPECIFIED LATERALITY, UNSPECIFIED OSTEOARTHRITIS TYPE: Primary | ICD-10-CM

## 2025-04-17 DIAGNOSIS — F43.10 PTSD (POST-TRAUMATIC STRESS DISORDER): ICD-10-CM

## 2025-04-17 PROCEDURE — 99316 NF DSCHRG MGMT 30 MIN+: CPT | Performed by: FAMILY MEDICINE
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
## 2025-04-17 NOTE — TELEPHONE ENCOUNTER
Spoke to facility and given orders from recent office visit. Fax number obtained as [phone] and office note faxed. Will call our office with any questions or concerns.
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
## 2025-04-21 ENCOUNTER — APPOINTMENT (OUTPATIENT)
Dept: INFECTIOUS DISEASES | Facility: CLINIC | Age: 70
End: 2025-04-21
Payer: MEDICARE

## 2025-04-21 DIAGNOSIS — M86.461 CHRONIC OSTEOMYELITIS OF RIGHT TIBIA WITH DRAINING SINUS (MULTI): Primary | ICD-10-CM

## 2025-04-21 PROCEDURE — 1111F DSCHRG MED/CURRENT MED MERGE: CPT | Performed by: INTERNAL MEDICINE

## 2025-04-21 PROCEDURE — 1157F ADVNC CARE PLAN IN RCRD: CPT | Performed by: INTERNAL MEDICINE

## 2025-04-21 PROCEDURE — 99214 OFFICE O/P EST MOD 30 MIN: CPT | Performed by: INTERNAL MEDICINE

## 2025-04-21 ASSESSMENT — ENCOUNTER SYMPTOMS
CARDIOVASCULAR NEGATIVE: 1
HEMATOLOGIC/LYMPHATIC NEGATIVE: 1
NEUROLOGICAL NEGATIVE: 1
RESPIRATORY NEGATIVE: 1
MUSCULOSKELETAL NEGATIVE: 1
PSYCHIATRIC NEGATIVE: 1
ENDOCRINE NEGATIVE: 1
ALLERGIC/IMMUNOLOGIC NEGATIVE: 1
CONSTITUTIONAL NEGATIVE: 1
EYES NEGATIVE: 1
GASTROINTESTINAL NEGATIVE: 1
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
## 2025-04-30 NOTE — PROGRESS NOTES
Subjective   HPI   Adriana Wood is a 69 y.o. female with a past medical history of bradycardia, cardiomyopathy, CAD, cardiac arrest due to electrolyte abnormalities and zofran use post-operatively, and traumatic brain injury. Patient had a total knee right knee done by Dr. Whelan in March 2024 which was complicated by E. Coli infection. Patient had right TKA wound dehiscence s/p I&D right knee with polyswap and attempted re-closure of complex wound on 5/10/2024 by Dr. Whelan. She was referred to plastic surgery (Dr. Reyes) perioperatively given projected anticipated need for possible flap coverage of the wound/defect site. Patient re-admitted and returned to the OR with orthopedics on 2/28/2025 for right knee I&D, revision static spacer and application of incisional wound vac. Plastic surgery service consulted postoperatively to follow for flap recommendations/timing. She is s/p R latissimus free flap to R knee on 3/14 with Dr. Lundberg. Discharged on 3/24/25 to SNF, and re-admitted on 3/26/25 for concerns for right latissimus dorsi hematoma. Hospital coarse was complicated by episode of bradycardia, HR in the 30's on 3/30, asymptomatic, medicine consulted. Repeat US of chest on 4/1 indicates large right lateral chest wall hematoma with a drain in place. Patient requiring return to OR on 4/3 for right chest wall hematoma evacuation. Discharged on 4/9 with 4-week course of lovenox as well as vancomycin for knee prosthetic infection.    4/14: Doing very well postoperatively. Pain reasonably well-controlled, though she continues to have intermittent spasms. Some continued mild drainage from right flank, though decreasing in quantity. No recurrence of hematoma or new bruising/swelling of surgical sites. Has no other concerns at this time. Denies f/c, n/v, skin changes.  5/12: Doing well post operatively. Has been home for the past 4 weeks and completed her Lovenox and vancomycin treatments. She continues to  describe a pinpoint ache/soreness from her right lateral back at the superior portion of her incision. She notes a couple days ago she has minimal bleeding from her knee incision flap. Controlled with pressure and currently with minimal scabbing. She is wanting to know when she can move forward with Dr Whelan for her knee replacement surgery. (Currently 8 weeks from flap reconstruction)    Assessment:  Vital Signs:  /60   Pulse (!) 36       ROS:  All 10 systems were reviewed and are unremarkable except for those mentioned in HPI.     Physical Exam  Vitals and nursing note reviewed. Exam conducted with a chaperone present.   Constitutional:       General: She is not in acute distress.     Appearance: She is somewhat chronically ill-appearing.   Eyes:      Extraocular Movements: Extraocular movements intact.      Conjunctiva/sclera: Conjunctivae normal.      Pupils: Pupils are equal, round, and reactive to light.   Cardiovascular:      Rate and Rhythm: Normal rate and regular rhythm.      Pulses: Normal pulses.   Pulmonary:      Effort: Pulmonary effort is normal.      Breath sounds: Normal breath sounds.   Abdominal:      Palpations: Abdomen is soft. There is no mass.      Tenderness: There is no abdominal tenderness.      Hernia: No hernia is present.   Musculoskeletal:         General: No swelling or tenderness.      Cervical back: Normal range of motion and neck supple.   Skin:     Capillary Refill: Capillary refill takes less than 2 seconds.      Coloration: Skin is not jaundiced.      Findings: No bruising or rash.   Neurological:      General: No focal deficit present.      Mental Status: She is oriented to person, place, and time.   Psychiatric:         Mood and Affect: Mood normal.         Behavior: Behavior normal.         Thought Content: Thought content normal.         Judgment: Judgment normal.     Focused Exam:  RLE flap: warm to touch, soft and compressible circumferential incisions around flap  well approximated with some scabbing at the inferior portion of the flap. No erythema or fluctuance.  Right posterior lat donor site: incision healed to distal end, incision well approximated and following post operative course. Hyperpigmentation consistent with healing course. No erythema, bleeding, hematoma or fluctuance.    Assessment/Plan   Now POD 59 following free flap 3/14/2025; POD 39 s/p hematoma evacuation 4/3. No rebleeding since cessation of eliquis, currently on a 4 week course of lovenox 40 daily. Completed her vancomycin postoperatively. Overall, appropriate for postoperative course and she appears to have excellent viability of her free flap. Her donor site also appears to be appropriate for this stage of recovery.     Her free flap and her donor sites do not require further wound care. She is ok to shower; allow water to run over the wounds without scrubbing at the wounds. No swimming/soaking baths for 2 months postoperatively (June 2025).     We have ordered 5 days of percocet 7.5-325 in order to help wean her pain requirements given she is still taking oxycodone 10. She is not describing neuropathic pain and has prior peptic ulcer disease, so we will defer gabapentin and celebrex at this time.     Patient states she would like to go home; she is cleared to leave facility from a plastic surgery perspective.    Will follow up with Dr. Whelan; would recommend 6-12 weeks of flap healing time prior to orthopedic interventions.     Activity:  - Continue to maintain non-weight bearing status to RLE.   - Ensure no compression is applied to the flap site.   - Continue to elevate your leg to alleviate edema.   - Please do not apply ice or heat directly to flap without barrier in place .  - You may shower. Check water temperature.   - Avoid soaking or submerging surgical incisions/sites until cleared by Dr Reyes     Surgical Site/Wound Care:   - Local wound care instructions. Avoid application of creams,  lotions or ointments to surgical site, no soaking or scrubbing of surgical sites.   - Continue to monitor flap for changes in general appearance, color, temperature and turgor. Please additionally monitor for any developing signs of infection which may include increased redness, swelling, fever/chills, green/yellow drainage, or foul odor from surgical sites or wounds. If any signs of infection or changes in flap appearance are to occur, please immediately contact the plastic surgery office.     Medications  - IV Vancomycin- completed  - Lovenox- completed on 5/8/25   - Follow up in [facility] clinic  in 3-4 weeks.  - Updated Dr Whelan team on 5/12. Okay for moving forward with orthopedic intervention after another 3-4 weeks (end date June 9th)    Seen and discussed with Dr. Reyes.    Melissa Shaikh PA-C

## 2025-05-07 DIAGNOSIS — M17.31 POST-TRAUMATIC ARTHRITIS OF LOWER LEG, RIGHT: Primary | ICD-10-CM

## 2025-05-07 DIAGNOSIS — T84.50XD PROSTHETIC JOINT INFECTION, SUBSEQUENT ENCOUNTER: ICD-10-CM

## 2025-05-07 DIAGNOSIS — T84.7XXD HARDWARE COMPLICATING WOUND INFECTION, SUBSEQUENT ENCOUNTER: ICD-10-CM

## 2025-05-07 RX ORDER — OXYCODONE HYDROCHLORIDE 5 MG/1
5 TABLET ORAL EVERY 4 HOURS PRN
Qty: 84 TABLET | Refills: 0 | Status: SHIPPED | OUTPATIENT
Start: 2025-05-07 | End: 2025-05-21

## 2025-05-07 RX ORDER — NALOXONE HYDROCHLORIDE 4 MG/.1ML
1 SPRAY NASAL AS NEEDED
Qty: 2 EACH | Refills: 0 | Status: SHIPPED | OUTPATIENT
Start: 2025-05-07

## 2025-05-09 ENCOUNTER — TELEPHONE (OUTPATIENT)
Dept: PLASTIC SURGERY | Facility: CLINIC | Age: 70
End: 2025-05-09
Payer: MEDICARE

## 2025-05-09 NOTE — TELEPHONE ENCOUNTER
Spoke with patient regarding confirmation of upcoming appointment on Monday May 12, at 10:00 am. Pt verbalizes confirmation. We will see her in our office on Monday

## 2025-05-12 ENCOUNTER — APPOINTMENT (OUTPATIENT)
Dept: PLASTIC SURGERY | Facility: CLINIC | Age: 70
End: 2025-05-12
Payer: MEDICARE

## 2025-05-12 VITALS — SYSTOLIC BLOOD PRESSURE: 125 MMHG | DIASTOLIC BLOOD PRESSURE: 60 MMHG | HEART RATE: 36 BPM

## 2025-05-12 DIAGNOSIS — Z98.890 POSTOPERATIVE STATE: Primary | ICD-10-CM

## 2025-05-12 PROCEDURE — 99213 OFFICE O/P EST LOW 20 MIN: CPT

## 2025-05-12 PROCEDURE — 1159F MED LIST DOCD IN RCRD: CPT

## 2025-05-12 RX ORDER — POLYETHYLENE GLYCOL 3350 17 G/17G
17 POWDER, FOR SOLUTION ORAL DAILY
COMMUNITY

## 2025-05-23 DIAGNOSIS — T84.50XD PROSTHETIC JOINT INFECTION, SUBSEQUENT ENCOUNTER: ICD-10-CM

## 2025-05-23 DIAGNOSIS — T84.7XXD HARDWARE COMPLICATING WOUND INFECTION, SUBSEQUENT ENCOUNTER: ICD-10-CM

## 2025-05-23 DIAGNOSIS — M17.31 POST-TRAUMATIC ARTHRITIS OF LOWER LEG, RIGHT: ICD-10-CM

## 2025-05-23 RX ORDER — OXYCODONE HYDROCHLORIDE 5 MG/1
5 TABLET ORAL EVERY 4 HOURS PRN
Qty: 84 TABLET | Refills: 0 | Status: SHIPPED | OUTPATIENT
Start: 2025-05-23 | End: 2025-05-27 | Stop reason: SDUPTHER

## 2025-05-27 RX ORDER — OXYCODONE HYDROCHLORIDE 5 MG/1
5 TABLET ORAL EVERY 4 HOURS PRN
Qty: 84 TABLET | Refills: 0 | Status: SHIPPED | OUTPATIENT
Start: 2025-05-27 | End: 2025-06-10

## 2025-05-28 NOTE — PROGRESS NOTES
Subjective   HPI   Adriana Wood is a 69 y.o. female with a past medical history of bradycardia, cardiomyopathy, CAD, cardiac arrest due to electrolyte abnormalities and zofran use post-operatively, and traumatic brain injury. Patient had a right TKA by Dr. Whelan in March 13, 2024 which was complicated by E. Coli infection and wound dehiscence s/p I&D right knee with polyswap and attempted re-closure of complex wound on 5/10/2024 by Dr. Whelan. She was referred to plastic surgery (Dr. Reyes) perioperatively given projected anticipated need for possible flap coverage of the wound/defect site. Patient re-admitted and returned to the OR with orthopedics on 2/28/2025 for right knee I&D, revision static spacer and application of incisional wound vac. Plastic surgery service consulted postoperatively to follow for flap recommendations/timing. She is s/p R latissimus free flap to R knee on 3/14/25 with Dr. Lundberg. Discharged on 3/24/25 to SNF, and re-admitted on 3/26/25 for concerns for right latissimus dorsi hematoma. Hospital coarse was complicated by episode of bradycardia, HR in the 30's on 3/30, asymptomatic, medicine consulted. Repeat US of chest on 4/1 indicates large right lateral chest wall hematoma with a drain in place. Patient requiring return to OR on 4/3 for right chest wall hematoma evacuation. Discharged on 4/9 with 4-week course of lovenox as well as vancomycin for knee prosthetic infection.    4/14: Doing very well postoperatively. Pain reasonably well-controlled, though she continues to have intermittent spasms. Some continued mild drainage from right flank, though decreasing in quantity. No recurrence of hematoma or new bruising/swelling of surgical sites. Has no other concerns at this time. Denies f/c, n/v, skin changes.  5/12: Doing well post operatively. Has been home for the past 4 weeks and completed her Lovenox and vancomycin treatments. She continues to describe a pinpoint  ache/soreness from her right lateral back at the superior portion of her incision. She notes a couple days ago she has minimal bleeding from her knee incision flap. Controlled with pressure and currently with minimal scabbing. She is wanting to know when she can move forward with Dr Whelan for her knee replacement surgery. (Currently 8 weeks from flap reconstruction).  6/9: POD#87 (12.5 weeks)- doing well post operatively. Denies pain from sites. Denies any drainage, fever, chills, n/v. Wondering about moving forward with knee replacement.     Assessment:  Vital Signs:  Blood pressure 107/74, pulse 54, resp. rate 18, weight 68.9 kg (152 lb).      ROS:  All 10 systems were reviewed and are unremarkable except for those mentioned in HPI.     Physical Exam  Vitals and nursing note reviewed. Exam conducted with a chaperone present.   Constitutional:       General: She is not in acute distress.     Appearance: She is somewhat chronically ill-appearing.   Eyes:      Extraocular Movements: Extraocular movements intact.      Conjunctiva/sclera: Conjunctivae normal.      Pupils: Pupils are equal, round, and reactive to light.   Cardiovascular:      Rate and Rhythm: Normal rate and regular rhythm.      Pulses: Normal pulses.   Pulmonary:      Effort: Pulmonary effort is normal.      Breath sounds: Normal breath sounds.   Abdominal:      Palpations: Abdomen is soft. There is no mass.      Tenderness: There is no abdominal tenderness.      Hernia: No hernia is present.   Musculoskeletal:         General: No swelling or tenderness.      Cervical back: Normal range of motion and neck supple.   Skin:     Capillary Refill: Capillary refill takes less than 2 seconds.      Coloration: Skin is not jaundiced.      Findings: No bruising or rash.   Neurological:      General: No focal deficit present.      Mental Status: She is oriented to person, place, and time.   Psychiatric:         Mood and Affect: Mood normal.         Behavior:  Behavior normal.         Thought Content: Thought content normal.         Judgment: Judgment normal.     Focused Exam:  RLE flap: warm to touch, soft and compressible circumferential incisions around flap well approximated minor scabbing at the inferior portion of the flap. No erythema or fluctuance.  Right posterior lat donor site: incision healed to distal end, incision well approximated and following post operative course. Hyperpigmentation consistent with healing course. No erythema, bleeding, hematoma or fluctuance.    Assessment/Plan   Now POD 87 following free flap 3/14/2025; s/p hematoma evacuation 4/3/2025. No rebleeding since cessation of eliquis, completed 4 week course of lovenox postoperatively. Completed her vancomycin postoperatively. Overall, appropriate for postoperative course and she appears to have excellent viability of her free flap. Her donor site also appears to be appropriate for this stage of recovery.     Her free flap and her donor sites do not require further wound care. She is ok to shower; allow water to run over the wounds without scrubbing at the wounds.    - OK from a plastics perspective to move forward with right total knee replacement per Dr. Whelan. Recommend patient schedule appointment with Dr. Whelan's office  - Per patient, infectious disease will want to be re-engaged prior to knee replacement, please notify them of any upcoming orthopaedic surgical plans    Activity:  - Continue to maintain non-weight bearing status to RLE.   - Ensure no compression is applied to the flap site.   - Continue to elevate your leg to alleviate edema.   - Please do not apply ice or heat directly to flap without barrier in place .  - You may shower. Check water temperature.     Surgical Site/Wound Care:   - Local wound care instructions. Avoid application of creams, lotions or ointments to surgical site, no soaking or scrubbing of surgical sites. OK to use Aquaphor around incisional flap sites  for concerns of dry skin.    - Continue to monitor flap for changes in general appearance, color, temperature and turgor. Please additionally monitor for any developing signs of infection which may include increased redness, swelling, fever/chills, green/yellow drainage, or foul odor from surgical sites or wounds. If any signs of infection or changes in flap appearance are to occur, please immediately contact the plastic surgery office.     Medications  - IV Vancomycin- completed  - Lovenox- completed   - Follow up in [facility] as needed.    Seen and discussed with Dr. Reyes.    Ronny Felix PA-C  Plastic and Reconstructive Surgery

## 2025-06-06 ENCOUNTER — APPOINTMENT (OUTPATIENT)
Dept: PRIMARY CARE | Facility: CLINIC | Age: 70
End: 2025-06-06
Payer: MEDICARE

## 2025-06-06 VITALS — SYSTOLIC BLOOD PRESSURE: 126 MMHG | DIASTOLIC BLOOD PRESSURE: 78 MMHG | HEART RATE: 59 BPM | OXYGEN SATURATION: 97 %

## 2025-06-06 DIAGNOSIS — E78.5 HYPERLIPIDEMIA, UNSPECIFIED HYPERLIPIDEMIA TYPE: ICD-10-CM

## 2025-06-06 DIAGNOSIS — I42.8 OTHER CARDIOMYOPATHY: Primary | ICD-10-CM

## 2025-06-06 DIAGNOSIS — Z98.890 POSTOPERATIVE STATE: ICD-10-CM

## 2025-06-06 DIAGNOSIS — J01.00 ACUTE NON-RECURRENT MAXILLARY SINUSITIS: ICD-10-CM

## 2025-06-06 DIAGNOSIS — M86.161: ICD-10-CM

## 2025-06-06 DIAGNOSIS — L30.9 ECZEMA, UNSPECIFIED TYPE: ICD-10-CM

## 2025-06-06 DIAGNOSIS — M25.50 ARTHRALGIA, UNSPECIFIED JOINT: ICD-10-CM

## 2025-06-06 PROCEDURE — 99214 OFFICE O/P EST MOD 30 MIN: CPT | Performed by: FAMILY MEDICINE

## 2025-06-06 RX ORDER — CEPHALEXIN 500 MG/1
500 CAPSULE ORAL 2 TIMES DAILY
Qty: 14 CAPSULE | Refills: 0 | Status: SHIPPED | OUTPATIENT
Start: 2025-06-06 | End: 2025-06-13

## 2025-06-06 RX ORDER — PIMECROLIMUS 10 MG/G
CREAM TOPICAL 2 TIMES DAILY
Qty: 100 G | Refills: 1 | Status: SHIPPED | OUTPATIENT
Start: 2025-06-06 | End: 2025-07-18

## 2025-06-06 ASSESSMENT — ENCOUNTER SYMPTOMS
ARTHRALGIAS: 1
COLOR CHANGE: 0
JOINT SWELLING: 1
SHORTNESS OF BREATH: 0
TROUBLE SWALLOWING: 0
SEIZURES: 0
BLOOD IN STOOL: 0
NERVOUS/ANXIOUS: 0
UNEXPECTED WEIGHT CHANGE: 0
DIFFICULTY URINATING: 0
APPETITE CHANGE: 0
PALPITATIONS: 0
CONFUSION: 0
CONSTIPATION: 0
FEVER: 0
DIARRHEA: 0
HEMATURIA: 0

## 2025-06-06 ASSESSMENT — PATIENT HEALTH QUESTIONNAIRE - PHQ9
2. FEELING DOWN, DEPRESSED OR HOPELESS: NOT AT ALL
SUM OF ALL RESPONSES TO PHQ9 QUESTIONS 1 AND 2: 0
1. LITTLE INTEREST OR PLEASURE IN DOING THINGS: NOT AT ALL
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
## 2025-06-09 ENCOUNTER — APPOINTMENT (OUTPATIENT)
Dept: PLASTIC SURGERY | Facility: CLINIC | Age: 70
End: 2025-06-09
Payer: MEDICARE

## 2025-06-09 VITALS
DIASTOLIC BLOOD PRESSURE: 74 MMHG | RESPIRATION RATE: 18 BRPM | HEART RATE: 54 BPM | BODY MASS INDEX: 21.81 KG/M2 | SYSTOLIC BLOOD PRESSURE: 107 MMHG | WEIGHT: 152 LBS

## 2025-06-09 DIAGNOSIS — Z98.890 POSTOPERATIVE STATE: Primary | ICD-10-CM

## 2025-06-10 PROBLEM — I47.21 TORSADES DE POINTES (MULTI): Status: RESOLVED | Noted: 2023-08-09 | Resolved: 2025-06-10

## 2025-06-10 PROBLEM — S02.30XA: Status: RESOLVED | Noted: 2023-08-09 | Resolved: 2025-06-10

## 2025-06-10 ASSESSMENT — ENCOUNTER SYMPTOMS
SINUS PRESSURE: 1
SINUS PAIN: 1
RHINORRHEA: 1

## 2025-06-16 DIAGNOSIS — L30.9 ECZEMA, UNSPECIFIED TYPE: Primary | ICD-10-CM

## 2025-06-16 DIAGNOSIS — T84.50XD PROSTHETIC JOINT INFECTION, SUBSEQUENT ENCOUNTER: ICD-10-CM

## 2025-06-16 DIAGNOSIS — T84.7XXD HARDWARE COMPLICATING WOUND INFECTION, SUBSEQUENT ENCOUNTER: ICD-10-CM

## 2025-06-16 DIAGNOSIS — M17.31 POST-TRAUMATIC ARTHRITIS OF LOWER LEG, RIGHT: ICD-10-CM

## 2025-06-16 RX ORDER — OXYCODONE HYDROCHLORIDE 5 MG/1
5 TABLET ORAL EVERY 4 HOURS PRN
Qty: 84 TABLET | Refills: 0 | Status: SHIPPED | OUTPATIENT
Start: 2025-06-16 | End: 2025-06-30

## 2025-06-16 RX ORDER — TRIAMCINOLONE ACETONIDE 5 MG/G
CREAM TOPICAL 2 TIMES DAILY
Qty: 45 G | Refills: 0 | Status: SHIPPED | OUTPATIENT
Start: 2025-06-16

## 2025-06-30 DIAGNOSIS — M17.31 POST-TRAUMATIC ARTHRITIS OF LOWER LEG, RIGHT: ICD-10-CM

## 2025-06-30 DIAGNOSIS — T84.7XXD HARDWARE COMPLICATING WOUND INFECTION, SUBSEQUENT ENCOUNTER: ICD-10-CM

## 2025-06-30 DIAGNOSIS — T84.50XD PROSTHETIC JOINT INFECTION, SUBSEQUENT ENCOUNTER: ICD-10-CM

## 2025-06-30 RX ORDER — OXYCODONE HYDROCHLORIDE 5 MG/1
5 TABLET ORAL EVERY 4 HOURS PRN
Qty: 84 TABLET | Refills: 0 | Status: SHIPPED | OUTPATIENT
Start: 2025-06-30 | End: 2025-07-14

## 2025-07-17 DIAGNOSIS — M17.31 POST-TRAUMATIC ARTHRITIS OF LOWER LEG, RIGHT: ICD-10-CM

## 2025-07-17 DIAGNOSIS — T84.50XD PROSTHETIC JOINT INFECTION, SUBSEQUENT ENCOUNTER: ICD-10-CM

## 2025-07-17 DIAGNOSIS — T84.7XXD HARDWARE COMPLICATING WOUND INFECTION, SUBSEQUENT ENCOUNTER: ICD-10-CM

## 2025-07-18 DIAGNOSIS — T84.7XXD HARDWARE COMPLICATING WOUND INFECTION, SUBSEQUENT ENCOUNTER: ICD-10-CM

## 2025-07-18 DIAGNOSIS — M17.31 POST-TRAUMATIC ARTHRITIS OF LOWER LEG, RIGHT: ICD-10-CM

## 2025-07-18 DIAGNOSIS — M79.672 LEFT FOOT PAIN: Primary | ICD-10-CM

## 2025-07-18 DIAGNOSIS — T84.50XD PROSTHETIC JOINT INFECTION, SUBSEQUENT ENCOUNTER: ICD-10-CM

## 2025-07-18 RX ORDER — OXYCODONE HYDROCHLORIDE 5 MG/1
5 TABLET ORAL EVERY 4 HOURS PRN
Qty: 84 TABLET | Refills: 0 | Status: SHIPPED | OUTPATIENT
Start: 2025-07-18 | End: 2025-08-01

## 2025-07-18 RX ORDER — OXYCODONE HYDROCHLORIDE 5 MG/1
5 TABLET ORAL EVERY 4 HOURS PRN
Qty: 84 TABLET | Refills: 0 | Status: SHIPPED | OUTPATIENT
Start: 2025-07-18 | End: 2025-07-18 | Stop reason: SDUPTHER

## 2025-07-21 DIAGNOSIS — M17.31 POST-TRAUMATIC ARTHRITIS OF LOWER LEG, RIGHT: ICD-10-CM

## 2025-07-21 DIAGNOSIS — T84.50XD PROSTHETIC JOINT INFECTION, SUBSEQUENT ENCOUNTER: ICD-10-CM

## 2025-07-21 DIAGNOSIS — T84.7XXD HARDWARE COMPLICATING WOUND INFECTION, SUBSEQUENT ENCOUNTER: ICD-10-CM

## 2025-07-28 DIAGNOSIS — T84.50XD PROSTHETIC JOINT INFECTION, SUBSEQUENT ENCOUNTER: ICD-10-CM

## 2025-07-28 DIAGNOSIS — Z96.651 STATUS POST TOTAL RIGHT KNEE REPLACEMENT: ICD-10-CM

## 2025-07-31 DIAGNOSIS — T84.50XD PROSTHETIC JOINT INFECTION, SUBSEQUENT ENCOUNTER: ICD-10-CM

## 2025-07-31 DIAGNOSIS — T84.7XXD HARDWARE COMPLICATING WOUND INFECTION, SUBSEQUENT ENCOUNTER: ICD-10-CM

## 2025-07-31 DIAGNOSIS — M17.31 POST-TRAUMATIC ARTHRITIS OF LOWER LEG, RIGHT: ICD-10-CM

## 2025-07-31 RX ORDER — OXYCODONE HYDROCHLORIDE 5 MG/1
5 TABLET ORAL EVERY 4 HOURS PRN
Qty: 120 TABLET | Refills: 0 | OUTPATIENT
Start: 2025-07-31 | End: 2025-08-30

## 2025-07-31 RX ORDER — OXYCODONE HYDROCHLORIDE 5 MG/1
5 TABLET ORAL EVERY 4 HOURS PRN
Qty: 120 TABLET | Refills: 0 | Status: SHIPPED | OUTPATIENT
Start: 2025-07-31 | End: 2025-08-30

## 2025-08-11 ENCOUNTER — OFFICE VISIT (OUTPATIENT)
Dept: ORTHOPEDIC SURGERY | Facility: HOSPITAL | Age: 70
End: 2025-08-11
Payer: MEDICARE

## 2025-08-11 ENCOUNTER — HOSPITAL ENCOUNTER (OUTPATIENT)
Dept: RADIOLOGY | Facility: HOSPITAL | Age: 70
Discharge: HOME | End: 2025-08-11
Payer: MEDICARE

## 2025-08-11 DIAGNOSIS — Z96.651 STATUS POST TOTAL RIGHT KNEE REPLACEMENT: ICD-10-CM

## 2025-08-11 DIAGNOSIS — Z01.818 PRE-OP EVALUATION: ICD-10-CM

## 2025-08-11 PROCEDURE — 73560 X-RAY EXAM OF KNEE 1 OR 2: CPT | Mod: 50

## 2025-08-11 PROCEDURE — 73560 X-RAY EXAM OF KNEE 1 OR 2: CPT | Mod: BILATERAL PROCEDURE | Performed by: RADIOLOGY

## 2025-08-11 PROCEDURE — 99215 OFFICE O/P EST HI 40 MIN: CPT | Performed by: ORTHOPAEDIC SURGERY

## 2025-08-12 LAB
C-REACTIVE PROTEIN WIDE RANGE: <3 MG/L
CRP SERPL-MCNC: <3 MG/L
ERYTHROCYTE [SEDIMENTATION RATE] IN BLOOD BY WESTERGREN METHOD: 22 MM/H
SED RATE, AUTOMATED: 22 MM/H

## 2025-08-25 DIAGNOSIS — T84.7XXD HARDWARE COMPLICATING WOUND INFECTION, SUBSEQUENT ENCOUNTER: ICD-10-CM

## 2025-08-25 DIAGNOSIS — M17.31 POST-TRAUMATIC ARTHRITIS OF LOWER LEG, RIGHT: ICD-10-CM

## 2025-08-25 DIAGNOSIS — T84.50XD PROSTHETIC JOINT INFECTION, SUBSEQUENT ENCOUNTER: ICD-10-CM

## 2025-08-25 RX ORDER — OXYCODONE HYDROCHLORIDE 5 MG/1
5 TABLET ORAL EVERY 4 HOURS PRN
Qty: 180 TABLET | Refills: 0 | Status: SHIPPED | OUTPATIENT
Start: 2025-08-25 | End: 2025-09-24

## 2025-08-28 ENCOUNTER — HOSPITAL ENCOUNTER (OUTPATIENT)
Dept: RADIOLOGY | Facility: HOSPITAL | Age: 70
Discharge: HOME | End: 2025-08-28
Payer: MEDICARE

## 2025-08-28 DIAGNOSIS — Z01.818 PRE-OP EVALUATION: ICD-10-CM

## 2025-08-28 DIAGNOSIS — Z96.651 STATUS POST TOTAL RIGHT KNEE REPLACEMENT: ICD-10-CM

## 2025-08-28 DIAGNOSIS — M79.672 LEFT FOOT PAIN: ICD-10-CM

## 2025-08-28 PROCEDURE — 73610 X-RAY EXAM OF ANKLE: CPT | Mod: LT

## 2025-08-28 PROCEDURE — 73630 X-RAY EXAM OF FOOT: CPT | Mod: LT

## 2025-08-28 PROCEDURE — 73721 MRI JNT OF LWR EXTRE W/O DYE: CPT | Mod: RT

## 2025-09-05 ENCOUNTER — APPOINTMENT (OUTPATIENT)
Dept: PRIMARY CARE | Facility: CLINIC | Age: 70
End: 2025-09-05
Payer: MEDICARE

## 2025-09-05 PROBLEM — L89.152 PRESSURE ULCER OF SACRAL REGION, STAGE 2 (MULTI): Status: ACTIVE | Noted: 2025-09-05

## 2025-09-11 ENCOUNTER — APPOINTMENT (OUTPATIENT)
Dept: ORTHOPEDIC SURGERY | Facility: CLINIC | Age: 70
End: 2025-09-11
Payer: MEDICARE

## (undated) DEVICE — APPLICATION KIT, ADVANCED CEMENT MIXING/BIO-PREP CEMENT

## (undated) DEVICE — BANDAGE, COFLEX, 6 X 5 YDS, FOAM TAN, STERILE, LF

## (undated) DEVICE — Device

## (undated) DEVICE — MANIFOLD, 4 PORT NEPTUNE STANDARD

## (undated) DEVICE — THERAPY UNIT, PREVENA PLUS 125

## (undated) DEVICE — TOWEL, SURGICAL, NEURO, O/R, 16 X 26, BLUE, STERILE

## (undated) DEVICE — NEEDLE, HYPODERMIC, REGULAR WALL, REGULAR BEVEL, 30 G X 0.5 IN

## (undated) DEVICE — PADDING, WEBRIL, UNDERCAST, STERILE, 4 IN

## (undated) DEVICE — BANDAGE, ELASTIC, MATRIX, SELF-CLOSURE, 4 IN X 5 YD, LF

## (undated) DEVICE — STOCKINETTE, IMPERVIOUS, 12 X 48 IN, LF, STERILE

## (undated) DEVICE — CANNULA, CHANG HYDRO- DISSECT, 27G, FLAT TIP, DISP

## (undated) DEVICE — COVER, CART, 45 X 27 X 48 IN, CLEAR

## (undated) DEVICE — DRAIN, CHANNEL, HUBLESS, 1/4 ROUND FULL FLUTE, 19 FR/W 1/4" TROCAR"

## (undated) DEVICE — COVER, C-ARM W/CLIPS, OEC GE

## (undated) DEVICE — BLADE, SAW, DUAL SAGITTAL, 1.9 X 90 X 30

## (undated) DEVICE — DRAPE, SHEET, U, STERI DRAPE, 47 X 51 IN, DISPOSABLE, STERILE

## (undated) DEVICE — SPONGE, LAP, XRAY DECT, 18IN X 18IN, W/MASTER DMT, STERILE

## (undated) DEVICE — MIXER, CEMENT, MIXEVAC III HIGH VACUUM KIT, STERILE

## (undated) DEVICE — SUTURE, MONOCRYL, 4-0, 18 IN, PS2, UNDYED

## (undated) DEVICE — DRESSING, PREVENA PLUS, CUSTOMIZABLE, 90CM

## (undated) DEVICE — DRAIN, WOUND, ROUND, W/TROCAR, HOLE PATTERN, 10 IN, MEDIUM, 1/8 X 49 IN

## (undated) DEVICE — SUTURE, VICRYL, 1, 27 IN, CT-1, VIOLET

## (undated) DEVICE — NEEDLE, MICRODISSECTION STR 4CM

## (undated) DEVICE — DRESSING, PREVENA, PEEL AND PLACE, 20CM

## (undated) DEVICE — APPLIER, LIGACLIP, MULTIPLE, SMALL 9-3/8IN

## (undated) DEVICE — PREP TRAY, SKIN, DRY, W/GLOVES

## (undated) DEVICE — COVER, TABLE, 44 X 75 IN, DISPOSABLE, LF, STERILE

## (undated) DEVICE — STRIP, SKIN CLOSURE, STERI STRIP, REINFORCED, 0.5 X 4 IN

## (undated) DEVICE — PADDING, WEBRIL, UNDERCAST, STERILE, 6 IN

## (undated) DEVICE — BANDAGE, ELASTIC, ACE, ACE, DOUBLE LENGTH, 6 X 550 IN, LF

## (undated) DEVICE — DRAPE COVER, C ARM, FLOUROSCAN IMAGING SYS

## (undated) DEVICE — WOUND VAC KIT, W/CANISTER, 500ML

## (undated) DEVICE — CLIPPER, SURGICAL BLADE ASSEMBLY, GENERAL PURPOSE, SINGLE USE

## (undated) DEVICE — APPLIER,  LIGACLIP MULTI CLIP, 30 MED 11 1/2

## (undated) DEVICE — HOOD, SURGICAL, FLYTE HYBRID

## (undated) DEVICE — BAG, DECANTER

## (undated) DEVICE — STAPLER, SKIN PROXIMATE, 35 WIDE

## (undated) DEVICE — HIGH FLOW TIP FOR INTERPULSE HANDPIECE SET

## (undated) DEVICE — HOOK, CEMENT, BLUE

## (undated) DEVICE — TIP, SUCTION, YANKAUER, FLEXIBLE

## (undated) DEVICE — SPONGE, HEMOSTATIC, GELATIN, SURGIFOAM, 8 X 12.5 CM X 10 MM

## (undated) DEVICE — NEEDLE, HYPODERMIC, REGULAR WALL, REGULAR BEVEL, 18 G X 1.5 IN

## (undated) DEVICE — APPLICATOR, CHLORAPREP, W/ORANGE TINT, 26ML

## (undated) DEVICE — DRESSING, MEPILEX BORDER, POST-OP AG, 4 X 6 IN

## (undated) DEVICE — SENSOR, SMALL PATCH, VIOPTIX

## (undated) DEVICE — SUTURE, VICRYL PLUS, 2-0, 27IN, PSL, UND, BRAIDED

## (undated) DEVICE — SUTURE, VICRYL, 2-0, 27 IN, FSL, UNDYED

## (undated) DEVICE — CATHETER, IV, INSYTE, AUTOGUARD, SHIELDED, 24 G X 0.75 IN, VIALON

## (undated) DEVICE — MICROCLIPS, GEM HEMOSTATIC TITANIUM

## (undated) DEVICE — MAYO TRAY, LARGE

## (undated) DEVICE — SUTURE, MONOCRYL, 2-0, 27 IN, SH/V-20 , UNDYED

## (undated) DEVICE — DRAPE, PAD, PREP, W/ 9 IN CUFF, 24 X 41, LF, NS

## (undated) DEVICE — CLAMP, DRAPE/TUBE, DISPOSABLE, NS

## (undated) DEVICE — SUTURE, PROLENE, 0, 30 IN, V-34, BLUE

## (undated) DEVICE — GOWN, ASTOUND, XL

## (undated) DEVICE — GOWN, SURGICAL, SMARTGOWN, XLARGE, STERILE

## (undated) DEVICE — COVER, TABLE, UHC

## (undated) DEVICE — BOLSTER, HIP

## (undated) DEVICE — SUTURE, ETHILON, 2-0, FSLX 30, BLACK

## (undated) DEVICE — SYRINGE, 1 CC, LUER LOCK

## (undated) DEVICE — INTERPULSE HANDPIECE SET W/ 10FT SUCTION TUBING

## (undated) DEVICE — PROTECTOR, NERVE, ULNAR, PINK

## (undated) DEVICE — EVACUATOR, WOUND, CLOSED, 3 SPRING, 400 CC, Y CONNECTING TUBE

## (undated) DEVICE — SUTURE, ETHILON, 2-0, 18 IN, FS, BLACK, BX/12

## (undated) DEVICE — DRAPE, SHEET, EXTREMITY, W/ARM BOARD COVERS, 87 X 106 X 128 IN, DISPOSABLE, LF, STERILE

## (undated) DEVICE — SUTURE, PDS II, 1, 36 IN, CT-1, VIOLET

## (undated) DEVICE — TOOL, MR8, 9CM MTL CUT, 3MM CARBIDE

## (undated) DEVICE — BANDAGE, COFLEX, 4 X 5 YDS, TAN, STERILE, LF

## (undated) DEVICE — DRAPE, INCISE, ANTIMICROBIAL, IOBAN 2, LARGE, 17 X 23 IN, DISPOSABLE, STERILE

## (undated) DEVICE — STRIP, SKIN CLOSURE, STERI STRIP, REINFORCED, 1 X 5 IN

## (undated) DEVICE — BANDAGE, ESMARK, 6 IN X 9 FT, STERILE

## (undated) DEVICE — SUTURE, ETHIBOND EXCEL 1, TAPER POINT CT-1 GREEN 30 INCH

## (undated) DEVICE — TIP, SUCTION, YANKAUER, BULB, ADULT

## (undated) DEVICE — COVER, BACK TABLE, 65 X 90, HVY REINFORCED

## (undated) DEVICE — DRAPE, INCISE, ANTIMICROBIAL, IOBAN 2, STERI DRAPE, 23 X 33 IN, DISPOSABLE, STERILE

## (undated) DEVICE — SYSTEM KIT, PREVENA PLUS

## (undated) DEVICE — CATHETER TRAY, SURESTEP, 16FR, URINE METER W/STATLOCK

## (undated) DEVICE — IMMOBILIZER, KNEE, POST OP, SUPER LITE, W/STRAIGHT STAYS, LARGE, 20 IN

## (undated) DEVICE — BANDAGE, GAUZE, CONFORMING, KERLIX, 6 PLY, 4.5 IN X 4.1 YD

## (undated) DEVICE — WOUND SYSTEM, DEBRIDEMENT & CLEANING, O.R DUOPAK

## (undated) DEVICE — DRESSING, NON-ADHERENT, OIL EMULSION, CURITY, 3 X 8 IN, STERILE

## (undated) DEVICE — DRAPE, SHEET, THREE QUARTER, FAN FOLD, 57 X 77 IN

## (undated) DEVICE — DRAPE, BIG CASE, BACK TABLE

## (undated) DEVICE — DRESSING, MEPILEX BORDER, POST-OP AG, 4 X 10 IN

## (undated) DEVICE — THERAPY UNIT, 14-DAY PREVENA PLUS 125

## (undated) DEVICE — COVER PROBE, SOFT FLEX W/ GEL, 5 X 48 IN (13X122CM)

## (undated) DEVICE — DRILL BIT, 4.2 X 180MM

## (undated) DEVICE — DRESSING, TRANSPARENT, TEGADERM, 2-3/8 X 2-3/4 IN

## (undated) DEVICE — CLEANER, WIPE, INSTRUMENT, 3.25 X 3.25 IN

## (undated) DEVICE — TIP, FEMORAL, CANAL, COAXIAL

## (undated) DEVICE — COLLECTION/DELIVERY SYSTEM, COPAN ESWAB, REG SIZE SWAB

## (undated) DEVICE — DRAPE PACK, UNIVERSAL II

## (undated) DEVICE — MARKER, SKIN, DUAL TIP, W/RULER AND LABEL

## (undated) DEVICE — STAY SET, SURGICAL, 5MM SHARP HOOK, 8PK

## (undated) DEVICE — SYSTEM KIT, INCISION, PREVENA PEEL AND PLACE, 20CM

## (undated) DEVICE — SYRINGE, 35 CC, LUER LOCK, MONOJECT, W/O CAP, LF

## (undated) DEVICE — BLADE, SAW PFC DUAL CUT 25 X 90

## (undated) DEVICE — NEEDLE, SAFETY, 18 G X 1.5 IN

## (undated) DEVICE — COVER HANDLE LIGHT, STERIS, BLUE, STERILE

## (undated) DEVICE — BLADE, SAW, SAGITTAL, 18.5 X 73 X 0.76 MM, STAINLESS STEEL, STERILE